# Patient Record
Sex: MALE | Race: BLACK OR AFRICAN AMERICAN | NOT HISPANIC OR LATINO | Employment: OTHER | URBAN - METROPOLITAN AREA
[De-identification: names, ages, dates, MRNs, and addresses within clinical notes are randomized per-mention and may not be internally consistent; named-entity substitution may affect disease eponyms.]

---
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
## 2017-12-15 ENCOUNTER — OFFICE VISIT (OUTPATIENT)
Dept: SPINE | Facility: CLINIC | Age: 82
End: 2017-12-15
Attending: PHYSICAL MEDICINE & REHABILITATION
Payer: MEDICARE

## 2017-12-15 ENCOUNTER — LAB VISIT (OUTPATIENT)
Dept: LAB | Facility: OTHER | Age: 82
End: 2017-12-15
Attending: PHYSICAL MEDICINE & REHABILITATION
Payer: MEDICARE

## 2017-12-15 VITALS
HEART RATE: 60 BPM | SYSTOLIC BLOOD PRESSURE: 142 MMHG | WEIGHT: 256 LBS | HEIGHT: 71 IN | BODY MASS INDEX: 35.84 KG/M2 | DIASTOLIC BLOOD PRESSURE: 67 MMHG

## 2017-12-15 DIAGNOSIS — G95.9 CERVICAL MYELOPATHY: ICD-10-CM

## 2017-12-15 DIAGNOSIS — R26.81 GAIT INSTABILITY: ICD-10-CM

## 2017-12-15 DIAGNOSIS — M40.203 KYPHOSIS OF CERVICOTHORACIC REGION, UNSPECIFIED KYPHOSIS TYPE: ICD-10-CM

## 2017-12-15 DIAGNOSIS — M54.2 NECK PAIN: Primary | ICD-10-CM

## 2017-12-15 LAB
CREAT SERPL-MCNC: 0.9 MG/DL
EST. GFR  (AFRICAN AMERICAN): >60 ML/MIN/1.73 M^2
EST. GFR  (NON AFRICAN AMERICAN): >60 ML/MIN/1.73 M^2

## 2017-12-15 PROCEDURE — 99999 PR PBB SHADOW E&M-EST. PATIENT-LVL III: CPT | Mod: PBBFAC,,, | Performed by: PHYSICAL MEDICINE & REHABILITATION

## 2017-12-15 PROCEDURE — 36415 COLL VENOUS BLD VENIPUNCTURE: CPT

## 2017-12-15 PROCEDURE — 99204 OFFICE O/P NEW MOD 45 MIN: CPT | Mod: S$PBB,,, | Performed by: PHYSICAL MEDICINE & REHABILITATION

## 2017-12-15 PROCEDURE — 82565 ASSAY OF CREATININE: CPT

## 2017-12-15 PROCEDURE — 99213 OFFICE O/P EST LOW 20 MIN: CPT | Mod: PBBFAC | Performed by: PHYSICAL MEDICINE & REHABILITATION

## 2017-12-15 RX ORDER — LACTULOSE 10 G/15ML
SOLUTION ORAL
Refills: 0 | COMMUNITY
Start: 2017-12-06

## 2017-12-15 RX ORDER — DILTIAZEM HYDROCHLORIDE 120 MG/1
90 CAPSULE, EXTENDED RELEASE ORAL
Refills: 0 | COMMUNITY
Start: 2017-11-02

## 2017-12-15 RX ORDER — APIXABAN 2.5 MG/1
2.5 TABLET, FILM COATED ORAL 2 TIMES DAILY
Refills: 0 | Status: ON HOLD | COMMUNITY
Start: 2017-11-29 | End: 2018-05-18 | Stop reason: HOSPADM

## 2017-12-15 RX ORDER — INSULIN GLARGINE 100 [IU]/ML
INJECTION, SOLUTION SUBCUTANEOUS
Refills: 0 | COMMUNITY
Start: 2017-11-02 | End: 2018-04-04 | Stop reason: CLARIF

## 2017-12-15 RX ORDER — GABAPENTIN 100 MG/1
100 CAPSULE ORAL 3 TIMES DAILY PRN
Refills: 0 | COMMUNITY
Start: 2017-11-02

## 2017-12-15 RX ORDER — DOXYCYCLINE 100 MG/1
100 CAPSULE ORAL 2 TIMES DAILY
Refills: 0 | COMMUNITY
Start: 2017-11-09 | End: 2018-04-04 | Stop reason: CLARIF

## 2017-12-15 RX ORDER — VALSARTAN 80 MG/1
80 TABLET ORAL DAILY
Refills: 0 | Status: ON HOLD | COMMUNITY
Start: 2017-11-02 | End: 2018-05-18 | Stop reason: HOSPADM

## 2017-12-15 RX ORDER — PEN NEEDLE, DIABETIC 31 GX5/16"
NEEDLE, DISPOSABLE MISCELLANEOUS
Refills: 0 | COMMUNITY
Start: 2017-11-04 | End: 2019-01-01

## 2017-12-15 RX ORDER — ATORVASTATIN CALCIUM 20 MG/1
20 TABLET, FILM COATED ORAL NIGHTLY
Refills: 0 | COMMUNITY
Start: 2017-11-02

## 2017-12-15 RX ORDER — CINACALCET HYDROCHLORIDE 30 MG/1
30 TABLET, COATED ORAL
Refills: 0 | COMMUNITY
Start: 2017-11-16

## 2017-12-15 RX ORDER — GABAPENTIN 400 MG/1
400 CAPSULE ORAL 2 TIMES DAILY
Refills: 0 | COMMUNITY
Start: 2017-12-06 | End: 2018-04-04 | Stop reason: CLARIF

## 2017-12-15 RX ORDER — TRAZODONE HYDROCHLORIDE 50 MG/1
50 TABLET ORAL NIGHTLY
Refills: 0 | Status: ON HOLD | COMMUNITY
Start: 2017-12-06 | End: 2018-05-18 | Stop reason: HOSPADM

## 2017-12-15 NOTE — LETTER
December 15, 2017      Branden Baeza MD  [address]  [place] [address]           [facility]  [address], [address]  [facility] [address]  Phone: [phone]  Fax: [phone]          Patient: Hollis Pitts   MR Number: [id number]   YOB: 1935   Date of Visit: 12/15/2017       Dear Dr. Branden Baeza:    Thank you for referring Hollis Pitts to me for evaluation. Attached you will find relevant portions of my assessment and plan of care.    If you have questions, please do not hesitate to call me. I look forward to following Hollis Pitts along with you.    Sincerely,    Leonor Black MD    Enclosure  CC:  No Recipients    If you would like to receive this communication electronically, please contact [email or web address] or [phone] to request more information on [facility] Link access.    For providers and/or their staff who would like to refer a patient to [facility], please contact us through our one-stop-shop provider referral line, [facility] [place], at [phone].    If you feel you have received this communication in error or would no longer like to receive these types of communications, please e-mail [email or web address]

## 2017-12-21 ENCOUNTER — HOSPITAL ENCOUNTER (OUTPATIENT)
Dept: RADIOLOGY | Facility: OTHER | Age: 82
Discharge: HOME | End: 2017-12-21
Attending: PHYSICAL MEDICINE & REHABILITATION
Payer: MEDICARE

## 2017-12-21 ENCOUNTER — TELEPHONE (OUTPATIENT)
Dept: SPINE | Facility: CLINIC | Age: 82
End: 2017-12-21

## 2017-12-21 DIAGNOSIS — R26.81 GAIT INSTABILITY: ICD-10-CM

## 2017-12-21 DIAGNOSIS — M54.2 NECK PAIN: ICD-10-CM

## 2017-12-21 DIAGNOSIS — M40.203 KYPHOSIS OF CERVICOTHORACIC REGION, UNSPECIFIED KYPHOSIS TYPE: ICD-10-CM

## 2017-12-21 DIAGNOSIS — G95.9 CERVICAL MYELOPATHY: Primary | ICD-10-CM

## 2017-12-21 PROCEDURE — 72070 X-RAY EXAM THORAC SPINE 2VWS: CPT | Mod: TC

## 2017-12-21 PROCEDURE — 72070 X-RAY EXAM THORAC SPINE 2VWS: CPT | Mod: 26,,, | Performed by: RADIOLOGY

## 2017-12-21 PROCEDURE — 72050 X-RAY EXAM NECK SPINE 4/5VWS: CPT | Mod: TC

## 2017-12-21 PROCEDURE — 72050 X-RAY EXAM NECK SPINE 4/5VWS: CPT | Mod: 26,,, | Performed by: RADIOLOGY

## 2017-12-21 NOTE — TELEPHONE ENCOUNTER
MRI called and stated that patient is unable to get test done because he is in to much pain and they were unable to get patient head into the machine without him moving she suggest that he goes to main campus and he be sedated.

## 2017-12-22 ENCOUNTER — TELEPHONE (OUTPATIENT)
Dept: SPINE | Facility: CLINIC | Age: 82
End: 2017-12-22

## 2017-12-22 NOTE — TELEPHONE ENCOUNTER
Called spoke with patient daughter I inform her that Jennie will contact her to schedule MRI with sedation once it is reschedule give us a call and to reschedule appointment with .  ----- Message from Karina Galarza sent at 12/22/2017  9:35 AM CST -----  Contact: Bud Dhaliwal)  x_  1st Request  _  2nd Request  _  3rd Request    Who: Bud Dhaliwal)    Why: The staff at the MRI imaging center stated that  the patient needed to be sedated in order to have an MRI. The pt needs to be rescheduled for an MRI and A F/U appt.      What Number to Call Back:[phone]    When to Expect a call back: (Within 24 hours)    Please return the call at earliest convenience. Thanks!

## 2018-01-10 ENCOUNTER — TELEPHONE (OUTPATIENT)
Dept: SPINE | Facility: CLINIC | Age: 83
End: 2018-01-10

## 2018-01-10 DIAGNOSIS — G95.9 CERVICAL MYELOPATHY: Primary | ICD-10-CM

## 2018-01-10 DIAGNOSIS — R26.81 GAIT INSTABILITY: ICD-10-CM

## 2018-01-10 DIAGNOSIS — M40.203 KYPHOSIS OF CERVICOTHORACIC REGION, UNSPECIFIED KYPHOSIS TYPE: ICD-10-CM

## 2018-01-10 DIAGNOSIS — M54.2 NECK PAIN: ICD-10-CM

## 2018-01-10 NOTE — TELEPHONE ENCOUNTER
The MRI of the cervical spine is suppose to be done with sedation, not with oral sedation.  The important one is MRI of the cervical spine and needs to be scheduled with sedation.  I am putting another order in for MRI cervical with and without contrast cervical.

## 2018-01-10 NOTE — TELEPHONE ENCOUNTER
----- Message from Jennifer Lozoya sent at 1/9/2018  4:49 PM CST -----  Contact: Jennifer Lozoya - Radiology.  To whom this may concern:    I am writing in regards to patient, Hollis Pitts (MRN [id number]).  His daughter, Bud Stephenson, reached out to me after Mr. Pitts had a bad experience at [facility] MRI.  [facility] at [facility] per their request.  They wanted me to reach out to you to prescribe him medicine for pain because he's unable to lay down on the flat MRI table too long without hurting.  Can you help me with this matter as soon as possible?      You can call and speak to his daughter at [phone].  She is also requesting if you could send the medication to the pharmacy:  [facility]  [address]  [place], [place]  [phone]    Please call me at [phone] if you have any questions or if you need me to help with anything.    Thank you so much,  Jennifer Lozoya  Radiology  [phone]

## 2018-01-10 NOTE — TELEPHONE ENCOUNTER
----- Message from Leonor Black MD sent at 1/10/2018  9:11 AM CST -----  Contact: Jennifer Lozoya - Radiology.  The MRI is suppose to be with sedation, so he should not have the pain lying down????  ----- Message -----  From: Jennifer Lozoya  Sent: 1/9/2018   4:49 PM  To: Leonor Black MD, #    To whom this may concern:    I am writing in regards to patient, Hollis Pitts (MRN [id number]).  His daughter, Bud Stephenson, reached out to me after Mr. Pitts had a bad experience at [facility] MRI.  [facility] at 4:30[facility] per their request.  They wanted me to reach out to you to prescribe him medicine for pain because he's unable to lay down on the flat MRI table too long without hurting.  Can you help me with this matter as soon as possible?      You can call and speak to his daughter at [phone].  She is also requesting if you could send the medication to the pharmacy:  [facility]  [address]  [place], [place]  [phone]    Please call me at [phone] if you have any questions or if you need me to help with anything.    Thank you so much,  Jennifer Lozoya  Radiology  [phone]

## 2018-01-10 NOTE — TELEPHONE ENCOUNTER
----- Message from Jennifer Lozoya sent at 1/9/2018  4:49 PM CST -----  Contact: Jennifer Lozoya - Radiology.  To whom this may concern:    I am writing in regards to patient, Hollis Pitts (MRN [id number]).  His daughter, Bud Stephenson, reached out to me after Mr. Pitts had a bad experience at [facility] MRI.  [facility] per their request.  They wanted me to reach out to you to prescribe him medicine for pain because he's unable to lay down on the flat MRI table too long without hurting.  Can you help me with this matter as soon as possible?      You can call and speak to his daughter at [phone].  She is also requesting if you could send the medication to the pharmacy:  [facility]  [address]  [place], [place]  [phone]    Please call me at [phone] if you have any questions or if you need me to help with anything.    Thank you so much,  Jennifer Lozoya  Radiology  [phone]

## 2018-01-12 ENCOUNTER — HOSPITAL ENCOUNTER (OUTPATIENT)
Dept: RADIOLOGY | Facility: HOSPITAL | Age: 83
Discharge: HOME | End: 2018-01-12
Attending: PHYSICAL MEDICINE & REHABILITATION
Payer: MEDICARE

## 2018-01-12 VITALS — SYSTOLIC BLOOD PRESSURE: 187 MMHG | OXYGEN SATURATION: 97 % | DIASTOLIC BLOOD PRESSURE: 84 MMHG | HEART RATE: 54 BPM

## 2018-01-12 VITALS
OXYGEN SATURATION: 94 % | HEART RATE: 66 BPM | RESPIRATION RATE: 18 BRPM | DIASTOLIC BLOOD PRESSURE: 77 MMHG | SYSTOLIC BLOOD PRESSURE: 161 MMHG

## 2018-01-12 DIAGNOSIS — R26.81 GAIT INSTABILITY: ICD-10-CM

## 2018-01-12 DIAGNOSIS — M54.2 NECK PAIN: ICD-10-CM

## 2018-01-12 DIAGNOSIS — M40.203 KYPHOSIS OF CERVICOTHORACIC REGION, UNSPECIFIED KYPHOSIS TYPE: ICD-10-CM

## 2018-01-12 DIAGNOSIS — G95.9 CERVICAL MYELOPATHY: ICD-10-CM

## 2018-01-12 PROCEDURE — 72141 MRI NECK SPINE W/O DYE: CPT | Mod: TC

## 2018-01-12 PROCEDURE — 72148 MRI LUMBAR SPINE W/O DYE: CPT | Mod: 26,GC,, | Performed by: RADIOLOGY

## 2018-01-12 PROCEDURE — 72148 MRI LUMBAR SPINE W/O DYE: CPT | Mod: TC

## 2018-01-12 PROCEDURE — 72141 MRI NECK SPINE W/O DYE: CPT | Mod: 26,GC,, | Performed by: RADIOLOGY

## 2018-01-12 PROCEDURE — 63600175 PHARM REV CODE 636 W HCPCS: Performed by: STUDENT IN AN ORGANIZED HEALTH CARE EDUCATION/TRAINING PROGRAM

## 2018-01-12 RX ORDER — MIDAZOLAM HYDROCHLORIDE 1 MG/ML
2 INJECTION, SOLUTION INTRAMUSCULAR; INTRAVENOUS
Status: DISCONTINUED | OUTPATIENT
Start: 2018-01-12 | End: 2018-01-13 | Stop reason: HOSPADM

## 2018-01-12 RX ADMIN — MIDAZOLAM HYDROCHLORIDE 2 MG: 1 INJECTION, SOLUTION INTRAMUSCULAR; INTRAVENOUS at 01:01

## 2018-01-12 NOTE — PROGRESS NOTES
Pt reports feeling fine and AAO w/ NAD noted / currently sitting up in his W/c and talking with staff / plan to D/C home / pt's sister-in-law is in [place] and to take pt home / pt is W./C bound due to back pain

## 2018-01-12 NOTE — PROGRESS NOTES
Pt to W/R and family member with pt to take home / tolerated MRI well and reports feel asleep and does not remember the procedure / encouraged to increase PO fluids / eat a meal and rest today

## 2018-01-12 NOTE — PROGRESS NOTES
Staff moving pt to MRI table and C/O intense neck pain / pt able to be positioned after several attempts where with padding he is able to tolerate being flat / has trouble with neck bending

## 2018-01-12 NOTE — PROGRESS NOTES
Several attempts for IV unsuccessful / RN and Tech attempted x 4 and successfully placed 22ga to R hand / pt has some tremors and anxiety as well as contractions in UE bi-lat with small veins making IV access diff. / pt agree and cooperative

## 2018-01-15 ENCOUNTER — TELEPHONE (OUTPATIENT)
Dept: SPINE | Facility: CLINIC | Age: 83
End: 2018-01-15

## 2018-01-15 NOTE — TELEPHONE ENCOUNTER
MRI of the cervical spine shows reversal of the normal curve and degenerative changes, no evidence of myelopathy but feel like likely cause of weakness.  We will get him scheduled to see Dr. Myers.  A message left to review results.  They have also been released on my [facility]

## 2018-01-16 ENCOUNTER — TELEPHONE (OUTPATIENT)
Dept: SPINE | Facility: CLINIC | Age: 83
End: 2018-01-16

## 2018-01-17 ENCOUNTER — TELEPHONE (OUTPATIENT)
Dept: SPINE | Facility: CLINIC | Age: 83
End: 2018-01-17

## 2018-01-17 NOTE — TELEPHONE ENCOUNTER
Called and discussed results with he and his daughter.  They are aware of appointment with Dr. Hess on 2/5

## 2018-02-05 ENCOUNTER — TELEPHONE (OUTPATIENT)
Dept: NEUROSURGERY | Facility: CLINIC | Age: 83
End: 2018-02-05

## 2018-02-05 ENCOUNTER — OFFICE VISIT (OUTPATIENT)
Dept: SPINE | Facility: CLINIC | Age: 83
End: 2018-02-05
Payer: MEDICARE

## 2018-02-05 VITALS
RESPIRATION RATE: 14 BRPM | DIASTOLIC BLOOD PRESSURE: 82 MMHG | WEIGHT: 256 LBS | SYSTOLIC BLOOD PRESSURE: 116 MMHG | HEIGHT: 71 IN | BODY MASS INDEX: 35.84 KG/M2 | HEART RATE: 60 BPM

## 2018-02-05 DIAGNOSIS — G95.9 CERVICAL MYELOPATHY: Primary | ICD-10-CM

## 2018-02-05 PROCEDURE — 99999 PR PBB SHADOW E&M-EST. PATIENT-LVL III: CPT | Mod: PBBFAC,,, | Performed by: NEUROLOGICAL SURGERY

## 2018-02-05 PROCEDURE — 1159F MED LIST DOCD IN RCRD: CPT | Mod: ,,, | Performed by: NEUROLOGICAL SURGERY

## 2018-02-05 PROCEDURE — 99204 OFFICE O/P NEW MOD 45 MIN: CPT | Mod: S$PBB,,, | Performed by: NEUROLOGICAL SURGERY

## 2018-02-05 PROCEDURE — 1125F AMNT PAIN NOTED PAIN PRSNT: CPT | Mod: ,,, | Performed by: NEUROLOGICAL SURGERY

## 2018-02-05 PROCEDURE — 99213 OFFICE O/P EST LOW 20 MIN: CPT | Mod: PBBFAC | Performed by: NEUROLOGICAL SURGERY

## 2018-02-05 RX ORDER — DOCUSATE SODIUM 100 MG/1
100 CAPSULE, LIQUID FILLED ORAL 2 TIMES DAILY
COMMUNITY
End: 2018-04-04 | Stop reason: CLARIF

## 2018-02-05 RX ORDER — OMEGA-3-ACID ETHYL ESTERS 1 G/1
2 CAPSULE, LIQUID FILLED ORAL 2 TIMES DAILY
COMMUNITY
End: 2018-04-04 | Stop reason: CLARIF
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
## 2018-02-05 NOTE — LETTER
February 5, 2018      Leonor Black MD  [address]  [address]  [facility]  [facility] [address]           [facility]  [address], [address]  [facility] [address]  Phone: [phone]  Fax: [phone]          Patient: Hollis Pitts   MR Number: [id number]   YOB: 1935   Date of Visit: 2/5/2018       Dear Dr. Leonor Black:    Thank you for referring Hollis Pitts to me for evaluation. Attached you will find relevant portions of my assessment and plan of care.    If you have questions, please do not hesitate to call me. I look forward to following Hollis Pitts along with you.    Sincerely,    Osvaldo Hess MD    Enclosure  CC:  No Recipients    If you would like to receive this communication electronically, please contact [email or web address] or [phone] to request more information on [facility] Link access.    For providers and/or their staff who would like to refer a patient to [facility], please contact us through our one-stop-shop provider referral line, [facility], at [phone].    If you feel you have received this communication in error or would no longer like to receive these types of communications, please e-mail [email or web address]

## 2018-02-19 ENCOUNTER — TELEPHONE (OUTPATIENT)
Dept: NEUROSURGERY | Facility: CLINIC | Age: 83
End: 2018-02-19

## 2018-02-19 ENCOUNTER — CLINICAL SUPPORT (OUTPATIENT)
Dept: REHABILITATION | Facility: HOSPITAL | Age: 83
End: 2018-02-19
Attending: NEUROLOGICAL SURGERY
Payer: MEDICARE

## 2018-02-19 DIAGNOSIS — R26.89 DECREASED FUNCTIONAL MOBILITY: ICD-10-CM

## 2018-02-19 DIAGNOSIS — M54.2 CERVICAL PAIN: ICD-10-CM

## 2018-02-19 DIAGNOSIS — R29.898 SHOULDER WEAKNESS: ICD-10-CM

## 2018-02-19 PROCEDURE — G8978 MOBILITY CURRENT STATUS: HCPCS | Mod: CL

## 2018-02-19 PROCEDURE — G8979 MOBILITY GOAL STATUS: HCPCS | Mod: CK

## 2018-02-19 PROCEDURE — 97162 PT EVAL MOD COMPLEX 30 MIN: CPT

## 2018-02-19 NOTE — TELEPHONE ENCOUNTER
----- Message from Dalton Bailon sent at 2/19/2018  9:29 AM CST -----  Contact: Bud (daughter)  X_ 1st Request  _ 2nd Request  _ 3rd Request    Who: KINGS HALL [[id number]]    Why: Would like to clarify with staff that patient neck brace orders will be at the PT appointment    What Number to Call Back: [phone] (cell) or [phone] (work)    When to Expect a call back: (Before the end of the day)  -- if call after 3:00 call back will be tomorrow.

## 2018-02-19 NOTE — PLAN OF CARE
"                                                    Physical Therapy Initial Evaluation     Name: Hollis Pitts  [facility] Number: [id number]    Diagnosis:   Encounter Diagnoses   Name Primary?    Cervical pain     Shoulder weakness     Decreased functional mobility      Physician: Osvaldo Hess MD  Treatment Orders: PT Eval and Treat  Past Medical History:   Diagnosis Date    BPH (benign prostatic hypertrophy)     Depression     Diabetes mellitus, type 2     Dyslipidemia     Glaucoma (increased eye pressure)     Hypertension      Current Outpatient Prescriptions   Medication Sig    atorvastatin (LIPITOR) 20 MG tablet Take 20 mg by mouth nightly.    BD INSULIN PEN NEEDLE UF SHORT 31 gauge x 5/16" Ndle USE WITH LANTUS PEN DAILY    CARTIA  mg Cp24 Take 120 mg by mouth once daily.    cetirizine (ZYRTEC) 10 MG tablet Take 10 mg by mouth daily as needed.    citalopram (CELEXA) 20 MG tablet Take 20 mg by mouth once daily.    clorazepate (TRANXENE) 3.75 MG Tab Start with half (½) a tablet in AM and 1 at bedtime, if no relief of stress in a week, may increase to 1 tablet twice a day.    CONSTULOSE 10 gram/15 mL solution TAKE  30 MILLILITERS BY MOUTH 3 TIMES A DAY UNTIL BOWEL MOVEMENT ...  (REFER TO PRESCRIPTION NOTES).    docusate sodium (COLACE) 100 MG capsule Take 100 mg by mouth 2 (two) times daily.    doxycycline (VIBRAMYCIN) 100 MG Cap Take 100 mg by mouth 2 (two) times daily.    ELIQUIS 2.5 mg Tab     fluticasone (FLONASE) 50 mcg/actuation nasal spray 1 spray by Each Nare route once daily.    FREESTYLE TEST Strp     gabapentin (NEURONTIN) 100 MG capsule Take 200 mg by mouth 2 (two) times daily.    gabapentin (NEURONTIN) 400 MG capsule Take 400 mg by mouth 2 (two) times daily.    LANTUS SOLOSTAR 100 unit/mL (3 mL) InPn pen INJECT 15 UNITS UNDER THE SKIN AT BEDTIME (MUST SEE THE DOCTOR ON 11/14/2017)    omega-3 acid ethyl esters (LOVAZA) 1 gram capsule Take 2 g by mouth 2 (two) times daily. "    SENSIPAR 30 mg Tab     tamsulosin (FLOMAX) 0.4 mg Cp24 0.4 mg once daily.     traZODone (DESYREL) 50 MG tablet Take 50 mg by mouth every evening.    valsartan (DIOVAN) 80 MG tablet Take 80 mg by mouth once daily.    vitamin B comp with C no.4 150 mg Tab Take vitamin B complex 1 a day.  Over-the-counter is an appropriate alternative.     No current facility-administered medications for this visit.      Review of patient's allergies indicates:  No Known Allergies    Evaluation Date: 02/19/2018  Visit # authorized: 20  Authorization period: 12/31/2018  MD referral: Osvaldo Hess MD      Subjective     Patient states: something fell on his head in July and crushed his vertebrae. Pt presents to therapy in  with soft collar around neck. Pt has been in  since July due to injury and states he only wears collar when riding in his car to limit movement of head. Pt's daughter reports Hollis just finished up HH 3 weeks ago. Pt lives with daughter and requires assistance for all ADL's and mobility. Pt unable to use R hand to do ADL's as he cannot . Pt with memory deficits, unable to recall home health. PT required assistance from daughter for history. Pt only notices pain with active movement of neck.   Imaging:     Findings:   There is reversal of the normal cervical lordosis. There is significant disc space loss through the levels of C4-C7 with  degenerative changes of the endplates. There is mild grade 1 retrolisthesis of C4 on C5 and C5 on C6.  Bone marrow  signal is normal.   Visualized posterior fossa structures and cervical cord are unremarkable.   Limited evaluation of the neck soft tissues is unremarkable.   C2-3: Posterior disc osteophyte complex formation without spinal canal stenosis or neuroforaminal narrowing.   C3-4: No spinal canal stenosis or neuroforaminal narrowing.   C4-5: Posterior disc osteophyte complex formation and uncovertebral arthrosis resulting in moderate right-sided  neuroforaminal  narrowing and no spinal canal stenosis.   C5-6: Posterior disc osteophyte complex formation effacing the anterior CSF sleeve and uncovertebral  arthrosis resulting in moderate right-sided neuroforaminal narrowing and mild spinal canal stenosis.   C6-7: Posterior disc osteophyte complex formation effacing the anterior CSF sleeve and uncovertebral  arthrosis resulting in moderate spinal canal stenosis and moderate to severe left neuroforaminal narrowing   C7-T1: No spinal canal stenosis or neuroforaminal narrowing.  Pain Scale: Hollis rates pain on a scale of 0-10 to be 8 at worst; 1 currently; 1 at best .  Onset: sudden  Radicular symptoms:  BUE to fingertips - numbness, tingling.   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 I recommended surgical correction of his deformity because he has lost the ability to ambulate over the past 7 months. He believes his deficits have stabilized at this time and would like to consider his options. I have recommended a [place] J collar worn at all times and physical therapy. He will follow up in 1 month for re-evaluation.     The patient understands and agrees with the plan of care. All questions were answered.   1. [place] J cervical collar  2. Referral to Physical Therapy  3. RTC 1 month       Objective     Posture Alignment: slouched posture, forward head, sitting in WC - pt with noted C/S in ROT L with slight SB    CERVICAL SPINE AROM:   Flexion: 3 degrees   Extension: 0 degrees   Left Sidebend: 2 degrees   Right Sidebend: 0 degrees   Left Rotation: 2 degrees   Right  Rotation: 0 degrees     SEGMENTAL MOBILITY: cervical deformity noted, joint mobility not tested due to safety     UPPER EXTREMITY STRENGTH:   Left Right   Shoulder Flexion 2+/5 2+/5   Shoulder Abduction 2+/5 2+/5     Shoulder Internal Rotation 4+/5 4+/5   Shoulder External Rotation 2+/5 3-/5   Elbow Flexion 4-/5 3+/5   Elbow Extension 4-/5 3+/5   Wrist Flexion 4/5 4/5   Wrist Extension 4/5 4/5    Poor Fair        Dermatomes: Sensation: Light Touch: Intact  Myotomes: Decreased t/o BUE  Palpation: Mild TTP at B Upper trap     Special Tests:  Not tested due to safety     Pt/family was provided educational information, including: role of PT, goals for PT, scheduling - pt verbalized understanding. Discussed insurance limitations with pt.     TREATMENT     Time In: 2:50 p  Time Out: 3:20 p    PT Evaluation Completed? Yes  Discussed Plan of Care with patient: Yes      Assessment     Patient presents to therapy with s/s consistent with diagnosis. Pt with primary deficits in C/S ROM, BUE strength, BUE radicular s/s, and overall decreased functional mobility. Pt severely limited in all active movement at C/S. Noted increased mm tone B paraspinals, C/S extensors. Pt is in process of being scheduled for fitting of [place] J collar. Pt requires assistance for all ADL's and functional activities. Pt evaluated at [facility] - PT deemed pt more appropriate for referral to Neuro PT at [facility]. Pt and daughter agreed with this plan, will contact patient to schedule therapy once referral is confirmed. Pt will require transportation.    Pt prognosis is Fair.  Pt will benefit from skilled outpatient physical therapy to address the above stated deficits, provide pt/family education and to maximize pt's level of independence.     History  Co-morbidities and personal factors that may impact the plan of care Examination  Body Structures and Functions, activity limitations and participation restrictions that may impact the plan of care    Clinical  Presentation   Co-morbidities:   advanced age, depression and HTN, DM II        Personal Factors:   age  lifestyle  attitudes Body Regions:   neck  upper extremities    Body Systems:    ROM  strength  transfers  motor control        Participation Restrictions:   Fall Risk - WC bound      Activity limitations:   Learning and applying knowledge  no deficits    General Tasks and Commands  undertaking multiple tasks    Communication  communicating with/receiving spoken language    Mobility  lifting and carrying objects  fine hand use (grasping/picking up)  walking  moving around using equipment (WC)    Self care  washing oneself (bathing, drying, washing hands)  caring for body parts (brushing teeth, shaving, grooming)  toileting  dressing    Domestic Life  shopping  cooking  doing house work (cleaning house, washing dishes, laundry)    Interactions/Relationships  no deficits    Life Areas  no deficits    Community and Social Life  no deficits         evolving clinical presentation with changing clinical characteristics                      moderate   moderate  moderate Decision Making/ Complexity Score:  moderate       Medical necessity is demonstrated by the following IMPAIRMENTS/PROBLEMS:  1. Increased Pain  2. Decreased Segmental Mobility & Decreased ROM  3. Decreased Core & BUE strength  4. Postural Imbalance  5. Decreased Tolerance to Functional Activities    Pt's spiritual, cultural and educational needs considered and pt agreeable to plan of care and goals as stated below:     Anticipated Barriers for physical therapy: Severe functional disability    Short Term GOALS: 3 weeks. Pt agrees with goals set.  1. Patient demonstrates independence with HEP.   2. Patient demonstrates independence with Postural Awareness.   3. Patient demonstrates independence with body mechanics.     Long Term GOALS: 6 weeks. Pt agrees with goals set.  1. Patient demonstrates increased C/S ROM by 10 degrees to improve tolerance to  functional activities pain free.   2. Patient demonstrates increased strength BUE's to 3+/5 or greater to improve tolerance to functional activities pain free.   3. Patient demonstrates improved overall function per FOTO Neck Survey to 48% Limitation or less.     Functional Limitations Reports - G Codes  Category: Mobility  Tool: FOTO Neck Survey  Score: 69% Limitation   Modifier  Impairment Limitation Restriction    CH  0 % impaired, limited or restricted    CI  @ least 1% but less than 20% impaired, limited or restricted    CJ  @ least 20%<40% impaired, limited or restricted    CK  @ least 40%<60% impaired, limited or restricted    CL  @ least 60% <80% impaired, limited or restricted    CM  @ least 80%<100% impaired limited or restricted    CN  100% impaired, limited or restricted     Current/  CL = 69% limitation  Goal/ : CK =  48% limitation    PLAN     Outpatient physical therapy 2 times weekly to include: pt ed, hep, therapeutic exercises, neuromuscular re-education/ balance exercises, joint mobilizations, aquatic therapy and modalities prn. Cont PT for  6 weeks. Pt may be seen by PTA as part of the rehabilitation team.     Therapist: Renee Wilkerson, PT  2/19/2018

## 2018-02-19 NOTE — TELEPHONE ENCOUNTER
----- Message from Linden Stevens sent at 2/19/2018  1:52 PM CST -----  Contact: patients daughter Khanh  x_ 1st Request   _ 2nd Request   _ 3rd Request     Who: KINGS HALL [[id number]]    Why: Pt missed your call is requesting a call back from Hayley    What Number to Call Back: [phone]    When to Expect a call back: (Before the end of the day)   -- if call after 3:00 call back will be tomorrow.

## 2018-02-22 ENCOUNTER — TELEPHONE (OUTPATIENT)
Dept: NEUROSURGERY | Facility: CLINIC | Age: 83
End: 2018-02-22

## 2018-02-22 NOTE — TELEPHONE ENCOUNTER
----- Message from Alexsu Santizo sent at 2/22/2018  8:59 AM CST -----  Contact: Vishal Miranda [employer]    [phone]  Calling for the status of the detailed written order sent on 2-8-18.  They are not able to dispense anything to the pt until they get it back.

## 2018-03-08 ENCOUNTER — OFFICE VISIT (OUTPATIENT)
Dept: NEUROSURGERY | Facility: CLINIC | Age: 83
End: 2018-03-08
Payer: MEDICARE

## 2018-03-08 VITALS
HEART RATE: 60 BPM | BODY MASS INDEX: 35.84 KG/M2 | DIASTOLIC BLOOD PRESSURE: 66 MMHG | SYSTOLIC BLOOD PRESSURE: 125 MMHG | HEIGHT: 71 IN | WEIGHT: 256 LBS | TEMPERATURE: 98 F

## 2018-03-08 DIAGNOSIS — G95.9 CERVICAL MYELOPATHY: Primary | ICD-10-CM

## 2018-03-08 DIAGNOSIS — M40.12 OTHER SECONDARY KYPHOSIS, CERVICAL REGION: ICD-10-CM

## 2018-03-08 PROCEDURE — 99214 OFFICE O/P EST MOD 30 MIN: CPT | Mod: S$PBB,,, | Performed by: NEUROLOGICAL SURGERY

## 2018-03-08 PROCEDURE — 99999 PR PBB SHADOW E&M-EST. PATIENT-LVL III: CPT | Mod: PBBFAC,,, | Performed by: NEUROLOGICAL SURGERY

## 2018-03-08 PROCEDURE — 99213 OFFICE O/P EST LOW 20 MIN: CPT | Mod: PBBFAC | Performed by: NEUROLOGICAL SURGERY

## 2018-03-08 RX ORDER — POLYETHYLENE GLYCOL 3350 17 G/17G
POWDER, FOR SOLUTION ORAL
Refills: 0 | COMMUNITY
Start: 2017-12-20 | End: 2018-04-04 | Stop reason: CLARIF

## 2018-03-08 RX ORDER — BISACODYL 5 MG
TABLET, DELAYED RELEASE (ENTERIC COATED) ORAL
Refills: 0 | COMMUNITY
Start: 2018-02-12

## 2018-03-08 NOTE — PROGRESS NOTES
CHIEF COMPLAINT:  Follow up after Physical Therapy and cervical collar    I, Phi Loera, attest that this documentation has been prepared under the direction and in the presence of Osvaldo Hess MD.    HPI:  Hollis Pitts is a 82 y.o.  male with type 2 diabetes, who presents today for follow up evaluation of neck pain after Physical Therapy and cervical collar. Pt reports continued numbness and tingling in his bilateral hands. His neck pain has been improving since his last visit. Pt presents today in a wheel chair and wearing a cervical collar. He has been wearing the collar regularly but not always consistently. Pt denies any back pain or headaches. Per family, the mobility in his right arm has improved, but has begun swelling again.       Review of patient's allergies indicates:  No Known Allergies    Past Medical History:   Diagnosis Date    BPH (benign prostatic hypertrophy)     Depression     Diabetes mellitus, type 2     Dyslipidemia     Glaucoma (increased eye pressure)     Hypertension      Past Surgical History:   Procedure Laterality Date    CATARACT EXTRACTION W/  INTRAOCULAR LENS IMPLANT       Family History   Problem Relation Age of Onset    Clotting disorder Mother     Paranoid behavior Mother     Cancer Maternal Grandmother      Social History   Substance Use Topics    Smoking status: Former Smoker     Quit date: 2/6/1950    Smokeless tobacco: Never Used    Alcohol use 4.2 oz/week     7 Shots of liquor per week        Review of Systems   Constitutional: Negative.    HENT: Negative.    Eyes: Negative.    Respiratory: Negative.    Cardiovascular:        Right hand swelling   Gastrointestinal: Negative.    Endocrine: Negative.    Genitourinary: Negative.    Musculoskeletal: Positive for neck pain. Negative for back pain and gait problem.   Skin: Negative.    Allergic/Immunologic: Negative.    Neurological: Positive for numbness (bilateral hands). Negative for weakness,  light-headedness and headaches.   Hematological: Negative.    Psychiatric/Behavioral: Negative.        OBJECTIVE:   Vital Signs:  Temp: 98.4 °F (36.9 °C) (03/08/18 1008)  Pulse: 60 (03/08/18 1008)  BP: 125/66 (03/08/18 1008)    Physical Exam:    Vital signs: All nursing notes and vital signs reviewed -- afebrile, vital signs stable.  Constitutional: Patient sitting comfortably in chair. Appears well developed and well nourished.  Skin: Exposed areas are intact without abnormal markings, rashes or other lesions.  HEENT: Normocephalic. Normal conjunctivae.  Cardiovascular: Normal rate and regular rhythm.  Respiratory: Chest wall rises and falls symmetrically, without signs of respiratory distress.  Abdomen: Soft and non-tender.  Extremities: Warm and without edema. Calves supple, non-tender.  Psych/Behavior: Normal affect.    Neurological:    Mental status: Alert and oriented. Conversational and appropriate.       Cranial Nerves: Grossly intact.     Motor:    Upper:  Deltoids Triceps Biceps WE WF     R 3/5 4/5 5/5 5/5 5/5 4/5    L 3/5 4/5 5/5 5/5 5/5 4/5      Lower:  HF KE KF DF PF EHL    R 4-/5 5/5 5/5 5/5 5/5 5/5    L 4-/5 5/5 5/5 5/5 5/5 5/5     Sensory: Intact sensation to light touch in all extremities. Romberg negative.    Reflexes:          DTR: 2+ symmetrically throughout.     Moctezuma's: Negative.     Babinski's: Negative.     Clonus: Negative.    Cerebellar: Finger-to-nose and rapid alternating movements normal. Gait stable, fluid.    Spine:    Posture: Head well aligned over pelvis in front and side views.  No focal or global spinal deformity visible on inspection. Shoulders and hips even. No obvious leg length discrepancy. No scapula winging.    Bending: Full ROM with forward, back and lateral bending. No rib prominence with forward bend.    Cervical:      ROM: Cervical deformity. Inability to maintain horizontal gaze. Restricted ROM in all directions, pain limited.       Midline TTP: Negative.      Spurling's test: Negative.     Lhermitte's: Negative.    Thoracic:     Midline TTP: Negative    Lumbar:     Midline TTP: Negative     Straight Leg Test: Negative     Crossed Straight Leg Test: Negative     Sciatic notch tenderness: Negative.    Other:     SI joint TTP: Negative.     Greater trochanter TTP: Negative.     Tenderness with external/internal hip rotation: Negative.    Diagnostic Results:  All imaging was independently reviewed by me.    No new imaging for my review.     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 His myelopathy is stable to mildly improved in the cervical collar, and his neck pain has improved significantly. Today we discussed in detail my recommendation for surgical correction of his kyphosis in order to protect his spinal cord and preserve his current neurologic function. Specifically I have recommended a 2 level ACD at C5-6 and C6-7. R/B/A/I/M were reviewed in detail. He would like to proceed and follow up with me one week before surgery to discuss any final questions. He will need thorough preop medical clearance. I will also order a CT C spine prior to surgery for planning purposes.    He is NOT a candidate for the ACD pathway given his cervical deformity.    The patient understands and agrees with the plan of care. All questions were answered.     1. 2 level ACDF  2. RTC 1 week prior to surgery       I, Dr. Osvaldo Hess personally performed the services described in this documentation. All medical record entries made by the scribe, Phi Loera, were at my direction and in my presence.  I have reviewed the chart and agree that the record reflects my personal performance and is accurate and complete.      Osvaldo Hess M.D.  Department of Neurosurgery  [facility]      .

## 2018-04-03 ENCOUNTER — TELEPHONE (OUTPATIENT)
Dept: NEUROSURGERY | Facility: CLINIC | Age: 83
End: 2018-04-03

## 2018-04-03 DIAGNOSIS — M48.02 CERVICAL STENOSIS OF SPINE: ICD-10-CM

## 2018-04-03 DIAGNOSIS — M40.292 OTHER KYPHOSIS OF CERVICAL REGION: ICD-10-CM

## 2018-04-03 DIAGNOSIS — G95.9 MYELOPATHY: Primary | ICD-10-CM

## 2018-04-04 ENCOUNTER — ANESTHESIA EVENT (OUTPATIENT)
Dept: SURGERY | Facility: HOSPITAL | Age: 83
DRG: 471 | End: 2018-04-04
Payer: MEDICARE

## 2018-04-04 DIAGNOSIS — Z01.818 PRE-OP TESTING: Primary | ICD-10-CM

## 2018-04-04 RX ORDER — VIT C/E/ZN/COPPR/LUTEIN/ZEAXAN 250MG-90MG
1200 CAPSULE ORAL DAILY
Status: ON HOLD | COMMUNITY
End: 2018-05-18 | Stop reason: HOSPADM

## 2018-04-04 NOTE — ANESTHESIA PREPROCEDURE EVALUATION
Anesthesia Assessment: Preoperative EQUATION    Planned Procedure: Procedure(s) (LRB):  DISKECTOMY AND FUSION-ANTERIOR CERVICAL (ACDF) (N/A)  Requested Anesthesia Type:General  Surgeon: Osvaldo Hess MD  Service: Neurosurgery  Known or anticipated Date of Surgery:4/10/2018      Optimization:  Anesthesia Preop Clinic Assessment  Indicated      Plan:    Testing:  T&S     Consultation:Patient's PCP for a statement of optimization      Patient  has previously scheduled Medical Appointment:    Navigation: Tests Scheduled.              Consults scheduled.             Results will be tracked by Preop Clinic.                        No anesthesia preop clinic visit.                                                                                                                04/04/2018  Pre-operative evaluation for Procedure(s) (LRB):  DISKECTOMY AND FUSION-ANTERIOR CERVICAL (ACDF) (N/A)    Hollis Pitts is a 82 y.o. male with PMH of presumably controlled DM2 (no A1C), moderately controlled HTN, obesity, and cervical myelopathy secondary to cervical stenosis indicating the procedure above.      LDA: none currently     Prev airway:  None on file     Drips:  None     Patient Active Problem List   Diagnosis    Abnormal EKG    Shortness of breath    Morbid obesity    Erectile dysfunction    BPH with urinary obstruction    Memory deficits    Anxiety    Cervical pain    Shoulder weakness    Decreased functional mobility       Review of patient's allergies indicates:  No Known Allergies     No current facility-administered medications on file prior to encounter.      Current Outpatient Prescriptions on File Prior to Encounter   Medication Sig Dispense Refill    atorvastatin (LIPITOR) 20 MG tablet Take 20 mg by mouth nightly.  0    bisacodyl (DULCOLAX) 5 mg EC tablet   0    CARTIA  mg Cp24 Take 90 mg by mouth.   0    citalopram (CELEXA) 20 MG tablet Take 20 mg by mouth once daily.  0    gabapentin  "(NEURONTIN) 100 MG capsule Take 100 mg by mouth 3 (three) times daily as needed.   0    traZODone (DESYREL) 50 MG tablet Take 50 mg by mouth every evening.  0    valsartan (DIOVAN) 80 MG tablet Take 80 mg by mouth once daily.  0    vitamin B comp with C no.4 150 mg Tab Take vitamin B complex 1 a day.  Over-the-counter is an appropriate alternative. 30 tablet 12    BD INSULIN PEN NEEDLE UF SHORT 31 gauge x 5/16" Ndle USE WITH LANTUS PEN DAILY  0    CONSTULOSE 10 gram/15 mL solution TAKE  30 MILLILITERS BY MOUTH 3 TIMES A DAY UNTIL BOWEL MOVEMENT ...  (REFER TO PRESCRIPTION NOTES).  0    ELIQUIS 2.5 mg Tab Take 2.5 mg by mouth 2 (two) times daily.   0    FREESTYLE TEST Strp   1    SENSIPAR 30 mg Tab Take 30 mg by mouth daily with breakfast.   0       Past Surgical History:   Procedure Laterality Date    CATARACT EXTRACTION W/  INTRAOCULAR LENS IMPLANT         Social History     Social History    Marital status:      Spouse name: N/A    Number of children: N/A    Years of education: N/A     Occupational History    Not on file.     Social History Main Topics    Smoking status: Former Smoker     Quit date: 2/6/1950    Smokeless tobacco: Never Used    Alcohol use 4.2 oz/week     7 Shots of liquor per week    Drug use: No    Sexual activity: Yes     Partners: Female     Other Topics Concern    Not on file     Social History Narrative    No narrative on file         Vital Signs Range (Last 24H):         CBC: No results for input(s): WBC, RBC, HGB, HCT, PLT, MCV, MCH, MCHC in the last 72 hours.    CMP: No results for input(s): NA, K, CL, CO2, BUN, CREATININE, GLU, MG, PHOS, CALCIUM, ALBUMIN, PROT, ALKPHOS, ALT, AST, BILITOT in the last 72 hours.    INR  No results for input(s): PT, INR, PROTIME, APTT in the last 72 hours.        Diagnostic Studies:      EKG:  Vent. Rate : 052 BPM     Atrial Rate : 052 BPM     P-R Int : 150 ms          QRS Dur : 084 ms      QT Int : 434 ms       P-R-T Axes : 013 " -11 049 degrees     QTc Int : 403 ms    Sinus bradycardia  Otherwise normal ECG  No previous ECGs available  Confirmed by Too Petersen MD ([id number]) on 3/19/2018 7:15:45 PM    Stress Echo 2015:   Post Exercise Imaging:    Immediate post exercise images demonstrate left ventricular function augmenting. Right ventricular function augments. Left ventricular end systolic volume decreases.     No exercise induced wall motion abnormalities were identified.       CONCLUSIONS     1 - Normal left ventricular systolic function (EF 55-60%).     2 - Moderate left atrial enlargement.     3 - Left ventricular diastolic dysfunction.     4 - Normal right ventricular systolic function .     No evidence of stress induced myocardial ischemia.     This document has been electronically    SIGNED BY: Marquita Roy MD On: 12/31/2015 09:55          Anesthesia Evaluation    I have reviewed the Patient Summary Reports.     I have reviewed the Medications.     Review of Systems  Anesthesia Hx:  No previous Anesthesia  History of prior surgery of interest to airway management or planning: Previous anesthesia: General Denies Family Hx of Anesthesia complications.   Denies Personal Hx of Anesthesia complications.   Social:  Former Smoker, Social Alcohol Use    Hematology/Oncology:  Hematology Normal   Oncology Normal     EENT/Dental:EENT/Dental Normal   Cardiovascular:   Exercise tolerance: poor Hypertension, well controlled  Functional Capacity 1 METS    Pulmonary:  Pulmonary Normal    Renal/:  Renal/ Normal     Hepatic/GI:  Hepatic/GI Normal    Musculoskeletal:   Arthritis  Right upper extremity weakness, right hand edematous   Neurological:  Neurology Normal    Endocrine:   Diabetes, well controlled  Diabetes, Type 2 Diabetes for 40 years Typical AM glucose range: 96    Psych:   depression      OLAYINKA PAUL 4/4/18    Physical Exam  General:  Well nourished    Airway/Jaw/Neck:  Airway Findings: Mouth Opening: Normal Tongue: Normal  General  Airway Assessment: Adult  Mallampati: II  Improves to II with phonation.  TM Distance: Normal, at least 6 cm  Jaw/Neck Findings:  Neck ROM: Extension Decreased, Mod.      Dental:  Dental Findings: Upper Dentures   Chest/Lungs:  Chest/Lungs Findings: Clear to auscultation, Normal Respiratory Rate     Heart/Vascular:  Heart Findings: Rate: Normal  Rhythm: Regular Rhythm  Sounds: Normal        Mental Status:  Mental Status Findings:  Cooperative, Alert and Oriented         Anesthesia Plan  Type of Anesthesia, risks & benefits discussed:  Anesthesia Type:  general  Patient's Preference:   Intra-op Monitoring Plan: arterial line and standard ASA monitors  Intra-op Monitoring Plan Comments:   Post Op Pain Control Plan: multimodal analgesia  Post Op Pain Control Plan Comments:   Induction:   IV  Beta Blocker:  Patient is not currently on a Beta-Blocker (No further documentation required).       Informed Consent: Patient understands risks and agrees with Anesthesia plan.  Questions answered. Anesthesia consent signed with patient.  ASA Score: 3     Day of Surgery Review of History & Physical:    H&P update referred to the surgeon.         Ready For Surgery From Anesthesia Perspective.

## 2018-04-04 NOTE — PRE ADMISSION SCREENING
Anesthesia Assessment: Preoperative EQUATION    Planned Procedure: Procedure(s) (LRB):  DISKECTOMY AND FUSION-ANTERIOR CERVICAL (ACDF) (N/A)  Requested Anesthesia Type:General  Surgeon: Osvaldo Hess MD  Service: Neurosurgery  Known or anticipated Date of Surgery:4/10/2018      Optimization:  Anesthesia Preop Clinic Assessment  Indicated      Plan:    Testing:  T&S     Consultation:Patient's PCP for a statement of optimization      Patient  has previously scheduled Medical Appointment:    Navigation: Tests Scheduled.              Consults scheduled.             Results will be tracked by Preop Clinic.                        No anesthesia preop clinic visit.

## 2018-04-05 ENCOUNTER — OFFICE VISIT (OUTPATIENT)
Dept: NEUROSURGERY | Facility: CLINIC | Age: 83
End: 2018-04-05
Payer: MEDICARE

## 2018-04-05 ENCOUNTER — HOSPITAL ENCOUNTER (OUTPATIENT)
Dept: RADIOLOGY | Facility: HOSPITAL | Age: 83
Discharge: HOME | End: 2018-04-05
Attending: NEUROLOGICAL SURGERY
Payer: MEDICARE

## 2018-04-05 VITALS
WEIGHT: 256 LBS | DIASTOLIC BLOOD PRESSURE: 64 MMHG | BODY MASS INDEX: 43.71 KG/M2 | SYSTOLIC BLOOD PRESSURE: 124 MMHG | HEART RATE: 54 BPM | HEIGHT: 64 IN

## 2018-04-05 DIAGNOSIS — M40.12 OTHER SECONDARY KYPHOSIS, CERVICAL REGION: ICD-10-CM

## 2018-04-05 DIAGNOSIS — G95.9 CERVICAL MYELOPATHY: ICD-10-CM

## 2018-04-05 DIAGNOSIS — G95.9 CERVICAL MYELOPATHY: Primary | ICD-10-CM

## 2018-04-05 PROCEDURE — 99213 OFFICE O/P EST LOW 20 MIN: CPT | Mod: S$PBB,,, | Performed by: NEUROLOGICAL SURGERY

## 2018-04-05 PROCEDURE — 72125 CT NECK SPINE W/O DYE: CPT | Mod: TC

## 2018-04-05 PROCEDURE — 72125 CT NECK SPINE W/O DYE: CPT | Mod: 26,,, | Performed by: RADIOLOGY

## 2018-04-05 PROCEDURE — 99999 PR PBB SHADOW E&M-EST. PATIENT-LVL III: CPT | Mod: PBBFAC,,, | Performed by: NEUROLOGICAL SURGERY

## 2018-04-05 PROCEDURE — 99213 OFFICE O/P EST LOW 20 MIN: CPT | Mod: PBBFAC,25 | Performed by: NEUROLOGICAL SURGERY

## 2018-04-05 NOTE — PROGRESS NOTES
CHIEF COMPLAINT:  Follow up 1 week prior to surgery     I, Phi Loera, attest that this documentation has been prepared under the direction and in the presence of Osvaldo Hess MD.    HPI:  Hollis Pitts is a 82 y.o.  male with type 2 diabetes, who presents today for follow up evaluation one week prior to 2 level ACDF scheduled for 4/10/2018. Pt reports    Pt presents today wearing a cervical collar and in a wheelchair. He would like to proceed with surgery. Per pt's family, his PCP would like to see him on 4/9, the day before surgery. He notes continued neck pain and BUE numbness/tingling as well as BUE weakness. Pt is only able to walk a few steps unassisted. He has never received neck surgery.     Review of patient's allergies indicates:  No Known Allergies    Past Medical History:   Diagnosis Date    BPH (benign prostatic hypertrophy)     Depression     Diabetes mellitus, type 2     Dyslipidemia     Glaucoma (increased eye pressure)     Hypertension      Past Surgical History:   Procedure Laterality Date    CATARACT EXTRACTION W/  INTRAOCULAR LENS IMPLANT       Family History   Problem Relation Age of Onset    Clotting disorder Mother     Paranoid behavior Mother     Cancer Maternal Grandmother      Social History   Substance Use Topics    Smoking status: Former Smoker     Quit date: 2/6/1950    Smokeless tobacco: Never Used    Alcohol use 4.2 oz/week     7 Shots of liquor per week        Review of Systems   HENT: Negative.    Eyes: Negative.    Respiratory: Negative.    Cardiovascular: Negative.    Gastrointestinal: Negative.    Endocrine: Negative.    Genitourinary: Negative.    Musculoskeletal: Positive for gait problem (wheelchair) and neck pain. Negative for back pain.   Skin: Negative.    Allergic/Immunologic: Negative.    Neurological: Positive for weakness (BUE) and numbness (BUE). Negative for light-headedness and headaches.   Hematological: Negative.    Psychiatric/Behavioral:  Negative.        OBJECTIVE:   Vital Signs:  Pulse: (!) 54 (04/05/18 1017)  BP: 124/64 (04/05/18 1017)    Physical Exam:    Vital signs: All nursing notes and vital signs reviewed -- afebrile, vital signs stable.  Constitutional: Patient sitting comfortably in chair. Appears well developed and well nourished.  Skin: Exposed areas are intact without abnormal markings, rashes or other lesions.  HEENT: Normocephalic. Normal conjunctivae.  Cardiovascular: Normal rate and regular rhythm.  Respiratory: Chest wall rises and falls symmetrically, without signs of respiratory distress.  Abdomen: Soft and non-tender.  Extremities: Warm and without edema. Calves supple, non-tender.  Psych/Behavior: Normal affect.    Neurological:    Mental status: Alert and oriented. Conversational and appropriate.       Cranial Nerves: Grossly intact.     Motor:    Upper:  Deltoids Triceps Biceps WE WF     R 3/5 4/5 5/5 5/5 5/5 4/5    L 3/5 4/5 5/5 5/5 5/5 4/5      Lower:  HF KE KF DF PF EHL    R 4-/5 5/5 5/5 5/5 5/5 5/5    L 4-/5 5/5 5/5 5/5 5/5 5/5     Sensory: Intact sensation to light touch in all extremities. Romberg negative.    Reflexes:          DTR: 2+ symmetrically throughout.     Moctezuma's: Negative.     Babinski's: Negative.     Clonus: Negative.    Cerebellar: Finger-to-nose and rapid alternating movements normal. Gait stable, fluid.    Spine:    Posture: Head well aligned over pelvis in front and side views.  No focal or global spinal deformity visible on inspection. Shoulders and hips even. No obvious leg length discrepancy. No scapula winging.    Bending: Full ROM with forward, back and lateral bending. No rib prominence with forward bend.    Cervical:      ROM: Cervical deformity. Inability to maintain horizontal gaze. Restricted ROM in all directions, pain limited.      Midline TTP: Negative.     Spurling's test: Negative.     Lhermitte's: Negative.    Thoracic:     Midline TTP: Negative    Lumbar:     Midline TTP:  Negative     Straight Leg Test: Negative     Crossed Straight Leg Test: Negative     Sciatic notch tenderness: Negative.    Other:     SI joint TTP: Negative.     Greater trochanter TTP: Negative.     Tenderness with external/internal hip rotation: Negative.    Diagnostic Results:  All imaging was independently reviewed by me.    No new imaging for my review.     ASSESSMENT/PLAN:     Today we reviewed the planned for surgery in detail again, reviewing the risks and benefits. He and his daughter wish to proceed with the surgery. Given his age and medical comorbidities, I do NOT think he is an appropriate ACD Pathway candidate.    The patient understands and agrees with the plan of care. All questions were answered.     1. 2 level ACDF at C5/6 and C6/7 as scheduled      I, Dr. Osvaldo Hess personally performed the services described in this documentation. All medical record entries made by the scribe, Phi Loera, were at my direction and in my presence.  I have reviewed the chart and agree that the record reflects my personal performance and is accurate and complete.      Osvaldo Hess M.D.  Department of Neurosurgery  [facility]      .

## 2018-04-10 ENCOUNTER — HOSPITAL ENCOUNTER (INPATIENT)
Facility: HOSPITAL | Age: 83
LOS: 38 days | Discharge: NURSING, REHAB OR LONG-TERM CARE | DRG: 471 | End: 2018-05-18
Attending: NEUROLOGICAL SURGERY | Admitting: NEUROLOGICAL SURGERY
Payer: MEDICARE

## 2018-04-10 ENCOUNTER — ANESTHESIA (OUTPATIENT)
Dept: SURGERY | Facility: HOSPITAL | Age: 83
DRG: 471 | End: 2018-04-10
Payer: MEDICARE

## 2018-04-10 DIAGNOSIS — R26.89 DECREASED FUNCTIONAL MOBILITY: ICD-10-CM

## 2018-04-10 DIAGNOSIS — E78.49 OTHER HYPERLIPIDEMIA: ICD-10-CM

## 2018-04-10 DIAGNOSIS — I50.30 DIASTOLIC CHF: ICD-10-CM

## 2018-04-10 DIAGNOSIS — M54.12 CERVICAL MYELOPATHY WITH CERVICAL RADICULOPATHY: ICD-10-CM

## 2018-04-10 DIAGNOSIS — T88.8XXD FLUID COLLECTION AT SURGICAL SITE, SUBSEQUENT ENCOUNTER: ICD-10-CM

## 2018-04-10 DIAGNOSIS — R00.0 TACHYCARDIA: ICD-10-CM

## 2018-04-10 DIAGNOSIS — M54.2 CERVICAL PAIN: ICD-10-CM

## 2018-04-10 DIAGNOSIS — A41.9 SEPSIS, DUE TO UNSPECIFIED ORGANISM: ICD-10-CM

## 2018-04-10 DIAGNOSIS — Z92.29 HX: ANTICOAGULATION: ICD-10-CM

## 2018-04-10 DIAGNOSIS — R29.898 SHOULDER WEAKNESS: Primary | ICD-10-CM

## 2018-04-10 DIAGNOSIS — G95.9 CERVICAL MYELOPATHY WITH CERVICAL RADICULOPATHY: ICD-10-CM

## 2018-04-10 DIAGNOSIS — I10 ESSENTIAL HYPERTENSION: ICD-10-CM

## 2018-04-10 DIAGNOSIS — R33.8 BENIGN PROSTATIC HYPERPLASIA WITH URINARY RETENTION: ICD-10-CM

## 2018-04-10 DIAGNOSIS — T88.8XXA FLUID COLLECTION AT SURGICAL SITE, INITIAL ENCOUNTER: ICD-10-CM

## 2018-04-10 DIAGNOSIS — J69.0 ASPIRATION PNEUMONITIS: ICD-10-CM

## 2018-04-10 DIAGNOSIS — N40.1 BENIGN PROSTATIC HYPERPLASIA WITH URINARY RETENTION: ICD-10-CM

## 2018-04-10 DIAGNOSIS — F41.9 ANXIETY: ICD-10-CM

## 2018-04-10 DIAGNOSIS — E83.52 HYPERCALCEMIA: ICD-10-CM

## 2018-04-10 DIAGNOSIS — G96.00 CSF LEAK: ICD-10-CM

## 2018-04-10 DIAGNOSIS — J69.0 ASPIRATION PNEUMONIA OF BOTH LOWER LOBES DUE TO GASTRIC SECRETIONS: ICD-10-CM

## 2018-04-10 DIAGNOSIS — E11.65 TYPE 2 DIABETES MELLITUS WITH HYPERGLYCEMIA, WITHOUT LONG-TERM CURRENT USE OF INSULIN: ICD-10-CM

## 2018-04-10 DIAGNOSIS — R41.0 DELIRIUM: ICD-10-CM

## 2018-04-10 LAB
ANION GAP SERPL CALC-SCNC: 7 MMOL/L
BASOPHILS # BLD AUTO: 0.02 K/UL
BASOPHILS NFR BLD: 0.2 %
BUN SERPL-MCNC: 17 MG/DL
CALCIUM SERPL-MCNC: 8.5 MG/DL
CHLORIDE SERPL-SCNC: 109 MMOL/L
CO2 SERPL-SCNC: 23 MMOL/L
CREAT SERPL-MCNC: 0.7 MG/DL
DIFFERENTIAL METHOD: ABNORMAL
EOSINOPHIL # BLD AUTO: 0.1 K/UL
EOSINOPHIL NFR BLD: 1.2 %
ERYTHROCYTE [DISTWIDTH] IN BLOOD BY AUTOMATED COUNT: 15.3 %
EST. GFR  (AFRICAN AMERICAN): >60 ML/MIN/1.73 M^2
EST. GFR  (NON AFRICAN AMERICAN): >60 ML/MIN/1.73 M^2
GLUCOSE SERPL-MCNC: 108 MG/DL (ref 70–110)
GLUCOSE SERPL-MCNC: 170 MG/DL
HCO3 UR-SCNC: 22.8 MMOL/L (ref 24–28)
HCT VFR BLD AUTO: 30.3 %
HCT VFR BLD CALC: 29 %PCV (ref 36–54)
HGB BLD-MCNC: 9.4 G/DL
IMM GRANULOCYTES # BLD AUTO: 0.07 K/UL
IMM GRANULOCYTES NFR BLD AUTO: 0.6 %
LYMPHOCYTES # BLD AUTO: 3.2 K/UL
LYMPHOCYTES NFR BLD: 27.5 %
MCH RBC QN AUTO: 30.6 PG
MCHC RBC AUTO-ENTMCNC: 31 G/DL
MCV RBC AUTO: 99 FL
MONOCYTES # BLD AUTO: 1.2 K/UL
MONOCYTES NFR BLD: 10 %
NEUTROPHILS # BLD AUTO: 7.1 K/UL
NEUTROPHILS NFR BLD: 60.5 %
NRBC BLD-RTO: 0 /100 WBC
PCO2 BLDA: 33.8 MMHG (ref 35–45)
PH SMN: 7.44 [PH] (ref 7.35–7.45)
PLATELET # BLD AUTO: 208 K/UL
PMV BLD AUTO: 9.5 FL
PO2 BLDA: 281 MMHG (ref 80–100)
POC BE: -1 MMOL/L
POC IONIZED CALCIUM: 1.23 MMOL/L (ref 1.06–1.42)
POC SATURATED O2: 100 % (ref 95–100)
POC TCO2: 24 MMOL/L (ref 23–27)
POCT GLUCOSE: 123 MG/DL (ref 70–110)
POCT GLUCOSE: 146 MG/DL (ref 70–110)
POCT GLUCOSE: 206 MG/DL (ref 70–110)
POTASSIUM BLD-SCNC: 4 MMOL/L (ref 3.5–5.1)
POTASSIUM SERPL-SCNC: 4.1 MMOL/L
RBC # BLD AUTO: 3.07 M/UL
SAMPLE: ABNORMAL
SODIUM BLD-SCNC: 142 MMOL/L (ref 136–145)
SODIUM SERPL-SCNC: 139 MMOL/L
WBC # BLD AUTO: 11.65 K/UL

## 2018-04-10 PROCEDURE — 63600175 PHARM REV CODE 636 W HCPCS: Performed by: STUDENT IN AN ORGANIZED HEALTH CARE EDUCATION/TRAINING PROGRAM

## 2018-04-10 PROCEDURE — 00QT0ZZ REPAIR SPINAL MENINGES, OPEN APPROACH: ICD-10-PCS | Performed by: NEUROLOGICAL SURGERY

## 2018-04-10 PROCEDURE — 27201423 OPTIME MED/SURG SUP & DEVICES STERILE SUPPLY: Performed by: NEUROLOGICAL SURGERY

## 2018-04-10 PROCEDURE — 0RB30ZZ EXCISION OF CERVICAL VERTEBRAL DISC, OPEN APPROACH: ICD-10-PCS | Performed by: NEUROLOGICAL SURGERY

## 2018-04-10 PROCEDURE — 27201037 HC PRESSURE MONITORING SET UP

## 2018-04-10 PROCEDURE — 20930 SP BONE ALGRFT MORSEL ADD-ON: CPT | Mod: GC,,, | Performed by: NEUROLOGICAL SURGERY

## 2018-04-10 PROCEDURE — 36000710: Performed by: NEUROLOGICAL SURGERY

## 2018-04-10 PROCEDURE — 94761 N-INVAS EAR/PLS OXIMETRY MLT: CPT

## 2018-04-10 PROCEDURE — C1729 CATH, DRAINAGE: HCPCS | Performed by: NEUROLOGICAL SURGERY

## 2018-04-10 PROCEDURE — 25000003 PHARM REV CODE 250: Performed by: STUDENT IN AN ORGANIZED HEALTH CARE EDUCATION/TRAINING PROGRAM

## 2018-04-10 PROCEDURE — 22853 INSJ BIOMECHANICAL DEVICE: CPT | Mod: GC,,, | Performed by: NEUROLOGICAL SURGERY

## 2018-04-10 PROCEDURE — 25000003 PHARM REV CODE 250: Performed by: NEUROLOGICAL SURGERY

## 2018-04-10 PROCEDURE — 37000008 HC ANESTHESIA 1ST 15 MINUTES: Performed by: NEUROLOGICAL SURGERY

## 2018-04-10 PROCEDURE — D9220A PRA ANESTHESIA: Mod: ,,, | Performed by: ANESTHESIOLOGY

## 2018-04-10 PROCEDURE — 63600175 PHARM REV CODE 636 W HCPCS: Performed by: NEUROLOGICAL SURGERY

## 2018-04-10 PROCEDURE — C1713 ANCHOR/SCREW BN/BN,TIS/BN: HCPCS | Performed by: NEUROLOGICAL SURGERY

## 2018-04-10 PROCEDURE — 22552 ARTHRD ANT NTRBD CERVICAL EA: CPT | Mod: GC,,, | Performed by: NEUROLOGICAL SURGERY

## 2018-04-10 PROCEDURE — 82962 GLUCOSE BLOOD TEST: CPT | Performed by: NEUROLOGICAL SURGERY

## 2018-04-10 PROCEDURE — 22551 ARTHRD ANT NTRBDY CERVICAL: CPT | Mod: 22,GC,, | Performed by: NEUROLOGICAL SURGERY

## 2018-04-10 PROCEDURE — 22845 INSERT SPINE FIXATION DEVICE: CPT | Mod: 59,GC,, | Performed by: NEUROLOGICAL SURGERY

## 2018-04-10 PROCEDURE — 0RG20A0 FUSION OF 2 OR MORE CERVICAL VERTEBRAL JOINTS WITH INTERBODY FUSION DEVICE, ANTERIOR APPROACH, ANTERIOR COLUMN, OPEN APPROACH: ICD-10-PCS | Performed by: NEUROLOGICAL SURGERY

## 2018-04-10 PROCEDURE — 63600175 PHARM REV CODE 636 W HCPCS: Performed by: NURSE ANESTHETIST, CERTIFIED REGISTERED

## 2018-04-10 PROCEDURE — 85025 COMPLETE CBC W/AUTO DIFF WBC: CPT

## 2018-04-10 PROCEDURE — 80048 BASIC METABOLIC PNL TOTAL CA: CPT

## 2018-04-10 PROCEDURE — 86920 COMPATIBILITY TEST SPIN: CPT

## 2018-04-10 PROCEDURE — 71000033 HC RECOVERY, INTIAL HOUR: Performed by: NEUROLOGICAL SURGERY

## 2018-04-10 PROCEDURE — 71000039 HC RECOVERY, EACH ADD'L HOUR: Performed by: NEUROLOGICAL SURGERY

## 2018-04-10 PROCEDURE — 37000009 HC ANESTHESIA EA ADD 15 MINS: Performed by: NEUROLOGICAL SURGERY

## 2018-04-10 PROCEDURE — 27000221 HC OXYGEN, UP TO 24 HOURS

## 2018-04-10 PROCEDURE — 36000711: Performed by: NEUROLOGICAL SURGERY

## 2018-04-10 PROCEDURE — 27800903 OPTIME MED/SURG SUP & DEVICES OTHER IMPLANTS: Performed by: NEUROLOGICAL SURGERY

## 2018-04-10 PROCEDURE — 20600001 HC STEP DOWN PRIVATE ROOM

## 2018-04-10 DEVICE — MATRIX BONE CELLR VIVIGEN 1CC: Type: IMPLANTABLE DEVICE | Site: SPINE CERVICAL | Status: FUNCTIONAL

## 2018-04-10 DEVICE — SPACER LORDOTIC STD ACIS 7MM: Type: IMPLANTABLE DEVICE | Site: SPINE CERVICAL | Status: FUNCTIONAL

## 2018-04-10 DEVICE — IMPLANTABLE DEVICE: Type: IMPLANTABLE DEVICE | Site: SPINE CERVICAL | Status: FUNCTIONAL

## 2018-04-10 DEVICE — SCREW BONE SPINAL ANT 16MM: Type: IMPLANTABLE DEVICE | Site: SPINE CERVICAL | Status: FUNCTIONAL

## 2018-04-10 RX ORDER — CEFAZOLIN SODIUM 1 G/3ML
2 INJECTION, POWDER, FOR SOLUTION INTRAMUSCULAR; INTRAVENOUS
Status: COMPLETED | OUTPATIENT
Start: 2018-04-10 | End: 2018-04-10

## 2018-04-10 RX ORDER — MUPIROCIN 20 MG/G
OINTMENT TOPICAL
Status: DISCONTINUED | OUTPATIENT
Start: 2018-04-10 | End: 2018-04-10 | Stop reason: HOSPADM

## 2018-04-10 RX ORDER — BISACODYL 5 MG
10 TABLET, DELAYED RELEASE (ENTERIC COATED) ORAL DAILY
Status: DISCONTINUED | OUTPATIENT
Start: 2018-04-11 | End: 2018-04-12

## 2018-04-10 RX ORDER — LIDOCAINE HCL/PF 100 MG/5ML
SYRINGE (ML) INTRAVENOUS
Status: DISCONTINUED | OUTPATIENT
Start: 2018-04-10 | End: 2018-04-10

## 2018-04-10 RX ORDER — INSULIN ASPART 100 [IU]/ML
0-5 INJECTION, SOLUTION INTRAVENOUS; SUBCUTANEOUS
Status: DISCONTINUED | OUTPATIENT
Start: 2018-04-10 | End: 2018-04-12

## 2018-04-10 RX ORDER — GLUCAGON 1 MG
1 KIT INJECTION
Status: DISCONTINUED | OUTPATIENT
Start: 2018-04-10 | End: 2018-04-12

## 2018-04-10 RX ORDER — NICARDIPINE HYDROCHLORIDE 2.5 MG/ML
INJECTION INTRAVENOUS
Status: DISCONTINUED | OUTPATIENT
Start: 2018-04-10 | End: 2018-04-10

## 2018-04-10 RX ORDER — SUCCINYLCHOLINE CHLORIDE 20 MG/ML
INJECTION INTRAMUSCULAR; INTRAVENOUS
Status: DISCONTINUED | OUTPATIENT
Start: 2018-04-10 | End: 2018-04-10

## 2018-04-10 RX ORDER — HYDROMORPHONE HYDROCHLORIDE 1 MG/ML
0.2 INJECTION, SOLUTION INTRAMUSCULAR; INTRAVENOUS; SUBCUTANEOUS EVERY 5 MIN PRN
Status: DISCONTINUED | OUTPATIENT
Start: 2018-04-10 | End: 2018-04-10 | Stop reason: HOSPADM

## 2018-04-10 RX ORDER — HYDRALAZINE HYDROCHLORIDE 20 MG/ML
10 INJECTION INTRAMUSCULAR; INTRAVENOUS EVERY 4 HOURS PRN
Status: DISCONTINUED | OUTPATIENT
Start: 2018-04-10 | End: 2018-04-13

## 2018-04-10 RX ORDER — HEPARIN SODIUM 5000 [USP'U]/ML
5000 INJECTION, SOLUTION INTRAVENOUS; SUBCUTANEOUS EVERY 8 HOURS
Status: DISCONTINUED | OUTPATIENT
Start: 2018-04-11 | End: 2018-04-13

## 2018-04-10 RX ORDER — FENTANYL CITRATE 50 UG/ML
INJECTION, SOLUTION INTRAMUSCULAR; INTRAVENOUS
Status: DISCONTINUED | OUTPATIENT
Start: 2018-04-10 | End: 2018-04-10

## 2018-04-10 RX ORDER — ACETAMINOPHEN 325 MG/1
650 TABLET ORAL EVERY 6 HOURS PRN
Status: DISCONTINUED | OUTPATIENT
Start: 2018-04-10 | End: 2018-04-16

## 2018-04-10 RX ORDER — SODIUM CHLORIDE 0.9 % (FLUSH) 0.9 %
3 SYRINGE (ML) INJECTION
Status: DISCONTINUED | OUTPATIENT
Start: 2018-04-10 | End: 2018-04-10 | Stop reason: HOSPADM

## 2018-04-10 RX ORDER — SODIUM CHLORIDE AND POTASSIUM CHLORIDE 150; 900 MG/100ML; MG/100ML
INJECTION, SOLUTION INTRAVENOUS CONTINUOUS
Status: DISCONTINUED | OUTPATIENT
Start: 2018-04-10 | End: 2018-04-11

## 2018-04-10 RX ORDER — MIDAZOLAM HYDROCHLORIDE 1 MG/ML
INJECTION, SOLUTION INTRAMUSCULAR; INTRAVENOUS
Status: DISCONTINUED | OUTPATIENT
Start: 2018-04-10 | End: 2018-04-10

## 2018-04-10 RX ORDER — ONDANSETRON 8 MG/1
8 TABLET, ORALLY DISINTEGRATING ORAL EVERY 6 HOURS PRN
Status: DISCONTINUED | OUTPATIENT
Start: 2018-04-10 | End: 2018-04-23

## 2018-04-10 RX ORDER — VANCOMYCIN HYDROCHLORIDE 1 G/20ML
INJECTION, POWDER, LYOPHILIZED, FOR SOLUTION INTRAVENOUS
Status: DISCONTINUED | OUTPATIENT
Start: 2018-04-10 | End: 2018-04-10 | Stop reason: HOSPADM

## 2018-04-10 RX ORDER — ONDANSETRON 4 MG/1
4 TABLET, ORALLY DISINTEGRATING ORAL ONCE
Status: COMPLETED | OUTPATIENT
Start: 2018-04-10 | End: 2018-04-10

## 2018-04-10 RX ORDER — ATORVASTATIN CALCIUM 20 MG/1
20 TABLET, FILM COATED ORAL NIGHTLY
Status: DISCONTINUED | OUTPATIENT
Start: 2018-04-10 | End: 2018-04-13

## 2018-04-10 RX ORDER — IBUPROFEN 200 MG
24 TABLET ORAL
Status: DISCONTINUED | OUTPATIENT
Start: 2018-04-10 | End: 2018-04-12

## 2018-04-10 RX ORDER — MAG HYDROX/ALUMINUM HYD/SIMETH 200-200-20
30 SUSPENSION, ORAL (FINAL DOSE FORM) ORAL EVERY 4 HOURS PRN
Status: DISCONTINUED | OUTPATIENT
Start: 2018-04-10 | End: 2018-04-23

## 2018-04-10 RX ORDER — PHENYLEPHRINE HYDROCHLORIDE 10 MG/ML
INJECTION INTRAVENOUS
Status: DISCONTINUED | OUTPATIENT
Start: 2018-04-10 | End: 2018-04-10

## 2018-04-10 RX ORDER — MUPIROCIN 20 MG/G
1 OINTMENT TOPICAL
Status: DISCONTINUED | OUTPATIENT
Start: 2018-04-10 | End: 2018-04-10 | Stop reason: HOSPADM

## 2018-04-10 RX ORDER — GABAPENTIN 100 MG/1
100 CAPSULE ORAL 3 TIMES DAILY
Status: DISCONTINUED | OUTPATIENT
Start: 2018-04-10 | End: 2018-04-13

## 2018-04-10 RX ORDER — LABETALOL HYDROCHLORIDE 5 MG/ML
INJECTION, SOLUTION INTRAVENOUS
Status: DISCONTINUED | OUTPATIENT
Start: 2018-04-10 | End: 2018-04-10

## 2018-04-10 RX ORDER — METHYLPREDNISOLONE ACETATE 40 MG/ML
INJECTION, SUSPENSION INTRA-ARTICULAR; INTRALESIONAL; INTRAMUSCULAR; SOFT TISSUE
Status: DISCONTINUED | OUTPATIENT
Start: 2018-04-10 | End: 2018-04-10 | Stop reason: HOSPADM

## 2018-04-10 RX ORDER — SODIUM CHLORIDE 9 MG/ML
INJECTION, SOLUTION INTRAVENOUS CONTINUOUS
Status: DISCONTINUED | OUTPATIENT
Start: 2018-04-10 | End: 2018-04-10

## 2018-04-10 RX ORDER — FENTANYL CITRATE 50 UG/ML
25 INJECTION, SOLUTION INTRAMUSCULAR; INTRAVENOUS EVERY 5 MIN PRN
Status: DISCONTINUED | OUTPATIENT
Start: 2018-04-10 | End: 2018-04-10 | Stop reason: HOSPADM

## 2018-04-10 RX ORDER — HYDROCODONE BITARTRATE AND ACETAMINOPHEN 5; 325 MG/1; MG/1
1 TABLET ORAL EVERY 4 HOURS PRN
Status: DISCONTINUED | OUTPATIENT
Start: 2018-04-10 | End: 2018-04-23

## 2018-04-10 RX ORDER — DEXAMETHASONE SODIUM PHOSPHATE 4 MG/ML
INJECTION, SOLUTION INTRA-ARTICULAR; INTRALESIONAL; INTRAMUSCULAR; INTRAVENOUS; SOFT TISSUE
Status: DISCONTINUED | OUTPATIENT
Start: 2018-04-10 | End: 2018-04-10

## 2018-04-10 RX ORDER — ACETAMINOPHEN 10 MG/ML
1000 INJECTION, SOLUTION INTRAVENOUS ONCE
Status: COMPLETED | OUTPATIENT
Start: 2018-04-10 | End: 2018-04-10

## 2018-04-10 RX ORDER — MORPHINE SULFATE 2 MG/ML
2 INJECTION, SOLUTION INTRAMUSCULAR; INTRAVENOUS
Status: DISCONTINUED | OUTPATIENT
Start: 2018-04-10 | End: 2018-04-12

## 2018-04-10 RX ORDER — SODIUM CHLORIDE 9 MG/ML
INJECTION, SOLUTION INTRAVENOUS CONTINUOUS PRN
Status: DISCONTINUED | OUTPATIENT
Start: 2018-04-10 | End: 2018-04-10

## 2018-04-10 RX ORDER — FENTANYL CITRATE 50 UG/ML
25 INJECTION, SOLUTION INTRAMUSCULAR; INTRAVENOUS
Status: DISCONTINUED | OUTPATIENT
Start: 2018-04-10 | End: 2018-04-10

## 2018-04-10 RX ORDER — KETAMINE HCL IN 0.9 % NACL 50 MG/5 ML
SYRINGE (ML) INTRAVENOUS
Status: DISCONTINUED | OUTPATIENT
Start: 2018-04-10 | End: 2018-04-10

## 2018-04-10 RX ORDER — CITALOPRAM 10 MG/1
20 TABLET ORAL DAILY
Status: DISCONTINUED | OUTPATIENT
Start: 2018-04-11 | End: 2018-04-13

## 2018-04-10 RX ORDER — ROCURONIUM BROMIDE 10 MG/ML
INJECTION, SOLUTION INTRAVENOUS
Status: DISCONTINUED | OUTPATIENT
Start: 2018-04-10 | End: 2018-04-10

## 2018-04-10 RX ORDER — VALSARTAN 80 MG/1
80 TABLET ORAL DAILY
Status: DISCONTINUED | OUTPATIENT
Start: 2018-04-11 | End: 2018-04-13

## 2018-04-10 RX ORDER — LIDOCAINE HYDROCHLORIDE AND EPINEPHRINE 10; 10 MG/ML; UG/ML
INJECTION, SOLUTION INFILTRATION; PERINEURAL
Status: DISCONTINUED | OUTPATIENT
Start: 2018-04-10 | End: 2018-04-10 | Stop reason: HOSPADM

## 2018-04-10 RX ORDER — AMOXICILLIN 250 MG
2 CAPSULE ORAL NIGHTLY PRN
Status: DISCONTINUED | OUTPATIENT
Start: 2018-04-10 | End: 2018-04-13

## 2018-04-10 RX ORDER — ACETAMINOPHEN 325 MG/1
650 TABLET ORAL EVERY 4 HOURS PRN
Status: DISCONTINUED | OUTPATIENT
Start: 2018-04-10 | End: 2018-04-16

## 2018-04-10 RX ORDER — BACITRACIN 50000 [IU]/1
INJECTION, POWDER, FOR SOLUTION INTRAMUSCULAR
Status: DISCONTINUED | OUTPATIENT
Start: 2018-04-10 | End: 2018-04-10 | Stop reason: HOSPADM

## 2018-04-10 RX ORDER — MUPIROCIN 20 MG/G
1 OINTMENT TOPICAL 2 TIMES DAILY
Status: COMPLETED | OUTPATIENT
Start: 2018-04-10 | End: 2018-04-15

## 2018-04-10 RX ORDER — LABETALOL HYDROCHLORIDE 5 MG/ML
10 INJECTION, SOLUTION INTRAVENOUS EVERY 4 HOURS PRN
Status: DISCONTINUED | OUTPATIENT
Start: 2018-04-10 | End: 2018-04-13

## 2018-04-10 RX ORDER — CINACALCET 30 MG/1
30 TABLET, FILM COATED ORAL
Status: DISCONTINUED | OUTPATIENT
Start: 2018-04-11 | End: 2018-04-11

## 2018-04-10 RX ORDER — IBUPROFEN 200 MG
16 TABLET ORAL
Status: DISCONTINUED | OUTPATIENT
Start: 2018-04-10 | End: 2018-04-12

## 2018-04-10 RX ORDER — PROPOFOL 10 MG/ML
VIAL (ML) INTRAVENOUS
Status: DISCONTINUED | OUTPATIENT
Start: 2018-04-10 | End: 2018-04-10

## 2018-04-10 RX ORDER — PROPOFOL 10 MG/ML
VIAL (ML) INTRAVENOUS CONTINUOUS PRN
Status: DISCONTINUED | OUTPATIENT
Start: 2018-04-10 | End: 2018-04-10

## 2018-04-10 RX ORDER — LIDOCAINE HYDROCHLORIDE 10 MG/ML
1 INJECTION, SOLUTION EPIDURAL; INFILTRATION; INTRACAUDAL; PERINEURAL ONCE
Status: COMPLETED | OUTPATIENT
Start: 2018-04-10 | End: 2018-04-10

## 2018-04-10 RX ORDER — DILTIAZEM HYDROCHLORIDE 30 MG/1
90 TABLET, FILM COATED ORAL DAILY
Status: DISCONTINUED | OUTPATIENT
Start: 2018-04-11 | End: 2018-04-13

## 2018-04-10 RX ORDER — BISACODYL 10 MG
10 SUPPOSITORY, RECTAL RECTAL DAILY
Status: DISCONTINUED | OUTPATIENT
Start: 2018-04-11 | End: 2018-04-11

## 2018-04-10 RX ORDER — CEFAZOLIN SODIUM 1 G/3ML
2 INJECTION, POWDER, FOR SOLUTION INTRAMUSCULAR; INTRAVENOUS
Status: COMPLETED | OUTPATIENT
Start: 2018-04-11 | End: 2018-04-12

## 2018-04-10 RX ORDER — TRAZODONE HYDROCHLORIDE 50 MG/1
50 TABLET ORAL NIGHTLY
Status: DISCONTINUED | OUTPATIENT
Start: 2018-04-10 | End: 2018-04-13

## 2018-04-10 RX ADMIN — Medication 10 MG: at 04:04

## 2018-04-10 RX ADMIN — LIDOCAINE HYDROCHLORIDE 1 MG: 10 INJECTION, SOLUTION EPIDURAL; INFILTRATION; INTRACAUDAL; PERINEURAL at 11:04

## 2018-04-10 RX ADMIN — MUPIROCIN: 20 OINTMENT TOPICAL at 11:04

## 2018-04-10 RX ADMIN — Medication 0.2 MG: at 07:04

## 2018-04-10 RX ADMIN — PHENYLEPHRINE HYDROCHLORIDE 200 MCG: 10 INJECTION INTRAVENOUS at 02:04

## 2018-04-10 RX ADMIN — FENTANYL CITRATE 50 MCG: 50 INJECTION, SOLUTION INTRAMUSCULAR; INTRAVENOUS at 01:04

## 2018-04-10 RX ADMIN — Medication 10 MG: at 01:04

## 2018-04-10 RX ADMIN — PROPOFOL 150 MG: 10 INJECTION, EMULSION INTRAVENOUS at 12:04

## 2018-04-10 RX ADMIN — MUPIROCIN: 20 OINTMENT TOPICAL at 08:04

## 2018-04-10 RX ADMIN — DEXAMETHASONE SODIUM PHOSPHATE 12 MG: 4 INJECTION, SOLUTION INTRAMUSCULAR; INTRAVENOUS at 04:04

## 2018-04-10 RX ADMIN — LABETALOL HYDROCHLORIDE 10 MG: 5 INJECTION, SOLUTION INTRAVENOUS at 03:04

## 2018-04-10 RX ADMIN — NICARDIPINE HYDROCHLORIDE 0.6 MG: 2.5 INJECTION INTRAVENOUS at 03:04

## 2018-04-10 RX ADMIN — ONDANSETRON 4 MG: 4 TABLET, ORALLY DISINTEGRATING ORAL at 07:04

## 2018-04-10 RX ADMIN — MIDAZOLAM HYDROCHLORIDE 2 MG: 1 INJECTION, SOLUTION INTRAMUSCULAR; INTRAVENOUS at 11:04

## 2018-04-10 RX ADMIN — LABETALOL HYDROCHLORIDE 10 MG: 5 INJECTION, SOLUTION INTRAVENOUS at 02:04

## 2018-04-10 RX ADMIN — REMIFENTANIL HYDROCHLORIDE 0.2 MCG: 1 INJECTION, POWDER, LYOPHILIZED, FOR SOLUTION INTRAVENOUS at 12:04

## 2018-04-10 RX ADMIN — SODIUM CHLORIDE: 0.9 INJECTION, SOLUTION INTRAVENOUS at 11:04

## 2018-04-10 RX ADMIN — ATORVASTATIN CALCIUM 20 MG: 20 TABLET, FILM COATED ORAL at 08:04

## 2018-04-10 RX ADMIN — POTASSIUM CHLORIDE AND SODIUM CHLORIDE: 900; 150 INJECTION, SOLUTION INTRAVENOUS at 06:04

## 2018-04-10 RX ADMIN — GABAPENTIN 100 MG: 100 CAPSULE ORAL at 08:04

## 2018-04-10 RX ADMIN — CEFAZOLIN 2 G: 330 INJECTION, POWDER, FOR SOLUTION INTRAMUSCULAR; INTRAVENOUS at 04:04

## 2018-04-10 RX ADMIN — Medication 20 MG: at 12:04

## 2018-04-10 RX ADMIN — ACETAMINOPHEN 1000 MG: 10 INJECTION, SOLUTION INTRAVENOUS at 01:04

## 2018-04-10 RX ADMIN — SODIUM CHLORIDE, SODIUM GLUCONATE, SODIUM ACETATE, POTASSIUM CHLORIDE, MAGNESIUM CHLORIDE, SODIUM PHOSPHATE, DIBASIC, AND POTASSIUM PHOSPHATE: .53; .5; .37; .037; .03; .012; .00082 INJECTION, SOLUTION INTRAVENOUS at 12:04

## 2018-04-10 RX ADMIN — ROCURONIUM BROMIDE 20 MG: 10 INJECTION, SOLUTION INTRAVENOUS at 12:04

## 2018-04-10 RX ADMIN — SUCCINYLCHOLINE CHLORIDE 120 MG: 20 INJECTION, SOLUTION INTRAMUSCULAR; INTRAVENOUS at 12:04

## 2018-04-10 RX ADMIN — CEFAZOLIN 2 G: 330 INJECTION, POWDER, FOR SOLUTION INTRAMUSCULAR; INTRAVENOUS at 12:04

## 2018-04-10 RX ADMIN — LIDOCAINE HYDROCHLORIDE 75 MG: 20 INJECTION, SOLUTION INTRAVENOUS at 12:04

## 2018-04-10 RX ADMIN — SODIUM CHLORIDE 0.2 MCG: 9 INJECTION, SOLUTION INTRAVENOUS at 12:04

## 2018-04-10 RX ADMIN — PROPOFOL 150 MCG/KG/MIN: 10 INJECTION, EMULSION INTRAVENOUS at 12:04

## 2018-04-10 RX ADMIN — TRAZODONE HYDROCHLORIDE 50 MG: 50 TABLET ORAL at 09:04

## 2018-04-10 RX ADMIN — FENTANYL CITRATE 50 MCG: 50 INJECTION, SOLUTION INTRAMUSCULAR; INTRAVENOUS at 02:04

## 2018-04-10 RX ADMIN — Medication 20 MG: at 01:04

## 2018-04-10 RX ADMIN — NICARDIPINE HYDROCHLORIDE 0.4 MG: 2.5 INJECTION INTRAVENOUS at 02:04

## 2018-04-10 RX ADMIN — MUPIROCIN 1 G: 20 OINTMENT TOPICAL at 08:04

## 2018-04-10 RX ADMIN — INSULIN ASPART 1 UNITS: 100 INJECTION, SOLUTION INTRAVENOUS; SUBCUTANEOUS at 09:04

## 2018-04-10 RX ADMIN — FENTANYL CITRATE 100 MCG: 50 INJECTION, SOLUTION INTRAMUSCULAR; INTRAVENOUS at 12:04

## 2018-04-10 NOTE — HPI
82 y.o.  male with type 2 diabetes, who presents today for follow up evaluation one week prior to 2 level ACDF scheduled for 4/10/2018. Pt reports    Pt presents today wearing a cervical collar and in a wheelchair. He would like to proceed with surgery. Per pt's family, his PCP would like to see him on 4/9, the day before surgery. He notes continued neck pain and BUE numbness/tingling as well as BUE weakness. Pt is only able to walk a few steps unassisted. He has never received neck surgery.

## 2018-04-10 NOTE — SUBJECTIVE & OBJECTIVE
"Prescriptions Prior to Admission   Medication Sig Dispense Refill Last Dose    atorvastatin (LIPITOR) 20 MG tablet Take 20 mg by mouth nightly.  0 Taking    bisacodyl (DULCOLAX) 5 mg EC tablet   0 Taking    CARTIA  mg Cp24 Take 90 mg by mouth.   0 Taking    citalopram (CELEXA) 20 MG tablet Take 20 mg by mouth once daily.  0 Taking    gabapentin (NEURONTIN) 100 MG capsule Take 100 mg by mouth 3 (three) times daily as needed.   0 Taking    omega-3 fatty acids-fish oil (FISH OIL) 360-1,200 mg Cap Take 1,200 mg by mouth once daily.   Taking    traZODone (DESYREL) 50 MG tablet Take 50 mg by mouth every evening.  0 Taking    valsartan (DIOVAN) 80 MG tablet Take 80 mg by mouth once daily.  0 Taking    vitamin B comp with C no.4 150 mg Tab Take vitamin B complex 1 a day.  Over-the-counter is an appropriate alternative. 30 tablet 12 Taking    BD INSULIN PEN NEEDLE UF SHORT 31 gauge x 5/16" Ndle USE WITH LANTUS PEN DAILY  0 Taking    CONSTULOSE 10 gram/15 mL solution TAKE  30 MILLILITERS BY MOUTH 3 TIMES A DAY UNTIL BOWEL MOVEMENT ...  (REFER TO PRESCRIPTION NOTES).  0 Taking    ELIQUIS 2.5 mg Tab Take 2.5 mg by mouth 2 (two) times daily.   0 Taking    FREESTYLE TEST Strp   1 Taking    SENSIPAR 30 mg Tab Take 30 mg by mouth daily with breakfast.   0 Taking       Review of patient's allergies indicates:  No Known Allergies    Past Medical History:   Diagnosis Date    BPH (benign prostatic hypertrophy)     Depression     Diabetes mellitus, type 2     Dyslipidemia     Glaucoma (increased eye pressure)     Hypertension      Past Surgical History:   Procedure Laterality Date    CATARACT EXTRACTION W/  INTRAOCULAR LENS IMPLANT       Family History     Problem Relation (Age of Onset)    Cancer Maternal Grandmother    Clotting disorder Mother    Paranoid behavior Mother        Social History Main Topics    Smoking status: Former Smoker     Quit date: 2/6/1950    Smokeless tobacco: Never Used    Alcohol " use 4.2 oz/week     7 Shots of liquor per week    Drug use: No    Sexual activity: Yes     Partners: Female     Review of Systems  Objective:        There is no height or weight on file to calculate BMI.  Vital Signs (Most Recent):    Vital Signs (24h Range):                              Physical Exam:  Nursing note and vitals reviewed.    Constitutional: He appears well-developed and well-nourished.     Cardiovascular: Normal rate.     Abdominal: Soft.     Psych/Behavior: He is alert. He is oriented to person, place, and time. He has a normal mood and affect.     Neurological:   Constitutional: Patient sitting comfortably in chair. Appears well developed and well nourished.  Skin: Exposed areas are intact without abnormal markings, rashes or other lesions.  HEENT: Normocephalic. Normal conjunctivae.  Cardiovascular: Normal rate and regular rhythm.  Respiratory: Chest wall rises and falls symmetrically, without signs of respiratory distress.  Abdomen: Soft and non-tender.  Extremities: Warm and without edema. Calves supple, non-tender.  Psych/Behavior: Normal affect.     Neurological:     Mental status: Alert and oriented. Conversational and appropriate.       Cranial Nerves: Grossly intact.      Motor:     Upper:   Deltoids Triceps Biceps WE WF     R 3/5 4/5 5/5 5/5 5/5 4/5    L 3/5 4/5 5/5 5/5 5/5 4/5     Lower:   HF KE KF DF PF EHL    R 4-/5 5/5 5/5 5/5 5/5 5/5    L 4-/5 5/5 5/5 5/5 5/5 5/5     Sensory: Intact sensation to light touch in all extremities. Romberg negative.     Reflexes:          DTR: 2+ symmetrically throughout.     Moctezuma's: Negative.     Babinski's: Negative.     Clonus: Negative.     Cerebellar: Finger-to-nose and rapid alternating movements normal. Gait stable, fluid.     Spine:     Posture: Head well aligned over pelvis in front and side views.  No focal or global spinal deformity visible on inspection. Shoulders and hips even. No obvious leg length discrepancy. No scapula  winging.     Bending: Full ROM with forward, back and lateral bending. No rib prominence with forward bend.     Cervical:      ROM: Cervical deformity. Inability to maintain horizontal gaze. Restricted ROM in all directions, pain limited.      Midline TTP: Negative.     Spurling's test: Negative.     Lhermitte's: Negative.     Thoracic:     Midline TTP: Negative     Lumbar:     Midline TTP: Negative     Straight Leg Test: Negative     Crossed Straight Leg Test: Negative     Sciatic notch tenderness: Negative.     Other:     SI joint TTP: Negative.     Greater trochanter TTP: Negative.     Tenderness with external/internal hip rotation: Negative.         Significant Labs:  No results for input(s): GLU, NA, K, CL, CO2, BUN, CREATININE, CALCIUM, MG in the last 48 hours.  No results for input(s): WBC, HGB, HCT, PLT in the last 48 hours.  No results for input(s): LABPT, INR, APTT in the last 48 hours.  Microbiology Results (last 7 days)     ** No results found for the last 168 hours. **        {Select Results:80438}    Significant Diagnostics:  {Imaging Review:84786}

## 2018-04-10 NOTE — OP NOTE
[facility]  Neurosurgery  Operative Note    SUMMARY      Date of Procedure: 4/10/2018     Procedure: Procedure(s) (LRB):  DISKECTOMY AND FUSION-ANTERIOR CERVICAL (ACDF); C5-6. C6-7 (N/A)     Surgeon(s) and Role:     * Shilo Borrego MD - Resident - Assisting     * Osvaldo Hess MD - Primary     * Osvaldo Hess MD - Primary     * Osvaldo Hess MD - Primary  Orion Be MD      Pre-Operative Diagnosis: Myelopathy [G95.9]  Cervical stenosis of spine [M48.02]    Post-Operative Diagnosis: Post-Op Diagnosis Codes:     * Myelopathy [G95.9]     * Cervical stenosis of spine [M48.02]    Anesthesia: General    Technical Procedures Used: C5-7 ACDF    Description of the Findings of the Procedure: see full op note    Complications: No    Estimated Blood Loss (EBL): 1050 mL           Specimens:   Specimen (12h ago through future)    None           Implants:   Implant Name Type Inv. Item Serial No.  Lot No. LRB No. Used   PIN DISTRACTION DISP 14MM - [id number]  PIN DISTRACTION DISP 14MM  B [facility] [id number] N/A 2   [employer] BONE CELLR VIVIGEN 1CC - [id number]  MATRIX BONE CELLR VIVIGEN 1C [id number] [facility]  N/A 1   PIN FIXATION TEMPORARY STRGHT - [id number]  PIN FIXATION TEMPORARY STRGHT  [employer]  N/A 1   PLATE BONE SPINAL ANT 34MM - [id number]  PLATE BONE SPINAL ANT 34MM  [employer]  N/A 1   SCREW BONE SPINAL ANT 16MM - [id number]  SCREW BONE SPINAL ANT 16MM  [employer]  N/A 6   13mm [place] Cage       [employer]   N/A 1              Condition: Good    Disposition: PACU - hemodynamically stable.

## 2018-04-10 NOTE — TRANSFER OF CARE
Anesthesia Transfer of Care Note    Patient: Hollis Pitts    Procedure(s) Performed: Procedure(s) (LRB):  DISKECTOMY AND FUSION-ANTERIOR CERVICAL (ACDF); C5-6. C6-7 (N/A)    Patient location: PACU    Anesthesia Type: general    Transport from OR: Transported from OR on 100% O2 by closed face mask with adequate spontaneous ventilation    Post pain: adequate analgesia    Post assessment: no apparent anesthetic complications and tolerated procedure well    Post vital signs: stable    Level of consciousness: responds to stimulation and sedated    Nausea/Vomiting: no nausea/vomiting    Complications: none    Transfer of care protocol was followed      Last vitals:   Visit Vitals  /63   Pulse 64   Temp 36 °C (96.8 °F) (Temporal)   Resp 19   Ht 6' (1.829 m)   Wt 102.1 kg (225 lb)   SpO2 100%   BMI 30.52 kg/m²

## 2018-04-11 PROBLEM — E78.5 HYPERLIPIDEMIA: Status: ACTIVE | Noted: 2018-04-11

## 2018-04-11 PROBLEM — N40.1 BENIGN PROSTATIC HYPERPLASIA WITH URINARY RETENTION: Status: ACTIVE | Noted: 2018-04-11

## 2018-04-11 PROBLEM — E78.49 OTHER HYPERLIPIDEMIA: Status: ACTIVE | Noted: 2018-04-11

## 2018-04-11 PROBLEM — N40.0 BPH (BENIGN PROSTATIC HYPERPLASIA): Status: ACTIVE | Noted: 2018-04-11

## 2018-04-11 PROBLEM — E11.65 TYPE 2 DIABETES MELLITUS WITH HYPERGLYCEMIA: Status: ACTIVE | Noted: 2018-04-11

## 2018-04-11 PROBLEM — Z92.29 HX: ANTICOAGULATION: Status: ACTIVE | Noted: 2018-04-11

## 2018-04-11 PROBLEM — R33.8 BENIGN PROSTATIC HYPERPLASIA WITH URINARY RETENTION: Status: ACTIVE | Noted: 2018-04-11

## 2018-04-11 PROBLEM — I10 ESSENTIAL HYPERTENSION: Status: ACTIVE | Noted: 2018-04-11

## 2018-04-11 LAB
ANION GAP SERPL CALC-SCNC: 8 MMOL/L
BASOPHILS # BLD AUTO: 0.02 K/UL
BASOPHILS NFR BLD: 0.1 %
BUN SERPL-MCNC: 20 MG/DL
CALCIUM SERPL-MCNC: 9 MG/DL
CHLORIDE SERPL-SCNC: 107 MMOL/L
CO2 SERPL-SCNC: 20 MMOL/L
CREAT SERPL-MCNC: 0.9 MG/DL
DIFFERENTIAL METHOD: ABNORMAL
EOSINOPHIL # BLD AUTO: 0 K/UL
EOSINOPHIL NFR BLD: 0 %
ERYTHROCYTE [DISTWIDTH] IN BLOOD BY AUTOMATED COUNT: 15.3 %
EST. GFR  (AFRICAN AMERICAN): >60 ML/MIN/1.73 M^2
EST. GFR  (NON AFRICAN AMERICAN): >60 ML/MIN/1.73 M^2
GLUCOSE SERPL-MCNC: 275 MG/DL
HCT VFR BLD AUTO: 32.1 %
HGB BLD-MCNC: 10.1 G/DL
IMM GRANULOCYTES # BLD AUTO: 0.2 K/UL
IMM GRANULOCYTES NFR BLD AUTO: 1.1 %
LYMPHOCYTES # BLD AUTO: 1.6 K/UL
LYMPHOCYTES NFR BLD: 8.5 %
MCH RBC QN AUTO: 30.7 PG
MCHC RBC AUTO-ENTMCNC: 31.5 G/DL
MCV RBC AUTO: 98 FL
MONOCYTES # BLD AUTO: 1.6 K/UL
MONOCYTES NFR BLD: 8.9 %
NEUTROPHILS # BLD AUTO: 14.9 K/UL
NEUTROPHILS NFR BLD: 81.4 %
NRBC BLD-RTO: 0 /100 WBC
PLATELET # BLD AUTO: 231 K/UL
PMV BLD AUTO: 9.5 FL
POCT GLUCOSE: 229 MG/DL (ref 70–110)
POCT GLUCOSE: 247 MG/DL (ref 70–110)
POCT GLUCOSE: 271 MG/DL (ref 70–110)
POCT GLUCOSE: 307 MG/DL (ref 70–110)
POTASSIUM SERPL-SCNC: 5 MMOL/L
RBC # BLD AUTO: 3.29 M/UL
SODIUM SERPL-SCNC: 135 MMOL/L
WBC # BLD AUTO: 18.34 K/UL

## 2018-04-11 PROCEDURE — 25000003 PHARM REV CODE 250: Performed by: NEUROLOGICAL SURGERY

## 2018-04-11 PROCEDURE — 25000003 PHARM REV CODE 250: Performed by: PHYSICIAN ASSISTANT

## 2018-04-11 PROCEDURE — 20600001 HC STEP DOWN PRIVATE ROOM

## 2018-04-11 PROCEDURE — 99222 1ST HOSP IP/OBS MODERATE 55: CPT | Mod: GC,,, | Performed by: INTERNAL MEDICINE

## 2018-04-11 PROCEDURE — 94761 N-INVAS EAR/PLS OXIMETRY MLT: CPT

## 2018-04-11 PROCEDURE — 99024 POSTOP FOLLOW-UP VISIT: CPT | Mod: POP,,, | Performed by: PHYSICIAN ASSISTANT

## 2018-04-11 PROCEDURE — 97161 PT EVAL LOW COMPLEX 20 MIN: CPT

## 2018-04-11 PROCEDURE — 97530 THERAPEUTIC ACTIVITIES: CPT

## 2018-04-11 PROCEDURE — 85025 COMPLETE CBC W/AUTO DIFF WBC: CPT

## 2018-04-11 PROCEDURE — 36415 COLL VENOUS BLD VENIPUNCTURE: CPT

## 2018-04-11 PROCEDURE — 63600175 PHARM REV CODE 636 W HCPCS: Performed by: INTERNAL MEDICINE

## 2018-04-11 PROCEDURE — 94799 UNLISTED PULMONARY SVC/PX: CPT

## 2018-04-11 PROCEDURE — 80048 BASIC METABOLIC PNL TOTAL CA: CPT

## 2018-04-11 PROCEDURE — 63600175 PHARM REV CODE 636 W HCPCS: Performed by: NEUROLOGICAL SURGERY

## 2018-04-11 RX ORDER — BISACODYL 10 MG
10 SUPPOSITORY, RECTAL RECTAL DAILY PRN
Status: DISCONTINUED | OUTPATIENT
Start: 2018-04-11 | End: 2018-04-13

## 2018-04-11 RX ORDER — BACITRACIN ZINC 500 UNIT/G
OINTMENT (GRAM) TOPICAL 2 TIMES DAILY
Status: DISCONTINUED | OUTPATIENT
Start: 2018-04-12 | End: 2018-04-13

## 2018-04-11 RX ORDER — TAMSULOSIN HYDROCHLORIDE 0.4 MG/1
0.4 CAPSULE ORAL DAILY
Status: DISCONTINUED | OUTPATIENT
Start: 2018-04-11 | End: 2018-04-13

## 2018-04-11 RX ORDER — INSULIN ASPART 100 [IU]/ML
3 INJECTION, SOLUTION INTRAVENOUS; SUBCUTANEOUS
Status: DISCONTINUED | OUTPATIENT
Start: 2018-04-11 | End: 2018-04-12

## 2018-04-11 RX ADMIN — CITALOPRAM HYDROBROMIDE 20 MG: 20 TABLET ORAL at 08:04

## 2018-04-11 RX ADMIN — POTASSIUM CHLORIDE AND SODIUM CHLORIDE: 900; 150 INJECTION, SOLUTION INTRAVENOUS at 05:04

## 2018-04-11 RX ADMIN — INSULIN ASPART 3 UNITS: 100 INJECTION, SOLUTION INTRAVENOUS; SUBCUTANEOUS at 05:04

## 2018-04-11 RX ADMIN — HYDROCODONE BITARTRATE AND ACETAMINOPHEN 1 TABLET: 5; 325 TABLET ORAL at 01:04

## 2018-04-11 RX ADMIN — INSULIN ASPART 1 UNITS: 100 INJECTION, SOLUTION INTRAVENOUS; SUBCUTANEOUS at 09:04

## 2018-04-11 RX ADMIN — GABAPENTIN 100 MG: 100 CAPSULE ORAL at 08:04

## 2018-04-11 RX ADMIN — CEFAZOLIN 2 G: 330 INJECTION, POWDER, FOR SOLUTION INTRAMUSCULAR; INTRAVENOUS at 08:04

## 2018-04-11 RX ADMIN — TAMSULOSIN HYDROCHLORIDE 0.4 MG: 0.4 CAPSULE ORAL at 05:04

## 2018-04-11 RX ADMIN — CEFAZOLIN 2 G: 330 INJECTION, POWDER, FOR SOLUTION INTRAMUSCULAR; INTRAVENOUS at 01:04

## 2018-04-11 RX ADMIN — HEPARIN SODIUM 5000 UNITS: 5000 INJECTION, SOLUTION INTRAVENOUS; SUBCUTANEOUS at 02:04

## 2018-04-11 RX ADMIN — ATORVASTATIN CALCIUM 20 MG: 20 TABLET, FILM COATED ORAL at 09:04

## 2018-04-11 RX ADMIN — HEPARIN SODIUM 5000 UNITS: 5000 INJECTION, SOLUTION INTRAVENOUS; SUBCUTANEOUS at 09:04

## 2018-04-11 RX ADMIN — MUPIROCIN 1 G: 20 OINTMENT TOPICAL at 09:04

## 2018-04-11 RX ADMIN — VALSARTAN 80 MG: 40 TABLET ORAL at 08:04

## 2018-04-11 RX ADMIN — CEFAZOLIN 2 G: 330 INJECTION, POWDER, FOR SOLUTION INTRAMUSCULAR; INTRAVENOUS at 11:04

## 2018-04-11 RX ADMIN — TRAZODONE HYDROCHLORIDE 50 MG: 50 TABLET ORAL at 09:04

## 2018-04-11 RX ADMIN — INSULIN ASPART 2 UNITS: 100 INJECTION, SOLUTION INTRAVENOUS; SUBCUTANEOUS at 09:04

## 2018-04-11 RX ADMIN — INSULIN ASPART 4 UNITS: 100 INJECTION, SOLUTION INTRAVENOUS; SUBCUTANEOUS at 12:04

## 2018-04-11 RX ADMIN — HYDROCODONE BITARTRATE AND ACETAMINOPHEN 1 TABLET: 5; 325 TABLET ORAL at 05:04

## 2018-04-11 RX ADMIN — CINACALCET HYDROCHLORIDE 30 MG: 30 TABLET, COATED ORAL at 08:04

## 2018-04-11 RX ADMIN — HYDROCODONE BITARTRATE AND ACETAMINOPHEN 1 TABLET: 5; 325 TABLET ORAL at 11:04

## 2018-04-11 RX ADMIN — CEFAZOLIN 2 G: 330 INJECTION, POWDER, FOR SOLUTION INTRAMUSCULAR; INTRAVENOUS at 05:04

## 2018-04-11 RX ADMIN — BISACODYL 10 MG: 5 TABLET, COATED ORAL at 08:04

## 2018-04-11 RX ADMIN — HEPARIN SODIUM 5000 UNITS: 5000 INJECTION, SOLUTION INTRAVENOUS; SUBCUTANEOUS at 05:04

## 2018-04-11 RX ADMIN — MUPIROCIN 1 G: 20 OINTMENT TOPICAL at 08:04

## 2018-04-11 RX ADMIN — DILTIAZEM HYDROCHLORIDE 90 MG: 30 TABLET, FILM COATED ORAL at 08:04

## 2018-04-11 RX ADMIN — GABAPENTIN 100 MG: 100 CAPSULE ORAL at 09:04

## 2018-04-11 RX ADMIN — GABAPENTIN 100 MG: 100 CAPSULE ORAL at 03:04

## 2018-04-11 NOTE — PT/OT/SLP EVAL
Physical Therapy Evaluation    Patient Name:  Hollis Pitts   MRN:  [id number]    Recommendations:     Discharge Recommendations:  nursing facility, skilled   Discharge Equipment Recommendations: bedside commode   Barriers to discharge: Inaccessible home and Decreased caregiver support    Assessment:     Hollis Pitts is a 82 y.o. male admitted with a medical diagnosis of <principal problem not specified>.  He presents with the following impairments/functional limitations:  weakness, impaired functional mobilty, gait instability, impaired endurance, impaired balance, impaired self care skills, decreased lower extremity function, decreased upper extremity function, pain, impaired cardiopulmonary response to activity, decreased safety awareness.  Pt demo decreased functional mobility secondary to BLE weakness, cervical pain, and fatigue.  Pt tolerated session c mod c/o fatigue.  Pt performed bed mobility c max A, transfer c max-total A.  Pt performed sit<>stand 3x c RW max A - unable to achieve full standing d/t BLE weakness, excess knee flexion and forward flexed posture.  Pt transferred to bedside chair total A squat pivot.  Pt would benefit from continued skilled acute PT 4x/wk to improve functional mobility.  Recommending pt receive PT services in SNF setting following d/c from hospital once medically cleared.      Rehab Prognosis:  fair; patient would benefit from acute skilled PT services to address these deficits and reach maximum level of function.      Recent Surgery: Procedure(s) (LRB):  DISKECTOMY AND FUSION-ANTERIOR CERVICAL (ACDF); C5-6. C6-7 (N/A) 1 Day Post-Op    Plan:     During this hospitalization, patient to be seen 4 x/week to address the above listed problems via gait training, therapeutic activities, therapeutic exercises, neuromuscular re-education  · Plan of Care Expires:  05/11/18   Plan of Care Reviewed with: patient    Subjective     Communicated with RN prior to session.  Patient found  "HOB elevated upon PT entry to room, agreeable to evaluation.      Chief Complaint: neck pain; weakness  Patient comments/goals: "I don't want to slip out of the chair because I slipped out of one before."  Pain/Comfort:  · Pain Rating 1: 8/10  · Location - Orientation 1: posterior  · Location 1: neck  · Pain Addressed 1: Distraction  · Pain Rating Post-Intervention 1: 8/10    Patients cultural, spiritual, Zoroastrianism conflicts given the current situation: none stated    Living Environment:  Pt lives c friend in H c [place].  PTA pt mainly using WC for mobility c limited short distance amb c SW and assistance from friend.    Prior to admission, patients level of functiom: required assistance c ADLs and functional mobility.  Patient has the following equipment: walker, standard, wheelchair.  DME owned (not currently used): none.  Upon discharge, patient will have assistance from friend (limited d/t work).    Objective:     Patient found with: telemetry, peripheral IV, MADELIN drain     General Precautions: Standard, fall   Orthopedic Precautions:N/A   Braces: Cervical collar     Exams:  · Cognitive Exam:  Patient is oriented to Person, Place, Time and Situation and follows 100% of all commands   · Gross Motor Coordination:  WFL  · Sensation:    · -       Intact  · RLE ROM: WFL  · RLE Strength: hip flex (3/5); knee flex/ext (4-/5); ankle DF/PF (3+/5)  · LLE ROM: WFL  · LLE Strength: hip flex (3/5); knee flex/ext (4-/5); ankle DF/PF (4-/5)    Functional Mobility:  · Bed Mobility:     · Rolling Right: maximal assistance  · Scooting: maximal assistance  · Supine to Sit: maximal assistance  · Transfers:     · Sit to Stand:  maximal assistance with standard walker  · Bed to Chair: total assistance with  no AD  using  Squat Pivot  · Gait: not attempted d/t BLE and fatigue  · Balance: static sitting (S); dynamic sitting (SBA); static standing (max A)    AM-PAC 6 CLICK MOBILITY  Total Score:10       Therapeutic Activities and " Exercises:  Educated on PT role/POC; safety c mobility; benefits of OOB activities; performing BLE exercises while up in chair or in bed -v/u  Sit<>stand 3x c RW max A  Static standing 3x~30sec  Transfer bed>chair c total A  Assisted c use of urinal  Contacted RN for linen change as pt soiled lines.    Patient left up in chair with all lines intact, call button in reach and RN notified.    GOALS:    Physical Therapy Goals        Problem: Physical Therapy Goal    Goal Priority Disciplines Outcome Goal Variances Interventions   Physical Therapy Goal     PT/OT, PT Ongoing (interventions implemented as appropriate)     Description:  Goals to be met by: 2018     Patient will increase functional independence with mobility by performin. Supine to sit with MInimal Assistance  2. Sit to supine with MInimal Assistance  3. Sit to stand transfer with Minimal Assistance  4. Bed to chair transfer with Minimal Assistance using Rolling Walker  5. Lower extremity exercise program x30 reps per handout, with independence                      History:     Past Medical History:   Diagnosis Date    BPH (benign prostatic hypertrophy)     Depression     Diabetes mellitus, type 2     Dyslipidemia     Glaucoma (increased eye pressure)     Hypertension        Past Surgical History:   Procedure Laterality Date    CATARACT EXTRACTION W/  INTRAOCULAR LENS IMPLANT         Clinical Decision Making:     History  Co-morbidities and personal factors that may impact the plan of care Examination  Body Structures and Functions, activity limitations and participation restrictions that may impact the plan of care Clinical Presentation   Decision Making/ Complexity Score   Co-morbidities:   [] Time since onset of injury / illness / exacerbation  [] Status of current condition  []Patient's cognitive status and safety concerns    [] Multiple Medical Problems (see med hx)  Personal Factors:   [] Patient's age  [] Prior Level of function    [] Patient's home situation (environment and family support)  [] Patient's level of motivation  [] Expected progression of patient      HISTORY:(criteria)    [x] 73347 - no personal factors/history    [] 89185 - has 1-2 personal factor/comorbidity     [] 47202 - has >3 personal factor/comorbidity     Body Regions:  [] Objective examination findings  [] Head     []  Neck  [] Trunk   [] Upper Extremity  [] Lower Extremity    Body Systems:  [] For communication ability, affect, cognition, language, and learning style: the assessment of the ability to make needs known, consciousness, orientation (person, place, and time), expected emotional /behavioral responses, and learning preferences (eg, learning barriers, education  needs)  [] For the neuromuscular system: a general assessment of gross coordinated movement (eg, balance, gait, locomotion, transfers, and transitions) and motor function  (motor control and motor learning)  [] For the musculoskeletal system: the assessment of gross symmetry, gross range of motion, gross strength, height, and weight  [] For the integumentary system: the assessment of pliability(texture), presence of scar formation, skin color, and skin integrity  [] For cardiovascular/pulmonary system: the assessment of heart rate, respiratory rate, blood pressure, and edema     Activity limitations:    [] Patient's cognitive status and saf ety concerns          [] Status of current condition      [] Weight bearing restriction  [] Cardiopulmunary Restriction    Participation Restrictions:   [] Goals and goal agreement with the patient     [] Rehab potential (prognosis) and probable outcome      Examination of Body System: (criteria)    [x] 83649 - addressing 1-2 elements    [] 85788 - addressing a total of 3 or more elements     [] 42820 -  Addressing a total of 4 or more elements         Clinical Presentation: (criteria)  Stable - 98851     On examination of body system using standardized tests and  measures patient presents with 1-2 elements from any of the following: body structures and functions, activity limitations, and/or participation restrictions.  Leading to a clinical presentation that is considered stable and/or uncomplicated                              Clinical Decision Making  (Eval Complexity):  Low- 11116     Time Tracking:     PT Received On: 04/11/18  PT Start Time: 1115     PT Stop Time: 1146  PT Total Time (min): 31 min     Billable Minutes: Evaluation 15 min and Therapeutic Activity 16 min      Rogelio Hernández, PT  04/11/2018

## 2018-04-11 NOTE — SUBJECTIVE & OBJECTIVE
"Past Medical History:   Diagnosis Date    BPH (benign prostatic hypertrophy)     Depression     Diabetes mellitus, type 2     Dyslipidemia     Glaucoma (increased eye pressure)     Hypertension        Past Surgical History:   Procedure Laterality Date    CATARACT EXTRACTION W/  INTRAOCULAR LENS IMPLANT         Review of patient's allergies indicates:  No Known Allergies    No current facility-administered medications on file prior to encounter.      Current Outpatient Prescriptions on File Prior to Encounter   Medication Sig    atorvastatin (LIPITOR) 20 MG tablet Take 20 mg by mouth nightly.    bisacodyl (DULCOLAX) 5 mg EC tablet     CARTIA  mg Cp24 Take 90 mg by mouth.     citalopram (CELEXA) 20 MG tablet Take 20 mg by mouth once daily.    gabapentin (NEURONTIN) 100 MG capsule Take 100 mg by mouth 3 (three) times daily as needed.     SENSIPAR 30 mg Tab Take 30 mg by mouth daily with breakfast.     traZODone (DESYREL) 50 MG tablet Take 50 mg by mouth every evening.    valsartan (DIOVAN) 80 MG tablet Take 80 mg by mouth once daily.    vitamin B comp with C no.4 150 mg Tab Take vitamin B complex 1 a day.  Over-the-counter is an appropriate alternative.    BD INSULIN PEN NEEDLE UF SHORT 31 gauge x 5/16" Ndle USE WITH LANTUS PEN DAILY    CONSTULOSE 10 gram/15 mL solution TAKE  30 MILLILITERS BY MOUTH 3 TIMES A DAY UNTIL BOWEL MOVEMENT ...  (REFER TO PRESCRIPTION NOTES).    ELIQUIS 2.5 mg Tab Take 2.5 mg by mouth 2 (two) times daily.     FREESTYLE TEST Strp      Family History     Problem Relation (Age of Onset)    Cancer Maternal Grandmother    Clotting disorder Mother    Paranoid behavior Mother        Social History Main Topics    Smoking status: Former Smoker     Quit date: 2/6/1950    Smokeless tobacco: Never Used    Alcohol use 4.2 oz/week     7 Shots of liquor per week    Drug use: No    Sexual activity: Yes     Partners: Female     Review of Systems   Constitutional: Negative for " chills, fatigue and fever.   HENT: Negative for congestion and sore throat.    Eyes: Negative for visual disturbance.   Respiratory: Negative for cough and shortness of breath.    Cardiovascular: Positive for leg swelling. Negative for chest pain and palpitations.   Gastrointestinal: Negative for abdominal distention, abdominal pain, constipation, diarrhea, nausea and vomiting.   Endocrine: Negative for polyuria.   Genitourinary: Negative for dysuria and urgency.   Musculoskeletal: Negative for myalgias.   Skin: Negative for rash and wound.   Allergic/Immunologic: Negative for immunocompromised state.   Neurological: Positive for weakness and numbness. Negative for dizziness and seizures.   Hematological: Negative for adenopathy. Does not bruise/bleed easily.   Psychiatric/Behavioral: Negative for confusion and dysphoric mood. The patient is not nervous/anxious.      Objective:     Vital Signs (Most Recent):  Temp: 97.5 °F (36.4 °C) (04/11/18 1536)  Pulse: 60 (04/11/18 1536)  Resp: 16 (04/11/18 1536)  BP: 126/60 (04/11/18 1536)  SpO2: 98 % (04/11/18 1536) Vital Signs (24h Range):  Temp:  [96.8 °F (36 °C)-98.3 °F (36.8 °C)] 97.5 °F (36.4 °C)  Pulse:  [58-89] 60  Resp:  [16-22] 16  SpO2:  [94 %-100 %] 98 %  BP: (121-170)/(59-81) 126/60     Weight: 114 kg (251 lb 5.2 oz)  Body mass index is 34.09 kg/m².    Physical Exam   Constitutional: He is oriented to person, place, and time. He appears well-developed and well-nourished. No distress.   HENT:   Head: Normocephalic and atraumatic.   Neck drain in place  C-collar in place  Incision c/d/i   Eyes: EOM are normal. Pupils are equal, round, and reactive to light.   Neck: Normal range of motion. Neck supple. No JVD present.   Cardiovascular: Normal rate and regular rhythm.  Exam reveals no gallop and no friction rub.    No murmur heard.  Trace pedal edema bilaterally   Pulmonary/Chest: Effort normal and breath sounds normal. No respiratory distress. He has no wheezes.  "  Abdominal: Soft. Bowel sounds are normal. He exhibits no distension and no mass. There is no tenderness.   Musculoskeletal: Normal range of motion. He exhibits no edema or tenderness.   Neurological: He is alert and oriented to person, place, and time. He displays normal reflexes. No cranial nerve deficit.   Skin: Skin is warm and dry. No rash noted. No erythema.   Psychiatric: He has a normal mood and affect. His behavior is normal.   Circumstantial thought process       Significant Labs:     CBC:    Recent Labs  Lab 04/10/18  1518 04/10/18  1751 04/11/18  1222   WBC  --  11.65 18.34*   GRAN  --  60.5  7.1 81.4*  14.9*   HGB  --  9.4* 10.1*   HCT 29* 30.3* 32.1*   PLT  --  208 231       Chem 10:    Recent Labs  Lab 04/10/18  1751 04/11/18  1221    135*   K 4.1 5.0    107   CO2 23 20*   BUN 17 20   CREATININE 0.7 0.9   * 275*   CALCIUM 8.5* 9.0       Significant Imaging:     X-ray cervical spine:  "Patient has had surgery with fusion from the C 5-C6-C7 levels.  Disc spacers and bridging anterior plate can be seen.  Alignment is satisfactory." - per interpreting radiologist  "

## 2018-04-11 NOTE — ASSESSMENT & PLAN NOTE
-- On apixaban prior to admission  -- Indication not clear. Per his daughter, it was started after his head trauma admission for VTE prophylaxis  -- She denied any history of arrhythmia, DVT, or PE  -- Agree with holding, particularly in the context of recent surgery. Would not resume upon d/c
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
## 2018-04-11 NOTE — ASSESSMENT & PLAN NOTE
-- Stable at present. Acceptable BP range in an elderly man immediately following a large surgery  -- Continue diltiazem and ARB

## 2018-04-11 NOTE — ASSESSMENT & PLAN NOTE
-- Poorly controlled currently, the etiology of which is likely post-surgical stress and glucocorticoid administration (received both dex and methylprednisolone)  -- Recommend HbA1c to assess long-term control (ordered for AM draw)  -- Will hold home oral antihyperglycemic agents in favor of low-dose aspart SSI + POCT glucose checks  -- Recommend aspart 2 units TID with meals (ordered)  -- Diabetic diet when not NPO

## 2018-04-11 NOTE — OP NOTE
DATE OF SURGERY: 4/10/18    PREOPERATIVE DIAGNOSIS:  1. 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 Severe degenerative cervical stenosis with myelopathy  3. History of controlled Diabetes type 2    POSTOPERATIVE DIAGNOSIS:  Same.    PROCEDURE PERFORMED:  1. Anterior cervical discectomy and fusion with plating, C5-6 and C6-7  2. Application of PEEK interbody cage, C5-6 and C6-7 (Depuy Synthes)  3. Application of anterior cervical plate, C5-6 and C6-7 (Depuy Synthes)  4. Use of intraoperative microscope for microdissection  5. Use of neuromonitoring with MEPs  6. Use of intraoperative fluoroscopy  7. Vivigen bone grafting  8. Application and removal of Caraballo Wells tongs    SURGEON: Osvaldo Hess M.D.    ASSISTANT: Shilo Borrego M.D.    ANESTHESIA: GETA    ESTIMATED BLOOD LOSS: 200mL    COMPLICATIONS: Durotomy at C6-7 s/p repair    DRAINS: None    SPECIMENS SENT: None    INDICATIONS:    This is an 82M with advanced and progressive degenerative cervical myelopathy and kyphosis. He has 4 extremity weakness and lost the ability to walk several months prior to our first clinic encounter. Imaging showed severe kyphotic deformity with the apex at C5-6 and C6-7, where he also has severe degenerative stenosis and ongoing spinal cord compression. I recommended a 2 level ACD. R/B/A/I/M were reviewed in detail with the patient and his daughter and they wished to proceed. They understand that his risks are significantly elevated because of his age and DM2.    PROCEDURE:    The patient was brought into the operating room where he was intubated and placed under general anesthesia without difficulty. All lines were placed. The patient was positioned supine onto the operating table with appropriate padding of all pressure points. Caraballo Wells tongs were placed at the head. The head was placed in a Halsey head christensen with a chin strap and hanging weight traction. Lateral x-rays were taken for  localization and to assess cervical lordosis. Baseline MEPs were run and found to be present and stable in all extremities. The right neck was marked, prepped and draped in the usual sterile fashion. A timeout was performed prior to the procedure. Ten mL of Lidocaine with epinephrine were injected into the skin.    A transverse linear skin incision was made at the right neck and Weitlaner retractors were used to widen the incision. The platysma was divided sharply with Metzenbaum scissors. A sub-plastysmal dissection was carried out with Bovie electrocautery. A Lauren Beck approach to the the anterior cervical spine was performed in the usual fashion. The carotid artery was palpated lateral to the working corridor. The prevertebral soft tissues were bluntly dissected with Kitner dissectors. A spinal needle was placed into the presumed C5-6 disc space, and was confirmed on lateral x-ray. Subperiosteal dissection was carried out with Bovie electrocautery. The Trimline retractor system was put into place. Star pins were placed at C5 and C7 and confirmed to be in good position on xray. They were then distracted to widen the disc spaces.    An annulotomy at C5-6 and C6-7 was performed with the 15 blade. Pituitary rongeurs and curettes were used to remove disc material at both levels. Anterior osteophytes were removed with the rongeur and Kerrison punches. The end plates at both levels were prepared with straight curettes. The microscope was then brought into the field for microdissection. Posterior osteophytes were removed with the high speed drill and Kerrison punches at both levels. The posterior longitudinal ligament was opened and resected with curettes and kerrison punches at both levels. Adequate central and neuroforaminal decompression was achieved and confirmed at both levels with a nerve hook. A small durotomy was encountered at C6-7 where PLL was adherent to the dura. It was repaired with a piece of  Surgicel and Eviseal fibrin glue.    Both C5-6 and C6-7 were sized with a spacer trail. A size 7 PEEK cage was packed with Vivigen and countersunk into C5-6. A size 13 carbon fiber cage was packed with Vivigen and countersunk into C6-7. MEPs were run after this was completed and found to be consistent with baseline. The microscope was taken out of the field. The Lorain pins were removed and the hanging weights were removed. An anterior cervical plate spanning from C5 to C7 was then placed and secured with size 16 variable screws. All hardware was found to be in adequate position on AP and lateral xrays, and an excellent deformity reduction was achieved. The screws were finally tightened and locked.    The wound was copiously irrigated with normal saline and hemostasis was achieved with bipolar electrocautery. Depomedrol was placed over the esophagus and the retropharyngeal space. One gram of Vancomycin powder was placed into the wound. A deep Hemovac drain was placed. The platysma was reapproximated with interrupted inverted 3.0 Vicryl sutures and a subcuticular stitch was placed with 4.0 Monocryl. Dermabond was placed at the skin.    The patient tolerated the procedure well from a hemodynamic and neuromonitoring standpoint. MEPs were present and stable throughout the case. I was present for all critical portions of the case, and at the end of the case all counts were correct. The patient was repositioned supine onto the hospital bed where he was extubated and allowed to emerge from anesthesia without difficult. The patient was sent to the PACU in stable condition for recovery.    JUSTIFICATION OF MODIFIER 22: This was a complex deformity operation of the cervical spine in a frail elderly patient. It required significantly increased preop planning, mental effort, technical skill and time to perform every aspect of this procedure.

## 2018-04-11 NOTE — HOSPITAL COURSE
4/10: OR for ACDF C3-5  4/11: NAEON. Continued neck pain and BUE numbness/weakness which is same as preop. No new/worsening pain/weakness since surgery. Tolerating mechanical soft diet since daughter took his dentures home. He is sitting in chair with cervical collar on. Therapy recommending SNF placement. Surgical drain clamped; will dc tomorrow. Medicine consulted for comanagement ( DM and HTN etc).   4/12: Surgical drain clamped yesterday and patient was started on flomax after rouse removal given history of BPH. NAEON. Patient reported increased neck pain, choking on liquid/solids, coughing, and difficulty eating secondary to pain. He said he has some issues yesterday but it is worst today. Nurse did report of coughing with AM meds today. Yesterday, nursing staff fed patient breakfast/lunch and did not notice any issues.   4/13: Lumbar drain placed this am, transfer to [facility] when bed available for higher level of care  4/14: NAEON. Some drainage from around HV exit site. Stitch placed  4/15: continue LD, continues drainage from HV site.   4/16: no drainage from HV site overnight  4/18: HV pulled yesterday, continued LD drain, OR cancelled today due to no leaking from wound  4/19: no drainage from wound. Clamp LD today.   4/20: LD removed. PT/OT ordered.   4/21: concern for aspiration o/n, WBC trending up, now on O2, lethargic, consulted HM & possible tx to [facility]  4/23: vitals and exam improved. TTF  4/24: stable, TTF  4/26: WBC up again, Pseudomonas on last UA on rocephin and vanc. Redraw vanc trough today and reconsult HM. Remains NPO per ST   4/27: Patient with waxing and waning leukocytosis. Low grade fever yesterday evening with Tmax 100.6. Patient has no pain on exam. He is deconditioned. Medicine re-consulted for co-management since patient stepped down. He continues to have dysphagia and speech therapy is unsure when he will progress with swallowing. IR consulted for peg placement for long term nutritional  means. Plan for MRI C spine w/wo contrast today to reassess patient given dysphagia, continued weakness and deconditioning. Radiology wanted CT head to r/o any metals prior MRI.  UA ordered, nursing staff to collect.   4/28: MRI with increased fluid collection in surgical bed concerning for CSF. Transfer to ICU, plan for wound washout and csf repair today. PEG delayed. On vanc, cefepime and flagyl for aspiration PNA  4/29: s/p wound washout and CSF leak repair POD 1. Patient extubated post op, on RA sat 98. NAEON.  4/30:  per ID will change abx regimen to vanc and cef  5/1: evauation by sp, transfer to ns   5/2: leukocytosis resolved, ST recommending NPO, intra-op cultures remain negative. Pt drowsy today during exam, will get repeat CTH  5/3: Repeat CTH stable. Hospital medicine consulted for assistance with commorbidities   5/4: Plan for PEG placement today  5/5: PEG in place.   5/6: NAEON, pending SNF  5/7: NAEON. Afebrile. VSS. Continued general deconditioning and intermittent drowsiness. Patient awake and alert on my exam but drowsy/lethargic with ST at 9:30am. He has no incisional pain, dysphagia, or any complaints on exam. Speech improved compared to pre wound revision on 4/28/18. Medicine following peripherally for co-management. Pending SNF placement. Tolerating TF. Voiding per condom cath.   5/8:  NAEON. Afebrile. VSS. Continued general deconditioning and waxing/waning mental status. Patient awake and alert on nurse's exam early am but drowsy on my exam with Dr. Hess at 11:30am. CT head today to reassess previous hygromas.  Medicine following peripherally for co-management and evaluating hypercalcemia. Pending SNF placement.   5/10: Exam stable - awake, alert today. BP stabilized, BG improved with current regimen. Cleared for discharge to SNF from  standpoint pending biphosphonate infusion.  5/11-12: Pt neurologically stable.  Stable for DC to SNF, Awaiting placement, to be transferred on Monday.    5/13: NAEON, placement pending, mental status improved  5/14: Aspiration this am after episode of vomiting. On 2L O2. Accepted to NH when medically stable.  5/15: Pt stable on 2L O2. Repeat CXR stable.  Started on po augmentin for aspiration pneumonia prophylaxis.  Intermittent low grade fevers.  HM Following.  SNF pending.    5/16: Intermittent low grade fevers again overnight with Tmax 102.7 at 1824 and leukocytosis. O2 sats ranging 92-96% on 2L. TF restarted yesterday; now at goal. Medicine comanaging and given 1L LR bolus for ANGELIA crea 1.8. Medicine also started cipro for UTI. Patient awake and alert on exam with improving speech. He has no complaints on exam. Pending SNF placement when medically stable.   5/17: ANGELIA improving, crea 1.5 today. O2 sats ranging 92-99% on 2L. AFEBRILE overnight. SBP improved 110-143 today--med adjusting diltiazem dosing. Med started IVF. Suppository today since last BM was 5/11. Identification and susceptibility pending for Ucx. Ekg (sinus tachy) today since patient was tachycardic 110's, asymptomatic.   5/18: BG dropped overnight- likely due to unintentionally delayed tube feeds? Will hold aspart today and continue to monitor just on detemir. Renal function improving. Had 1 large BM. Breathing improved. ANGELIA resolved, improved respiratory status, HR improved. DC today to SNF.

## 2018-04-11 NOTE — PLAN OF CARE
Problem: Physical Therapy Goal  Goal: Physical Therapy Goal  Goals to be met by: 2018     Patient will increase functional independence with mobility by performin. Supine to sit with MInimal Assistance  2. Sit to supine with MInimal Assistance  3. Sit to stand transfer with Minimal Assistance  4. Bed to chair transfer with Minimal Assistance using Rolling Walker  5. Lower extremity exercise program x30 reps per handout, with independence    Outcome: Ongoing (interventions implemented as appropriate)  Eval completed and POC established    Rogelio Hernández DPT  2018

## 2018-04-11 NOTE — PLAN OF CARE
TAN following for DC needs. TAN in communication with CM.    Team requested that TAN sent referrals to SNF's near the patient's home as the patient has a weakened support system at this time. TAN sent to [facility] the [facility], and [facility] via [facility].     Only PT has seen the patient at this time. TAN sent PT note and will send more therapy notes once written.     UPDATE 4:08 PM  SW received call from patient's nurse stating that the patient's ex-wife is in the room and would like to speak to TAN. TAN met with the patient and ex-wife at the bedside. TAN provided the patient and ex-wife with a SNF list. The ex-wife stated that she will review the list with her daughter and call TAN with choices. The ex-wife stated that the patient's daughter has the POA. The daughter's name is Bud Stephenson and her contact is [phone].      Yvonne Ellington, TAN  [id number]

## 2018-04-11 NOTE — ASSESSMENT & PLAN NOTE
-- Poorly controlled currently, the etiology of which is likely post-surgical stress and glucocorticoid administration,  -- given concern for kai-surgical inflammation affecting patient's swallowing, primary is giving one day worth of decadron 4mg IV [id number].   -- HbA1c 5.7, patient reports being controlled on lifestyle modification alone.    -- discontinue prandial insulin in setting of NPO status. Starting basal insulin with detemir 11u daily. Continue LDSSI.    -- Diabetic diet when not NPO

## 2018-04-11 NOTE — ANESTHESIA POSTPROCEDURE EVALUATION
Anesthesia Post Evaluation    Patient: Hollis Pitts    Procedure(s) Performed: Procedure(s) (LRB):  DISKECTOMY AND FUSION-ANTERIOR CERVICAL (ACDF); C5-6. C6-7 (N/A)    Final Anesthesia Type: general  Patient location during evaluation: PACU  Patient participation: Yes- Able to Participate  Level of consciousness: awake and alert  Post-procedure vital signs: reviewed and stable  Pain management: adequate  Airway patency: patent  PONV status at discharge: No PONV  Anesthetic complications: no      Cardiovascular status: blood pressure returned to baseline  Respiratory status: unassisted  Hydration status: euvolemic  Follow-up not needed.        Visit Vitals  BP (!) 155/81 (BP Location: Right arm, Patient Position: Sitting)   Pulse 89   Temp 36.7 °C (98 °F) (Oral)   Resp 16   Ht 6' (1.829 m)   Wt 114 kg (251 lb 5.2 oz)   SpO2 95%   BMI 34.09 kg/m²       Pain/Katarina Score: Pain Assessment Performed: Yes (4/11/2018  4:00 AM)  Presence of Pain: denies (4/11/2018  4:00 AM)  Pain Rating Prior to Med Admin: 4 (4/11/2018  5:54 AM)  Pain Rating Post Med Admin: 0 (4/11/2018  2:52 AM)  Katarina Score: 9 (4/10/2018  9:20 PM)

## 2018-04-11 NOTE — ASSESSMENT & PLAN NOTE
82 y.o. male s/p C3-5 ACDF with Dr. Hess, POD#1. Previously on eliquis.     -Patient is neurologically stable but with continued BUE weakness and neck pain which is unchanged since surgery.   -Post op imaging with satisfactory placement of hardware.   -Pain controlled on current regimen.  -Surgical drain: 98cc serosang. Continue drain but keep it clamped/no suction and to gravity.  -Continue antibiotics while drain in place.   -Bacitracin to incision BID.   -Cervical collar when OOB or with activity  -Continue aggressive PT/OT/OOB daily  -Patient must be OOB for atleast 6 hours daily (may be in intervals: 2 hours in chair with each meal)  -IS to bed side. Patient to use atleast 10x every hour.  -Continue bowel regimen daily.  -Continue SQH, Teds and SCDs for DVTP.  -Restarted all home meds for HTN, HLD, anxiety/depression, and hypercalcemia.   -Dubois DC'ed today. Patient voiding. Will bladder scan in 4 hour if patient does not void spontaneously. Nursing staff to measure PVR  -Medicine consulted for co-management of co morbidities. He is poor historian. Unable to determine history or home meds/dosing. Attempted to contact daughter multiple times but with no response.     Dispo:  Pending SNF placemement    Discussed with Dr. Hess
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
## 2018-04-11 NOTE — NURSING TRANSFER
Nursing Transfer Note      4/10/2018     Transfer [id number]    Transfer via stretcher    Transfer with personal belongings    Transported by pct/rn    Medicines sent: insulin pen, iv fluids    Chart send with patient: yes    Notified: kaylynn, RN- on tsu    Patient reassessed at: upon arrival to the unit after xray

## 2018-04-11 NOTE — PLAN OF CARE
POC reviewed with pt and daughter, both acknowledged understanding. Pt remains free of falls/injuries. Pt on telemetry remains SR and SB. Pt blood glucose being monitored q 4, insulin administered per prn orders. Pt tolerating clear liquid diet. Pt pain controled with prescribed meds. Pt fitted and wearing neck brace. Pt voiding per catheter, output recorded. Pt has a neck hemovac to gravity, not suction. Output recorded. Recorded q 4 NV checks per MD orders. Bringing to xray before transporting pt to his room. Daughter has pt's upper dentures in her purse.  No acute events throughout shift. No distress noted, will continue to monitor.

## 2018-04-11 NOTE — PROGRESS NOTES
Home medical supplies at the pts bedside, fitted and placed the neck brace onto the pt. Neck brace instructions left in pt's chart.

## 2018-04-11 NOTE — ANESTHESIA RELEASE NOTE
Anesthesia Release from PACU Note    Patient: Hollis Pitts    Procedure(s) Performed: Procedure(s) (LRB):  DISKECTOMY AND FUSION-ANTERIOR CERVICAL (ACDF); C5-6. C6-7 (N/A)    Anesthesia type: general    Post pain: Adequate analgesia    Post assessment: no apparent anesthetic complications    Last Vitals:   Visit Vitals  BP (!) 121/59   Pulse (!) 58   Temp 36.3 °C (97.3 °F) (Temporal)   Resp 18   Ht 6' (1.829 m)   Wt 102.1 kg (225 lb)   SpO2 95%   BMI 30.52 kg/m²       Post vital signs: stable    Level of consciousness: awake, alert  and oriented    Nausea/Vomiting: no nausea/no vomiting    Complications: none    Airway Patency: patent    Respiratory: unassisted    Cardiovascular: stable and blood pressure at baseline    Hydration: euvolemic
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
## 2018-04-11 NOTE — ANESTHESIA POSTPROCEDURE EVALUATION
Anesthesia Post Evaluation    Patient: Hollis Pitts    Procedure(s) Performed: Procedure(s) (LRB):  DISKECTOMY AND FUSION-ANTERIOR CERVICAL (ACDF); C5-6. C6-7 (N/A)    Final Anesthesia Type: general  Patient location during evaluation: PACU  Patient participation: Yes- Able to Participate  Level of consciousness: awake and alert and oriented  Post-procedure vital signs: reviewed and stable  Pain management: adequate  Airway patency: patent  PONV status at discharge: No PONV  Anesthetic complications: no      Cardiovascular status: blood pressure returned to baseline and hemodynamically stable  Respiratory status: unassisted  Hydration status: euvolemic  Follow-up not needed.        Visit Vitals  BP (!) 121/59   Pulse (!) 58   Temp 36.3 °C (97.3 °F) (Temporal)   Resp 18   Ht 6' (1.829 m)   Wt 102.1 kg (225 lb)   SpO2 95%   BMI 30.52 kg/m²       Pain/Katarina Score: Pain Assessment Performed: Yes (4/10/2018  7:30 PM)  Presence of Pain: complains of pain/discomfort (4/10/2018  7:30 PM)  Pain Rating Prior to Med Admin: 8 (4/10/2018  7:35 PM)  Katarina Score: 9 (4/10/2018  7:30 PM)

## 2018-04-11 NOTE — ASSESSMENT & PLAN NOTE
-- Management per primary team  -- POD #1 s/p ACDF.SNF placement in progress per discussion with the patient  -- High delirium risk. Discontinued IV phenergan PRN. Recommend the lowest dose opioid possible for pain and avoiding sedating medications. Agree with rouse d/c trial, with the caveat that his BPH places him at high risk for urinary obstruction

## 2018-04-11 NOTE — HOSPITAL COURSE
"Patient admitted to [facility] for cervical myelopathy and radiculopathy, s/p cervical diskectomy and fusion 4/10/18. On 4/11 patient developed dysphagia likely due to post op inflammation. He was started on steroids and hospital medicine was consulted for assistance with glycemic control. Patient was found to have a post-op CSF leak; lumbar drain was placed 4/13 and patient was transferred to NCC unit. Lumbar drain was removed 4/20. Patient has had significant leukocytosis since 4/19 (WBC 20's) and, per chart review, there was concern for an aspiration event 4/21. He had SIRS criteria at this time and was thus started on broad spectrum abx with vanc and cefepime. Urine Cx from 4/21 grew pseudomonas sensitive to cefepime, however, leukocytosis has persisted. CXR 4/21 showed "Persistent bilateral areas of increased attenuation are noted within the lower lung zones concerning for edema, aspiration or pneumonia." Patient was stepped down to floor 4/25.     5/8 - NAEON. BG slightly elevated out of desired range. Will increase aspart dosing. BP remains well controlled. Pending vitamin D and PTH given hypercalcemia.   5/9- Patient continues to work with PT/OT. Remains NPO. Vitamin D very low at 15 and PTH elevated at 134. Patient unable to receive senispar via PEG tube. IV fluids started for hypercalcemia and patient appearing dry on exam. BG in better control but still needs adjustment. Increase detemir to 16 u BID and aspart to 5 units q 4 hours.  5/10- IV fluids stopped given some increased respiratory secretions. detemir increased to 18 units BID though BG better controlled. Leukocytosis resolved.   5/11- NAEON. BG well controlled. S/p one dose of pamidronate with improvement in calcium. BP well controlled.   5/14- Patient with aspirational event after vomiting this AM. Thick copious secretions and minimal hypoxia now on NC. Mental status status quo. Complaining of diffuse pain. Discussed with family member at bedside that " if patient were to decompensate, they would want everything done.   5/15- Fever curve improving. Patient maintaining sats on 2 L. Having intermittent cough. Started on abx overnight as per primary team.     5/16- Patient febrile up to 102.7. ANGELIA developed overnight likely from sepsis and pre-renal injury. On cirpo and augmentin. Patient's mental status markedly improved. More talkative today. Coached on incentive spirometry. Acapella and CPT q 4 hours.   5/17/2018- ADRIANEON. Pt without complaints today, states he is not SOB or having fevers. ANGELIA improving, BG within goal range, will monitor it for changes now that feeds are restarted.  5/18- BG dropped overnight- unsure if this was related to tube feeds not being hung or not? Will hold aspart today and continue to monitor just on detemir. Renal function improving. Had 1 large BM. Breathing improved.

## 2018-04-11 NOTE — PROGRESS NOTES
[facility]  Neurosurgery  Progress Note    Subjective:     History of Present Illness: 82 y.o.  male with type 2 diabetes, who presents today for follow up evaluation one week prior to 2 level ACDF scheduled for 4/10/2018. Pt reports    Pt presents today wearing a cervical collar and in a wheelchair. He would like to proceed with surgery. Per pt's family, his PCP would like to see him on 4/9, the day before surgery. He notes continued neck pain and BUE numbness/tingling as well as BUE weakness. Pt is only able to walk a few steps unassisted. He has never received neck surgery.     Post-Op Info:  Procedure(s) (LRB):  DISKECTOMY AND FUSION-ANTERIOR CERVICAL (ACDF); C5-6. C6-7 (N/A)   1 Day Post-Op     Interval History:  NAEON. POD#1 ACDF C3-5. He is sitting in chair with cervical collar on. Continued neck pain and BUE numbness/weakness which is same as preop. No new/worsening pain/weakness since surgery. Tolerating mechanical soft diet since daughter took his dentures home. Voiding since rouse removal. Therapy recommending SNF placement. Surgical drain clamped today. Medicine consulted for comanagement ( DM and HTN etc).     Medications:  Continuous Infusions:  Scheduled Meds:   atorvastatin  20 mg Oral Nightly    bisacodyl  10 mg Oral Daily    ceFAZolin (ANCEF) IVPB  2 g Intravenous Q8H    cinacalcet  30 mg Oral Daily with breakfast    citalopram  20 mg Oral Daily    diltiaZEM  90 mg Oral Daily    gabapentin  100 mg Oral TID    heparin (porcine)  5,000 Units Subcutaneous Q8H    mupirocin  1 g Nasal BID    traZODone  50 mg Oral QHS    valsartan  80 mg Oral Daily     PRN Meds:acetaminophen, acetaminophen, aluminum-magnesium hydroxide-simethicone, bisacodyl, dextrose 50%, dextrose 50%, glucagon (human recombinant), glucose, glucose, hydrALAZINE, hydrocodone-acetaminophen 5-325mg, insulin aspart U-100, labetalol, morphine, ondansetron, promethazine (PHENERGAN) IVPB, senna-docusate 8.6-50 mg      Review of Systems  Objective:     Weight: 114 kg (251 lb 5.2 oz)  Body mass index is 34.09 kg/m².  Vital Signs (Most Recent):  Temp: 97.5 °F (36.4 °C) (04/11/18 1536)  Pulse: 60 (04/11/18 1536)  Resp: 16 (04/11/18 1536)  BP: 126/60 (04/11/18 1536)  SpO2: 98 % (04/11/18 1536) Vital Signs (24h Range):  Temp:  [96.8 °F (36 °C)-98.3 °F (36.8 °C)] 97.5 °F (36.4 °C)  Pulse:  [58-89] 60  Resp:  [16-22] 16  SpO2:  [94 %-100 %] 98 %  BP: (121-170)/(59-81) 126/60       Date 04/11/18 0700 - 04/12/18 0659   Shift 5296-5520 3179-1910 0233-3407 24 Hour Total   I  N  T  A  K  E   Shift Total  (mL/kg)       O  U  T  P  U  T   Urine  (mL/kg/hr) 200  (0.2)   200    Shift Total  (mL/kg) 200  (1.8)   200  (1.8)   Weight (kg) 114 114 114 114                        Closed/Suction Drain 04/10/18 1425 Anterior Neck Accordion 10 Fr. (Active)   Site Description Healing 4/10/2018 10:26 PM   Dressing Type No dressing 4/10/2018 10:26 PM   Dressing Status Clean;Dry;Intact 4/10/2018  9:20 PM   Drainage Serosanguineous 4/11/2018  7:34 AM   Status Clamped 4/11/2018  8:45 AM   Output (mL) 90 mL 4/11/2018  5:00 AM       Neurosurgery Physical Exam  General: well developed, well nourished. no acute distress. Generalized deconditioning   Neurologic: Awake, alert and oriented x3. Thought content appropriate.  Head: normocephalic, atraumatic   GCS: Motor: 6/Verbal: 5/Eyes: 4 GCS Total: 15  Cranial nerves: face symmetric, tongue midline, pupils equal, round, reactive to light with accomodation, extraocular muscles intact. CN II-XII grossly intact.   Language: no aphasia  Speech: no dysarthria   Sensory: decrease response to light touch in BUE  Motor Strength: Moves all extremities spontaneously with good tone.     Strength  Deltoids Triceps Biceps Wrist Extension Wrist Flexion Hand    Upper: R 3/5 3/5 4/5 5/5 5/5 4/5    L 4+/5 4+/5 4+/5 5/5 5/5 4+/5     Iliopsoas Quadriceps Knee  Flexion Tibialis  anterior Gastro- cnemius EHL   Lower: R 4/5 5/5 5/5  5/5 5/5 5/5    L 4/5 5/5 5/5 5/5 5/5 5/5       Moct[place]uma: absent  Clonus: absent  Babinski: absent   ENT: normal hearing with finger rub  Heart: RRR, no cyanosis, pallor, or edema.   Lungs:  normal respiratory effort  Abdomen: soft, non-tender and symmetric  Extremities: warm with no cyanosis, edema, or clubbing. Generalized edema throughout.   Pulses: palpable distal pulses  Skin: warm, dry and intact. No visible rashes or lesions.       wound is c/d/i, no erythema, edema, ttp, or drainage.  wound edges are well approximated.        Significant Labs:    Recent Labs  Lab 04/10/18  1751 04/11/18  1221   * 275*    135*   K 4.1 5.0    107   CO2 23 20*   BUN 17 20   CREATININE 0.7 0.9   CALCIUM 8.5* 9.0       Recent Labs  Lab 04/10/18  1518 04/10/18  1751 04/11/18  1222   WBC  --  11.65 18.34*   HGB  --  9.4* 10.1*   HCT 29* 30.3* 32.1*   PLT  --  208 231     No results for input(s): LABPT, INR, APTT in the last 48 hours.  Microbiology Results (last 7 days)     ** No results found for the last 168 hours. **            Significant Diagnostics:  Post-op films personally reviewed and showed good hardware position anteriorly at C3-5      Assessment/Plan:     Cervical myelopathy with cervical radiculopathy    82 y.o. male s/p C3-5 ACDF with Dr. Hess, POD#1. Previously on eliquis.     -Patient is neurologically stable but with continued BUE weakness and neck pain which is unchanged since surgery.   -Post op imaging with satisfactory placement of hardware.   -Pain controlled on current regimen.  -Surgical drain: 98cc serosang. Continue drain but keep it clamped/no suction and to gravity.  -Continue antibiotics while drain in place.   -Bacitracin to incision BID.   -Cervical collar when OOB or with activity  -Continue aggressive PT/OT/OOB daily  -Patient must be OOB for atleast 6 hours daily (may be in intervals: 2 hours in chair with each meal)  -IS to bed side. Patient to use atleast 10x every  hour.  -Continue bowel regimen daily.  -Continue SQH, Teds and SCDs for DVTP.  -Restarted all home meds for HTN, HLD,   -[place]'ed today. Patient voiding. Will bladder scan in 4 hour if patient does not void spontaneously. Nursing staff to measure PVR  -Medicine consulted for co-management of co morbidities. He is poor historian. Unable to determine home meds and dosing. Attempted to contact daughter multiple times but with no response.     Dispo:  Pending SNF placemement    Discussed with Dr. Deshawn Bradford PAWoodC  Neurosurgery  [facility]

## 2018-04-11 NOTE — SUBJECTIVE & OBJECTIVE
Interval History:  MITESHON. POD#1 ACDF C3-5. He is sitting in chair with cervical collar on. Continued neck pain and BUE numbness/weakness which is same as preop. No new/worsening pain/weakness since surgery. Tolerating mechanical soft diet since daughter took his dentures home. Voiding since rouse removal. Therapy recommending SNF placement. Surgical drain clamped today. Medicine consulted for comanagement ( DM and HTN etc).     Medications:  Continuous Infusions:  Scheduled Meds:   atorvastatin  20 mg Oral Nightly    bisacodyl  10 mg Oral Daily    ceFAZolin (ANCEF) IVPB  2 g Intravenous Q8H    cinacalcet  30 mg Oral Daily with breakfast    citalopram  20 mg Oral Daily    diltiaZEM  90 mg Oral Daily    gabapentin  100 mg Oral TID    heparin (porcine)  5,000 Units Subcutaneous Q8H    mupirocin  1 g Nasal BID    traZODone  50 mg Oral QHS    valsartan  80 mg Oral Daily     PRN Meds:acetaminophen, acetaminophen, aluminum-magnesium hydroxide-simethicone, bisacodyl, dextrose 50%, dextrose 50%, glucagon (human recombinant), glucose, glucose, hydrALAZINE, hydrocodone-acetaminophen 5-325mg, insulin aspart U-100, labetalol, morphine, ondansetron, promethazine (PHENERGAN) IVPB, senna-docusate 8.6-50 mg     Review of Systems  Objective:     Weight: 114 kg (251 lb 5.2 oz)  Body mass index is 34.09 kg/m².  Vital Signs (Most Recent):  Temp: 97.5 °F (36.4 °C) (04/11/18 1536)  Pulse: 60 (04/11/18 1536)  Resp: 16 (04/11/18 1536)  BP: 126/60 (04/11/18 1536)  SpO2: 98 % (04/11/18 1536) Vital Signs (24h Range):  Temp:  [96.8 °F (36 °C)-98.3 °F (36.8 °C)] 97.5 °F (36.4 °C)  Pulse:  [58-89] 60  Resp:  [16-22] 16  SpO2:  [94 %-100 %] 98 %  BP: (121-170)/(59-81) 126/60       Date 04/11/18 0700 - 04/12/18 0659   Shift 6205-7072 1053-6775 9116-7031 24 Hour Total   I  N  T  A  K  E   Shift Total  (mL/kg)       O  U  T  P  U  T   Urine  (mL/kg/hr) 200  (0.2)   200    Shift Total  (mL/kg) 200  (1.8)   200  (1.8)   Weight (kg) 114 114  114 114                        Closed/Suction Drain 04/10/18 1425 Anterior Neck Accordion 10 Fr. (Active)   Site Description Healing 4/10/2018 10:26 PM   Dressing Type No dressing 4/10/2018 10:26 PM   Dressing Status Clean;Dry;Intact 4/10/2018  9:20 PM   Drainage Serosanguineous 4/11/2018  7:34 AM   Status Clamped 4/11/2018  8:45 AM   Output (mL) 90 mL 4/11/2018  5:00 AM       Neurosurgery Physical Exam  General: well developed, well nourished. no acute distress. Generalized deconditioning   Neurologic: Awake, alert and oriented x3. Thought content appropriate.  Head: normocephalic, atraumatic   GCS: Motor: 6/Verbal: 5/Eyes: 4 GCS Total: 15  Cranial nerves: face symmetric, tongue midline, pupils equal, round, reactive to light with accomodation, extraocular muscles intact. CN II-XII grossly intact.   Language: no aphasia  Speech: no dysarthria   Sensory: decrease response to light touch in BUE  Motor Strength: Moves all extremities spontaneously with good tone.     Strength  Deltoids Triceps Biceps Wrist Extension Wrist Flexion Hand    Upper: R 3/5 3/5 4/5 5/5 5/5 4/5    L 4+/5 4+/5 4+/5 5/5 5/5 4+/5     Iliopsoas Quadriceps Knee  Flexion Tibialis  anterior Gastro- cnemius EHL   Lower: R 4/5 5/5 5/5 5/5 5/5 5/5    L 4/5 5/5 5/5 5/5 5/5 5/5       [place]ct[place]: absent  Clonus: absent  Babinski: absent   ENT: normal hearing with finger rub  Heart: RRR, no cyanosis, pallor, or edema.   Lungs:  normal respiratory effort  Abdomen: soft, non-tender and symmetric  Extremities: warm with no cyanosis, edema, or clubbing. Generalized edema throughout.   Pulses: palpable distal pulses  Skin: warm, dry and intact. No visible rashes or lesions.       wound is c/d/i, no erythema, edema, ttp, or drainage.  wound edges are well approximated.        Significant Labs:    Recent Labs  Lab 04/10/18  1751 04/11/18  1221   * 275*    135*   K 4.1 5.0    107   CO2 23 20*   BUN 17 20   CREATININE 0.7 0.9   CALCIUM 8.5* 9.0        Recent Labs  Lab 04/10/18  1518 04/10/18  1751 04/11/18  1222   WBC  --  11.65 18.34*   HGB  --  9.4* 10.1*   HCT 29* 30.3* 32.1*   PLT  --  208 231     No results for input(s): LABPT, INR, APTT in the last 48 hours.  Microbiology Results (last 7 days)     ** No results found for the last 168 hours. **            Significant Diagnostics:  Post-op films personally reviewed and showed good hardware position anteriorly at C3-5

## 2018-04-12 LAB
ANION GAP SERPL CALC-SCNC: 5 MMOL/L
BASOPHILS # BLD AUTO: 0.01 K/UL
BASOPHILS NFR BLD: 0.1 %
BUN SERPL-MCNC: 22 MG/DL
CALCIUM SERPL-MCNC: 9 MG/DL
CHLORIDE SERPL-SCNC: 107 MMOL/L
CO2 SERPL-SCNC: 26 MMOL/L
CREAT SERPL-MCNC: 0.8 MG/DL
DIFFERENTIAL METHOD: ABNORMAL
EOSINOPHIL # BLD AUTO: 0 K/UL
EOSINOPHIL NFR BLD: 0 %
ERYTHROCYTE [DISTWIDTH] IN BLOOD BY AUTOMATED COUNT: 15.5 %
EST. GFR  (AFRICAN AMERICAN): >60 ML/MIN/1.73 M^2
EST. GFR  (NON AFRICAN AMERICAN): >60 ML/MIN/1.73 M^2
ESTIMATED AVG GLUCOSE: 117 MG/DL
GLUCOSE SERPL-MCNC: 218 MG/DL
HBA1C MFR BLD HPLC: 5.7 %
HCT VFR BLD AUTO: 28.6 %
HGB BLD-MCNC: 9 G/DL
IMM GRANULOCYTES # BLD AUTO: 0.07 K/UL
IMM GRANULOCYTES NFR BLD AUTO: 0.5 %
LYMPHOCYTES # BLD AUTO: 1.7 K/UL
LYMPHOCYTES NFR BLD: 12.7 %
MCH RBC QN AUTO: 30.3 PG
MCHC RBC AUTO-ENTMCNC: 31.5 G/DL
MCV RBC AUTO: 96 FL
MONOCYTES # BLD AUTO: 1.7 K/UL
MONOCYTES NFR BLD: 12.9 %
NEUTROPHILS # BLD AUTO: 9.9 K/UL
NEUTROPHILS NFR BLD: 73.8 %
NRBC BLD-RTO: 0 /100 WBC
PLATELET # BLD AUTO: 212 K/UL
PMV BLD AUTO: 10 FL
POCT GLUCOSE: 191 MG/DL (ref 70–110)
POCT GLUCOSE: 241 MG/DL (ref 70–110)
POTASSIUM SERPL-SCNC: 4.6 MMOL/L
RBC # BLD AUTO: 2.97 M/UL
SODIUM SERPL-SCNC: 138 MMOL/L
WBC # BLD AUTO: 13.47 K/UL

## 2018-04-12 PROCEDURE — 83036 HEMOGLOBIN GLYCOSYLATED A1C: CPT

## 2018-04-12 PROCEDURE — 97166 OT EVAL MOD COMPLEX 45 MIN: CPT

## 2018-04-12 PROCEDURE — 25000003 PHARM REV CODE 250: Performed by: NEUROLOGICAL SURGERY

## 2018-04-12 PROCEDURE — 80048 BASIC METABOLIC PNL TOTAL CA: CPT

## 2018-04-12 PROCEDURE — 36415 COLL VENOUS BLD VENIPUNCTURE: CPT

## 2018-04-12 PROCEDURE — 99024 POSTOP FOLLOW-UP VISIT: CPT | Mod: POP,,, | Performed by: PHYSICIAN ASSISTANT

## 2018-04-12 PROCEDURE — 25000003 PHARM REV CODE 250: Performed by: STUDENT IN AN ORGANIZED HEALTH CARE EDUCATION/TRAINING PROGRAM

## 2018-04-12 PROCEDURE — 63600175 PHARM REV CODE 636 W HCPCS: Performed by: NEUROLOGICAL SURGERY

## 2018-04-12 PROCEDURE — 63600175 PHARM REV CODE 636 W HCPCS: Performed by: STUDENT IN AN ORGANIZED HEALTH CARE EDUCATION/TRAINING PROGRAM

## 2018-04-12 PROCEDURE — 63600175 PHARM REV CODE 636 W HCPCS: Performed by: PHYSICIAN ASSISTANT

## 2018-04-12 PROCEDURE — 25000003 PHARM REV CODE 250: Performed by: PHYSICIAN ASSISTANT

## 2018-04-12 PROCEDURE — 99232 SBSQ HOSP IP/OBS MODERATE 35: CPT | Mod: GC,,, | Performed by: INTERNAL MEDICINE

## 2018-04-12 PROCEDURE — 92610 EVALUATE SWALLOWING FUNCTION: CPT

## 2018-04-12 PROCEDURE — 20600001 HC STEP DOWN PRIVATE ROOM

## 2018-04-12 PROCEDURE — 85025 COMPLETE CBC W/AUTO DIFF WBC: CPT

## 2018-04-12 PROCEDURE — 63600175 PHARM REV CODE 636 W HCPCS: Performed by: INTERNAL MEDICINE

## 2018-04-12 RX ORDER — DEXAMETHASONE SODIUM PHOSPHATE 4 MG/ML
4 INJECTION, SOLUTION INTRA-ARTICULAR; INTRALESIONAL; INTRAMUSCULAR; INTRAVENOUS; SOFT TISSUE EVERY 6 HOURS
Status: COMPLETED | OUTPATIENT
Start: 2018-04-12 | End: 2018-04-13

## 2018-04-12 RX ORDER — MORPHINE SULFATE 2 MG/ML
3 INJECTION, SOLUTION INTRAMUSCULAR; INTRAVENOUS
Status: DISCONTINUED | OUTPATIENT
Start: 2018-04-12 | End: 2018-04-13

## 2018-04-12 RX ORDER — ACETAMINOPHEN 10 MG/ML
1000 INJECTION, SOLUTION INTRAVENOUS EVERY 8 HOURS
Status: COMPLETED | OUTPATIENT
Start: 2018-04-12 | End: 2018-04-13

## 2018-04-12 RX ORDER — INSULIN ASPART 100 [IU]/ML
0-5 INJECTION, SOLUTION INTRAVENOUS; SUBCUTANEOUS EVERY 6 HOURS PRN
Status: DISCONTINUED | OUTPATIENT
Start: 2018-04-12 | End: 2018-04-13

## 2018-04-12 RX ORDER — SODIUM CHLORIDE 9 MG/ML
INJECTION, SOLUTION INTRAVENOUS CONTINUOUS
Status: DISCONTINUED | OUTPATIENT
Start: 2018-04-12 | End: 2018-04-13

## 2018-04-12 RX ORDER — GLUCAGON 1 MG
1 KIT INJECTION
Status: DISCONTINUED | OUTPATIENT
Start: 2018-04-12 | End: 2018-04-13

## 2018-04-12 RX ORDER — DIAZEPAM 5 MG/ML
5 INJECTION, SOLUTION INTRAMUSCULAR; INTRAVENOUS EVERY 6 HOURS PRN
Status: DISCONTINUED | OUTPATIENT
Start: 2018-04-12 | End: 2018-04-13

## 2018-04-12 RX ORDER — INSULIN ASPART 100 [IU]/ML
6 INJECTION, SOLUTION INTRAVENOUS; SUBCUTANEOUS
Status: DISCONTINUED | OUTPATIENT
Start: 2018-04-12 | End: 2018-04-12

## 2018-04-12 RX ADMIN — HEPARIN SODIUM 5000 UNITS: 5000 INJECTION, SOLUTION INTRAVENOUS; SUBCUTANEOUS at 05:04

## 2018-04-12 RX ADMIN — DEXAMETHASONE SODIUM PHOSPHATE 4 MG: 4 INJECTION, SOLUTION INTRAMUSCULAR; INTRAVENOUS at 06:04

## 2018-04-12 RX ADMIN — DIAZEPAM 5 MG: 5 INJECTION, SOLUTION INTRAMUSCULAR; INTRAVENOUS at 04:04

## 2018-04-12 RX ADMIN — HYDROCODONE BITARTRATE AND ACETAMINOPHEN 1 TABLET: 5; 325 TABLET ORAL at 07:04

## 2018-04-12 RX ADMIN — ACETAMINOPHEN 1000 MG: 10 INJECTION, SOLUTION INTRAVENOUS at 02:04

## 2018-04-12 RX ADMIN — CEFAZOLIN 2 G: 330 INJECTION, POWDER, FOR SOLUTION INTRAMUSCULAR; INTRAVENOUS at 04:04

## 2018-04-12 RX ADMIN — VALSARTAN 80 MG: 40 TABLET ORAL at 08:04

## 2018-04-12 RX ADMIN — INSULIN ASPART 3 UNITS: 100 INJECTION, SOLUTION INTRAVENOUS; SUBCUTANEOUS at 08:04

## 2018-04-12 RX ADMIN — GABAPENTIN 100 MG: 100 CAPSULE ORAL at 08:04

## 2018-04-12 RX ADMIN — TAMSULOSIN HYDROCHLORIDE 0.4 MG: 0.4 CAPSULE ORAL at 08:04

## 2018-04-12 RX ADMIN — MORPHINE SULFATE 3 MG: 2 INJECTION, SOLUTION INTRAMUSCULAR; INTRAVENOUS at 06:04

## 2018-04-12 RX ADMIN — MORPHINE SULFATE 3 MG: 2 INJECTION, SOLUTION INTRAMUSCULAR; INTRAVENOUS at 04:04

## 2018-04-12 RX ADMIN — MORPHINE SULFATE 2 MG: 2 INJECTION, SOLUTION INTRAMUSCULAR; INTRAVENOUS at 10:04

## 2018-04-12 RX ADMIN — HEPARIN SODIUM 5000 UNITS: 5000 INJECTION, SOLUTION INTRAVENOUS; SUBCUTANEOUS at 08:04

## 2018-04-12 RX ADMIN — INSULIN ASPART 2 UNITS: 100 INJECTION, SOLUTION INTRAVENOUS; SUBCUTANEOUS at 08:04

## 2018-04-12 RX ADMIN — DEXAMETHASONE SODIUM PHOSPHATE 4 MG: 4 INJECTION, SOLUTION INTRAMUSCULAR; INTRAVENOUS at 10:04

## 2018-04-12 RX ADMIN — MORPHINE SULFATE 3 MG: 2 INJECTION, SOLUTION INTRAMUSCULAR; INTRAVENOUS at 02:04

## 2018-04-12 RX ADMIN — MUPIROCIN 1 G: 20 OINTMENT TOPICAL at 08:04

## 2018-04-12 RX ADMIN — LABETALOL HYDROCHLORIDE 10 MG: 5 INJECTION, SOLUTION INTRAVENOUS at 04:04

## 2018-04-12 RX ADMIN — CEFAZOLIN 2 G: 330 INJECTION, POWDER, FOR SOLUTION INTRAMUSCULAR; INTRAVENOUS at 07:04

## 2018-04-12 RX ADMIN — HEPARIN SODIUM 5000 UNITS: 5000 INJECTION, SOLUTION INTRAVENOUS; SUBCUTANEOUS at 02:04

## 2018-04-12 RX ADMIN — MORPHINE SULFATE 2 MG: 2 INJECTION, SOLUTION INTRAMUSCULAR; INTRAVENOUS at 09:04

## 2018-04-12 RX ADMIN — SODIUM CHLORIDE: 0.9 INJECTION, SOLUTION INTRAVENOUS at 02:04

## 2018-04-12 RX ADMIN — ACETAMINOPHEN 1000 MG: 10 INJECTION, SOLUTION INTRAVENOUS at 08:04

## 2018-04-12 RX ADMIN — DILTIAZEM HYDROCHLORIDE 90 MG: 30 TABLET, FILM COATED ORAL at 08:04

## 2018-04-12 RX ADMIN — BACITRACIN ZINC: 500 OINTMENT TOPICAL at 09:04

## 2018-04-12 RX ADMIN — INSULIN DETEMIR 11 UNITS: 100 INJECTION, SOLUTION SUBCUTANEOUS at 02:04

## 2018-04-12 RX ADMIN — CITALOPRAM HYDROBROMIDE 20 MG: 20 TABLET ORAL at 08:04

## 2018-04-12 NOTE — PT/OT/SLP EVAL
"Occupational Therapy   Evaluation    Name: Hollis Pitts  MRN: [id number]  Admitting Diagnosis:  Cervical myelopathy with cervical radiculopathy 2 Days Post-Op    Recommendations:     Discharge Recommendations: nursing facility, skilled  Discharge Equipment Recommendations:   (possibly drop arm commode if safe/appropriate; con't to assess for further needs)  Barriers to discharge:  Decreased caregiver support- friend who pt lives with can provide limited support    History:     Occupational Profile:  Living Environment: lives in  home with "another sandoval", states it's a friend who works  Previous level of function: friend assists with transfer to w/c, pt states he was able to stand with assist and pivot to w/c PTA, friend assists with dressing UB/LB, some setup for feeding using LUE, wears diapers and friend assist with seated cleaning/sponge bath as able- reports weakness has been ongoing x 1-1/2 years.   Roles and Routines: none stated  Equipment Owned:  walker, standard, wheelchair  Assistance upon Discharge: limited    Past Medical History:   Diagnosis Date    Benign prostatic hyperplasia with urinary retention 4/11/2018    Cervical myelopathy with cervical radiculopathy 4/10/2018    Depression     Dyslipidemia     Essential hypertension 4/11/2018    Glaucoma (increased eye pressure)     Mild cognitive impairment     Traumatic brain injury 07/2017    Type 2 diabetes mellitus with hyperglycemia, without long-term current use of insulin 4/11/2018         Past Surgical History:   Procedure Laterality Date    CATARACT EXTRACTION W/  INTRAOCULAR LENS IMPLANT         Subjective     Chief Complaint: pain in neck and into Ue's, 8 not moving, 9 with any movement  Patient/Family stated goals: none stated, agreed that he wants to increase strength and functional abilities  Communicated with: nsg prior to session.  Pain/Comfort:  Pain Rating 1:  (8/9 10 today in neck and increased significantly with movement and " swallowing)  Pain Addressed 1: Distraction, Reposition, Cessation of Activity  Pain Rating Post-Intervention 1:  (8-9/10)    Patients cultural, spiritual, Islam conflicts given the current situation: none    Objective:     Patient found with: telemetry, peripheral IV, MADELIN drain    General Precautions: Standard, aspiration, fall   Orthopedic Precautions:    Braces: Cervical collar     Occupational Performance:    Bed Mobility:    · Rolling with total assist today, pain is significant issue today (with movement and swallowing)    Functional Mobility/Transfers:  -not performed this date due to pain limiting movement today and decreased safety for task    Activities of Daily Living:  · Feeding- NPO, not able to get L hand completely to mouth   · UE dress total assist  · LE dress total assist  · toileting total assist-nsg notified that pt needing to be changed/cleaned after tx    Cognitive/Visual Perceptual:  Follows command appropriately, noted with [facility]CI s/p TBI in 2017 per chart    Physical Exam:HOB up ~60'  RUE: shoulder ~1/8 AROM/ ~1/2 PROM; elbow flexion grossly 3/5, triceps trace/PROM grossly wfl extension, grasp 3-/5 (3/4 AROM, index trace ) and decreased full PROM  LUE: shoulder ~1/4 AROM/~1/2 PROM; elbow flexion 4-/5, triceps 3+/5, grasp 4-/5  Decreased BLE strength, see PT note for details    Patient left HOB elevated with ST present, call button in reach    [facility] 6 Click:  [facility] Total Score: 7    Treatment & Education:  Pt performed A/AA/PROM all jt's/planes BUE's x 5-10 reps with pain tolerance limiting, performed BLE AAROM x 10 reps heel slides and abd/adduction, active ankle pumps; educated on plan of care, exercises as able in bed; total assist to position pt in bed after exercises/eval for ST eval.  Education:    Assessment:     Hollis Pitts is a 82 y.o. male with a medical diagnosis of Cervical myelopathy with cervical radiculopathy.  He presents with significantly decreased strength and ADL  "performance, pain limiting activity today but pt participating as able.  Performance deficits affecting function are weakness, impaired endurance, impaired self care skills, impaired balance, impaired functional mobilty, decreased upper extremity function, decreased lower extremity function, pain, decreased ROM.      Rehab Prognosis:  good; patient would benefit from acute skilled OT services to address these deficits and reach maximum level of function.         Clinical Decision Makin.  OT Mod:  "Pt evaluation falls under moderate complexity for evaluation coding due to identification of 3-5 performance deficits noted as stated above. Eval required Min/Mod assistance to complete on this date and detailed assessment(s) were utilized. Moreover, an expanded review of history and occupational profile obtained with additional review of cognitive, physical and psychosocial hx."     Plan:     Patient to be seen 4 x/week to address the above listed problems via self-care/home management, therapeutic activities, therapeutic exercises  · Plan of Care Expires: 18  · Plan of Care Reviewed with: patient    This Plan of care has been discussed with the patient who was involved in its development and understands and is in agreement with the identified goals and treatment plan    GOALS:    Occupational Therapy Goals        Problem: Occupational Therapy Goal    Goal Priority Disciplines Outcome Interventions   Occupational Therapy Goal     OT, PT/OT Ongoing (interventions implemented as appropriate)    Description:  Goals to be met by: 18     Patient will increase functional independence with ADLs by performing:    Feeding with Set-up Assistance.  UE Dressing with Moderate Assistance.  LE Dressing with Maximum Assistance.  Grooming while seated with Minimal Assistance.  Supine to sit with Moderate Assistance.  Stand pivot transfers with Moderate Assistance.  Increased functional strength B shoulders to ~1/2 AROM " for increased adl independence and mobility, increase distal strength 1/2-1 mm grade for increased adl's and mobility.  Family/caregiver education provided for mobility and adl's, DME needs, AE rec's                       Time Tracking:     OT Date of Treatment: 04/12/18  OT Start Time: 1205  OT Stop Time: 1230  OT Total Time (min): 25 min    Billable Minutes:Evaluation 25 min    Carito Christopher OT  4/12/2018

## 2018-04-12 NOTE — PLAN OF CARE
SW following for DC needs. SW in communication with CM.    SW received message from patient's daughter, Bud ([phone]). SW called Bud back and left a message.    Yvonne Ellington, TAN  [id number]

## 2018-04-12 NOTE — PT/OT/SLP EVAL
"Speech Language Pathology Evaluation  Bedside Swallow    Patient Name:  Hollis Pitts   MRN:  [id number]  Admitting Diagnosis: Cervical myelopathy with cervical radiculopathy    Recommendations:                 General Recommendations:  Dysphagia therapy  Diet recommendations:  NPO, NPO   Aspiration Precautions: Ice chips sparingly and Strict aspiration precautions if pt needs medication pt ok for pills crushed in small amounts of puree  General Precautions: Standard, aspiration, fall  Communication strategies:  none    History:     Past Medical History:   Diagnosis Date    Benign prostatic hyperplasia with urinary retention 4/11/2018    Cervical myelopathy with cervical radiculopathy 4/10/2018    Depression     Dyslipidemia     Essential hypertension 4/11/2018    Glaucoma (increased eye pressure)     Mild cognitive impairment     Traumatic brain injury 07/2017    Type 2 diabetes mellitus with hyperglycemia, without long-term current use of insulin 4/11/2018       Past Surgical History:   Procedure Laterality Date    CATARACT EXTRACTION W/  INTRAOCULAR LENS IMPLANT         Social History: Patient lives with awake/alert.    Prior diet: Regular/thin.    Subjective     Awake/alert  " It's so painful to swallow."     Pain/Comfort:  ·   Pt with c/o pain during swallow-no numerical value given    Objective:     Oral Musculature Evaluation  · Oral Musculature: WFL  · Dentition: present and adequate  · Mandibular Strength and Mobility: WFL  · Oral Labial Strength and Mobility: WFL  · Lingual Strength and Mobility: WFL  · Volitional Cough: adequate  · Volitional Swallow: timely  · Voice Prior to PO Intake: clear    Bedside Swallow Eval:   Consistencies Assessed:  · Thin liquids x2 small sips   · Puree 1/4 tsp x2     Oral Phase:   · WFL   · Facial grimaces     Pharyngeal Phase:   · multiple spontaneous swallows  · throat clearing post all trial with regurgitation of stasis into oral cavity and removed via suction. "   · Pudding and thin liquids noted in suction tubing.   · wet vocal quality after swallow post thin liquids      Assessment:     Hollis Pitts is a 82 y.o. male with an SLP diagnosis of Dysphagia.  He presents s/p ACDF    Goals:    SLP Goals        Problem: SLP Goal    Goal Priority Disciplines Outcome   SLP Goal     SLP    Description:  Speech Language Pathology Goals  Goals expected to be met by 4/18    1. Pt will participate in ongoing swallow assessment                        Plan:     · Patient to be seen:  5 x/week   · Plan of Care reviewed with:  patient   · SLP Follow-Up:  Yes       Discharge recommendations:  nursing facility, skilled     Time Tracking:     SLP Treatment Date:   04/12/18  Speech Start Time:  1224  Speech Stop Time:  1236     Speech Total Time (min):  12 min    Billable Minutes: Eval Swallow and Oral Function 12    Humera Wadsworth CCC-SLP  04/12/2018

## 2018-04-12 NOTE — ASSESSMENT & PLAN NOTE
82 y.o. male s/p C3-5 ACDF with Dr. Hess, POD#2. Previously on eliquis.     -Patient is neurologically stable but with continued BUE weakness and neck pain which is unchanged since surgery. Today, patient has worsening neck pain and now with dysphagia with solids/liquids. Will unclamp drain and start dex4q6 x24 hrs. NPO till speech evaluation.   -Post op imaging with satisfactory placement of hardware.   -Pain controlled on current regimen.  -Surgical drain: not documented since it was clamped. Drainage is serosang. Continue drain but at no suction and to gravity.  -Continue antibiotics while drain in place.   -Bacitracin to incision BID.   -Cervical collar when OOB or with activity  -Continue aggressive PT/OT/OOB daily  -Patient must be OOB for atleast 6 hours daily (may be in intervals: 2 hours in chair with each meal)  -IS to bed side. Patient to use atleast 10x every hour.  -Continue bowel regimen daily.  -Continue SQH, Teds and SCDs for DVTP.  -Restarted all home meds for HTN, HLD, anxiety/depression, and hypercalcemia.   -[place]ois DC'ed 4/11/18. Patient voiding and PVR <15cc this AM. Started flomax yesterday given history of BPH.   -Medicine consulted for co-management of co morbidities. He is poor historian. Unable to determine history or home meds/dosing. Attempted to contact daughter again today multiple times but with no response.   -Hyperglycemia/DM: on insulin TID with POCT. Med managing.   -Previously taking eliquis 2.5mg BID. Okay to resume 1 week after surgery.     Dispo:  Pending SNF placemement    Discussed with Dr. Hess

## 2018-04-12 NOTE — PLAN OF CARE
Problem: Patient Care Overview  Goal: Plan of Care Review  Outcome: Ongoing (interventions implemented as appropriate)  Pt has call bell in reach, non slip socks on, and bedrails up x2. Pt has wife and daughter at bedside. Pt encouraged to wash hands. Pt working with PT during the day. Pt encouraged to sleep with head elevated.

## 2018-04-12 NOTE — SUBJECTIVE & OBJECTIVE
Interval History: Surgical drain clamped yesterday and patient was started on flomax after rouse removal given history of BPH. MITESHON. Patient reported increased neck pain, choking on liquid/solids, coughing, and difficulty eating secondary to pain. He said he has some issues yesterday but it is worse today. Nurse did report of coughing with AM meds today. Yesterday's nursing staff fed patient breakfast/lunch and did not notice any issues. He is voiding on pads with post void residual 15cc this am. Medicine following for co-management.     Medications:  Continuous Infusions:  Scheduled Meds:   atorvastatin  20 mg Oral Nightly    bacitracin   Topical (Top) BID    bisacodyl  10 mg Oral Daily    ceFAZolin (ANCEF) IVPB  2 g Intravenous Q8H    citalopram  20 mg Oral Daily    dexamethasone  4 mg Intravenous Q6H    diltiaZEM  90 mg Oral Daily    gabapentin  100 mg Oral TID    heparin (porcine)  5,000 Units Subcutaneous Q8H    insulin aspart U-100  3 Units Subcutaneous TIDWM    mupirocin  1 g Nasal BID    tamsulosin  0.4 mg Oral Daily    traZODone  50 mg Oral QHS    valsartan  80 mg Oral Daily     PRN Meds:acetaminophen, acetaminophen, aluminum-magnesium hydroxide-simethicone, bisacodyl, dextrose 50%, dextrose 50%, glucagon (human recombinant), glucose, glucose, hydrALAZINE, hydrocodone-acetaminophen 5-325mg, insulin aspart U-100, labetalol, morphine, ondansetron, senna-docusate 8.6-50 mg     Review of Systems  Objective:     Weight: 113.6 kg (250 lb 7.1 oz)  Body mass index is 33.97 kg/m².  Vital Signs (Most Recent):  Temp: 98.3 °F (36.8 °C) (04/12/18 0809)  Pulse: 97 (04/12/18 0819)  Resp: 16 (04/12/18 0809)  BP: (!) 140/72 (04/12/18 0809)  SpO2: 95 % (04/12/18 0809) Vital Signs (24h Range):  Temp:  [97.3 °F (36.3 °C)-98.5 °F (36.9 °C)] 98.3 °F (36.8 °C)  Pulse:  [60-97] 97  Resp:  [16-18] 16  SpO2:  [95 %-98 %] 95 %  BP: (126-144)/(60-72) 140/72                           Closed/Suction Drain 04/10/18 1425  Anterior Neck Accordion 10 Fr. (Active)   Site Description Healing 4/10/2018 10:26 PM   Dressing Type No dressing 4/10/2018 10:26 PM   Dressing Status Clean;Dry;Intact 4/10/2018  9:20 PM   Drainage Serosanguineous 4/11/2018  7:34 AM   Status Clamped 4/11/2018  8:45 AM   Output (mL) 90 mL 4/11/2018  5:00 AM       Neurosurgery Physical Exam  General: well developed, well nourished. no acute distress. Generalized deconditioning   Neurologic: Awake, alert and oriented x3. Thought content appropriate.  Head: normocephalic, atraumatic   GCS: Motor: 6/Verbal: 5/Eyes: 4 GCS Total: 15  Cranial nerves: face symmetric, tongue midline, pupils equal, round, reactive to light with accomodation, extraocular muscles intact. CN II-XII grossly intact.   Language: no aphasia  Speech: no dysarthria   Sensory: decrease response to light touch in BUE  Motor Strength: Moves all extremities spontaneously with good tone.      Strength   Deltoids Triceps Biceps Wrist Extension Wrist Flexion Hand    Upper: R 3/5 3/5 4/5 5/5 5/5 4/5     L 4/5 4/5 4/5 5/5 5/5 4+/5       Iliopsoas Quadriceps Knee  Flexion Tibialis  anterior Gastro- cnemius EHL   Lower: R 4/5 5/5 5/5 5/5 5/5 5/5     L 4/5 5/5 5/5 5/5 5/5 5/5         [place]: absent  Clonus: absent  Babinski: absent   ENT: normal hearing with finger rub  Heart: RRR, no cyanosis, pallor, or edema.   Lungs:  normal respiratory effort  Abdomen: soft, non-tender and symmetric  Extremities: warm with no cyanosis, edema, or clubbing. Generalized edema throughout.   Pulses: palpable distal pulses  Skin: warm, dry and intact. No visible rashes or lesions.         wound is c/d/i, no erythema, edema, ttp, or drainage.  wound edges are well approximated. Surgical drain intact.        Significant Labs:    Recent Labs  Lab 04/10/18  1751 04/11/18  1221 04/12/18  0518   * 275* 218*    135* 138   K 4.1 5.0 4.6    107 107   CO2 23 20* 26   BUN 17 20 22   CREATININE 0.7 0.9 0.8   CALCIUM  8.5* 9.0 9.0       Recent Labs  Lab 04/10/18  1751 04/11/18  1222 04/12/18  0518   WBC 11.65 18.34* 13.47*   HGB 9.4* 10.1* 9.0*   HCT 30.3* 32.1* 28.6*    231 212     No results for input(s): LABPT, INR, APTT in the last 48 hours.  Microbiology Results (last 7 days)     ** No results found for the last 168 hours. **        Significant Diagnostics:  Post-op films personally reviewed and showed good hardware position anteriorly at C3-5

## 2018-04-12 NOTE — PLAN OF CARE
Problem: Occupational Therapy Goal  Goal: Occupational Therapy Goal  Goals to be met by: 4/26/18     Patient will increase functional independence with ADLs by performing:    Feeding with Set-up Assistance.  UE Dressing with Moderate Assistance.  LE Dressing with Maximum Assistance.  Grooming while seated with Minimal Assistance.  Supine to sit with Moderate Assistance.  Stand pivot transfers with Moderate Assistance.  Increased functional strength by 1/2 -1 mm grade for increased adl independence and mobility.    Outcome: Ongoing (interventions implemented as appropriate)  Goals established.  Carito Christopher, OTR
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
## 2018-04-12 NOTE — PROGRESS NOTES
Patient moderately limited and not able to change position independently.  Patient positioned to left side with one pillow and air mattress ordered.  Will continue to monitor.

## 2018-04-12 NOTE — PLAN OF CARE
Problem: Occupational Therapy Goal  Goal: Occupational Therapy Goal  Goals to be met by: 4/26/18     Patient will increase functional independence with ADLs by performing:    Feeding with Set-up Assistance.  UE Dressing with Moderate Assistance.  LE Dressing with Maximum Assistance.  Grooming while seated with Minimal Assistance.  Supine to sit with Moderate Assistance.  Stand pivot transfers with Moderate Assistance.  Increased functional strength B shoulders to ~1/2 AROM for increased adl independence and mobility, increase distal strength 1/2-1 mm grade for increased adl's and mobility.  Family/caregiver education provided for mobility and adl's, DME needs, AE rec's      Outcome: Ongoing (interventions implemented as appropriate)  con't with established goals  Carito Christopher, OTR

## 2018-04-12 NOTE — ASSESSMENT & PLAN NOTE
-- On apixaban prior to admission.   -- Indication not clear. Per his daughter, it was started after his head trauma admission for VTE prophylaxis.   -- She denied any history of arrhythmia, DVT, or PE  -- Agree with holding, particularly in the context of recent surgery. Would not resume upon d/c.

## 2018-04-12 NOTE — PROGRESS NOTES
[facility]  Neurosurgery  Progress Note    Subjective:     History of Present Illness: 82 y.o.  male with type 2 diabetes, who presents today for follow up evaluation one week prior to 2 level ACDF scheduled for 4/10/2018. Pt reports    Pt presents today wearing a cervical collar and in a wheelchair. He would like to proceed with surgery. Per pt's family, his PCP would like to see him on 4/9, the day before surgery. He notes continued neck pain and BUE numbness/tingling as well as BUE weakness. Pt is only able to walk a few steps unassisted. He has never received neck surgery.     Post-Op Info:  Procedure(s) (LRB):  DISKECTOMY AND FUSION-ANTERIOR CERVICAL (ACDF); C5-6. C6-7 (N/A)   2 Days Post-Op     Interval History: Surgical drain clamped yesterday and patient was started on flomax after rouse removal given history of BPH. NAEON. Patient reported increased neck pain, choking on liquid/solids, coughing, and difficulty eating secondary to pain. He said he has some issues yesterday but it is worse today. Nurse did report of coughing with AM meds today. Yesterday's nursing staff fed patient breakfast/lunch and did not notice any issues. He is voiding on pads with post void residual 15cc this am. Medicine following for co-management.     Medications:  Continuous Infusions:  Scheduled Meds:   atorvastatin  20 mg Oral Nightly    bacitracin   Topical (Top) BID    bisacodyl  10 mg Oral Daily    ceFAZolin (ANCEF) IVPB  2 g Intravenous Q8H    citalopram  20 mg Oral Daily    dexamethasone  4 mg Intravenous Q6H    diltiaZEM  90 mg Oral Daily    gabapentin  100 mg Oral TID    heparin (porcine)  5,000 Units Subcutaneous Q8H    insulin aspart U-100  3 Units Subcutaneous TIDWM    mupirocin  1 g Nasal BID    tamsulosin  0.4 mg Oral Daily    traZODone  50 mg Oral QHS    valsartan  80 mg Oral Daily     PRN Meds:acetaminophen, acetaminophen, aluminum-magnesium hydroxide-simethicone, bisacodyl,  dextrose 50%, dextrose 50%, glucagon (human recombinant), glucose, glucose, hydrALAZINE, hydrocodone-acetaminophen 5-325mg, insulin aspart U-100, labetalol, morphine, ondansetron, senna-docusate 8.6-50 mg     Review of Systems  Objective:     Weight: 113.6 kg (250 lb 7.1 oz)  Body mass index is 33.97 kg/m².  Vital Signs (Most Recent):  Temp: 98.3 °F (36.8 °C) (04/12/18 0809)  Pulse: 97 (04/12/18 0819)  Resp: 16 (04/12/18 0809)  BP: (!) 140/72 (04/12/18 0809)  SpO2: 95 % (04/12/18 0809) Vital Signs (24h Range):  Temp:  [97.3 °F (36.3 °C)-98.5 °F (36.9 °C)] 98.3 °F (36.8 °C)  Pulse:  [60-97] 97  Resp:  [16-18] 16  SpO2:  [95 %-98 %] 95 %  BP: (126-144)/(60-72) 140/72                           Closed/Suction Drain 04/10/18 1425 Anterior Neck Accordion 10 Fr. (Active)   Site Description Healing 4/10/2018 10:26 PM   Dressing Type No dressing 4/10/2018 10:26 PM   Dressing Status Clean;Dry;Intact 4/10/2018  9:20 PM   Drainage Serosanguineous 4/11/2018  7:34 AM   Status Clamped 4/11/2018  8:45 AM   Output (mL) 90 mL 4/11/2018  5:00 AM       Neurosurgery Physical Exam  General: well developed, well nourished. no acute distress. Generalized deconditioning   Neurologic: Awake, alert and oriented x3. Thought content appropriate.  Head: normocephalic, atraumatic   GCS: Motor: 6/Verbal: 5/Eyes: 4 GCS Total: 15  Cranial nerves: face symmetric, tongue midline, pupils equal, round, reactive to light with accomodation, extraocular muscles intact. CN II-XII grossly intact.   Language: no aphasia  Speech: no dysarthria   Sensory: decrease response to light touch in BUE  Motor Strength: Moves all extremities spontaneously with good tone.      Strength   Deltoids Triceps Biceps Wrist Extension Wrist Flexion Hand    Upper: R 3/5 3/5 4/5 5/5 5/5 4/5     L 4/5 4/5 4/5 5/5 5/5 4+/5       Iliopsoas Quadriceps Knee  Flexion Tibialis  anterior Gastro- cnemius EHL   Lower: R 4/5 5/5 5/5 5/5 5/5 5/5     L 4/5 5/5 5/5 5/5 5/5 5/5          Moctezuma: absent  Clonus: absent  Babinski: absent   ENT: normal hearing with finger rub  Heart: RRR, no cyanosis, pallor, or edema.   Lungs:  normal respiratory effort  Abdomen: soft, non-tender and symmetric  Extremities: warm with no cyanosis, edema, or clubbing. Generalized edema throughout.   Pulses: palpable distal pulses  Skin: warm, dry and intact. No visible rashes or lesions.         wound is c/d/i, no erythema, edema, ttp, or drainage.  wound edges are well approximated. Surgical drain intact.        Significant Labs:    Recent Labs  Lab 04/10/18  1751 04/11/18  1221 04/12/18  0518   * 275* 218*    135* 138   K 4.1 5.0 4.6    107 107   CO2 23 20* 26   BUN 17 20 22   CREATININE 0.7 0.9 0.8   CALCIUM 8.5* 9.0 9.0       Recent Labs  Lab 04/10/18  1751 04/11/18  1222 04/12/18  0518   WBC 11.65 18.34* 13.47*   HGB 9.4* 10.1* 9.0*   HCT 30.3* 32.1* 28.6*    231 212     No results for input(s): LABPT, INR, APTT in the last 48 hours.  Microbiology Results (last 7 days)     ** No results found for the last 168 hours. **        Significant Diagnostics:  Post-op films personally reviewed and showed good hardware position anteriorly at C3-5    Assessment/Plan:     * Cervical myelopathy with cervical radiculopathy    82 y.o. male s/p C3-5 ACDF with Dr. Hess, POD#2. Previously on eliquis.     -Patient is neurologically stable but with continued BUE weakness and neck pain which is unchanged since surgery. Today, patient has worsening neck pain and now with dysphagia with solids/liquids. Will unclamp drain and start dex4q6 x24 hrs. NPO till speech evaluation.   -Post op imaging with satisfactory placement of hardware.   -Pain controlled on current regimen.  -Surgical drain: not documented since it was clamped. Drainage is serosang. Continue drain but at no suction and to gravity.  -Continue antibiotics while drain in place.   -Bacitracin to incision BID.   -Cervical collar when OOB or  with activity  -Continue aggressive PT/OT/OOB daily  -Patient must be OOB for atleast 6 hours daily (may be in intervals: 2 hours in chair with each meal)  -IS to bed side. Patient to use atleast 10x every hour.  -Continue bowel regimen daily.  -Continue SQH, Teds and SCDs for DVTP.  -Restarted all home meds for HTN, HLD, anxiety/depression, and hypercalcemia.   -Dubois SD'ed 4/11/18. Patient voiding and PVR <15cc this AM. Started flomax yesterday given history of BPH.   -Medicine consulted for co-management of co morbidities. He is poor historian. Unable to determine history or home meds/dosing. Attempted to contact daughter again today multiple times but with no response.   -Hyperglycemia/DM: on insulin TID with POCT. Med managing.   -Previously taking eliquis 2.5mg BID. Okay to resume 1 week after surgery.     Dispo:  Pending SNF placemement    Discussed with KEVEN CorderoC  Neurosurgery  [facility]

## 2018-04-12 NOTE — ASSESSMENT & PLAN NOTE
-- Management per primary team  -- POD #2 s/p ACDF.   -- High delirium risk. Discontinued IV phenergan PRN. Recommend the lowest dose opioid possible for pain and avoiding sedating medications. Agree with rouse d/c trial, with the caveat that his BPH places him at high risk for urinary obstruction

## 2018-04-12 NOTE — SUBJECTIVE & OBJECTIVE
Interval History:   NAOEN. Patient reports eating little if anything since yesterday due to his sore throat and inability to eat. Made NPO until further speech evaluation.   Provides no other complaints.    Review of Systems   Constitutional: no fever or chills  ENT: no nasal congestion or sore throat  Respiratory: no cough or shortness of breath  Cardiovascular: no chest pain or palpitations  Gastrointestinal: no nausea or vomiting, no abdominal pain or abdominal distention   Genitourinary: no hematuria or dysuria  Integument/Breast: no rash or pruritis  Hematologic/Lymphatic: no easy bruising or lymphadenopathy  Musculoskeletal: no arthralgias or myalgias  Neurological: no seizures or tremors  Endocrine: no heat or cold intolerance    Objective:     Vital Signs (Most Recent):  Temp: 98 °F (36.7 °C) (04/12/18 1142)  Pulse: 62 (04/12/18 1142)  Resp: 18 (04/12/18 1142)  BP: (!) 166/77 (04/12/18 1142)  SpO2: 97 % (04/12/18 1142) Vital Signs (24h Range):  Temp:  [97.5 °F (36.4 °C)-98.5 °F (36.9 °C)] 98 °F (36.7 °C)  Pulse:  [60-97] 62  Resp:  [16-18] 18  SpO2:  [95 %-98 %] 97 %  BP: (126-166)/(60-77) 166/77     Weight: 113.6 kg (250 lb 7.1 oz)  Body mass index is 33.97 kg/m².    Intake/Output Summary (Last 24 hours) at 04/12/18 1414  Last data filed at 04/12/18 0500   Gross per 24 hour   Intake              120 ml   Output              725 ml   Net             -605 ml      Physical Exam  General: well developed, well nourished, appears to be in NAD.   Eyes: conjunctivae/corneas clear. PERRL. EOMI.   Neck: supple, symmetrical, trachea midline, no JVD. Collar brace on. Has horizontal surgical incision with drain.   Cardiovascular: Heart: regular rate and rhythm, S1, S2 normal, no murmur, click, rub or gallop.  Lungs: clear to auscultation bilaterally and normal respiratory effort  Chest Wall: no tenderness  Abdomen/Rectal: Abdomen: Non distended. + BS. No masses. No TTP. No rebound or guarding.   Extremities: + pitting  edema in extremities, no redness or tenderness in the calves or thighs. Pulses: 2+ and symmetric  Skin: Skin color, texture, turgor normal. No rashes or lesions.   Musculoskeletal: full range of motion of joints.   Lymph Nodes: No cervical or supraclavicular adenopathy  Psych/Behavioral: Alert and oriented, slowed response and low affect.      Significant Labs:   BMP:   Recent Labs  Lab 04/12/18  0518   *      K 4.6      CO2 26   BUN 22   CREATININE 0.8   CALCIUM 9.0     CBC:   Recent Labs  Lab 04/10/18  1751 04/11/18  1222 04/12/18  0518   WBC 11.65 18.34* 13.47*   HGB 9.4* 10.1* 9.0*   HCT 30.3* 32.1* 28.6*    231 212

## 2018-04-13 LAB
ANION GAP SERPL CALC-SCNC: 6 MMOL/L
BASOPHILS # BLD AUTO: 0.01 K/UL
BASOPHILS NFR BLD: 0.1 %
BUN SERPL-MCNC: 19 MG/DL
CALCIUM SERPL-MCNC: 9.4 MG/DL
CHLORIDE SERPL-SCNC: 107 MMOL/L
CO2 SERPL-SCNC: 24 MMOL/L
CREAT SERPL-MCNC: 0.8 MG/DL
DIFFERENTIAL METHOD: ABNORMAL
EOSINOPHIL # BLD AUTO: 0 K/UL
EOSINOPHIL NFR BLD: 0 %
ERYTHROCYTE [DISTWIDTH] IN BLOOD BY AUTOMATED COUNT: 15.2 %
EST. GFR  (AFRICAN AMERICAN): >60 ML/MIN/1.73 M^2
EST. GFR  (NON AFRICAN AMERICAN): >60 ML/MIN/1.73 M^2
GLUCOSE SERPL-MCNC: 205 MG/DL
HCT VFR BLD AUTO: 29.5 %
HGB BLD-MCNC: 9.3 G/DL
IMM GRANULOCYTES # BLD AUTO: 0.06 K/UL
IMM GRANULOCYTES NFR BLD AUTO: 0.6 %
LYMPHOCYTES # BLD AUTO: 1.2 K/UL
LYMPHOCYTES NFR BLD: 12.8 %
MCH RBC QN AUTO: 30.4 PG
MCHC RBC AUTO-ENTMCNC: 31.5 G/DL
MCV RBC AUTO: 96 FL
MONOCYTES # BLD AUTO: 0.4 K/UL
MONOCYTES NFR BLD: 3.6 %
NEUTROPHILS # BLD AUTO: 8.1 K/UL
NEUTROPHILS NFR BLD: 82.9 %
NRBC BLD-RTO: 0 /100 WBC
PLATELET # BLD AUTO: 228 K/UL
PMV BLD AUTO: 9.8 FL
POCT GLUCOSE: 154 MG/DL (ref 70–110)
POCT GLUCOSE: 176 MG/DL (ref 70–110)
POCT GLUCOSE: 204 MG/DL (ref 70–110)
POCT GLUCOSE: 207 MG/DL (ref 70–110)
POCT GLUCOSE: 207 MG/DL (ref 70–110)
POTASSIUM SERPL-SCNC: 4.8 MMOL/L
RBC # BLD AUTO: 3.06 M/UL
SODIUM SERPL-SCNC: 137 MMOL/L
WBC # BLD AUTO: 9.72 K/UL

## 2018-04-13 PROCEDURE — 63600175 PHARM REV CODE 636 W HCPCS: Performed by: PHYSICIAN ASSISTANT

## 2018-04-13 PROCEDURE — 25000003 PHARM REV CODE 250: Performed by: NURSE PRACTITIONER

## 2018-04-13 PROCEDURE — 63600175 PHARM REV CODE 636 W HCPCS: Performed by: STUDENT IN AN ORGANIZED HEALTH CARE EDUCATION/TRAINING PROGRAM

## 2018-04-13 PROCEDURE — 63600175 PHARM REV CODE 636 W HCPCS: Performed by: INTERNAL MEDICINE

## 2018-04-13 PROCEDURE — 80048 BASIC METABOLIC PNL TOTAL CA: CPT

## 2018-04-13 PROCEDURE — 85025 COMPLETE CBC W/AUTO DIFF WBC: CPT

## 2018-04-13 PROCEDURE — 20000000 HC ICU ROOM

## 2018-04-13 PROCEDURE — 25000003 PHARM REV CODE 250: Performed by: STUDENT IN AN ORGANIZED HEALTH CARE EDUCATION/TRAINING PROGRAM

## 2018-04-13 PROCEDURE — 63600175 PHARM REV CODE 636 W HCPCS: Performed by: NEUROLOGICAL SURGERY

## 2018-04-13 PROCEDURE — 93010 ELECTROCARDIOGRAM REPORT: CPT | Mod: ,,, | Performed by: INTERNAL MEDICINE

## 2018-04-13 PROCEDURE — 63600175 PHARM REV CODE 636 W HCPCS: Performed by: PSYCHIATRY & NEUROLOGY

## 2018-04-13 PROCEDURE — 99223 1ST HOSP IP/OBS HIGH 75: CPT | Mod: ,,, | Performed by: NURSE PRACTITIONER

## 2018-04-13 PROCEDURE — 63600175 PHARM REV CODE 636 W HCPCS: Performed by: NURSE PRACTITIONER

## 2018-04-13 PROCEDURE — 99024 POSTOP FOLLOW-UP VISIT: CPT | Mod: POP,,, | Performed by: PHYSICIAN ASSISTANT

## 2018-04-13 PROCEDURE — 36415 COLL VENOUS BLD VENIPUNCTURE: CPT

## 2018-04-13 RX ORDER — INSULIN ASPART 100 [IU]/ML
0-5 INJECTION, SOLUTION INTRAVENOUS; SUBCUTANEOUS EVERY 6 HOURS PRN
Status: DISCONTINUED | OUTPATIENT
Start: 2018-04-13 | End: 2018-04-13

## 2018-04-13 RX ORDER — TRAZODONE HYDROCHLORIDE 50 MG/1
50 TABLET ORAL NIGHTLY
Status: DISCONTINUED | OUTPATIENT
Start: 2018-04-13 | End: 2018-04-15

## 2018-04-13 RX ORDER — AMOXICILLIN 250 MG
1 CAPSULE ORAL 2 TIMES DAILY
Status: DISCONTINUED | OUTPATIENT
Start: 2018-04-13 | End: 2018-04-13

## 2018-04-13 RX ORDER — GABAPENTIN 300 MG/1
300 CAPSULE ORAL 3 TIMES DAILY
Status: DISCONTINUED | OUTPATIENT
Start: 2018-04-13 | End: 2018-04-15

## 2018-04-13 RX ORDER — INSULIN ASPART 100 [IU]/ML
1-12 INJECTION, SOLUTION INTRAVENOUS; SUBCUTANEOUS EVERY 4 HOURS PRN
Status: DISCONTINUED | OUTPATIENT
Start: 2018-04-13 | End: 2018-04-16

## 2018-04-13 RX ORDER — SODIUM CHLORIDE 9 MG/ML
INJECTION, SOLUTION INTRAVENOUS CONTINUOUS
Status: DISCONTINUED | OUTPATIENT
Start: 2018-04-13 | End: 2018-04-15

## 2018-04-13 RX ORDER — GLUCAGON 1 MG
1 KIT INJECTION
Status: DISCONTINUED | OUTPATIENT
Start: 2018-04-13 | End: 2018-04-13 | Stop reason: SDUPTHER

## 2018-04-13 RX ORDER — TAMSULOSIN HYDROCHLORIDE 0.4 MG/1
0.4 CAPSULE ORAL DAILY
Status: DISCONTINUED | OUTPATIENT
Start: 2018-04-13 | End: 2018-04-13

## 2018-04-13 RX ORDER — HEPARIN SODIUM 5000 [USP'U]/ML
5000 INJECTION, SOLUTION INTRAVENOUS; SUBCUTANEOUS EVERY 8 HOURS
Status: DISCONTINUED | OUTPATIENT
Start: 2018-04-13 | End: 2018-05-18 | Stop reason: HOSPADM

## 2018-04-13 RX ORDER — FENTANYL CITRATE 50 UG/ML
25 INJECTION, SOLUTION INTRAMUSCULAR; INTRAVENOUS ONCE
Status: COMPLETED | OUTPATIENT
Start: 2018-04-13 | End: 2018-04-13

## 2018-04-13 RX ORDER — CEFAZOLIN SODIUM 1 G/3ML
2 INJECTION, POWDER, FOR SOLUTION INTRAMUSCULAR; INTRAVENOUS
Status: DISCONTINUED | OUTPATIENT
Start: 2018-04-13 | End: 2018-04-20

## 2018-04-13 RX ORDER — HYDROCHLOROTHIAZIDE 12.5 MG/1
12.5 TABLET ORAL DAILY
Status: DISCONTINUED | OUTPATIENT
Start: 2018-04-13 | End: 2018-04-13

## 2018-04-13 RX ORDER — DILTIAZEM HYDROCHLORIDE 30 MG/1
30 TABLET, FILM COATED ORAL EVERY 6 HOURS
Status: DISCONTINUED | OUTPATIENT
Start: 2018-04-13 | End: 2018-04-15

## 2018-04-13 RX ORDER — HEPARIN SODIUM 5000 [USP'U]/ML
5000 INJECTION, SOLUTION INTRAVENOUS; SUBCUTANEOUS EVERY 8 HOURS
Status: DISCONTINUED | OUTPATIENT
Start: 2018-04-13 | End: 2018-04-13

## 2018-04-13 RX ORDER — SENNOSIDES 8.6 MG/1
8.6 TABLET ORAL 2 TIMES DAILY
Status: DISCONTINUED | OUTPATIENT
Start: 2018-04-13 | End: 2018-04-13 | Stop reason: SDUPTHER

## 2018-04-13 RX ORDER — SENNOSIDES 8.6 MG/1
8.6 TABLET ORAL 2 TIMES DAILY
Status: DISCONTINUED | OUTPATIENT
Start: 2018-04-13 | End: 2018-04-13

## 2018-04-13 RX ORDER — GLUCAGON 1 MG
1 KIT INJECTION
Status: DISCONTINUED | OUTPATIENT
Start: 2018-04-13 | End: 2018-04-23

## 2018-04-13 RX ORDER — DILTIAZEM HYDROCHLORIDE 30 MG/1
30 TABLET, FILM COATED ORAL EVERY 6 HOURS
Status: DISCONTINUED | OUTPATIENT
Start: 2018-04-13 | End: 2018-04-13

## 2018-04-13 RX ORDER — CITALOPRAM 10 MG/1
20 TABLET ORAL DAILY
Status: DISCONTINUED | OUTPATIENT
Start: 2018-04-14 | End: 2018-04-15

## 2018-04-13 RX ORDER — LABETALOL HYDROCHLORIDE 5 MG/ML
10 INJECTION, SOLUTION INTRAVENOUS EVERY 30 MIN PRN
Status: DISCONTINUED | OUTPATIENT
Start: 2018-04-13 | End: 2018-04-13

## 2018-04-13 RX ORDER — TAMSULOSIN HYDROCHLORIDE 0.4 MG/1
0.4 CAPSULE ORAL DAILY
Status: DISCONTINUED | OUTPATIENT
Start: 2018-04-13 | End: 2018-04-15

## 2018-04-13 RX ORDER — FENTANYL CITRATE 50 UG/ML
50 INJECTION, SOLUTION INTRAMUSCULAR; INTRAVENOUS ONCE
Status: COMPLETED | OUTPATIENT
Start: 2018-04-13 | End: 2018-04-13

## 2018-04-13 RX ORDER — LIDOCAINE HYDROCHLORIDE 20 MG/ML
JELLY TOPICAL
Status: DISCONTINUED | OUTPATIENT
Start: 2018-04-13 | End: 2018-05-18 | Stop reason: HOSPADM

## 2018-04-13 RX ORDER — GABAPENTIN 300 MG/1
300 CAPSULE ORAL 3 TIMES DAILY
Status: DISCONTINUED | OUTPATIENT
Start: 2018-04-13 | End: 2018-04-13

## 2018-04-13 RX ORDER — LIDOCAINE HYDROCHLORIDE 20 MG/ML
JELLY TOPICAL ONCE
Status: COMPLETED | OUTPATIENT
Start: 2018-04-13 | End: 2018-04-13

## 2018-04-13 RX ORDER — ATORVASTATIN CALCIUM 20 MG/1
20 TABLET, FILM COATED ORAL NIGHTLY
Status: DISCONTINUED | OUTPATIENT
Start: 2018-04-13 | End: 2018-04-15

## 2018-04-13 RX ORDER — AMOXICILLIN 250 MG
1 CAPSULE ORAL 2 TIMES DAILY
Status: DISCONTINUED | OUTPATIENT
Start: 2018-04-13 | End: 2018-04-15

## 2018-04-13 RX ORDER — CEFAZOLIN SODIUM 1 G/3ML
2 INJECTION, POWDER, FOR SOLUTION INTRAMUSCULAR; INTRAVENOUS
Status: CANCELLED | OUTPATIENT
Start: 2018-04-13

## 2018-04-13 RX ORDER — CEFAZOLIN SODIUM 1 G/3ML
2 INJECTION, POWDER, FOR SOLUTION INTRAMUSCULAR; INTRAVENOUS ONCE
Status: COMPLETED | OUTPATIENT
Start: 2018-04-13 | End: 2018-04-13

## 2018-04-13 RX ORDER — INSULIN ASPART 100 [IU]/ML
1-10 INJECTION, SOLUTION INTRAVENOUS; SUBCUTANEOUS EVERY 6 HOURS PRN
Status: DISCONTINUED | OUTPATIENT
Start: 2018-04-13 | End: 2018-04-13

## 2018-04-13 RX ORDER — LABETALOL HYDROCHLORIDE 5 MG/ML
10 INJECTION, SOLUTION INTRAVENOUS EVERY 6 HOURS PRN
Status: DISCONTINUED | OUTPATIENT
Start: 2018-04-13 | End: 2018-05-02

## 2018-04-13 RX ORDER — HYDRALAZINE HYDROCHLORIDE 20 MG/ML
10 INJECTION INTRAMUSCULAR; INTRAVENOUS EVERY 4 HOURS PRN
Status: DISCONTINUED | OUTPATIENT
Start: 2018-04-13 | End: 2018-04-25

## 2018-04-13 RX ADMIN — HYDRALAZINE HYDROCHLORIDE 10 MG: 20 INJECTION INTRAMUSCULAR; INTRAVENOUS at 07:04

## 2018-04-13 RX ADMIN — DEXAMETHASONE SODIUM PHOSPHATE 4 MG: 4 INJECTION, SOLUTION INTRAMUSCULAR; INTRAVENOUS at 01:04

## 2018-04-13 RX ADMIN — HEPARIN SODIUM 5000 UNITS: 5000 INJECTION, SOLUTION INTRAVENOUS; SUBCUTANEOUS at 10:04

## 2018-04-13 RX ADMIN — DEXAMETHASONE SODIUM PHOSPHATE 4 MG: 4 INJECTION, SOLUTION INTRAMUSCULAR; INTRAVENOUS at 05:04

## 2018-04-13 RX ADMIN — SODIUM CHLORIDE: 0.9 INJECTION, SOLUTION INTRAVENOUS at 04:04

## 2018-04-13 RX ADMIN — INSULIN ASPART 1 UNITS: 100 INJECTION, SOLUTION INTRAVENOUS; SUBCUTANEOUS at 01:04

## 2018-04-13 RX ADMIN — FENTANYL CITRATE 50 MCG: 50 INJECTION INTRAMUSCULAR; INTRAVENOUS at 09:04

## 2018-04-13 RX ADMIN — INSULIN ASPART 2 UNITS: 100 INJECTION, SOLUTION INTRAVENOUS; SUBCUTANEOUS at 11:04

## 2018-04-13 RX ADMIN — LIDOCAINE HYDROCHLORIDE: 20 JELLY TOPICAL at 06:04

## 2018-04-13 RX ADMIN — FENTANYL CITRATE 25 MCG: 50 INJECTION, SOLUTION INTRAMUSCULAR; INTRAVENOUS at 01:04

## 2018-04-13 RX ADMIN — CEFAZOLIN 2 G: 330 INJECTION, POWDER, FOR SOLUTION INTRAMUSCULAR; INTRAVENOUS at 09:04

## 2018-04-13 RX ADMIN — INSULIN ASPART 1 UNITS: 100 INJECTION, SOLUTION INTRAVENOUS; SUBCUTANEOUS at 05:04

## 2018-04-13 RX ADMIN — INSULIN ASPART 2 UNITS: 100 INJECTION, SOLUTION INTRAVENOUS; SUBCUTANEOUS at 10:04

## 2018-04-13 RX ADMIN — MORPHINE SULFATE 3 MG: 2 INJECTION, SOLUTION INTRAMUSCULAR; INTRAVENOUS at 01:04

## 2018-04-13 RX ADMIN — INSULIN ASPART 4 UNITS: 100 INJECTION, SOLUTION INTRAVENOUS; SUBCUTANEOUS at 09:04

## 2018-04-13 RX ADMIN — HEPARIN SODIUM 5000 UNITS: 5000 INJECTION, SOLUTION INTRAVENOUS; SUBCUTANEOUS at 05:04

## 2018-04-13 RX ADMIN — MUPIROCIN 1 G: 20 OINTMENT TOPICAL at 09:04

## 2018-04-13 RX ADMIN — CEFAZOLIN 2 G: 330 INJECTION, POWDER, FOR SOLUTION INTRAMUSCULAR; INTRAVENOUS at 04:04

## 2018-04-13 RX ADMIN — INSULIN ASPART 4 UNITS: 100 INJECTION, SOLUTION INTRAVENOUS; SUBCUTANEOUS at 05:04

## 2018-04-13 RX ADMIN — HEPARIN SODIUM 5000 UNITS: 5000 INJECTION, SOLUTION INTRAVENOUS; SUBCUTANEOUS at 09:04

## 2018-04-13 RX ADMIN — ACETAMINOPHEN 1000 MG: 10 INJECTION, SOLUTION INTRAVENOUS at 05:04

## 2018-04-13 RX ADMIN — INSULIN DETEMIR 10 UNITS: 100 INJECTION, SOLUTION SUBCUTANEOUS at 10:04

## 2018-04-13 NOTE — PROGRESS NOTES
NCC team notified of pt being unable to pass ADELAIDA. Randolph tube ordered; to be placed by MD. Will continue to monitor.

## 2018-04-13 NOTE — SUBJECTIVE & OBJECTIVE
Interval History: Mr. Pitts complains of some pain between his shoulders.  He denies any new weakness, paresthesias.      Medications:  Continuous Infusions:  Scheduled Meds:   atorvastatin  20 mg Oral Nightly    citalopram  20 mg Oral Daily    diltiaZEM  30 mg Oral Q6H    gabapentin  100 mg Oral TID    mupirocin  1 g Nasal BID    traZODone  50 mg Oral QHS    valsartan  80 mg Oral Daily     PRN Meds:acetaminophen, acetaminophen, aluminum-magnesium hydroxide-simethicone, dextrose 50%, dextrose 50%, glucagon (human recombinant), glucagon (human recombinant), hydrocodone-acetaminophen 5-325mg, insulin aspart U-100, ondansetron, senna-docusate 8.6-50 mg     Review of Systems  Objective:     Weight: 112.1 kg (247 lb 2.2 oz)  Body mass index is 33.52 kg/m².  Vital Signs (Most Recent):  Temp: 98.2 °F (36.8 °C) (04/13/18 1108)  Pulse: 89 (04/13/18 1108)  Resp: 16 (04/13/18 1108)  BP: (!) 163/77 (04/13/18 1108)  SpO2: (!) 92 % (04/13/18 1108) Vital Signs (24h Range):  Temp:  [98 °F (36.7 °C)-98.7 °F (37.1 °C)] 98.2 °F (36.8 °C)  Pulse:  [62-93] 89  Resp:  [16-20] 16  SpO2:  [92 %-97 %] 92 %  BP: (123-176)/(59-83) 163/77                           Closed/Suction Drain 04/10/18 1425 Anterior Neck Accordion 10 Fr. (Active)   Site Description Healing 4/13/2018  7:30 AM   Dressing Type No dressing 4/13/2018  7:30 AM   Dressing Status Clean;Dry;Intact 4/12/2018  8:08 PM   Drainage Serosanguineous 4/13/2018  7:30 AM   Status To bulb suction 4/13/2018  7:30 AM   Output (mL) 90 mL 4/11/2018  5:00 AM       Neurosurgery Physical Exam   General: well developed, well nourished, no distress  Head: normocephalic, atraumatic  Neurologic: Alert and oriented. Thought content appropriate  GCS: Motor: 6/Verbal: 5/Eyes: 4 GCS Total: 15  Mental Status: Awake, Alert, Oriented x 4  Language: No aphasia  Speech: No dysarthria  Cranial nerves: face symmetric, tongue midline, CN II-XII grossly intact.   Eyes: pupils equal, round, reactive to  light with accommodation, EOMI  Pulmonary: normal respirations, not labored, no accessory muscles used  Abdomen: soft, non-distended, not tender to palpation  Sensory: intact to light touch throughout  Motor Strength: Moves all extremities spontaneously with good tone.  No abnormal movements seen.     Strength  Deltoids Triceps Biceps Wrist Extension Wrist Flexion Hand    Upper: R 3/5 3/5 4/5 5/5 5/5 4-/5    L 4/5 4/5 4/5 5/5 5/5 4/5     Iliopsoas Quadriceps Knee  Flexion Tibialis  anterior Gastro- cnemius EHL   Lower: R 4/5 5/5 5/5 5/5 5/5 5/5    L 4/5 5/5 5/5 5/5 5/5 5/5     Pronator Drift: no drift noted  Finger-to-nose: Intact bilaterally  Moctezuma: absent  Clonus: absent  Babinski: absent  Pulses: 2+ and symmetric radial and dorsalis pedis  Skin: warm, dry and intact, no rashes  Incision is clean, dry, and intact with skin well approximated with dermabond.  There is no drainage, erythema, or edema.  HV intact with bloody drainage that appears to be CSF.        Significant Labs:    Recent Labs  Lab 04/12/18  0518 04/13/18  0424   * 205*    137   K 4.6 4.8    107   CO2 26 24   BUN 22 19   CREATININE 0.8 0.8   CALCIUM 9.0 9.4       Recent Labs  Lab 04/12/18 0518 04/13/18  0423   WBC 13.47* 9.72   HGB 9.0* 9.3*   HCT 28.6* 29.5*    228     No results for input(s): LABPT, INR, APTT in the last 48 hours.  Microbiology Results (last 7 days)     ** No results found for the last 168 hours. **          Significant Diagnostics:  No new imaging
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
## 2018-04-13 NOTE — PROGRESS NOTES
Pt arrived to RM 7065 @ 1400. [facility] team notified of pt's arrival to unit. Team to bedside to assess pt. Will continue to monitor.

## 2018-04-13 NOTE — HOSPITAL COURSE
4/13: Admit NCC s/p ACDF with new csf leak pending lumbar drain  4/14: ok to sit up with lumbar drain clamped, passed sp eval started diet   4/16: No leaking noted from drain insertion site. Will follow I/O, Serum Na. Discussed elloquis, no documented reason/patient unaware why taking it.   4/17 CSF drainage notified Neurosurgery; Drain is clamped; Possible OR tomorrow in AM - However, final decision to be determined tonight.   4/18: Insulin increased. Dilt. Dose adjusted.   4/19: Discuss transfer with NSGY,   4/21: patient with SIRS criteria, ill appearing, transfer back to [facility], empiric ABX, cultures  4/22: more alert today, start tube feeds, CXR stable  4/23- GNR in urine culture. WBC remains elevated but stabilized. Afebrile.  4/24: No LP per NSGY,continue abx,  c.diff pending, increased aspart, stepdown to NSGY today  4/25: NAEON, transfer to [facility] [place] [place]  4/28: today on floor patient having increasing dysphagia, MRI cervical spine revealed fluid collection pushing on trachea and esophagus causing displacement, NCC consulted and NSGY took patient emergently to OR, admit to NCC  4/29: s/p wound washout and CSF leak repair POD 1. Patient extubated post op, on RA sat 98. NAEON.  4/30:  per ID will change abx regimen to vanc and cef  5/1: evauation by sp, transfer to nsgy

## 2018-04-13 NOTE — PT/OT/SLP DISCHARGE
Physical Therapy Discharge Summary    Name: Hollis Pitts  MRN: [id number]   Principal Problem: Cervical myelopathy with cervical radiculopathy     Patient Discharged from acute Physical Therapy on 18.  Please refer to prior PT noted date on 18 for functional status.     Assessment:     Patient was discharged unexpectedly.  Information required to complete an accurate discharge summary is unknown.  Refer to therapy initial evaluation and last progress note for initial and most recent functional status and goal achievement.  Recommendations made may be found in medical record.    Objective:     GOALS:    Physical Therapy Goals        Problem: Physical Therapy Goal    Goal Priority Disciplines Outcome Goal Variances Interventions   Physical Therapy Goal     PT/OT, PT Ongoing (interventions implemented as appropriate)     Description:  Goals to be met by: 2018     Patient will increase functional independence with mobility by performin. Supine to sit with MInimal Assistance  2. Sit to supine with MInimal Assistance  3. Sit to stand transfer with Minimal Assistance  4. Bed to chair transfer with Minimal Assistance using Rolling Walker  5. Lower extremity exercise program x30 reps per handout, with independence                      Reasons for Discontinuation of Therapy Services  Patient is unable to continue work toward goals because of medical or psychosocial complications.      Plan:     Patient Discharged to: Neuro ICU 2* CSF leak.    Jennifer Fuller, PT  2018

## 2018-04-13 NOTE — ASSESSMENT & PLAN NOTE
- NSGY following   - S/P ACDF with CSF leak   - Pending Lumbar drain goal 10 ML /hr clamp if >10ml/h  - PT/Ot when able   - NPO  - Neuro checks Q 1

## 2018-04-13 NOTE — PT/OT/SLP DISCHARGE
Occupational Therapy Discharge Summary    Hollis Pitts  MRN: [id number]   Principal Problem: Cervical myelopathy with cervical radiculopathy      Patient Discharged from acute Occupational Therapy on 4/13/18.  Please refer to prior OT note dated 4/12/18 for functional status.    Assessment:      Patient transferred to lower level of care secondary to decreasing medical/mental status    Objective:     GOALS:    Occupational Therapy Goals        Problem: Occupational Therapy Goal    Goal Priority Disciplines Outcome Interventions   Occupational Therapy Goal     OT, PT/OT Ongoing (interventions implemented as appropriate)    Description:  Goals to be met by: 4/26/18     Patient will increase functional independence with ADLs by performing:    Feeding with Set-up Assistance.  UE Dressing with Moderate Assistance.  LE Dressing with Maximum Assistance.  Grooming while seated with Minimal Assistance.  Supine to sit with Moderate Assistance.  Stand pivot transfers with Moderate Assistance.  Increased functional strength B shoulders to ~1/2 AROM for increased adl independence and mobility, increase distal strength 1/2-1 mm grade for increased adl's and mobility.  Family/caregiver education provided for mobility and adl's, DME needs, AE rec's                       Reasons for Discontinuation of Therapy Services  Transfer to alternate level of care.      Plan:     Patient Discharged to: pending d/c to ICU, orders discontinued by MD Carito Christopher, OT  4/13/2018

## 2018-04-13 NOTE — PROGRESS NOTES
Report called to HUMBERTO Lao. Pt transferred to NICU. Receiving nurse at the BS. All belongings with pt. Family  at the BS visiting pt earlier today and informed of pending transfer to room 7065.

## 2018-04-13 NOTE — PLAN OF CARE
TAN following for DC needs. TAN in communication with CM.    SW received call from patient's daughter, Bud ([phone]). Bud stated that she was made aware that the patient went back to ICU. Bud stated that she will go visit skilled facilities this weekend and will give 3 choices on Monday.     Yvonne Ellington, GARY  [id number]

## 2018-04-13 NOTE — CARE UPDATE
Discussed patient with ALFREDA this morning. He developed CSF leak and he is being transferred to neuro critical care. He has been made strict NPO and his insulin discontinued by the neurosurgery primary team.    Recommend continuing his basal insulin despite NPO status given the advanced nature of his diabetes and dexamethasone administration. He tolerated 11 units of detemir yesterday with glucose consistently in the high 190s and low 200s on POCT testing. Will order 10 units daily and start a sliding scale with low-dose aspart SSI and POCT glucose Q6H.     Fernando Miranda MD  PGY-3 Internal Medicine  Pager #: [phone]

## 2018-04-13 NOTE — SUBJECTIVE & OBJECTIVE
Interval History:  [facility]dit [facility]     Review of Systems    Review of symptoms  Constitutional: Denies fevers or chills.  Pulmonary: Denies shortness of breath or cough.  Cardiology: Denies chest pain or palpitations.  GI: Denies abdominal pain or constipation.  Neurologic: Denies new weakness,  headache, or paresthesias.  Objective:     Vitals:  Temp: 98.2 °F (36.8 °C)  Pulse: 89  Rhythm: normal sinus rhythm  BP: (!) 163/77  MAP (mmHg): 111  Resp: 16  SpO2: (!) 92 %  O2 Device (Oxygen Therapy): room air    Temp  Min: 98 °F (36.7 °C)  Max: 98.7 °F (37.1 °C)  Pulse  Min: 62  Max: 93  BP  Min: 123/59  Max: 176/79  MAP (mmHg)  Min: 108  Max: 117  Resp  Min: 16  Max: 20  SpO2  Min: 92 %  Max: 97 %    04/12 0701 - 04/13 0700  In: 582.5 [I.V.:382.5]  Out: 1000 [Urine:1000]   Unmeasured Output  Urine Occurrence: 1  Stool Occurrence: 0       Physical Exam       Physical Exam:  GA: Alert, comfortable, no acute distress.   HEENT: No scleral icterus or JVD.   Pulmonary: Clear to auscultation A/L.  Cardiac: RRR S1 & S2 w/o rubs/murmurs/gallops.   Abdominal: Bowel sounds present x 4.   Skin: No jaundice, rashes, or visible lesions.  Neuro:  --GCS: E4 V5 M6  --Mental Status: Awake alert oriented follows all commands     --CN II-XII grossly intact.   --Pupils 3mm, PERRL.   --Corneal reflex, gag, cough intact.  --LUE strength: 5/5  --RUE strength: 5/5  --LLE strength: 5/5  --RLE strength: 5/5    Unable to test gait due to level of consciousness.    Medications:  Continuous Scheduled  atorvastatin 20 mg Nightly   citalopram 20 mg Daily   diltiaZEM 90 mg Daily   gabapentin 100 mg TID   heparin (porcine) 5,000 Units Q8H   insulin detemir U-100 10 Units Daily   mupirocin 1 g BID   traZODone 50 mg QHS   valsartan 80 mg Daily   PRN  acetaminophen 650 mg Q4H PRN   acetaminophen 650 mg Q6H PRN   aluminum-magnesium hydroxide-simethicone 30 mL Q4H PRN   dextrose 50% 12.5 g PRN   glucagon (human recombinant) 1 mg PRN   hydrocodone-acetaminophen  5-325mg 1 tablet Q4H PRN   insulin aspart U-100 0-5 Units Q6H PRN   ondansetron 8 mg Q6H PRN   senna-docusate 8.6-50 mg 2 tablet Nightly PRN     Today I personally reviewed pertinent medications, lines/drains/airways, imaging, lab results,     Diet

## 2018-04-13 NOTE — PROGRESS NOTES
[facility]  Neurosurgery  Progress Note    Subjective:     History of Present Illness: 82 y.o.  male with type 2 diabetes, who presents today for follow up evaluation one week prior to 2 level ACDF scheduled for 4/10/2018. Pt reports    Pt presents today wearing a cervical collar and in a wheelchair. He would like to proceed with surgery. Per pt's family, his PCP would like to see him on 4/9, the day before surgery. He notes continued neck pain and BUE numbness/tingling as well as BUE weakness. Pt is only able to walk a few steps unassisted. He has never received neck surgery.     Post-Op Info:  Procedure(s) (LRB):  DISKECTOMY AND FUSION-ANTERIOR CERVICAL (ACDF); C5-6. C6-7 (N/A)   3 Days Post-Op     Interval History: Mr. Pitts complains of some pain between his shoulders.  He denies any new weakness, paresthesias.      Medications:  Continuous Infusions:  Scheduled Meds:   atorvastatin  20 mg Oral Nightly    citalopram  20 mg Oral Daily    diltiaZEM  30 mg Oral Q6H    gabapentin  100 mg Oral TID    mupirocin  1 g Nasal BID    traZODone  50 mg Oral QHS    valsartan  80 mg Oral Daily     PRN Meds:acetaminophen, acetaminophen, aluminum-magnesium hydroxide-simethicone, dextrose 50%, dextrose 50%, glucagon (human recombinant), glucagon (human recombinant), hydrocodone-acetaminophen 5-325mg, insulin aspart U-100, ondansetron, senna-docusate 8.6-50 mg     Review of Systems  Objective:     Weight: 112.1 kg (247 lb 2.2 oz)  Body mass index is 33.52 kg/m².  Vital Signs (Most Recent):  Temp: 98.2 °F (36.8 °C) (04/13/18 1108)  Pulse: 89 (04/13/18 1108)  Resp: 16 (04/13/18 1108)  BP: (!) 163/77 (04/13/18 1108)  SpO2: (!) 92 % (04/13/18 1108) Vital Signs (24h Range):  Temp:  [98 °F (36.7 °C)-98.7 °F (37.1 °C)] 98.2 °F (36.8 °C)  Pulse:  [62-93] 89  Resp:  [16-20] 16  SpO2:  [92 %-97 %] 92 %  BP: (123-176)/(59-83) 163/77                           Closed/Suction Drain 04/10/18 1425 Anterior Neck  Accordion 10 Fr. (Active)   Site Description Healing 4/13/2018  7:30 AM   Dressing Type No dressing 4/13/2018  7:30 AM   Dressing Status Clean;Dry;Intact 4/12/2018  8:08 PM   Drainage Serosanguineous 4/13/2018  7:30 AM   Status To bulb suction 4/13/2018  7:30 AM   Output (mL) 90 mL 4/11/2018  5:00 AM       Neurosurgery Physical Exam   General: well developed, well nourished, no distress  Head: normocephalic, atraumatic  Neurologic: Alert and oriented. Thought content appropriate  GCS: Motor: 6/Verbal: 5/Eyes: 4 GCS Total: 15  Mental Status: Awake, Alert, Oriented x 4  Language: No aphasia  Speech: No dysarthria  Cranial nerves: face symmetric, tongue midline, CN II-XII grossly intact.   Eyes: pupils equal, round, reactive to light with accommodation, EOMI  Pulmonary: normal respirations, not labored, no accessory muscles used  Abdomen: soft, non-distended, not tender to palpation  Sensory: intact to light touch throughout  Motor Strength: Moves all extremities spontaneously with good tone.  No abnormal movements seen.     Strength  Deltoids Triceps Biceps Wrist Extension Wrist Flexion Hand    Upper: R 3/5 3/5 4/5 5/5 5/5 4-/5    L 4/5 4/5 4/5 5/5 5/5 4/5     Iliopsoas Quadriceps Knee  Flexion Tibialis  anterior Gastro- cnemius EHL   Lower: R 4/5 5/5 5/5 5/5 5/5 5/5    L 4/5 5/5 5/5 5/5 5/5 5/5     Pronator Drift: no drift noted  Finger-to-nose: Intact bilaterally  [place]: absent  Clonus: absent  Babinski: absent  Pulses: 2+ and symmetric radial and dorsalis pedis  Skin: warm, dry and intact, no rashes  Incision is clean, dry, and intact with skin well approximated with dermabond.  There is no drainage, erythema, or edema.  HV intact with bloody drainage that appears to be CSF.        Significant Labs:    Recent Labs  Lab 04/12/18  0518 04/13/18  0424   * 205*    137   K 4.6 4.8    107   CO2 26 24   BUN 22 19   CREATININE 0.8 0.8   CALCIUM 9.0 9.4       Recent Labs  Lab 04/12/18  0518  04/13/18  0423   WBC 13.47* 9.72   HGB 9.0* 9.3*   HCT 28.6* 29.5*    228     No results for input(s): LABPT, INR, APTT in the last 48 hours.  Microbiology Results (last 7 days)     ** No results found for the last 168 hours. **          Significant Diagnostics:  No new imaging    Assessment/Plan:     * Cervical myelopathy with cervical radiculopathy    82 y.o. male s/p C3-5 ACDF with Dr. Hess, POD#3  -Neurologically stable  -Leave incision open to air  -Xrays stable post op  -HV with CSF drainage - currently clamped, please leave clamped  -LD placed today for continued CSF leak - Lumbar drain to be open at 0 cm h20 and level with shoulder. Check output q30min and clamp drain to prevent draining more than 10cc per hr.  Does not require minimum output per hour - if LD puts out 0, it is fine.  Please do not drop to floor to drain.  -Transfer to [facility] for LD management  -Ancef while drains in place  -Continue dex 4mg q6h, PPI while on dex  -Cervical collar when OOB or with activity  -OK for OOB with PT - clamp LD when OOB  -IS to bed side. Patient to use atleast 10x every hour.  -Continue bowel regimen daily.  -Continue SQH, Teds and SCDs for DVTP.  -Urinary retention - rouse replaced yesterday,   -Hyperglycemia/DM: on insulin TID with POCT. Med managing.   -Continue to hold Eliquis x 1 weeks at this time  -Discussed with Dr. Deshawn Hernandez PA-C  Neurosurgery  [facility]
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
## 2018-04-13 NOTE — ASSESSMENT & PLAN NOTE
82 y.o. male s/p C3-5 ACDF with Dr. Hess, POD#3  -Neurologically stable  -Leave incision open to air  -Xrays stable post op  -HV with CSF drainage - currently clamped, please leave clamped  -LD placed today for continued CSF leak - Lumbar drain to be open at 0 cm h20 and level with shoulder. Check output q30min and clamp drain to prevent draining more than 10cc per hr.  Does not require minimum output per hour - if LD puts out 0, it is fine.  Please do not drop to floor to drain.  -Transfer to [facility] for LD management  -Ancef while drains in place  -Continue dex 4mg q6h, PPI while on dex  -Cervical collar when OOB or with activity  -OK for OOB with PT - clamp LD when OOB  -IS to bed side. Patient to use atleast 10x every hour.  -Continue bowel regimen daily.  -Continue SQH, Teds and SCDs for DVTP.  -Urinary retention - rouse replaced yesterday,   -Hyperglycemia/DM: on insulin TID with POCT. Med managing.   -Continue to hold Eliquis x 1 weeks at this time  -Discussed with Dr. Hess

## 2018-04-13 NOTE — PT/OT/SLP PROGRESS
Speech Language Pathology      Hollis Pitts  MRN: [id number]    Patient not seen today secondary to Other (Orders cancelled 2/2 pt transferring to ICU per neurosurgery).  Please re-consult as appropriate.     RADHA Condon, CCC-SLP

## 2018-04-14 LAB
ALBUMIN SERPL BCP-MCNC: 3 G/DL
ALP SERPL-CCNC: 67 U/L
ALT SERPL W/O P-5'-P-CCNC: 9 U/L
ANION GAP SERPL CALC-SCNC: 8 MMOL/L
AST SERPL-CCNC: 11 U/L
BASOPHILS # BLD AUTO: 0.01 K/UL
BASOPHILS NFR BLD: 0.1 %
BILIRUB SERPL-MCNC: 0.4 MG/DL
BLD PROD TYP BPU: NORMAL
BLOOD UNIT EXPIRATION DATE: NORMAL
BLOOD UNIT TYPE CODE: 5100
BLOOD UNIT TYPE: NORMAL
BUN SERPL-MCNC: 21 MG/DL
CALCIUM SERPL-MCNC: 9.8 MG/DL
CHLORIDE SERPL-SCNC: 107 MMOL/L
CO2 SERPL-SCNC: 21 MMOL/L
CODING SYSTEM: NORMAL
CREAT SERPL-MCNC: 0.8 MG/DL
DIFFERENTIAL METHOD: ABNORMAL
DISPENSE STATUS: NORMAL
EOSINOPHIL # BLD AUTO: 0 K/UL
EOSINOPHIL NFR BLD: 0 %
ERYTHROCYTE [DISTWIDTH] IN BLOOD BY AUTOMATED COUNT: 14.8 %
EST. GFR  (AFRICAN AMERICAN): >60 ML/MIN/1.73 M^2
EST. GFR  (NON AFRICAN AMERICAN): >60 ML/MIN/1.73 M^2
GLUCOSE SERPL-MCNC: 132 MG/DL
HCT VFR BLD AUTO: 32.7 %
HGB BLD-MCNC: 10.3 G/DL
IMM GRANULOCYTES # BLD AUTO: 0.09 K/UL
IMM GRANULOCYTES NFR BLD AUTO: 0.7 %
INR PPP: 1.2
LYMPHOCYTES # BLD AUTO: 2.5 K/UL
LYMPHOCYTES NFR BLD: 20.1 %
MAGNESIUM SERPL-MCNC: 1.7 MG/DL
MCH RBC QN AUTO: 30.1 PG
MCHC RBC AUTO-ENTMCNC: 31.5 G/DL
MCV RBC AUTO: 96 FL
MONOCYTES # BLD AUTO: 1.8 K/UL
MONOCYTES NFR BLD: 14 %
NEUTROPHILS # BLD AUTO: 8.1 K/UL
NEUTROPHILS NFR BLD: 65.1 %
NRBC BLD-RTO: 0 /100 WBC
PHOSPHATE SERPL-MCNC: 2 MG/DL
PLATELET # BLD AUTO: 259 K/UL
PMV BLD AUTO: 9.9 FL
POCT GLUCOSE: 135 MG/DL (ref 70–110)
POCT GLUCOSE: 149 MG/DL (ref 70–110)
POCT GLUCOSE: 166 MG/DL (ref 70–110)
POCT GLUCOSE: 169 MG/DL (ref 70–110)
POCT GLUCOSE: 237 MG/DL (ref 70–110)
POTASSIUM SERPL-SCNC: 4.1 MMOL/L
PROT SERPL-MCNC: 6.5 G/DL
PROTHROMBIN TIME: 12.2 SEC
RBC # BLD AUTO: 3.42 M/UL
SODIUM SERPL-SCNC: 136 MMOL/L
TRANS ERYTHROCYTES VOL PATIENT: NORMAL ML
WBC # BLD AUTO: 12.47 K/UL

## 2018-04-14 PROCEDURE — 83735 ASSAY OF MAGNESIUM: CPT

## 2018-04-14 PROCEDURE — 63600175 PHARM REV CODE 636 W HCPCS: Performed by: NURSE PRACTITIONER

## 2018-04-14 PROCEDURE — 85610 PROTHROMBIN TIME: CPT

## 2018-04-14 PROCEDURE — 20000000 HC ICU ROOM

## 2018-04-14 PROCEDURE — 25000003 PHARM REV CODE 250: Performed by: NURSE PRACTITIONER

## 2018-04-14 PROCEDURE — 85025 COMPLETE CBC W/AUTO DIFF WBC: CPT

## 2018-04-14 PROCEDURE — G8997 SWALLOW GOAL STATUS: HCPCS | Mod: CH

## 2018-04-14 PROCEDURE — 63600175 PHARM REV CODE 636 W HCPCS: Performed by: STUDENT IN AN ORGANIZED HEALTH CARE EDUCATION/TRAINING PROGRAM

## 2018-04-14 PROCEDURE — 94761 N-INVAS EAR/PLS OXIMETRY MLT: CPT

## 2018-04-14 PROCEDURE — G8996 SWALLOW CURRENT STATUS: HCPCS | Mod: CI

## 2018-04-14 PROCEDURE — 80053 COMPREHEN METABOLIC PANEL: CPT

## 2018-04-14 PROCEDURE — 84100 ASSAY OF PHOSPHORUS: CPT

## 2018-04-14 PROCEDURE — 92610 EVALUATE SWALLOWING FUNCTION: CPT

## 2018-04-14 PROCEDURE — 63600175 PHARM REV CODE 636 W HCPCS: Performed by: PHYSICIAN ASSISTANT

## 2018-04-14 PROCEDURE — 25000003 PHARM REV CODE 250: Performed by: STUDENT IN AN ORGANIZED HEALTH CARE EDUCATION/TRAINING PROGRAM

## 2018-04-14 PROCEDURE — 99233 SBSQ HOSP IP/OBS HIGH 50: CPT | Mod: GC,,, | Performed by: PSYCHIATRY & NEUROLOGY

## 2018-04-14 RX ORDER — SODIUM,POTASSIUM PHOSPHATES 280-250MG
2 POWDER IN PACKET (EA) ORAL
Status: DISCONTINUED | OUTPATIENT
Start: 2018-04-14 | End: 2018-04-14

## 2018-04-14 RX ORDER — MAGNESIUM SULFATE/D5W 2 G/50 ML
2 INTRAVENOUS SOLUTION, PIGGYBACK (ML) INTRAVENOUS ONCE
Status: COMPLETED | OUTPATIENT
Start: 2018-04-14 | End: 2018-04-14

## 2018-04-14 RX ORDER — FENTANYL CITRATE 50 UG/ML
INJECTION, SOLUTION INTRAMUSCULAR; INTRAVENOUS
Status: DISPENSED
Start: 2018-04-14 | End: 2018-04-14

## 2018-04-14 RX ORDER — FENTANYL CITRATE 50 UG/ML
12.5 INJECTION, SOLUTION INTRAMUSCULAR; INTRAVENOUS ONCE
Status: DISCONTINUED | OUTPATIENT
Start: 2018-04-14 | End: 2018-04-14

## 2018-04-14 RX ORDER — FENTANYL CITRATE 50 UG/ML
12.5 INJECTION, SOLUTION INTRAMUSCULAR; INTRAVENOUS
Status: DISCONTINUED | OUTPATIENT
Start: 2018-04-14 | End: 2018-05-04

## 2018-04-14 RX ADMIN — GABAPENTIN 300 MG: 300 CAPSULE ORAL at 12:04

## 2018-04-14 RX ADMIN — GABAPENTIN 300 MG: 300 CAPSULE ORAL at 08:04

## 2018-04-14 RX ADMIN — HYDRALAZINE HYDROCHLORIDE 10 MG: 20 INJECTION INTRAMUSCULAR; INTRAVENOUS at 11:04

## 2018-04-14 RX ADMIN — INSULIN ASPART 6 UNITS: 100 INJECTION, SOLUTION INTRAVENOUS; SUBCUTANEOUS at 10:04

## 2018-04-14 RX ADMIN — DILTIAZEM HYDROCHLORIDE 30 MG: 30 TABLET, FILM COATED ORAL at 12:04

## 2018-04-14 RX ADMIN — Medication 2 G: at 07:04

## 2018-04-14 RX ADMIN — HYDRALAZINE HYDROCHLORIDE 10 MG: 20 INJECTION INTRAMUSCULAR; INTRAVENOUS at 02:04

## 2018-04-14 RX ADMIN — DILTIAZEM HYDROCHLORIDE 30 MG: 30 TABLET, FILM COATED ORAL at 06:04

## 2018-04-14 RX ADMIN — VALSARTAN 20 MG: 40 TABLET, FILM COATED ORAL at 03:04

## 2018-04-14 RX ADMIN — SODIUM PHOSPHATE, MONOBASIC, MONOHYDRATE 20.01 MMOL: 276; 142 INJECTION, SOLUTION INTRAVENOUS at 07:04

## 2018-04-14 RX ADMIN — DILTIAZEM HYDROCHLORIDE 30 MG: 30 TABLET, FILM COATED ORAL at 11:04

## 2018-04-14 RX ADMIN — CEFAZOLIN 2 G: 330 INJECTION, POWDER, FOR SOLUTION INTRAMUSCULAR; INTRAVENOUS at 02:04

## 2018-04-14 RX ADMIN — FENTANYL CITRATE 12.5 MCG: 50 INJECTION, SOLUTION INTRAMUSCULAR; INTRAVENOUS at 03:04

## 2018-04-14 RX ADMIN — STANDARDIZED SENNA CONCENTRATE AND DOCUSATE SODIUM 1 TABLET: 8.6; 5 TABLET, FILM COATED ORAL at 12:04

## 2018-04-14 RX ADMIN — TRAZODONE HYDROCHLORIDE 50 MG: 50 TABLET ORAL at 09:04

## 2018-04-14 RX ADMIN — TAMSULOSIN HYDROCHLORIDE 0.4 MG: 0.4 CAPSULE ORAL at 12:04

## 2018-04-14 RX ADMIN — MUPIROCIN 1 G: 20 OINTMENT TOPICAL at 09:04

## 2018-04-14 RX ADMIN — CEFAZOLIN 2 G: 330 INJECTION, POWDER, FOR SOLUTION INTRAMUSCULAR; INTRAVENOUS at 01:04

## 2018-04-14 RX ADMIN — ATORVASTATIN CALCIUM 20 MG: 20 TABLET, FILM COATED ORAL at 08:04

## 2018-04-14 RX ADMIN — HEPARIN SODIUM 5000 UNITS: 5000 INJECTION, SOLUTION INTRAVENOUS; SUBCUTANEOUS at 09:04

## 2018-04-14 RX ADMIN — HEPARIN SODIUM 5000 UNITS: 5000 INJECTION, SOLUTION INTRAVENOUS; SUBCUTANEOUS at 06:04

## 2018-04-14 RX ADMIN — MUPIROCIN 1 G: 20 OINTMENT TOPICAL at 12:04

## 2018-04-14 RX ADMIN — CITALOPRAM HYDROBROMIDE 20 MG: 10 TABLET ORAL at 12:04

## 2018-04-14 RX ADMIN — HEPARIN SODIUM 5000 UNITS: 5000 INJECTION, SOLUTION INTRAVENOUS; SUBCUTANEOUS at 02:04

## 2018-04-14 RX ADMIN — STANDARDIZED SENNA CONCENTRATE AND DOCUSATE SODIUM 1 TABLET: 8.6; 5 TABLET, FILM COATED ORAL at 08:04

## 2018-04-14 NOTE — PT/OT/SLP EVAL
"Speech Language Pathology Evaluation  Bedside Swallow    Patient Name:  Hollis Pitts   MRN:  [id number]   [id number]/[id number] A    Admitting Diagnosis: Cervical myelopathy with cervical radiculopathy    Recommendations:                 General Recommendations:  Dysphagia therapy  Diet recommendations:  Regular, Thin   Aspiration Precautions:   · NO straws  · PO meds crushed in pureed  · Pt would benefit from 1:1 supervision to ensure consistent implementation of ALL aspiration precautions   · Fully awake and alert for PO intake  · Fully upright position for PO intake  · Small bites/ sips  · Slow rate of eating/ drinking  · 1 bite/ sip @ a time  · Refrain from talking prior to swallow completion   · Remain upright for 20-20 min post PO intake   · Discontinue PO upon: choking, wet vocal quality, wet breath sounds, watery eyes, reddened facial features  General Precautions: Standard, aspiration, fall  Communication strategies:  go to room if call light pushed    History:     Past Medical History:   Diagnosis Date    Benign prostatic hyperplasia with urinary retention 4/11/2018    Cervical myelopathy with cervical radiculopathy 4/10/2018    Depression     Dyslipidemia     Essential hypertension 4/11/2018    Glaucoma (increased eye pressure)     Mild cognitive impairment     Traumatic brain injury 07/2017    Type 2 diabetes mellitus with hyperglycemia, without long-term current use of insulin 4/11/2018       Past Surgical History:   Procedure Laterality Date    CATARACT EXTRACTION W/  INTRAOCULAR LENS IMPLANT       Prior Intubation HX:  Pt intubated/ extubated same day, 4/10/18.    Prior diet: Per pt, regular consistency diet and thin liquids. Per review of medical chart, NPO recommended per results of SLP evaluation completed 4/12/18.     Subjective     "I'm coughing a lot just because of the phlegm from my surgery.     Pain/Comfort:  · Pain Rating 1: 0/10  · Pain Rating Post-Intervention 1: 0/10    Objective:     Oral " Musculature Evaluation  · Oral Musculature: WFL  · Dentition: upper dentures, present and adequate  · Secretion Management: adequate  · Mucosal Quality: good  · Mandibular Strength and Mobility: WFL  · Oral Labial Strength and Mobility: WFL  · Lingual Strength and Mobility: WFL  · Velar Elevation: WFL  · Buccal Strength and Mobility: WFL  · Volitional Cough: WFL  · Volitional Swallow: WFL  · Voice Prior to PO Intake: Mildly hoarse    Bedside Swallow Eval:   Consistencies Assessed:  · Thin water- multiple cup sips in isolation and as liquid wash   · Nectar thick water- cup sip x1  · Pureed apple sauce- tsp x2  · Regular consistency preet cracker- 1/2 cracker via bites x2    Oral Phase:   · Appeared WFL    Pharyngeal Phase:   · Pt with frequent spontaneous dry coughing before, during, and after provision of PO trials appearing likely 2/2 s/p ACDF. Coughing and vocal quality unchanged and dry across observations. SPO2 remained at 95-97% throughout evaluation. Aspiration unable to be ruled out. However appears likely unrelated to PO.     Treatment:   Pt awake and alert upon entry wearing cervical collar. HOB raised. Education provided re: role of SLP, definition/ risks/ overt clinical signs of aspiration, definition of silent aspiration, consistent implementation of all aspiration precautions listed above, and SLP POC. Pt verbalized understanding of all education provided and agreement with SLP POC. White board updated. No further questions.     Results discussed with MD.     Assessment:     Hollis Pitts is a 82 y.o. male with an SLP diagnosis of risk of aspiration.     Goals:    SLP Goals        Problem: SLP Goal    Goal Priority Disciplines Outcome   SLP Goal     SLP Ongoing (interventions implemented as appropriate)   Description:  Speech Language Pathology Goals  Goals expected to be met by 4/21:    1. Pt will tolerate regular consistency diet and thin liquids with no overt clinical signs of aspiration.                           Plan:     · Patient to be seen:  5 x/week   · Plan of Care expires:  05/13/18  · Plan of Care reviewed with:  patient   · SLP Follow-Up:  Yes       Discharge recommendations:  nursing facility, skilled     Time Tracking:     SLP Treatment Date:   04/14/18  Speech Start Time:  1216  Speech Stop Time:  1232     Speech Total Time (min):  16 min    Billable Minutes: Eval Swallow and Oral Function 16    RADHA Moseley, CCC-SLP  [phone]  4/14/2018

## 2018-04-14 NOTE — ASSESSMENT & PLAN NOTE
- NSGY following   - S/P ACDF with CSF leak   - Lumbar drain goal 10 ML /hr clamp if >10ml/h  - PT/Ot when able   - passed sp eval and diet started   - Neuro checks Q 1   - ok to sit up with lumbar drain clamped per nsgy

## 2018-04-14 NOTE — PT/OT/SLP PROGRESS
Speech Language Pathology      Hollis CASANDRA Pitts   7065/7065 A    MRN: [id number]    SLP orders received and acknowledged. Thank you. Patient not seen today secondary to MD hold (Comment) (NSG reported per neurosurgery, HOB unable to be raised to fully upright position necessary for PO intake upon SLP attempt to evaluate pt at 1046.). Will follow-up.     RADHA Moseley, CCC-SLP  [phone]  4/14/2018

## 2018-04-14 NOTE — PLAN OF CARE
Problem: Patient Care Overview  Goal: Plan of Care Review  Outcome: Ongoing (interventions implemented as appropriate)  No acute events this shift. POC reviewed with patient; he verbalized understanding. All questions answered, all concerns addressed. Pt with serous fluid leaking from around hemovac insertion site; Neuro Sx placed an additional suture to help with this. hemovac remains clamped. Lumbar drain open and drained to 10ml every hour. Diet started per speech therapy recs; pt now able to swallow home meds in pudding. VS stable, no distress noted.

## 2018-04-14 NOTE — SUBJECTIVE & OBJECTIVE
Interval History:  AMdit [facility]     Review of Systems      Review of symptoms  Constitutional: Denies fevers or chills.  Pulmonary: Denies shortness of breath or cough.  Cardiology: Denies chest pain or palpitations.  GI: Denies abdominal pain or constipation.  Neurologic: Denies new weakness,  headache, or paresthesias.  Objective:     Vitals:  Temp: 98.6 °F (37 °C)  Pulse: (!) 117  Rhythm: normal sinus rhythm  BP: (!) 151/73  MAP (mmHg): 104  Resp: (!) 22  SpO2: 98 %  O2 Device (Oxygen Therapy): room air    Temp  Min: 97.8 °F (36.6 °C)  Max: 98.9 °F (37.2 °C)  Pulse  Min: 71  Max: 117  BP  Min: 150/70  Max: 170/81  MAP (mmHg)  Min: 101  Max: 116  Resp  Min: 10  Max: 28  SpO2  Min: 94 %  Max: 98 %    04/13 0701 - 04/14 0700  In: 1400 [I.V.:1400]  Out: 2985 [Urine:2520; Drains:465]   Unmeasured Output  Urine Occurrence: 1  Stool Occurrence: 0       Physical Exam         Physical Exam:  GA: Alert, comfortable, no acute distress.   HEENT: No scleral icterus or JVD.   Pulmonary: Clear to auscultation A/L.  Cardiac: RRR S1 & S2 w/o rubs/murmurs/gallops.   Abdominal: Bowel sounds present x 4.   Skin: No jaundice, rashes, or visible lesions.  Neuro:  --GCS: E4 V5 M6  --Mental Status: Awake alert oriented follows all commands     --CN II-XII grossly intact.   --Pupils 3mm, PERRL.   --Corneal reflex, gag, cough intact.  --LUE strength: 5/5  --RUE strength: 5/5  --LLE strength: 5/5  --RLE strength: 5/5    Unable to test gait due to level of consciousness.    Medications:  Continuous    sodium chloride 0.9% Last Rate: 75 mL/hr at 04/14/18 1300   Scheduled    atorvastatin 20 mg Nightly   ceFAZolin (ANCEF) IVPB 2 g Q12H   citalopram 20 mg Daily   diltiaZEM 30 mg Q6H   fentaNYL     gabapentin 300 mg TID   heparin (porcine) 5,000 Units Q8H   mupirocin 1 g BID   senna-docusate 8.6-50 mg 1 tablet BID   tamsulosin 0.4 mg Daily   traZODone 50 mg QHS   valsartan 20 mg Daily   PRN    acetaminophen 650 mg Q4H PRN   acetaminophen 650 mg Q6H  PRN   aluminum-magnesium hydroxide-simethicone 30 mL Q4H PRN   dextrose 50% 12.5 g PRN   dextrose 50% 12.5 g PRN   fentaNYL 12.5 mcg Q2H PRN   glucagon (human recombinant) 1 mg PRN   hydrALAZINE 10 mg Q4H PRN   hydrocodone-acetaminophen 5-325mg 1 tablet Q4H PRN   insulin aspart U-100 1-12 Units Q4H PRN   labetalol 10 mg Q6H PRN   lidocaine HCL 2%  PRN   ondansetron 8 mg Q6H PRN     Today I personally reviewed pertinent medications, lines/drains/airways, imaging, lab results,     Diet

## 2018-04-14 NOTE — ASSESSMENT & PLAN NOTE
-- Stable at present.    -- Continue diltiazem 90mg and valsartan 80mg daily.  -- able to restart po meds now that virgilio vallejo passed

## 2018-04-14 NOTE — SUBJECTIVE & OBJECTIVE
Interval History: NAEON. Lumbar drain placed yesterday.    Medications:  Continuous Infusions:   sodium chloride 0.9% 100 mL/hr at 04/14/18 1000     Scheduled Meds:   atorvastatin  20 mg Per NG tube Nightly    ceFAZolin (ANCEF) IVPB  2 g Intravenous Q12H    citalopram  20 mg Per NG tube Daily    diltiaZEM  30 mg Per NG tube Q6H    fentaNYL        gabapentin  300 mg Per NG tube TID    heparin (porcine)  5,000 Units Subcutaneous Q8H    mupirocin  1 g Nasal BID    senna-docusate 8.6-50 mg  1 tablet Per NG tube BID    tamsulosin  0.4 mg Per NG tube Daily    traZODone  50 mg Per NG tube QHS    valsartan  20 mg Per NG tube Daily     PRN Meds:acetaminophen, acetaminophen, aluminum-magnesium hydroxide-simethicone, dextrose 50%, dextrose 50%, fentaNYL, glucagon (human recombinant), hydrALAZINE, hydrocodone-acetaminophen 5-325mg, insulin aspart U-100, labetalol, lidocaine HCL 2%, ondansetron     Review of Systems    Objective:     Weight: 112.1 kg (247 lb 2.2 oz)  Body mass index is 33.52 kg/m².  Vital Signs (Most Recent):  Temp: 98.6 °F (37 °C) (04/14/18 1100)  Pulse: 89 (04/14/18 1100)  Resp: 16 (04/14/18 1100)  BP: (!) 170/81 (04/14/18 1100)  SpO2: 96 % (04/14/18 1100) Vital Signs (24h Range):  Temp:  [97.8 °F (36.6 °C)-98.9 °F (37.2 °C)] 98.6 °F (37 °C)  Pulse:  [71-94] 89  Resp:  [10-28] 16  SpO2:  [94 %-98 %] 96 %  BP: (150-170)/(70-85) 170/81       Date 04/14/18 0700 - 04/15/18 0659   Shift 5486-3802 0851-7367 6342-9985 24 Hour Total   I  N  T  A  K  E   I.V.  (mL/kg) 450  (4)   450  (4)    IV Piggyback 250   250    Shift Total  (mL/kg) 700  (6.2)   700  (6.2)   O  U  T  P  U  T   Urine  (mL/kg/hr) 725   725    Drains 40   40    Shift Total  (mL/kg) 765  (6.8)   765  (6.8)   Weight (kg) 112.1 112.1 112.1 112.1            Closed/Suction Drain 04/10/18 1425 Anterior Neck Accordion 10 Fr. (Active)   Site Description Healing 4/13/2018  7:30 AM   Dressing Type No dressing 4/13/2018  7:30 AM   Dressing Status  Clean;Dry;Intact 4/12/2018  8:08 PM   Drainage Serosanguineous 4/13/2018  7:30 AM   Status To bulb suction 4/13/2018  7:30 AM   Output (mL) 90 mL 4/11/2018  5:00 AM       Neurosurgery Physical Exam     General: well developed, well nourished, no distress  Head: normocephalic, atraumatic  Neurologic: Alert and oriented. Thought content appropriate  GCS: Motor: 6/Verbal: 5/Eyes: 4 GCS Total: 15  Mental Status: Awake, Alert, Oriented x 4  Language: No aphasia  Speech: No dysarthria  Cranial nerves: face symmetric, tongue midline, CN II-XII grossly intact.   Eyes: pupils equal, round, reactive to light with accommodation, EOMI  Pulmonary: normal respirations, not labored, no accessory muscles used  Abdomen: soft, non-distended, not tender to palpation  Sensory: intact to light touch throughout  Motor Strength: Moves all extremities spontaneously with good tone.  No abnormal movements seen.     Strength  Deltoids Triceps Biceps Wrist Extension Wrist Flexion Hand    Upper: R 3/5 3/5 4/5 5/5 5/5 4-/5    L 4/5 4/5 4/5 5/5 5/5 4/5     Iliopsoas Quadriceps Knee  Flexion Tibialis  anterior Gastro- cnemius EHL   Lower: R 4/5 5/5 5/5 5/5 5/5 5/5    L 4/5 5/5 5/5 5/5 5/5 5/5     Pronator Drift: no drift noted  Finger-to-nose: Intact bilaterally  Incision is clean, dry, intact with skin well approximated with dermabond.  There is no drainage, erythema, or edema.      Significant Labs:    Recent Labs  Lab 04/13/18  0424 04/14/18  0255   * 132*    136   K 4.8 4.1    107   CO2 24 21*   BUN 19 21   CREATININE 0.8 0.8   CALCIUM 9.4 9.8   MG  --  1.7       Recent Labs  Lab 04/13/18  0423 04/14/18  0255   WBC 9.72 12.47   HGB 9.3* 10.3*   HCT 29.5* 32.7*    259     No results for input(s): LABPT, INR, APTT in the last 48 hours.  Microbiology Results (last 7 days)     ** No results found for the last 168 hours. **          Significant Diagnostics:  No new imaging

## 2018-04-14 NOTE — PROGRESS NOTES
[facility]  Neurosurgery  Progress Note    Subjective:     History of Present Illness: 82 y.o.  male with type 2 diabetes, who presents today for follow up evaluation one week prior to 2 level ACDF scheduled for 4/10/2018. Pt reports    Pt presents today wearing a cervical collar and in a wheelchair. He would like to proceed with surgery. Per pt's family, his PCP would like to see him on 4/9, the day before surgery. He notes continued neck pain and BUE numbness/tingling as well as BUE weakness. Pt is only able to walk a few steps unassisted. He has never received neck surgery.     Post-Op Info:  Procedure(s) (LRB):  DISKECTOMY AND FUSION-ANTERIOR CERVICAL (ACDF); C5-6. C6-7 (N/A)   4 Days Post-Op     Interval History: NAEON. Lumbar drain placed yesterday.    Medications:  Continuous Infusions:   sodium chloride 0.9% 100 mL/hr at 04/14/18 1000     Scheduled Meds:   atorvastatin  20 mg Per NG tube Nightly    ceFAZolin (ANCEF) IVPB  2 g Intravenous Q12H    citalopram  20 mg Per NG tube Daily    diltiaZEM  30 mg Per NG tube Q6H    fentaNYL        gabapentin  300 mg Per NG tube TID    heparin (porcine)  5,000 Units Subcutaneous Q8H    mupirocin  1 g Nasal BID    senna-docusate 8.6-50 mg  1 tablet Per NG tube BID    tamsulosin  0.4 mg Per NG tube Daily    traZODone  50 mg Per NG tube QHS    valsartan  20 mg Per NG tube Daily     PRN Meds:acetaminophen, acetaminophen, aluminum-magnesium hydroxide-simethicone, dextrose 50%, dextrose 50%, fentaNYL, glucagon (human recombinant), hydrALAZINE, hydrocodone-acetaminophen 5-325mg, insulin aspart U-100, labetalol, lidocaine HCL 2%, ondansetron     Review of Systems    Objective:     Weight: 112.1 kg (247 lb 2.2 oz)  Body mass index is 33.52 kg/m².  Vital Signs (Most Recent):  Temp: 98.6 °F (37 °C) (04/14/18 1100)  Pulse: 89 (04/14/18 1100)  Resp: 16 (04/14/18 1100)  BP: (!) 170/81 (04/14/18 1100)  SpO2: 96 % (04/14/18 1100) Vital Signs (24h  Range):  Temp:  [97.8 °F (36.6 °C)-98.9 °F (37.2 °C)] 98.6 °F (37 °C)  Pulse:  [71-94] 89  Resp:  [10-28] 16  SpO2:  [94 %-98 %] 96 %  BP: (150-170)/(70-85) 170/81       Date 04/14/18 0700 - 04/15/18 0659   Shift 3348-1201 8683-9597 9809-9609 24 Hour Total   I  N  T  A  K  E   I.V.  (mL/kg) 450  (4)   450  (4)    IV Piggyback 250   250    Shift Total  (mL/kg) 700  (6.2)   700  (6.2)   O  U  T  P  U  T   Urine  (mL/kg/hr) 725   725    Drains 40   40    Shift Total  (mL/kg) 765  (6.8)   765  (6.8)   Weight (kg) 112.1 112.1 112.1 112.1            Closed/Suction Drain 04/10/18 1425 Anterior Neck Accordion 10 Fr. (Active)   Site Description Healing 4/13/2018  7:30 AM   Dressing Type No dressing 4/13/2018  7:30 AM   Dressing Status Clean;Dry;Intact 4/12/2018  8:08 PM   Drainage Serosanguineous 4/13/2018  7:30 AM   Status To bulb suction 4/13/2018  7:30 AM   Output (mL) 90 mL 4/11/2018  5:00 AM       Neurosurgery Physical Exam     General: well developed, well nourished, no distress  Head: normocephalic, atraumatic  Neurologic: Alert and oriented. Thought content appropriate  GCS: Motor: 6/Verbal: 5/Eyes: 4 GCS Total: 15  Mental Status: Awake, Alert, Oriented x 4  Language: No aphasia  Speech: No dysarthria  Cranial nerves: face symmetric, tongue midline, CN II-XII grossly intact.   Eyes: pupils equal, round, reactive to light with accommodation, EOMI  Pulmonary: normal respirations, not labored, no accessory muscles used  Abdomen: soft, non-distended, not tender to palpation  Sensory: intact to light touch throughout  Motor Strength: Moves all extremities spontaneously with good tone.  No abnormal movements seen.     Strength  Deltoids Triceps Biceps Wrist Extension Wrist Flexion Hand    Upper: R 3/5 3/5 4/5 5/5 5/5 4-/5    L 4/5 4/5 4/5 5/5 5/5 4/5     Iliopsoas Quadriceps Knee  Flexion Tibialis  anterior Gastro- cnemius EHL   Lower: R 4/5 5/5 5/5 5/5 5/5 5/5    L 4/5 5/5 5/5 5/5 5/5 5/5     Pronator Drift: no drift  noted  Finger-to-nose: Intact bilaterally  Incision is clean, dry, intact with skin well approximated with dermabond.  There is no drainage, erythema, or edema.      Significant Labs:    Recent Labs  Lab 04/13/18  0424 04/14/18  0255   * 132*    136   K 4.8 4.1    107   CO2 24 21*   BUN 19 21   CREATININE 0.8 0.8   CALCIUM 9.4 9.8   MG  --  1.7       Recent Labs  Lab 04/13/18  0423 04/14/18  0255   WBC 9.72 12.47   HGB 9.3* 10.3*   HCT 29.5* 32.7*    259     No results for input(s): LABPT, INR, APTT in the last 48 hours.  Microbiology Results (last 7 days)     ** No results found for the last 168 hours. **          Significant Diagnostics:  No new imaging    Assessment/Plan:     * Cervical myelopathy with cervical radiculopathy    82 y.o. male s/p C3-5 ACDF with Dr. Hess, POD#4    -Neurologically stable  -Leave incision open to air  -HV with CSF drainage - currently clamped, please leave clamped  -LD placed for continued CSF leak - Lumbar drain to be open at 0 cm h20 and level with shoulder. Check output q30min and clamp drain to prevent draining more than 10cc per hr.  Does not require minimum output per hour - if LD puts out 0, it is fine.  Please do not drop to floor to drain.  -Ancef while drains in place  -Continue dex 4mg q6h, PPI while on dex  -Cervical collar when OOB or with activity  -OK for OOB with PT - clamp LD when OOB  -IS to bed side. Patient to use atleast 10x every hour.  -Continue bowel regimen daily.  -Continue SQH, Teds and SCDs for DVTP.  -Urinary retention - rouse replaced  -Hyperglycemia/DM: on insulin TID with POCT.   -Continue to hold Eliquis x 1 weeks at this time  -Medical management per NCC  -ST az Johns MD  Neurosurgery  [facility]

## 2018-04-14 NOTE — PLAN OF CARE
Problem: Patient Care Overview  Goal: Plan of Care Review  Outcome: Ongoing (interventions implemented as appropriate)  POC reviewed with pt at 0500. Pt verbalized understanding. Questions and concerns addressed. No acute events overnight. Pt vitals and neuro exam consistent though out night. Pt reported of fluid leak. Nurse unable to visualize source of leak and will continue to monitor with hourly neuro checks. Pt labs rresulted. NSCCU contacted.Nurse awaiting response.  Pt progressing toward goals. Will continue to monitor. See flowsheets for full assessment and VS info

## 2018-04-14 NOTE — PROGRESS NOTES
[facility]  Neurocritical Care  Progress Note    Admit Date: 4/10/2018  Service Date: 04/14/2018  Length of Stay: 4    Subjective:     Chief Complaint: Cervical myelopathy with cervical radiculopathy    History of Present Illness: Mr. Pitts is an 82-year-old man with HTN, HPLD, DMII, BPH, MDD, and cervical spondylosis with myelopathy who was admitted to NS for ACDF. Per chart review he was in his usual state of health until July 2017 when he was hit by a large beam of wood, after which he has been non-ambulatory and confused. While very pleasant, he is unfortunately somewhat of a poor historian. His symptoms prior to admission include bilateral hand numbness, tingling, and restricted upper extremity range of motion. CT C-spine without contrast showed multilevel spondylosis of there cervical spine with mild stenosis at C5-6 and C6-7 as well as severe extensive neuroforaminal narrowing. Yesterday he underwent two level ACDF at C5/6 and C6/7 without immediate complication. Reported blood loss in the brief op note is 1050 mL. He was given dexamethasone as well. Hospital medicine consulted for hyperglycemia and comanagement of his medical conditions.He had developed a CSF leak and will have Luumbar drain placed by ns. He is being admitted to [facility] for a higher level of care.     Hospital Course: 4/13: Admit NCC s/p ACDF with new csf leak pending lumbar drain  4/14: ok to sit up with lumbar drain clamped, passed sp eval started diet     Interval History:  AMdit [facility]     Review of Systems      Review of symptoms  Constitutional: Denies fevers or chills.  Pulmonary: Denies shortness of breath or cough.  Cardiology: Denies chest pain or palpitations.  GI: Denies abdominal pain or constipation.  Neurologic: Denies new weakness,  headache, or paresthesias.  Objective:     Vitals:  Temp: 98.6 °F (37 °C)  Pulse: (!) 117  Rhythm: normal sinus rhythm  BP: (!) 151/73  MAP (mmHg): 104  Resp: (!) 22  SpO2: 98  %  O2 Device (Oxygen Therapy): room air    Temp  Min: 97.8 °F (36.6 °C)  Max: 98.9 °F (37.2 °C)  Pulse  Min: 71  Max: 117  BP  Min: 150/70  Max: 170/81  MAP (mmHg)  Min: 101  Max: 116  Resp  Min: 10  Max: 28  SpO2  Min: 94 %  Max: 98 %    04/13 0701 - 04/14 0700  In: 1400 [I.V.:1400]  Out: 2985 [Urine:2520; Drains:465]   Unmeasured Output  Urine Occurrence: 1  Stool Occurrence: 0       Physical Exam         Physical Exam:  GA: Alert, comfortable, no acute distress.   HEENT: No scleral icterus or JVD.   Pulmonary: Clear to auscultation A/L.  Cardiac: RRR S1 & S2 w/o rubs/murmurs/gallops.   Abdominal: Bowel sounds present x 4.   Skin: No jaundice, rashes, or visible lesions.  Neuro:  --GCS: E4 V5 M6  --Mental Status: Awake alert oriented follows all commands     --CN II-XII grossly intact.   --Pupils 3mm, PERRL.   --Corneal reflex, gag, cough intact.  --LUE strength: 5/5  --RUE strength: 5/5  --LLE strength: 5/5  --RLE strength: 5/5    Unable to test gait due to level of consciousness.    Medications:  Continuous    sodium chloride 0.9% Last Rate: 75 mL/hr at 04/14/18 1300   Scheduled    atorvastatin 20 mg Nightly   ceFAZolin (ANCEF) IVPB 2 g Q12H   citalopram 20 mg Daily   diltiaZEM 30 mg Q6H   fentaNYL     gabapentin 300 mg TID   heparin (porcine) 5,000 Units Q8H   mupirocin 1 g BID   senna-docusate 8.6-50 mg 1 tablet BID   tamsulosin 0.4 mg Daily   traZODone 50 mg QHS   valsartan 20 mg Daily   PRN    acetaminophen 650 mg Q4H PRN   acetaminophen 650 mg Q6H PRN   aluminum-magnesium hydroxide-simethicone 30 mL Q4H PRN   dextrose 50% 12.5 g PRN   dextrose 50% 12.5 g PRN   fentaNYL 12.5 mcg Q2H PRN   glucagon (human recombinant) 1 mg PRN   hydrALAZINE 10 mg Q4H PRN   hydrocodone-acetaminophen 5-325mg 1 tablet Q4H PRN   insulin aspart U-100 1-12 Units Q4H PRN   labetalol 10 mg Q6H PRN   lidocaine HCL 2%  PRN   ondansetron 8 mg Q6H PRN     Today I personally reviewed pertinent medications, lines/drains/airways, imaging,  lab results,     Diet              Assessment/Plan:     Neuro   * Cervical myelopathy with cervical radiculopathy    - NSGY following   - S/P ACDF with CSF leak   - Lumbar drain goal 10 ML /hr clamp if >10ml/h  - PT/Ot when able   - passed sp eval and diet started   - Neuro checks Q 1   - ok to sit up with lumbar drain clamped per nsgy          Cardiac/Vascular   Other hyperlipidemia    - Continue atorvastatin        Essential hypertension    -- Stable at present.    -- Continue diltiazem 90mg and valsartan 80mg daily.  -- able to restart po meds now that sp eval passed         Endocrine   Type 2 diabetes mellitus with hyperglycemia, without long-term current use of insulin    - HbA1c 5.7  - starting diet   - SSI with ACCU checks         Other   Decreased functional mobility    - PT/TO eval and treat when ok per NSGY             Prophylaxis:  Venous Thromboembolism: mechanical  Stress Ulcer: None  Ventilator Pneumonia: not applicable     Activity Orders          Out of bed starting at 04/10 1644        No Order    Daljit Macdonald NP  Neurocritical Care  [facility]

## 2018-04-14 NOTE — PROGRESS NOTES
D/t unsuccessful maribeth tube placement, pt unable to receive medications. JOHN Vincent made aware. Nurse awaiting further orders/instructions

## 2018-04-14 NOTE — PLAN OF CARE
Problem: SLP Goal  Goal: SLP Goal  Speech Language Pathology Goals  Goals expected to be met by 4/21:    1. Pt will tolerate regular consistency diet and thin liquids with no overt clinical signs of aspiration.        Outcome: Ongoing (interventions implemented as appropriate)  Clinical evaluation of swallow complete. Recommend regular consistency diet and thin liquids. NO straws. PO meds crushed in pureed. Strict aspiration precautions. Pt would benefit from 1:1 supervision to ensure consistent implementation of all aspiration precautions.     RADHA Moseley, CCC-SLP  [phone]  4/14/2018

## 2018-04-15 LAB
ALBUMIN SERPL BCP-MCNC: 2.7 G/DL
ALP SERPL-CCNC: 65 U/L
ALT SERPL W/O P-5'-P-CCNC: 8 U/L
ANION GAP SERPL CALC-SCNC: 7 MMOL/L
AST SERPL-CCNC: 10 U/L
BASOPHILS # BLD AUTO: 0.01 K/UL
BASOPHILS NFR BLD: 0.1 %
BILIRUB SERPL-MCNC: 0.5 MG/DL
BUN SERPL-MCNC: 21 MG/DL
CALCIUM SERPL-MCNC: 9 MG/DL
CHLORIDE SERPL-SCNC: 110 MMOL/L
CO2 SERPL-SCNC: 20 MMOL/L
CREAT SERPL-MCNC: 0.7 MG/DL
DIFFERENTIAL METHOD: ABNORMAL
EOSINOPHIL # BLD AUTO: 0 K/UL
EOSINOPHIL NFR BLD: 0.2 %
ERYTHROCYTE [DISTWIDTH] IN BLOOD BY AUTOMATED COUNT: 15 %
EST. GFR  (AFRICAN AMERICAN): >60 ML/MIN/1.73 M^2
EST. GFR  (NON AFRICAN AMERICAN): >60 ML/MIN/1.73 M^2
GLUCOSE SERPL-MCNC: 138 MG/DL
HCT VFR BLD AUTO: 31.7 %
HGB BLD-MCNC: 10.2 G/DL
IMM GRANULOCYTES # BLD AUTO: 0.06 K/UL
IMM GRANULOCYTES NFR BLD AUTO: 0.6 %
LYMPHOCYTES # BLD AUTO: 2.4 K/UL
LYMPHOCYTES NFR BLD: 25 %
MAGNESIUM SERPL-MCNC: 1.7 MG/DL
MCH RBC QN AUTO: 31 PG
MCHC RBC AUTO-ENTMCNC: 32.2 G/DL
MCV RBC AUTO: 96 FL
MONOCYTES # BLD AUTO: 1.6 K/UL
MONOCYTES NFR BLD: 16.8 %
NEUTROPHILS # BLD AUTO: 5.5 K/UL
NEUTROPHILS NFR BLD: 57.3 %
NRBC BLD-RTO: 0 /100 WBC
PHOSPHATE SERPL-MCNC: 2.2 MG/DL
PLATELET # BLD AUTO: 254 K/UL
PMV BLD AUTO: 9.9 FL
POCT GLUCOSE: 154 MG/DL (ref 70–110)
POCT GLUCOSE: 167 MG/DL (ref 70–110)
POCT GLUCOSE: 174 MG/DL (ref 70–110)
POCT GLUCOSE: 181 MG/DL (ref 70–110)
POCT GLUCOSE: 191 MG/DL (ref 70–110)
POTASSIUM SERPL-SCNC: 3.8 MMOL/L
PROT SERPL-MCNC: 5.9 G/DL
RBC # BLD AUTO: 3.29 M/UL
SODIUM SERPL-SCNC: 137 MMOL/L
WBC # BLD AUTO: 9.68 K/UL

## 2018-04-15 PROCEDURE — 80053 COMPREHEN METABOLIC PANEL: CPT

## 2018-04-15 PROCEDURE — 94761 N-INVAS EAR/PLS OXIMETRY MLT: CPT

## 2018-04-15 PROCEDURE — 25000003 PHARM REV CODE 250: Performed by: NURSE PRACTITIONER

## 2018-04-15 PROCEDURE — 85025 COMPLETE CBC W/AUTO DIFF WBC: CPT

## 2018-04-15 PROCEDURE — 84100 ASSAY OF PHOSPHORUS: CPT

## 2018-04-15 PROCEDURE — 25000003 PHARM REV CODE 250: Performed by: STUDENT IN AN ORGANIZED HEALTH CARE EDUCATION/TRAINING PROGRAM

## 2018-04-15 PROCEDURE — 25000003 PHARM REV CODE 250: Performed by: PHYSICIAN ASSISTANT

## 2018-04-15 PROCEDURE — 63600175 PHARM REV CODE 636 W HCPCS: Performed by: PHYSICIAN ASSISTANT

## 2018-04-15 PROCEDURE — 20000000 HC ICU ROOM

## 2018-04-15 PROCEDURE — 99233 SBSQ HOSP IP/OBS HIGH 50: CPT | Mod: GC,,, | Performed by: PSYCHIATRY & NEUROLOGY

## 2018-04-15 PROCEDURE — 63600175 PHARM REV CODE 636 W HCPCS: Performed by: STUDENT IN AN ORGANIZED HEALTH CARE EDUCATION/TRAINING PROGRAM

## 2018-04-15 PROCEDURE — 63600175 PHARM REV CODE 636 W HCPCS: Performed by: NURSE PRACTITIONER

## 2018-04-15 PROCEDURE — 25000003 PHARM REV CODE 250: Performed by: PSYCHIATRY & NEUROLOGY

## 2018-04-15 PROCEDURE — 83735 ASSAY OF MAGNESIUM: CPT

## 2018-04-15 RX ORDER — TRAZODONE HYDROCHLORIDE 50 MG/1
50 TABLET ORAL NIGHTLY
Status: DISCONTINUED | OUTPATIENT
Start: 2018-04-15 | End: 2018-04-23

## 2018-04-15 RX ORDER — DILTIAZEM HYDROCHLORIDE 30 MG/1
30 TABLET, FILM COATED ORAL EVERY 6 HOURS
Status: DISCONTINUED | OUTPATIENT
Start: 2018-04-16 | End: 2018-04-18

## 2018-04-15 RX ORDER — GABAPENTIN 300 MG/1
300 CAPSULE ORAL 3 TIMES DAILY
Status: DISCONTINUED | OUTPATIENT
Start: 2018-04-15 | End: 2018-04-23

## 2018-04-15 RX ORDER — VALSARTAN 40 MG/1
40 TABLET ORAL DAILY
Status: DISCONTINUED | OUTPATIENT
Start: 2018-04-15 | End: 2018-04-15

## 2018-04-15 RX ORDER — TAMSULOSIN HYDROCHLORIDE 0.4 MG/1
0.4 CAPSULE ORAL DAILY
Status: DISCONTINUED | OUTPATIENT
Start: 2018-04-16 | End: 2018-04-23

## 2018-04-15 RX ORDER — ATORVASTATIN CALCIUM 20 MG/1
20 TABLET, FILM COATED ORAL NIGHTLY
Status: DISCONTINUED | OUTPATIENT
Start: 2018-04-15 | End: 2018-04-23

## 2018-04-15 RX ORDER — AMOXICILLIN 250 MG
1 CAPSULE ORAL 2 TIMES DAILY
Status: DISCONTINUED | OUTPATIENT
Start: 2018-04-15 | End: 2018-04-23

## 2018-04-15 RX ORDER — POLYETHYLENE GLYCOL 3350 17 G/17G
17 POWDER, FOR SOLUTION ORAL DAILY
Status: DISCONTINUED | OUTPATIENT
Start: 2018-04-15 | End: 2018-04-16

## 2018-04-15 RX ORDER — LANOLIN ALCOHOL/MO/W.PET/CERES
400 CREAM (GRAM) TOPICAL ONCE
Status: COMPLETED | OUTPATIENT
Start: 2018-04-15 | End: 2018-04-15

## 2018-04-15 RX ORDER — VALSARTAN 40 MG/1
40 TABLET ORAL DAILY
Status: DISCONTINUED | OUTPATIENT
Start: 2018-04-16 | End: 2018-04-23

## 2018-04-15 RX ORDER — MAGNESIUM SULFATE HEPTAHYDRATE 40 MG/ML
2 INJECTION, SOLUTION INTRAVENOUS ONCE
Status: DISCONTINUED | OUTPATIENT
Start: 2018-04-15 | End: 2018-04-15

## 2018-04-15 RX ORDER — CITALOPRAM 20 MG/1
20 TABLET, FILM COATED ORAL DAILY
Status: DISCONTINUED | OUTPATIENT
Start: 2018-04-16 | End: 2018-04-23

## 2018-04-15 RX ADMIN — TRAZODONE HYDROCHLORIDE 50 MG: 50 TABLET ORAL at 08:04

## 2018-04-15 RX ADMIN — CITALOPRAM HYDROBROMIDE 20 MG: 10 TABLET ORAL at 09:04

## 2018-04-15 RX ADMIN — STANDARDIZED SENNA CONCENTRATE AND DOCUSATE SODIUM 1 TABLET: 8.6; 5 TABLET, FILM COATED ORAL at 09:04

## 2018-04-15 RX ADMIN — INSULIN ASPART 4 UNITS: 100 INJECTION, SOLUTION INTRAVENOUS; SUBCUTANEOUS at 10:04

## 2018-04-15 RX ADMIN — DILTIAZEM HYDROCHLORIDE 30 MG: 30 TABLET, FILM COATED ORAL at 06:04

## 2018-04-15 RX ADMIN — HEPARIN SODIUM 5000 UNITS: 5000 INJECTION, SOLUTION INTRAVENOUS; SUBCUTANEOUS at 09:04

## 2018-04-15 RX ADMIN — INSULIN ASPART 4 UNITS: 100 INJECTION, SOLUTION INTRAVENOUS; SUBCUTANEOUS at 12:04

## 2018-04-15 RX ADMIN — TAMSULOSIN HYDROCHLORIDE 0.4 MG: 0.4 CAPSULE ORAL at 09:04

## 2018-04-15 RX ADMIN — CEFAZOLIN 2 G: 330 INJECTION, POWDER, FOR SOLUTION INTRAMUSCULAR; INTRAVENOUS at 01:04

## 2018-04-15 RX ADMIN — HYDRALAZINE HYDROCHLORIDE 10 MG: 20 INJECTION INTRAMUSCULAR; INTRAVENOUS at 08:04

## 2018-04-15 RX ADMIN — HEPARIN SODIUM 5000 UNITS: 5000 INJECTION, SOLUTION INTRAVENOUS; SUBCUTANEOUS at 01:04

## 2018-04-15 RX ADMIN — INSULIN ASPART 4 UNITS: 100 INJECTION, SOLUTION INTRAVENOUS; SUBCUTANEOUS at 08:04

## 2018-04-15 RX ADMIN — HEPARIN SODIUM 5000 UNITS: 5000 INJECTION, SOLUTION INTRAVENOUS; SUBCUTANEOUS at 06:04

## 2018-04-15 RX ADMIN — INSULIN DETEMIR 5 UNITS: 100 INJECTION, SOLUTION SUBCUTANEOUS at 09:04

## 2018-04-15 RX ADMIN — DILTIAZEM HYDROCHLORIDE 30 MG: 30 TABLET, FILM COATED ORAL at 11:04

## 2018-04-15 RX ADMIN — ATORVASTATIN CALCIUM 20 MG: 20 TABLET, FILM COATED ORAL at 08:04

## 2018-04-15 RX ADMIN — VALSARTAN 40 MG: 40 TABLET ORAL at 09:04

## 2018-04-15 RX ADMIN — GABAPENTIN 300 MG: 300 CAPSULE ORAL at 02:04

## 2018-04-15 RX ADMIN — GABAPENTIN 300 MG: 300 CAPSULE ORAL at 08:04

## 2018-04-15 RX ADMIN — DILTIAZEM HYDROCHLORIDE 30 MG: 30 TABLET, FILM COATED ORAL at 12:04

## 2018-04-15 RX ADMIN — MUPIROCIN 1 G: 20 OINTMENT TOPICAL at 09:04

## 2018-04-15 RX ADMIN — STANDARDIZED SENNA CONCENTRATE AND DOCUSATE SODIUM 1 TABLET: 8.6; 5 TABLET, FILM COATED ORAL at 08:04

## 2018-04-15 RX ADMIN — POLYETHYLENE GLYCOL 3350 17 G: 17 POWDER, FOR SOLUTION ORAL at 09:04

## 2018-04-15 RX ADMIN — INSULIN DETEMIR 5 UNITS: 100 INJECTION, SOLUTION SUBCUTANEOUS at 08:04

## 2018-04-15 RX ADMIN — INSULIN ASPART 4 UNITS: 100 INJECTION, SOLUTION INTRAVENOUS; SUBCUTANEOUS at 06:04

## 2018-04-15 RX ADMIN — CEFAZOLIN 2 G: 330 INJECTION, POWDER, FOR SOLUTION INTRAMUSCULAR; INTRAVENOUS at 02:04

## 2018-04-15 RX ADMIN — GABAPENTIN 300 MG: 300 CAPSULE ORAL at 09:04

## 2018-04-15 RX ADMIN — HYDRALAZINE HYDROCHLORIDE 10 MG: 20 INJECTION INTRAMUSCULAR; INTRAVENOUS at 04:04

## 2018-04-15 RX ADMIN — MAGNESIUM OXIDE TAB 400 MG (241.3 MG ELEMENTAL MG) 400 MG: 400 (241.3 MG) TAB at 09:04

## 2018-04-15 NOTE — ASSESSMENT & PLAN NOTE
- HbA1c 5.7  - starting diet   - SSI with ACCU checks   --levemir added as patient takes LA insulin at home.

## 2018-04-15 NOTE — H&P
[facility]  Neurocritical Care  History & Physical    Admit Date: 4/10/2018  Service Date: 04/15/2018  Length of Stay: 5    Subjective:     Chief Complaint: Cervical myelopathy with cervical radiculopathy    History of Present Illness: Mr. Pitts is an 82-year-old man with HTN, HPLD, DMII, BPH, MDD, and cervical spondylosis with myelopathy who was admitted to NS for ACDF. Per chart review he was in his usual state of health until July 2017 when he was hit by a large beam of wood, after which he has been non-ambulatory and confused. While very pleasant, he is unfortunately somewhat of a poor historian. His symptoms prior to admission include bilateral hand numbness, tingling, and restricted upper extremity range of motion. CT C-spine without contrast showed multilevel spondylosis of there cervical spine with mild stenosis at C5-6 and C6-7 as well as severe extensive neuroforaminal narrowing. Yesterday he underwent two level ACDF at C5/6 and C6/7 without immediate complication. Reported blood loss in the brief op note is 1050 mL. He was given dexamethasone as well. Hospital medicine consulted for hyperglycemia and comanagement of his medical conditions.He had developed a CSF leak and will have Luumbar drain placed by ns. He is being admitted to [facility] for a higher level of care.       Review of Systems: Constitutional: Denies fevers or chills.  Pulmonary: Denies shortness of breath or cough.  Cardiology: Denies chest pain or palpitations.  GI: Denies abdominal pain or constipation.  Neurologic: Denies new weakness, headache, or paresthesias.     Vitals:   Temp: 97.9 °F (36.6 °C)  Pulse: 89  Rhythm: normal sinus rhythm  BP: (!) 140/70  MAP (mmHg): 94  Resp: 20  SpO2: 97 %  O2 Device (Oxygen Therapy): room air    Temp  Min: 97.7 °F (36.5 °C)  Max: 99 °F (37.2 °C)  Pulse  Min: 82  Max: 121  BP  Min: 124/56  Max: 183/85  MAP (mmHg)  Min: 81  Max: 122  Resp  Min: 15  Max: 23  SpO2  Min: 95 %  Max: 100  %    04/14 0701 - 04/15 0700  In: 1872.1 [I.V.:1622.1]  Out: 2430 [Urine:2310; Drains:120]   Unmeasured Output  Urine Occurrence: 1  Stool Occurrence: 0     Physical Exam:  GA: Alert, comfortable, no acute distress.   HEENT: No scleral icterus or JVD.   Pulmonary: Clear to auscultation A/L.  Cardiac: RRR S1 & S2 w/o rubs/murmurs/gallops.   Abdominal: Bowel sounds present x 4.   Skin: No jaundice, rashes, or visible lesions.  Neuro:  --GCS: E4 V5 M6  --Mental Status: Awake alert oriented follows all commands     --CN II-XII grossly intact.   --Pupils 3mm, PERRL.   --Corneal reflex, gag, cough intact.  --slightly decreased RUE proximal strength, otherwise 5/5 throughout  --sensation intact dillon  --negative babinski  --normal tone throughout    Medications:   Continuous Scheduled  atorvastatin 20 mg Nightly   ceFAZolin (ANCEF) IVPB 2 g Q12H   citalopram 20 mg Daily   diltiaZEM 30 mg Q6H   gabapentin 300 mg TID   heparin (porcine) 5,000 Units Q8H   insulin detemir U-100 5 Units BID   polyethylene glycol 17 g Daily   senna-docusate 8.6-50 mg 1 tablet BID   tamsulosin 0.4 mg Daily   traZODone 50 mg QHS   valsartan 40 mg Daily   PRN  acetaminophen 650 mg Q4H PRN   acetaminophen 650 mg Q6H PRN   aluminum-magnesium hydroxide-simethicone 30 mL Q4H PRN   dextrose 50% 12.5 g PRN   dextrose 50% 12.5 g PRN   fentaNYL 12.5 mcg Q2H PRN   glucagon (human recombinant) 1 mg PRN   hydrALAZINE 10 mg Q4H PRN   hydrocodone-acetaminophen 5-325mg 1 tablet Q4H PRN   insulin aspart U-100 1-12 Units Q4H PRN   labetalol 10 mg Q6H PRN   lidocaine HCL 2%  PRN   ondansetron 8 mg Q6H PRN      Today I independently reviewed pertinent medications, lines/drains/airways, imaging, cardiology, lab results, microbiology results,   Assessment/Plan:     Neuro   * Cervical myelopathy with cervical radiculopathy    - NSGY following   - S/P ACDF with CSF leak   - Lumbar drain goal 10 ML /hr clamp if >10ml/h  - PT/Ot when able   - passed sp eval and diet started    - Neuro checks Q 1   - ok to sit up with lumbar drain clamped per nsgy          Cardiac/Vascular   Other hyperlipidemia    - Continue atorvastatin        Essential hypertension    -- Stable at present.    -- Continue valsartan 40mg daily.          Endocrine   Type 2 diabetes mellitus with hyperglycemia, without long-term current use of insulin    - HbA1c 5.7  - starting diet   - SSI with ACCU checks   --levemir added as patient takes LA insulin at home.             Prophylaxis:  Venous Thromboembolism: mechanical chemical  Stress Ulcer: None  Ventilator Pneumonia: not applicable     Activity Orders          Out of bed starting at 04/10 1644        No Order    Candice Hamm PA-C  Neurocritical Care  [facility]

## 2018-04-15 NOTE — PROGRESS NOTES
Site surrounding hemovac draining clear fluid. NCC team notified. Instructed to notify neurosx. LUIS ALBERTO Quigley w/ neurosx notified and to bedside to assess pt. Instructed to unclamp hemovac and allow to drain via gravity for 30 minutes. Hemovac re-clamped at 1530. Drainage from area around hemovac site resolved. Will continue to monitor.

## 2018-04-15 NOTE — ASSESSMENT & PLAN NOTE
- NSGY following   - S/P ACDF with CSF leak   - Lumbar drain goal 10 ML /hr clamp if >10ml/h  - PT/Ot when able   - passed sp eval and diet started   - Neuro checks Q 1   - ok to sit up with lumbar drain clamped per nsgy  - No drainage noted from puncture site.

## 2018-04-15 NOTE — PROGRESS NOTES
[facility]  Neurosurgery  Progress Note    Subjective:     History of Present Illness: 82 y.o.  male with type 2 diabetes, who presents today for follow up evaluation one week prior to 2 level ACDF scheduled for 4/10/2018. Pt reports    Pt presents today wearing a cervical collar and in a wheelchair. He would like to proceed with surgery. Per pt's family, his PCP would like to see him on 4/9, the day before surgery. He notes continued neck pain and BUE numbness/tingling as well as BUE weakness. Pt is only able to walk a few steps unassisted. He has never received neck surgery.     Post-Op Info:  Procedure(s) (LRB):  DISKECTOMY AND FUSION-ANTERIOR CERVICAL (ACDF); C5-6. C6-7 (N/A)   5 Days Post-Op     Interval History: NAEON. Drainage from HV exit site continues.    Medications:  Continuous Infusions:    Scheduled Meds:   atorvastatin  20 mg Per NG tube Nightly    ceFAZolin (ANCEF) IVPB  2 g Intravenous Q12H    citalopram  20 mg Per NG tube Daily    diltiaZEM  30 mg Per NG tube Q6H    gabapentin  300 mg Per NG tube TID    heparin (porcine)  5,000 Units Subcutaneous Q8H    insulin detemir U-100  5 Units Subcutaneous BID    polyethylene glycol  17 g Oral Daily    senna-docusate 8.6-50 mg  1 tablet Per NG tube BID    tamsulosin  0.4 mg Per NG tube Daily    traZODone  50 mg Per NG tube QHS    valsartan  40 mg Per NG tube Daily     PRN Meds:acetaminophen, acetaminophen, aluminum-magnesium hydroxide-simethicone, dextrose 50%, dextrose 50%, fentaNYL, glucagon (human recombinant), hydrALAZINE, hydrocodone-acetaminophen 5-325mg, insulin aspart U-100, labetalol, lidocaine HCL 2%, ondansetron     Review of Systems    Objective:     Weight: 109.5 kg (241 lb 6.5 oz)  Body mass index is 32.74 kg/m².  Vital Signs (Most Recent):  Temp: 97.7 °F (36.5 °C) (04/15/18 0701)  Pulse: 105 (04/15/18 1001)  Resp: (!) 22 (04/15/18 1001)  BP: 131/71 (04/15/18 1001)  SpO2: 97 % (04/15/18 1001) Vital Signs (24h  Range):  Temp:  [97.7 °F (36.5 °C)-99 °F (37.2 °C)] 97.7 °F (36.5 °C)  Pulse:  [] 105  Resp:  [15-23] 22  SpO2:  [95 %-100 %] 97 %  BP: (124-183)/(56-85) 131/71       Date 04/15/18 0700 - 04/16/18 0659   Shift 1109-6094 1671-3196 8067-3322 24 Hour Total   I  N  T  A  K  E   P.O. 300   300    I.V.  (mL/kg) 75  (0.7)   75  (0.7)    Shift Total  (mL/kg) 375  (3.4)   375  (3.4)   O  U  T  P  U  T   Urine  (mL/kg/hr) 153   153    Drains 6   6    Shift Total  (mL/kg) 159  (1.5)   159  (1.5)   Weight (kg) 109.5 109.5 109.5 109.5            Closed/Suction Drain 04/10/18 1425 Anterior Neck Accordion 10 Fr. (Active)   Site Description Healing 4/13/2018  7:30 AM   Dressing Type No dressing 4/13/2018  7:30 AM   Dressing Status Clean;Dry;Intact 4/12/2018  8:08 PM   Drainage Serosanguineous 4/13/2018  7:30 AM   Status To bulb suction 4/13/2018  7:30 AM   Output (mL) 90 mL 4/11/2018  5:00 AM       Neurosurgery Physical Exam     General: well developed, well nourished, no distress  Head: normocephalic, atraumatic  Neurologic: Alert and oriented. Thought content appropriate  GCS: Motor: 6/Verbal: 5/Eyes: 4 GCS Total: 15  Mental Status: Awake, Alert, Oriented x 4  Language: No aphasia  Speech: No dysarthria  Cranial nerves: face symmetric, tongue midline, CN II-XII grossly intact.   Eyes: pupils equal, round, reactive to light with accommodation, EOMI  Pulmonary: normal respirations, not labored, no accessory muscles used  Abdomen: soft, non-distended, not tender to palpation  Sensory: intact to light touch throughout  Motor Strength: Moves all extremities spontaneously with good tone.  No abnormal movements seen.     Strength  Deltoids Triceps Biceps Wrist Extension Wrist Flexion Hand    Upper: R 3/5 3/5 4/5 5/5 5/5 4-/5    L 4/5 4/5 4/5 5/5 5/5 4/5     Iliopsoas Quadriceps Knee  Flexion Tibialis  anterior Gastro- cnemius EHL   Lower: R 4/5 5/5 5/5 5/5 5/5 5/5    L 4/5 5/5 5/5 5/5 5/5 5/5     Pronator Drift: no drift  noted  Finger-to-nose: Intact bilaterally  Incision is clean, dry, intact with skin well approximated with dermabond.  There is no drainage, erythema, or edema.      Significant Labs:    Recent Labs  Lab 04/14/18  0255 04/15/18  0351   * 138*    137   K 4.1 3.8    110   CO2 21* 20*   BUN 21 21   CREATININE 0.8 0.7   CALCIUM 9.8 9.0   MG 1.7 1.7       Recent Labs  Lab 04/14/18  0255 04/15/18  0351   WBC 12.47 9.68   HGB 10.3* 10.2*   HCT 32.7* 31.7*    254       Recent Labs  Lab 04/14/18  1307   INR 1.2     Microbiology Results (last 7 days)     ** No results found for the last 168 hours. **          Significant Diagnostics:  No new imaging    Assessment/Plan:     * Cervical myelopathy with cervical radiculopathy    82 y.o. male s/p C3-5 ACDF with Dr. Hess.    -Neurologically stable  -Leave incision open to air  -HV with CSF drainage - currently clamped, please leave clamped  -LD placed for continued CSF leak - Drain 10cc/hr  -Ancef while drains in place  -Continue dex 4mg q6h, PPI while on dex  -Cervical collar when OOB or with activity  -OK for OOB with PT - clamp LD when OOB  -IS to bed side. Patient to use atleast 10x every hour.  -Continue bowel regimen daily.  -Continue SQH, Teds and SCDs for DVTP.  -Urinary retention - rouse replaced  -Hyperglycemia/DM: on insulin TID with POCT.   -Continue to hold Eliquis x 1 weeks at this time  -Medical management per NCC  -ST kehinde Johns MD  Neurosurgery  [facility]

## 2018-04-15 NOTE — PLAN OF CARE
Problem: Patient Care Overview  Goal: Plan of Care Review  Outcome: Ongoing (interventions implemented as appropriate)  POC reviewed with Hollis Pitts and family at 1600. Pt verbalized understanding. Questions and concerns addressed. No acute events today. Pt progressing toward goals. Lumbar drain at 10ml every hour; hemovac clamped; pt given bath per protocol.  Will continue to monitor. See flowsheets for full assessment and VS info.

## 2018-04-15 NOTE — ASSESSMENT & PLAN NOTE
82 y.o. male s/p C3-5 ACDF with Dr. Hess.    -Neurologically stable  -Leave incision open to air  -HV with CSF drainage - currently clamped, please leave clamped  -LD placed for continued CSF leak - Drain 10cc/hr  -Ancef while drains in place  -Continue dex 4mg q6h, PPI while on dex  -Cervical collar when OOB or with activity  -OK for OOB with PT - clamp LD when OOB  -IS to bed side. Patient to use atleast 10x every hour.  -Continue bowel regimen daily.  -Continue SQH, Teds and SCDs for DVTP.  -Urinary retention - rouse replaced  -Hyperglycemia/DM: on insulin TID with POCT.   -Continue to hold Eliquis x 1 weeks at this time  -Medical management per NCC  -ST consulted

## 2018-04-15 NOTE — PLAN OF CARE
Problem: Patient Care Overview  Goal: Plan of Care Review  Outcome: Ongoing (interventions implemented as appropriate)  POC reviewed with pt at 0400. Pt verbalized understanding. Questions and concerns addressed. No acute events overnight. Pt progressing toward goals. Will continue to monitor. See flowsheets for full assessment and VS info

## 2018-04-15 NOTE — SUBJECTIVE & OBJECTIVE
Interval History: NAEON. Drainage from HV exit site continues.    Medications:  Continuous Infusions:    Scheduled Meds:   atorvastatin  20 mg Per NG tube Nightly    ceFAZolin (ANCEF) IVPB  2 g Intravenous Q12H    citalopram  20 mg Per NG tube Daily    diltiaZEM  30 mg Per NG tube Q6H    gabapentin  300 mg Per NG tube TID    heparin (porcine)  5,000 Units Subcutaneous Q8H    insulin detemir U-100  5 Units Subcutaneous BID    polyethylene glycol  17 g Oral Daily    senna-docusate 8.6-50 mg  1 tablet Per NG tube BID    tamsulosin  0.4 mg Per NG tube Daily    traZODone  50 mg Per NG tube QHS    valsartan  40 mg Per NG tube Daily     PRN Meds:acetaminophen, acetaminophen, aluminum-magnesium hydroxide-simethicone, dextrose 50%, dextrose 50%, fentaNYL, glucagon (human recombinant), hydrALAZINE, hydrocodone-acetaminophen 5-325mg, insulin aspart U-100, labetalol, lidocaine HCL 2%, ondansetron     Review of Systems    Objective:     Weight: 109.5 kg (241 lb 6.5 oz)  Body mass index is 32.74 kg/m².  Vital Signs (Most Recent):  Temp: 97.7 °F (36.5 °C) (04/15/18 0701)  Pulse: 105 (04/15/18 1001)  Resp: (!) 22 (04/15/18 1001)  BP: 131/71 (04/15/18 1001)  SpO2: 97 % (04/15/18 1001) Vital Signs (24h Range):  Temp:  [97.7 °F (36.5 °C)-99 °F (37.2 °C)] 97.7 °F (36.5 °C)  Pulse:  [] 105  Resp:  [15-23] 22  SpO2:  [95 %-100 %] 97 %  BP: (124-183)/(56-85) 131/71       Date 04/15/18 0700 - 04/16/18 0659   Shift 7194-7007 0841-1928 3880-8437 24 Hour Total   I  N  T  A  K  E   P.O. 300   300    I.V.  (mL/kg) 75  (0.7)   75  (0.7)    Shift Total  (mL/kg) 375  (3.4)   375  (3.4)   O  U  T  P  U  T   Urine  (mL/kg/hr) 153   153    Drains 6   6    Shift Total  (mL/kg) 159  (1.5)   159  (1.5)   Weight (kg) 109.5 109.5 109.5 109.5            Closed/Suction Drain 04/10/18 1425 Anterior Neck Accordion 10 Fr. (Active)   Site Description Healing 4/13/2018  7:30 AM   Dressing Type No dressing 4/13/2018  7:30 AM   Dressing Status  Clean;Dry;Intact 4/12/2018  8:08 PM   Drainage Serosanguineous 4/13/2018  7:30 AM   Status To bulb suction 4/13/2018  7:30 AM   Output (mL) 90 mL 4/11/2018  5:00 AM       Neurosurgery Physical Exam     General: well developed, well nourished, no distress  Head: normocephalic, atraumatic  Neurologic: Alert and oriented. Thought content appropriate  GCS: Motor: 6/Verbal: 5/Eyes: 4 GCS Total: 15  Mental Status: Awake, Alert, Oriented x 4  Language: No aphasia  Speech: No dysarthria  Cranial nerves: face symmetric, tongue midline, CN II-XII grossly intact.   Eyes: pupils equal, round, reactive to light with accommodation, EOMI  Pulmonary: normal respirations, not labored, no accessory muscles used  Abdomen: soft, non-distended, not tender to palpation  Sensory: intact to light touch throughout  Motor Strength: Moves all extremities spontaneously with good tone.  No abnormal movements seen.     Strength  Deltoids Triceps Biceps Wrist Extension Wrist Flexion Hand    Upper: R 3/5 3/5 4/5 5/5 5/5 4-/5    L 4/5 4/5 4/5 5/5 5/5 4/5     Iliopsoas Quadriceps Knee  Flexion Tibialis  anterior Gastro- cnemius EHL   Lower: R 4/5 5/5 5/5 5/5 5/5 5/5    L 4/5 5/5 5/5 5/5 5/5 5/5     Pronator Drift: no drift noted  Finger-to-nose: Intact bilaterally  Incision is clean, dry, intact with skin well approximated with dermabond.  There is no drainage, erythema, or edema.      Significant Labs:    Recent Labs  Lab 04/14/18  0255 04/15/18  0351   * 138*    137   K 4.1 3.8    110   CO2 21* 20*   BUN 21 21   CREATININE 0.8 0.7   CALCIUM 9.8 9.0   MG 1.7 1.7       Recent Labs  Lab 04/14/18  0255 04/15/18  0351   WBC 12.47 9.68   HGB 10.3* 10.2*   HCT 32.7* 31.7*    254       Recent Labs  Lab 04/14/18  1307   INR 1.2     Microbiology Results (last 7 days)     ** No results found for the last 168 hours. **          Significant Diagnostics:  No new imaging

## 2018-04-15 NOTE — PROGRESS NOTES
Dr. Johns (neurosurgery) notified of pt's CSF leakage around and hourly drainage. No interventions at this time. Nurse awaiting order

## 2018-04-16 LAB
ALBUMIN SERPL BCP-MCNC: 2.7 G/DL
ALP SERPL-CCNC: 70 U/L
ALT SERPL W/O P-5'-P-CCNC: 7 U/L
ANION GAP SERPL CALC-SCNC: 8 MMOL/L
AST SERPL-CCNC: 9 U/L
BASOPHILS # BLD AUTO: 0.01 K/UL
BASOPHILS NFR BLD: 0.1 %
BILIRUB SERPL-MCNC: 0.6 MG/DL
BUN SERPL-MCNC: 17 MG/DL
CALCIUM SERPL-MCNC: 9.5 MG/DL
CHLORIDE SERPL-SCNC: 107 MMOL/L
CO2 SERPL-SCNC: 20 MMOL/L
CREAT SERPL-MCNC: 0.7 MG/DL
DIFFERENTIAL METHOD: ABNORMAL
EOSINOPHIL # BLD AUTO: 0.1 K/UL
EOSINOPHIL NFR BLD: 1.1 %
ERYTHROCYTE [DISTWIDTH] IN BLOOD BY AUTOMATED COUNT: 15 %
EST. GFR  (AFRICAN AMERICAN): >60 ML/MIN/1.73 M^2
EST. GFR  (NON AFRICAN AMERICAN): >60 ML/MIN/1.73 M^2
GLUCOSE SERPL-MCNC: 213 MG/DL
HCT VFR BLD AUTO: 32.4 %
HGB BLD-MCNC: 10.4 G/DL
IMM GRANULOCYTES # BLD AUTO: 0.07 K/UL
IMM GRANULOCYTES NFR BLD AUTO: 0.9 %
LYMPHOCYTES # BLD AUTO: 1.5 K/UL
LYMPHOCYTES NFR BLD: 19.4 %
MAGNESIUM SERPL-MCNC: 1.7 MG/DL
MAGNESIUM SERPL-MCNC: 1.9 MG/DL
MCH RBC QN AUTO: 30.7 PG
MCHC RBC AUTO-ENTMCNC: 32.1 G/DL
MCV RBC AUTO: 96 FL
MONOCYTES # BLD AUTO: 1.2 K/UL
MONOCYTES NFR BLD: 15.5 %
NEUTROPHILS # BLD AUTO: 4.9 K/UL
NEUTROPHILS NFR BLD: 63 %
NRBC BLD-RTO: 0 /100 WBC
PHOSPHATE SERPL-MCNC: 2.3 MG/DL
PLATELET # BLD AUTO: 298 K/UL
PMV BLD AUTO: 9.3 FL
POCT GLUCOSE: 155 MG/DL (ref 70–110)
POCT GLUCOSE: 161 MG/DL (ref 70–110)
POCT GLUCOSE: 187 MG/DL (ref 70–110)
POCT GLUCOSE: 198 MG/DL (ref 70–110)
POCT GLUCOSE: 204 MG/DL (ref 70–110)
POCT GLUCOSE: 214 MG/DL (ref 70–110)
POCT GLUCOSE: 227 MG/DL (ref 70–110)
POTASSIUM SERPL-SCNC: 4.3 MMOL/L
PROT SERPL-MCNC: 5.9 G/DL
RBC # BLD AUTO: 3.39 M/UL
SODIUM SERPL-SCNC: 135 MMOL/L
SODIUM SERPL-SCNC: 137 MMOL/L
WBC # BLD AUTO: 7.83 K/UL

## 2018-04-16 PROCEDURE — 63600175 PHARM REV CODE 636 W HCPCS: Performed by: NURSE PRACTITIONER

## 2018-04-16 PROCEDURE — 99233 SBSQ HOSP IP/OBS HIGH 50: CPT | Mod: ,,, | Performed by: NURSE PRACTITIONER

## 2018-04-16 PROCEDURE — 84295 ASSAY OF SERUM SODIUM: CPT

## 2018-04-16 PROCEDURE — 63600175 PHARM REV CODE 636 W HCPCS: Performed by: PHYSICIAN ASSISTANT

## 2018-04-16 PROCEDURE — 85025 COMPLETE CBC W/AUTO DIFF WBC: CPT

## 2018-04-16 PROCEDURE — 83735 ASSAY OF MAGNESIUM: CPT | Mod: 91

## 2018-04-16 PROCEDURE — 63600175 PHARM REV CODE 636 W HCPCS: Performed by: STUDENT IN AN ORGANIZED HEALTH CARE EDUCATION/TRAINING PROGRAM

## 2018-04-16 PROCEDURE — 25000003 PHARM REV CODE 250: Performed by: STUDENT IN AN ORGANIZED HEALTH CARE EDUCATION/TRAINING PROGRAM

## 2018-04-16 PROCEDURE — 94761 N-INVAS EAR/PLS OXIMETRY MLT: CPT

## 2018-04-16 PROCEDURE — 83735 ASSAY OF MAGNESIUM: CPT

## 2018-04-16 PROCEDURE — 84100 ASSAY OF PHOSPHORUS: CPT

## 2018-04-16 PROCEDURE — 92526 ORAL FUNCTION THERAPY: CPT

## 2018-04-16 PROCEDURE — 20000000 HC ICU ROOM

## 2018-04-16 PROCEDURE — 80053 COMPREHEN METABOLIC PANEL: CPT

## 2018-04-16 RX ORDER — SODIUM,POTASSIUM PHOSPHATES 280-250MG
2 POWDER IN PACKET (EA) ORAL
Status: DISCONTINUED | OUTPATIENT
Start: 2018-04-16 | End: 2018-04-23

## 2018-04-16 RX ORDER — POTASSIUM CHLORIDE 20 MEQ/15ML
60 SOLUTION ORAL
Status: DISCONTINUED | OUTPATIENT
Start: 2018-04-16 | End: 2018-04-23

## 2018-04-16 RX ORDER — GLUCAGON 1 MG
1 KIT INJECTION
Status: DISCONTINUED | OUTPATIENT
Start: 2018-04-16 | End: 2018-04-23

## 2018-04-16 RX ORDER — INSULIN ASPART 100 [IU]/ML
1-10 INJECTION, SOLUTION INTRAVENOUS; SUBCUTANEOUS
Status: DISCONTINUED | OUTPATIENT
Start: 2018-04-16 | End: 2018-04-23

## 2018-04-16 RX ORDER — LANOLIN ALCOHOL/MO/W.PET/CERES
800 CREAM (GRAM) TOPICAL
Status: DISCONTINUED | OUTPATIENT
Start: 2018-04-16 | End: 2018-04-23

## 2018-04-16 RX ORDER — IBUPROFEN 200 MG
16 TABLET ORAL
Status: DISCONTINUED | OUTPATIENT
Start: 2018-04-16 | End: 2018-04-23

## 2018-04-16 RX ORDER — IBUPROFEN 200 MG
24 TABLET ORAL
Status: DISCONTINUED | OUTPATIENT
Start: 2018-04-16 | End: 2018-04-23

## 2018-04-16 RX ORDER — POTASSIUM CHLORIDE 20 MEQ/15ML
40 SOLUTION ORAL
Status: DISCONTINUED | OUTPATIENT
Start: 2018-04-16 | End: 2018-04-23

## 2018-04-16 RX ORDER — ACETAMINOPHEN 325 MG/1
650 TABLET ORAL EVERY 4 HOURS PRN
Status: DISCONTINUED | OUTPATIENT
Start: 2018-04-16 | End: 2018-04-23

## 2018-04-16 RX ADMIN — CEFAZOLIN 2 G: 330 INJECTION, POWDER, FOR SOLUTION INTRAMUSCULAR; INTRAVENOUS at 02:04

## 2018-04-16 RX ADMIN — POTASSIUM & SODIUM PHOSPHATES POWDER PACK 280-160-250 MG 2 PACKET: 280-160-250 PACK at 12:04

## 2018-04-16 RX ADMIN — INSULIN DETEMIR 5 UNITS: 100 INJECTION, SOLUTION SUBCUTANEOUS at 08:04

## 2018-04-16 RX ADMIN — HYDRALAZINE HYDROCHLORIDE 10 MG: 20 INJECTION INTRAMUSCULAR; INTRAVENOUS at 06:04

## 2018-04-16 RX ADMIN — STANDARDIZED SENNA CONCENTRATE AND DOCUSATE SODIUM 1 TABLET: 8.6; 5 TABLET, FILM COATED ORAL at 09:04

## 2018-04-16 RX ADMIN — GABAPENTIN 300 MG: 300 CAPSULE ORAL at 08:04

## 2018-04-16 RX ADMIN — INSULIN ASPART 2 UNITS: 100 INJECTION, SOLUTION INTRAVENOUS; SUBCUTANEOUS at 09:04

## 2018-04-16 RX ADMIN — CITALOPRAM HYDROBROMIDE 20 MG: 20 TABLET ORAL at 08:04

## 2018-04-16 RX ADMIN — HEPARIN SODIUM 5000 UNITS: 5000 INJECTION, SOLUTION INTRAVENOUS; SUBCUTANEOUS at 09:04

## 2018-04-16 RX ADMIN — POTASSIUM & SODIUM PHOSPHATES POWDER PACK 280-160-250 MG 2 PACKET: 280-160-250 PACK at 05:04

## 2018-04-16 RX ADMIN — POTASSIUM & SODIUM PHOSPHATES POWDER PACK 280-160-250 MG 2 PACKET: 280-160-250 PACK at 08:04

## 2018-04-16 RX ADMIN — VALSARTAN 40 MG: 40 TABLET ORAL at 08:04

## 2018-04-16 RX ADMIN — HEPARIN SODIUM 5000 UNITS: 5000 INJECTION, SOLUTION INTRAVENOUS; SUBCUTANEOUS at 05:04

## 2018-04-16 RX ADMIN — INSULIN ASPART 2 UNITS: 100 INJECTION, SOLUTION INTRAVENOUS; SUBCUTANEOUS at 06:04

## 2018-04-16 RX ADMIN — MAGNESIUM OXIDE TAB 400 MG (241.3 MG ELEMENTAL MG) 800 MG: 400 (241.3 MG) TAB at 05:04

## 2018-04-16 RX ADMIN — DILTIAZEM HYDROCHLORIDE 30 MG: 30 TABLET, FILM COATED ORAL at 12:04

## 2018-04-16 RX ADMIN — GABAPENTIN 300 MG: 300 CAPSULE ORAL at 09:04

## 2018-04-16 RX ADMIN — POTASSIUM CHLORIDE 40 MEQ: 20 SOLUTION ORAL at 05:04

## 2018-04-16 RX ADMIN — DILTIAZEM HYDROCHLORIDE 30 MG: 30 TABLET, FILM COATED ORAL at 05:04

## 2018-04-16 RX ADMIN — GABAPENTIN 300 MG: 300 CAPSULE ORAL at 02:04

## 2018-04-16 RX ADMIN — ATORVASTATIN CALCIUM 20 MG: 20 TABLET, FILM COATED ORAL at 09:04

## 2018-04-16 RX ADMIN — TRAZODONE HYDROCHLORIDE 50 MG: 50 TABLET ORAL at 09:04

## 2018-04-16 RX ADMIN — MAGNESIUM OXIDE TAB 400 MG (241.3 MG ELEMENTAL MG) 800 MG: 400 (241.3 MG) TAB at 08:04

## 2018-04-16 RX ADMIN — INSULIN ASPART 4 UNITS: 100 INJECTION, SOLUTION INTRAVENOUS; SUBCUTANEOUS at 08:04

## 2018-04-16 RX ADMIN — INSULIN ASPART 2 UNITS: 100 INJECTION, SOLUTION INTRAVENOUS; SUBCUTANEOUS at 12:04

## 2018-04-16 RX ADMIN — TAMSULOSIN HYDROCHLORIDE 0.4 MG: 0.4 CAPSULE ORAL at 08:04

## 2018-04-16 RX ADMIN — HEPARIN SODIUM 5000 UNITS: 5000 INJECTION, SOLUTION INTRAVENOUS; SUBCUTANEOUS at 02:04

## 2018-04-16 NOTE — ASSESSMENT & PLAN NOTE
82 y.o. male s/p C3-5 ACDF with Dr. Hess.    -Neurologically stable  -Leave incision open to air  -HV with CSF drainage - currently clamped, please leave clamped  -LD placed for continued CSF leak - Drain 10cc/hr, continue today  -Ancef while drains in place  -Continue dex 4mg q6h, PPI while on dex  -Cervical collar when OOB or with activity  -OK for OOB with PT - clamp LD when OOB  -IS to bed side. Patient to use atleast 10x every hour.  -Continue bowel regimen daily.  -Continue SQH, Teds and SCDs for DVTP.  -Urinary retention - rouse replaced  -Hyperglycemia/DM: on insulin TID with POCT.   -Continue to hold Eliquis x 1 weeks at this time  -Medical management per NCC  -ST consulted

## 2018-04-16 NOTE — NURSING
Pt UO 0 mL/hr this hour. Pt voiding via condom cath. Notified BO Larson w/ NCC. No new orders at this time. Will continue to monitor.

## 2018-04-16 NOTE — SUBJECTIVE & OBJECTIVE
Interval History:    -No Leaking noted from Drain insertion site.   -Elloquis noted in Hx,. No documented a-fib or DVT. EF WNL. Patient does not know why he is taking either.       Review of Systems   Respiratory: Negative for apnea, cough, choking, chest tightness, shortness of breath, wheezing and stridor.    Cardiovascular: Negative for chest pain, palpitations and leg swelling.   Gastrointestinal: Negative for abdominal distention, abdominal pain, nausea and vomiting.   Neurological: Negative for numbness.   Psychiatric/Behavioral: Negative for agitation and confusion.       Objective:     Vitals:  Temp: 98.2 °F (36.8 °C)  Pulse: 95  Rhythm: normal sinus rhythm  BP: (!) 113/53  MAP (mmHg): 77  Resp: 13  SpO2: 99 %  O2 Device (Oxygen Therapy): room air    Temp  Min: 97.8 °F (36.6 °C)  Max: 98.5 °F (36.9 °C)  Pulse  Min: 78  Max: 104  BP  Min: 111/69  Max: 172/79  MAP (mmHg)  Min: 77  Max: 111  Resp  Min: 11  Max: 21  SpO2  Min: 93 %  Max: 99 %    04/15 0701 - 04/16 0700  In: 875 [P.O.:800; I.V.:75]  Out: 1777 [Urine:1568; Drains:209]   Unmeasured Output  Urine Occurrence: 1  Stool Occurrence: 0       Physical Exam   Constitutional: He appears well-developed.   Cardiovascular: Normal rate, regular rhythm, normal heart sounds and intact distal pulses.    Pulmonary/Chest: Effort normal and breath sounds normal.   Abdominal: Soft. Bowel sounds are normal.   Neurological:   E4 V5 M6  AAO x 4  PERRLA, EOMI, 3mm/3mm  VAN, folows commands  Sensation intact    RUE 3/5  LUE 3/5  RLE 5/5  LLE 5/5   Skin: Skin is warm.   Vitals reviewed.        Medications:  Continuous Scheduled  atorvastatin 20 mg Nightly   ceFAZolin (ANCEF) IVPB 2 g Q12H   citalopram 20 mg Daily   diltiaZEM 30 mg Q6H   gabapentin 300 mg TID   heparin (porcine) 5,000 Units Q8H   insulin detemir U-100 7 Units BID   senna-docusate 8.6-50 mg 1 tablet BID   tamsulosin 0.4 mg Daily   traZODone 50 mg QHS   valsartan 40 mg Daily   PRN  acetaminophen 650 mg Q4H PRN    aluminum-magnesium hydroxide-simethicone 30 mL Q4H PRN   dextrose 50% 12.5 g PRN   dextrose 50% 12.5 g PRN   dextrose 50% 25 g PRN   fentaNYL 12.5 mcg Q2H PRN   glucagon (human recombinant) 1 mg PRN   glucagon (human recombinant) 1 mg PRN   glucose 16 g PRN   glucose 24 g PRN   hydrALAZINE 10 mg Q4H PRN   hydrocodone-acetaminophen 5-325mg 1 tablet Q4H PRN   insulin aspart U-100 1-10 Units QID (AC + HS) PRN   labetalol 10 mg Q6H PRN   lidocaine HCL 2%  PRN   magnesium oxide 800 mg PRN   magnesium oxide 800 mg PRN   ondansetron 8 mg Q6H PRN   potassium chloride 10% 40 mEq PRN   potassium chloride 10% 40 mEq PRN   potassium chloride 10% 60 mEq PRN   potassium, sodium phosphates 2 packet PRN   potassium, sodium phosphates 2 packet PRN   potassium, sodium phosphates 2 packet PRN     Today I personally reviewed pertinent medications, lines/drains/airways, imaging, cardiology, lab results, microbiology results,   Diet  [facility]; 2000 Calorie  Diet diabetic [facility]; 2000 Calorie
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
## 2018-04-16 NOTE — PROGRESS NOTES
[facility]  Neurosurgery  Progress Note    Subjective:     History of Present Illness: 82 y.o.  male with type 2 diabetes, who presents today for follow up evaluation one week prior to 2 level ACDF scheduled for 4/10/2018. Pt reports    Pt presents today wearing a cervical collar and in a wheelchair. He would like to proceed with surgery. Per pt's family, his PCP would like to see him on 4/9, the day before surgery. He notes continued neck pain and BUE numbness/tingling as well as BUE weakness. Pt is only able to walk a few steps unassisted. He has never received neck surgery.     Post-Op Info:  Procedure(s) (LRB):  DISKECTOMY AND FUSION-ANTERIOR CERVICAL (ACDF); C5-6. C6-7 (N/A)   6 Days Post-Op     Interval History: NAEON. No drainage from HV site this morning    Medications:  Continuous Infusions:    Scheduled Meds:   atorvastatin  20 mg Oral Nightly    ceFAZolin (ANCEF) IVPB  2 g Intravenous Q12H    citalopram  20 mg Oral Daily    diltiaZEM  30 mg Oral Q6H    gabapentin  300 mg Oral TID    heparin (porcine)  5,000 Units Subcutaneous Q8H    insulin detemir U-100  7 Units Subcutaneous BID    senna-docusate 8.6-50 mg  1 tablet Oral BID    tamsulosin  0.4 mg Oral Daily    traZODone  50 mg Oral QHS    valsartan  40 mg Oral Daily     PRN Meds:acetaminophen, aluminum-magnesium hydroxide-simethicone, dextrose 50%, dextrose 50%, dextrose 50%, fentaNYL, glucagon (human recombinant), glucagon (human recombinant), glucose, glucose, hydrALAZINE, hydrocodone-acetaminophen 5-325mg, insulin aspart U-100, labetalol, lidocaine HCL 2%, magnesium oxide, magnesium oxide, ondansetron, potassium chloride 10%, potassium chloride 10%, potassium chloride 10%, potassium, sodium phosphates, potassium, sodium phosphates, potassium, sodium phosphates     Review of Systems    Objective:     Weight: 109.5 kg (241 lb 6.5 oz)  Body mass index is 32.74 kg/m².  Vital Signs (Most Recent):  Temp: 98.2 °F (36.8 °C)  (04/16/18 1102)  Pulse: 102 (04/16/18 1302)  Resp: 18 (04/16/18 1302)  BP: 127/64 (04/16/18 1302)  SpO2: 96 % (04/16/18 1302) Vital Signs (24h Range):  Temp:  [97.8 °F (36.6 °C)-98.5 °F (36.9 °C)] 98.2 °F (36.8 °C)  Pulse:  [] 102  Resp:  [11-21] 18  SpO2:  [93 %-99 %] 96 %  BP: (111-172)/(57-80) 127/64       Date 04/16/18 0700 - 04/17/18 0659   Shift 3662-4459 8367-8896 1953-0365 24 Hour Total   I  N  T  A  K  E   P.O. 275   275    Shift Total  (mL/kg) 275  (2.5)   275  (2.5)   O  U  T  P  U  T   Urine  (mL/kg/hr) 560   560    Drains 85   85    Shift Total  (mL/kg) 645  (5.9)   645  (5.9)   Weight (kg) 109.5 109.5 109.5 109.5            Closed/Suction Drain 04/10/18 1425 Anterior Neck Accordion 10 Fr. (Active)   Site Description Healing 4/13/2018  7:30 AM   Dressing Type No dressing 4/13/2018  7:30 AM   Dressing Status Clean;Dry;Intact 4/12/2018  8:08 PM   Drainage Serosanguineous 4/13/2018  7:30 AM   Status To bulb suction 4/13/2018  7:30 AM   Output (mL) 90 mL 4/11/2018  5:00 AM       Neurosurgery Physical Exam     General: well developed, well nourished, no distress  Head: normocephalic, atraumatic  Neurologic: Alert and oriented. Thought content appropriate  GCS: Motor: 6/Verbal: 5/Eyes: 4 GCS Total: 15  Mental Status: Awake, Alert, Oriented x 4  Language: No aphasia  Speech: No dysarthria  Cranial nerves: face symmetric, tongue midline, CN II-XII grossly intact.   Eyes: pupils equal, round, reactive to light with accommodation, EOMI  Pulmonary: normal respirations, not labored, no accessory muscles used  Abdomen: soft, non-distended, not tender to palpation  Sensory: intact to light touch throughout  Motor Strength: Moves all extremities spontaneously with good tone.  No abnormal movements seen.     Strength  Deltoids Triceps Biceps Wrist Extension Wrist Flexion Hand    Upper: R 3/5 3/5 4/5 5/5 5/5 4-/5    L 4/5 4/5 4/5 5/5 5/5 4/5     Iliopsoas Quadriceps Knee  Flexion Tibialis  anterior Gastro-  cnemius EHL   Lower: R 4/5 5/5 5/5 5/5 5/5 5/5    L 4/5 5/5 5/5 5/5 5/5 5/5     Pronator Drift: no drift noted  Finger-to-nose: Intact bilaterally  Incision is clean, dry, intact with skin well approximated with dermabond.  There is no drainage, erythema, or edema.      Significant Labs:    Recent Labs  Lab 04/15/18  0351 04/16/18  0152   * 213*    135*   K 3.8 4.3    107   CO2 20* 20*   BUN 21 17   CREATININE 0.7 0.7   CALCIUM 9.0 9.5   MG 1.7 1.7       Recent Labs  Lab 04/15/18  0351 04/16/18  0152   WBC 9.68 7.83   HGB 10.2* 10.4*   HCT 31.7* 32.4*    298     No results for input(s): LABPT, INR, APTT in the last 48 hours.  Microbiology Results (last 7 days)     ** No results found for the last 168 hours. **          Significant Diagnostics:  No new imaging    Assessment/Plan:     * Cervical myelopathy with cervical radiculopathy    82 y.o. male s/p C3-5 ACDF with Dr. Hess.    -Neurologically stable  -Leave incision open to air  -HV with CSF drainage - currently clamped, please leave clamped  -LD placed for continued CSF leak - Drain 10cc/hr, continue today  -Ancef while drains in place  -Continue dex 4mg q6h, PPI while on dex  -Cervical collar when OOB or with activity  -OK for OOB with PT - clamp LD when OOB  -IS to bed side. Patient to use atleast 10x every hour.  -Continue bowel regimen daily.  -Continue SQH, Teds and SCDs for DVTP.  -Urinary retention - rouse replaced  -Hyperglycemia/DM: on insulin TID with POCT.   -Continue to hold Eliquis x 1 weeks at this time  -Medical management per NCC  -ST kehinde Holman MD  Neurosurgery  [facility]

## 2018-04-16 NOTE — PLAN OF CARE
04/16/18 0847   Discharge Reassessment   Assessment Type Discharge Planning Reassessment   Provided patient/caregiver education on the expected discharge date and the discharge plan No   Do you have any problems affording any of your prescribed medications? No   Discharge Plan A Skilled Nursing Facility   Discharge Plan B Rehab   Patient choice form signed by patient/caregiver No   Can the patient answer the patient profile reliably? Yes, cognitively intact   How does the patient rate their overall health at the present time? Fair   Describe the patient's ability to walk at the present time. Major restrictions/daily assistance from another person   How often would a person be available to care for the patient? Whenever needed   Number of comorbid conditions (as recorded on the chart) Three   During the past month, has the patient often been bothered by feeling down, depressed or hopeless? No   During the past month, has the patient often been bothered by little interest or pleasure in doing things? No       Patient in Neuro ICU-- Per MD, CSF leak with lumbar drain.  Not medically stable for discharge.   SW working on SNF placement when patient medically stable.     Kristan Hahn RN, CCRN-K, [facility]  Neuro-Critical Care   [phone]

## 2018-04-16 NOTE — PLAN OF CARE
Problem: Patient Care Overview  Goal: Plan of Care Review  Outcome: Ongoing (interventions implemented as appropriate)  POC reviewed with pt and family at 1300. Pt verbalized understanding. Questions and concerns addressed. No acute events today. Pt's lumbar drain increased from 10 mL/hr to 15 mL/hr drainage. Pt progressing toward goals. Will continue to monitor. See flowsheets for full assessment and VS info.

## 2018-04-16 NOTE — PT/OT/SLP PROGRESS
Speech Language Pathology Treatment  Discharge    Patient Name:  Hollis Pitts   MRN:  [id number]  Admitting Diagnosis: Cervical myelopathy with cervical radiculopathy    Recommendations:                 General Recommendations:  Follow-up not indicated  Diet recommendations:  Regular, Liquid Diet Level: Thin   Aspiration Precautions: Standard aspiration precautions   General Precautions: Standard, aspiration, fall  Communication strategies:  none    Subjective     Awake/alert      Pain/Comfort:  · Pain Rating 1: 0/10  · Pain Rating Post-Intervention 1: 0/10    Objective:     Has the patient been evaluated by SLP for swallowing?   Yes  Keep patient NPO? No   Current Respiratory Status: room air      Pt repositioned upright in bed with c-collar in place. Pt tolerated thin liquids via straw x5oz, fruit x2 and whole pills with liquid wash x8 for ongoing swallow assessment. Pt with adequate bolus control, mastication time and oral clearance of all trials. No overt s/s of airway compromise across trials. SLP reviewed swallow precautions with pt. Pt tolerating regular diet/thin liquids without difficulty. Recommend regular diet/thin liquids at this time. No further ST recommended at this time.     Assessment:     Hollis Pitts is a 82 y.o. male with an SLP diagnosis of Dysphagia.    Goals:    SLP Goals        Problem: SLP Goal    Goal Priority Disciplines Outcome   SLP Goal     SLP Ongoing (interventions implemented as appropriate)   Description:  Speech Language Pathology Goals  Goals expected to be met by 4/21:    1. Pt will tolerate regular consistency diet and thin liquids with no overt clinical signs of aspiration. MET                           Plan:     · Plan of Care reviewed with:  patient   · SLP Follow-Up:  No       Discharge recommendations:  nursing facility, skilled       Time Tracking:     SLP Treatment Date:   04/16/18  Speech Start Time:  0824  Speech Stop Time:  0834     Speech Total Time (min):  10  min    Billable Minutes: Treatment Swallowing Dysfunction 10    Humera Wadsworth CCC-SLP  04/16/2018
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
## 2018-04-16 NOTE — SUBJECTIVE & OBJECTIVE
Interval History: NAEON. No drainage from HV site this morning    Medications:  Continuous Infusions:    Scheduled Meds:   atorvastatin  20 mg Oral Nightly    ceFAZolin (ANCEF) IVPB  2 g Intravenous Q12H    citalopram  20 mg Oral Daily    diltiaZEM  30 mg Oral Q6H    gabapentin  300 mg Oral TID    heparin (porcine)  5,000 Units Subcutaneous Q8H    insulin detemir U-100  7 Units Subcutaneous BID    senna-docusate 8.6-50 mg  1 tablet Oral BID    tamsulosin  0.4 mg Oral Daily    traZODone  50 mg Oral QHS    valsartan  40 mg Oral Daily     PRN Meds:acetaminophen, aluminum-magnesium hydroxide-simethicone, dextrose 50%, dextrose 50%, dextrose 50%, fentaNYL, glucagon (human recombinant), glucagon (human recombinant), glucose, glucose, hydrALAZINE, hydrocodone-acetaminophen 5-325mg, insulin aspart U-100, labetalol, lidocaine HCL 2%, magnesium oxide, magnesium oxide, ondansetron, potassium chloride 10%, potassium chloride 10%, potassium chloride 10%, potassium, sodium phosphates, potassium, sodium phosphates, potassium, sodium phosphates     Review of Systems    Objective:     Weight: 109.5 kg (241 lb 6.5 oz)  Body mass index is 32.74 kg/m².  Vital Signs (Most Recent):  Temp: 98.2 °F (36.8 °C) (04/16/18 1102)  Pulse: 102 (04/16/18 1302)  Resp: 18 (04/16/18 1302)  BP: 127/64 (04/16/18 1302)  SpO2: 96 % (04/16/18 1302) Vital Signs (24h Range):  Temp:  [97.8 °F (36.6 °C)-98.5 °F (36.9 °C)] 98.2 °F (36.8 °C)  Pulse:  [] 102  Resp:  [11-21] 18  SpO2:  [93 %-99 %] 96 %  BP: (111-172)/(57-80) 127/64       Date 04/16/18 0700 - 04/17/18 0659   Shift 7210-3210 0106-8167 0700-5535 24 Hour Total   I  N  T  A  K  E   P.O. 275   275    Shift Total  (mL/kg) 275  (2.5)   275  (2.5)   O  U  T  P  U  T   Urine  (mL/kg/hr) 560   560    Drains 85   85    Shift Total  (mL/kg) 645  (5.9)   645  (5.9)   Weight (kg) 109.5 109.5 109.5 109.5            Closed/Suction Drain 04/10/18 1425 Anterior Neck Accordion 10 Fr. (Active)   Site  Description Healing 4/13/2018  7:30 AM   Dressing Type No dressing 4/13/2018  7:30 AM   Dressing Status Clean;Dry;Intact 4/12/2018  8:08 PM   Drainage Serosanguineous 4/13/2018  7:30 AM   Status To bulb suction 4/13/2018  7:30 AM   Output (mL) 90 mL 4/11/2018  5:00 AM       Neurosurgery Physical Exam     General: well developed, well nourished, no distress  Head: normocephalic, atraumatic  Neurologic: Alert and oriented. Thought content appropriate  GCS: Motor: 6/Verbal: 5/Eyes: 4 GCS Total: 15  Mental Status: Awake, Alert, Oriented x 4  Language: No aphasia  Speech: No dysarthria  Cranial nerves: face symmetric, tongue midline, CN II-XII grossly intact.   Eyes: pupils equal, round, reactive to light with accommodation, EOMI  Pulmonary: normal respirations, not labored, no accessory muscles used  Abdomen: soft, non-distended, not tender to palpation  Sensory: intact to light touch throughout  Motor Strength: Moves all extremities spontaneously with good tone.  No abnormal movements seen.     Strength  Deltoids Triceps Biceps Wrist Extension Wrist Flexion Hand    Upper: R 3/5 3/5 4/5 5/5 5/5 4-/5    L 4/5 4/5 4/5 5/5 5/5 4/5     Iliopsoas Quadriceps Knee  Flexion Tibialis  anterior Gastro- cnemius EHL   Lower: R 4/5 5/5 5/5 5/5 5/5 5/5    L 4/5 5/5 5/5 5/5 5/5 5/5     Pronator Drift: no drift noted  Finger-to-nose: Intact bilaterally  Incision is clean, dry, intact with skin well approximated with dermabond.  There is no drainage, erythema, or edema.      Significant Labs:    Recent Labs  Lab 04/15/18  0351 04/16/18  0152   * 213*    135*   K 3.8 4.3    107   CO2 20* 20*   BUN 21 17   CREATININE 0.7 0.7   CALCIUM 9.0 9.5   MG 1.7 1.7       Recent Labs  Lab 04/15/18  0351 04/16/18  0152   WBC 9.68 7.83   HGB 10.2* 10.4*   HCT 31.7* 32.4*    298     No results for input(s): LABPT, INR, APTT in the last 48 hours.  Microbiology Results (last 7 days)     ** No results found for the last 168  hours. **          Significant Diagnostics:  No new imaging

## 2018-04-16 NOTE — PLAN OF CARE
Problem: Patient Care Overview  Goal: Plan of Care Review  Outcome: Ongoing (interventions implemented as appropriate)  POC reviewed with pt and caregiver at 2000. Pt verbalized understanding. Questions and concerns addressed. No acute events overnight. Pt progressing toward goals. Will continue to monitor. See flowsheets for full assessment and VS info.

## 2018-04-17 ENCOUNTER — ANESTHESIA EVENT (OUTPATIENT)
Dept: SURGERY | Facility: HOSPITAL | Age: 83
End: 2018-04-17

## 2018-04-17 LAB
ABO + RH BLD: NORMAL
ALBUMIN SERPL BCP-MCNC: 2.7 G/DL
ALP SERPL-CCNC: 69 U/L
ALT SERPL W/O P-5'-P-CCNC: 5 U/L
ANION GAP SERPL CALC-SCNC: 8 MMOL/L
AST SERPL-CCNC: 8 U/L
BASOPHILS # BLD AUTO: 0.03 K/UL
BASOPHILS NFR BLD: 0.4 %
BILIRUB SERPL-MCNC: 0.4 MG/DL
BLD GP AB SCN CELLS X3 SERPL QL: NORMAL
BUN SERPL-MCNC: 15 MG/DL
CALCIUM SERPL-MCNC: 10.3 MG/DL
CHLORIDE SERPL-SCNC: 108 MMOL/L
CO2 SERPL-SCNC: 22 MMOL/L
CREAT SERPL-MCNC: 0.7 MG/DL
DIFFERENTIAL METHOD: ABNORMAL
EOSINOPHIL # BLD AUTO: 0.2 K/UL
EOSINOPHIL NFR BLD: 2.9 %
ERYTHROCYTE [DISTWIDTH] IN BLOOD BY AUTOMATED COUNT: 15.6 %
EST. GFR  (AFRICAN AMERICAN): >60 ML/MIN/1.73 M^2
EST. GFR  (NON AFRICAN AMERICAN): >60 ML/MIN/1.73 M^2
GLUCOSE SERPL-MCNC: 158 MG/DL
HCT VFR BLD AUTO: 32.9 %
HGB BLD-MCNC: 10.6 G/DL
IMM GRANULOCYTES # BLD AUTO: 0.1 K/UL
IMM GRANULOCYTES NFR BLD AUTO: 1.2 %
LYMPHOCYTES # BLD AUTO: 2.6 K/UL
LYMPHOCYTES NFR BLD: 32 %
MAGNESIUM SERPL-MCNC: 1.8 MG/DL
MCH RBC QN AUTO: 31.2 PG
MCHC RBC AUTO-ENTMCNC: 32.2 G/DL
MCV RBC AUTO: 97 FL
MONOCYTES # BLD AUTO: 1.2 K/UL
MONOCYTES NFR BLD: 14 %
NEUTROPHILS # BLD AUTO: 4.1 K/UL
NEUTROPHILS NFR BLD: 49.5 %
NRBC BLD-RTO: 0 /100 WBC
PHOSPHATE SERPL-MCNC: 2.7 MG/DL
PHOSPHATE SERPL-MCNC: 2.7 MG/DL
PLATELET # BLD AUTO: 278 K/UL
PMV BLD AUTO: 9.5 FL
POCT GLUCOSE: 170 MG/DL (ref 70–110)
POCT GLUCOSE: 211 MG/DL (ref 70–110)
POCT GLUCOSE: 229 MG/DL (ref 70–110)
POCT GLUCOSE: 259 MG/DL (ref 70–110)
POTASSIUM SERPL-SCNC: 4.8 MMOL/L
PROT SERPL-MCNC: 5.9 G/DL
RBC # BLD AUTO: 3.4 M/UL
SODIUM SERPL-SCNC: 138 MMOL/L
WBC # BLD AUTO: 8.22 K/UL

## 2018-04-17 PROCEDURE — 84100 ASSAY OF PHOSPHORUS: CPT

## 2018-04-17 PROCEDURE — 83735 ASSAY OF MAGNESIUM: CPT

## 2018-04-17 PROCEDURE — 93931 UPPER EXTREMITY STUDY: CPT

## 2018-04-17 PROCEDURE — 63600175 PHARM REV CODE 636 W HCPCS: Performed by: PHYSICIAN ASSISTANT

## 2018-04-17 PROCEDURE — 63600175 PHARM REV CODE 636 W HCPCS: Performed by: STUDENT IN AN ORGANIZED HEALTH CARE EDUCATION/TRAINING PROGRAM

## 2018-04-17 PROCEDURE — 99233 SBSQ HOSP IP/OBS HIGH 50: CPT | Mod: ,,, | Performed by: PSYCHIATRY & NEUROLOGY

## 2018-04-17 PROCEDURE — 20000000 HC ICU ROOM

## 2018-04-17 PROCEDURE — 25000003 PHARM REV CODE 250: Performed by: STUDENT IN AN ORGANIZED HEALTH CARE EDUCATION/TRAINING PROGRAM

## 2018-04-17 PROCEDURE — 85025 COMPLETE CBC W/AUTO DIFF WBC: CPT

## 2018-04-17 PROCEDURE — 86901 BLOOD TYPING SEROLOGIC RH(D): CPT

## 2018-04-17 PROCEDURE — 80053 COMPREHEN METABOLIC PANEL: CPT

## 2018-04-17 PROCEDURE — 63600175 PHARM REV CODE 636 W HCPCS: Performed by: NURSE PRACTITIONER

## 2018-04-17 PROCEDURE — 93931 UPPER EXTREMITY STUDY: CPT | Mod: 26,,, | Performed by: INTERNAL MEDICINE

## 2018-04-17 PROCEDURE — 99291 CRITICAL CARE FIRST HOUR: CPT | Mod: ,,, | Performed by: PHYSICIAN ASSISTANT

## 2018-04-17 RX ORDER — ROCURONIUM BROMIDE 10 MG/ML
INJECTION, SOLUTION INTRAVENOUS
Status: DISPENSED
Start: 2018-04-17 | End: 2018-04-17

## 2018-04-17 RX ORDER — SUCCINYLCHOLINE CHLORIDE 20 MG/ML
INJECTION INTRAMUSCULAR; INTRAVENOUS
Status: DISPENSED
Start: 2018-04-17 | End: 2018-04-17

## 2018-04-17 RX ORDER — PROPOFOL 10 MG/ML
INJECTION, EMULSION INTRAVENOUS
Status: DISPENSED
Start: 2018-04-17 | End: 2018-04-17

## 2018-04-17 RX ORDER — SODIUM CHLORIDE 9 MG/ML
INJECTION, SOLUTION INTRAVENOUS CONTINUOUS
Status: CANCELLED | OUTPATIENT
Start: 2018-04-17

## 2018-04-17 RX ORDER — PHENYLEPHRINE HCL IN 0.9% NACL 1 MG/10 ML
SYRINGE (ML) INTRAVENOUS
Status: DISPENSED
Start: 2018-04-17 | End: 2018-04-17

## 2018-04-17 RX ORDER — MUPIROCIN 20 MG/G
1 OINTMENT TOPICAL
Status: CANCELLED | OUTPATIENT
Start: 2018-04-17

## 2018-04-17 RX ORDER — MUPIROCIN 20 MG/G
OINTMENT TOPICAL
Status: CANCELLED | OUTPATIENT
Start: 2018-04-17

## 2018-04-17 RX ORDER — ETOMIDATE 2 MG/ML
INJECTION INTRAVENOUS
Status: DISPENSED
Start: 2018-04-17 | End: 2018-04-17

## 2018-04-17 RX ADMIN — STANDARDIZED SENNA CONCENTRATE AND DOCUSATE SODIUM 1 TABLET: 8.6; 5 TABLET, FILM COATED ORAL at 08:04

## 2018-04-17 RX ADMIN — GABAPENTIN 300 MG: 300 CAPSULE ORAL at 09:04

## 2018-04-17 RX ADMIN — INSULIN ASPART 4 UNITS: 100 INJECTION, SOLUTION INTRAVENOUS; SUBCUTANEOUS at 04:04

## 2018-04-17 RX ADMIN — CEFAZOLIN 2 G: 330 INJECTION, POWDER, FOR SOLUTION INTRAMUSCULAR; INTRAVENOUS at 02:04

## 2018-04-17 RX ADMIN — VALSARTAN 40 MG: 40 TABLET ORAL at 08:04

## 2018-04-17 RX ADMIN — TAMSULOSIN HYDROCHLORIDE 0.4 MG: 0.4 CAPSULE ORAL at 08:04

## 2018-04-17 RX ADMIN — INSULIN ASPART 4 UNITS: 100 INJECTION, SOLUTION INTRAVENOUS; SUBCUTANEOUS at 01:04

## 2018-04-17 RX ADMIN — DILTIAZEM HYDROCHLORIDE 30 MG: 30 TABLET, FILM COATED ORAL at 11:04

## 2018-04-17 RX ADMIN — MAGNESIUM OXIDE TAB 400 MG (241.3 MG ELEMENTAL MG) 800 MG: 400 (241.3 MG) TAB at 06:04

## 2018-04-17 RX ADMIN — GABAPENTIN 300 MG: 300 CAPSULE ORAL at 08:04

## 2018-04-17 RX ADMIN — HEPARIN SODIUM 5000 UNITS: 5000 INJECTION, SOLUTION INTRAVENOUS; SUBCUTANEOUS at 06:04

## 2018-04-17 RX ADMIN — FENTANYL CITRATE 12.5 MCG: 50 INJECTION, SOLUTION INTRAMUSCULAR; INTRAVENOUS at 09:04

## 2018-04-17 RX ADMIN — TRAZODONE HYDROCHLORIDE 50 MG: 50 TABLET ORAL at 09:04

## 2018-04-17 RX ADMIN — INSULIN ASPART 3 UNITS: 100 INJECTION, SOLUTION INTRAVENOUS; SUBCUTANEOUS at 09:04

## 2018-04-17 RX ADMIN — DILTIAZEM HYDROCHLORIDE 30 MG: 30 TABLET, FILM COATED ORAL at 01:04

## 2018-04-17 RX ADMIN — DILTIAZEM HYDROCHLORIDE 30 MG: 30 TABLET, FILM COATED ORAL at 02:04

## 2018-04-17 RX ADMIN — INSULIN ASPART 2 UNITS: 100 INJECTION, SOLUTION INTRAVENOUS; SUBCUTANEOUS at 09:04

## 2018-04-17 RX ADMIN — ATORVASTATIN CALCIUM 20 MG: 20 TABLET, FILM COATED ORAL at 09:04

## 2018-04-17 RX ADMIN — CEFAZOLIN 2 G: 330 INJECTION, POWDER, FOR SOLUTION INTRAMUSCULAR; INTRAVENOUS at 01:04

## 2018-04-17 RX ADMIN — DILTIAZEM HYDROCHLORIDE 30 MG: 30 TABLET, FILM COATED ORAL at 05:04

## 2018-04-17 RX ADMIN — CITALOPRAM HYDROBROMIDE 20 MG: 20 TABLET ORAL at 08:04

## 2018-04-17 RX ADMIN — POTASSIUM & SODIUM PHOSPHATES POWDER PACK 280-160-250 MG 2 PACKET: 280-160-250 PACK at 06:04

## 2018-04-17 RX ADMIN — HEPARIN SODIUM 5000 UNITS: 5000 INJECTION, SOLUTION INTRAVENOUS; SUBCUTANEOUS at 09:04

## 2018-04-17 RX ADMIN — STANDARDIZED SENNA CONCENTRATE AND DOCUSATE SODIUM 1 TABLET: 8.6; 5 TABLET, FILM COATED ORAL at 09:04

## 2018-04-17 RX ADMIN — DILTIAZEM HYDROCHLORIDE 30 MG: 30 TABLET, FILM COATED ORAL at 06:04

## 2018-04-17 RX ADMIN — HEPARIN SODIUM 5000 UNITS: 5000 INJECTION, SOLUTION INTRAVENOUS; SUBCUTANEOUS at 01:04

## 2018-04-17 RX ADMIN — GABAPENTIN 300 MG: 300 CAPSULE ORAL at 04:04

## 2018-04-17 NOTE — SUBJECTIVE & OBJECTIVE
Interval History:  4/17 CSF drainage notified Neurosurgery; Drain is clamped; Possible OR tomorrow in AM - However, final decision to be determined tonight.     Review of Systems   Review of symptoms: Denies all   Constitutional: Denies fevers or chills.  Pulmonary: Denies shortness of breath or cough.  Cardiology: Denies chest pain or palpitations.  GI: Denies abdominal pain or constipation.  Neurologic: Denies new weakness,  headache, or paresthesias.  Objective:     Vitals:  Temp: 98.7 °F (37.1 °C)  Pulse: 91  Rhythm: normal sinus rhythm  BP: 138/63  MAP (mmHg): 90  Resp: 20  SpO2: 98 %  O2 Device (Oxygen Therapy): room air    Temp  Min: 97.4 °F (36.3 °C)  Max: 98.7 °F (37.1 °C)  Pulse  Min: 73  Max: 97  BP  Min: 118/60  Max: 171/72  MAP (mmHg)  Min: 82  Max: 108  Resp  Min: 11  Max: 24  SpO2  Min: 96 %  Max: 100 %    04/16 0701 - 04/17 0700  In: 375 [P.O.:375]  Out: 1545 [Urine:1385; Drains:160]   Unmeasured Output  Urine Occurrence: 1  Stool Occurrence: 0  Pad Count: 2       Physical Exam     Physical Exam:  GA: C.Collar in place, Awake, Alert, Oriented, Follows commands,comfortable, no acute distress.   HEENT: No scleral icterus or JVD.   Pulmonary: Clear to auscultation Anteior. No wheezing, crackles, or rhonchi.  Cardiac: RRR S1 & S2 w/o rubs/murmurs/gallops.   Abdominal: Bowel sounds present x 4. No appreciable hepatosplenomegaly.  Skin: No jaundice, rashes, or visible lesions.  Neuro:  --GCS: E4 V5 M5  --Mental Status:  C.Collar in place, Awake, Alert, Oriented, Follows commands,  --CN II-XII grossly unable to fully assess completely   --Pupils 4mm, PERRL.   --Corneal reflex, gag, cough intact.  --LUE strength: 5/5  --RUE strength: 4/5  --LLE strength: 3/5 able to wiggle toes  --RLE strength: 3/5 able to wiggle toes  Unable to test orientation, language, gait due to level of consciousness.    Medications:  Continuous Scheduled  atorvastatin 20 mg Nightly   ceFAZolin (ANCEF) IVPB 2 g Q12H   citalopram 20 mg  Daily   diltiaZEM 30 mg Q6H   gabapentin 300 mg TID   heparin (porcine) 5,000 Units Q8H   insulin detemir U-100 7 Units BID   senna-docusate 8.6-50 mg 1 tablet BID   tamsulosin 0.4 mg Daily   traZODone 50 mg QHS   valsartan 40 mg Daily   PRN  acetaminophen 650 mg Q4H PRN   aluminum-magnesium hydroxide-simethicone 30 mL Q4H PRN   dextrose 50% 12.5 g PRN   dextrose 50% 12.5 g PRN   dextrose 50% 25 g PRN   fentaNYL 12.5 mcg Q2H PRN   glucagon (human recombinant) 1 mg PRN   glucagon (human recombinant) 1 mg PRN   glucose 16 g PRN   glucose 24 g PRN   hydrALAZINE 10 mg Q4H PRN   hydrocodone-acetaminophen 5-325mg 1 tablet Q4H PRN   insulin aspart U-100 1-10 Units QID (AC + HS) PRN   labetalol 10 mg Q6H PRN   lidocaine HCL 2%  PRN   magnesium oxide 800 mg PRN   magnesium oxide 800 mg PRN   ondansetron 8 mg Q6H PRN   potassium chloride 10% 40 mEq PRN   potassium chloride 10% 40 mEq PRN   potassium chloride 10% 60 mEq PRN   potassium, sodium phosphates 2 packet PRN   potassium, sodium phosphates 2 packet PRN   potassium, sodium phosphates 2 packet PRN     Today I personally reviewed pertinent medications, lines/drains/airways, lab results, notably:    Diet  Diet diabetic [facility]; 2000 Calorie  Diet diabetic [facility]; 2000 Calorie

## 2018-04-17 NOTE — PROGRESS NOTES
[facility]  Neurosurgery  Progress Note    Subjective:     History of Present Illness: 82 y.o.  male with type 2 diabetes, who presents today for follow up evaluation one week prior to 2 level ACDF scheduled for 4/10/2018. Pt reports    Pt presents today wearing a cervical collar and in a wheelchair. He would like to proceed with surgery. Per pt's family, his PCP would like to see him on 4/9, the day before surgery. He notes continued neck pain and BUE numbness/tingling as well as BUE weakness. Pt is only able to walk a few steps unassisted. He has never received neck surgery.     Post-Op Info:  Procedure(s) (LRB):  DISKECTOMY AND FUSION-ANTERIOR CERVICAL (ACDF); C5-6. C6-7 (N/A)   7 Days Post-Op     Interval History: No AE. AFVSS. No drainage from HV site this morning but started this afternoon.     Medications:  Continuous Infusions:    Scheduled Meds:   atorvastatin  20 mg Oral Nightly    ceFAZolin (ANCEF) IVPB  2 g Intravenous Q12H    citalopram  20 mg Oral Daily    diltiaZEM  30 mg Oral Q6H    etomidate        gabapentin  300 mg Oral TID    heparin (porcine)  5,000 Units Subcutaneous Q8H    insulin detemir U-100  7 Units Subcutaneous BID    phenylephrine HCl in 0.9% NaCl        propofol        rocuronium        senna-docusate 8.6-50 mg  1 tablet Oral BID    succinylcholine        tamsulosin  0.4 mg Oral Daily    traZODone  50 mg Oral QHS    valsartan  40 mg Oral Daily     PRN Meds:acetaminophen, aluminum-magnesium hydroxide-simethicone, dextrose 50%, dextrose 50%, dextrose 50%, fentaNYL, glucagon (human recombinant), glucagon (human recombinant), glucose, glucose, hydrALAZINE, hydrocodone-acetaminophen 5-325mg, insulin aspart U-100, labetalol, lidocaine HCL 2%, magnesium oxide, magnesium oxide, ondansetron, potassium chloride 10%, potassium chloride 10%, potassium chloride 10%, potassium, sodium phosphates, potassium, sodium phosphates, potassium, sodium phosphates     Review  of Systems    Objective:     Weight: 109.5 kg (241 lb 6.5 oz)  Body mass index is 32.74 kg/m².  Vital Signs (Most Recent):  Temp: 97.8 °F (36.6 °C) (04/17/18 1105)  Pulse: 92 (04/17/18 1305)  Resp: 13 (04/17/18 1305)  BP: 136/64 (04/17/18 1305)  SpO2: 98 % (04/17/18 1305) Vital Signs (24h Range):  Temp:  [97.4 °F (36.3 °C)-98.5 °F (36.9 °C)] 97.8 °F (36.6 °C)  Pulse:  [73-96] 92  Resp:  [10-24] 13  SpO2:  [96 %-100 %] 98 %  BP: (118-171)/(57-75) 136/64       Date 04/17/18 0700 - 04/18/18 0659   Shift 5482-6313 8874-7965 5421-1451 24 Hour Total   I  N  T  A  K  E   P.O. 350   350    Shift Total  (mL/kg) 350  (3.2)   350  (3.2)   O  U  T  P  U  T   Urine  (mL/kg/hr) 755   755    Drains 57   57    Shift Total  (mL/kg) 812  (7.4)   812  (7.4)   Weight (kg) 109.5 109.5 109.5 109.5            Closed/Suction Drain 04/10/18 1425 Anterior Neck Accordion 10 Fr. (Active)   Site Description Healing 4/13/2018  7:30 AM   Dressing Type No dressing 4/13/2018  7:30 AM   Dressing Status Clean;Dry;Intact 4/12/2018  8:08 PM   Drainage Serosanguineous 4/13/2018  7:30 AM   Status To bulb suction 4/13/2018  7:30 AM   Output (mL) 90 mL 4/11/2018  5:00 AM       Neurosurgery Physical Exam     General: well developed, well nourished, no distress  Head: normocephalic, atraumatic  Neurologic: Alert and oriented. Thought content appropriate  GCS: Motor: 6/Verbal: 5/Eyes: 4 GCS Total: 15  Mental Status: Awake, Alert, Oriented x 4  Language: No aphasia  Speech: No dysarthria  Cranial nerves: face symmetric, tongue midline, CN II-XII grossly intact.   Eyes: pupils equal, round, reactive to light with accommodation, EOMI  Pulmonary: normal respirations, not labored, no accessory muscles used  Abdomen: soft, non-distended, not tender to palpation  Sensory: intact to light touch throughout  Motor Strength: Moves all extremities spontaneously with good tone.  No abnormal movements seen.     Strength  Deltoids Triceps Biceps Wrist Extension Wrist  Flexion Hand    Upper: R 3/5 3/5 4/5 5/5 5/5 4-/5    L 4/5 4/5 4/5 5/5 5/5 4/5     Iliopsoas Quadriceps Knee  Flexion Tibialis  anterior Gastro- cnemius EHL   Lower: R 4/5 5/5 5/5 5/5 5/5 5/5    L 4/5 5/5 5/5 5/5 5/5 5/5     Pronator Drift: no drift noted  Finger-to-nose: Intact bilaterally  Incision is clean, dry, intact with skin well approximated with dermabond.  There is no drainage, erythema, or edema.      Significant Labs:    Recent Labs  Lab 04/16/18  0152 04/16/18  1515 04/16/18  1537 04/17/18  0212   *  --   --  158*   * 137  --  138   K 4.3  --   --  4.8     --   --  108   CO2 20*  --   --  22*   BUN 17  --   --  15   CREATININE 0.7  --   --  0.7   CALCIUM 9.5  --   --  10.3   MG 1.7  --  1.9 1.8       Recent Labs  Lab 04/16/18  0152 04/17/18  0212   WBC 7.83 8.22   HGB 10.4* 10.6*   HCT 32.4* 32.9*    278     No results for input(s): LABPT, INR, APTT in the last 48 hours.  Microbiology Results (last 7 days)     ** No results found for the last 168 hours. **          Significant Diagnostics:  No new imaging    Assessment/Plan:     * Cervical myelopathy with cervical radiculopathy    82 y.o. male s/p C3-5 ACDF POD#7    -Neurologically stable  -Leave incision open to air  -HV with CSF drainage - currently clamped, please leave clamped  -LD placed for continued CSF leak - Drain 15cc/hr, continue today  -Ancef while drains in place  -Cervical collar when OOB or with activity  Strict bed rest.   -IS to bed side. Patient to use atleast 10x every hour.  CV- goal normotension  Pulm- on RA  FENGI- ADAT. Goal eunatremia.   Heme/ID- hgb/hct stable. Afebrile.   ppx- SQH; hold at midnight, PILI/SCDs, gi ppx. Continue bowel regimen daily.  Continue to hold Eliquis  Medical management per NCC    Dispo- cont ICU care of lumabr drain. Will take to OR tomorrow for exploration of wound.             Orion Be MD  Neurosurgery  [facility]

## 2018-04-17 NOTE — PROGRESS NOTES
Patient taken to 2nd floor CT scanner on a portable monitor per RN. Ambu bag in bed. No acute events during transfer. RN will continue to monitor.

## 2018-04-17 NOTE — PLAN OF CARE
Problem: Patient Care Overview  Goal: Plan of Care Review  Outcome: Ongoing (interventions implemented as appropriate)  POC reviewed with patient at 1400. Patient verbalized understanding. Questions and concerns addressed with the patient. Lumbar drain clamped per Neurosurgery orders. Type and Screen sent off per orders. Patient is to be NPO at 0000. No acute events during the day. RN will continue to monitor. See flowsheets for full assessment and VS info.

## 2018-04-17 NOTE — PROGRESS NOTES
Nurse reported phone call from Nadiya to pt. Pt refused to speak with family member. Nurse left number on board for patient.

## 2018-04-17 NOTE — ANESTHESIA PREPROCEDURE EVALUATION
04/17/2018  [facility]  Anesthesia Pre-Operative Evaluation         Patient Name: Hollis Pitts  YOB: 1935  MRN: [id number]    SUBJECTIVE:     Pre-operative evaluation for Procedure(s) (LRB):  WASHOUT-CERVICAL (Right)     04/17/2018    Hollis Pitts is a 82 y.o. obese male w/ a significant PMHx of HTN, anxiety, cervical myelopathy and radiculopathy s/p discectomy and anterior fusion who presents for the above procedure. Pt is awake alert and oriented to person, place, and time. He denies any SOB, CP, or N/V. He's in a C-collar  And on room air.    LDA:   Left forearm 20g PIV  Right wrist 18g PIV  Neck drain  Condom catheter  Lumbar drain    Prev airway:   Present Prior to Hospital Arrival?: No; Placement Date: 04/10/18; Placement Time: 1204; Method of Intubation: Glidescope (in line. no extension. patient semirecumbent.); Inserted by: Anesthesia Resident; Airway Device: Endotracheal Tube; Mask Ventilation: Easy; Intubated: Postinduction; Blade:  (glidescope 4); Airway Device Size: 7.5; Style: Cuffed; Cuff Inflation: Minimal occlusive pressure; Placement Verified By: Auscultation, Capnometry, ETT Condensation; Grade: Grade I; Complicating Factors: None; Removal Date: 04/10/18;  Removal Time: 1657    Drips: None documented.      Patient Active Problem List   Diagnosis    Abnormal EKG    Shortness of breath    Morbid obesity    Erectile dysfunction    BPH with urinary obstruction    Memory deficits    Anxiety    Cervical pain    Shoulder weakness    Decreased functional mobility    Cervical myelopathy with cervical radiculopathy    Benign prostatic hyperplasia with urinary retention    Type 2 diabetes mellitus with hyperglycemia, without long-term current use of insulin    Essential hypertension    HX: anticoagulation    Other hyperlipidemia       Review of  "patient's allergies indicates:  No Known Allergies    Current Inpatient Medications:   atorvastatin  20 mg Oral Nightly    ceFAZolin (ANCEF) IVPB  2 g Intravenous Q12H    citalopram  20 mg Oral Daily    diltiaZEM  30 mg Oral Q6H    etomidate        gabapentin  300 mg Oral TID    heparin (porcine)  5,000 Units Subcutaneous Q8H    insulin detemir U-100  7 Units Subcutaneous BID    phenylephrine HCl in 0.9% NaCl        propofol        rocuronium        senna-docusate 8.6-50 mg  1 tablet Oral BID    succinylcholine        tamsulosin  0.4 mg Oral Daily    traZODone  50 mg Oral QHS    valsartan  40 mg Oral Daily       No current facility-administered medications on file prior to encounter.      Current Outpatient Prescriptions on File Prior to Encounter   Medication Sig Dispense Refill    atorvastatin (LIPITOR) 20 MG tablet Take 20 mg by mouth nightly.  0    bisacodyl (DULCOLAX) 5 mg EC tablet   0    CARTIA  mg Cp24 Take 90 mg by mouth.   0    citalopram (CELEXA) 20 MG tablet Take 20 mg by mouth once daily.  0    gabapentin (NEURONTIN) 100 MG capsule Take 100 mg by mouth 3 (three) times daily as needed.   0    SENSIPAR 30 mg Tab Take 30 mg by mouth daily with breakfast.   0    traZODone (DESYREL) 50 MG tablet Take 50 mg by mouth every evening.  0    valsartan (DIOVAN) 80 MG tablet Take 80 mg by mouth once daily.  0    vitamin B comp with C no.4 150 mg Tab Take vitamin B complex 1 a day.  Over-the-counter is an appropriate alternative. 30 tablet 12    BD INSULIN PEN NEEDLE UF SHORT 31 gauge x 5/16" Ndle USE WITH LANTUS PEN DAILY  0    CONSTULOSE 10 gram/15 mL solution TAKE  30 MILLILITERS BY MOUTH 3 TIMES A DAY UNTIL BOWEL MOVEMENT ...  (REFER TO PRESCRIPTION NOTES).  0    ELIQUIS 2.5 mg Tab Take 2.5 mg by mouth 2 (two) times daily.   0    FREESTYLE TEST Strp   1       Past Surgical History:   Procedure Laterality Date    CATARACT EXTRACTION W/  INTRAOCULAR LENS IMPLANT         Social " History     Social History    Marital status:      Spouse name: N/A    Number of children: N/A    Years of education: N/A     Occupational History    Not on file.     Social History Main Topics    Smoking status: Former Smoker     Quit date: 2/6/1950    Smokeless tobacco: Never Used    Alcohol use 4.2 oz/week     7 Shots of liquor per week    Drug use: No    Sexual activity: Yes     Partners: Female     Other Topics Concern    Not on file     Social History Narrative    No narrative on file       OBJECTIVE:     Vital Signs Range (Last 24H):  Temp:  [36.3 °C (97.4 °F)-36.9 °C (98.5 °F)]   Pulse:  []   Resp:  [10-24]   BP: (113-171)/(53-75)   SpO2:  [96 %-100 %]       CBC:   Recent Labs      04/16/18   0152  04/17/18   0212   WBC  7.83  8.22   RBC  3.39*  3.40*   HGB  10.4*  10.6*   HCT  32.4*  32.9*   PLT  298  278   MCV  96  97   MCH  30.7  31.2*   MCHC  32.1  32.2       CMP:   Recent Labs      04/16/18   0152  04/16/18   1515  04/16/18   1537  04/17/18   0212   NA  135*  137   --   138   K  4.3   --    --   4.8   CL  107   --    --   108   CO2  20*   --    --   22*   BUN  17   --    --   15   CREATININE  0.7   --    --   0.7   GLU  213*   --    --   158*   MG  1.7   --   1.9  1.8   PHOS  2.3*   --    --   2.7  2.7   CALCIUM  9.5   --    --   10.3   ALBUMIN  2.7*   --    --   2.7*   PROT  5.9*   --    --   5.9*   ALKPHOS  70   --    --   69   ALT  7*   --    --   5*   AST  9*   --    --   8*   BILITOT  0.6   --    --   0.4       INR:  Recent Labs      04/14/18   1307   INR  1.2       Diagnostic Studies: No relevant studies.    EKG:   Sinus rhythm with Premature atrial complexes  Otherwise normal ECG  When compared with ECG of 19-MAR-2018 12:08,  Premature atrial complexes are now Present  Vent. rate has increased BY  29 BPM  Confirmed by Nancy Owen MD ([id number]) on 4/14/2018 11:32:23 AM    2D ECHO:  No results found for this or any previous visit.      ASSESSMENT/PLAN:         Anesthesia  Evaluation    I have reviewed the Patient Summary Reports.     I have reviewed the Medications.     Review of Systems  Anesthesia Hx:  No previous Anesthesia  History of prior surgery of interest to airway management or planning: Previous anesthesia: General Denies Family Hx of Anesthesia complications.   Denies Personal Hx of Anesthesia complications.   Social:  Former Smoker, Social Alcohol Use    Hematology/Oncology:  Hematology Normal   Oncology Normal     EENT/Dental:EENT/Dental Normal   Cardiovascular:   Exercise tolerance: poor Hypertension, well controlled  Functional Capacity 1 METS    Pulmonary:  Pulmonary Normal    Renal/:  Renal/ Normal     Hepatic/GI:  Hepatic/GI Normal    Musculoskeletal:   Arthritis  Right upper extremity weakness, right hand edematous   Neurological:  Neurology Normal    Endocrine:   Diabetes, well controlled  Diabetes, Type 2 Diabetes for 40 years Typical AM glucose range: 96    Psych:   anxiety depression          Physical Exam  General:  Well nourished    Airway/Jaw/Neck:  Airway Findings: Mouth Opening: Small, but > 3cm Tongue: Normal  General Airway Assessment: Adult  Mallampati: II  Improves to II with phonation.  TM Distance: Normal, at least 6 cm  Jaw/Neck Findings:   - Pt has a C-collar in place     Dental:  Dental Findings: Upper Dentures   Chest/Lungs:  Chest/Lungs Findings: Clear to auscultation, Normal Respiratory Rate     Heart/Vascular:  Heart Findings: Rate: Normal  Rhythm: Regular Rhythm  Sounds: Normal        Mental Status:  Mental Status Findings:  Cooperative, Alert and Oriented         Anesthesia Plan  Type of Anesthesia, risks & benefits discussed:  Anesthesia Type:  general  Patient's Preference:   Intra-op Monitoring Plan: standard ASA monitors  Intra-op Monitoring Plan Comments:   Post Op Pain Control Plan: multimodal analgesia  Post Op Pain Control Plan Comments:   Induction:   IV  Beta Blocker:  Patient is not currently on a Beta-Blocker (No further  documentation required).       Informed Consent: Patient understands risks and agrees with Anesthesia plan.  Questions answered. Anesthesia consent signed with patient.  ASA Score: 3     Day of Surgery Review of History & Physical:    H&P update referred to the surgeon.     Anesthesia Plan Notes: - maybe a difficult intubation with C-collar in place.         Ready For Surgery From Anesthesia Perspective.

## 2018-04-17 NOTE — PROGRESS NOTES
Phosphorus lab draw held until AM lab draw d/t poor venous access. Dr. Tamar MD [facility] notified and approved.

## 2018-04-17 NOTE — ASSESSMENT & PLAN NOTE
82 y.o. male s/p C3-5 ACDF POD#7    -Neurologically stable  -Leave incision open to air  -HV with CSF drainage - currently clamped, please leave clamped  -LD placed for continued CSF leak - Drain 15cc/hr, continue today  -Ancef while drains in place  -Cervical collar when OOB or with activity  Strict bed rest.   -IS to bed side. Patient to use atleast 10x every hour.  CV- goal normotension  Pulm- on RA  FENGI- ADAT. Goal eunatremia.   Heme/ID- hgb/hct stable. Afebrile.   ppx- SQH; hold at midnight, PILI/SCDs, gi ppx. Continue bowel regimen daily.  Continue to hold Eliquis  Medical management per NCC    Dispo- cont ICU care of lumabr drain. Will take to OR tomorrow for exploration of wound.

## 2018-04-17 NOTE — PROGRESS NOTES
Neurosrugery paged. Nurse awaiting response. [facility] and drain out put

## 2018-04-17 NOTE — SUBJECTIVE & OBJECTIVE
Interval History: No AE. AFVSS. No drainage from HV site this morning but started this afternoon.     Medications:  Continuous Infusions:    Scheduled Meds:   atorvastatin  20 mg Oral Nightly    ceFAZolin (ANCEF) IVPB  2 g Intravenous Q12H    citalopram  20 mg Oral Daily    diltiaZEM  30 mg Oral Q6H    etomidate        gabapentin  300 mg Oral TID    heparin (porcine)  5,000 Units Subcutaneous Q8H    insulin detemir U-100  7 Units Subcutaneous BID    phenylephrine HCl in 0.9% NaCl        propofol        rocuronium        senna-docusate 8.6-50 mg  1 tablet Oral BID    succinylcholine        tamsulosin  0.4 mg Oral Daily    traZODone  50 mg Oral QHS    valsartan  40 mg Oral Daily     PRN Meds:acetaminophen, aluminum-magnesium hydroxide-simethicone, dextrose 50%, dextrose 50%, dextrose 50%, fentaNYL, glucagon (human recombinant), glucagon (human recombinant), glucose, glucose, hydrALAZINE, hydrocodone-acetaminophen 5-325mg, insulin aspart U-100, labetalol, lidocaine HCL 2%, magnesium oxide, magnesium oxide, ondansetron, potassium chloride 10%, potassium chloride 10%, potassium chloride 10%, potassium, sodium phosphates, potassium, sodium phosphates, potassium, sodium phosphates     Review of Systems    Objective:     Weight: 109.5 kg (241 lb 6.5 oz)  Body mass index is 32.74 kg/m².  Vital Signs (Most Recent):  Temp: 97.8 °F (36.6 °C) (04/17/18 1105)  Pulse: 92 (04/17/18 1305)  Resp: 13 (04/17/18 1305)  BP: 136/64 (04/17/18 1305)  SpO2: 98 % (04/17/18 1305) Vital Signs (24h Range):  Temp:  [97.4 °F (36.3 °C)-98.5 °F (36.9 °C)] 97.8 °F (36.6 °C)  Pulse:  [73-96] 92  Resp:  [10-24] 13  SpO2:  [96 %-100 %] 98 %  BP: (118-171)/(57-75) 136/64       Date 04/17/18 0700 - 04/18/18 0659   Shift 5898-8297 4370-0829 6532-7942 24 Hour Total   I  N  T  A  K  E   P.O. 350   350    Shift Total  (mL/kg) 350  (3.2)   350  (3.2)   O  U  T  P  U  T   Urine  (mL/kg/hr) 755   755    Drains 57   57    Shift Total  (mL/kg)  812  (7.4)   812  (7.4)   Weight (kg) 109.5 109.5 109.5 109.5            Closed/Suction Drain 04/10/18 1425 Anterior Neck Accordion 10 Fr. (Active)   Site Description Healing 4/13/2018  7:30 AM   Dressing Type No dressing 4/13/2018  7:30 AM   Dressing Status Clean;Dry;Intact 4/12/2018  8:08 PM   Drainage Serosanguineous 4/13/2018  7:30 AM   Status To bulb suction 4/13/2018  7:30 AM   Output (mL) 90 mL 4/11/2018  5:00 AM       Neurosurgery Physical Exam     General: well developed, well nourished, no distress  Head: normocephalic, atraumatic  Neurologic: Alert and oriented. Thought content appropriate  GCS: Motor: 6/Verbal: 5/Eyes: 4 GCS Total: 15  Mental Status: Awake, Alert, Oriented x 4  Language: No aphasia  Speech: No dysarthria  Cranial nerves: face symmetric, tongue midline, CN II-XII grossly intact.   Eyes: pupils equal, round, reactive to light with accommodation, EOMI  Pulmonary: normal respirations, not labored, no accessory muscles used  Abdomen: soft, non-distended, not tender to palpation  Sensory: intact to light touch throughout  Motor Strength: Moves all extremities spontaneously with good tone.  No abnormal movements seen.     Strength  Deltoids Triceps Biceps Wrist Extension Wrist Flexion Hand    Upper: R 3/5 3/5 4/5 5/5 5/5 4-/5    L 4/5 4/5 4/5 5/5 5/5 4/5     Iliopsoas Quadriceps Knee  Flexion Tibialis  anterior Gastro- cnemius EHL   Lower: R 4/5 5/5 5/5 5/5 5/5 5/5    L 4/5 5/5 5/5 5/5 5/5 5/5     Pronator Drift: no drift noted  Finger-to-nose: Intact bilaterally  Incision is clean, dry, intact with skin well approximated with dermabond.  There is no drainage, erythema, or edema.      Significant Labs:    Recent Labs  Lab 04/16/18  0152 04/16/18  1515 04/16/18  1537 04/17/18  0212   *  --   --  158*   * 137  --  138   K 4.3  --   --  4.8     --   --  108   CO2 20*  --   --  22*   BUN 17  --   --  15   CREATININE 0.7  --   --  0.7   CALCIUM 9.5  --   --  10.3   MG 1.7  --   1.9 1.8       Recent Labs  Lab 04/16/18  0152 04/17/18  0212   WBC 7.83 8.22   HGB 10.4* 10.6*   HCT 32.4* 32.9*    278     No results for input(s): LABPT, INR, APTT in the last 48 hours.  Microbiology Results (last 7 days)     ** No results found for the last 168 hours. **          Significant Diagnostics:  No new imaging

## 2018-04-17 NOTE — PROGRESS NOTES
Patient returned from 2nd floor CT scan with RN. Ambu bag in bed. No acute events during transfer. RN will continue to monitor.
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
## 2018-04-17 NOTE — ASSESSMENT & PLAN NOTE
- NSGY following   - S/P ACDF with CSF leak  POD #7  - Lumbar drain goal 10 ML /hr clamp if >10ml/h  - PT/Ot when able   - passed sp eval and diet started   - Neuro checks Q 1   - ok to sit up with lumbar drain clamped per nsgy  - Continue Ancef while drain in place  - 4/17 CSF drainage noted NGSY made aware Drain is clamped; Possible OR tomorrow in AM - However, final decision to be determined tonight.

## 2018-04-17 NOTE — PLAN OF CARE
SW completed LOCET via phone and faxed the PASRR to the state in anticipation of SNF recs.    Makayla Ann LMSW  Neurocritical Care   [facility]  [id number]

## 2018-04-17 NOTE — PROGRESS NOTES
RN notified Neurosurgery to clarify order set on lumbar drainage. RN ordered to drop the drain below the level of the patient to get 15 cc/her per LUIS ALBERTO Johnson. RN will continue to monitor.

## 2018-04-18 ENCOUNTER — ANESTHESIA (OUTPATIENT)
Dept: SURGERY | Facility: HOSPITAL | Age: 83
End: 2018-04-18

## 2018-04-18 LAB
ALBUMIN SERPL BCP-MCNC: 2.5 G/DL
ALP SERPL-CCNC: 69 U/L
ALT SERPL W/O P-5'-P-CCNC: 6 U/L
ANION GAP SERPL CALC-SCNC: 8 MMOL/L
AST SERPL-CCNC: 10 U/L
BASOPHILS # BLD AUTO: 0.04 K/UL
BASOPHILS NFR BLD: 0.3 %
BILIRUB SERPL-MCNC: 0.4 MG/DL
BUN SERPL-MCNC: 19 MG/DL
CALCIUM SERPL-MCNC: 8.8 MG/DL
CHLORIDE SERPL-SCNC: 109 MMOL/L
CO2 SERPL-SCNC: 20 MMOL/L
CREAT SERPL-MCNC: 0.9 MG/DL
DIFFERENTIAL METHOD: ABNORMAL
EOSINOPHIL # BLD AUTO: 0.2 K/UL
EOSINOPHIL NFR BLD: 1.2 %
ERYTHROCYTE [DISTWIDTH] IN BLOOD BY AUTOMATED COUNT: 15.6 %
EST. GFR  (AFRICAN AMERICAN): >60 ML/MIN/1.73 M^2
EST. GFR  (NON AFRICAN AMERICAN): >60 ML/MIN/1.73 M^2
GLUCOSE SERPL-MCNC: 198 MG/DL
HCT VFR BLD AUTO: 30.6 %
HGB BLD-MCNC: 9.9 G/DL
IMM GRANULOCYTES # BLD AUTO: 0.27 K/UL
IMM GRANULOCYTES NFR BLD AUTO: 2 %
LYMPHOCYTES # BLD AUTO: 2.2 K/UL
LYMPHOCYTES NFR BLD: 15.7 %
MAGNESIUM SERPL-MCNC: 1.6 MG/DL
MCH RBC QN AUTO: 31.2 PG
MCHC RBC AUTO-ENTMCNC: 32.4 G/DL
MCV RBC AUTO: 97 FL
MONOCYTES # BLD AUTO: 1.5 K/UL
MONOCYTES NFR BLD: 11.1 %
NEUTROPHILS # BLD AUTO: 9.5 K/UL
NEUTROPHILS NFR BLD: 69.7 %
NRBC BLD-RTO: 0 /100 WBC
PHOSPHATE SERPL-MCNC: 2.1 MG/DL
PLATELET # BLD AUTO: 284 K/UL
PMV BLD AUTO: 9.1 FL
POCT GLUCOSE: 196 MG/DL (ref 70–110)
POCT GLUCOSE: 218 MG/DL (ref 70–110)
POCT GLUCOSE: 218 MG/DL (ref 70–110)
POCT GLUCOSE: 242 MG/DL (ref 70–110)
POCT GLUCOSE: 245 MG/DL (ref 70–110)
POTASSIUM SERPL-SCNC: 4.6 MMOL/L
PROT SERPL-MCNC: 5.5 G/DL
RBC # BLD AUTO: 3.17 M/UL
SODIUM SERPL-SCNC: 137 MMOL/L
WBC # BLD AUTO: 13.68 K/UL

## 2018-04-18 PROCEDURE — 99499 UNLISTED E&M SERVICE: CPT | Mod: ,,, | Performed by: NEUROLOGICAL SURGERY

## 2018-04-18 PROCEDURE — 80053 COMPREHEN METABOLIC PANEL: CPT

## 2018-04-18 PROCEDURE — 25000003 PHARM REV CODE 250: Performed by: NURSE PRACTITIONER

## 2018-04-18 PROCEDURE — 63600175 PHARM REV CODE 636 W HCPCS: Performed by: STUDENT IN AN ORGANIZED HEALTH CARE EDUCATION/TRAINING PROGRAM

## 2018-04-18 PROCEDURE — 20000000 HC ICU ROOM

## 2018-04-18 PROCEDURE — 94761 N-INVAS EAR/PLS OXIMETRY MLT: CPT

## 2018-04-18 PROCEDURE — 83735 ASSAY OF MAGNESIUM: CPT

## 2018-04-18 PROCEDURE — 25000003 PHARM REV CODE 250: Performed by: PSYCHIATRY & NEUROLOGY

## 2018-04-18 PROCEDURE — 85025 COMPLETE CBC W/AUTO DIFF WBC: CPT

## 2018-04-18 PROCEDURE — 63600175 PHARM REV CODE 636 W HCPCS: Performed by: NURSE PRACTITIONER

## 2018-04-18 PROCEDURE — 84100 ASSAY OF PHOSPHORUS: CPT

## 2018-04-18 PROCEDURE — 99233 SBSQ HOSP IP/OBS HIGH 50: CPT | Mod: ,,, | Performed by: NURSE PRACTITIONER

## 2018-04-18 PROCEDURE — 63600175 PHARM REV CODE 636 W HCPCS: Performed by: PHYSICIAN ASSISTANT

## 2018-04-18 PROCEDURE — 25500020 PHARM REV CODE 255: Performed by: NEUROLOGICAL SURGERY

## 2018-04-18 PROCEDURE — 25000003 PHARM REV CODE 250: Performed by: STUDENT IN AN ORGANIZED HEALTH CARE EDUCATION/TRAINING PROGRAM

## 2018-04-18 RX ORDER — DILTIAZEM HYDROCHLORIDE 120 MG/1
120 CAPSULE, COATED, EXTENDED RELEASE ORAL DAILY
Status: DISCONTINUED | OUTPATIENT
Start: 2018-04-18 | End: 2018-04-23

## 2018-04-18 RX ADMIN — GABAPENTIN 300 MG: 300 CAPSULE ORAL at 02:04

## 2018-04-18 RX ADMIN — HYDRALAZINE HYDROCHLORIDE 10 MG: 20 INJECTION INTRAMUSCULAR; INTRAVENOUS at 05:04

## 2018-04-18 RX ADMIN — CEFAZOLIN 2 G: 330 INJECTION, POWDER, FOR SOLUTION INTRAMUSCULAR; INTRAVENOUS at 02:04

## 2018-04-18 RX ADMIN — GABAPENTIN 300 MG: 300 CAPSULE ORAL at 09:04

## 2018-04-18 RX ADMIN — TAMSULOSIN HYDROCHLORIDE 0.4 MG: 0.4 CAPSULE ORAL at 09:04

## 2018-04-18 RX ADMIN — INSULIN ASPART 2 UNITS: 100 INJECTION, SOLUTION INTRAVENOUS; SUBCUTANEOUS at 10:04

## 2018-04-18 RX ADMIN — INSULIN ASPART 4 UNITS: 100 INJECTION, SOLUTION INTRAVENOUS; SUBCUTANEOUS at 02:04

## 2018-04-18 RX ADMIN — INSULIN ASPART 2 UNITS: 100 INJECTION, SOLUTION INTRAVENOUS; SUBCUTANEOUS at 05:04

## 2018-04-18 RX ADMIN — POTASSIUM & SODIUM PHOSPHATES POWDER PACK 280-160-250 MG 2 PACKET: 280-160-250 PACK at 05:04

## 2018-04-18 RX ADMIN — DILTIAZEM HYDROCHLORIDE 120 MG: 120 CAPSULE, COATED, EXTENDED RELEASE ORAL at 09:04

## 2018-04-18 RX ADMIN — FENTANYL CITRATE 12.5 MCG: 50 INJECTION, SOLUTION INTRAMUSCULAR; INTRAVENOUS at 10:04

## 2018-04-18 RX ADMIN — LABETALOL HYDROCHLORIDE 10 MG: 5 INJECTION, SOLUTION INTRAVENOUS at 03:04

## 2018-04-18 RX ADMIN — ATORVASTATIN CALCIUM 20 MG: 20 TABLET, FILM COATED ORAL at 08:04

## 2018-04-18 RX ADMIN — ACETAMINOPHEN 650 MG: 325 TABLET, FILM COATED ORAL at 07:04

## 2018-04-18 RX ADMIN — FENTANYL CITRATE 12.5 MCG: 50 INJECTION, SOLUTION INTRAMUSCULAR; INTRAVENOUS at 05:04

## 2018-04-18 RX ADMIN — VALSARTAN 40 MG: 40 TABLET ORAL at 09:04

## 2018-04-18 RX ADMIN — MAGNESIUM OXIDE TAB 400 MG (241.3 MG ELEMENTAL MG) 800 MG: 400 (241.3 MG) TAB at 05:04

## 2018-04-18 RX ADMIN — CITALOPRAM HYDROBROMIDE 20 MG: 20 TABLET ORAL at 09:04

## 2018-04-18 RX ADMIN — TRAZODONE HYDROCHLORIDE 50 MG: 50 TABLET ORAL at 08:04

## 2018-04-18 RX ADMIN — IOHEXOL 180 ML: 350 INJECTION, SOLUTION INTRAVENOUS at 12:04

## 2018-04-18 RX ADMIN — HEPARIN SODIUM 5000 UNITS: 5000 INJECTION, SOLUTION INTRAVENOUS; SUBCUTANEOUS at 09:04

## 2018-04-18 RX ADMIN — STANDARDIZED SENNA CONCENTRATE AND DOCUSATE SODIUM 1 TABLET: 8.6; 5 TABLET, FILM COATED ORAL at 08:04

## 2018-04-18 RX ADMIN — HEPARIN SODIUM 5000 UNITS: 5000 INJECTION, SOLUTION INTRAVENOUS; SUBCUTANEOUS at 02:04

## 2018-04-18 RX ADMIN — GABAPENTIN 300 MG: 300 CAPSULE ORAL at 08:04

## 2018-04-18 RX ADMIN — DILTIAZEM HYDROCHLORIDE 30 MG: 30 TABLET, FILM COATED ORAL at 05:04

## 2018-04-18 RX ADMIN — HYDRALAZINE HYDROCHLORIDE 10 MG: 20 INJECTION INTRAMUSCULAR; INTRAVENOUS at 02:04

## 2018-04-18 RX ADMIN — INSULIN ASPART 4 UNITS: 100 INJECTION, SOLUTION INTRAVENOUS; SUBCUTANEOUS at 10:04

## 2018-04-18 NOTE — ASSESSMENT & PLAN NOTE
82 y.o. male s/p C3-5 ACDF POD#8    -Neurologically stable  -Leave incision open to air  -HV removed, monitor site for leaking  -LD placed for continued CSF leak - Drain 15cc/hr, continue today  -Ancef while drains in place  -Cervical collar when OOB or with activity  -ok to sit up today  -IS to bed side. Patient to use atleast 10x every hour.  CV- goal normotension  Pulm- on RA  FENGI- ADAT. Goal eunatremia.   Heme/ID- hgb/hct stable. Afebrile.   ppx- SQH; hold at midnight, PILI/SCDs, gi ppx. Continue bowel regimen daily.  Continue to hold Eliquis  Medical management per NCC    Dispo- cont ICU care of lumabr drain.

## 2018-04-18 NOTE — ASSESSMENT & PLAN NOTE
- NSGY following   - S/P ACDF with CSF leak 4/10  - Lumbar drain clamped  - PT/Ot when able   - passed sp eval and diet started   - Neuro checks Q 1   - ok to sit up with lumbar drain clamped per nsgy  - Continue Ancef while drain in place  - 4/17 CSF drainage noted NGSY made aware Drain is clamped; Possible OR 4/18. Believe on hold at this time

## 2018-04-18 NOTE — NURSING
Pt brought to radiology at 11:15 for barium swallow study via stretcher w/ RN and transport w/ portable tele monitor. Arrived in radiology at 11:30. Pt tolerated transport well. Barium swallow study completed. Pt tolerated procedure well. VSS. Pt transported back to [facility] via stretcher by RN and transport w/ portable tele monitor. Pt tolerated transport well. Arrived in room at 1300. Pt resting comfortably in bed. VSS. Will continue to monitor.

## 2018-04-18 NOTE — SUBJECTIVE & OBJECTIVE
Interval History: HV pulled yesterday, LD still in place, no leaking from wound. OR cancelled today    Medications:  Continuous Infusions:    Scheduled Meds:   atorvastatin  20 mg Oral Nightly    ceFAZolin (ANCEF) IVPB  2 g Intravenous Q12H    citalopram  20 mg Oral Daily    diltiaZEM  120 mg Oral Daily    gabapentin  300 mg Oral TID    heparin (porcine)  5,000 Units Subcutaneous Q8H    insulin detemir U-100  9 Units Subcutaneous BID    senna-docusate 8.6-50 mg  1 tablet Oral BID    tamsulosin  0.4 mg Oral Daily    traZODone  50 mg Oral QHS    valsartan  40 mg Oral Daily     PRN Meds:acetaminophen, aluminum-magnesium hydroxide-simethicone, dextrose 50%, dextrose 50%, dextrose 50%, fentaNYL, glucagon (human recombinant), glucagon (human recombinant), glucose, glucose, hydrALAZINE, hydrocodone-acetaminophen 5-325mg, insulin aspart U-100, labetalol, lidocaine HCL 2%, magnesium oxide, magnesium oxide, ondansetron, potassium chloride 10%, potassium chloride 10%, potassium chloride 10%, potassium, sodium phosphates, potassium, sodium phosphates, potassium, sodium phosphates     Review of Systems    Objective:     Weight: 109.5 kg (241 lb 6.5 oz)  Body mass index is 32.74 kg/m².  Vital Signs (Most Recent):  Temp: 98.7 °F (37.1 °C) (04/18/18 0702)  Pulse: 101 (04/18/18 1002)  Resp: 18 (04/18/18 1002)  BP: (!) 140/65 (04/18/18 1002)  SpO2: 98 % (04/18/18 1002) Vital Signs (24h Range):  Temp:  [97.5 °F (36.4 °C)-99.2 °F (37.3 °C)] 98.7 °F (37.1 °C)  Pulse:  [] 101  Resp:  [13-22] 18  SpO2:  [96 %-99 %] 98 %  BP: (128-173)/(58-75) 140/65       Date 04/18/18 0700 - 04/19/18 0659   Shift 2937-0197 9333-1255 7509-0962 24 Hour Total   I  N  T  A  K  E   Shift Total  (mL/kg)       O  U  T  P  U  T   Urine  (mL/kg/hr) 725   725    Drains 31   31    Shift Total  (mL/kg) 756  (6.9)   756  (6.9)   Weight (kg) 109.5 109.5 109.5 109.5            Closed/Suction Drain 04/10/18 1425 Anterior Neck Accordion 10 Fr. (Active)    Site Description Healing 4/13/2018  7:30 AM   Dressing Type No dressing 4/13/2018  7:30 AM   Dressing Status Clean;Dry;Intact 4/12/2018  8:08 PM   Drainage Serosanguineous 4/13/2018  7:30 AM   Status To bulb suction 4/13/2018  7:30 AM   Output (mL) 90 mL 4/11/2018  5:00 AM       Neurosurgery Physical Exam     General: well developed, well nourished, no distress  Head: normocephalic, atraumatic  Neurologic: Alert and oriented. Thought content appropriate  GCS: Motor: 6/Verbal: 5/Eyes: 4 GCS Total: 15  Mental Status: Awake, Alert, Oriented x 4  Language: No aphasia  Speech: No dysarthria  Cranial nerves: face symmetric, tongue midline, CN II-XII grossly intact.   Eyes: pupils equal, round, reactive to light with accommodation, EOMI  Pulmonary: normal respirations, not labored, no accessory muscles used  Abdomen: soft, non-distended, not tender to palpation  Sensory: intact to light touch throughout  Motor Strength: Moves all extremities spontaneously with good tone.  No abnormal movements seen.     Strength  Deltoids Triceps Biceps Wrist Extension Wrist Flexion Hand    Upper: R 3/5 3/5 4/5 5/5 5/5 4-/5    L 4/5 4/5 4/5 5/5 5/5 4/5     Iliopsoas Quadriceps Knee  Flexion Tibialis  anterior Gastro- cnemius EHL   Lower: R 4/5 5/5 5/5 5/5 5/5 5/5    L 4/5 5/5 5/5 5/5 5/5 5/5     Pronator Drift: no drift noted  Finger-to-nose: Intact bilaterally  Incision is clean, dry, intact with skin well approximated with dermabond.  There is no drainage, erythema, or edema.      Significant Labs:    Recent Labs  Lab 04/16/18  1515 04/16/18  1537 04/17/18  0212 04/18/18  0341   GLU  --   --  158* 198*     --  138 137   K  --   --  4.8 4.6   CL  --   --  108 109   CO2  --   --  22* 20*   BUN  --   --  15 19   CREATININE  --   --  0.7 0.9   CALCIUM  --   --  10.3 8.8   MG  --  1.9 1.8 1.6       Recent Labs  Lab 04/17/18  0212 04/18/18  0341   WBC 8.22 13.68*   HGB 10.6* 9.9*   HCT 32.9* 30.6*    284     No results for  input(s): LABPT, INR, APTT in the last 48 hours.  Microbiology Results (last 7 days)     ** No results found for the last 168 hours. **          Significant Diagnostics:  No new imaging

## 2018-04-18 NOTE — PLAN OF CARE
Problem: Patient Care Overview  Goal: Plan of Care Review  POC reviewed with pt and family at 1300. Pt and daughter verbalized understanding. Questions and concerns addressed. No acute events today. Pt had barium swallow study done today. Pt tolerated procedure well. Pt progressing toward goals. Will continue to monitor. See flowsheets for full assessment and VS info.
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
## 2018-04-18 NOTE — PLAN OF CARE
Problem: Patient Care Overview  Goal: Plan of Care Review  Outcome: Ongoing (interventions implemented as appropriate)  POC reviewed with pt at 0400. Pt verbalized understanding. Nurse ackowledged patient's fears and concerns pertaining to procedures and care. Pt neuro consistently the same throughout the night. Pt status was changed to NPO at midnight for procedure preparation. Pt received AM meds except heparin per JOHN Colon, NP [facility]. Pt seen by Dr. Andreas MD at approx 6AM. Pt in need of consent for procedure. Dr. Andreas MD informed and acknowledged need.  Lio's daughter was at bedside. Nurse and NP MATTHIEU Macdonald addressed questions and concerns. No acute events overnight. Pt progressing toward goals. Will continue to monitor. See flowsheets for full assessment and VS info

## 2018-04-18 NOTE — PROGRESS NOTES
Pt to report to AM procedure. Consent not obtained by Neurosurgery. Dr. Andreas MD notified while at bedside. MD verbalized plan to obtain consent.

## 2018-04-18 NOTE — NURSING
Dr. Hess w/ TREY at bedside. Orders to measure pt's neck circumference in cm q1h x 4 hr. Baseline neck measurement: 39 1/2 cm. Orders to notify NSY ASAP if 1 cm difference.

## 2018-04-18 NOTE — PROGRESS NOTES
[facility]  Neurosurgery  Progress Note    Subjective:     History of Present Illness: 82 y.o.  male with type 2 diabetes, who presents today for follow up evaluation one week prior to 2 level ACDF scheduled for 4/10/2018. Pt reports    Pt presents today wearing a cervical collar and in a wheelchair. He would like to proceed with surgery. Per pt's family, his PCP would like to see him on 4/9, the day before surgery. He notes continued neck pain and BUE numbness/tingling as well as BUE weakness. Pt is only able to walk a few steps unassisted. He has never received neck surgery.     Post-Op Info:  Procedure(s) (LRB):  DISKECTOMY AND FUSION-ANTERIOR CERVICAL (ACDF); C5-6. C6-7 (N/A)   8 Days Post-Op     Interval History: HV pulled yesterday, LD still in place, no leaking from wound. OR cancelled today    Medications:  Continuous Infusions:    Scheduled Meds:   atorvastatin  20 mg Oral Nightly    ceFAZolin (ANCEF) IVPB  2 g Intravenous Q12H    citalopram  20 mg Oral Daily    diltiaZEM  120 mg Oral Daily    gabapentin  300 mg Oral TID    heparin (porcine)  5,000 Units Subcutaneous Q8H    insulin detemir U-100  9 Units Subcutaneous BID    senna-docusate 8.6-50 mg  1 tablet Oral BID    tamsulosin  0.4 mg Oral Daily    traZODone  50 mg Oral QHS    valsartan  40 mg Oral Daily     PRN Meds:acetaminophen, aluminum-magnesium hydroxide-simethicone, dextrose 50%, dextrose 50%, dextrose 50%, fentaNYL, glucagon (human recombinant), glucagon (human recombinant), glucose, glucose, hydrALAZINE, hydrocodone-acetaminophen 5-325mg, insulin aspart U-100, labetalol, lidocaine HCL 2%, magnesium oxide, magnesium oxide, ondansetron, potassium chloride 10%, potassium chloride 10%, potassium chloride 10%, potassium, sodium phosphates, potassium, sodium phosphates, potassium, sodium phosphates     Review of Systems    Objective:     Weight: 109.5 kg (241 lb 6.5 oz)  Body mass index is 32.74 kg/m².  Vital Signs  (Most Recent):  Temp: 98.7 °F (37.1 °C) (04/18/18 0702)  Pulse: 101 (04/18/18 1002)  Resp: 18 (04/18/18 1002)  BP: (!) 140/65 (04/18/18 1002)  SpO2: 98 % (04/18/18 1002) Vital Signs (24h Range):  Temp:  [97.5 °F (36.4 °C)-99.2 °F (37.3 °C)] 98.7 °F (37.1 °C)  Pulse:  [] 101  Resp:  [13-22] 18  SpO2:  [96 %-99 %] 98 %  BP: (128-173)/(58-75) 140/65       Date 04/18/18 0700 - 04/19/18 0659   Shift 5605-8515 4796-8793 2312-6983 24 Hour Total   I  N  T  A  K  E   Shift Total  (mL/kg)       O  U  T  P  U  T   Urine  (mL/kg/hr) 725   725    Drains 31   31    Shift Total  (mL/kg) 756  (6.9)   756  (6.9)   Weight (kg) 109.5 109.5 109.5 109.5            Closed/Suction Drain 04/10/18 1425 Anterior Neck Accordion 10 Fr. (Active)   Site Description Healing 4/13/2018  7:30 AM   Dressing Type No dressing 4/13/2018  7:30 AM   Dressing Status Clean;Dry;Intact 4/12/2018  8:08 PM   Drainage Serosanguineous 4/13/2018  7:30 AM   Status To bulb suction 4/13/2018  7:30 AM   Output (mL) 90 mL 4/11/2018  5:00 AM       Neurosurgery Physical Exam     General: well developed, well nourished, no distress  Head: normocephalic, atraumatic  Neurologic: Alert and oriented. Thought content appropriate  GCS: Motor: 6/Verbal: 5/Eyes: 4 GCS Total: 15  Mental Status: Awake, Alert, Oriented x 4  Language: No aphasia  Speech: No dysarthria  Cranial nerves: face symmetric, tongue midline, CN II-XII grossly intact.   Eyes: pupils equal, round, reactive to light with accommodation, EOMI  Pulmonary: normal respirations, not labored, no accessory muscles used  Abdomen: soft, non-distended, not tender to palpation  Sensory: intact to light touch throughout  Motor Strength: Moves all extremities spontaneously with good tone.  No abnormal movements seen.     Strength  Deltoids Triceps Biceps Wrist Extension Wrist Flexion Hand    Upper: R 3/5 3/5 4/5 5/5 5/5 4-/5    L 4/5 4/5 4/5 5/5 5/5 4/5     Iliopsoas Quadriceps Knee  Flexion Tibialis  anterior  Gastro- cnemius EHL   Lower: R 4/5 5/5 5/5 5/5 5/5 5/5    L 4/5 5/5 5/5 5/5 5/5 5/5     Pronator Drift: no drift noted  Finger-to-nose: Intact bilaterally  Incision is clean, dry, intact with skin well approximated with dermabond.  There is no drainage, erythema, or edema.      Significant Labs:    Recent Labs  Lab 04/16/18  1515 04/16/18  1537 04/17/18  0212 04/18/18  0341   GLU  --   --  158* 198*     --  138 137   K  --   --  4.8 4.6   CL  --   --  108 109   CO2  --   --  22* 20*   BUN  --   --  15 19   CREATININE  --   --  0.7 0.9   CALCIUM  --   --  10.3 8.8   MG  --  1.9 1.8 1.6       Recent Labs  Lab 04/17/18  0212 04/18/18  0341   WBC 8.22 13.68*   HGB 10.6* 9.9*   HCT 32.9* 30.6*    284     No results for input(s): LABPT, INR, APTT in the last 48 hours.  Microbiology Results (last 7 days)     ** No results found for the last 168 hours. **          Significant Diagnostics:  No new imaging    Assessment/Plan:     * Cervical myelopathy with cervical radiculopathy    82 y.o. male s/p C3-5 ACDF POD#8    -Neurologically stable  -Leave incision open to air  -HV removed, monitor site for leaking  -LD placed for continued CSF leak - Drain 15cc/hr, continue today  -Ancef while drains in place  -Cervical collar when OOB or with activity  -ok to sit up today  -IS to bed side. Patient to use atleast 10x every hour.  CV- goal normotension  Pulm- on RA  FENGI- ADAT. Goal eunatremia.   Heme/ID- hgb/hct stable. Afebrile.   ppx- SQH; hold at midnight, PILI/SCDs, gi ppx. Continue bowel regimen daily.  Continue to hold Eliquis  Medical management per [facility]    Dispo- cont ICU care of lumabr drain.            Devante Holman MD  Neurosurgery  [facility]

## 2018-04-18 NOTE — SUBJECTIVE & OBJECTIVE
Interval History:    -Insulin increased  -No OR today  -Dilt Adjusted    Review of Systems   Respiratory: Negative for apnea, cough, choking, chest tightness, shortness of breath, wheezing and stridor.    Cardiovascular: Negative for chest pain, palpitations and leg swelling.   Gastrointestinal: Negative for abdominal distention, abdominal pain, anal bleeding, blood in stool, nausea and vomiting.       Objective:     Vitals:  Temp: 98.7 °F (37.1 °C)  Pulse: 101  Rhythm: normal sinus rhythm  BP: (!) 140/65  MAP (mmHg): 93  Resp: 18  SpO2: 98 %  O2 Device (Oxygen Therapy): room air    Temp  Min: 97.5 °F (36.4 °C)  Max: 99.2 °F (37.3 °C)  Pulse  Min: 84  Max: 104  BP  Min: 128/58  Max: 173/75  MAP (mmHg)  Min: 83  Max: 113  Resp  Min: 13  Max: 22  SpO2  Min: 96 %  Max: 99 %    04/17 0701 - 04/18 0700  In: 850 [P.O.:850]  Out: 2876 [Urine:2660; Drains:216]   Unmeasured Output  Urine Occurrence: 1  Stool Occurrence: 0  Pad Count: 2       Physical Exam   Constitutional: He appears well-developed.   Cardiovascular: Normal rate, regular rhythm, normal heart sounds and intact distal pulses.    Pulmonary/Chest: Effort normal and breath sounds normal.   Abdominal: Soft. Bowel sounds are normal.   Neurological:   E4 V5 M6  AAO x 4  PERRLA, EOMI, 3mm/3mm  VAN, folows commands  Sensation intact    RUE 3/5  LUE 3/5  RLE 5/5  LLE 5/5    Skin: Skin is warm.   Vitals reviewed.        Medications:  Continuous Scheduled  atorvastatin 20 mg Nightly   ceFAZolin (ANCEF) IVPB 2 g Q12H   citalopram 20 mg Daily   diltiaZEM 120 mg Daily   gabapentin 300 mg TID   heparin (porcine) 5,000 Units Q8H   insulin detemir U-100 9 Units BID   senna-docusate 8.6-50 mg 1 tablet BID   tamsulosin 0.4 mg Daily   traZODone 50 mg QHS   valsartan 40 mg Daily   PRN  acetaminophen 650 mg Q4H PRN   aluminum-magnesium hydroxide-simethicone 30 mL Q4H PRN   dextrose 50% 12.5 g PRN   dextrose 50% 12.5 g PRN   dextrose 50% 25 g PRN   fentaNYL 12.5 mcg Q2H PRN    glucagon (human recombinant) 1 mg PRN   glucagon (human recombinant) 1 mg PRN   glucose 16 g PRN   glucose 24 g PRN   hydrALAZINE 10 mg Q4H PRN   hydrocodone-acetaminophen 5-325mg 1 tablet Q4H PRN   insulin aspart U-100 1-10 Units QID (AC + HS) PRN   labetalol 10 mg Q6H PRN   lidocaine HCL 2%  PRN   magnesium oxide 800 mg PRN   magnesium oxide 800 mg PRN   ondansetron 8 mg Q6H PRN   potassium chloride 10% 40 mEq PRN   potassium chloride 10% 40 mEq PRN   potassium chloride 10% 60 mEq PRN   potassium, sodium phosphates 2 packet PRN   potassium, sodium phosphates 2 packet PRN   potassium, sodium phosphates 2 packet PRN     Today I personally reviewed pertinent medications, lines/drains/airways, imaging, cardiology, lab results, microbiology results, notably:    Diet  Diet NPO  Diet NPO

## 2018-04-19 PROBLEM — D72.829 LEUKOCYTOSIS: Status: ACTIVE | Noted: 2018-04-19

## 2018-04-19 PROBLEM — R00.0 TACHYCARDIA: Status: ACTIVE | Noted: 2018-04-19

## 2018-04-19 LAB
ALBUMIN SERPL BCP-MCNC: 2.8 G/DL
ALBUMIN SERPL BCP-MCNC: 2.8 G/DL
ALP SERPL-CCNC: 73 U/L
ALP SERPL-CCNC: 75 U/L
ALT SERPL W/O P-5'-P-CCNC: 5 U/L
ALT SERPL W/O P-5'-P-CCNC: <5 U/L
ANION GAP SERPL CALC-SCNC: 7 MMOL/L
ANION GAP SERPL CALC-SCNC: 7 MMOL/L
ANISOCYTOSIS BLD QL SMEAR: SLIGHT
AST SERPL-CCNC: 6 U/L
AST SERPL-CCNC: 9 U/L
BACTERIA #/AREA URNS AUTO: NORMAL /HPF
BASO STIPL BLD QL SMEAR: ABNORMAL
BASOPHILS # BLD AUTO: 0.02 K/UL
BASOPHILS # BLD AUTO: 0.03 K/UL
BASOPHILS NFR BLD: 0.1 %
BASOPHILS NFR BLD: 0.2 %
BILIRUB SERPL-MCNC: 0.7 MG/DL
BILIRUB SERPL-MCNC: 0.7 MG/DL
BILIRUB UR QL STRIP: NEGATIVE
BUN SERPL-MCNC: 21 MG/DL
BUN SERPL-MCNC: 22 MG/DL
CALCIUM SERPL-MCNC: 10.4 MG/DL
CALCIUM SERPL-MCNC: 10.7 MG/DL
CHLORIDE SERPL-SCNC: 102 MMOL/L
CHLORIDE SERPL-SCNC: 102 MMOL/L
CLARITY UR REFRACT.AUTO: CLEAR
CO2 SERPL-SCNC: 24 MMOL/L
CO2 SERPL-SCNC: 26 MMOL/L
COLOR UR AUTO: YELLOW
CREAT SERPL-MCNC: 0.9 MG/DL
CREAT SERPL-MCNC: 0.9 MG/DL
DIFFERENTIAL METHOD: ABNORMAL
DIFFERENTIAL METHOD: ABNORMAL
EOSINOPHIL # BLD AUTO: 0 K/UL
EOSINOPHIL # BLD AUTO: 0 K/UL
EOSINOPHIL NFR BLD: 0 %
EOSINOPHIL NFR BLD: 0 %
ERYTHROCYTE [DISTWIDTH] IN BLOOD BY AUTOMATED COUNT: 15.6 %
ERYTHROCYTE [DISTWIDTH] IN BLOOD BY AUTOMATED COUNT: 15.7 %
EST. GFR  (AFRICAN AMERICAN): >60 ML/MIN/1.73 M^2
EST. GFR  (AFRICAN AMERICAN): >60 ML/MIN/1.73 M^2
EST. GFR  (NON AFRICAN AMERICAN): >60 ML/MIN/1.73 M^2
EST. GFR  (NON AFRICAN AMERICAN): >60 ML/MIN/1.73 M^2
GLUCOSE SERPL-MCNC: 228 MG/DL
GLUCOSE SERPL-MCNC: 241 MG/DL
GLUCOSE UR QL STRIP: ABNORMAL
HCT VFR BLD AUTO: 33.1 %
HCT VFR BLD AUTO: 34.1 %
HGB BLD-MCNC: 10.5 G/DL
HGB BLD-MCNC: 10.7 G/DL
HGB UR QL STRIP: NEGATIVE
IMM GRANULOCYTES # BLD AUTO: 0.24 K/UL
IMM GRANULOCYTES # BLD AUTO: 0.24 K/UL
IMM GRANULOCYTES NFR BLD AUTO: 1.2 %
IMM GRANULOCYTES NFR BLD AUTO: 1.2 %
KETONES UR QL STRIP: ABNORMAL
LEUKOCYTE ESTERASE UR QL STRIP: NEGATIVE
LYMPHOCYTES # BLD AUTO: 2 K/UL
LYMPHOCYTES # BLD AUTO: 2 K/UL
LYMPHOCYTES NFR BLD: 10.1 %
LYMPHOCYTES NFR BLD: 9.8 %
MAGNESIUM SERPL-MCNC: 1.8 MG/DL
MCH RBC QN AUTO: 30.5 PG
MCH RBC QN AUTO: 30.7 PG
MCHC RBC AUTO-ENTMCNC: 31.4 G/DL
MCHC RBC AUTO-ENTMCNC: 31.7 G/DL
MCV RBC AUTO: 97 FL
MCV RBC AUTO: 97 FL
MICROSCOPIC COMMENT: NORMAL
MONOCYTES # BLD AUTO: 1.5 K/UL
MONOCYTES # BLD AUTO: 2.3 K/UL
MONOCYTES NFR BLD: 11.1 %
MONOCYTES NFR BLD: 7.4 %
NEUTROPHILS # BLD AUTO: 16 K/UL
NEUTROPHILS # BLD AUTO: 16.1 K/UL
NEUTROPHILS NFR BLD: 77.8 %
NEUTROPHILS NFR BLD: 81.1 %
NITRITE UR QL STRIP: NEGATIVE
NRBC BLD-RTO: 0 /100 WBC
NRBC BLD-RTO: 0 /100 WBC
PH UR STRIP: 6 [PH] (ref 5–8)
PHOSPHATE SERPL-MCNC: 2.8 MG/DL
PLATELET # BLD AUTO: 330 K/UL
PLATELET # BLD AUTO: 330 K/UL
PLATELET BLD QL SMEAR: ABNORMAL
PMV BLD AUTO: 9.1 FL
PMV BLD AUTO: 9.1 FL
POCT GLUCOSE: 182 MG/DL (ref 70–110)
POCT GLUCOSE: 204 MG/DL (ref 70–110)
POCT GLUCOSE: 214 MG/DL (ref 70–110)
POIKILOCYTOSIS BLD QL SMEAR: SLIGHT
POTASSIUM SERPL-SCNC: 4.8 MMOL/L
POTASSIUM SERPL-SCNC: 5 MMOL/L
PROCALCITONIN SERPL IA-MCNC: 0.21 NG/ML
PROT SERPL-MCNC: 6.4 G/DL
PROT SERPL-MCNC: 6.5 G/DL
PROT UR QL STRIP: NEGATIVE
RBC # BLD AUTO: 3.42 M/UL
RBC # BLD AUTO: 3.51 M/UL
RBC #/AREA URNS AUTO: 2 /HPF (ref 0–4)
SCHISTOCYTES BLD QL SMEAR: ABNORMAL
SCHISTOCYTES BLD QL SMEAR: PRESENT
SODIUM SERPL-SCNC: 133 MMOL/L
SODIUM SERPL-SCNC: 135 MMOL/L
SP GR UR STRIP: 1.02 (ref 1–1.03)
SQUAMOUS #/AREA URNS AUTO: 0 /HPF
URN SPEC COLLECT METH UR: ABNORMAL
UROBILINOGEN UR STRIP-ACNC: 2 EU/DL
WBC # BLD AUTO: 19.66 K/UL
WBC # BLD AUTO: 20.72 K/UL
WBC #/AREA URNS AUTO: 2 /HPF (ref 0–5)
YEAST UR QL AUTO: NORMAL

## 2018-04-19 PROCEDURE — 63600175 PHARM REV CODE 636 W HCPCS: Performed by: STUDENT IN AN ORGANIZED HEALTH CARE EDUCATION/TRAINING PROGRAM

## 2018-04-19 PROCEDURE — 84100 ASSAY OF PHOSPHORUS: CPT

## 2018-04-19 PROCEDURE — 87070 CULTURE OTHR SPECIMN AEROBIC: CPT

## 2018-04-19 PROCEDURE — 25000003 PHARM REV CODE 250: Performed by: NEUROLOGICAL SURGERY

## 2018-04-19 PROCEDURE — 87040 BLOOD CULTURE FOR BACTERIA: CPT | Mod: 59

## 2018-04-19 PROCEDURE — 85025 COMPLETE CBC W/AUTO DIFF WBC: CPT | Mod: 91

## 2018-04-19 PROCEDURE — 63600175 PHARM REV CODE 636 W HCPCS: Performed by: NURSE PRACTITIONER

## 2018-04-19 PROCEDURE — 25000003 PHARM REV CODE 250: Performed by: NURSE PRACTITIONER

## 2018-04-19 PROCEDURE — 80053 COMPREHEN METABOLIC PANEL: CPT | Mod: 91

## 2018-04-19 PROCEDURE — 20600001 HC STEP DOWN PRIVATE ROOM

## 2018-04-19 PROCEDURE — 63600175 PHARM REV CODE 636 W HCPCS: Performed by: PHYSICIAN ASSISTANT

## 2018-04-19 PROCEDURE — 25000003 PHARM REV CODE 250: Performed by: STUDENT IN AN ORGANIZED HEALTH CARE EDUCATION/TRAINING PROGRAM

## 2018-04-19 PROCEDURE — 81001 URINALYSIS AUTO W/SCOPE: CPT

## 2018-04-19 PROCEDURE — 25000003 PHARM REV CODE 250: Performed by: PSYCHIATRY & NEUROLOGY

## 2018-04-19 PROCEDURE — 84145 PROCALCITONIN (PCT): CPT

## 2018-04-19 PROCEDURE — 87205 SMEAR GRAM STAIN: CPT

## 2018-04-19 PROCEDURE — 83735 ASSAY OF MAGNESIUM: CPT

## 2018-04-19 PROCEDURE — 63600175 PHARM REV CODE 636 W HCPCS: Performed by: PSYCHIATRY & NEUROLOGY

## 2018-04-19 RX ORDER — INSULIN ASPART 100 [IU]/ML
3 INJECTION, SOLUTION INTRAVENOUS; SUBCUTANEOUS
Status: DISCONTINUED | OUTPATIENT
Start: 2018-04-19 | End: 2018-04-23

## 2018-04-19 RX ORDER — METOPROLOL TARTRATE 25 MG/1
12.5 TABLET ORAL EVERY 8 HOURS
Status: DISCONTINUED | OUTPATIENT
Start: 2018-04-19 | End: 2018-04-23

## 2018-04-19 RX ORDER — BENZONATATE 100 MG/1
100 CAPSULE ORAL 3 TIMES DAILY PRN
Status: DISCONTINUED | OUTPATIENT
Start: 2018-04-19 | End: 2018-05-01

## 2018-04-19 RX ADMIN — HYDRALAZINE HYDROCHLORIDE 10 MG: 20 INJECTION INTRAMUSCULAR; INTRAVENOUS at 11:04

## 2018-04-19 RX ADMIN — HEPARIN SODIUM 5000 UNITS: 5000 INJECTION, SOLUTION INTRAVENOUS; SUBCUTANEOUS at 05:04

## 2018-04-19 RX ADMIN — HYDROCODONE BITARTRATE AND ACETAMINOPHEN 1 TABLET: 5; 325 TABLET ORAL at 08:04

## 2018-04-19 RX ADMIN — INSULIN ASPART 4 UNITS: 100 INJECTION, SOLUTION INTRAVENOUS; SUBCUTANEOUS at 02:04

## 2018-04-19 RX ADMIN — BENZONATATE 100 MG: 100 CAPSULE ORAL at 08:04

## 2018-04-19 RX ADMIN — FENTANYL CITRATE 12.5 MCG: 50 INJECTION, SOLUTION INTRAMUSCULAR; INTRAVENOUS at 02:04

## 2018-04-19 RX ADMIN — BENZONATATE 100 MG: 100 CAPSULE ORAL at 10:04

## 2018-04-19 RX ADMIN — MAGNESIUM OXIDE TAB 400 MG (241.3 MG ELEMENTAL MG) 800 MG: 400 (241.3 MG) TAB at 02:04

## 2018-04-19 RX ADMIN — GABAPENTIN 300 MG: 300 CAPSULE ORAL at 08:04

## 2018-04-19 RX ADMIN — TRAZODONE HYDROCHLORIDE 50 MG: 50 TABLET ORAL at 08:04

## 2018-04-19 RX ADMIN — DILTIAZEM HYDROCHLORIDE 120 MG: 120 CAPSULE, COATED, EXTENDED RELEASE ORAL at 08:04

## 2018-04-19 RX ADMIN — STANDARDIZED SENNA CONCENTRATE AND DOCUSATE SODIUM 1 TABLET: 8.6; 5 TABLET, FILM COATED ORAL at 08:04

## 2018-04-19 RX ADMIN — HYDROCODONE BITARTRATE AND ACETAMINOPHEN 1 TABLET: 5; 325 TABLET ORAL at 07:04

## 2018-04-19 RX ADMIN — Medication 12.5 MG: at 08:04

## 2018-04-19 RX ADMIN — HYDROCODONE BITARTRATE AND ACETAMINOPHEN 1 TABLET: 5; 325 TABLET ORAL at 01:04

## 2018-04-19 RX ADMIN — HEPARIN SODIUM 5000 UNITS: 5000 INJECTION, SOLUTION INTRAVENOUS; SUBCUTANEOUS at 09:04

## 2018-04-19 RX ADMIN — VALSARTAN 40 MG: 40 TABLET ORAL at 08:04

## 2018-04-19 RX ADMIN — MAGNESIUM OXIDE TAB 400 MG (241.3 MG ELEMENTAL MG) 800 MG: 400 (241.3 MG) TAB at 06:04

## 2018-04-19 RX ADMIN — CITALOPRAM HYDROBROMIDE 20 MG: 20 TABLET ORAL at 08:04

## 2018-04-19 RX ADMIN — GABAPENTIN 300 MG: 300 CAPSULE ORAL at 02:04

## 2018-04-19 RX ADMIN — CEFAZOLIN 2 G: 330 INJECTION, POWDER, FOR SOLUTION INTRAMUSCULAR; INTRAVENOUS at 02:04

## 2018-04-19 RX ADMIN — INSULIN ASPART 1 UNITS: 100 INJECTION, SOLUTION INTRAVENOUS; SUBCUTANEOUS at 08:04

## 2018-04-19 RX ADMIN — TAMSULOSIN HYDROCHLORIDE 0.4 MG: 0.4 CAPSULE ORAL at 08:04

## 2018-04-19 RX ADMIN — HEPARIN SODIUM 5000 UNITS: 5000 INJECTION, SOLUTION INTRAVENOUS; SUBCUTANEOUS at 01:04

## 2018-04-19 RX ADMIN — INSULIN ASPART 3 UNITS: 100 INJECTION, SOLUTION INTRAVENOUS; SUBCUTANEOUS at 02:04

## 2018-04-19 RX ADMIN — ATORVASTATIN CALCIUM 20 MG: 20 TABLET, FILM COATED ORAL at 08:04

## 2018-04-19 RX ADMIN — INSULIN ASPART 4 UNITS: 100 INJECTION, SOLUTION INTRAVENOUS; SUBCUTANEOUS at 08:04

## 2018-04-19 RX ADMIN — BENZONATATE 100 MG: 100 CAPSULE ORAL at 02:04

## 2018-04-19 NOTE — PLAN OF CARE
04/19/18 0925   Discharge Reassessment   Assessment Type Discharge Planning Reassessment   Provided patient/caregiver education on the expected discharge date and the discharge plan No   Do you have any problems affording any of your prescribed medications? No   Discharge Plan A Skilled Nursing Facility   Discharge Plan B Rehab   Patient choice form signed by patient/caregiver N/A   Can the patient answer the patient profile reliably? Yes, cognitively intact   How does the patient rate their overall health at the present time? Fair   Describe the patient's ability to walk at the present time. Major restrictions/daily assistance from another person   How often would a person be available to care for the patient? Whenever needed   Number of comorbid conditions (as recorded on the chart) Three   During the past month, has the patient often been bothered by feeling down, depressed or hopeless? No   During the past month, has the patient often been bothered by little interest or pleasure in doing things? No       Patient with lumbar drain.  Not medically stable for discharge    Kristan Hahn RN, CCRN-K, [facility]  Neuro-Critical Care   [phone]

## 2018-04-19 NOTE — PLAN OF CARE
Problem: Patient Care Overview  Goal: Plan of Care Review  Outcome: Ongoing (interventions implemented as appropriate)  POC reviewed with pt at 2300. Pt verbalized understanding. Questions and concerns addressed. Pt progressing toward goals. Will continue to monitor. See flowsheets for full assessment and VS info. See notes for events overnight.

## 2018-04-19 NOTE — SUBJECTIVE & OBJECTIVE
Interval History:    -Insulin increased  -Discuss transfer to floor under NSGY    Review of Systems   Respiratory: Negative for apnea, cough, choking, chest tightness, shortness of breath, wheezing and stridor.    Cardiovascular: Negative for chest pain, palpitations and leg swelling.   Gastrointestinal: Negative for abdominal distention, abdominal pain, anal bleeding, blood in stool, nausea and vomiting.       Objective:     Vitals:  Temp: 98.9 °F (37.2 °C)  Pulse: 101  Rhythm: normal sinus rhythm  BP: (!) 163/60  MAP (mmHg): 86  Resp: 19  SpO2: 95 %  O2 Device (Oxygen Therapy): room air    Temp  Min: 98.4 °F (36.9 °C)  Max: 99.9 °F (37.7 °C)  Pulse  Min: 89  Max: 114  BP  Min: 122/68  Max: 163/60  MAP (mmHg)  Min: 83  Max: 102  Resp  Min: 14  Max: 25  SpO2  Min: 93 %  Max: 100 %    04/18 0701 - 04/19 0700  In: 1150 [P.O.:1150]  Out: 1884 [Urine:1625; Drains:259]   Unmeasured Output  Urine Occurrence: 1  Stool Occurrence: 0  Pad Count: 1       Physical Exam   Constitutional: He appears well-developed.   Cardiovascular: Normal rate, regular rhythm, normal heart sounds and intact distal pulses.    Pulmonary/Chest: Effort normal and breath sounds normal.   Abdominal: Soft. Bowel sounds are normal.   Neurological:   E4 V5 M6  AAO x 4  PERRLA, EOMI, 3mm/3mm  VAN, folows commands  Sensation intact    RUE 3/5  LUE 3/5  RLE 5/5  LLE 5/5    Skin: Skin is warm.   Vitals reviewed.        Medications:  Continuous Scheduled    atorvastatin 20 mg Nightly   ceFAZolin (ANCEF) IVPB 2 g Q12H   citalopram 20 mg Daily   diltiaZEM 120 mg Daily   gabapentin 300 mg TID   heparin (porcine) 5,000 Units Q8H   insulin detemir U-100 11 Units BID   senna-docusate 8.6-50 mg 1 tablet BID   tamsulosin 0.4 mg Daily   traZODone 50 mg QHS   valsartan 40 mg Daily   PRN    acetaminophen 650 mg Q4H PRN   aluminum-magnesium hydroxide-simethicone 30 mL Q4H PRN   benzonatate 100 mg TID PRN   dextrose 50% 12.5 g PRN   dextrose 50% 12.5 g PRN   dextrose 50%  25 g PRN   fentaNYL 12.5 mcg Q2H PRN   glucagon (human recombinant) 1 mg PRN   glucagon (human recombinant) 1 mg PRN   glucose 16 g PRN   glucose 24 g PRN   hydrALAZINE 10 mg Q4H PRN   hydrocodone-acetaminophen 5-325mg 1 tablet Q4H PRN   insulin aspart U-100 1-10 Units QID (AC + HS) PRN   labetalol 10 mg Q6H PRN   lidocaine HCL 2%  PRN   magnesium oxide 800 mg PRN   magnesium oxide 800 mg PRN   ondansetron 8 mg Q6H PRN   potassium chloride 10% 40 mEq PRN   potassium chloride 10% 40 mEq PRN   potassium chloride 10% 60 mEq PRN   potassium, sodium phosphates 2 packet PRN   potassium, sodium phosphates 2 packet PRN   potassium, sodium phosphates 2 packet PRN     Today I personally reviewed pertinent medications, lines/drains/airways, imaging, cardiology, lab results, microbiology results,   Diet  Diet diabetic [facility]; 2000 Calorie  Diet diabetic [facility]; 2000 Calorie

## 2018-04-19 NOTE — SUBJECTIVE & OBJECTIVE
Interval History: NAEON. No leakage from wound.     Medications:  Continuous Infusions:    Scheduled Meds:   atorvastatin  20 mg Oral Nightly    ceFAZolin (ANCEF) IVPB  2 g Intravenous Q12H    citalopram  20 mg Oral Daily    diltiaZEM  120 mg Oral Daily    gabapentin  300 mg Oral TID    heparin (porcine)  5,000 Units Subcutaneous Q8H    insulin aspart U-100  3 Units Subcutaneous TIDWM    insulin detemir U-100  9 Units Subcutaneous BID    senna-docusate 8.6-50 mg  1 tablet Oral BID    tamsulosin  0.4 mg Oral Daily    traZODone  50 mg Oral QHS    valsartan  40 mg Oral Daily     PRN Meds:acetaminophen, aluminum-magnesium hydroxide-simethicone, benzonatate, dextrose 50%, dextrose 50%, dextrose 50%, fentaNYL, glucagon (human recombinant), glucagon (human recombinant), glucose, glucose, hydrALAZINE, hydrocodone-acetaminophen 5-325mg, insulin aspart U-100, labetalol, lidocaine HCL 2%, magnesium oxide, magnesium oxide, ondansetron, potassium chloride 10%, potassium chloride 10%, potassium chloride 10%, potassium, sodium phosphates, potassium, sodium phosphates, potassium, sodium phosphates     Review of Systems    Objective:     Weight: 109.5 kg (241 lb 6.5 oz)  Body mass index is 32.74 kg/m².  Vital Signs (Most Recent):  Temp: 98.9 °F (37.2 °C) (04/19/18 1501)  Pulse: 94 (04/19/18 1601)  Resp: 16 (04/19/18 1601)  BP: 128/60 (04/19/18 1601)  SpO2: (!) 94 % (04/19/18 1601) Vital Signs (24h Range):  Temp:  [97.6 °F (36.4 °C)-99.9 °F (37.7 °C)] 98.9 °F (37.2 °C)  Pulse:  [] 94  Resp:  [14-25] 16  SpO2:  [93 %-100 %] 94 %  BP: (123-163)/(58-74) 128/60       Date 04/19/18 0700 - 04/20/18 0659   Shift 3705-3925 6959-2981 5581-7966 24 Hour Total   I  N  T  A  K  E   P.O. 600 120  720    Shift Total  (mL/kg) 600  (5.5) 120  (1.1)  720  (6.6)   O  U  T  P  U  T   Urine  (mL/kg/hr) 600  (0.7)   600    Drains 30 0  30    Shift Total  (mL/kg) 630  (5.8) 0  (0)  630  (5.8)   Weight (kg) 109.5 109.5 109.5 109.5             Closed/Suction Drain 04/10/18 1425 Anterior Neck Accordion 10 Fr. (Active)   Site Description Healing 4/13/2018  7:30 AM   Dressing Type No dressing 4/13/2018  7:30 AM   Dressing Status Clean;Dry;Intact 4/12/2018  8:08 PM   Drainage Serosanguineous 4/13/2018  7:30 AM   Status To bulb suction 4/13/2018  7:30 AM   Output (mL) 90 mL 4/11/2018  5:00 AM       Neurosurgery Physical Exam     General: well developed, well nourished, no distress  Head: normocephalic, atraumatic  Neurologic: Alert and oriented. Thought content appropriate  GCS: Motor: 6/Verbal: 5/Eyes: 4 GCS Total: 15  Mental Status: Awake, Alert, Oriented x 4  Language: No aphasia  Speech: No dysarthria  Cranial nerves: face symmetric, tongue midline, CN II-XII grossly intact.   Eyes: pupils equal, round, reactive to light with accommodation, EOMI  Pulmonary: normal respirations, not labored, no accessory muscles used  Abdomen: soft, non-distended, not tender to palpation  Sensory: intact to light touch throughout  Motor Strength: Moves all extremities spontaneously with good tone.  No abnormal movements seen.     Strength  Deltoids Triceps Biceps Wrist Extension Wrist Flexion Hand    Upper: R 3/5 3/5 4/5 5/5 5/5 4-/5    L 4/5 4/5 4/5 5/5 5/5 4/5     Iliopsoas Quadriceps Knee  Flexion Tibialis  anterior Gastro- cnemius EHL   Lower: R 4/5 5/5 5/5 5/5 5/5 5/5    L 4/5 5/5 5/5 5/5 5/5 5/5     Pronator Drift: no drift noted  Finger-to-nose: Intact bilaterally  Incision is clean, dry, intact with skin well approximated with dermabond.  There is no drainage, erythema, or edema.      Significant Labs:    Recent Labs  Lab 04/18/18  0341 04/19/18  0120 04/19/18  0852   * 241* 228*    133* 135*   K 4.6 5.0 4.8    102 102   CO2 20* 24 26   BUN 19 22 21   CREATININE 0.9 0.9 0.9   CALCIUM 8.8 10.4 10.7*   MG 1.6 1.8  --        Recent Labs  Lab 04/18/18  0341 04/19/18  0120 04/19/18  0852   WBC 13.68* 20.72* 19.66*   HGB 9.9* 10.5* 10.7*   HCT  30.6* 33.1* 34.1*    330 330     No results for input(s): LABPT, INR, APTT in the last 48 hours.  Microbiology Results (last 7 days)     Procedure Component Value Units Date/Time    Blood culture [[id number]] Collected:  04/19/18 1005    Order Status:  Sent Specimen:  Blood from Peripheral, Wrist, Right Updated:  04/19/18 1131    Blood culture [[id number]] Collected:  04/19/18 0952    Order Status:  Sent Specimen:  Blood from Peripheral, Hand, Left Updated:  04/19/18 1127    Culture, Respiratory with Gram Stain [[id number]] Collected:  04/19/18 0231    Order Status:  Completed Specimen:  Respiratory from Sputum, Expectorated Updated:  04/19/18 0902     Gram Stain (Respiratory) <10 epithelial cells per low power field.     Gram Stain (Respiratory) Many WBC's     Gram Stain (Respiratory) Many Gram positive cocci     Gram Stain (Respiratory) Many Gram negative rods     Gram Stain (Respiratory) Rare Gram positive rods          Significant Diagnostics:  No new imaging
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
## 2018-04-19 NOTE — PROGRESS NOTES
NCC and NSGY notified of change in pt CSF color from clear/yellow to blood tinged since beginning of shift. No new orders at this time. Will continue to monitor closely.

## 2018-04-19 NOTE — ASSESSMENT & PLAN NOTE
- NSGY following   - S/P ACDF with CSF leak 4/10  - Lumbar drain clamped  - PT/Ot when able   - passed sp eval and diet started   - Neuro checks Q 1   - ok to sit up with lumbar drain clamped per nsgy  - Continue Ancef while drain in place  - 4/17 CSF drainage noted NGSY made aware Drain is clamped; P  4/19 discussing tranfers to floor with NSGY service

## 2018-04-19 NOTE — ASSESSMENT & PLAN NOTE
82 y.o. male s/p C3-5 ACDF POD#9    -Neurologically stable  -Leave incision open to air  -HV removed, monitor site for leaking  -LD placed for continued CSF leak - CLAMP TODAY  -Ancef while drains in place  -Cervical collar when OOB or with activity  -ok to sit up  -IS to bed side. Patient to use atleast 10x every hour.  CV- goal normotension  Pulm- on RA  FENGI- ADAT. Goal eunatremia.   Heme/ID- hgb/hct stable. Afebrile.   ppx- PILI/SCDs, gi ppx. Continue bowel regimen daily.  Continue to hold Eliquis  Medical management per NCC    Dispo- cont ICU care of lumabr drain.

## 2018-04-19 NOTE — NURSING
Pt transferred to  726 by RN x2 in bed. HUMBERTO Alcantara notified of pt arrival. Pt oriented to , call light within reach, and bed alarm set. Lumbar drain remains clamped per neurosurgery orders. Family in  with pt. NAD.

## 2018-04-19 NOTE — PROGRESS NOTES
NCC notified of pt frequent cough and elevated WBCs. Respiratory culture ordered and prn cough med ordered. Will continue to monitor.

## 2018-04-19 NOTE — PROGRESS NOTES
[facility]  Neurosurgery  Progress Note    Subjective:     History of Present Illness: 82 y.o.  male with type 2 diabetes, who presents today for follow up evaluation one week prior to 2 level ACDF scheduled for 4/10/2018. Pt reports    Pt presents today wearing a cervical collar and in a wheelchair. He would like to proceed with surgery. Per pt's family, his PCP would like to see him on 4/9, the day before surgery. He notes continued neck pain and BUE numbness/tingling as well as BUE weakness. Pt is only able to walk a few steps unassisted. He has never received neck surgery.     Post-Op Info:  Procedure(s) (LRB):  DISKECTOMY AND FUSION-ANTERIOR CERVICAL (ACDF); C5-6. C6-7 (N/A)   9 Days Post-Op     Interval History: NAEON. No leakage from wound.     Medications:  Continuous Infusions:    Scheduled Meds:   atorvastatin  20 mg Oral Nightly    ceFAZolin (ANCEF) IVPB  2 g Intravenous Q12H    citalopram  20 mg Oral Daily    diltiaZEM  120 mg Oral Daily    gabapentin  300 mg Oral TID    heparin (porcine)  5,000 Units Subcutaneous Q8H    insulin aspart U-100  3 Units Subcutaneous TIDWM    insulin detemir U-100  9 Units Subcutaneous BID    senna-docusate 8.6-50 mg  1 tablet Oral BID    tamsulosin  0.4 mg Oral Daily    traZODone  50 mg Oral QHS    valsartan  40 mg Oral Daily     PRN Meds:acetaminophen, aluminum-magnesium hydroxide-simethicone, benzonatate, dextrose 50%, dextrose 50%, dextrose 50%, fentaNYL, glucagon (human recombinant), glucagon (human recombinant), glucose, glucose, hydrALAZINE, hydrocodone-acetaminophen 5-325mg, insulin aspart U-100, labetalol, lidocaine HCL 2%, magnesium oxide, magnesium oxide, ondansetron, potassium chloride 10%, potassium chloride 10%, potassium chloride 10%, potassium, sodium phosphates, potassium, sodium phosphates, potassium, sodium phosphates     Review of Systems    Objective:     Weight: 109.5 kg (241 lb 6.5 oz)  Body mass index is 32.74  kg/m².  Vital Signs (Most Recent):  Temp: 98.9 °F (37.2 °C) (04/19/18 1501)  Pulse: 94 (04/19/18 1601)  Resp: 16 (04/19/18 1601)  BP: 128/60 (04/19/18 1601)  SpO2: (!) 94 % (04/19/18 1601) Vital Signs (24h Range):  Temp:  [97.6 °F (36.4 °C)-99.9 °F (37.7 °C)] 98.9 °F (37.2 °C)  Pulse:  [] 94  Resp:  [14-25] 16  SpO2:  [93 %-100 %] 94 %  BP: (123-163)/(58-74) 128/60       Date 04/19/18 0700 - 04/20/18 0659   Shift 5713-0150 7450-2828 2997-4884 24 Hour Total   I  N  T  A  K  E   P.O. 600 120  720    Shift Total  (mL/kg) 600  (5.5) 120  (1.1)  720  (6.6)   O  U  T  P  U  T   Urine  (mL/kg/hr) 600  (0.7)   600    Drains 30 0  30    Shift Total  (mL/kg) 630  (5.8) 0  (0)  630  (5.8)   Weight (kg) 109.5 109.5 109.5 109.5            Closed/Suction Drain 04/10/18 1425 Anterior Neck Accordion 10 Fr. (Active)   Site Description Healing 4/13/2018  7:30 AM   Dressing Type No dressing 4/13/2018  7:30 AM   Dressing Status Clean;Dry;Intact 4/12/2018  8:08 PM   Drainage Serosanguineous 4/13/2018  7:30 AM   Status To bulb suction 4/13/2018  7:30 AM   Output (mL) 90 mL 4/11/2018  5:00 AM       Neurosurgery Physical Exam     General: well developed, well nourished, no distress  Head: normocephalic, atraumatic  Neurologic: Alert and oriented. Thought content appropriate  GCS: Motor: 6/Verbal: 5/Eyes: 4 GCS Total: 15  Mental Status: Awake, Alert, Oriented x 4  Language: No aphasia  Speech: No dysarthria  Cranial nerves: face symmetric, tongue midline, CN II-XII grossly intact.   Eyes: pupils equal, round, reactive to light with accommodation, EOMI  Pulmonary: normal respirations, not labored, no accessory muscles used  Abdomen: soft, non-distended, not tender to palpation  Sensory: intact to light touch throughout  Motor Strength: Moves all extremities spontaneously with good tone.  No abnormal movements seen.     Strength  Deltoids Triceps Biceps Wrist Extension Wrist Flexion Hand    Upper: R 3/5 3/5 4/5 5/5 5/5 4-/5    L 4/5  4/5 4/5 5/5 5/5 4/5     Iliopsoas Quadriceps Knee  Flexion Tibialis  anterior Gastro- cnemius EHL   Lower: R 4/5 5/5 5/5 5/5 5/5 5/5    L 4/5 5/5 5/5 5/5 5/5 5/5     Pronator Drift: no drift noted  Finger-to-nose: Intact bilaterally  Incision is clean, dry, intact with skin well approximated with dermabond.  There is no drainage, erythema, or edema.      Significant Labs:    Recent Labs  Lab 04/18/18  0341 04/19/18  0120 04/19/18  0852   * 241* 228*    133* 135*   K 4.6 5.0 4.8    102 102   CO2 20* 24 26   BUN 19 22 21   CREATININE 0.9 0.9 0.9   CALCIUM 8.8 10.4 10.7*   MG 1.6 1.8  --        Recent Labs  Lab 04/18/18 0341 04/19/18  0120 04/19/18  0852   WBC 13.68* 20.72* 19.66*   HGB 9.9* 10.5* 10.7*   HCT 30.6* 33.1* 34.1*    330 330     No results for input(s): LABPT, INR, APTT in the last 48 hours.  Microbiology Results (last 7 days)     Procedure Component Value Units Date/Time    Blood culture [[id number]] Collected:  04/19/18 1005    Order Status:  Sent Specimen:  Blood from Peripheral, Wrist, Right Updated:  04/19/18 1131    Blood culture [[id number]] Collected:  04/19/18 0952    Order Status:  Sent Specimen:  Blood from Peripheral, Hand, Left Updated:  04/19/18 1127    Culture, Respiratory with Gram Stain [[id number]] Collected:  04/19/18 0231    Order Status:  Completed Specimen:  Respiratory from Sputum, Expectorated Updated:  04/19/18 0902     Gram Stain (Respiratory) <10 epithelial cells per low power field.     Gram Stain (Respiratory) Many WBC's     Gram Stain (Respiratory) Many Gram positive cocci     Gram Stain (Respiratory) Many Gram negative rods     Gram Stain (Respiratory) Rare Gram positive rods          Significant Diagnostics:  No new imaging    Assessment/Plan:     * Cervical myelopathy with cervical radiculopathy    82 y.o. male s/p C3-5 ACDF POD#9    -Neurologically stable  -Leave incision open to air  -HV removed, monitor site for leaking  -LD placed for continued  CSF leak - CLAMP TODAY  -Ancef while drains in place  -Cervical collar when OOB or with activity  -ok to sit up  -IS to bed side. Patient to use atleast 10x every hour.  CV- goal normotension  Pulm- on RA  FENGI- ADAT. Goal eunatremia.   Heme/ID- hgb/hct stable. Afebrile.   ppx- PILI/SCDs, gi ppx. Continue bowel regimen daily.  Continue to hold Eliquis  Medical management per NCC    Dispo- cont ICU care of lumabr drain.            Rosalio Johns MD  Neurosurgery  [facility]

## 2018-04-20 LAB
ALBUMIN SERPL BCP-MCNC: 2.7 G/DL
ALP SERPL-CCNC: 73 U/L
ALT SERPL W/O P-5'-P-CCNC: <5 U/L
ANION GAP SERPL CALC-SCNC: 10 MMOL/L
AST SERPL-CCNC: 9 U/L
BASOPHILS # BLD AUTO: 0.01 K/UL
BASOPHILS NFR BLD: 0.1 %
BILIRUB SERPL-MCNC: 0.7 MG/DL
BUN SERPL-MCNC: 23 MG/DL
CALCIUM SERPL-MCNC: 11.5 MG/DL
CHLORIDE SERPL-SCNC: 100 MMOL/L
CO2 SERPL-SCNC: 24 MMOL/L
CREAT SERPL-MCNC: 0.8 MG/DL
DIFFERENTIAL METHOD: ABNORMAL
EOSINOPHIL # BLD AUTO: 0 K/UL
EOSINOPHIL NFR BLD: 0 %
ERYTHROCYTE [DISTWIDTH] IN BLOOD BY AUTOMATED COUNT: 15.4 %
EST. GFR  (AFRICAN AMERICAN): >60 ML/MIN/1.73 M^2
EST. GFR  (NON AFRICAN AMERICAN): >60 ML/MIN/1.73 M^2
GLUCOSE SERPL-MCNC: 233 MG/DL
HCT VFR BLD AUTO: 33 %
HGB BLD-MCNC: 10.4 G/DL
IMM GRANULOCYTES # BLD AUTO: 0.16 K/UL
IMM GRANULOCYTES NFR BLD AUTO: 1.2 %
LYMPHOCYTES # BLD AUTO: 1.1 K/UL
LYMPHOCYTES NFR BLD: 8.7 %
MAGNESIUM SERPL-MCNC: 2 MG/DL
MCH RBC QN AUTO: 30.6 PG
MCHC RBC AUTO-ENTMCNC: 31.5 G/DL
MCV RBC AUTO: 97 FL
MONOCYTES # BLD AUTO: 2 K/UL
MONOCYTES NFR BLD: 15.2 %
NEUTROPHILS # BLD AUTO: 9.7 K/UL
NEUTROPHILS NFR BLD: 74.8 %
NRBC BLD-RTO: 0 /100 WBC
PHOSPHATE SERPL-MCNC: 2.2 MG/DL
PLATELET # BLD AUTO: 318 K/UL
PMV BLD AUTO: 9.4 FL
POCT GLUCOSE: 204 MG/DL (ref 70–110)
POCT GLUCOSE: 206 MG/DL (ref 70–110)
POCT GLUCOSE: 244 MG/DL (ref 70–110)
POTASSIUM SERPL-SCNC: 4.6 MMOL/L
PROT SERPL-MCNC: 6.6 G/DL
RBC # BLD AUTO: 3.4 M/UL
SODIUM SERPL-SCNC: 134 MMOL/L
WBC # BLD AUTO: 13.02 K/UL

## 2018-04-20 PROCEDURE — 97802 MEDICAL NUTRITION INDIV IN: CPT

## 2018-04-20 PROCEDURE — 84100 ASSAY OF PHOSPHORUS: CPT

## 2018-04-20 PROCEDURE — 25000003 PHARM REV CODE 250: Performed by: NEUROLOGICAL SURGERY

## 2018-04-20 PROCEDURE — 85025 COMPLETE CBC W/AUTO DIFF WBC: CPT

## 2018-04-20 PROCEDURE — 36415 COLL VENOUS BLD VENIPUNCTURE: CPT

## 2018-04-20 PROCEDURE — 83735 ASSAY OF MAGNESIUM: CPT

## 2018-04-20 PROCEDURE — 25000003 PHARM REV CODE 250: Performed by: NURSE PRACTITIONER

## 2018-04-20 PROCEDURE — 63600175 PHARM REV CODE 636 W HCPCS: Performed by: STUDENT IN AN ORGANIZED HEALTH CARE EDUCATION/TRAINING PROGRAM

## 2018-04-20 PROCEDURE — 80053 COMPREHEN METABOLIC PANEL: CPT

## 2018-04-20 PROCEDURE — 25000003 PHARM REV CODE 250: Performed by: PSYCHIATRY & NEUROLOGY

## 2018-04-20 PROCEDURE — 25000003 PHARM REV CODE 250: Performed by: PHYSICIAN ASSISTANT

## 2018-04-20 PROCEDURE — 25000003 PHARM REV CODE 250: Performed by: STUDENT IN AN ORGANIZED HEALTH CARE EDUCATION/TRAINING PROGRAM

## 2018-04-20 PROCEDURE — 20600001 HC STEP DOWN PRIVATE ROOM

## 2018-04-20 PROCEDURE — 99024 POSTOP FOLLOW-UP VISIT: CPT | Mod: POP,,, | Performed by: PHYSICIAN ASSISTANT

## 2018-04-20 PROCEDURE — 63600175 PHARM REV CODE 636 W HCPCS: Performed by: PHYSICIAN ASSISTANT

## 2018-04-20 RX ADMIN — HEPARIN SODIUM 5000 UNITS: 5000 INJECTION, SOLUTION INTRAVENOUS; SUBCUTANEOUS at 09:04

## 2018-04-20 RX ADMIN — INSULIN ASPART 4 UNITS: 100 INJECTION, SOLUTION INTRAVENOUS; SUBCUTANEOUS at 12:04

## 2018-04-20 RX ADMIN — INSULIN ASPART 4 UNITS: 100 INJECTION, SOLUTION INTRAVENOUS; SUBCUTANEOUS at 09:04

## 2018-04-20 RX ADMIN — Medication 12.5 MG: at 09:04

## 2018-04-20 RX ADMIN — INSULIN ASPART 3 UNITS: 100 INJECTION, SOLUTION INTRAVENOUS; SUBCUTANEOUS at 04:04

## 2018-04-20 RX ADMIN — GABAPENTIN 300 MG: 300 CAPSULE ORAL at 03:04

## 2018-04-20 RX ADMIN — Medication 12.5 MG: at 03:04

## 2018-04-20 RX ADMIN — ACETAMINOPHEN 650 MG: 325 TABLET, FILM COATED ORAL at 05:04

## 2018-04-20 RX ADMIN — INSULIN ASPART 3 UNITS: 100 INJECTION, SOLUTION INTRAVENOUS; SUBCUTANEOUS at 09:04

## 2018-04-20 RX ADMIN — ATORVASTATIN CALCIUM 20 MG: 20 TABLET, FILM COATED ORAL at 09:04

## 2018-04-20 RX ADMIN — INSULIN ASPART 4 UNITS: 100 INJECTION, SOLUTION INTRAVENOUS; SUBCUTANEOUS at 04:04

## 2018-04-20 RX ADMIN — HYDROCODONE BITARTRATE AND ACETAMINOPHEN 1 TABLET: 5; 325 TABLET ORAL at 12:04

## 2018-04-20 RX ADMIN — HEPARIN SODIUM 5000 UNITS: 5000 INJECTION, SOLUTION INTRAVENOUS; SUBCUTANEOUS at 05:04

## 2018-04-20 RX ADMIN — Medication 12.5 MG: at 05:04

## 2018-04-20 RX ADMIN — GABAPENTIN 300 MG: 300 CAPSULE ORAL at 09:04

## 2018-04-20 RX ADMIN — CITALOPRAM HYDROBROMIDE 20 MG: 20 TABLET ORAL at 09:04

## 2018-04-20 RX ADMIN — INSULIN ASPART 2 UNITS: 100 INJECTION, SOLUTION INTRAVENOUS; SUBCUTANEOUS at 09:04

## 2018-04-20 RX ADMIN — INSULIN ASPART 3 UNITS: 100 INJECTION, SOLUTION INTRAVENOUS; SUBCUTANEOUS at 12:04

## 2018-04-20 RX ADMIN — CEFAZOLIN 2 G: 330 INJECTION, POWDER, FOR SOLUTION INTRAMUSCULAR; INTRAVENOUS at 02:04

## 2018-04-20 RX ADMIN — VALSARTAN 40 MG: 40 TABLET ORAL at 09:04

## 2018-04-20 RX ADMIN — SODIUM CHLORIDE TAB 1 GM 2 G: 1 TAB at 03:04

## 2018-04-20 RX ADMIN — STANDARDIZED SENNA CONCENTRATE AND DOCUSATE SODIUM 1 TABLET: 8.6; 5 TABLET, FILM COATED ORAL at 09:04

## 2018-04-20 RX ADMIN — TAMSULOSIN HYDROCHLORIDE 0.4 MG: 0.4 CAPSULE ORAL at 09:04

## 2018-04-20 RX ADMIN — HEPARIN SODIUM 5000 UNITS: 5000 INJECTION, SOLUTION INTRAVENOUS; SUBCUTANEOUS at 03:04

## 2018-04-20 RX ADMIN — DILTIAZEM HYDROCHLORIDE 120 MG: 120 CAPSULE, COATED, EXTENDED RELEASE ORAL at 09:04

## 2018-04-20 RX ADMIN — TRAZODONE HYDROCHLORIDE 50 MG: 50 TABLET ORAL at 09:04

## 2018-04-20 NOTE — ASSESSMENT & PLAN NOTE
82 y.o. male s/p C3-5 ACDF POD#10    -Neurologically stable  -Neuro checks q4h  -Leave incision open to air  -HV removed, monitor site for leaking  -LD removed, monitor site for leaking  -Cervical collar when OOB or with activity  -IS to bed side. Patient to use atleast 10x every hour.  -CV- goal normotension. Continue home meds. PRN meds SBP >160.  -Hyponatremia-  today, will start daily NaCl tabs.  -Heme/ID- hgb/hct stable. Afebrile.   -Urinary retention- rouse in place, continue Flomax  -Continue PILI/SCDs/SQH  -Continue bowel regimen daily.  -Continue to hold Eliquis  -PT/OT ordered, will f/u recs

## 2018-04-20 NOTE — PLAN OF CARE
TAN following for DC needs. TAN in communication with CM.    SW spoke to the patient's daughter, Bud. Bud stated that their choices for SNF are [facility], [facility], and [facility].     SW sent to the above facilities via [facility].     Yvonne Ellington, TAN  [id number]

## 2018-04-20 NOTE — PLAN OF CARE
Problem: Patient Care Overview  Goal: Plan of Care Review  Outcome: Ongoing (interventions implemented as appropriate)  Pt seems to be a bit lethargic and complained of sore throat/ difficulty swallowing. Spoke with MD and had his diet changed to soft/pureed. Attempted to feed him dinner and he complained of his throat hurting and seemed unable to swallow. After he was more lethargic and his breathing was slightly labored- after auscultation of his anterior chest I Consulted Nsx about CXR for poss aspPneumonia. Pt is AAOX4.

## 2018-04-20 NOTE — PROGRESS NOTES
[facility]  Neurosurgery  Progress Note    Subjective:     History of Present Illness: 82 y.o.  male with type 2 diabetes, who presents today for follow up evaluation one week prior to 2 level ACDF scheduled for 4/10/2018. Pt reports    Pt presents today wearing a cervical collar and in a wheelchair. He would like to proceed with surgery. Per pt's family, his PCP would like to see him on 4/9, the day before surgery. He notes continued neck pain and BUE numbness/tingling as well as BUE weakness. Pt is only able to walk a few steps unassisted. He has never received neck surgery.     Post-Op Info:  Procedure(s) (LRB):  DISKECTOMY AND FUSION-ANTERIOR CERVICAL (ACDF); C5-6. C6-7 (N/A)   10 Days Post-Op     Interval History: NAEON. Pt states pain is well controlled. Denies numbness/tingling, increased weakness, difficulty swallowing, cp, or sob. Tolerating diet. Dubois in place. C-collar in place.    Medications:  Continuous Infusions:  Scheduled Meds:   atorvastatin  20 mg Oral Nightly    ceFAZolin (ANCEF) IVPB  2 g Intravenous Q12H    citalopram  20 mg Oral Daily    diltiaZEM  120 mg Oral Daily    gabapentin  300 mg Oral TID    heparin (porcine)  5,000 Units Subcutaneous Q8H    insulin aspart U-100  3 Units Subcutaneous TIDWM    insulin detemir U-100  9 Units Subcutaneous BID    metoprolol tartrate  12.5 mg Oral Q8H    senna-docusate 8.6-50 mg  1 tablet Oral BID    tamsulosin  0.4 mg Oral Daily    traZODone  50 mg Oral QHS    valsartan  40 mg Oral Daily     PRN Meds:acetaminophen, aluminum-magnesium hydroxide-simethicone, benzonatate, dextrose 50%, dextrose 50%, dextrose 50%, fentaNYL, glucagon (human recombinant), glucagon (human recombinant), glucose, glucose, hydrALAZINE, hydrocodone-acetaminophen 5-325mg, insulin aspart U-100, labetalol, lidocaine HCL 2%, magnesium oxide, magnesium oxide, ondansetron, potassium chloride 10%, potassium chloride 10%, potassium chloride 10%, potassium,  sodium phosphates, potassium, sodium phosphates, potassium, sodium phosphates     Review of Systems  Objective:     Weight: 109.5 kg (241 lb 6.5 oz)  Body mass index is 32.74 kg/m².  Vital Signs (Most Recent):  Temp: 97.7 °F (36.5 °C) (04/20/18 1143)  Pulse: 81 (04/20/18 1143)  Resp: 18 (04/20/18 1143)  BP: (!) 101/57 (04/20/18 1138)  SpO2: (!) 94 % (04/20/18 1143) Vital Signs (24h Range):  Temp:  [95.8 °F (35.4 °C)-100.1 °F (37.8 °C)] 97.7 °F (36.5 °C)  Pulse:  [] 81  Resp:  [16-21] 18  SpO2:  [90 %-96 %] 94 %  BP: (101-172)/(57-89) 101/57                      Male External Urinary Catheter 04/13/18 1600 Small (Active)   Collection Container Urimeter 4/20/2018  8:21 AM   Securement Method secured to top of thigh w/ adhesive device 4/20/2018  8:21 AM   Skin no redness;no breakdown 4/20/2018  8:21 AM   Tolerance no signs/symptoms of discomfort 4/20/2018  8:21 AM   Output (mL) 150 mL 4/19/2018  2:00 PM   Catheter Change Date 04/19/18 4/19/2018  3:01 PM   Catheter Change Time 0701 4/19/2018  3:01 PM            Lumbar Drain 04/13/18 1000 (Active)   Drain Status Clamped 4/19/2018  3:01 PM   Drain Level (cm) 0 cm 4/18/2018  7:05 PM   Level to cmH20 (see comment) 4/18/2018  3:02 PM   CSF Color Tan 4/19/2018  3:01 PM   Site Description Healing 4/19/2018  3:01 PM   Dressing Status Clean;Dry;Intact 4/19/2018  3:01 PM   Interventions HOB degrees (see comment) 4/19/2018  3:05 AM   Output (mL) 0 mL 4/19/2018  3:00 PM       Neurosurgery Physical Exam  General: well developed, well nourished, no distress.   Head: normocephalic, atraumatic  Neurologic: Alert and oriented. Thought content appropriate.  GCS: Motor: 6/Verbal: 5/Eyes: 4 GCS Total: 15  Mental Status: Awake, Alert, Oriented x 4  Language: No aphasia  Speech: No dysarthria  Cranial nerves: face symmetric, tongue midline, CN II-XII grossly intact.   Eyes: pupils equal, round, reactive to light with accomodation, EOMI.   Pulmonary: normal respirations, no signs of  respiratory distress  Abdomen: soft, non-distended, not tender to palpation  Sensory: intact to light touch throughout    Motor Strength:Moves all extremities spontaneously with good tone. No abnormal movements seen.     Strength  Deltoids Triceps Biceps Wrist Extension Wrist Flexion Hand    Upper: R 3/5 3/5 4-/5 4/5 4/5 4/5    L 3/5 4/5 4/5 4/5 4/5 4/5     Iliopsoas Quadriceps Knee  Flexion Tibialis  anterior Gastro- cnemius EHL   Lower: R 3/5 4+/5 5/5 5/5 5/5 5/5    L 3/5 4+/5 5/5 5/5 5/5 5/5     DTR's - 2 + and symmetric in UE and LE  Pronator Drift: no drift noted  Finger-to-nose: Intact bilaterally  Moctezuma: absent  Clonus: absent  Babinski: absent  Pulses: 2+ and symmetric radial and dorsalis pedis.  Skin: Skin is warm, dry and intact.  Incision c/d/i with no surrounding erythema, edema, or drainage. Skin edges well approximated with dermabond.  Lumbar drain removed and skin closed with suture. No drainage from site.  C-collar in place.   .    Significant Labs:    Recent Labs  Lab 04/19/18  0120 04/19/18  0852 04/20/18  0529   * 228* 233*   * 135* 134*   K 5.0 4.8 4.6    102 100   CO2 24 26 24   BUN 22 21 23   CREATININE 0.9 0.9 0.8   CALCIUM 10.4 10.7* 11.5*   MG 1.8  --  2.0       Recent Labs  Lab 04/19/18  0120 04/19/18  0852 04/20/18  0529   WBC 20.72* 19.66* 13.02*   HGB 10.5* 10.7* 10.4*   HCT 33.1* 34.1* 33.0*    330 318     No results for input(s): LABPT, INR, APTT in the last 48 hours.  Microbiology Results (last 7 days)     Procedure Component Value Units Date/Time    Culture, Respiratory with Gram Stain [[id number]] Collected:  04/19/18 0231    Order Status:  Completed Specimen:  Respiratory from Sputum, Expectorated Updated:  04/20/18 0907     Respiratory Culture Normal respiratory mitzy     Gram Stain (Respiratory) <10 epithelial cells per low power field.     Gram Stain (Respiratory) Many WBC's     Gram Stain (Respiratory) Many Gram positive cocci     Gram Stain  (Respiratory) Many Gram negative rods     Gram Stain (Respiratory) Rare Gram positive rods    Blood culture [[id number]] Collected:  04/19/18 1005    Order Status:  Completed Specimen:  Blood from Peripheral, Wrist, Right Updated:  04/19/18 1915     Blood Culture, Routine No Growth to date    Narrative:       Blood cultures from 2 different sites. 4 bottles total.  Please draw before starting antibiotics.    Blood culture [[id number]] Collected:  04/19/18 0952    Order Status:  Completed Specimen:  Blood from Peripheral, Hand, Left Updated:  04/19/18 1915     Blood Culture, Routine No Growth to date    Narrative:       Blood cultures x 2 different sites. 4 bottles total. Please  draw cultures before administering antibiotics.        Recent Lab Results       04/20/18  0812 04/20/18  0529 04/19/18  1955 04/19/18  1429      Immature Granulocytes  1.2(H)       Immature Grans (Abs)  0.16  Comment:  Mild elevation in immature granulocytes is non specific and   can be seen in a variety of conditions including stress response,   acute inflammation, trauma and pregnancy. Correlation with other   laboratory and clinical findings is essential.  (H)       Albumin  2.7(L)       Alkaline Phosphatase  73       ALT  <5(L)       Anion Gap  10       AST  9(L)       Baso #  0.01       Basophil%  0.1       Total Bilirubin  0.7  Comment:  For infants and newborns, interpretation of results should be based  on gestational age, weight and in agreement with clinical  observations.  Premature Infant recommended reference ranges:  Up to 24 hours.............<8.0 mg/dL  Up to 48 hours............<12.0 mg/dL  3-5 days..................<15.0 mg/dL  6-29 days.................<15.0 mg/dL         BUN, Bld  23       Calcium  11.5(H)       Chloride  100       CO2  24       Creatinine  0.8       Differential Method  Automated       eGFR if   >60.0       eGFR if non   >60.0  Comment:  Calculation used to obtain the  estimated glomerular filtration  rate (eGFR) is the CKD-EPI equation.          Eos #  0.0       Eosinophil%  0.0       Glucose  233(H)       Gran # (ANC)  9.7(H)       Gran%  74.8(H)       Hematocrit  33.0(L)       Hemoglobin  10.4(L)       Lymph #  1.1       Lymph%  8.7(L)       Magnesium  2.0       MCH  30.6       MCHC  31.5(L)       MCV  97       Mono #  2.0(H)       Mono%  15.2(H)       MPV  9.4       nRBC  0       Phosphorus  2.2(L)       Platelets  318       POCT Glucose 206(H)  182(H) 204(H)     Potassium  4.6       Total Protein  6.6       RBC  3.40(L)       RDW  15.4(H)       Sodium  134(L)       WBC  13.02(H)           All pertinent labs from the last 24 hours have been reviewed.    Significant Diagnostics:  No new imaging    Assessment/Plan:     * Cervical myelopathy with cervical radiculopathy    82 y.o. male s/p C3-5 ACDF POD#10    -Neurologically stable  -Neuro checks q4h  -Leave incision open to air  -HV removed, monitor site for leaking  -LD removed, monitor site for leaking  -Cervical collar when OOB or with activity  -IS to bed side. Patient to use atleast 10x every hour.  -CV- goal normotension. Continue home meds. PRN meds SBP >160.  -Hyponatremia-  today, will start daily NaCl tabs.  -Heme/ID- hgb/hct stable. Afebrile.   -Urinary retention- rouse in place, continue Flomax  -Continue PILI/SCDs/SQH  -Continue bowel regimen daily.  -Continue to hold Eliquis  -PT/OT ordered, will f/u recs              Daisy Camacho PA-C  Neurosurgery  [facility]

## 2018-04-20 NOTE — PROGRESS NOTES
[facility]  Adult Nutrition  Consult Note    SUMMARY     Recommendations    1. Continue current 2000 kcal ADA diet.   2. Add Boost Glucose Control ONS BID to aid in caloric intake.   3. RD to monitor & follow-up.    Goals: PO intake >50%  Nutrition Goal Status: new  Communication of RD Recs: reviewed with RN    Reason for Assessment    Reason for Assessment: length of stay  Diagnosis: other (see comments) (Cervical myelopathy)  Relevant Medical History: DM, HTN  Interdisciplinary Rounds: did not attend    General Information Comments: 10 days post-op cervical fusion. Pt w/ varying appetite, consuming 25-75% of meals.  Nutrition Discharge Planning: Adequate PO intake    Nutrition/Diet History    Patient Reported Diet/Restrictions/Preferences: general  Do you have any cultural, spiritual, Shinto conflicts, given your current situation?: none  Factors Affecting Nutritional Intake: decreased appetite    Anthropometrics    Temp: 98.8 °F (37.1 °C)  Height Method: Stated  Height: 6' (182.9 cm)  Height (inches): 72 in  Weight Method: Bed Scale  Weight: 109.5 kg (241 lb 6.5 oz)  Weight (lb): 241.41 lb  Ideal Body Weight (IBW), Male: 178 lb  % Ideal Body Weight, Male (lb): 135.62 lb  BMI (Calculated): 32.8  BMI Grade: 30 - 34.9- obesity - grade I    Lab/Procedures/Meds    Pertinent Labs Reviewed: reviewed  Pertinent Labs Comments: Na 134, Gluc 233, A1C 5.7  Pertinent Medications Reviewed: reviewed    Physical Findings/Assessment    Overall Physical Appearance: nourished  Oral/Mouth Cavity: WDL  Skin: incision(s)    Estimated/Assessed Needs    Weight Used For Calorie Calculations: 109.5 kg (241 lb 6.5 oz)  Energy Calorie Requirements (kcal): 2211 kcal/d  Energy Need Method: [place]-St Jeor (1.1 PAL)     Protein Requirements: 110 g/d (1 g/kg)   Weight Used For Protein Calculations: 109.5 kg (241 lb 6.5 oz)     Fluid Need Method: other (see comments) (Per MD or 1 mL/kcal)    Nutrition Prescription  Ordered    Current Diet Order: 2000 kcal ADA    Nutrition Risk    Level of Risk/Frequency of Follow-up: moderate     Assessment and Plan    Nutrition Problem  Increased nutrient needs    Related to (etiology):   Physiological causes    Signs and Symptoms (as evidenced by):   S/p cervical fusion     Nutrition Diagnosis Status:   New    Monitor and Evaluation    Food and Nutrient Intake: energy intake, food and beverage intake  Food and Nutrient Adminstration: diet order  Physical Activity and Function: nutrition-related ADLs and IADLs  Anthropometric Measurements: weight, weight change  Biochemical Data, Medical Tests and Procedures: lipid profile, glucose/endocrine profile, inflammatory profile, gastrointestinal profile, electrolyte and renal panel  Nutrition-Focused Physical Findings: overall appearance     Nutrition Follow-Up    RD Follow-up?: Yes

## 2018-04-20 NOTE — SUBJECTIVE & OBJECTIVE
Interval History: NAEON. Pt states pain is well controlled. Denies numbness/tingling, increased weakness, difficulty swallowing, cp, or sob. Tolerating diet. Dubois in place. C-collar in place.    Medications:  Continuous Infusions:  Scheduled Meds:   atorvastatin  20 mg Oral Nightly    ceFAZolin (ANCEF) IVPB  2 g Intravenous Q12H    citalopram  20 mg Oral Daily    diltiaZEM  120 mg Oral Daily    gabapentin  300 mg Oral TID    heparin (porcine)  5,000 Units Subcutaneous Q8H    insulin aspart U-100  3 Units Subcutaneous TIDWM    insulin detemir U-100  9 Units Subcutaneous BID    metoprolol tartrate  12.5 mg Oral Q8H    senna-docusate 8.6-50 mg  1 tablet Oral BID    tamsulosin  0.4 mg Oral Daily    traZODone  50 mg Oral QHS    valsartan  40 mg Oral Daily     PRN Meds:acetaminophen, aluminum-magnesium hydroxide-simethicone, benzonatate, dextrose 50%, dextrose 50%, dextrose 50%, fentaNYL, glucagon (human recombinant), glucagon (human recombinant), glucose, glucose, hydrALAZINE, hydrocodone-acetaminophen 5-325mg, insulin aspart U-100, labetalol, lidocaine HCL 2%, magnesium oxide, magnesium oxide, ondansetron, potassium chloride 10%, potassium chloride 10%, potassium chloride 10%, potassium, sodium phosphates, potassium, sodium phosphates, potassium, sodium phosphates     Review of Systems  Objective:     Weight: 109.5 kg (241 lb 6.5 oz)  Body mass index is 32.74 kg/m².  Vital Signs (Most Recent):  Temp: 97.7 °F (36.5 °C) (04/20/18 1143)  Pulse: 81 (04/20/18 1143)  Resp: 18 (04/20/18 1143)  BP: (!) 101/57 (04/20/18 1138)  SpO2: (!) 94 % (04/20/18 1143) Vital Signs (24h Range):  Temp:  [95.8 °F (35.4 °C)-100.1 °F (37.8 °C)] 97.7 °F (36.5 °C)  Pulse:  [] 81  Resp:  [16-21] 18  SpO2:  [90 %-96 %] 94 %  BP: (101-172)/(57-89) 101/57                      Male External Urinary Catheter 04/13/18 1600 Small (Active)   Collection Container Urimeter 4/20/2018  8:21 AM   Securement Method secured to top of thigh w/  adhesive device 4/20/2018  8:21 AM   Skin no redness;no breakdown 4/20/2018  8:21 AM   Tolerance no signs/symptoms of discomfort 4/20/2018  8:21 AM   Output (mL) 150 mL 4/19/2018  2:00 PM   Catheter Change Date 04/19/18 4/19/2018  3:01 PM   Catheter Change Time 0701 4/19/2018  3:01 PM            Lumbar Drain 04/13/18 1000 (Active)   Drain Status Clamped 4/19/2018  3:01 PM   Drain Level (cm) 0 cm 4/18/2018  7:05 PM   Level to cmH20 (see comment) 4/18/2018  3:02 PM   CSF Color Tan 4/19/2018  3:01 PM   Site Description Healing 4/19/2018  3:01 PM   Dressing Status Clean;Dry;Intact 4/19/2018  3:01 PM   Interventions HOB degrees (see comment) 4/19/2018  3:05 AM   Output (mL) 0 mL 4/19/2018  3:00 PM       Neurosurgery Physical Exam  General: well developed, well nourished, no distress.   Head: normocephalic, atraumatic  Neurologic: Alert and oriented. Thought content appropriate.  GCS: Motor: 6/Verbal: 5/Eyes: 4 GCS Total: 15  Mental Status: Awake, Alert, Oriented x 4  Language: No aphasia  Speech: No dysarthria  Cranial nerves: face symmetric, tongue midline, CN II-XII grossly intact.   Eyes: pupils equal, round, reactive to light with accomodation, EOMI.   Pulmonary: normal respirations, no signs of respiratory distress  Abdomen: soft, non-distended, not tender to palpation  Sensory: intact to light touch throughout    Motor Strength:Moves all extremities spontaneously with good tone. No abnormal movements seen.     Strength  Deltoids Triceps Biceps Wrist Extension Wrist Flexion Hand    Upper: R 3/5 3/5 4-/5 4/5 4/5 4/5    L 3/5 4/5 4/5 4/5 4/5 4/5     Iliopsoas Quadriceps Knee  Flexion Tibialis  anterior Gastro- cnemius EHL   Lower: R 3/5 4+/5 5/5 5/5 5/5 5/5    L 3/5 4+/5 5/5 5/5 5/5 5/5     DTR's - 2 + and symmetric in UE and LE  Pronator Drift: no drift noted  Finger-to-nose: Intact bilaterally  Moctezuma: absent  Clonus: absent  Babinski: absent  Pulses: 2+ and symmetric radial and dorsalis pedis.  Skin: Skin is  warm, dry and intact.  Incision c/d/i with no surrounding erythema, edema, or drainage. Skin edges well approximated with dermabond.  Lumbar drain removed and skin closed with suture. No drainage from site.  C-collar in place.   .    Significant Labs:    Recent Labs  Lab 04/19/18  0120 04/19/18  0852 04/20/18  0529   * 228* 233*   * 135* 134*   K 5.0 4.8 4.6    102 100   CO2 24 26 24   BUN 22 21 23   CREATININE 0.9 0.9 0.8   CALCIUM 10.4 10.7* 11.5*   MG 1.8  --  2.0       Recent Labs  Lab 04/19/18  0120 04/19/18  0852 04/20/18  0529   WBC 20.72* 19.66* 13.02*   HGB 10.5* 10.7* 10.4*   HCT 33.1* 34.1* 33.0*    330 318     No results for input(s): LABPT, INR, APTT in the last 48 hours.  Microbiology Results (last 7 days)     Procedure Component Value Units Date/Time    Culture, Respiratory with Gram Stain [[id number]] Collected:  04/19/18 0231    Order Status:  Completed Specimen:  Respiratory from Sputum, Expectorated Updated:  04/20/18 0907     Respiratory Culture Normal respiratory mitzy     Gram Stain (Respiratory) <10 epithelial cells per low power field.     Gram Stain (Respiratory) Many WBC's     Gram Stain (Respiratory) Many Gram positive cocci     Gram Stain (Respiratory) Many Gram negative rods     Gram Stain (Respiratory) Rare Gram positive rods    Blood culture [[id number]] Collected:  04/19/18 1005    Order Status:  Completed Specimen:  Blood from Peripheral, Wrist, Right Updated:  04/19/18 1915     Blood Culture, Routine No Growth to date    Narrative:       Blood cultures from 2 different sites. 4 bottles total.  Please draw before starting antibiotics.    Blood culture [[id number]] Collected:  04/19/18 0952    Order Status:  Completed Specimen:  Blood from Peripheral, Hand, Left Updated:  04/19/18 1915     Blood Culture, Routine No Growth to date    Narrative:       Blood cultures x 2 different sites. 4 bottles total. Please  draw cultures before administering antibiotics.         Recent Lab Results       04/20/18  0812 04/20/18  0529 04/19/18  1955 04/19/18  1429      Immature Granulocytes  1.2(H)       Immature Grans (Abs)  0.16  Comment:  Mild elevation in immature granulocytes is non specific and   can be seen in a variety of conditions including stress response,   acute inflammation, trauma and pregnancy. Correlation with other   laboratory and clinical findings is essential.  (H)       Albumin  2.7(L)       Alkaline Phosphatase  73       ALT  <5(L)       Anion Gap  10       AST  9(L)       Baso #  0.01       Basophil%  0.1       Total Bilirubin  0.7  Comment:  For infants and newborns, interpretation of results should be based  on gestational age, weight and in agreement with clinical  observations.  Premature Infant recommended reference ranges:  Up to 24 hours.............<8.0 mg/dL  Up to 48 hours............<12.0 mg/dL  3-5 days..................<15.0 mg/dL  6-29 days.................<15.0 mg/dL         BUN, Bld  23       Calcium  11.5(H)       Chloride  100       CO2  24       Creatinine  0.8       Differential Method  Automated       eGFR if   >60.0       eGFR if non   >60.0  Comment:  Calculation used to obtain the estimated glomerular filtration  rate (eGFR) is the CKD-EPI equation.          Eos #  0.0       Eosinophil%  0.0       Glucose  233(H)       Gran # (ANC)  9.7(H)       Gran%  74.8(H)       Hematocrit  33.0(L)       Hemoglobin  10.4(L)       Lymph #  1.1       Lymph%  8.7(L)       Magnesium  2.0       MCH  30.6       MCHC  31.5(L)       MCV  97       Mono #  2.0(H)       Mono%  15.2(H)       MPV  9.4       nRBC  0       Phosphorus  2.2(L)       Platelets  318       POCT Glucose 206(H)  182(H) 204(H)     Potassium  4.6       Total Protein  6.6       RBC  3.40(L)       RDW  15.4(H)       Sodium  134(L)       WBC  13.02(H)           All pertinent labs from the last 24 hours have been reviewed.    Significant Diagnostics:  No new  imaging

## 2018-04-20 NOTE — PLAN OF CARE
CM met with patient to discuss the rec for [facility]. Patient provided the name of [facility] and CM went over other options and patient chose [facility].

## 2018-04-21 PROBLEM — R65.10 SIRS (SYSTEMIC INFLAMMATORY RESPONSE SYNDROME): Status: ACTIVE | Noted: 2018-04-21

## 2018-04-21 LAB
ALBUMIN SERPL BCP-MCNC: 2.6 G/DL
ALP SERPL-CCNC: 69 U/L
ALT SERPL W/O P-5'-P-CCNC: 5 U/L
AMORPH CRY UR QL COMP ASSIST: NORMAL
ANION GAP SERPL CALC-SCNC: 8 MMOL/L
AST SERPL-CCNC: 11 U/L
BACTERIA #/AREA URNS AUTO: NORMAL /HPF
BACTERIA SPEC AEROBE CULT: NORMAL
BASOPHILS # BLD AUTO: 0.02 K/UL
BASOPHILS NFR BLD: 0.1 %
BILIRUB SERPL-MCNC: 0.9 MG/DL
BILIRUB UR QL STRIP: NEGATIVE
BUN SERPL-MCNC: 32 MG/DL
CALCIUM SERPL-MCNC: 11.9 MG/DL
CHLORIDE SERPL-SCNC: 103 MMOL/L
CLARITY UR REFRACT.AUTO: ABNORMAL
CO2 SERPL-SCNC: 26 MMOL/L
COLOR UR AUTO: YELLOW
CREAT SERPL-MCNC: 0.8 MG/DL
DIFFERENTIAL METHOD: ABNORMAL
EOSINOPHIL # BLD AUTO: 0 K/UL
EOSINOPHIL NFR BLD: 0 %
ERYTHROCYTE [DISTWIDTH] IN BLOOD BY AUTOMATED COUNT: 15.2 %
EST. GFR  (AFRICAN AMERICAN): >60 ML/MIN/1.73 M^2
EST. GFR  (NON AFRICAN AMERICAN): >60 ML/MIN/1.73 M^2
GLUCOSE SERPL-MCNC: 238 MG/DL
GLUCOSE UR QL STRIP: ABNORMAL
GRAM STN SPEC: NORMAL
HCT VFR BLD AUTO: 33.3 %
HGB BLD-MCNC: 10.6 G/DL
HGB UR QL STRIP: ABNORMAL
HYALINE CASTS UR QL AUTO: 0 /LPF
IMM GRANULOCYTES # BLD AUTO: 0.15 K/UL
IMM GRANULOCYTES NFR BLD AUTO: 0.7 %
KETONES UR QL STRIP: ABNORMAL
LEUKOCYTE ESTERASE UR QL STRIP: NEGATIVE
LYMPHOCYTES # BLD AUTO: 1.3 K/UL
LYMPHOCYTES NFR BLD: 6.3 %
MAGNESIUM SERPL-MCNC: 2 MG/DL
MCH RBC QN AUTO: 30.7 PG
MCHC RBC AUTO-ENTMCNC: 31.8 G/DL
MCV RBC AUTO: 97 FL
MICROSCOPIC COMMENT: NORMAL
MONOCYTES # BLD AUTO: 1.7 K/UL
MONOCYTES NFR BLD: 8.1 %
NEUTROPHILS # BLD AUTO: 17.5 K/UL
NEUTROPHILS NFR BLD: 84.8 %
NITRITE UR QL STRIP: NEGATIVE
NRBC BLD-RTO: 0 /100 WBC
PH UR STRIP: 5 [PH] (ref 5–8)
PHOSPHATE SERPL-MCNC: 1.8 MG/DL
PLATELET # BLD AUTO: 330 K/UL
PMV BLD AUTO: 9.4 FL
POCT GLUCOSE: 201 MG/DL (ref 70–110)
POCT GLUCOSE: 257 MG/DL (ref 70–110)
POTASSIUM SERPL-SCNC: 4.4 MMOL/L
PROT SERPL-MCNC: 6.6 G/DL
PROT UR QL STRIP: ABNORMAL
RBC # BLD AUTO: 3.45 M/UL
RBC #/AREA URNS AUTO: 1 /HPF (ref 0–4)
SODIUM SERPL-SCNC: 137 MMOL/L
SP GR UR STRIP: 1.02 (ref 1–1.03)
URN SPEC COLLECT METH UR: ABNORMAL
UROBILINOGEN UR STRIP-ACNC: NEGATIVE EU/DL
WBC # BLD AUTO: 20.64 K/UL
WBC #/AREA URNS AUTO: 2 /HPF (ref 0–5)
YEAST UR QL AUTO: NORMAL

## 2018-04-21 PROCEDURE — 97164 PT RE-EVAL EST PLAN CARE: CPT

## 2018-04-21 PROCEDURE — 25000003 PHARM REV CODE 250: Performed by: NEUROLOGICAL SURGERY

## 2018-04-21 PROCEDURE — 87088 URINE BACTERIA CULTURE: CPT

## 2018-04-21 PROCEDURE — 27000633 HC CORTRAK FEEDING TUBE

## 2018-04-21 PROCEDURE — 97168 OT RE-EVAL EST PLAN CARE: CPT

## 2018-04-21 PROCEDURE — 20000000 HC ICU ROOM

## 2018-04-21 PROCEDURE — 99291 CRITICAL CARE FIRST HOUR: CPT | Mod: ,,, | Performed by: PSYCHIATRY & NEUROLOGY

## 2018-04-21 PROCEDURE — 63600175 PHARM REV CODE 636 W HCPCS: Performed by: PSYCHIATRY & NEUROLOGY

## 2018-04-21 PROCEDURE — 43752 NASAL/OROGASTRIC W/TUBE PLMT: CPT

## 2018-04-21 PROCEDURE — 84100 ASSAY OF PHOSPHORUS: CPT

## 2018-04-21 PROCEDURE — 81001 URINALYSIS AUTO W/SCOPE: CPT

## 2018-04-21 PROCEDURE — 87086 URINE CULTURE/COLONY COUNT: CPT

## 2018-04-21 PROCEDURE — 99024 POSTOP FOLLOW-UP VISIT: CPT | Mod: POP,,, | Performed by: PHYSICIAN ASSISTANT

## 2018-04-21 PROCEDURE — 63600175 PHARM REV CODE 636 W HCPCS: Performed by: NEUROLOGICAL SURGERY

## 2018-04-21 PROCEDURE — 87186 SC STD MICRODIL/AGAR DIL: CPT

## 2018-04-21 PROCEDURE — 63600175 PHARM REV CODE 636 W HCPCS: Performed by: STUDENT IN AN ORGANIZED HEALTH CARE EDUCATION/TRAINING PROGRAM

## 2018-04-21 PROCEDURE — 85025 COMPLETE CBC W/AUTO DIFF WBC: CPT

## 2018-04-21 PROCEDURE — 87040 BLOOD CULTURE FOR BACTERIA: CPT | Mod: 59

## 2018-04-21 PROCEDURE — 25000003 PHARM REV CODE 250: Performed by: PSYCHIATRY & NEUROLOGY

## 2018-04-21 PROCEDURE — 36415 COLL VENOUS BLD VENIPUNCTURE: CPT

## 2018-04-21 PROCEDURE — 80053 COMPREHEN METABOLIC PANEL: CPT

## 2018-04-21 PROCEDURE — 87077 CULTURE AEROBIC IDENTIFY: CPT

## 2018-04-21 PROCEDURE — 51702 INSERT TEMP BLADDER CATH: CPT

## 2018-04-21 PROCEDURE — 25000003 PHARM REV CODE 250: Performed by: NURSE PRACTITIONER

## 2018-04-21 PROCEDURE — 27000221 HC OXYGEN, UP TO 24 HOURS

## 2018-04-21 PROCEDURE — 83735 ASSAY OF MAGNESIUM: CPT

## 2018-04-21 PROCEDURE — 25000003 PHARM REV CODE 250: Performed by: STUDENT IN AN ORGANIZED HEALTH CARE EDUCATION/TRAINING PROGRAM

## 2018-04-21 RX ORDER — VANCOMYCIN/0.9 % SOD CHLORIDE 1 G/100 ML
1000 PLASTIC BAG, INJECTION (ML) INTRAVENOUS
Status: DISCONTINUED | OUTPATIENT
Start: 2018-04-21 | End: 2018-04-23

## 2018-04-21 RX ORDER — CEFTRIAXONE 1 G/1
1 INJECTION, POWDER, FOR SOLUTION INTRAMUSCULAR; INTRAVENOUS
Status: DISCONTINUED | OUTPATIENT
Start: 2018-04-21 | End: 2018-04-21

## 2018-04-21 RX ORDER — CEFEPIME HYDROCHLORIDE 1 G/1
1 INJECTION, POWDER, FOR SOLUTION INTRAMUSCULAR; INTRAVENOUS
Status: DISCONTINUED | OUTPATIENT
Start: 2018-04-21 | End: 2018-04-28

## 2018-04-21 RX ADMIN — INSULIN ASPART 2 UNITS: 100 INJECTION, SOLUTION INTRAVENOUS; SUBCUTANEOUS at 09:04

## 2018-04-21 RX ADMIN — INSULIN ASPART 4 UNITS: 100 INJECTION, SOLUTION INTRAVENOUS; SUBCUTANEOUS at 06:04

## 2018-04-21 RX ADMIN — Medication 12.5 MG: at 05:04

## 2018-04-21 RX ADMIN — Medication 1 G: at 12:04

## 2018-04-21 RX ADMIN — TAMSULOSIN HYDROCHLORIDE 0.4 MG: 0.4 CAPSULE ORAL at 10:04

## 2018-04-21 RX ADMIN — DILTIAZEM HYDROCHLORIDE 120 MG: 120 CAPSULE, COATED, EXTENDED RELEASE ORAL at 10:04

## 2018-04-21 RX ADMIN — HEPARIN SODIUM 5000 UNITS: 5000 INJECTION, SOLUTION INTRAVENOUS; SUBCUTANEOUS at 05:04

## 2018-04-21 RX ADMIN — CITALOPRAM HYDROBROMIDE 20 MG: 20 TABLET ORAL at 10:04

## 2018-04-21 RX ADMIN — HEPARIN SODIUM 5000 UNITS: 5000 INJECTION, SOLUTION INTRAVENOUS; SUBCUTANEOUS at 01:04

## 2018-04-21 RX ADMIN — Medication 12.5 MG: at 01:04

## 2018-04-21 RX ADMIN — GABAPENTIN 300 MG: 300 CAPSULE ORAL at 10:04

## 2018-04-21 RX ADMIN — ACETAMINOPHEN 650 MG: 325 TABLET, FILM COATED ORAL at 12:04

## 2018-04-21 RX ADMIN — CEFEPIME 1 G: 1 INJECTION, POWDER, FOR SOLUTION INTRAMUSCULAR; INTRAVENOUS at 12:04

## 2018-04-21 RX ADMIN — VALSARTAN 40 MG: 40 TABLET ORAL at 10:04

## 2018-04-21 RX ADMIN — CEFTRIAXONE SODIUM 1 G: 1 INJECTION, POWDER, FOR SOLUTION INTRAMUSCULAR; INTRAVENOUS at 12:04

## 2018-04-21 RX ADMIN — CEFEPIME 1 G: 1 INJECTION, POWDER, FOR SOLUTION INTRAMUSCULAR; INTRAVENOUS at 09:04

## 2018-04-21 RX ADMIN — INSULIN ASPART 6 UNITS: 100 INJECTION, SOLUTION INTRAVENOUS; SUBCUTANEOUS at 12:04

## 2018-04-21 RX ADMIN — INSULIN ASPART 3 UNITS: 100 INJECTION, SOLUTION INTRAVENOUS; SUBCUTANEOUS at 12:04

## 2018-04-21 RX ADMIN — HEPARIN SODIUM 5000 UNITS: 5000 INJECTION, SOLUTION INTRAVENOUS; SUBCUTANEOUS at 09:04

## 2018-04-21 NOTE — PLAN OF CARE
Problem: Physical Therapy Goal  Goal: Physical Therapy Goal  Goals to be met by: 2018     Patient will increase functional independence with mobility by performin. Supine to sit with Max Assistance  2. Sit to supine with Max Assistance  3. Sit to stand transfer with Max  Assistance  4. Bed to chair transfer with Max Assistance using Rolling Walker  5. Lower extremity exercise program x30 reps per handout, with independence     Outcome: Ongoing (interventions implemented as appropriate)  Goals updated. Remain appropriate to improve functional independence.    DC rec's for SNF.    Osvaldo Abbott III, DPT, PT  2018
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
## 2018-04-21 NOTE — ASSESSMENT & PLAN NOTE
82 y.o. male s/p C3-5 ACDF POD#11  - Neurologically stable, no concern for CSF leak  - Neuro checks q4h  - Leave incision open to air, healing well with no edema present  - Overnight, concern for aspiration & placed on 2L NC, + rhonchi on exam  - NPO for now, speech to reevaluate  - Now tachycardic & WBC 20 (13), with fevers overnight  - CXR with concern for developing PNA   - Blood cx, urine cx, BLE US pending  - Consulted hospital medicine for management of hypoxia, concerns for infection  - Also called NCC to evaluate pt for transfer to unit for higher level of care  -Cervical collar when OOB or with activity  -IS to bed side. Patient to use atleast 10x every hour.  -CV- goal normotension. Continue home meds. PRN meds SBP >160.  -Hyponatremia-  today, will start daily NaCl tabs.  -Heme/ID- hgb/hct stable. Afebrile.   -Urinary retention- rouse in place, continue Flomax  -Continue PILI/SCDs/SQH  -Continue bowel regimen daily.  -Continue to hold Eliquis  -Discussed with Dr. Hess

## 2018-04-21 NOTE — ASSESSMENT & PLAN NOTE
Leukocytosis  Tachycardia  Hyperthermia  Broad spectrum abx  Blood cultures  Urinalysis  Transfer back to [facility]

## 2018-04-21 NOTE — PLAN OF CARE
Problem: Patient Care Overview  Goal: Plan of Care Review  Outcome: Ongoing (interventions implemented as appropriate)  PT is AAOX3, no acute changes noted during shift, pt is on bedrest and has been repositioned q2, no skin breakdown noted, VSS tele monitor maintained. Neurovascular checks were done q4, no new deficits noted, No falls noted. Fall precautions remain Pain assessed. No pain noted. Pt resting comfortable in bed. Call light in reach. Will continue to monitor.

## 2018-04-21 NOTE — PT/OT/SLP RE-EVAL
Physical Therapy Re-evaluation    Patient Name:  Hollis Pitts   MRN:  [id number]    Recommendations:     Discharge Recommendations:  nursing facility, skilled   Discharge Equipment Recommendations:  (TBD)   Barriers to discharge: Total Assist for all mobility    Assessment:     Hollis Pitts is a 82 y.o. male admitted with a medical diagnosis of Cervical myelopathy with cervical radiculopathy.  He presents with the following impairments/functional limitations:  weakness, impaired endurance, impaired self care skills, impaired functional mobilty, gait instability, impaired balance, decreased lower extremity function, decreased upper extremity function, decreased safety awareness, pain, impaired coordination, impaired fine motor, impaired skin, impaired cardiopulmonary response to activity. Fair tolerance to PT session. Total Assist required for safety with all mobility. Max-Mod Assist to maintain midline, upright posture for edge of bed sitting. Poor engagement of posterior trunk musculature to reverse thoracic kyphosis and rounded shoulder positioning. Unsafe to attempt stand attempt on this date secondary to assist required to maintain upright posture and BLE weakness. Returned to supine with Total Assist after 5-6 mins seated EOB. Unable to tolerate further EOB sitting secondary to fatigue and inability to hold self in midline position. Total Assist for supine scoot towards HOB. To benefit from continued skilled PT intervention to address deficits. DC rec's for SNF to improve safety and overall functional mobility.    Rehab Prognosis:  Fair; patient would benefit from acute skilled PT services to address these deficits and reach maximum level of function.      Recent Surgery: Procedure(s) (LRB):  DISKECTOMY AND FUSION-ANTERIOR CERVICAL (ACDF); C5-6. C6-7 (N/A) 11 Days Post-Op    Plan:     During this hospitalization, patient to be seen 3 x/week to address the above listed problems via gait training, therapeutic  activities, therapeutic exercises, neuromuscular re-education  · Plan of Care Expires:  05/21/18   Plan of Care Reviewed with: patient    Subjective     Communicated with RN prior to session.  Patient found L-sidelying upon PT entry to room, agreeable to evaluation.      Chief Complaint: neck pain  Pain/Comfort:  · Pain Rating 1:  (reports neck pain with movement; however, not rated with VAS)  · Pain Rating Post-Intervention 1:  (no pain once returned to L-sidelying position)    Patients cultural, spiritual, Temple conflicts given the current situation: none stated      Objective:     Patient found with: telemetry, peripheral IV, pressure relief boots (cervical collar; wedge placed for left sidelying position)     General Precautions: Standard, aspiration, fall   Orthopedic Precautions:N/A   Braces: Cervical collar     Exams:  · Cognitive Exam:  Patient is oriented to Person and Situation and follows 100% of one-step commands   · Gross Motor Coordination:  impaired secondary to BLE weakness R>L  · Postural Exam:  Patient presented with the following abnormalities: -       Rounded shoulders  · -       Forward head  · -       Kyphosis  · Sensation: -       Intact  light/touch BLE  · Skin Integrity/Edema:  -       Skin integrity: Visible skin intact  · RLE Strength: knee extension 2+/5; ankle DF 3/5  · LLE ROM: WFL  · LLE Strength: knee extension 2+/5; ankle DF 3/5; hip strength NT secondary to fatigue with EOB sitting    Functional Mobility:  · Bed Mobility: Total Assist of 2 required for all bed mobility    AM-PAC 6 CLICK MOBILITY  Total Score:6       Therapeutic Activities and Exercises:  Re-evaluation with OT present.     Patient educated on:   - role of PT/POC    - safety with all functional mobility   - bed mobility training   - importance of participation with therapy services   - safe to attempt transfer with therapy services only at this time.    Verbalized understanding of all education  provided.      Patient left left sidelying with all lines intact, call button in reach, RN notified and OT present.    GOALS:    Physical Therapy Goals        Problem: Physical Therapy Goal    Goal Priority Disciplines Outcome Goal Variances Interventions   Physical Therapy Goal     PT/OT, PT Ongoing (interventions implemented as appropriate)     Description:  Goals to be met by: 2018     Patient will increase functional independence with mobility by performin. Supine to sit with Max Assistance  2. Sit to supine with Max Assistance  3. Sit to stand transfer with Max  Assistance  4. Bed to chair transfer with Max Assistance using Rolling Walker  5. Lower extremity exercise program x30 reps per handout, with independence                       History:     Past Medical History:   Diagnosis Date    Benign prostatic hyperplasia with urinary retention 2018    Cervical myelopathy with cervical radiculopathy 4/10/2018    Depression     Dyslipidemia     Essential hypertension 2018    Glaucoma (increased eye pressure)     Mild cognitive impairment     Traumatic brain injury 2017    Type 2 diabetes mellitus with hyperglycemia, without long-term current use of insulin 2018       Past Surgical History:   Procedure Laterality Date    CATARACT EXTRACTION W/  INTRAOCULAR LENS IMPLANT         Time Tracking:     PT Received On: 18  PT Start Time: 914     PT Stop Time: 928  PT Total Time (min): 14 min     Billable Minutes: Alla-genevieve 14      Osvaldo Abbott III, PT  2018

## 2018-04-21 NOTE — PLAN OF CARE
Problem: Occupational Therapy Goal  Goal: Occupational Therapy Goal  Goals to be met by: 4/28/18     Patient will increase functional independence with ADLs by performing:    Feeding with Set-up Assistance.  UE Dressing with Moderate Assistance.  LE Dressing with Maximum Assistance.  Grooming while seated with Minimal Assistance.  Supine to sit with Moderate Assistance.  Stand pivot transfers with Moderate Assistance.  Increased functional strength B shoulders to ~1/2 AROM for increased adl independence and mobility, increase distal strength 1/2-1 mm grade for increased adl's and mobility.  Family/caregiver education provided for mobility and adl's, DME needs, AE rec's      Initiate OT POC     Comments: Arash Easley OTR/L  4/21/2018

## 2018-04-21 NOTE — NURSING
Attempted to place NGT x 2 nurses. Unable to pass tube. Neuro Critical Care team made aware. No po meds to pass at this time. Will pass on to night shift to attempt. Speech eval for tomorrow placed. WCTM.

## 2018-04-21 NOTE — PT/OT/SLP RE-EVAL
Occupational Therapy   Re-evaluation    Name: Hollis Pitts  MRN: [id number]  Admitting Diagnosis:  Cervical myelopathy with cervical radiculopathy 11 Days Post-Op    Recommendations:     Discharge Recommendations: nursing facility, skilled  Discharge Equipment Recommendations:   (tbd)  Barriers to discharge:  Decreased caregiver support    History:     Past Medical History:   Diagnosis Date    Benign prostatic hyperplasia with urinary retention 4/11/2018    Cervical myelopathy with cervical radiculopathy 4/10/2018    Depression     Dyslipidemia     Essential hypertension 4/11/2018    Glaucoma (increased eye pressure)     Mild cognitive impairment     Traumatic brain injury 07/2017    Type 2 diabetes mellitus with hyperglycemia, without long-term current use of insulin 4/11/2018         Past Surgical History:   Procedure Laterality Date    CATARACT EXTRACTION W/  INTRAOCULAR LENS IMPLANT         Subjective     Chief Complaint: No complaints   Patient/Family stated goals: return home  Communicated with: RN prior to session.  Pain/Comfort:  Pain Rating 1: other (see comments) (C/o neck pain w/ movement but did not rate )  Location - Orientation 1: posterior  Location 1: neck  Pain Addressed 1: Distraction, Reposition, Cessation of Activity  Pain Rating Post-Intervention 1: 0/10    Objective:     Patient found with: telemetry, peripheral IV, pressure relief boots    General Precautions: Standard, aspiration, fall   Orthopedic Precautions:N/A   Braces: Cervical collar     Occupational Performance:    Bed Mobility:    · Patient completed Rolling/Turning to Left with  maximal assistance  · Patient completed Scooting/Bridging with total assistance  · Patient completed Supine to Sit with total assistance  · Patient completed Sit to Supine with total assistance    Functional Mobility/Transfers:  Not appropriate at this time.    Activities of Daily Living:  · LB Dressing: total assistance donned/doffed  "socks    Cognitive/Visual Perceptual:  Cognitive/Psychosocial Skills:     -       Oriented to: Person   -       Follows Commands/attention:Follows one-step commands  -       Communication: clear/fluent  -       Memory: No Deficits noted  -       Safety awareness/insight to disability: impaired   -       Mood/Affect/Coping skills/emotional control: Appropriate to situation      Physical Exam:  Balance:    -       poor overal balance   Postural examination/scapula alignment:    -       Rounded shoulders  -       Forward head  Skin integrity: Visible skin intact  Upper Extremity Range of Motion:     -       Right Upper Extremity: Deficits: ~15 degrees AROM and up to 90 for PROM  -       Left Upper Extremity: Deficits: ~10 degrees AROM; 75 degrees for PROM    Patient left left sidelying with all lines intact and call button in reach    [facility] 6 Click:  [facility] Total Score: 7    Treatment & Education:  Pt educated on POC.  Education:    Assessment:     Hollis Pitts is a 82 y.o. male with a medical diagnosis of Cervical myelopathy with cervical radiculopathy.  He presents with impairments listed below. Pt would benefit from skilled OT services to improve independence and overall occupational functioning.      Performance deficits affecting function are weakness, impaired endurance, impaired self care skills, impaired functional mobilty, gait instability, impaired balance, decreased upper extremity function, decreased lower extremity function, decreased safety awareness, decreased ROM.      Rehab Prognosis:  fair; patient would benefit from acute skilled OT services to address these deficits and reach maximum level of function.         Clinical Decision Makin.  OT Low:  "Pt evaluation falls under low complexity for evaluation coding due to performance deficits noted in 1-3 areas as stated above and 0 co-morbities affecting current functional status. Data obtained from problem focused assessments. No modifications " "or assistance was required for completion of evaluation. Only brief occupational profile and history review completed."     Plan:     Patient to be seen 3 x/week to address the above listed problems via self-care/home management, therapeutic activities, therapeutic exercises, neuromuscular re-education  · Plan of Care Expires: 05/21/18  · Plan of Care Reviewed with: patient    This Plan of care has been discussed with the patient who was involved in its development and understands and is in agreement with the identified goals and treatment plan    GOALS:    Occupational Therapy Goals        Problem: Occupational Therapy Goal    Goal Priority Disciplines Outcome Interventions   Occupational Therapy Goal     OT, PT/OT Ongoing (interventions implemented as appropriate)    Description:  Goals to be met by: 4/28/18     Patient will increase functional independence with ADLs by performing:    Feeding with Set-up Assistance.  UE Dressing with Moderate Assistance.  LE Dressing with Maximum Assistance.  Grooming while seated with Minimal Assistance.  Supine to sit with Moderate Assistance.  Stand pivot transfers with Moderate Assistance.  Increased functional strength B shoulders to ~1/2 AROM for increased adl independence and mobility, increase distal strength 1/2-1 mm grade for increased adl's and mobility.  Family/caregiver education provided for mobility and adl's, DME needs, AE rec's                        Time Tracking:     OT Date of Treatment: 04/21/18  OT Start Time: 0913  OT Stop Time: 0928  OT Total Time (min): 15 min    Billable Minutes:Re-eval 15 minutes    Arash Easley OT  4/21/2018      "

## 2018-04-21 NOTE — NURSING TRANSFER
Nursing Transfer Note      4/21/2018     Transfer To: [facility]    Transfer via bed    Transfer with cardiac monitoring    Transported by RN, Charge Nurse    Medicines sent: yes    Chart send with patient: Yes    Notified: daughter    Patient reassessed at: (4/21/18, 1415)     Upon arrival to floor: cardiac monitor applied, patient oriented to room, call bell in reach and bed in lowest position

## 2018-04-21 NOTE — SUBJECTIVE & OBJECTIVE
Interval History: Patient answering questions today but lethargic.  Concern for aspiration overnight, now on 2L O2.  No new weakness, paresthesias.     Medications:  Continuous Infusions:  Scheduled Meds:   atorvastatin  20 mg Oral Nightly    ceFEPime (MAXIPIME) IVPB  1 g Intravenous Q8H    citalopram  20 mg Oral Daily    diltiaZEM  120 mg Oral Daily    gabapentin  300 mg Oral TID    heparin (porcine)  5,000 Units Subcutaneous Q8H    insulin aspart U-100  3 Units Subcutaneous TIDWM    insulin detemir U-100  9 Units Subcutaneous BID    metoprolol tartrate  12.5 mg Oral Q8H    senna-docusate 8.6-50 mg  1 tablet Oral BID    sodium chloride  2 g Oral Daily    tamsulosin  0.4 mg Oral Daily    traZODone  50 mg Oral QHS    valsartan  40 mg Oral Daily    vancomycin (VANCOCIN) IVPB  1,000 mg Intravenous Q12H     PRN Meds:acetaminophen, aluminum-magnesium hydroxide-simethicone, benzonatate, dextrose 50%, dextrose 50%, dextrose 50%, fentaNYL, glucagon (human recombinant), glucagon (human recombinant), glucose, glucose, hydrALAZINE, hydrocodone-acetaminophen 5-325mg, insulin aspart U-100, labetalol, lidocaine HCL 2%, magnesium oxide, magnesium oxide, ondansetron, potassium chloride 10%, potassium chloride 10%, potassium chloride 10%, potassium, sodium phosphates, potassium, sodium phosphates, potassium, sodium phosphates     Review of Systems  Objective:     Weight: 109.5 kg (241 lb 6.5 oz)  Body mass index is 32.74 kg/m².  Vital Signs (Most Recent):  Temp: (!) 101.5 °F (38.6 °C) (04/21/18 1242)  Pulse: (!) 117 (04/21/18 1242)  Resp: (!) 22 (04/21/18 1242)  BP: (!) 142/67 (04/21/18 1156)  SpO2: 96 % (04/21/18 1242) Vital Signs (24h Range):  Temp:  [98.9 °F (37.2 °C)-102.4 °F (39.1 °C)] 101.5 °F (38.6 °C)  Pulse:  [] 117  Resp:  [16-22] 22  SpO2:  [88 %-96 %] 96 %  BP: (114-175)/(42-93) 142/67                      Male External Urinary Catheter 04/13/18 1600 Small (Active)   Collection Container Urimeter  4/20/2018  9:00 PM   Securement Method secured to top of thigh w/ adhesive device 4/20/2018  9:00 PM   Skin no redness;no breakdown 4/20/2018  9:00 PM   Tolerance no signs/symptoms of discomfort 4/20/2018  9:00 PM   Output (mL) 150 mL 4/19/2018  2:00 PM   Catheter Change Date 04/19/18 4/19/2018  3:01 PM   Catheter Change Time 0701 4/19/2018  3:01 PM       Neurosurgery Physical Exam   General: well developed, well nourished, no distress.   Head: normocephalic, atraumatic  Neurologic: Alert and oriented. Thought content appropriate, but lethargic & slow to answer  GCS: Motor: 6/Verbal: 5/Eyes: 4 GCS Total: 15  Mental Status: Awake, Alert, Oriented x 4  Language: No aphasia  Speech: No dysarthria  Cranial nerves: face symmetric, tongue midline, CN II-XII grossly intact.   Eyes: pupils equal, round, reactive to light with accomodation, EOMI.   Pulmonary: normal respirations, no signs of respiratory distress  Abdomen: soft, non-distended, not tender to palpation  Sensory: intact to light touch throughout     Motor Strength:Moves all extremities spontaneously with good tone. No abnormal movements seen.      Strength   Deltoids Triceps Biceps Wrist Extension Wrist Flexion Hand    Upper: R 3/5 3/5 4-/5 4/5 4/5 4/5     L 3/5 4/5 4/5 4/5 4/5 4/5       Iliopsoas Quadriceps Knee  Flexion Tibialis  anterior Gastro- cnemius EHL   Lower: R 3/5 4+/5 5/5 5/5 5/5 5/5     L 3/5 4+/5 5/5 5/5 5/5 5/5      DTR's - 2 + and symmetric in UE and LE  Pronator Drift: no drift noted  Finger-to-nose: Intact bilaterally  Moctezuma: absent  Clonus: absent  Babinski: absent  Pulses: 2+ and symmetric radial and dorsalis pedis.  Skin: Skin is warm, dry and intact.  Incision c/d/i with no surrounding erythema, edema, or drainage. Skin edges well approximated with dermabond.  C-collar in place.       Significant Labs:    Recent Labs  Lab 04/20/18  0529 04/21/18  0441   * 238*   * 137   K 4.6 4.4    103   CO2 24 26   BUN 23 32*    CREATININE 0.8 0.8   CALCIUM 11.5* 11.9*   MG 2.0 2.0       Recent Labs  Lab 04/20/18  0529 04/21/18  0441   WBC 13.02* 20.64*   HGB 10.4* 10.6*   HCT 33.0* 33.3*    330     No results for input(s): LABPT, INR, APTT in the last 48 hours.  Microbiology Results (last 7 days)     Procedure Component Value Units Date/Time    Blood culture [[id number]]     Order Status:  Sent Specimen:  Blood     Blood culture [[id number]]     Order Status:  Sent Specimen:  Blood     Culture, Respiratory with Gram Stain [[id number]] Collected:  04/19/18 0231    Order Status:  Completed Specimen:  Respiratory from Sputum, Expectorated Updated:  04/21/18 1059     Respiratory Culture Normal respiratory mitzy     Gram Stain (Respiratory) <10 epithelial cells per low power field.     Gram Stain (Respiratory) Many WBC's     Gram Stain (Respiratory) Many Gram positive cocci     Gram Stain (Respiratory) Many Gram negative rods     Gram Stain (Respiratory) Rare Gram positive rods    Blood culture [[id number]] Collected:  04/19/18 1005    Order Status:  Completed Specimen:  Blood from Peripheral, Wrist, Right Updated:  04/20/18 1412     Blood Culture, Routine No Growth to date     Blood Culture, Routine No Growth to date    Narrative:       Blood cultures from 2 different sites. 4 bottles total.  Please draw before starting antibiotics.    Blood culture [[id number]] Collected:  04/19/18 0952    Order Status:  Completed Specimen:  Blood from Peripheral, Hand, Left Updated:  04/20/18 1412     Blood Culture, Routine No Growth to date     Blood Culture, Routine No Growth to date    Narrative:       Blood cultures x 2 different sites. 4 bottles total. Please  draw cultures before administering antibiotics.          Significant Diagnostics:  I personally reviewed CXR - The cardiomediastinal silhouette is prominent, magnified by technique, stable..  There is obscuration of the left costophrenic angle suggesting effusion.  There is elevation of the  right hemidiaphragm..  The trachea is midline.  The lungs are symmetrically expanded bilaterally with patchy increased interstitial and parenchymal attenuation, primarily in a perihilar distribution suggesting edema.  There is patchy increased parenchymal attenuation projected over the left lower lung zone, developing consolidation is of concern.  Developing right lower lobe consolidation not excluded medially..  There is no pneumothorax.  The osseous structures are unchanged..

## 2018-04-21 NOTE — PLAN OF CARE
Problem: Patient Care Overview  Goal: Plan of Care Review  Outcome: Ongoing (interventions implemented as appropriate)  Pt transferred from neuro stepdown to CMICU room 3082. Pt oriented to self and situation, lethargic. Pt's speech unclear at times, pupils reactive and pulses palpable. Pt moves extremities spontaneously. Pt SR-ST on monitor. 2 L NC, nonproductive cough. C-collar in place, incision to anterior neck CDI, no dressing, open to air. Afebrile since arriving to unit. Dubois placed and urine specimen sent to lab. 1 BM on shift. Dentures at bed side table. NPO. Attempted to place NGT. IV abx. Plan for speech eval tomorrow. Vitals and assessments per flowsheets. POC reviewed with pt's family at bedside, all concerns and questions addressed.     Problem: Diabetes, Type 2 (Adult)  Intervention: Optimize Glycemic Control  Pt's BG monitored AC/HS. Scheduled insulin held due to pt's status of NPO. Sliding scale insulin administered. DM educated throughout shift. No episodes of hypoglycemia on shift.

## 2018-04-21 NOTE — PROGRESS NOTES
[facility]  Neurosurgery  Progress Note    Subjective:     History of Present Illness: 82 y.o.  male with type 2 diabetes, who presents today for follow up evaluation one week prior to 2 level ACDF scheduled for 4/10/2018. Pt reports    Pt presents today wearing a cervical collar and in a wheelchair. He would like to proceed with surgery. Per pt's family, his PCP would like to see him on 4/9, the day before surgery. He notes continued neck pain and BUE numbness/tingling as well as BUE weakness. Pt is only able to walk a few steps unassisted. He has never received neck surgery.     Post-Op Info:  Procedure(s) (LRB):  DISKECTOMY AND FUSION-ANTERIOR CERVICAL (ACDF); C5-6. C6-7 (N/A)   11 Days Post-Op     Interval History: Patient answering questions today but lethargic.  Concern for aspiration overnight, now on 2L O2.  No new weakness, paresthesias.     Medications:  Continuous Infusions:  Scheduled Meds:   atorvastatin  20 mg Oral Nightly    ceFEPime (MAXIPIME) IVPB  1 g Intravenous Q8H    citalopram  20 mg Oral Daily    diltiaZEM  120 mg Oral Daily    gabapentin  300 mg Oral TID    heparin (porcine)  5,000 Units Subcutaneous Q8H    insulin aspart U-100  3 Units Subcutaneous TIDWM    insulin detemir U-100  9 Units Subcutaneous BID    metoprolol tartrate  12.5 mg Oral Q8H    senna-docusate 8.6-50 mg  1 tablet Oral BID    sodium chloride  2 g Oral Daily    tamsulosin  0.4 mg Oral Daily    traZODone  50 mg Oral QHS    valsartan  40 mg Oral Daily    vancomycin (VANCOCIN) IVPB  1,000 mg Intravenous Q12H     PRN Meds:acetaminophen, aluminum-magnesium hydroxide-simethicone, benzonatate, dextrose 50%, dextrose 50%, dextrose 50%, fentaNYL, glucagon (human recombinant), glucagon (human recombinant), glucose, glucose, hydrALAZINE, hydrocodone-acetaminophen 5-325mg, insulin aspart U-100, labetalol, lidocaine HCL 2%, magnesium oxide, magnesium oxide, ondansetron, potassium chloride 10%, potassium  chloride 10%, potassium chloride 10%, potassium, sodium phosphates, potassium, sodium phosphates, potassium, sodium phosphates     Review of Systems  Objective:     Weight: 109.5 kg (241 lb 6.5 oz)  Body mass index is 32.74 kg/m².  Vital Signs (Most Recent):  Temp: (!) 101.5 °F (38.6 °C) (04/21/18 1242)  Pulse: (!) 117 (04/21/18 1242)  Resp: (!) 22 (04/21/18 1242)  BP: (!) 142/67 (04/21/18 1156)  SpO2: 96 % (04/21/18 1242) Vital Signs (24h Range):  Temp:  [98.9 °F (37.2 °C)-102.4 °F (39.1 °C)] 101.5 °F (38.6 °C)  Pulse:  [] 117  Resp:  [16-22] 22  SpO2:  [88 %-96 %] 96 %  BP: (114-175)/(42-93) 142/67                      Male External Urinary Catheter 04/13/18 1600 Small (Active)   Collection Container Urimeter 4/20/2018  9:00 PM   Securement Method secured to top of thigh w/ adhesive device 4/20/2018  9:00 PM   Skin no redness;no breakdown 4/20/2018  9:00 PM   Tolerance no signs/symptoms of discomfort 4/20/2018  9:00 PM   Output (mL) 150 mL 4/19/2018  2:00 PM   Catheter Change Date 04/19/18 4/19/2018  3:01 PM   Catheter Change Time 0701 4/19/2018  3:01 PM       Neurosurgery Physical Exam   General: well developed, well nourished, no distress.   Head: normocephalic, atraumatic  Neurologic: Alert and oriented. Thought content appropriate, but lethargic & slow to answer  GCS: Motor: 6/Verbal: 5/Eyes: 4 GCS Total: 15  Mental Status: Awake, Alert, Oriented x 4  Language: No aphasia  Speech: No dysarthria  Cranial nerves: face symmetric, tongue midline, CN II-XII grossly intact.   Eyes: pupils equal, round, reactive to light with accomodation, EOMI.   Pulmonary: normal respirations, no signs of respiratory distress  Abdomen: soft, non-distended, not tender to palpation  Sensory: intact to light touch throughout     Motor Strength:Moves all extremities spontaneously with good tone. No abnormal movements seen.      Strength   Deltoids Triceps Biceps Wrist Extension Wrist Flexion Hand    Upper: R 3/5 3/5 4-/5 4/5  4/5 4/5     L 3/5 4/5 4/5 4/5 4/5 4/5       Iliopsoas Quadriceps Knee  Flexion Tibialis  anterior Gastro- cnemius EHL   Lower: R 3/5 4+/5 5/5 5/5 5/5 5/5     L 3/5 4+/5 5/5 5/5 5/5 5/5      DTR's - 2 + and symmetric in UE and LE  Pronator Drift: no drift noted  Finger-to-nose: Intact bilaterally  Moctezuma: absent  Clonus: absent  Babinski: absent  Pulses: 2+ and symmetric radial and dorsalis pedis.  Skin: Skin is warm, dry and intact.  Incision c/d/i with no surrounding erythema, edema, or drainage. Skin edges well approximated with dermabond.  C-collar in place.       Significant Labs:    Recent Labs  Lab 04/20/18  0529 04/21/18  0441   * 238*   * 137   K 4.6 4.4    103   CO2 24 26   BUN 23 32*   CREATININE 0.8 0.8   CALCIUM 11.5* 11.9*   MG 2.0 2.0       Recent Labs  Lab 04/20/18  0529 04/21/18  0441   WBC 13.02* 20.64*   HGB 10.4* 10.6*   HCT 33.0* 33.3*    330     No results for input(s): LABPT, INR, APTT in the last 48 hours.  Microbiology Results (last 7 days)     Procedure Component Value Units Date/Time    Blood culture [[id number]]     Order Status:  Sent Specimen:  Blood     Blood culture [[id number]]     Order Status:  Sent Specimen:  Blood     Culture, Respiratory with Gram Stain [[id number]] Collected:  04/19/18 0231    Order Status:  Completed Specimen:  Respiratory from Sputum, Expectorated Updated:  04/21/18 1059     Respiratory Culture Normal respiratory mitzy     Gram Stain (Respiratory) <10 epithelial cells per low power field.     Gram Stain (Respiratory) Many WBC's     Gram Stain (Respiratory) Many Gram positive cocci     Gram Stain (Respiratory) Many Gram negative rods     Gram Stain (Respiratory) Rare Gram positive rods    Blood culture [[id number]] Collected:  04/19/18 1005    Order Status:  Completed Specimen:  Blood from Peripheral, Wrist, Right Updated:  04/20/18 1412     Blood Culture, Routine No Growth to date     Blood Culture, Routine No Growth to date     Narrative:       Blood cultures from 2 different sites. 4 bottles total.  Please draw before starting antibiotics.    Blood culture [[id number]] Collected:  04/19/18 0952    Order Status:  Completed Specimen:  Blood from Peripheral, Hand, Left Updated:  04/20/18 1412     Blood Culture, Routine No Growth to date     Blood Culture, Routine No Growth to date    Narrative:       Blood cultures x 2 different sites. 4 bottles total. Please  draw cultures before administering antibiotics.          Significant Diagnostics:  I personally reviewed CXR - The cardiomediastinal silhouette is prominent, magnified by technique, stable..  There is obscuration of the left costophrenic angle suggesting effusion.  There is elevation of the right hemidiaphragm..  The trachea is midline.  The lungs are symmetrically expanded bilaterally with patchy increased interstitial and parenchymal attenuation, primarily in a perihilar distribution suggesting edema.  There is patchy increased parenchymal attenuation projected over the left lower lung zone, developing consolidation is of concern.  Developing right lower lobe consolidation not excluded medially..  There is no pneumothorax.  The osseous structures are unchanged..    Assessment/Plan:     * Cervical myelopathy with cervical radiculopathy    82 y.o. male s/p C3-5 ACDF POD#11  - Neurologically stable, no concern for CSF leak  - Neuro checks q4h  - Leave incision open to air, healing well with no edema present  - Overnight, concern for aspiration & placed on 2L NC, + rhonchi on exam  - NPO for now, speech to reevaluate  - Now tachycardic & WBC 20 (13), with fevers overnight  - CXR with concern for developing PNA   - Blood cx, urine cx, BLE US pending  - Consulted hospital medicine for management of hypoxia, concerns for infection  - Also called NCC to evaluate pt for transfer to unit for higher level of care  -Cervical collar when OOB or with activity  -IS to bed side. Patient to use  atleast 10x every hour.  -CV- goal normotension. Continue home meds. PRN meds SBP >160.  -Hyponatremia-  today, will start daily NaCl tabs.  -Heme/ID- hgb/hct stable. Afebrile.   -Urinary retention- rouse in place, continue Flomax  -Continue PILI/SCDs/SQH  -Continue bowel regimen daily.  -Continue to hold Eliquis  -Discussed with LUIS ALBERTO Smith-C  Neurosurgery  [facility]

## 2018-04-21 NOTE — NURSING
Pt arrived to [facility] 3082. Pt placed on monitor, weight and temp obtained. No gtts. 2 L NC. NCC informed. Dubois placed and urine specimen sent. No new orders at this time. VIBHA.

## 2018-04-22 PROBLEM — E63.9 INADEQUATE DIETARY ENERGY INTAKE: Status: ACTIVE | Noted: 2018-04-22

## 2018-04-22 LAB
ALBUMIN SERPL BCP-MCNC: 2.4 G/DL
ALP SERPL-CCNC: 71 U/L
ALT SERPL W/O P-5'-P-CCNC: 10 U/L
ANION GAP SERPL CALC-SCNC: 8 MMOL/L
ANISOCYTOSIS BLD QL SMEAR: SLIGHT
AST SERPL-CCNC: 30 U/L
BASOPHILS # BLD AUTO: 0.02 K/UL
BASOPHILS NFR BLD: 0.1 %
BILIRUB SERPL-MCNC: 0.8 MG/DL
BUN SERPL-MCNC: 35 MG/DL
CALCIUM SERPL-MCNC: 11.5 MG/DL
CHLORIDE SERPL-SCNC: 108 MMOL/L
CO2 SERPL-SCNC: 26 MMOL/L
CREAT SERPL-MCNC: 0.8 MG/DL
DIFFERENTIAL METHOD: ABNORMAL
EOSINOPHIL # BLD AUTO: 0 K/UL
EOSINOPHIL NFR BLD: 0 %
ERYTHROCYTE [DISTWIDTH] IN BLOOD BY AUTOMATED COUNT: 15.2 %
EST. GFR  (AFRICAN AMERICAN): >60 ML/MIN/1.73 M^2
EST. GFR  (NON AFRICAN AMERICAN): >60 ML/MIN/1.73 M^2
GLUCOSE SERPL-MCNC: 233 MG/DL
HCT VFR BLD AUTO: 32.5 %
HGB BLD-MCNC: 10.3 G/DL
IMM GRANULOCYTES # BLD AUTO: 0.18 K/UL
IMM GRANULOCYTES NFR BLD AUTO: 0.8 %
LYMPHOCYTES # BLD AUTO: 1.7 K/UL
LYMPHOCYTES NFR BLD: 7.7 %
MAGNESIUM SERPL-MCNC: 2.1 MG/DL
MCH RBC QN AUTO: 31.1 PG
MCHC RBC AUTO-ENTMCNC: 31.7 G/DL
MCV RBC AUTO: 98 FL
MONOCYTES # BLD AUTO: 2.2 K/UL
MONOCYTES NFR BLD: 9.6 %
NEUTROPHILS # BLD AUTO: 18.5 K/UL
NEUTROPHILS NFR BLD: 81.8 %
NRBC BLD-RTO: 0 /100 WBC
PHOSPHATE SERPL-MCNC: 1.9 MG/DL
PLATELET # BLD AUTO: 301 K/UL
PLATELET BLD QL SMEAR: ABNORMAL
PMV BLD AUTO: 9.9 FL
POCT GLUCOSE: 181 MG/DL (ref 70–110)
POCT GLUCOSE: 199 MG/DL (ref 70–110)
POCT GLUCOSE: 200 MG/DL (ref 70–110)
POCT GLUCOSE: 220 MG/DL (ref 70–110)
POCT GLUCOSE: 222 MG/DL (ref 70–110)
POCT GLUCOSE: 234 MG/DL (ref 70–110)
POLYCHROMASIA BLD QL SMEAR: ABNORMAL
POTASSIUM SERPL-SCNC: 4.1 MMOL/L
PROT SERPL-MCNC: 6.4 G/DL
RBC # BLD AUTO: 3.31 M/UL
SODIUM SERPL-SCNC: 142 MMOL/L
WBC # BLD AUTO: 22.61 K/UL

## 2018-04-22 PROCEDURE — 27000221 HC OXYGEN, UP TO 24 HOURS

## 2018-04-22 PROCEDURE — G8997 SWALLOW GOAL STATUS: HCPCS | Mod: CJ

## 2018-04-22 PROCEDURE — 25000003 PHARM REV CODE 250: Performed by: STUDENT IN AN ORGANIZED HEALTH CARE EDUCATION/TRAINING PROGRAM

## 2018-04-22 PROCEDURE — 63600175 PHARM REV CODE 636 W HCPCS: Performed by: PSYCHIATRY & NEUROLOGY

## 2018-04-22 PROCEDURE — 25000003 PHARM REV CODE 250: Performed by: PSYCHIATRY & NEUROLOGY

## 2018-04-22 PROCEDURE — 99233 SBSQ HOSP IP/OBS HIGH 50: CPT | Mod: ,,, | Performed by: PSYCHIATRY & NEUROLOGY

## 2018-04-22 PROCEDURE — 25000003 PHARM REV CODE 250: Performed by: NURSE PRACTITIONER

## 2018-04-22 PROCEDURE — 80053 COMPREHEN METABOLIC PANEL: CPT

## 2018-04-22 PROCEDURE — 84100 ASSAY OF PHOSPHORUS: CPT

## 2018-04-22 PROCEDURE — 63600175 PHARM REV CODE 636 W HCPCS: Performed by: STUDENT IN AN ORGANIZED HEALTH CARE EDUCATION/TRAINING PROGRAM

## 2018-04-22 PROCEDURE — 92610 EVALUATE SWALLOWING FUNCTION: CPT

## 2018-04-22 PROCEDURE — 85025 COMPLETE CBC W/AUTO DIFF WBC: CPT

## 2018-04-22 PROCEDURE — 83735 ASSAY OF MAGNESIUM: CPT

## 2018-04-22 PROCEDURE — 25000003 PHARM REV CODE 250: Performed by: PHYSICIAN ASSISTANT

## 2018-04-22 PROCEDURE — 25000003 PHARM REV CODE 250: Performed by: NEUROLOGICAL SURGERY

## 2018-04-22 PROCEDURE — 97803 MED NUTRITION INDIV SUBSEQ: CPT

## 2018-04-22 PROCEDURE — 20000000 HC ICU ROOM

## 2018-04-22 PROCEDURE — 94761 N-INVAS EAR/PLS OXIMETRY MLT: CPT

## 2018-04-22 PROCEDURE — G8996 SWALLOW CURRENT STATUS: HCPCS | Mod: CN

## 2018-04-22 PROCEDURE — 63600175 PHARM REV CODE 636 W HCPCS: Performed by: NEUROLOGICAL SURGERY

## 2018-04-22 RX ADMIN — STANDARDIZED SENNA CONCENTRATE AND DOCUSATE SODIUM 1 TABLET: 8.6; 5 TABLET, FILM COATED ORAL at 08:04

## 2018-04-22 RX ADMIN — CEFEPIME 1 G: 1 INJECTION, POWDER, FOR SOLUTION INTRAMUSCULAR; INTRAVENOUS at 09:04

## 2018-04-22 RX ADMIN — HEPARIN SODIUM 5000 UNITS: 5000 INJECTION, SOLUTION INTRAVENOUS; SUBCUTANEOUS at 01:04

## 2018-04-22 RX ADMIN — CEFEPIME 1 G: 1 INJECTION, POWDER, FOR SOLUTION INTRAMUSCULAR; INTRAVENOUS at 04:04

## 2018-04-22 RX ADMIN — STANDARDIZED SENNA CONCENTRATE AND DOCUSATE SODIUM 1 TABLET: 8.6; 5 TABLET, FILM COATED ORAL at 09:04

## 2018-04-22 RX ADMIN — Medication 12.5 MG: at 09:04

## 2018-04-22 RX ADMIN — INSULIN ASPART 3 UNITS: 100 INJECTION, SOLUTION INTRAVENOUS; SUBCUTANEOUS at 05:04

## 2018-04-22 RX ADMIN — TRAZODONE HYDROCHLORIDE 50 MG: 50 TABLET ORAL at 09:04

## 2018-04-22 RX ADMIN — HEPARIN SODIUM 5000 UNITS: 5000 INJECTION, SOLUTION INTRAVENOUS; SUBCUTANEOUS at 04:04

## 2018-04-22 RX ADMIN — DILTIAZEM HYDROCHLORIDE 120 MG: 120 CAPSULE, COATED, EXTENDED RELEASE ORAL at 08:04

## 2018-04-22 RX ADMIN — POTASSIUM & SODIUM PHOSPHATES POWDER PACK 280-160-250 MG 2 PACKET: 280-160-250 PACK at 01:04

## 2018-04-22 RX ADMIN — INSULIN ASPART 1 UNITS: 100 INJECTION, SOLUTION INTRAVENOUS; SUBCUTANEOUS at 11:04

## 2018-04-22 RX ADMIN — HEPARIN SODIUM 5000 UNITS: 5000 INJECTION, SOLUTION INTRAVENOUS; SUBCUTANEOUS at 09:04

## 2018-04-22 RX ADMIN — Medication 1 G: at 01:04

## 2018-04-22 RX ADMIN — Medication 12.5 MG: at 01:04

## 2018-04-22 RX ADMIN — SODIUM CHLORIDE TAB 1 GM 2 G: 1 TAB at 08:04

## 2018-04-22 RX ADMIN — Medication 1 G: at 11:04

## 2018-04-22 RX ADMIN — ACETAMINOPHEN 650 MG: 325 TABLET, FILM COATED ORAL at 11:04

## 2018-04-22 RX ADMIN — POTASSIUM & SODIUM PHOSPHATES POWDER PACK 280-160-250 MG 2 PACKET: 280-160-250 PACK at 05:04

## 2018-04-22 RX ADMIN — CITALOPRAM HYDROBROMIDE 20 MG: 20 TABLET ORAL at 08:04

## 2018-04-22 RX ADMIN — Medication 12.5 MG: at 04:04

## 2018-04-22 RX ADMIN — GABAPENTIN 300 MG: 300 CAPSULE ORAL at 03:04

## 2018-04-22 RX ADMIN — INSULIN ASPART 4 UNITS: 100 INJECTION, SOLUTION INTRAVENOUS; SUBCUTANEOUS at 12:04

## 2018-04-22 RX ADMIN — POTASSIUM & SODIUM PHOSPHATES POWDER PACK 280-160-250 MG 2 PACKET: 280-160-250 PACK at 09:04

## 2018-04-22 RX ADMIN — INSULIN ASPART 2 UNITS: 100 INJECTION, SOLUTION INTRAVENOUS; SUBCUTANEOUS at 07:04

## 2018-04-22 RX ADMIN — CEFEPIME 1 G: 1 INJECTION, POWDER, FOR SOLUTION INTRAMUSCULAR; INTRAVENOUS at 01:04

## 2018-04-22 RX ADMIN — INSULIN ASPART 3 UNITS: 100 INJECTION, SOLUTION INTRAVENOUS; SUBCUTANEOUS at 12:04

## 2018-04-22 RX ADMIN — INSULIN ASPART 2 UNITS: 100 INJECTION, SOLUTION INTRAVENOUS; SUBCUTANEOUS at 05:04

## 2018-04-22 RX ADMIN — GABAPENTIN 300 MG: 300 CAPSULE ORAL at 09:04

## 2018-04-22 RX ADMIN — TAMSULOSIN HYDROCHLORIDE 0.4 MG: 0.4 CAPSULE ORAL at 08:04

## 2018-04-22 RX ADMIN — GABAPENTIN 300 MG: 300 CAPSULE ORAL at 08:04

## 2018-04-22 RX ADMIN — ATORVASTATIN CALCIUM 20 MG: 20 TABLET, FILM COATED ORAL at 09:04

## 2018-04-22 RX ADMIN — VALSARTAN 40 MG: 40 TABLET ORAL at 08:04

## 2018-04-22 NOTE — PLAN OF CARE
Problem: SLP Goal  Goal: SLP Goal  Speech Language Pathology Goals  Goals expected to be met by 4/29:    1. Pt will participate in ongoing assessment of swallow.          Outcome: Ongoing (interventions implemented as appropriate)  Bedside swallow assessment completed.  Pt with overt s/s aspiration at bedside.  Rec cont npo with strict aspiration precautions.  SLP to continue to follow. RADAH Condon, CRYSTAL/SLP  4/22/2018

## 2018-04-22 NOTE — ASSESSMENT & PLAN NOTE
Start tube feeds  Tube feeds only to ensure adequate nutrition while patient in acute SIRs  To avoid further aspiration due to mildly decreased mental status
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
## 2018-04-22 NOTE — NURSING
NG inserted per orders. Very difficult insertion. Pt unable to follow any commands to assist with placement. Orders for KUB placed.

## 2018-04-22 NOTE — NURSING
10F, 145cm feeding tube placed in right nare with assistance of [email or web address] [email or web address] device at 75cm.  X-RAY confirmed placement.

## 2018-04-22 NOTE — ASSESSMENT & PLAN NOTE
Leukocytosis still increasing  Tachycardia improved  Hyperthermia improved  Broad spectrum abx  Blood cultures NGTD  Urinalysis non infectiuos  likely aspiration

## 2018-04-22 NOTE — PT/OT/SLP EVAL
"Speech Language Pathology Evaluation  Bedside Swallow    Patient Name:  Hollis Pitts   MRN:  [id number]  Admitting Diagnosis: Cervical myelopathy with cervical radiculopathy    Recommendations:                 General Recommendations:  Dysphagia therapy  Diet recommendations:  NPO, NPO   Aspiration Precautions: Continue alternate means of nutrition and Strict aspiration precautions   General Precautions: Standard, aspiration, fall, NPO  Communication strategies:  provide increased time to answer and go to room if call light pushed    History:     Past Medical History:   Diagnosis Date    Benign prostatic hyperplasia with urinary retention 4/11/2018    Cervical myelopathy with cervical radiculopathy 4/10/2018    Depression     Dyslipidemia     Essential hypertension 4/11/2018    Glaucoma (increased eye pressure)     Mild cognitive impairment     Traumatic brain injury 07/2017    Type 2 diabetes mellitus with hyperglycemia, without long-term current use of insulin 4/11/2018       Past Surgical History:   Procedure Laterality Date    CATARACT EXTRACTION W/  INTRAOCULAR LENS IMPLANT         Social History: Patient did not state.  Pt seen by SLP previously during hospital stay, advanced to reg/thin and discharged from services on 4/16. Pt with return to ICU with questionable aspiration event.  NPO with ng tube currently.    Prior Intubation HX:  Intubated/extubated 4/10    Modified Barium Swallow: none this admission    Chest X-Rays: 4/21: "No pneumothorax or large pleural effusion"    Prior diet: reg/thin  Subjective     "It hurts and is difficult but I can do it."  Patient goals: to swallow     Pain/Comfort:  · Pain Rating 1: 0/10  · Pain Rating Post-Intervention 1: 0/10    Objective:     Oral Musculature Evaluation  · Oral Musculature: general weakness  · Dentition:  (upper dentures not present)  · Secretion Management: adequate  · Mucosal Quality: dry, sticky  · Mandibular Strength and Mobility: " impaired  · Oral Labial Strength and Mobility: functional pursing, functional seal  · Lingual Strength and Mobility: impaired strength, impaired left lateral movement, impaired right lateral movement  · Velar Elevation: WFL  · Buccal Strength and Mobility: WFL  · Volitional Cough: weak  · Volitional Swallow: delayed, max cues, decreased rise  · Voice Prior to PO Intake: decreased intensity, breathy    Bedside Swallow Eval:   Consistencies Assessed:  · Thin liquids tspn x1, ice chip x1  · Honey thick liquids tspn x1  · Puree tspn x1     Oral Phase:   · Slow oral transit time    Pharyngeal Phase:   · decreased hyolaryngeal excursion to palpation  · delayed swallow initation  · multiple spontaneous swallows (5+ with all)  · throat clearing with all  · wet vocal quality after swallow   · Effortful swallows with grimace across all consistencies    Compensatory Strategies  · Effortful swallow  · Multiple swallows    Treatment: Education provided re: role of SLP, s/s aspiration, risk of aspiration and cont npo.  Pt verbalized understanding though denied difficulty.  Education to be ongoing.  Pt's nurse alerted re: results and recs.       Assessment:     Hollis Pitts is a 82 y.o. male with an SLP diagnosis of Dysphagia.  He presents with overt s/s aspiration across all consistencies.    Goals:    SLP Goals        Problem: SLP Goal    Goal Priority Disciplines Outcome   SLP Goal     SLP Ongoing (interventions implemented as appropriate)   Description:  Speech Language Pathology Goals  Goals expected to be met by 4/29:    1. Pt will participate in ongoing assessment of swallow.                            Plan:     · Patient to be seen:  4 x/week   · Plan of Care expires:  05/22/18  · Plan of Care reviewed with:  patient   · SLP Follow-Up:  Yes       Discharge recommendations:  nursing facility, skilled   Barriers to Discharge:  Level of Skilled Assistance Needed      Time Tracking:     SLP Treatment Date:    04/22/18  Speech Start Time:  0815  Speech Stop Time:  0829     Speech Total Time (min):  14 min    Billable Minutes: Eval Swallow and Oral Function 14    RADHA Condon, CCC-SLP  04/22/2018

## 2018-04-22 NOTE — CONSULTS
Hospital medicine and NCC consulted for hypoxia after stepping down from neuro critical care; patient returned to the ICU prior to our evaluation. We will continue to follow the patient once he steps down from critical care.

## 2018-04-22 NOTE — CONSULTS
Consult received re: TF recs. RD is following pt. Noted pt now NPO per SLP recs, NG was placed.     Recommend Glucerna 1.5, goal rate of 55mL/hr.    -Will provide 1980kcal, 109g protein, and 1002mL fluid.    -Initiate at 10mL/hr and advance as tolerated by 10mL every 2-4hrs until goal rate.    -Hold for residuals >500mL.    -Additional water flushes per MD.     Will continue to follow-up as previously scheduled.

## 2018-04-23 ENCOUNTER — TELEPHONE (OUTPATIENT)
Dept: NEUROSURGERY | Facility: CLINIC | Age: 83
End: 2018-04-23

## 2018-04-23 LAB
ALBUMIN SERPL BCP-MCNC: 2.1 G/DL
ALP SERPL-CCNC: 83 U/L
ALT SERPL W/O P-5'-P-CCNC: 17 U/L
ANION GAP SERPL CALC-SCNC: 10 MMOL/L
AST SERPL-CCNC: 30 U/L
BACTERIA UR CULT: NORMAL
BASOPHILS # BLD AUTO: 0.03 K/UL
BASOPHILS NFR BLD: 0.1 %
BILIRUB SERPL-MCNC: 0.7 MG/DL
BUN SERPL-MCNC: 29 MG/DL
CALCIUM SERPL-MCNC: 10.9 MG/DL
CHLORIDE SERPL-SCNC: 111 MMOL/L
CO2 SERPL-SCNC: 24 MMOL/L
CREAT SERPL-MCNC: 0.8 MG/DL
DIFFERENTIAL METHOD: ABNORMAL
EOSINOPHIL # BLD AUTO: 0 K/UL
EOSINOPHIL NFR BLD: 0 %
ERYTHROCYTE [DISTWIDTH] IN BLOOD BY AUTOMATED COUNT: 15.2 %
EST. GFR  (AFRICAN AMERICAN): >60 ML/MIN/1.73 M^2
EST. GFR  (NON AFRICAN AMERICAN): >60 ML/MIN/1.73 M^2
GLUCOSE SERPL-MCNC: 248 MG/DL
HCT VFR BLD AUTO: 34.5 %
HGB BLD-MCNC: 10.9 G/DL
IMM GRANULOCYTES # BLD AUTO: 0.29 K/UL
IMM GRANULOCYTES NFR BLD AUTO: 1.3 %
LYMPHOCYTES # BLD AUTO: 2.1 K/UL
LYMPHOCYTES NFR BLD: 9.6 %
MAGNESIUM SERPL-MCNC: 1.8 MG/DL
MAGNESIUM SERPL-MCNC: 1.9 MG/DL
MCH RBC QN AUTO: 30.9 PG
MCHC RBC AUTO-ENTMCNC: 31.6 G/DL
MCV RBC AUTO: 98 FL
MONOCYTES # BLD AUTO: 2 K/UL
MONOCYTES NFR BLD: 8.9 %
NEUTROPHILS # BLD AUTO: 17.5 K/UL
NEUTROPHILS NFR BLD: 80.1 %
NRBC BLD-RTO: 0 /100 WBC
PHOSPHATE SERPL-MCNC: 1.6 MG/DL
PHOSPHATE SERPL-MCNC: 1.8 MG/DL
PLATELET # BLD AUTO: 298 K/UL
PMV BLD AUTO: 9.7 FL
POCT GLUCOSE: 239 MG/DL (ref 70–110)
POCT GLUCOSE: 245 MG/DL (ref 70–110)
POCT GLUCOSE: 275 MG/DL (ref 70–110)
POCT GLUCOSE: 325 MG/DL (ref 70–110)
POCT GLUCOSE: 351 MG/DL (ref 70–110)
POTASSIUM SERPL-SCNC: 3.8 MMOL/L
POTASSIUM SERPL-SCNC: 4.4 MMOL/L
PROT SERPL-MCNC: 6.1 G/DL
RBC # BLD AUTO: 3.53 M/UL
SODIUM SERPL-SCNC: 145 MMOL/L
VANCOMYCIN TROUGH SERPL-MCNC: 14.7 UG/ML
WBC # BLD AUTO: 21.88 K/UL

## 2018-04-23 PROCEDURE — 25000003 PHARM REV CODE 250: Performed by: STUDENT IN AN ORGANIZED HEALTH CARE EDUCATION/TRAINING PROGRAM

## 2018-04-23 PROCEDURE — 63600175 PHARM REV CODE 636 W HCPCS: Performed by: PSYCHIATRY & NEUROLOGY

## 2018-04-23 PROCEDURE — 92526 ORAL FUNCTION THERAPY: CPT

## 2018-04-23 PROCEDURE — 83735 ASSAY OF MAGNESIUM: CPT

## 2018-04-23 PROCEDURE — 25000003 PHARM REV CODE 250: Performed by: NURSE PRACTITIONER

## 2018-04-23 PROCEDURE — 20000000 HC ICU ROOM

## 2018-04-23 PROCEDURE — 84132 ASSAY OF SERUM POTASSIUM: CPT

## 2018-04-23 PROCEDURE — 25000003 PHARM REV CODE 250: Performed by: PHYSICIAN ASSISTANT

## 2018-04-23 PROCEDURE — 25000003 PHARM REV CODE 250: Performed by: PSYCHIATRY & NEUROLOGY

## 2018-04-23 PROCEDURE — 80053 COMPREHEN METABOLIC PANEL: CPT

## 2018-04-23 PROCEDURE — 27201109 HC SYSTEM FECAL MANAGEMENT

## 2018-04-23 PROCEDURE — 63600175 PHARM REV CODE 636 W HCPCS: Performed by: NURSE PRACTITIONER

## 2018-04-23 PROCEDURE — 80202 ASSAY OF VANCOMYCIN: CPT

## 2018-04-23 PROCEDURE — 84100 ASSAY OF PHOSPHORUS: CPT | Mod: 91

## 2018-04-23 PROCEDURE — 85025 COMPLETE CBC W/AUTO DIFF WBC: CPT

## 2018-04-23 PROCEDURE — 83735 ASSAY OF MAGNESIUM: CPT | Mod: 91

## 2018-04-23 PROCEDURE — 63600175 PHARM REV CODE 636 W HCPCS: Performed by: PHYSICIAN ASSISTANT

## 2018-04-23 PROCEDURE — 94761 N-INVAS EAR/PLS OXIMETRY MLT: CPT

## 2018-04-23 PROCEDURE — 84100 ASSAY OF PHOSPHORUS: CPT

## 2018-04-23 PROCEDURE — 63600175 PHARM REV CODE 636 W HCPCS: Performed by: STUDENT IN AN ORGANIZED HEALTH CARE EDUCATION/TRAINING PROGRAM

## 2018-04-23 PROCEDURE — 99233 SBSQ HOSP IP/OBS HIGH 50: CPT | Mod: ,,, | Performed by: PHYSICIAN ASSISTANT

## 2018-04-23 RX ORDER — ATORVASTATIN CALCIUM 20 MG/1
20 TABLET, FILM COATED ORAL NIGHTLY
Status: DISCONTINUED | OUTPATIENT
Start: 2018-04-23 | End: 2018-05-18 | Stop reason: HOSPADM

## 2018-04-23 RX ORDER — GABAPENTIN 300 MG/1
300 CAPSULE ORAL 3 TIMES DAILY
Status: DISCONTINUED | OUTPATIENT
Start: 2018-04-23 | End: 2018-04-29

## 2018-04-23 RX ORDER — POTASSIUM CHLORIDE 20 MEQ/15ML
60 SOLUTION ORAL
Status: DISCONTINUED | OUTPATIENT
Start: 2018-04-23 | End: 2018-04-26

## 2018-04-23 RX ORDER — GLUCAGON 1 MG
1 KIT INJECTION
Status: DISCONTINUED | OUTPATIENT
Start: 2018-04-23 | End: 2018-05-02

## 2018-04-23 RX ORDER — LANOLIN ALCOHOL/MO/W.PET/CERES
800 CREAM (GRAM) TOPICAL
Status: DISCONTINUED | OUTPATIENT
Start: 2018-04-23 | End: 2018-04-26

## 2018-04-23 RX ORDER — AMOXICILLIN 250 MG
1 CAPSULE ORAL 2 TIMES DAILY
Status: DISCONTINUED | OUTPATIENT
Start: 2018-04-23 | End: 2018-04-24

## 2018-04-23 RX ORDER — INSULIN ASPART 100 [IU]/ML
3 INJECTION, SOLUTION INTRAVENOUS; SUBCUTANEOUS
Status: DISCONTINUED | OUTPATIENT
Start: 2018-04-23 | End: 2018-04-24

## 2018-04-23 RX ORDER — SODIUM,POTASSIUM PHOSPHATES 280-250MG
2 POWDER IN PACKET (EA) ORAL
Status: DISCONTINUED | OUTPATIENT
Start: 2018-04-23 | End: 2018-04-26

## 2018-04-23 RX ORDER — DILTIAZEM HYDROCHLORIDE 30 MG/1
30 TABLET, FILM COATED ORAL EVERY 6 HOURS
Status: DISCONTINUED | OUTPATIENT
Start: 2018-04-23 | End: 2018-05-06

## 2018-04-23 RX ORDER — HYDROCODONE BITARTRATE AND ACETAMINOPHEN 5; 325 MG/1; MG/1
1 TABLET ORAL EVERY 4 HOURS PRN
Status: DISCONTINUED | OUTPATIENT
Start: 2018-04-23 | End: 2018-04-30

## 2018-04-23 RX ORDER — CITALOPRAM 10 MG/1
20 TABLET ORAL DAILY
Status: DISCONTINUED | OUTPATIENT
Start: 2018-04-24 | End: 2018-04-29

## 2018-04-23 RX ORDER — INSULIN ASPART 100 [IU]/ML
3 INJECTION, SOLUTION INTRAVENOUS; SUBCUTANEOUS EVERY 4 HOURS
Status: DISCONTINUED | OUTPATIENT
Start: 2018-04-23 | End: 2018-04-23

## 2018-04-23 RX ORDER — POTASSIUM CHLORIDE 20 MEQ/15ML
40 SOLUTION ORAL
Status: DISCONTINUED | OUTPATIENT
Start: 2018-04-23 | End: 2018-04-26

## 2018-04-23 RX ORDER — INSULIN ASPART 100 [IU]/ML
1-10 INJECTION, SOLUTION INTRAVENOUS; SUBCUTANEOUS EVERY 4 HOURS PRN
Status: DISCONTINUED | OUTPATIENT
Start: 2018-04-23 | End: 2018-05-02

## 2018-04-23 RX ORDER — TAMSULOSIN HYDROCHLORIDE 0.4 MG/1
0.4 CAPSULE ORAL DAILY
Status: DISCONTINUED | OUTPATIENT
Start: 2018-04-24 | End: 2018-05-08

## 2018-04-23 RX ORDER — TRAZODONE HYDROCHLORIDE 50 MG/1
50 TABLET ORAL NIGHTLY
Status: DISCONTINUED | OUTPATIENT
Start: 2018-04-23 | End: 2018-05-04

## 2018-04-23 RX ORDER — MAG HYDROX/ALUMINUM HYD/SIMETH 200-200-20
30 SUSPENSION, ORAL (FINAL DOSE FORM) ORAL EVERY 4 HOURS PRN
Status: DISCONTINUED | OUTPATIENT
Start: 2018-04-23 | End: 2018-05-18 | Stop reason: HOSPADM

## 2018-04-23 RX ORDER — VALSARTAN 40 MG/1
40 TABLET ORAL DAILY
Status: DISCONTINUED | OUTPATIENT
Start: 2018-04-24 | End: 2018-05-02

## 2018-04-23 RX ORDER — METOPROLOL TARTRATE 25 MG/1
12.5 TABLET ORAL EVERY 8 HOURS
Status: DISCONTINUED | OUTPATIENT
Start: 2018-04-23 | End: 2018-04-30

## 2018-04-23 RX ORDER — ONDANSETRON 8 MG/1
8 TABLET, ORALLY DISINTEGRATING ORAL EVERY 6 HOURS PRN
Status: DISCONTINUED | OUTPATIENT
Start: 2018-04-23 | End: 2018-05-18 | Stop reason: HOSPADM

## 2018-04-23 RX ORDER — VANCOMYCIN/0.9 % SOD CHLORIDE 1 G/100 ML
1000 PLASTIC BAG, INJECTION (ML) INTRAVENOUS
Status: DISCONTINUED | OUTPATIENT
Start: 2018-04-23 | End: 2018-04-25

## 2018-04-23 RX ORDER — ACETAMINOPHEN 325 MG/1
650 TABLET ORAL EVERY 4 HOURS PRN
Status: DISCONTINUED | OUTPATIENT
Start: 2018-04-23 | End: 2018-05-08

## 2018-04-23 RX ADMIN — TAMSULOSIN HYDROCHLORIDE 0.4 MG: 0.4 CAPSULE ORAL at 08:04

## 2018-04-23 RX ADMIN — HEPARIN SODIUM 5000 UNITS: 5000 INJECTION, SOLUTION INTRAVENOUS; SUBCUTANEOUS at 01:04

## 2018-04-23 RX ADMIN — INSULIN ASPART 3 UNITS: 100 INJECTION, SOLUTION INTRAVENOUS; SUBCUTANEOUS at 06:04

## 2018-04-23 RX ADMIN — CITALOPRAM HYDROBROMIDE 20 MG: 20 TABLET ORAL at 08:04

## 2018-04-23 RX ADMIN — GABAPENTIN 300 MG: 300 CAPSULE ORAL at 08:04

## 2018-04-23 RX ADMIN — POTASSIUM CHLORIDE 40 MEQ: 20 SOLUTION ORAL at 05:04

## 2018-04-23 RX ADMIN — ACETAMINOPHEN 650 MG: 325 TABLET, FILM COATED ORAL at 08:04

## 2018-04-23 RX ADMIN — POTASSIUM & SODIUM PHOSPHATES POWDER PACK 280-160-250 MG 2 PACKET: 280-160-250 PACK at 05:04

## 2018-04-23 RX ADMIN — Medication 12.5 MG: at 09:04

## 2018-04-23 RX ADMIN — POTASSIUM & SODIUM PHOSPHATES POWDER PACK 280-160-250 MG 2 PACKET: 280-160-250 PACK at 01:04

## 2018-04-23 RX ADMIN — GABAPENTIN 300 MG: 300 CAPSULE ORAL at 02:04

## 2018-04-23 RX ADMIN — DILTIAZEM HYDROCHLORIDE 30 MG: 30 TABLET, FILM COATED ORAL at 02:04

## 2018-04-23 RX ADMIN — INSULIN ASPART 3 UNITS: 100 INJECTION, SOLUTION INTRAVENOUS; SUBCUTANEOUS at 08:04

## 2018-04-23 RX ADMIN — HEPARIN SODIUM 5000 UNITS: 5000 INJECTION, SOLUTION INTRAVENOUS; SUBCUTANEOUS at 05:04

## 2018-04-23 RX ADMIN — INSULIN ASPART 3 UNITS: 100 INJECTION, SOLUTION INTRAVENOUS; SUBCUTANEOUS at 10:04

## 2018-04-23 RX ADMIN — POTASSIUM & SODIUM PHOSPHATES POWDER PACK 280-160-250 MG 2 PACKET: 280-160-250 PACK at 08:04

## 2018-04-23 RX ADMIN — Medication 12.5 MG: at 05:04

## 2018-04-23 RX ADMIN — CEFEPIME 1 G: 1 INJECTION, POWDER, FOR SOLUTION INTRAMUSCULAR; INTRAVENOUS at 08:04

## 2018-04-23 RX ADMIN — VALSARTAN 40 MG: 40 TABLET ORAL at 08:04

## 2018-04-23 RX ADMIN — CEFEPIME 1 G: 1 INJECTION, POWDER, FOR SOLUTION INTRAMUSCULAR; INTRAVENOUS at 05:04

## 2018-04-23 RX ADMIN — Medication 12.5 MG: at 01:04

## 2018-04-23 RX ADMIN — DILTIAZEM HYDROCHLORIDE 120 MG: 120 CAPSULE, COATED, EXTENDED RELEASE ORAL at 08:04

## 2018-04-23 RX ADMIN — STANDARDIZED SENNA CONCENTRATE AND DOCUSATE SODIUM 1 TABLET: 8.6; 5 TABLET, FILM COATED ORAL at 08:04

## 2018-04-23 RX ADMIN — VANCOMYCIN HYDROCHLORIDE 1 G: 10 INJECTION, POWDER, LYOPHILIZED, FOR SOLUTION INTRAVENOUS at 01:04

## 2018-04-23 RX ADMIN — INSULIN ASPART 3 UNITS: 100 INJECTION, SOLUTION INTRAVENOUS; SUBCUTANEOUS at 04:04

## 2018-04-23 RX ADMIN — INSULIN ASPART 8 UNITS: 100 INJECTION, SOLUTION INTRAVENOUS; SUBCUTANEOUS at 10:04

## 2018-04-23 RX ADMIN — MAGNESIUM OXIDE TAB 400 MG (241.3 MG ELEMENTAL MG) 800 MG: 400 (241.3 MG) TAB at 08:04

## 2018-04-23 RX ADMIN — ATORVASTATIN CALCIUM 20 MG: 20 TABLET, FILM COATED ORAL at 08:04

## 2018-04-23 RX ADMIN — INSULIN ASPART 4 UNITS: 100 INJECTION, SOLUTION INTRAVENOUS; SUBCUTANEOUS at 06:04

## 2018-04-23 RX ADMIN — INSULIN ASPART 6 UNITS: 100 INJECTION, SOLUTION INTRAVENOUS; SUBCUTANEOUS at 04:04

## 2018-04-23 RX ADMIN — TRAZODONE HYDROCHLORIDE 50 MG: 50 TABLET ORAL at 08:04

## 2018-04-23 RX ADMIN — MAGNESIUM OXIDE TAB 400 MG (241.3 MG ELEMENTAL MG) 800 MG: 400 (241.3 MG) TAB at 05:04

## 2018-04-23 RX ADMIN — CEFEPIME 1 G: 1 INJECTION, POWDER, FOR SOLUTION INTRAMUSCULAR; INTRAVENOUS at 01:04

## 2018-04-23 RX ADMIN — HYDRALAZINE HYDROCHLORIDE 10 MG: 20 INJECTION INTRAMUSCULAR; INTRAVENOUS at 02:04

## 2018-04-23 RX ADMIN — INSULIN ASPART 4 UNITS: 100 INJECTION, SOLUTION INTRAVENOUS; SUBCUTANEOUS at 08:04

## 2018-04-23 RX ADMIN — LABETALOL HYDROCHLORIDE 10 MG: 5 INJECTION, SOLUTION INTRAVENOUS at 08:04

## 2018-04-23 RX ADMIN — DILTIAZEM HYDROCHLORIDE 30 MG: 30 TABLET, FILM COATED ORAL at 05:04

## 2018-04-23 RX ADMIN — HEPARIN SODIUM 5000 UNITS: 5000 INJECTION, SOLUTION INTRAVENOUS; SUBCUTANEOUS at 09:04

## 2018-04-23 NOTE — PLAN OF CARE
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

## 2018-04-23 NOTE — PLAN OF CARE
TAN following for DC needs. TAN in communication with MALIKA.    TAN received call from Amy at [facility] who stated that the patient has been accepted. TAN informed Amy that the patient went to critical care and TAN will updated her when he steps down.     [facility] declined the patient due to not being able to meet his needs.     Yvonne Ellington, [facility] [place]  [phone]

## 2018-04-23 NOTE — PLAN OF CARE
Problem: Patient Care Overview  Goal: Plan of Care Review  Outcome: Ongoing (interventions implemented as appropriate)  NAEON. See flowsheets for vital signs and assessments. See below for updates on progress made.       Neuro: AAOx2, slept 6+ hours; afebrile; follows commands and move extremities x4    Cardiovascular: ST with PVCs; -160s/70s, given PRN hydralazine x1      Pulmonary: RA    Gastrointestinal: TF now at goal of 55 through cortrak; no BM    Genitourinary: voiding ~75 mL/hr per rouse    Endocrine: K 3.8, replaced with 40 mEq x1; mag 1.8, received 1/2 PRN PO replacement doses; phos 1.6, received 1/3 PRN PO replacement doses    Integumentary/other: c-collar in place all night, neck incision CDI    Infusions: KVO    POC: s/d    Patient progressing towards goals as tolerated, plan of care communicated and reviewed with oHllis Pitts. All concerns addressed. Will continue to monitor.

## 2018-04-23 NOTE — PT/OT/SLP PROGRESS
"Speech Language Pathology Treatment    Patient Name:  Hollis Pitts   MRN:  [id number]  Admitting Diagnosis: Cervical myelopathy with cervical radiculopathy    Recommendations:                 General Recommendations:  Dysphagia therapy  Diet recommendations:  NPO, Liquid Diet Level: NPO   Aspiration Precautions: Alternate means of nutrition/hydration, Frequent oral care and Strict aspiration precautions   General Precautions: Standard, aspiration, fall, NPO  Communication strategies:  provide increased time to answer and go to room if call light pushed    Subjective     SLP reviewed Pt with nurse, nurse reports she has been providing frequent oral care   Pt presents pleasantly confused, lethargic   Pt explains, "ok" when asked about location/place   Pt denies pain    Pain/Comfort:  · Pain Rating 1: 0/10  · Pain Rating Post-Intervention 1: 0/10    Objective:     Has the patient been evaluated by SLP for swallowing?   Yes  Keep patient NPO? Yes   Current Respiratory Status: room air    CXR 4/23/18: Cardiac monitoring leads project over the bilateral hemithoraces.  Lung volumes are symmetric.  Persistent bilateral areas of increased attenuation are noted within the lower lung zones concerning for edema, aspiration or pneumonia.  No pneumothorax or large pleural effusions.  No free air beneath the diaphragms.  Degenerative changes of the spine and shoulders noted.  Partially visualized postsurgical changes of the cervical spine again noted.  Enteric catheter crosses the diaphragm.    Pt seen for ongoing swallow assessment. Pt found awake in bed, watching TV upright in bed, with c-collar in place. Pt with open-mouth breathing posture, mildly wet vocal quality and coated lingual surface upon review of the oral mechanism.  Throat clear and cough on command difficult to eliit 2/2 decreased comprehension/cognitive status. Pt provided oral care via suction toothette.  Pt with hyperactive gag reflex t/o oral care. Pt seen with " trials of ice chip sliver x1. Pt with delayed gagging/throat clearing and ice chip contents suctioned from mouth. Patient not appropriate for PO trials. SLP reviews SLP role, S/S aspiration and need for ongoing swallow assessment with Pt. Pt lethargic and not able to demonstrate understanding.  Findings reviewed with nurse. Matt current. No further questions.     Assessment:     Hollis Pitts is a 82 y.o. male with an SLP diagnosis of Oropharyngeal Dysphagia.  He presents with decreased lingual coordination and hyperactive gag reflex t/o oral care today.  Patient would benefit from ongoing swallow assessment to determine safest, least restrictive means of nutrition/hydration/medication.  ST to continue to follow.     Goals:    SLP Goals        Problem: SLP Goal    Goal Priority Disciplines Outcome   SLP Goal     SLP Ongoing (interventions implemented as appropriate)   Description:  Speech Language Pathology Goals  Goals expected to be met by 4/29:    1. Pt will participate in ongoing assessment of swallow.                            Plan:     · Patient to be seen:  4 x/week   · Plan of Care expires:  05/22/18  · Plan of Care reviewed with:  patient   · SLP Follow-Up:  Yes       Discharge recommendations:  nursing facility, skilled   Barriers to Discharge:  Level of Skilled Assistance Needed     Time Tracking:     SLP Treatment Date:   04/23/18  Speech Start Time:  1516  Speech Stop Time:  1524     Speech Total Time (min):  8 min    Billable Minutes: Treatment Swallowing Dysfunction 8    VALENTINO Shelby., [facility]-SLP  Speech-Language Pathology  Pager: [phone]      04/23/2018

## 2018-04-23 NOTE — SUBJECTIVE & OBJECTIVE
Interval History: stepped up to ICU yesterday. AFVSS.     Medications:  Continuous Infusions:  Scheduled Meds:   atorvastatin  20 mg Oral Nightly    ceFEPime (MAXIPIME) IVPB  1 g Intravenous Q8H    citalopram  20 mg Oral Daily    diltiaZEM  120 mg Oral Daily    gabapentin  300 mg Oral TID    heparin (porcine)  5,000 Units Subcutaneous Q8H    insulin aspart U-100  3 Units Subcutaneous TIDWM    insulin detemir U-100  9 Units Subcutaneous BID    metoprolol tartrate  12.5 mg Oral Q8H    senna-docusate 8.6-50 mg  1 tablet Oral BID    sodium chloride  2 g Oral Daily    tamsulosin  0.4 mg Oral Daily    traZODone  50 mg Oral QHS    valsartan  40 mg Oral Daily    vancomycin (VANCOCIN) IVPB  1,000 mg Intravenous Q12H     PRN Meds:acetaminophen, aluminum-magnesium hydroxide-simethicone, benzonatate, dextrose 50%, dextrose 50%, dextrose 50%, fentaNYL, glucagon (human recombinant), glucagon (human recombinant), glucose, glucose, hydrALAZINE, hydrocodone-acetaminophen 5-325mg, insulin aspart U-100, labetalol, lidocaine HCL 2%, magnesium oxide, magnesium oxide, ondansetron, potassium chloride 10%, potassium chloride 10%, potassium chloride 10%, potassium, sodium phosphates, potassium, sodium phosphates, potassium, sodium phosphates     Review of Systems  Objective:     Weight: 99.5 kg (219 lb 5.7 oz)  Body mass index is 29.75 kg/m².  Vital Signs (Most Recent):  Temp: 97.8 °F (36.6 °C) (04/23/18 1100)  Pulse: 99 (04/23/18 1100)  Resp: 18 (04/23/18 1100)  BP: (!) 142/82 (04/23/18 1100)  SpO2: 95 % (04/23/18 1100) Vital Signs (24h Range):  Temp:  [97.8 °F (36.6 °C)-100 °F (37.8 °C)] 97.8 °F (36.6 °C)  Pulse:  [] 99  Resp:  [18-26] 18  SpO2:  [91 %-96 %] 95 %  BP: (130-164)/(62-82) 142/82       Date 04/23/18 0700 - 04/24/18 0659   Shift 6658-3291 1767-3966 2051-0027 24 Hour Total   I  N  T  A  K  E   I.V.  (mL/kg) 25  (0.3)   25  (0.3)    NG/   470    Shift Total  (mL/kg) 495  (5)   495  (5)    O  U  T  P  U  T   Urine  (mL/kg/hr) 330   330    Shift Total  (mL/kg) 330  (3.3)   330  (3.3)   Weight (kg) 99.5 99.5 99.5 99.5                   Male External Urinary Catheter 04/13/18 1600 Small (Active)   Collection Container Urimeter 4/20/2018  9:00 PM   Securement Method secured to top of thigh w/ adhesive device 4/20/2018  9:00 PM   Skin no redness;no breakdown 4/20/2018  9:00 PM   Tolerance no signs/symptoms of discomfort 4/20/2018  9:00 PM   Output (mL) 150 mL 4/19/2018  2:00 PM   Catheter Change Date 04/19/18 4/19/2018  3:01 PM   Catheter Change Time 0701 4/19/2018  3:01 PM       Neurosurgery Physical Exam   General: well developed, well nourished, no distress.   Head: normocephalic, atraumatic  Neurologic: Alert and oriented. Thought content appropriate, but lethargic & slow to answer  GCS: Motor: 6/Verbal: 5/Eyes: 4 GCS Total: 15  Mental Status: Awake, Alert, Oriented x 4  Language: No aphasia  Speech: No dysarthria  Cranial nerves: face symmetric, tongue midline, CN II-XII grossly intact.   Eyes: pupils equal, round, reactive to light with accomodation, EOMI.   Pulmonary: normal respirations, no signs of respiratory distress  Abdomen: soft, non-distended, not tender to palpation  Sensory: intact to light touch throughout     Motor Strength:Moves all extremities spontaneously with good tone. No abnormal movements seen.      Strength   Deltoids Triceps Biceps Wrist Extension Wrist Flexion Hand    Upper: R 3/5 3/5 4-/5 4/5 4/5 4/5     L 3/5 4/5 4/5 4/5 4/5 4/5       Iliopsoas Quadriceps Knee  Flexion Tibialis  anterior Gastro- cnemius EHL   Lower: R 3/5 4+/5 5/5 5/5 5/5 5/5     L 3/5 4+/5 5/5 5/5 5/5 5/5      DTR's - 2 + and symmetric in UE and LE  Pronator Drift: no drift noted  Finger-to-nose: Intact bilaterally  Moct[place]uma: absent  Clonus: absent  Babinski: absent  Pulses: 2+ and symmetric radial and dorsalis pedis.  Skin: Skin is warm, dry and intact.  Incision c/d/i with no surrounding erythema,  edema, or drainage. Skin edges well approximated with dermabond.  C-collar in place.       Significant Labs:    Recent Labs  Lab 04/22/18  0408 04/23/18  0357   * 248*    145   K 4.1 3.8    111*   CO2 26 24   BUN 35* 29*   CREATININE 0.8 0.8   CALCIUM 11.5* 10.9*   MG 2.1 1.8       Recent Labs  Lab 04/22/18  0408 04/23/18  0357   WBC 22.61* 21.88*   HGB 10.3* 10.9*   HCT 32.5* 34.5*    298     No results for input(s): LABPT, INR, APTT in the last 48 hours.  Microbiology Results (last 7 days)     Procedure Component Value Units Date/Time    Urine culture [[id number]] Collected:  04/21/18 1430    Order Status:  Completed Specimen:  Urine from Urine, Catheterized Updated:  04/22/18 2020     Urine Culture, Routine --     GRAM NEGATIVE MARIO, NON-LACTOSE   10,000 - 49,999 cfu/ml  Identification and susceptibility pending      Blood culture [[id number]] Collected:  04/21/18 1335    Order Status:  Completed Specimen:  Blood Updated:  04/22/18 1612     Blood Culture, Routine No Growth to date     Blood Culture, Routine No Growth to date    Narrative:       Blood cultures x 2 different sites. 4 bottles total. Please  draw cultures before administering antibiotics.    Blood culture [[id number]] Collected:  04/21/18 1335    Order Status:  Completed Specimen:  Blood Updated:  04/22/18 1612     Blood Culture, Routine No Growth to date     Blood Culture, Routine No Growth to date    Narrative:       Blood cultures from 2 different sites. 4 bottles total.  Please draw before starting antibiotics.    Blood culture [[id number]] Collected:  04/19/18 0952    Order Status:  Completed Specimen:  Blood from Peripheral, Hand, Left Updated:  04/22/18 1412     Blood Culture, Routine No Growth to date     Blood Culture, Routine No Growth to date     Blood Culture, Routine No Growth to date     Blood Culture, Routine No Growth to date    Narrative:       Blood cultures x 2 different sites. 4 bottles total.  Please  draw cultures before administering antibiotics.    Blood culture [[id number]] Collected:  04/19/18 1005    Order Status:  Completed Specimen:  Blood from Peripheral, Wrist, Right Updated:  04/22/18 1412     Blood Culture, Routine No Growth to date     Blood Culture, Routine No Growth to date     Blood Culture, Routine No Growth to date     Blood Culture, Routine No Growth to date    Narrative:       Blood cultures from 2 different sites. 4 bottles total.  Please draw before starting antibiotics.    Culture, Respiratory with Gram Stain [[id number]] Collected:  04/19/18 0231    Order Status:  Completed Specimen:  Respiratory from Sputum, Expectorated Updated:  04/21/18 1059     Respiratory Culture Normal respiratory mitzy     Gram Stain (Respiratory) <10 epithelial cells per low power field.     Gram Stain (Respiratory) Many WBC's     Gram Stain (Respiratory) Many Gram positive cocci     Gram Stain (Respiratory) Many Gram negative rods     Gram Stain (Respiratory) Rare Gram positive rods          Significant Diagnostics:  I personally reviewed CXR - The cardiomediastinal silhouette is prominent, magnified by technique, stable..  There is obscuration of the left costophrenic angle suggesting effusion.  There is elevation of the right hemidiaphragm..  The trachea is midline.  The lungs are symmetrically expanded bilaterally with patchy increased interstitial and parenchymal attenuation, primarily in a perihilar distribution suggesting edema.  There is patchy increased parenchymal attenuation projected over the left lower lung zone, developing consolidation is of concern.  Developing right lower lobe consolidation not excluded medially..  There is no pneumothorax.  The osseous structures are unchanged..Interval History:     Medications:  Continuous Infusions:  Scheduled Meds:   atorvastatin  20 mg Oral Nightly    ceFEPime (MAXIPIME) IVPB  1 g Intravenous Q8H    citalopram  20 mg Oral Daily    diltiaZEM  120 mg  Oral Daily    gabapentin  300 mg Oral TID    heparin (porcine)  5,000 Units Subcutaneous Q8H    insulin aspart U-100  3 Units Subcutaneous TIDWM    insulin detemir U-100  9 Units Subcutaneous BID    metoprolol tartrate  12.5 mg Oral Q8H    senna-docusate 8.6-50 mg  1 tablet Oral BID    sodium chloride  2 g Oral Daily    tamsulosin  0.4 mg Oral Daily    traZODone  50 mg Oral QHS    valsartan  40 mg Oral Daily    vancomycin (VANCOCIN) IVPB  1,000 mg Intravenous Q12H     PRN Meds:acetaminophen, aluminum-magnesium hydroxide-simethicone, benzonatate, dextrose 50%, dextrose 50%, dextrose 50%, fentaNYL, glucagon (human recombinant), glucagon (human recombinant), glucose, glucose, hydrALAZINE, hydrocodone-acetaminophen 5-325mg, insulin aspart U-100, labetalol, lidocaine HCL 2%, magnesium oxide, magnesium oxide, ondansetron, potassium chloride 10%, potassium chloride 10%, potassium chloride 10%, potassium, sodium phosphates, potassium, sodium phosphates, potassium, sodium phosphates     Review of Systems  Objective:     Weight: 99.5 kg (219 lb 5.7 oz)  Body mass index is 29.75 kg/m².  Vital Signs (Most Recent):  Temp: 97.8 °F (36.6 °C) (04/23/18 1100)  Pulse: 99 (04/23/18 1100)  Resp: 18 (04/23/18 1100)  BP: (!) 142/82 (04/23/18 1100)  SpO2: 95 % (04/23/18 1100) Vital Signs (24h Range):  Temp:  [97.8 °F (36.6 °C)-100 °F (37.8 °C)] 97.8 °F (36.6 °C)  Pulse:  [] 99  Resp:  [18-26] 18  SpO2:  [91 %-96 %] 95 %  BP: (130-164)/(62-82) 142/82       Date 04/23/18 0700 - 04/24/18 0659   Shift 5924-1800 1029-8504 8150-2958 24 Hour Total   I  N  T  A  K  E   I.V.  (mL/kg) 25  (0.3)   25  (0.3)    NG/   470    Shift Total  (mL/kg) 495  (5)   495  (5)   O  U  T  P  U  T   Urine  (mL/kg/hr) 330   330    Shift Total  (mL/kg) 330  (3.3)   330  (3.3)   Weight (kg) 99.5 99.5 99.5 99.5                        NG/OG Tube 04/21/18 2300 Cortrak Right nostril (Active)   Placement Check placement verified by distal tube length  "measurement 4/23/2018 11:00 AM   Distal Tube Length (cm) 75 4/23/2018 11:00 AM   Tolerance no signs/symptoms of discomfort 4/23/2018 11:00 AM   Securement anchored to nostril center w/ adhesive device 4/23/2018 11:00 AM   Clamp Status/Tolerance unclamped;no abdominal distention;no emesis;no residual;no restlessness 4/23/2018 11:00 AM   Insertion Site Appearance no redness, warmth, tenderness, skin breakdown, drainage 4/23/2018 11:00 AM   Drainage None 4/23/2018  5:00 AM   Flush/Irrigation flushed w/;water;no resistance met 4/23/2018  5:00 AM   Feeding Method continuous 4/23/2018 11:00 AM   Current Rate (mL/hr) 55 mL/hr 4/23/2018  5:00 AM   Goal Rate (mL/hr) 55 mL/hr 4/23/2018  5:00 AM   Intake (mL) 75 mL 4/23/2018  9:00 AM   Water Bolus (mL) 100 mL 4/23/2018  5:00 AM   Rate Formula Tube Feeding (mL/hr) 55 mL/hr 4/23/2018  5:00 AM   Intake (mL) - Formula Tube Feeding 55 4/23/2018 11:00 AM   Residual Amount (ml) 0 ml 4/23/2018 11:00 AM            Urethral Catheter 04/21/18 1432 Straight-tip (Active)   Site Assessment Clean;Intact;Dry 4/23/2018 11:00 AM   Collection Container Urimeter 4/23/2018 11:00 AM   Securement Method secured to top of thigh w/ adhesive device 4/23/2018 11:00 AM   Catheter Care Performed yes 4/23/2018  8:00 AM   Reason for Continuing Urinary Catheterization Urinary retention;Critically ill in ICU requiring intensive monitoring 4/23/2018 11:00 AM   CAUTI Prevention Bundle StatLock in place w 1" slack;Drainage bag off the floor;Green sheeting clip in use;No dependent loops or kinks 4/23/2018  7:00 AM   Output (mL) 75 mL 4/23/2018 11:00 AM       Neurosurgery Physical Exam    Significant Labs:    Recent Labs  Lab 04/22/18  0408 04/23/18  0357   * 248*    145   K 4.1 3.8    111*   CO2 26 24   BUN 35* 29*   CREATININE 0.8 0.8   CALCIUM 11.5* 10.9*   MG 2.1 1.8       Recent Labs  Lab 04/22/18  0408 04/23/18  0357   WBC 22.61* 21.88*   HGB 10.3* 10.9*   HCT 32.5* 34.5*    298 "     No results for input(s): LABPT, INR, APTT in the last 48 hours.  Microbiology Results (last 7 days)     Procedure Component Value Units Date/Time    Urine culture [[id number]] Collected:  04/21/18 1430    Order Status:  Completed Specimen:  Urine from Urine, Catheterized Updated:  04/22/18 2020     Urine Culture, Routine --     GRAM NEGATIVE MARIO, NON-LACTOSE   10,000 - 49,999 cfu/ml  Identification and susceptibility pending      Blood culture [[id number]] Collected:  04/21/18 1335    Order Status:  Completed Specimen:  Blood Updated:  04/22/18 1612     Blood Culture, Routine No Growth to date     Blood Culture, Routine No Growth to date    Narrative:       Blood cultures x 2 different sites. 4 bottles total. Please  draw cultures before administering antibiotics.    Blood culture [[id number]] Collected:  04/21/18 1335    Order Status:  Completed Specimen:  Blood Updated:  04/22/18 1612     Blood Culture, Routine No Growth to date     Blood Culture, Routine No Growth to date    Narrative:       Blood cultures from 2 different sites. 4 bottles total.  Please draw before starting antibiotics.    Blood culture [[id number]] Collected:  04/19/18 0952    Order Status:  Completed Specimen:  Blood from Peripheral, Hand, Left Updated:  04/22/18 1412     Blood Culture, Routine No Growth to date     Blood Culture, Routine No Growth to date     Blood Culture, Routine No Growth to date     Blood Culture, Routine No Growth to date    Narrative:       Blood cultures x 2 different sites. 4 bottles total. Please  draw cultures before administering antibiotics.    Blood culture [[id number]] Collected:  04/19/18 1005    Order Status:  Completed Specimen:  Blood from Peripheral, Wrist, Right Updated:  04/22/18 1412     Blood Culture, Routine No Growth to date     Blood Culture, Routine No Growth to date     Blood Culture, Routine No Growth to date     Blood Culture, Routine No Growth to date    Narrative:       Blood cultures  from 2 different sites. 4 bottles total.  Please draw before starting antibiotics.    Culture, Respiratory with Gram Stain [[id number]] Collected:  04/19/18 0231    Order Status:  Completed Specimen:  Respiratory from Sputum, Expectorated Updated:  04/21/18 1059     Respiratory Culture Normal respiratory mitzy     Gram Stain (Respiratory) <10 epithelial cells per low power field.     Gram Stain (Respiratory) Many WBC's     Gram Stain (Respiratory) Many Gram positive cocci     Gram Stain (Respiratory) Many Gram negative rods     Gram Stain (Respiratory) Rare Gram positive rods        All pertinent labs from the last 24 hours have been reviewed.    Significant Diagnostics:

## 2018-04-23 NOTE — TELEPHONE ENCOUNTER
Returned daughter phone call . She did not answer. I left a message on her vociemail       ----- Message from Alexus Santizo sent at 4/20/2018  2:00 PM CDT -----  Contact: Bud (daughter) @ [phone]  Pt is scheduled for a PO appt on 4-26-18.  Pt is currently in the hospital.  Pts daughter is calling to see if he needs to keep this appt.  Pls call.

## 2018-04-23 NOTE — ASSESSMENT & PLAN NOTE
82 y.o. male s/p C3-5 ACDF POD#13    - Neurologically stable  - Respiratory status improved, possible aspiration.  - Neuro checks q4h  - Leave incision open to air, healing well with no edema present  - NPO for now  - Urine with GNRs, rocephin.  -Cervical collar when OOB or with activity  -IS to bed side. Patient to use atleast 10x every hour.  -CV- goal normotension. Continue home meds. PRN meds SBP >160.  -Hyponatremia- daily NaCl tabs.  -Heme/ID- hgb/hct stable. Afebrile.   -Urinary retention- rouse in place, continue Flomax  -Continue PILI/SCDs/SQH  -Continue bowel regimen daily.  -Continue to hold Eliquis    Dispo: OK to [facility] neurosurgery service.

## 2018-04-23 NOTE — ASSESSMENT & PLAN NOTE
82 y.o. male s/p C3-5 ACDF POD#12    - Neurologically stable  - Respiratory status improved, possible aspiration.  - Neuro checks q4h  - Leave incision open to air, healing well with no edema present  - NPO for now  - Urine with GNRs, rocephin.  -Cervical collar when OOB or with activity  -IS to bed side. Patient to use atleast 10x every hour.  -CV- goal normotension. Continue home meds. PRN meds SBP >160.  -Hyponatremia- daily NaCl tabs.  -Heme/ID- hgb/hct stable. Afebrile.   -Urinary retention- rouse in place, continue Flomax  -Continue PILI/SCDs/SQH  -Continue bowel regimen daily.  -Continue to hold Eliquis    Dispo: cont ICU care today, possible ttf tomorrow if doing better.

## 2018-04-23 NOTE — PLAN OF CARE
Problem: Patient Care Overview  Goal: Plan of Care Review  Outcome: Ongoing (interventions implemented as appropriate)  Plan of care updated and reviewed. VSS. Pt remains at risk for aspiration, NG in place & TF at goal. Pt repositioned every 2 hours. See flow sheet for full documentation.

## 2018-04-23 NOTE — PROGRESS NOTES
[facility]  Neurosurgery  Progress Note    Subjective:     History of Present Illness: 82 y.o.  male with type 2 diabetes, who presents today for follow up evaluation one week prior to 2 level ACDF scheduled for 4/10/2018. Pt reports    Pt presents today wearing a cervical collar and in a wheelchair. He would like to proceed with surgery. Per pt's family, his PCP would like to see him on 4/9, the day before surgery. He notes continued neck pain and BUE numbness/tingling as well as BUE weakness. Pt is only able to walk a few steps unassisted. He has never received neck surgery.     Post-Op Info:  Procedure(s) (LRB):  DISKECTOMY AND FUSION-ANTERIOR CERVICAL (ACDF); C5-6. C6-7 (N/A)   13 Days Post-Op     Interval History: Previously stepped up to the ICU for resp issues. YAMILKA. Patient improved today.    Medications:  Continuous Infusions:  Scheduled Meds:   atorvastatin  20 mg Oral Nightly    ceFEPime (MAXIPIME) IVPB  1 g Intravenous Q8H    citalopram  20 mg Oral Daily    diltiaZEM  120 mg Oral Daily    gabapentin  300 mg Oral TID    heparin (porcine)  5,000 Units Subcutaneous Q8H    insulin aspart U-100  3 Units Subcutaneous TIDWM    insulin detemir U-100  9 Units Subcutaneous BID    metoprolol tartrate  12.5 mg Oral Q8H    senna-docusate 8.6-50 mg  1 tablet Oral BID    sodium chloride  2 g Oral Daily    tamsulosin  0.4 mg Oral Daily    traZODone  50 mg Oral QHS    valsartan  40 mg Oral Daily    vancomycin (VANCOCIN) IVPB  1,000 mg Intravenous Q12H     PRN Meds:acetaminophen, aluminum-magnesium hydroxide-simethicone, benzonatate, dextrose 50%, dextrose 50%, dextrose 50%, fentaNYL, glucagon (human recombinant), glucagon (human recombinant), glucose, glucose, hydrALAZINE, hydrocodone-acetaminophen 5-325mg, insulin aspart U-100, labetalol, lidocaine HCL 2%, magnesium oxide, magnesium oxide, ondansetron, potassium chloride 10%, potassium chloride 10%, potassium chloride 10%, potassium,  sodium phosphates, potassium, sodium phosphates, potassium, sodium phosphates     Review of Systems  Objective:     Weight: 99.5 kg (219 lb 5.7 oz)  Body mass index is 29.75 kg/m².  Vital Signs (Most Recent):  Temp: 97.8 °F (36.6 °C) (04/23/18 1100)  Pulse: 99 (04/23/18 1100)  Resp: 18 (04/23/18 1100)  BP: (!) 142/82 (04/23/18 1100)  SpO2: 95 % (04/23/18 1100) Vital Signs (24h Range):  Temp:  [97.8 °F (36.6 °C)-100 °F (37.8 °C)] 97.8 °F (36.6 °C)  Pulse:  [] 99  Resp:  [18-26] 18  SpO2:  [91 %-96 %] 95 %  BP: ()/(62-82) 142/82       Date 04/23/18 0700 - 04/24/18 0659   Shift 2388-6572 6353-9578 8613-7216 24 Hour Total   I  N  T  A  K  E   I.V.  (mL/kg) 25  (0.3)   25  (0.3)    NG/   470    Shift Total  (mL/kg) 495  (5)   495  (5)   O  U  T  P  U  T   Urine  (mL/kg/hr) 330   330    Shift Total  (mL/kg) 330  (3.3)   330  (3.3)   Weight (kg) 99.5 99.5 99.5 99.5            NG/OG Tube 04/21/18 2300 Cortrak Right nostril (Active)   Placement Check placement verified by distal tube length measurement 4/23/2018 11:00 AM   Distal Tube Length (cm) 75 4/23/2018 11:00 AM   Tolerance no signs/symptoms of discomfort 4/23/2018 11:00 AM   Securement anchored to nostril center w/ adhesive device 4/23/2018 11:00 AM   Clamp Status/Tolerance unclamped;no abdominal distention;no emesis;no residual;no restlessness 4/23/2018 11:00 AM   Insertion Site Appearance no redness, warmth, tenderness, skin breakdown, drainage 4/23/2018 11:00 AM   Drainage None 4/23/2018  5:00 AM   Flush/Irrigation flushed w/;water;no resistance met 4/23/2018  5:00 AM   Feeding Method continuous 4/23/2018 11:00 AM   Current Rate (mL/hr) 55 mL/hr 4/23/2018  5:00 AM   Goal Rate (mL/hr) 55 mL/hr 4/23/2018  5:00 AM   Intake (mL) 75 mL 4/23/2018  9:00 AM   Water Bolus (mL) 100 mL 4/23/2018  5:00 AM   Rate Formula Tube Feeding (mL/hr) 55 mL/hr 4/23/2018  5:00 AM   Intake (mL) - Formula Tube Feeding 55 4/23/2018 11:00 AM   Residual Amount (ml) 0 ml  "4/23/2018 11:00 AM            Urethral Catheter 04/21/18 1432 Straight-tip (Active)   Site Assessment Clean;Intact;Dry 4/23/2018 11:00 AM   Collection Container Urimeter 4/23/2018 11:00 AM   Securement Method secured to top of thigh w/ adhesive device 4/23/2018 11:00 AM   Catheter Care Performed yes 4/23/2018  8:00 AM   Reason for Continuing Urinary Catheterization Urinary retention;Critically ill in ICU requiring intensive monitoring 4/23/2018 11:00 AM   CAUTI Prevention Bundle StatLock in place w 1" slack;Drainage bag off the floor;Green sheeting clip in use;No dependent loops or kinks 4/23/2018  7:00 AM   Output (mL) 75 mL 4/23/2018 11:00 AM       Neurosurgery Physical Exam  General: well developed, well nourished, no distress.   Head: normocephalic, atraumatic  Neurologic: Alert and oriented.   GCS: Motor: 6/Verbal: 5/Eyes: 4 GCS Total: 15  Mental Status: Awake, Alert, Oriented x 4  Language: No aphasia  Speech: No dysarthria  Cranial nerves: face symmetric, tongue midline, CN II-XII grossly intact.   Eyes: pupils equal, round, reactive to light with accomodation, EOMI.   Sensory: intact to light touch throughout     Motor Strength:Moves all extremities spontaneously with good tone. No abnormal movements seen.      Strength   Deltoids Triceps Biceps Wrist Extension Wrist Flexion Hand    Upper: R 3/5 3/5 4-/5 4/5 4/5 4/5     L 3/5 4/5 4/5 4/5 4/5 4/5       Iliopsoas Quadriceps Knee  Flexion Tibialis  anterior Gastro- cnemius EHL   Lower: R 3/5 4+/5 5/5 5/5 5/5 5/5     L 3/5 4+/5 5/5 5/5 5/5 5/5      Incision c/d/i with no surrounding erythema, edema, or drainage. Skin edges well approximated with dermabond.  C-collar in place.     Significant Labs:    Recent Labs  Lab 04/22/18  0408 04/23/18  0357   * 248*    145   K 4.1 3.8    111*   CO2 26 24   BUN 35* 29*   CREATININE 0.8 0.8   CALCIUM 11.5* 10.9*   MG 2.1 1.8       Recent Labs  Lab 04/22/18  0408 04/23/18  0357   WBC 22.61* 21.88*   HGB " 10.3* 10.9*   HCT 32.5* 34.5*    298     No results for input(s): LABPT, INR, APTT in the last 48 hours.  Microbiology Results (last 7 days)     Procedure Component Value Units Date/Time    Urine culture [[id number]] Collected:  04/21/18 1430    Order Status:  Completed Specimen:  Urine from Urine, Catheterized Updated:  04/22/18 2020     Urine Culture, Routine --     GRAM NEGATIVE MARIO, NON-LACTOSE   10,000 - 49,999 cfu/ml  Identification and susceptibility pending      Blood culture [[id number]] Collected:  04/21/18 1335    Order Status:  Completed Specimen:  Blood Updated:  04/22/18 1612     Blood Culture, Routine No Growth to date     Blood Culture, Routine No Growth to date    Narrative:       Blood cultures x 2 different sites. 4 bottles total. Please  draw cultures before administering antibiotics.    Blood culture [[id number]] Collected:  04/21/18 1335    Order Status:  Completed Specimen:  Blood Updated:  04/22/18 1612     Blood Culture, Routine No Growth to date     Blood Culture, Routine No Growth to date    Narrative:       Blood cultures from 2 different sites. 4 bottles total.  Please draw before starting antibiotics.    Blood culture [[id number]] Collected:  04/19/18 0952    Order Status:  Completed Specimen:  Blood from Peripheral, Hand, Left Updated:  04/22/18 1412     Blood Culture, Routine No Growth to date     Blood Culture, Routine No Growth to date     Blood Culture, Routine No Growth to date     Blood Culture, Routine No Growth to date    Narrative:       Blood cultures x 2 different sites. 4 bottles total. Please  draw cultures before administering antibiotics.    Blood culture [[id number]] Collected:  04/19/18 1005    Order Status:  Completed Specimen:  Blood from Peripheral, Wrist, Right Updated:  04/22/18 1412     Blood Culture, Routine No Growth to date     Blood Culture, Routine No Growth to date     Blood Culture, Routine No Growth to date     Blood Culture, Routine No Growth  to date    Narrative:       Blood cultures from 2 different sites. 4 bottles total.  Please draw before starting antibiotics.    Culture, Respiratory with Gram Stain [[id number]] Collected:  04/19/18 0231    Order Status:  Completed Specimen:  Respiratory from Sputum, Expectorated Updated:  04/21/18 1059     Respiratory Culture Normal respiratory mitzy     Gram Stain (Respiratory) <10 epithelial cells per low power field.     Gram Stain (Respiratory) Many WBC's     Gram Stain (Respiratory) Many Gram positive cocci     Gram Stain (Respiratory) Many Gram negative rods     Gram Stain (Respiratory) Rare Gram positive rods        All pertinent labs from the last 24 hours have been reviewed.    Significant Diagnostics:  I have reviewed all pertinent imaging results/findings within the past 24 hours.    Assessment/Plan:     * Cervical myelopathy with cervical radiculopathy    82 y.o. male s/p C3-5 ACDF POD#13    - Neurologically stable  - Respiratory status improved, possible aspiration.  - Neuro checks q4h  - Leave incision open to air, healing well with no edema present  - NPO for now  - Urine with GNRs, rocephin.  -Cervical collar when OOB or with activity  -IS to bed side. Patient to use atleast 10x every hour.  -CV- goal normotension. Continue home meds. PRN meds SBP >160.  -Hyponatremia- daily NaCl tabs.  -Heme/ID- hgb/hct stable. Afebrile.   -Urinary retention- rouse in place, continue Flomax  -Continue PILI/SCDs/SQH  -Continue bowel regimen daily.  -Continue to hold Eliquis    Dispo: OK to [place] neurosurgery service.              Rosalio Johns MD  Neurosurgery  [facility]

## 2018-04-23 NOTE — SUBJECTIVE & OBJECTIVE
Interval History: Previously stepped up to the ICU for resp issues. YAMILKA. Patient improved today.    Medications:  Continuous Infusions:  Scheduled Meds:   atorvastatin  20 mg Oral Nightly    ceFEPime (MAXIPIME) IVPB  1 g Intravenous Q8H    citalopram  20 mg Oral Daily    diltiaZEM  120 mg Oral Daily    gabapentin  300 mg Oral TID    heparin (porcine)  5,000 Units Subcutaneous Q8H    insulin aspart U-100  3 Units Subcutaneous TIDWM    insulin detemir U-100  9 Units Subcutaneous BID    metoprolol tartrate  12.5 mg Oral Q8H    senna-docusate 8.6-50 mg  1 tablet Oral BID    sodium chloride  2 g Oral Daily    tamsulosin  0.4 mg Oral Daily    traZODone  50 mg Oral QHS    valsartan  40 mg Oral Daily    vancomycin (VANCOCIN) IVPB  1,000 mg Intravenous Q12H     PRN Meds:acetaminophen, aluminum-magnesium hydroxide-simethicone, benzonatate, dextrose 50%, dextrose 50%, dextrose 50%, fentaNYL, glucagon (human recombinant), glucagon (human recombinant), glucose, glucose, hydrALAZINE, hydrocodone-acetaminophen 5-325mg, insulin aspart U-100, labetalol, lidocaine HCL 2%, magnesium oxide, magnesium oxide, ondansetron, potassium chloride 10%, potassium chloride 10%, potassium chloride 10%, potassium, sodium phosphates, potassium, sodium phosphates, potassium, sodium phosphates     Review of Systems  Objective:     Weight: 99.5 kg (219 lb 5.7 oz)  Body mass index is 29.75 kg/m².  Vital Signs (Most Recent):  Temp: 97.8 °F (36.6 °C) (04/23/18 1100)  Pulse: 99 (04/23/18 1100)  Resp: 18 (04/23/18 1100)  BP: (!) 142/82 (04/23/18 1100)  SpO2: 95 % (04/23/18 1100) Vital Signs (24h Range):  Temp:  [97.8 °F (36.6 °C)-100 °F (37.8 °C)] 97.8 °F (36.6 °C)  Pulse:  [] 99  Resp:  [18-26] 18  SpO2:  [91 %-96 %] 95 %  BP: ()/(62-82) 142/82       Date 04/23/18 0700 - 04/24/18 0659   Shift [id number] 3169-4616 5078-4826 24 Hour Total   I  N  T  A  K  E   I.V.  (mL/kg) 25  (0.3)   25  (0.3)    NG/   470    Shift  "Total  (mL/kg) 495  (5)   495  (5)   O  U  T  P  U  T   Urine  (mL/kg/hr) 330   330    Shift Total  (mL/kg) 330  (3.3)   330  (3.3)   Weight (kg) 99.5 99.5 99.5 99.5            NG/OG Tube 04/21/18 2300 Cortrak Right nostril (Active)   Placement Check placement verified by distal tube length measurement 4/23/2018 11:00 AM   Distal Tube Length (cm) 75 4/23/2018 11:00 AM   Tolerance no signs/symptoms of discomfort 4/23/2018 11:00 AM   Securement anchored to nostril center w/ adhesive device 4/23/2018 11:00 AM   Clamp Status/Tolerance unclamped;no abdominal distention;no emesis;no residual;no restlessness 4/23/2018 11:00 AM   Insertion Site Appearance no redness, warmth, tenderness, skin breakdown, drainage 4/23/2018 11:00 AM   Drainage None 4/23/2018  5:00 AM   Flush/Irrigation flushed w/;water;no resistance met 4/23/2018  5:00 AM   Feeding Method continuous 4/23/2018 11:00 AM   Current Rate (mL/hr) 55 mL/hr 4/23/2018  5:00 AM   Goal Rate (mL/hr) 55 mL/hr 4/23/2018  5:00 AM   Intake (mL) 75 mL 4/23/2018  9:00 AM   Water Bolus (mL) 100 mL 4/23/2018  5:00 AM   Rate Formula Tube Feeding (mL/hr) 55 mL/hr 4/23/2018  5:00 AM   Intake (mL) - Formula Tube Feeding 55 4/23/2018 11:00 AM   Residual Amount (ml) 0 ml 4/23/2018 11:00 AM            Urethral Catheter 04/21/18 1432 Straight-tip (Active)   Site Assessment Clean;Intact;Dry 4/23/2018 11:00 AM   Collection Container Urimeter 4/23/2018 11:00 AM   Securement Method secured to top of thigh w/ adhesive device 4/23/2018 11:00 AM   Catheter Care Performed yes 4/23/2018  8:00 AM   Reason for Continuing Urinary Catheterization Urinary retention;Critically ill in ICU requiring intensive monitoring 4/23/2018 11:00 AM   CAUTI Prevention Bundle StatLock in place w 1" slack;Drainage bag off the floor;Green sheeting clip in use;No dependent loops or kinks 4/23/2018  7:00 AM   Output (mL) 75 mL 4/23/2018 11:00 AM       Neurosurgery Physical Exam  General: well developed, well nourished, " no distress.   Head: normocephalic, atraumatic  Neurologic: Alert and oriented.   GCS: Motor: 6/Verbal: 5/Eyes: 4 GCS Total: 15  Mental Status: Awake, Alert, Oriented x 4  Language: No aphasia  Speech: No dysarthria  Cranial nerves: face symmetric, tongue midline, CN II-XII grossly intact.   Eyes: pupils equal, round, reactive to light with accomodation, EOMI.   Sensory: intact to light touch throughout     Motor Strength:Moves all extremities spontaneously with good tone. No abnormal movements seen.      Strength   Deltoids Triceps Biceps Wrist Extension Wrist Flexion Hand    Upper: R 3/5 3/5 4-/5 4/5 4/5 4/5     L 3/5 4/5 4/5 4/5 4/5 4/5       Iliopsoas Quadriceps Knee  Flexion Tibialis  anterior Gastro- cnemius EHL   Lower: R 3/5 4+/5 5/5 5/5 5/5 5/5     L 3/5 4+/5 5/5 5/5 5/5 5/5      Incision c/d/i with no surrounding erythema, edema, or drainage. Skin edges well approximated with dermabond.  C-collar in place.     Significant Labs:    Recent Labs  Lab 04/22/18  0408 04/23/18  0357   * 248*    145   K 4.1 3.8    111*   CO2 26 24   BUN 35* 29*   CREATININE 0.8 0.8   CALCIUM 11.5* 10.9*   MG 2.1 1.8       Recent Labs  Lab 04/22/18  0408 04/23/18  0357   WBC 22.61* 21.88*   HGB 10.3* 10.9*   HCT 32.5* 34.5*    298     No results for input(s): LABPT, INR, APTT in the last 48 hours.  Microbiology Results (last 7 days)     Procedure Component Value Units Date/Time    Urine culture [[id number]] Collected:  04/21/18 1430    Order Status:  Completed Specimen:  Urine from Urine, Catheterized Updated:  04/22/18 2020     Urine Culture, Routine --     GRAM NEGATIVE MARIO, NON-LACTOSE   10,000 - 49,999 cfu/ml  Identification and susceptibility pending      Blood culture [[id number]] Collected:  04/21/18 1335    Order Status:  Completed Specimen:  Blood Updated:  04/22/18 1612     Blood Culture, Routine No Growth to date     Blood Culture, Routine No Growth to date    Narrative:       Blood  cultures x 2 different sites. 4 bottles total. Please  draw cultures before administering antibiotics.    Blood culture [[id number]] Collected:  04/21/18 1335    Order Status:  Completed Specimen:  Blood Updated:  04/22/18 1612     Blood Culture, Routine No Growth to date     Blood Culture, Routine No Growth to date    Narrative:       Blood cultures from 2 different sites. 4 bottles total.  Please draw before starting antibiotics.    Blood culture [[id number]] Collected:  04/19/18 0952    Order Status:  Completed Specimen:  Blood from Peripheral, Hand, Left Updated:  04/22/18 1412     Blood Culture, Routine No Growth to date     Blood Culture, Routine No Growth to date     Blood Culture, Routine No Growth to date     Blood Culture, Routine No Growth to date    Narrative:       Blood cultures x 2 different sites. 4 bottles total. Please  draw cultures before administering antibiotics.    Blood culture [[id number]] Collected:  04/19/18 1005    Order Status:  Completed Specimen:  Blood from Peripheral, Wrist, Right Updated:  04/22/18 1412     Blood Culture, Routine No Growth to date     Blood Culture, Routine No Growth to date     Blood Culture, Routine No Growth to date     Blood Culture, Routine No Growth to date    Narrative:       Blood cultures from 2 different sites. 4 bottles total.  Please draw before starting antibiotics.    Culture, Respiratory with Gram Stain [[id number]] Collected:  04/19/18 0231    Order Status:  Completed Specimen:  Respiratory from Sputum, Expectorated Updated:  04/21/18 1059     Respiratory Culture Normal respiratory mitzy     Gram Stain (Respiratory) <10 epithelial cells per low power field.     Gram Stain (Respiratory) Many WBC's     Gram Stain (Respiratory) Many Gram positive cocci     Gram Stain (Respiratory) Many Gram negative rods     Gram Stain (Respiratory) Rare Gram positive rods        All pertinent labs from the last 24 hours have been reviewed.    Significant Diagnostics:  I  have reviewed all pertinent imaging results/findings within the past 24 hours.

## 2018-04-23 NOTE — PLAN OF CARE
Problem: SLP Goal  Goal: SLP Goal  Speech Language Pathology Goals  Goals expected to be met by 4/29:    1. Pt will participate in ongoing assessment of swallow.           Outcome: Ongoing (interventions implemented as appropriate)  Pt with decreased control of oral secretions and coated lingual surface. PO trials held. Oral care provided. Pt ongoing with current goals. ST to continue to follow. Thank you.    VALENTINO Shelby., [facility]-SLP  Speech-Language Pathology  Pager: [phone]  4/23/2018

## 2018-04-24 ENCOUNTER — TELEPHONE (OUTPATIENT)
Dept: NEUROSURGERY | Facility: CLINIC | Age: 83
End: 2018-04-24

## 2018-04-24 LAB
ALBUMIN SERPL BCP-MCNC: 2.2 G/DL
ALP SERPL-CCNC: 90 U/L
ALT SERPL W/O P-5'-P-CCNC: 16 U/L
ANION GAP SERPL CALC-SCNC: 8 MMOL/L
ANISOCYTOSIS BLD QL SMEAR: SLIGHT
AST SERPL-CCNC: 17 U/L
BACTERIA BLD CULT: NORMAL
BACTERIA BLD CULT: NORMAL
BASOPHILS # BLD AUTO: 0.05 K/UL
BASOPHILS NFR BLD: 0.2 %
BILIRUB SERPL-MCNC: 0.5 MG/DL
BUN SERPL-MCNC: 30 MG/DL
BURR CELLS BLD QL SMEAR: ABNORMAL
C DIFF GDH STL QL: POSITIVE
C DIFF TOX A+B STL QL IA: NEGATIVE
C DIFF TOX GENS STL QL NAA+PROBE: NEGATIVE
CALCIUM SERPL-MCNC: 11.1 MG/DL
CHLORIDE SERPL-SCNC: 111 MMOL/L
CO2 SERPL-SCNC: 29 MMOL/L
CREAT SERPL-MCNC: 0.8 MG/DL
DIFFERENTIAL METHOD: ABNORMAL
EOSINOPHIL # BLD AUTO: 0 K/UL
EOSINOPHIL NFR BLD: 0 %
ERYTHROCYTE [DISTWIDTH] IN BLOOD BY AUTOMATED COUNT: 15.6 %
EST. GFR  (AFRICAN AMERICAN): >60 ML/MIN/1.73 M^2
EST. GFR  (NON AFRICAN AMERICAN): >60 ML/MIN/1.73 M^2
GLUCOSE SERPL-MCNC: 288 MG/DL
HCT VFR BLD AUTO: 33.8 %
HGB BLD-MCNC: 10.7 G/DL
HYPOCHROMIA BLD QL SMEAR: ABNORMAL
IMM GRANULOCYTES # BLD AUTO: 0.48 K/UL
IMM GRANULOCYTES NFR BLD AUTO: 2.3 %
LYMPHOCYTES # BLD AUTO: 1.9 K/UL
LYMPHOCYTES NFR BLD: 8.9 %
MAGNESIUM SERPL-MCNC: 2.1 MG/DL
MCH RBC QN AUTO: 31.6 PG
MCHC RBC AUTO-ENTMCNC: 31.7 G/DL
MCV RBC AUTO: 100 FL
MONOCYTES # BLD AUTO: 2.2 K/UL
MONOCYTES NFR BLD: 10.2 %
NEUTROPHILS # BLD AUTO: 16.7 K/UL
NEUTROPHILS NFR BLD: 78.4 %
NRBC BLD-RTO: 0 /100 WBC
OVALOCYTES BLD QL SMEAR: ABNORMAL
PHOSPHATE SERPL-MCNC: 2.2 MG/DL
PLATELET # BLD AUTO: 292 K/UL
PMV BLD AUTO: 10.6 FL
POCT GLUCOSE: 211 MG/DL (ref 70–110)
POCT GLUCOSE: 235 MG/DL (ref 70–110)
POCT GLUCOSE: 260 MG/DL (ref 70–110)
POCT GLUCOSE: 263 MG/DL (ref 70–110)
POCT GLUCOSE: 268 MG/DL (ref 70–110)
POCT GLUCOSE: 291 MG/DL (ref 70–110)
POIKILOCYTOSIS BLD QL SMEAR: SLIGHT
POLYCHROMASIA BLD QL SMEAR: ABNORMAL
POTASSIUM SERPL-SCNC: 4.2 MMOL/L
PROT SERPL-MCNC: 6.1 G/DL
RBC # BLD AUTO: 3.39 M/UL
SCHISTOCYTES BLD QL SMEAR: ABNORMAL
SODIUM SERPL-SCNC: 148 MMOL/L
WBC # BLD AUTO: 21.3 K/UL

## 2018-04-24 PROCEDURE — 63600175 PHARM REV CODE 636 W HCPCS: Performed by: PSYCHIATRY & NEUROLOGY

## 2018-04-24 PROCEDURE — 63600175 PHARM REV CODE 636 W HCPCS: Performed by: NURSE PRACTITIONER

## 2018-04-24 PROCEDURE — 25000003 PHARM REV CODE 250: Performed by: PHYSICIAN ASSISTANT

## 2018-04-24 PROCEDURE — 99232 SBSQ HOSP IP/OBS MODERATE 35: CPT | Mod: ,,, | Performed by: NURSE PRACTITIONER

## 2018-04-24 PROCEDURE — 87493 C DIFF AMPLIFIED PROBE: CPT

## 2018-04-24 PROCEDURE — 63600175 PHARM REV CODE 636 W HCPCS: Performed by: STUDENT IN AN ORGANIZED HEALTH CARE EDUCATION/TRAINING PROGRAM

## 2018-04-24 PROCEDURE — 63600175 PHARM REV CODE 636 W HCPCS: Performed by: PHYSICIAN ASSISTANT

## 2018-04-24 PROCEDURE — 87449 NOS EACH ORGANISM AG IA: CPT

## 2018-04-24 PROCEDURE — 94761 N-INVAS EAR/PLS OXIMETRY MLT: CPT

## 2018-04-24 PROCEDURE — 92526 ORAL FUNCTION THERAPY: CPT

## 2018-04-24 PROCEDURE — 80053 COMPREHEN METABOLIC PANEL: CPT

## 2018-04-24 PROCEDURE — 20000000 HC ICU ROOM

## 2018-04-24 PROCEDURE — 84100 ASSAY OF PHOSPHORUS: CPT

## 2018-04-24 PROCEDURE — 85025 COMPLETE CBC W/AUTO DIFF WBC: CPT

## 2018-04-24 PROCEDURE — 83735 ASSAY OF MAGNESIUM: CPT

## 2018-04-24 RX ORDER — INSULIN ASPART 100 [IU]/ML
5 INJECTION, SOLUTION INTRAVENOUS; SUBCUTANEOUS
Status: CANCELLED | OUTPATIENT
Start: 2018-04-24

## 2018-04-24 RX ORDER — INSULIN ASPART 100 [IU]/ML
5 INJECTION, SOLUTION INTRAVENOUS; SUBCUTANEOUS
Status: DISCONTINUED | OUTPATIENT
Start: 2018-04-24 | End: 2018-04-25

## 2018-04-24 RX ADMIN — HYDRALAZINE HYDROCHLORIDE 10 MG: 20 INJECTION INTRAMUSCULAR; INTRAVENOUS at 01:04

## 2018-04-24 RX ADMIN — INSULIN ASPART 6 UNITS: 100 INJECTION, SOLUTION INTRAVENOUS; SUBCUTANEOUS at 08:04

## 2018-04-24 RX ADMIN — DILTIAZEM HYDROCHLORIDE 30 MG: 30 TABLET, FILM COATED ORAL at 11:04

## 2018-04-24 RX ADMIN — HEPARIN SODIUM 5000 UNITS: 5000 INJECTION, SOLUTION INTRAVENOUS; SUBCUTANEOUS at 01:04

## 2018-04-24 RX ADMIN — Medication 12.5 MG: at 05:04

## 2018-04-24 RX ADMIN — VANCOMYCIN HYDROCHLORIDE 1 G: 10 INJECTION, POWDER, LYOPHILIZED, FOR SOLUTION INTRAVENOUS at 12:04

## 2018-04-24 RX ADMIN — GABAPENTIN 300 MG: 300 CAPSULE ORAL at 08:04

## 2018-04-24 RX ADMIN — ATORVASTATIN CALCIUM 20 MG: 20 TABLET, FILM COATED ORAL at 10:04

## 2018-04-24 RX ADMIN — VALSARTAN 40 MG: 40 TABLET ORAL at 08:04

## 2018-04-24 RX ADMIN — INSULIN ASPART 3 UNITS: 100 INJECTION, SOLUTION INTRAVENOUS; SUBCUTANEOUS at 08:04

## 2018-04-24 RX ADMIN — Medication 12.5 MG: at 01:04

## 2018-04-24 RX ADMIN — Medication 12.5 MG: at 10:04

## 2018-04-24 RX ADMIN — ACETAMINOPHEN 650 MG: 325 TABLET ORAL at 08:04

## 2018-04-24 RX ADMIN — INSULIN ASPART 5 UNITS: 100 INJECTION, SOLUTION INTRAVENOUS; SUBCUTANEOUS at 11:04

## 2018-04-24 RX ADMIN — CITALOPRAM HYDROBROMIDE 20 MG: 20 TABLET ORAL at 08:04

## 2018-04-24 RX ADMIN — INSULIN ASPART 5 UNITS: 100 INJECTION, SOLUTION INTRAVENOUS; SUBCUTANEOUS at 08:04

## 2018-04-24 RX ADMIN — INSULIN ASPART 5 UNITS: 100 INJECTION, SOLUTION INTRAVENOUS; SUBCUTANEOUS at 05:04

## 2018-04-24 RX ADMIN — INSULIN ASPART 3 UNITS: 100 INJECTION, SOLUTION INTRAVENOUS; SUBCUTANEOUS at 11:04

## 2018-04-24 RX ADMIN — DILTIAZEM HYDROCHLORIDE 30 MG: 30 TABLET, FILM COATED ORAL at 12:04

## 2018-04-24 RX ADMIN — INSULIN ASPART 3 UNITS: 100 INJECTION, SOLUTION INTRAVENOUS; SUBCUTANEOUS at 12:04

## 2018-04-24 RX ADMIN — CEFEPIME 1 G: 1 INJECTION, POWDER, FOR SOLUTION INTRAMUSCULAR; INTRAVENOUS at 05:04

## 2018-04-24 RX ADMIN — INSULIN ASPART 4 UNITS: 100 INJECTION, SOLUTION INTRAVENOUS; SUBCUTANEOUS at 05:04

## 2018-04-24 RX ADMIN — DILTIAZEM HYDROCHLORIDE 30 MG: 30 TABLET, FILM COATED ORAL at 05:04

## 2018-04-24 RX ADMIN — CEFEPIME 1 G: 1 INJECTION, POWDER, FOR SOLUTION INTRAMUSCULAR; INTRAVENOUS at 12:04

## 2018-04-24 RX ADMIN — HEPARIN SODIUM 5000 UNITS: 5000 INJECTION, SOLUTION INTRAVENOUS; SUBCUTANEOUS at 05:04

## 2018-04-24 RX ADMIN — GABAPENTIN 300 MG: 300 CAPSULE ORAL at 10:04

## 2018-04-24 RX ADMIN — INSULIN ASPART 4 UNITS: 100 INJECTION, SOLUTION INTRAVENOUS; SUBCUTANEOUS at 01:04

## 2018-04-24 RX ADMIN — INSULIN ASPART 3 UNITS: 100 INJECTION, SOLUTION INTRAVENOUS; SUBCUTANEOUS at 04:04

## 2018-04-24 RX ADMIN — CEFEPIME 1 G: 1 INJECTION, POWDER, FOR SOLUTION INTRAMUSCULAR; INTRAVENOUS at 10:04

## 2018-04-24 RX ADMIN — GABAPENTIN 300 MG: 300 CAPSULE ORAL at 04:04

## 2018-04-24 RX ADMIN — TAMSULOSIN HYDROCHLORIDE 0.4 MG: 0.4 CAPSULE ORAL at 08:04

## 2018-04-24 RX ADMIN — TRAZODONE HYDROCHLORIDE 50 MG: 50 TABLET ORAL at 10:04

## 2018-04-24 RX ADMIN — INSULIN ASPART 5 UNITS: 100 INJECTION, SOLUTION INTRAVENOUS; SUBCUTANEOUS at 01:04

## 2018-04-24 RX ADMIN — HEPARIN SODIUM 5000 UNITS: 5000 INJECTION, SOLUTION INTRAVENOUS; SUBCUTANEOUS at 10:04

## 2018-04-24 NOTE — ASSESSMENT & PLAN NOTE
Leukocytosis present but no longer increasing, hyperthermia less frequent but present  Tachycardia improved  Broad spectrum abx  Blood cultures NGTD  Urine culture with GNR, follow up speciation and sensitivities  Now day 3 of broad spectrum abx, if clinically no improvement may consider LP for post op meningitis given lumbar drain earlier in admission  No LP per NSGY team, continue abx per NSGY

## 2018-04-24 NOTE — PT/OT/SLP DISCHARGE
Physical Therapy Discharge Summary    Name: Hollis Pitts  MRN: [id number]   Principal Problem: Cervical myelopathy with cervical radiculopathy     Patient Discharged from acute Physical Therapy on 18.  Please refer to prior PT noted date on 18 for functional status.     Assessment:     Patient has not met goals.    Objective:     GOALS:    Physical Therapy Goals        Problem: Physical Therapy Goal    Goal Priority Disciplines Outcome Goal Variances Interventions   Physical Therapy Goal     PT/OT, PT Ongoing (interventions implemented as appropriate)     Description:  Goals to be met by: 2018     Patient will increase functional independence with mobility by performin. Supine to sit with Max Assistance  2. Sit to supine with Max Assistance  3. Sit to stand transfer with Max  Assistance  4. Bed to chair transfer with Max Assistance using Rolling Walker  5. Lower extremity exercise program x30 reps per handout, with independence                       Reasons for Discontinuation of Therapy Services  Transfer to alternate level of care-- ICU.       Plan:     Patient transferred to ICU 18. Pt will require new orders to resume therapy 2/2 change in status and transfer to higher level of care. Please re-consult therapy when appropriate.     Alexandria Joe, PT, DPT   2018

## 2018-04-24 NOTE — PT/OT/SLP PROGRESS
"Speech Language Pathology Treatment    Patient Name:  Hollis Pitts   MRN:  [id number]  Admitting Diagnosis: Cervical myelopathy with cervical radiculopathy    Recommendations:                 General Recommendations:  Dysphagia therapy  Diet recommendations:  NPO, Liquid Diet Level: NPO   Aspiration Precautions: Continue alternate means of nutrition, Frequent oral care and Strict aspiration precautions   General Precautions: Standard, aspiration, fall, NPO  Communication strategies:  provide increased time to answer and go to room if call light pushed    Subjective     SLP reviewed Pt with nurse, nurse reports Pt with wet vocal quality  Pt presents lethargic  Pt with minimal vocalizations or eye contact, unable to elicit spontaneous speech  Pts friend asks, "Can you give him less medicine so he is more awake?"    Pain/Comfort:  · Pain Rating 1: other (see comments) (SHAGGY 2/2 decreased ISAIAS)    Objective:     Has the patient been evaluated by SLP for swallowing?   Yes  Keep patient NPO? Yes   Current Respiratory Status: room air      Pt seen for ongoing swallow assessment. Pt found asleep in bed, HOB elevated, family friend at bedside. Patient wakes briefly per MAX verbal/tactile stimulation, easily falls back to sleep without consistent cueing. Patient with sustained phonation /a/ x1, wet vocal quality noted upon phonation. Pt with dry oral cavity, oral care provide e via suction toothette. PO trials held 2/2 decrease ISAIAS and wet vocal quality. Patient's friend asking about medications, questions deferred to nurse. No further questions. Whiteboard current. Findings reviewed with nurse.     Assessment:     Hollis Pitts is a 82 y.o. male with an SLP diagnosis of Dysphagia.  He presents with decreased ISAIAS and persistent, wet vocal quality.  Patient not appropriate for PO intake at this time and swallow assessment is ongoing.    Goals:    SLP Goals        Problem: SLP Goal    Goal Priority Disciplines Outcome   SLP " Goal     SLP Ongoing (interventions implemented as appropriate)   Description:  Speech Language Pathology Goals  Goals expected to be met by 4/29:    1. Pt will participate in ongoing assessment of swallow.                            Plan:     · Patient to be seen:  4 x/week   · Plan of Care expires:  05/22/18  · Plan of Care reviewed with:  patient, friend   · SLP Follow-Up:  Yes       Discharge recommendations:  nursing facility, skilled   Barriers to Discharge:  Level of Skilled Assistance Needed     Time Tracking:     SLP Treatment Date:   04/24/18  Speech Start Time:  1226  Speech Stop Time:  1236     Speech Total Time (min):  10 min    Billable Minutes: Speech Therapy Individual 10    ISABEL Shelby, [facility]-SLP  Speech-Language Pathology  Pager: [phone]      04/24/2018

## 2018-04-24 NOTE — PLAN OF CARE
Problem: SLP Goal  Goal: SLP Goal  Speech Language Pathology Goals  Goals expected to be met by 4/29:    1. Pt will participate in ongoing assessment of swallow.           Outcome: Ongoing (interventions implemented as appropriate)  Pt ongoing with current goal. Pt with decreased ISAIAS and wet vocal quality. Pt not appropriate for PO trials. Oral care provided.  See note for full details. ST To continue to follow.    VALENTINO Shelby., [facility]-SLP  Speech-Language Pathology  Pager: [phone]  4/24/2018

## 2018-04-24 NOTE — ASSESSMENT & PLAN NOTE
Pan Cx  Tmax -afebrile  ProCal 0.21  Urine cx-pseudomonas  Continue Vanc/cefepime  Pending C.Diff

## 2018-04-24 NOTE — SUBJECTIVE & OBJECTIVE
Interval History: NAEON. Patient neurostable.     Medications:  Continuous Infusions:  Scheduled Meds:   atorvastatin  20 mg Per NG tube Nightly    ceFEPime (MAXIPIME) IVPB  1 g Intravenous Q8H    citalopram  20 mg Per NG tube Daily    diltiaZEM  30 mg Per NG tube Q6H    gabapentin  300 mg Per NG tube TID    heparin (porcine)  5,000 Units Subcutaneous Q8H    insulin aspart U-100  5 Units Subcutaneous 6 times per day    insulin detemir U-100  9 Units Subcutaneous BID    metoprolol tartrate  12.5 mg Per NG tube Q8H    tamsulosin  0.4 mg Per NG tube Daily    traZODone  50 mg Per NG tube QHS    valsartan  40 mg Per NG tube Daily    vancomycin (VANCOCIN) IVPB  1,000 mg Intravenous Q12H     PRN Meds:acetaminophen, aluminum-magnesium hydroxide-simethicone, benzonatate, dextrose 50%, fentaNYL, glucagon (human recombinant), hydrALAZINE, hydrocodone-acetaminophen 5-325mg, insulin aspart U-100, labetalol, lidocaine HCL 2%, magnesium oxide, magnesium oxide, ondansetron, potassium chloride 10%, potassium chloride 10%, potassium chloride 10%, potassium, sodium phosphates, potassium, sodium phosphates, potassium, sodium phosphates     Review of Systems    Objective:     Weight: 99.5 kg (219 lb 5.7 oz)  Body mass index is 29.75 kg/m².  Vital Signs (Most Recent):  Temp: 98.8 °F (37.1 °C) (04/24/18 1100)  Pulse: 95 (04/24/18 1100)  Resp: 20 (04/24/18 1100)  BP: 128/64 (04/24/18 1100)  SpO2: 95 % (04/24/18 1100) Vital Signs (24h Range):  Temp:  [98.5 °F (36.9 °C)-99.6 °F (37.6 °C)] 98.8 °F (37.1 °C)  Pulse:  [] 95  Resp:  [16-53] 20  SpO2:  [93 %-97 %] 95 %  BP: (126-185)/(59-84) 128/64       Date 04/24/18 0700 - 04/25/18 0659   Shift 4115-3908 0611-2159 3569-9473 24 Hour Total   I  N  T  A  K  E   I.V.  (mL/kg) 25  (0.3)   25  (0.3)    NG/   465    Shift Total  (mL/kg) 490  (4.9)   490  (4.9)   O  U  T  P  U  T   Urine  (mL/kg/hr) 375   375    Stool 50   50    Shift Total  (mL/kg) 425  (4.3)   425  (4.3)  "  Weight (kg) 99.5 99.5 99.5 99.5            NG/OG Tube 04/21/18 2300 Cortrak Right nostril (Active)   Placement Check placement verified by distal tube length measurement 4/23/2018 11:00 AM   Distal Tube Length (cm) 75 4/23/2018 11:00 AM   Tolerance no signs/symptoms of discomfort 4/23/2018 11:00 AM   Securement anchored to nostril center w/ adhesive device 4/23/2018 11:00 AM   Clamp Status/Tolerance unclamped;no abdominal distention;no emesis;no residual;no restlessness 4/23/2018 11:00 AM   Insertion Site Appearance no redness, warmth, tenderness, skin breakdown, drainage 4/23/2018 11:00 AM   Drainage None 4/23/2018  5:00 AM   Flush/Irrigation flushed w/;water;no resistance met 4/23/2018  5:00 AM   Feeding Method continuous 4/23/2018 11:00 AM   Current Rate (mL/hr) 55 mL/hr 4/23/2018  5:00 AM   Goal Rate (mL/hr) 55 mL/hr 4/23/2018  5:00 AM   Intake (mL) 75 mL 4/23/2018  9:00 AM   Water Bolus (mL) 100 mL 4/23/2018  5:00 AM   Rate Formula Tube Feeding (mL/hr) 55 mL/hr 4/23/2018  5:00 AM   Intake (mL) - Formula Tube Feeding 55 4/23/2018 11:00 AM   Residual Amount (ml) 0 ml 4/23/2018 11:00 AM            Urethral Catheter 04/21/18 1432 Straight-tip (Active)   Site Assessment Clean;Intact;Dry 4/23/2018 11:00 AM   Collection Container Urimeter 4/23/2018 11:00 AM   Securement Method secured to top of thigh w/ adhesive device 4/23/2018 11:00 AM   Catheter Care Performed yes 4/23/2018  8:00 AM   Reason for Continuing Urinary Catheterization Urinary retention;Critically ill in ICU requiring intensive monitoring 4/23/2018 11:00 AM   CAUTI Prevention Bundle StatLock in place w 1" slack;Drainage bag off the floor;Green sheeting clip in use;No dependent loops or kinks 4/23/2018  7:00 AM   Output (mL) 75 mL 4/23/2018 11:00 AM       Neurosurgery Physical Exam    General: well developed, well nourished, no distress.   Head: normocephalic, atraumatic  Neurologic: Alert and oriented.   GCS: Motor: 6/Verbal: 5/Eyes: 4 GCS Total: " 15  Mental Status: Awake, Alert, Oriented x 4  Language: No aphasia  Speech: No dysarthria  Cranial nerves: face symmetric, tongue midline, CN II-XII grossly intact.   Eyes: pupils equal, round, reactive to light with accomodation, EOMI.   Sensory: intact to light touch throughout     Motor Strength:Moves all extremities spontaneously with good tone. No abnormal movements seen.      Strength   Deltoids Triceps Biceps Wrist Extension Wrist Flexion Hand    Upper: R 3/5 3/5 4-/5 4/5 4/5 4/5     L 3/5 4/5 4/5 4/5 4/5 4/5       Iliopsoas Quadriceps Knee  Flexion Tibialis  anterior Gastro- cnemius EHL   Lower: R 3/5 4+/5 5/5 5/5 5/5 5/5     L 3/5 4+/5 5/5 5/5 5/5 5/5      Incision c/d/i with no surrounding erythema, edema, or drainage. Skin edges well approximated with dermabond.  C-collar in place.     Significant Labs:    Recent Labs  Lab 04/23/18  0357 04/23/18  1101 04/24/18  0407   *  --  288*     --  148*   K 3.8 4.4 4.2   *  --  111*   CO2 24  --  29   BUN 29*  --  30*   CREATININE 0.8  --  0.8   CALCIUM 10.9*  --  11.1*   MG 1.8 1.9 2.1       Recent Labs  Lab 04/23/18  0357 04/24/18  0407   WBC 21.88* 21.30*   HGB 10.9* 10.7*   HCT 34.5* 33.8*    292     No results for input(s): LABPT, INR, APTT in the last 48 hours.  Microbiology Results (last 7 days)     Procedure Component Value Units Date/Time    Clostridium difficile EIA [[id number]]     Order Status:  No result Specimen:  Stool from Stool     Urine culture [[id number]]  (Susceptibility) Collected:  04/21/18 1430    Order Status:  Completed Specimen:  Urine from Urine, Catheterized Updated:  04/23/18 1800     Urine Culture, Routine --     PSEUDOMONAS AERUGINOSA  10,000 - 49,999 cfu/ml      Blood culture [[id number]] Collected:  04/21/18 1335    Order Status:  Completed Specimen:  Blood Updated:  04/23/18 1612     Blood Culture, Routine No Growth to date     Blood Culture, Routine No Growth to date     Blood Culture, Routine No  Growth to date    Narrative:       Blood cultures x 2 different sites. 4 bottles total. Please  draw cultures before administering antibiotics.    Blood culture [[id number]] Collected:  04/21/18 1335    Order Status:  Completed Specimen:  Blood Updated:  04/23/18 1612     Blood Culture, Routine No Growth to date     Blood Culture, Routine No Growth to date     Blood Culture, Routine No Growth to date    Narrative:       Blood cultures from 2 different sites. 4 bottles total.  Please draw before starting antibiotics.    Blood culture [[id number]] Collected:  04/19/18 1005    Order Status:  Completed Specimen:  Blood from Peripheral, Wrist, Right Updated:  04/23/18 1412     Blood Culture, Routine No Growth to date     Blood Culture, Routine No Growth to date     Blood Culture, Routine No Growth to date     Blood Culture, Routine No Growth to date     Blood Culture, Routine No Growth to date    Narrative:       Blood cultures from 2 different sites. 4 bottles total.  Please draw before starting antibiotics.    Blood culture [[id number]] Collected:  04/19/18 0952    Order Status:  Completed Specimen:  Blood from Peripheral, Hand, Left Updated:  04/23/18 1412     Blood Culture, Routine No Growth to date     Blood Culture, Routine No Growth to date     Blood Culture, Routine No Growth to date     Blood Culture, Routine No Growth to date     Blood Culture, Routine No Growth to date    Narrative:       Blood cultures x 2 different sites. 4 bottles total. Please  draw cultures before administering antibiotics.    Culture, Respiratory with Gram Stain [[id number]] Collected:  04/19/18 0231    Order Status:  Completed Specimen:  Respiratory from Sputum, Expectorated Updated:  04/21/18 1059     Respiratory Culture Normal respiratory mitzy     Gram Stain (Respiratory) <10 epithelial cells per low power field.     Gram Stain (Respiratory) Many WBC's     Gram Stain (Respiratory) Many Gram positive cocci     Gram Stain (Respiratory)  Many Gram negative rods     Gram Stain (Respiratory) Rare Gram positive rods        All pertinent labs from the last 24 hours have been reviewed.    Significant Diagnostics:  I have reviewed all pertinent imaging results/findings within the past 24 hours.

## 2018-04-24 NOTE — SUBJECTIVE & OBJECTIVE
Review of Systems    Review of symptoms  Constitutional: Denies fevers or chills.  Pulmonary: Denies shortness of breath or cough.  Cardiology: Denies chest pain or palpitations.  GI: Denies abdominal pain or constipation.  Neurologic: Denies new weakness,  headache, or paresthesias.  Objective:     Vitals:  Temp: 98.8 °F (37.1 °C)  Pulse: 87  Rhythm: normal sinus rhythm  BP: (!) 126/55  MAP (mmHg): 84  Resp: (!) 24  SpO2: 96 %  O2 Device (Oxygen Therapy): room air    Temp  Min: 98.8 °F (37.1 °C)  Max: 99.6 °F (37.6 °C)  Pulse  Min: 83  Max: 112  BP  Min: 126/55  Max: 185/84  MAP (mmHg)  Min: 84  Max: 121  Resp  Min: 16  Max: 53  SpO2  Min: 93 %  Max: 97 %    04/23 0701 - 04/24 0700  In: 2635 [I.V.:140]  Out: 1260 [Urine:1060]   Unmeasured Output  Urine Occurrence: 0  Stool Occurrence: 1  Emesis Occurrence: 0  Pad Count: 1       Physical Exam    Physical Exam:  GA: Alert, comfortable, no acute distress.   HEENT: No scleral icterus or JVD.   Pulmonary: Clear to auscultation A/P/L. No wheezing, crackles, or rhonchi.  Cardiac: RRR S1 & S2 w/o rubs/murmurs/gallops.   Abdominal: Bowel sounds present x 4. No appreciable hepatosplenomegaly.  Skin: No jaundice, rashes, or visible lesions.  Neuro:  --GCS: E3 V4 M6  --Mental Status:  Awake, alert,oriented to self,  Confused to place and time, follows some simple commands   --CN II-XII grossly intact.   --Pupils 3mm, PERRL.   --Corneal reflex, gag, cough intact.  -- Generalized weakness, moves all extremities     Medications:  Continuous Scheduled  atorvastatin 20 mg Nightly   ceFEPime (MAXIPIME) IVPB 1 g Q8H   citalopram 20 mg Daily   diltiaZEM 30 mg Q6H   gabapentin 300 mg TID   heparin (porcine) 5,000 Units Q8H   insulin aspart U-100 5 Units 6 times per day   insulin detemir U-100 9 Units BID   metoprolol tartrate 12.5 mg Q8H   tamsulosin 0.4 mg Daily   traZODone 50 mg QHS   valsartan 40 mg Daily   vancomycin (VANCOCIN) IVPB 1,000 mg Q12H   PRN  acetaminophen 650 mg Q4H  PRN   aluminum-magnesium hydroxide-simethicone 30 mL Q4H PRN   benzonatate 100 mg TID PRN   dextrose 50% 12.5 g PRN   fentaNYL 12.5 mcg Q2H PRN   glucagon (human recombinant) 1 mg PRN   hydrALAZINE 10 mg Q4H PRN   hydrocodone-acetaminophen 5-325mg 1 tablet Q4H PRN   insulin aspart U-100 1-10 Units Q4H PRN   labetalol 10 mg Q6H PRN   lidocaine HCL 2%  PRN   magnesium oxide 800 mg PRN   magnesium oxide 800 mg PRN   ondansetron 8 mg Q6H PRN   potassium chloride 10% 40 mEq PRN   potassium chloride 10% 40 mEq PRN   potassium chloride 10% 60 mEq PRN   potassium, sodium phosphates 2 packet PRN   potassium, sodium phosphates 2 packet PRN   potassium, sodium phosphates 2 packet PRN     Today I personally reviewed pertinent medications, lines/drains/airways, imaging, lab results,

## 2018-04-24 NOTE — ASSESSMENT & PLAN NOTE
Leukocytosis present but no longer increasing, hyperthermia less frequent but present  Tachycardia improved  Broad spectrum abx  Blood cultures NGTD  Urine culture with GNR, follow up speciation and sensitivities  Now day 3 of broad spectrum abx, if clinically no improvement may consider LP for post op meningitis given lumbar drain earlier in admission

## 2018-04-24 NOTE — PLAN OF CARE
ICU Attending Note  Neurocritical Care    Anemia of critical illness  Hypernatremia  Persistent leukocytosis    [id number]    -diltiazem, metoprolol, valsartan  -cefepime, vancomycin  -increase aspart, continue glargine, ISS  -send C diff given leukocytosis, diarrhea  -no LP per Neurosurgery at this time  -heparin prophylaxis  -coordinate transfer to floor with Neurosurgery

## 2018-04-24 NOTE — TELEPHONE ENCOUNTER
Returned michael phone call , no answer. I left a message sadia voicemail advising  Her to give me a call back

## 2018-04-24 NOTE — PROGRESS NOTES
[facility]  Neurosurgery  Progress Note    Subjective:     History of Present Illness: 82 y.o.  male with type 2 diabetes, who presents today for follow up evaluation one week prior to 2 level ACDF scheduled for 4/10/2018. Pt reports    Pt presents today wearing a cervical collar and in a wheelchair. He would like to proceed with surgery. Per pt's family, his PCP would like to see him on 4/9, the day before surgery. He notes continued neck pain and BUE numbness/tingling as well as BUE weakness. Pt is only able to walk a few steps unassisted. He has never received neck surgery.     Post-Op Info:  Procedure(s) (LRB):  DISKECTOMY AND FUSION-ANTERIOR CERVICAL (ACDF); C5-6. C6-7 (N/A)   14 Days Post-Op     Interval History: NAEON. Patient neurostable.     Medications:  Continuous Infusions:  Scheduled Meds:   atorvastatin  20 mg Per NG tube Nightly    ceFEPime (MAXIPIME) IVPB  1 g Intravenous Q8H    citalopram  20 mg Per NG tube Daily    diltiaZEM  30 mg Per NG tube Q6H    gabapentin  300 mg Per NG tube TID    heparin (porcine)  5,000 Units Subcutaneous Q8H    insulin aspart U-100  5 Units Subcutaneous 6 times per day    insulin detemir U-100  9 Units Subcutaneous BID    metoprolol tartrate  12.5 mg Per NG tube Q8H    tamsulosin  0.4 mg Per NG tube Daily    traZODone  50 mg Per NG tube QHS    valsartan  40 mg Per NG tube Daily    vancomycin (VANCOCIN) IVPB  1,000 mg Intravenous Q12H     PRN Meds:acetaminophen, aluminum-magnesium hydroxide-simethicone, benzonatate, dextrose 50%, fentaNYL, glucagon (human recombinant), hydrALAZINE, hydrocodone-acetaminophen 5-325mg, insulin aspart U-100, labetalol, lidocaine HCL 2%, magnesium oxide, magnesium oxide, ondansetron, potassium chloride 10%, potassium chloride 10%, potassium chloride 10%, potassium, sodium phosphates, potassium, sodium phosphates, potassium, sodium phosphates     Review of Systems    Objective:     Weight: 99.5 kg (219 lb 5.7  oz)  Body mass index is 29.75 kg/m².  Vital Signs (Most Recent):  Temp: 98.8 °F (37.1 °C) (04/24/18 1100)  Pulse: 95 (04/24/18 1100)  Resp: 20 (04/24/18 1100)  BP: 128/64 (04/24/18 1100)  SpO2: 95 % (04/24/18 1100) Vital Signs (24h Range):  Temp:  [98.5 °F (36.9 °C)-99.6 °F (37.6 °C)] 98.8 °F (37.1 °C)  Pulse:  [] 95  Resp:  [16-53] 20  SpO2:  [93 %-97 %] 95 %  BP: (126-185)/(59-84) 128/64       Date 04/24/18 0700 - 04/25/18 0659   Shift 4132-0473 4086-8485 6540-6681 24 Hour Total   I  N  T  A  K  E   I.V.  (mL/kg) 25  (0.3)   25  (0.3)    NG/   465    Shift Total  (mL/kg) 490  (4.9)   490  (4.9)   O  U  T  P  U  T   Urine  (mL/kg/hr) 375   375    Stool 50   50    Shift Total  (mL/kg) 425  (4.3)   425  (4.3)   Weight (kg) 99.5 99.5 99.5 99.5            NG/OG Tube 04/21/18 2300 Cortrak Right nostril (Active)   Placement Check placement verified by distal tube length measurement 4/23/2018 11:00 AM   Distal Tube Length (cm) 75 4/23/2018 11:00 AM   Tolerance no signs/symptoms of discomfort 4/23/2018 11:00 AM   Securement anchored to nostril center w/ adhesive device 4/23/2018 11:00 AM   Clamp Status/Tolerance unclamped;no abdominal distention;no emesis;no residual;no restlessness 4/23/2018 11:00 AM   Insertion Site Appearance no redness, warmth, tenderness, skin breakdown, drainage 4/23/2018 11:00 AM   Drainage None 4/23/2018  5:00 AM   Flush/Irrigation flushed w/;water;no resistance met 4/23/2018  5:00 AM   Feeding Method continuous 4/23/2018 11:00 AM   Current Rate (mL/hr) 55 mL/hr 4/23/2018  5:00 AM   Goal Rate (mL/hr) 55 mL/hr 4/23/2018  5:00 AM   Intake (mL) 75 mL 4/23/2018  9:00 AM   Water Bolus (mL) 100 mL 4/23/2018  5:00 AM   Rate Formula Tube Feeding (mL/hr) 55 mL/hr 4/23/2018  5:00 AM   Intake (mL) - Formula Tube Feeding 55 4/23/2018 11:00 AM   Residual Amount (ml) 0 ml 4/23/2018 11:00 AM            Urethral Catheter 04/21/18 1432 Straight-tip (Active)   Site Assessment Clean;Intact;Dry 4/23/2018  "11:00 AM   Collection Container Urimeter 4/23/2018 11:00 AM   Securement Method secured to top of thigh w/ adhesive device 4/23/2018 11:00 AM   Catheter Care Performed yes 4/23/2018  8:00 AM   Reason for Continuing Urinary Catheterization Urinary retention;Critically ill in ICU requiring intensive monitoring 4/23/2018 11:00 AM   CAUTI Prevention Bundle StatLock in place w 1" slack;Drainage bag off the floor;Green sheeting clip in use;No dependent loops or kinks 4/23/2018  7:00 AM   Output (mL) 75 mL 4/23/2018 11:00 AM       Neurosurgery Physical Exam    General: well developed, well nourished, no distress.   Head: normocephalic, atraumatic  Neurologic: Alert and oriented.   GCS: Motor: 6/Verbal: 5/Eyes: 4 GCS Total: 15  Mental Status: Awake, Alert, Oriented x 4  Language: No aphasia  Speech: No dysarthria  Cranial nerves: face symmetric, tongue midline, CN II-XII grossly intact.   Eyes: pupils equal, round, reactive to light with accomodation, EOMI.   Sensory: intact to light touch throughout     Motor Strength:Moves all extremities spontaneously with good tone. No abnormal movements seen.      Strength   Deltoids Triceps Biceps Wrist Extension Wrist Flexion Hand    Upper: R 3/5 3/5 4-/5 4/5 4/5 4/5     L 3/5 4/5 4/5 4/5 4/5 4/5       Iliopsoas Quadriceps Knee  Flexion Tibialis  anterior Gastro- cnemius EHL   Lower: R 3/5 4+/5 5/5 5/5 5/5 5/5     L 3/5 4+/5 5/5 5/5 5/5 5/5      Incision c/d/i with no surrounding erythema, edema, or drainage. Skin edges well approximated with dermabond.  C-collar in place.     Significant Labs:    Recent Labs  Lab 04/23/18  0357 04/23/18  1101 04/24/18  0407   *  --  288*     --  148*   K 3.8 4.4 4.2   *  --  111*   CO2 24  --  29   BUN 29*  --  30*   CREATININE 0.8  --  0.8   CALCIUM 10.9*  --  11.1*   MG 1.8 1.9 2.1       Recent Labs  Lab 04/23/18  0357 04/24/18  0407   WBC 21.88* 21.30*   HGB 10.9* 10.7*   HCT 34.5* 33.8*    292     No results for " input(s): LABPT, INR, APTT in the last 48 hours.  Microbiology Results (last 7 days)     Procedure Component Value Units Date/Time    Clostridium difficile EIA [[id number]]     Order Status:  No result Specimen:  Stool from Stool     Urine culture [[id number]]  (Susceptibility) Collected:  04/21/18 1430    Order Status:  Completed Specimen:  Urine from Urine, Catheterized Updated:  04/23/18 1800     Urine Culture, Routine --     PSEUDOMONAS AERUGINOSA  10,000 - 49,999 cfu/ml      Blood culture [[id number]] Collected:  04/21/18 1335    Order Status:  Completed Specimen:  Blood Updated:  04/23/18 1612     Blood Culture, Routine No Growth to date     Blood Culture, Routine No Growth to date     Blood Culture, Routine No Growth to date    Narrative:       Blood cultures x 2 different sites. 4 bottles total. Please  draw cultures before administering antibiotics.    Blood culture [[id number]] Collected:  04/21/18 1335    Order Status:  Completed Specimen:  Blood Updated:  04/23/18 1612     Blood Culture, Routine No Growth to date     Blood Culture, Routine No Growth to date     Blood Culture, Routine No Growth to date    Narrative:       Blood cultures from 2 different sites. 4 bottles total.  Please draw before starting antibiotics.    Blood culture [[id number]] Collected:  04/19/18 1005    Order Status:  Completed Specimen:  Blood from Peripheral, Wrist, Right Updated:  04/23/18 1412     Blood Culture, Routine No Growth to date     Blood Culture, Routine No Growth to date     Blood Culture, Routine No Growth to date     Blood Culture, Routine No Growth to date     Blood Culture, Routine No Growth to date    Narrative:       Blood cultures from 2 different sites. 4 bottles total.  Please draw before starting antibiotics.    Blood culture [[id number]] Collected:  04/19/18 0952    Order Status:  Completed Specimen:  Blood from Peripheral, Hand, Left Updated:  04/23/18 1412     Blood Culture, Routine No Growth to date     Blood  Culture, Routine No Growth to date     Blood Culture, Routine No Growth to date     Blood Culture, Routine No Growth to date     Blood Culture, Routine No Growth to date    Narrative:       Blood cultures x 2 different sites. 4 bottles total. Please  draw cultures before administering antibiotics.    Culture, Respiratory with Gram Stain [[id number]] Collected:  04/19/18 0231    Order Status:  Completed Specimen:  Respiratory from Sputum, Expectorated Updated:  04/21/18 1059     Respiratory Culture Normal respiratory mitzy     Gram Stain (Respiratory) <10 epithelial cells per low power field.     Gram Stain (Respiratory) Many WBC's     Gram Stain (Respiratory) Many Gram positive cocci     Gram Stain (Respiratory) Many Gram negative rods     Gram Stain (Respiratory) Rare Gram positive rods        All pertinent labs from the last 24 hours have been reviewed.    Significant Diagnostics:  I have reviewed all pertinent imaging results/findings within the past 24 hours.    Assessment/Plan:     * Cervical myelopathy with cervical radiculopathy    82 y.o. male s/p C3-5 ACDF POD#13    - Neurologically stable  - Respiratory status stable  - Neuro checks q4h  - Leave incision open to air, healing well with no edema present  - NPO for now, will continue to try with ST.  - Urine with GNRs, rocephin.  -Cervical collar when OOB or with activity  -IS to bed side. Patient to use atleast 10x every hour.  -CV- goal normotension. Continue home meds. PRN meds SBP >160.  -Hyponatremia- daily NaCl tabs.  -Heme/ID- hgb/hct stable. Afebrile.   -Urinary retention- rouse in place, continue Flomax  -Continue PILI/SCDs/SQH  -Continue bowel regimen daily.  -Continue to hold Eliquis    Dispo: TTF neurosurgery service today.              Rosalio Johns MD  Neurosurgery  [facility]

## 2018-04-24 NOTE — PT/OT/SLP DISCHARGE
Occupational Therapy Discharge Summary    Hollis Pitts  MRN: [id number]   Principal Problem: Cervical myelopathy with cervical radiculopathy      Patient Discharged from acute Occupational Therapy on 4/24.  Please refer to prior OT note dated 4/21 for functional status.    Assessment:      Transferred to ICU    Objective:     GOALS:    Occupational Therapy Goals        Problem: Occupational Therapy Goal    Goal Priority Disciplines Outcome Interventions   Occupational Therapy Goal     OT, PT/OT Ongoing (interventions implemented as appropriate)    Description:  Goals to be met by: 4/28/18     Patient will increase functional independence with ADLs by performing:    Feeding with Set-up Assistance.  UE Dressing with Moderate Assistance.  LE Dressing with Maximum Assistance.  Grooming while seated with Minimal Assistance.  Supine to sit with Moderate Assistance.  Stand pivot transfers with Moderate Assistance.  Increased functional strength B shoulders to ~1/2 AROM for increased adl independence and mobility, increase distal strength 1/2-1 mm grade for increased adl's and mobility.  Family/caregiver education provided for mobility and adl's, DME needs, AE rec's                        Reasons for Discontinuation of Therapy Services  Transfer to alternate level of care.      Plan:     Patient Discharged to: T/f to ICU; please re-consult therapy services when medically appropriate    Teresa lawler OT  4/24/2018

## 2018-04-24 NOTE — PLAN OF CARE
Problem: Patient Care Overview  Goal: Plan of Care Review  Outcome: Ongoing (interventions implemented as appropriate)  No acute events throughout day, VS and assessment per flow sheet, patient progressing towards goals as tolerated, plan of care reviewed with Hollis Pitts, all concerns addressed, will continue to monitor.      Neuro: pt became more alert as shift progressed, slept 8+ hours; c-collar in place, moves all extremities on commands    Cardiovascular: SR/ST with frequent ectopy; SBP 140s-170s, given PRN labetalol and hydralazine for SBP >160      Pulmonary: RA    Gastrointestinal: TF at goal of 55 through cortrak; flexi, BMx2    Genitourinary: voided 675 ml via condom cath    Endocrine: accuchecks q4 with scheduled and PRN insulin given with each check    Integumentary/other: c-collar in place, neck incision CDI    Infusions: KVO     POC: SD?

## 2018-04-24 NOTE — ASSESSMENT & PLAN NOTE
82 y.o. male s/p C3-5 ACDF POD#13    - Neurologically stable  - Respiratory status stable  - Neuro checks q4h  - Leave incision open to air, healing well with no edema present  - NPO for now, will continue to try with ST.  - Urine with GNRs, rocephin.  -Cervical collar when OOB or with activity  -IS to bed side. Patient to use atleast 10x every hour.  -CV- goal normotension. Continue home meds. PRN meds SBP >160.  -Hyponatremia- daily NaCl tabs.  -Heme/ID- hgb/hct stable. Afebrile.   -Urinary retention- rouse in place, continue Flomax  -Continue PILI/SCDs/SQH  -Continue bowel regimen daily.  -Continue to hold Eliquis    Dispo: TTF neurosurgery service today.

## 2018-04-24 NOTE — PROGRESS NOTES
[facility]  Neurocritical Care  Progress Note    Admit Date: 4/10/2018  Service Date: 04/23/2018  Length of Stay: 13    Subjective:     Chief Complaint: Cervical myelopathy with cervical radiculopathy    History of Present Illness: Mr. Pitts is an 82-year-old man with HTN, HPLD, DMII, BPH, MDD, and cervical spondylosis with myelopathy who was admitted to [facility] [place], [place] for ACDF. Per chart review he was in his usual state of health until July 2017 when he was hit by a large beam of wood, after which he has been non-ambulatory and confused. While very pleasant, he is unfortunately somewhat of a poor historian. His symptoms prior to admission include bilateral hand numbness, tingling, and restricted upper extremity range of motion. CT C-spine without contrast showed multilevel spondylosis of there cervical spine with mild stenosis at C5-6 and C6-7 as well as severe extensive neuroforaminal narrowing. Yesterday he underwent two level ACDF at C5/6 and C6/7 without immediate complication. Reported blood loss in the brief op note is 1050 mL. He was given dexamethasone as well. Hospital medicine consulted for hyperglycemia and comanagement of his medical conditions.He had developed a CSF leak and will have Luumbar drain placed by nsgy. He is being admitted to [facility] for a higher level of care.     Hospital Course: 4/13: Admit NCC s/p ACDF with new csf leak pending lumbar drain  4/14: ok to sit up with lumbar drain clamped, passed sp eval started diet   4/16: No leaking noted from drain insertion site. Will follow I/O, Serum Na. Discussed brandyoquhal, no documented reason/patient unaware why taking it.   4/17 CSF drainage notified Neurosurgery; Drain is clamped; Possible OR tomorrow in AM - However, final decision to be determined tonight.   4/18: Insulin increased. Dilt. Dose adjusted.   4/19: Discuss transfer with NSGY,   4/21: patient with SIRS criteria, ill appearing, transfer back to [facility], empiric ABX,  cultures  4/22: more alert today, start tube feeds, CXR stable      Interval History 4/23- GNR in urine culture. WBC remains elevated but stabilized. Afebrile.        Review of Systems:limited due to confusion    Vitals:   Temp: 99.5 °F (37.5 °C)  Pulse: 89  Rhythm: sinus tachycardia  BP: (!) 163/75  MAP (mmHg): 108  Resp: 20  SpO2: 95 %  O2 Device (Oxygen Therapy): room air    Temp  Min: 97.8 °F (36.6 °C)  Max: 100 °F (37.8 °C)  Pulse  Min: 83  Max: 124  BP  Min: 138/63  Max: 171/81  MAP (mmHg)  Min: 90  Max: 116  Resp  Min: 17  Max: 26  SpO2  Min: 91 %  Max: 97 %    04/22 0701 - 04/23 0700  In: 2090 [I.V.:100]  Out: 1590 [Urine:1590]   Unmeasured Output  Urine Occurrence: 0  Stool Occurrence: 1  Emesis Occurrence: 0  Pad Count: 1     Examination:   Constitutional: Well-nourished and -developed. No apparent distress.   Eyes: Conjunctiva clear, anicteric. Lids no lesions.  Head/Ears/Nose/Mouth/Throat/Neck: Moist mucous membranes. External ears, nose atraumatic.   Cardiovascular: Regular rhythm. No murmurs. No leg edema.  Respiratory: Comfortable respirations. Bilateral breath sounds course, no crackles  Gastrointestinal: No hernia. Soft, nondistended, nontender. + bowel sounds.    Neurologic:  -GCS E3V4 M6  -opens eyes to loud voice, only oriented to person, confused but follows commands  -Cranial nerves intact  -Motor antigravity x 4 except RUE which only moves at joint      Medications:   Continuous Scheduled  atorvastatin 20 mg Nightly   ceFEPime (MAXIPIME) IVPB 1 g Q8H   [START ON 4/24/2018] citalopram 20 mg Daily   diltiaZEM 30 mg Q6H   gabapentin 300 mg TID   heparin (porcine) 5,000 Units Q8H   insulin aspart U-100 3 Units 6 times per day   insulin detemir U-100 9 Units BID   metoprolol tartrate 12.5 mg Q8H   senna-docusate 8.6-50 mg 1 tablet BID   [START ON 4/24/2018] tamsulosin 0.4 mg Daily   traZODone 50 mg QHS   [START ON 4/24/2018] valsartan 40 mg Daily   vancomycin (VANCOCIN) IVPB 1,000 mg Q12H    PRN  acetaminophen 650 mg Q4H PRN   aluminum-magnesium hydroxide-simethicone 30 mL Q4H PRN   benzonatate 100 mg TID PRN   dextrose 50% 12.5 g PRN   fentaNYL 12.5 mcg Q2H PRN   glucagon (human recombinant) 1 mg PRN   hydrALAZINE 10 mg Q4H PRN   hydrocodone-acetaminophen 5-325mg 1 tablet Q4H PRN   insulin aspart U-100 1-10 Units Q4H PRN   labetalol 10 mg Q6H PRN   lidocaine HCL 2%  PRN   magnesium oxide 800 mg PRN   magnesium oxide 800 mg PRN   ondansetron 8 mg Q6H PRN   potassium chloride 10% 40 mEq PRN   potassium chloride 10% 40 mEq PRN   potassium chloride 10% 60 mEq PRN   potassium, sodium phosphates 2 packet PRN   potassium, sodium phosphates 2 packet PRN   potassium, sodium phosphates 2 packet PRN      Today I independently reviewed pertinent medications, lines/drains/airways, imaging, lab results, microbiology results, notably: Ucx  Assessment/Plan:     Neuro   * Cervical myelopathy with cervical radiculopathy    POD 13 ACDF complicated by CSF leak, lumbar drain earlier in admission, now out          Cardiac/Vascular   Tachycardia    Cardizem, metoprolol PO        Essential hypertension    Continue current regimen of PO BP meds          ID   SIRS (systemic inflammatory response syndrome)    Leukocytosis present but no longer increasing, hyperthermia less frequent but present  Tachycardia improved  Broad spectrum abx  Blood cultures NGTD  Urine culture with GNR, follow up speciation and sensitivities  Now day 3 of broad spectrum abx, if clinically no improvement may consider LP for post op meningitis given lumbar drain earlier in admission        Oncology   Leukocytosis    Pan Cx  Tmax 99.9  ProCal, Will follow          Endocrine   Type 2 diabetes mellitus with hyperglycemia, without long-term current use of insulin      ISS not sufficient , continue long acting and scheduled aspart, dose adjusted today             Prophylaxis:  Venous Thromboembolism: mechanical chemical  Stress Ulcer: None  Ventilator  Pneumonia: not applicable     Activity Orders          Out of bed starting at 04/10 1644        Full Code    Candice Hamm PA-C  Neurocritical Care  [facility]

## 2018-04-24 NOTE — PROGRESS NOTES
[facility]  Neurocritical Care  Progress Note    Admit Date: 4/10/2018  Service Date: 04/24/2018  Length of Stay: 14    Subjective:     Chief Complaint: Cervical myelopathy with cervical radiculopathy    History of Present Illness: Mr. Pitts is an 82-year-old man with HTN, HPLD, DMII, BPH, MDD, and cervical spondylosis with myelopathy who was admitted to [facility] for ACDF. Per chart review he was in his usual state of health until July 2017 when he was hit by a large beam of wood, after which he has been non-ambulatory and confused. While very pleasant, he is unfortunately somewhat of a poor historian. His symptoms prior to admission include bilateral hand numbness, tingling, and restricted upper extremity range of motion. CT C-spine without contrast showed multilevel spondylosis of there cervical spine with mild stenosis at C5-6 and C6-7 as well as severe extensive neuroforaminal narrowing. Yesterday he underwent two level ACDF at C5/6 and C6/7 without immediate complication. Reported blood loss in the brief op note is 1050 mL. He was given dexamethasone as well. Hospital medicine consulted for hyperglycemia and comanagement of his medical conditions.He had developed a CSF leak and will have Luumbar drain placed by nsgy. He is being admitted to [facility] for a higher level of care.     Hospital Course: 4/13: Admit NCC s/p ACDF with new csf leak pending lumbar drain  4/14: ok to sit up with lumbar drain clamped, passed sp eval started diet   4/16: No leaking noted from drain insertion site. Will follow I/O, Serum Na. Discussed brandyoquhal, no documented reason/patient unaware why taking it.   4/17 CSF drainage notified Neurosurgery; Drain is clamped; Possible OR tomorrow in AM - However, final decision to be determined tonight.   4/18: Insulin increased. Dilt. Dose adjusted.   4/19: Discuss transfer with NSGY,   4/21: patient with SIRS criteria, ill appearing, transfer back to [facility], empiric ABX,  cultures  4/22: more alert today, start tube feeds, CXR stable  4/23- GNR in urine culture. WBC remains elevated but stabilized. Afebrile.  4/24: No LP per NSGY,continue abx,  c.diff pending, increased aspart, stepdown to NSGY today        Review of Systems    Review of symptoms  Constitutional: Denies fevers or chills.  Pulmonary: Denies shortness of breath or cough.  Cardiology: Denies chest pain or palpitations.  GI: Denies abdominal pain or constipation.  Neurologic: Denies new weakness,  headache, or paresthesias.  Objective:     Vitals:  Temp: 98.8 °F (37.1 °C)  Pulse: 87  Rhythm: normal sinus rhythm  BP: (!) 126/55  MAP (mmHg): 84  Resp: (!) 24  SpO2: 96 %  O2 Device (Oxygen Therapy): room air    Temp  Min: 98.8 °F (37.1 °C)  Max: 99.6 °F (37.6 °C)  Pulse  Min: 83  Max: 112  BP  Min: 126/55  Max: 185/84  MAP (mmHg)  Min: 84  Max: 121  Resp  Min: 16  Max: 53  SpO2  Min: 93 %  Max: 97 %    04/23 0701 - 04/24 0700  In: 2635 [I.V.:140]  Out: 1260 [Urine:1060]   Unmeasured Output  Urine Occurrence: 0  Stool Occurrence: 1  Emesis Occurrence: 0  Pad Count: 1       Physical Exam    Physical Exam:  GA: Alert, comfortable, no acute distress.   HEENT: No scleral icterus or JVD.   Pulmonary: Clear to auscultation A/P/L. No wheezing, crackles, or rhonchi.  Cardiac: RRR S1 & S2 w/o rubs/murmurs/gallops.   Abdominal: Bowel sounds present x 4. No appreciable hepatosplenomegaly.  Skin: No jaundice, rashes, or visible lesions.  Neuro:  --GCS: E3 V4 M6  --Mental Status:  Awake, alert,oriented to self,  Confused to place and time, follows some simple commands   --CN II-XII grossly intact.   --Pupils 3mm, PERRL.   --Corneal reflex, gag, cough intact.  -- Generalized weakness, moves all extremities     Medications:  Continuous Scheduled  atorvastatin 20 mg Nightly   ceFEPime (MAXIPIME) IVPB 1 g Q8H   citalopram 20 mg Daily   diltiaZEM 30 mg Q6H   gabapentin 300 mg TID   heparin (porcine) 5,000 Units Q8H   insulin aspart U-100 5  Units 6 times per day   insulin detemir U-100 9 Units BID   metoprolol tartrate 12.5 mg Q8H   tamsulosin 0.4 mg Daily   traZODone 50 mg QHS   valsartan 40 mg Daily   vancomycin (VANCOCIN) IVPB 1,000 mg Q12H   PRN  acetaminophen 650 mg Q4H PRN   aluminum-magnesium hydroxide-simethicone 30 mL Q4H PRN   benzonatate 100 mg TID PRN   dextrose 50% 12.5 g PRN   fentaNYL 12.5 mcg Q2H PRN   glucagon (human recombinant) 1 mg PRN   hydrALAZINE 10 mg Q4H PRN   hydrocodone-acetaminophen 5-325mg 1 tablet Q4H PRN   insulin aspart U-100 1-10 Units Q4H PRN   labetalol 10 mg Q6H PRN   lidocaine HCL 2%  PRN   magnesium oxide 800 mg PRN   magnesium oxide 800 mg PRN   ondansetron 8 mg Q6H PRN   potassium chloride 10% 40 mEq PRN   potassium chloride 10% 40 mEq PRN   potassium chloride 10% 60 mEq PRN   potassium, sodium phosphates 2 packet PRN   potassium, sodium phosphates 2 packet PRN   potassium, sodium phosphates 2 packet PRN     Today I personally reviewed pertinent medications, lines/drains/airways, imaging, lab results,     Assessment/Plan:     Cardiac/Vascular   Tachycardia    Cardizem, metoprolol PO        Other hyperlipidemia    - Continue atorvastatin        Essential hypertension    Continue current regimen of PO BP meds          ID   SIRS (systemic inflammatory response syndrome)    Leukocytosis present but no longer increasing, hyperthermia less frequent but present  Tachycardia improved  Broad spectrum abx  Blood cultures NGTD  Urine culture with GNR, follow up speciation and sensitivities  Now day 3 of broad spectrum abx, if clinically no improvement may consider LP for post op meningitis given lumbar drain earlier in admission  No LP per NSGY team, continue abx per NSGY        Oncology   Leukocytosis    Pan Cx  Tmax -afebrile  ProCal 0.21  Urine cx-pseudomonas  Continue Vanc/cefepime  Pending C.Diff          Endocrine   Type 2 diabetes mellitus with hyperglycemia, without long-term current use of insulin    -- Increased  Aspart 5 units q 4 hrs  -- Continue Detemir 9 units BID        Other   Decreased functional mobility    - PT/TO eval and treat when ok per NSGY             Prophylaxis:  Venous Thromboembolism: chemical  Stress Ulcer: NA  Ventilator Pneumonia: not applicable     Full Code    Monika Galvan NP  Neurocritical Care  [facility]

## 2018-04-24 NOTE — PLAN OF CARE
Problem: Patient Care Overview  Goal: Plan of Care Review  Outcome: Ongoing (interventions implemented as appropriate)  Pt remains in ICU 3082. No acute events today. NSR with frequent ectopy throughout the day. On room air. AAOx1 to person, opens eyes spontaneously, weaker in upper and lower extremities. Pupils equal and reactive. Max temp 99.6, tylenol administered. C-collar in place, incision to neck CDI. Condom cath and Flexi in place. Pt on contact to rule out C.Diff, proper PPE worn. Family and friends educated to wear PPE and wash hands when entering and leaving room. Cortrak in place, tube feedings remain at goal. IV abx administered. Pt continues to have a congestive cough, unable to cough up anything. Bath completed. Vitals and assessments per chart. POC reviewed with pt at bedside.     Problem: Diabetes, Type 2 (Adult)  Intervention: Optimize Glycemic Control  Pt's BG monitored q4h. Scheduled and sliding scale insulin administered. DM education completed throughout the day. No episodes of hypoglycemia on shift.

## 2018-04-25 LAB
ALBUMIN SERPL BCP-MCNC: 2.1 G/DL
ALP SERPL-CCNC: 85 U/L
ALT SERPL W/O P-5'-P-CCNC: 14 U/L
ANION GAP SERPL CALC-SCNC: 5 MMOL/L
AST SERPL-CCNC: 14 U/L
BASOPHILS # BLD AUTO: 0.05 K/UL
BASOPHILS NFR BLD: 0.3 %
BILIRUB SERPL-MCNC: 0.4 MG/DL
BUN SERPL-MCNC: 30 MG/DL
CALCIUM SERPL-MCNC: 11.3 MG/DL
CHLORIDE SERPL-SCNC: 111 MMOL/L
CO2 SERPL-SCNC: 31 MMOL/L
CREAT SERPL-MCNC: 0.8 MG/DL
DIFFERENTIAL METHOD: ABNORMAL
EOSINOPHIL # BLD AUTO: 0 K/UL
EOSINOPHIL NFR BLD: 0.1 %
ERYTHROCYTE [DISTWIDTH] IN BLOOD BY AUTOMATED COUNT: 15.6 %
EST. GFR  (AFRICAN AMERICAN): >60 ML/MIN/1.73 M^2
EST. GFR  (NON AFRICAN AMERICAN): >60 ML/MIN/1.73 M^2
GLUCOSE SERPL-MCNC: 306 MG/DL
HCT VFR BLD AUTO: 36.1 %
HGB BLD-MCNC: 10.9 G/DL
IMM GRANULOCYTES # BLD AUTO: 0.35 K/UL
IMM GRANULOCYTES NFR BLD AUTO: 2.1 %
LYMPHOCYTES # BLD AUTO: 2.1 K/UL
LYMPHOCYTES NFR BLD: 12.5 %
MAGNESIUM SERPL-MCNC: 1.9 MG/DL
MCH RBC QN AUTO: 30.7 PG
MCHC RBC AUTO-ENTMCNC: 30.2 G/DL
MCV RBC AUTO: 102 FL
MONOCYTES # BLD AUTO: 1.4 K/UL
MONOCYTES NFR BLD: 8.2 %
NEUTROPHILS # BLD AUTO: 12.6 K/UL
NEUTROPHILS NFR BLD: 76.8 %
NRBC BLD-RTO: 0 /100 WBC
PHOSPHATE SERPL-MCNC: 2.2 MG/DL
PLATELET # BLD AUTO: 284 K/UL
PMV BLD AUTO: 10.2 FL
POCT GLUCOSE: 150 MG/DL (ref 70–110)
POCT GLUCOSE: 250 MG/DL (ref 70–110)
POCT GLUCOSE: 280 MG/DL (ref 70–110)
POCT GLUCOSE: 297 MG/DL (ref 70–110)
POTASSIUM SERPL-SCNC: 4.6 MMOL/L
PROT SERPL-MCNC: 5.9 G/DL
RBC # BLD AUTO: 3.55 M/UL
SODIUM SERPL-SCNC: 147 MMOL/L
WBC # BLD AUTO: 16.4 K/UL

## 2018-04-25 PROCEDURE — 97168 OT RE-EVAL EST PLAN CARE: CPT

## 2018-04-25 PROCEDURE — 85025 COMPLETE CBC W/AUTO DIFF WBC: CPT

## 2018-04-25 PROCEDURE — 27000221 HC OXYGEN, UP TO 24 HOURS

## 2018-04-25 PROCEDURE — 94761 N-INVAS EAR/PLS OXIMETRY MLT: CPT

## 2018-04-25 PROCEDURE — 80053 COMPREHEN METABOLIC PANEL: CPT

## 2018-04-25 PROCEDURE — 25000003 PHARM REV CODE 250: Performed by: PHYSICIAN ASSISTANT

## 2018-04-25 PROCEDURE — 99232 SBSQ HOSP IP/OBS MODERATE 35: CPT | Mod: ,,, | Performed by: NURSE PRACTITIONER

## 2018-04-25 PROCEDURE — 97530 THERAPEUTIC ACTIVITIES: CPT

## 2018-04-25 PROCEDURE — 11000001 HC ACUTE MED/SURG PRIVATE ROOM

## 2018-04-25 PROCEDURE — 63600175 PHARM REV CODE 636 W HCPCS: Performed by: STUDENT IN AN ORGANIZED HEALTH CARE EDUCATION/TRAINING PROGRAM

## 2018-04-25 PROCEDURE — 63600175 PHARM REV CODE 636 W HCPCS: Performed by: NURSE PRACTITIONER

## 2018-04-25 PROCEDURE — 86580 TB INTRADERMAL TEST: CPT | Performed by: NURSE PRACTITIONER

## 2018-04-25 PROCEDURE — 92526 ORAL FUNCTION THERAPY: CPT

## 2018-04-25 PROCEDURE — 63600175 PHARM REV CODE 636 W HCPCS: Performed by: PSYCHIATRY & NEUROLOGY

## 2018-04-25 PROCEDURE — 84100 ASSAY OF PHOSPHORUS: CPT

## 2018-04-25 PROCEDURE — 97164 PT RE-EVAL EST PLAN CARE: CPT

## 2018-04-25 PROCEDURE — 63600175 PHARM REV CODE 636 W HCPCS: Performed by: PHYSICIAN ASSISTANT

## 2018-04-25 PROCEDURE — 25000003 PHARM REV CODE 250: Performed by: NURSE PRACTITIONER

## 2018-04-25 PROCEDURE — 83735 ASSAY OF MAGNESIUM: CPT

## 2018-04-25 RX ORDER — INSULIN ASPART 100 [IU]/ML
6 INJECTION, SOLUTION INTRAVENOUS; SUBCUTANEOUS
Status: DISCONTINUED | OUTPATIENT
Start: 2018-04-25 | End: 2018-04-30

## 2018-04-25 RX ORDER — VANCOMYCIN/0.9 % SOD CHLORIDE 1 G/100 ML
1000 PLASTIC BAG, INJECTION (ML) INTRAVENOUS
Status: COMPLETED | OUTPATIENT
Start: 2018-04-25 | End: 2018-04-27

## 2018-04-25 RX ADMIN — DILTIAZEM HYDROCHLORIDE 30 MG: 30 TABLET, FILM COATED ORAL at 05:04

## 2018-04-25 RX ADMIN — INSULIN ASPART 4 UNITS: 100 INJECTION, SOLUTION INTRAVENOUS; SUBCUTANEOUS at 09:04

## 2018-04-25 RX ADMIN — TAMSULOSIN HYDROCHLORIDE 0.4 MG: 0.4 CAPSULE ORAL at 09:04

## 2018-04-25 RX ADMIN — GABAPENTIN 300 MG: 300 CAPSULE ORAL at 09:04

## 2018-04-25 RX ADMIN — DILTIAZEM HYDROCHLORIDE 30 MG: 30 TABLET, FILM COATED ORAL at 01:04

## 2018-04-25 RX ADMIN — ACETAMINOPHEN 650 MG: 325 TABLET ORAL at 08:04

## 2018-04-25 RX ADMIN — INSULIN ASPART 3 UNITS: 100 INJECTION, SOLUTION INTRAVENOUS; SUBCUTANEOUS at 05:04

## 2018-04-25 RX ADMIN — VALSARTAN 40 MG: 40 TABLET ORAL at 08:04

## 2018-04-25 RX ADMIN — HEPARIN SODIUM 5000 UNITS: 5000 INJECTION, SOLUTION INTRAVENOUS; SUBCUTANEOUS at 09:04

## 2018-04-25 RX ADMIN — INSULIN ASPART 6 UNITS: 100 INJECTION, SOLUTION INTRAVENOUS; SUBCUTANEOUS at 01:04

## 2018-04-25 RX ADMIN — ATORVASTATIN CALCIUM 20 MG: 20 TABLET, FILM COATED ORAL at 09:04

## 2018-04-25 RX ADMIN — Medication 5 UNITS: at 12:04

## 2018-04-25 RX ADMIN — VANCOMYCIN HYDROCHLORIDE 1 G: 10 INJECTION, POWDER, LYOPHILIZED, FOR SOLUTION INTRAVENOUS at 01:04

## 2018-04-25 RX ADMIN — DILTIAZEM HYDROCHLORIDE 30 MG: 30 TABLET, FILM COATED ORAL at 06:04

## 2018-04-25 RX ADMIN — INSULIN DETEMIR 13 UNITS: 100 INJECTION, SOLUTION SUBCUTANEOUS at 09:04

## 2018-04-25 RX ADMIN — INSULIN ASPART 5 UNITS: 100 INJECTION, SOLUTION INTRAVENOUS; SUBCUTANEOUS at 04:04

## 2018-04-25 RX ADMIN — HEPARIN SODIUM 5000 UNITS: 5000 INJECTION, SOLUTION INTRAVENOUS; SUBCUTANEOUS at 06:04

## 2018-04-25 RX ADMIN — Medication 12.5 MG: at 09:04

## 2018-04-25 RX ADMIN — CEFEPIME 1 G: 1 INJECTION, POWDER, FOR SOLUTION INTRAMUSCULAR; INTRAVENOUS at 09:04

## 2018-04-25 RX ADMIN — Medication 12.5 MG: at 05:04

## 2018-04-25 RX ADMIN — HEPARIN SODIUM 5000 UNITS: 5000 INJECTION, SOLUTION INTRAVENOUS; SUBCUTANEOUS at 01:04

## 2018-04-25 RX ADMIN — CITALOPRAM HYDROBROMIDE 20 MG: 20 TABLET ORAL at 09:04

## 2018-04-25 RX ADMIN — VANCOMYCIN HYDROCHLORIDE 1 G: 10 INJECTION, POWDER, LYOPHILIZED, FOR SOLUTION INTRAVENOUS at 02:04

## 2018-04-25 RX ADMIN — GABAPENTIN 300 MG: 300 CAPSULE ORAL at 03:04

## 2018-04-25 RX ADMIN — Medication 12.5 MG: at 01:04

## 2018-04-25 RX ADMIN — CEFEPIME 1 G: 1 INJECTION, POWDER, FOR SOLUTION INTRAMUSCULAR; INTRAVENOUS at 04:04

## 2018-04-25 RX ADMIN — CEFEPIME 1 G: 1 INJECTION, POWDER, FOR SOLUTION INTRAMUSCULAR; INTRAVENOUS at 01:04

## 2018-04-25 RX ADMIN — INSULIN ASPART 6 UNITS: 100 INJECTION, SOLUTION INTRAVENOUS; SUBCUTANEOUS at 09:04

## 2018-04-25 RX ADMIN — INSULIN ASPART 6 UNITS: 100 INJECTION, SOLUTION INTRAVENOUS; SUBCUTANEOUS at 05:04

## 2018-04-25 RX ADMIN — TRAZODONE HYDROCHLORIDE 50 MG: 50 TABLET ORAL at 09:04

## 2018-04-25 NOTE — PLAN OF CARE
Problem: Physical Therapy Goal  Goal: Physical Therapy Goal  Goals to be met by: 2018     Patient will increase functional independence with mobility by performin. Supine to sit with Moderate Assistance  2. Sit to supine with Moderate Assistance  3. Sit to stand transfer with Max Assistance  4. Bed to chair transfer with Max Assistance using Rolling Walker  5. Lower extremity exercise program x30 reps per handout, with independence  6. Pt will perform sitting at EOB x 10 minutes with supervision to improve trunk control       Outcome: Ongoing (interventions implemented as appropriate)  PT re-evaluation completed- see note for details. Goals established and POC initiated.     Alexandria Joe, PT, DPT   2018  Pager: [phone]

## 2018-04-25 NOTE — SUBJECTIVE & OBJECTIVE
Review of Systems    Review of symptoms  Constitutional: Denies fevers or chills.  Pulmonary: Denies shortness of breath or cough.  Cardiology: Denies chest pain or palpitations.  GI: Denies abdominal pain or constipation.  Neurologic: Denies new weakness,  headache, or paresthesias.  Objective:     Vitals:  Temp: 100 °F (37.8 °C)  Pulse: 98  Rhythm: sinus tachycardia  BP: (!) 141/73  MAP (mmHg): 99  Resp: 17  SpO2: (!) 93 %  O2 Device (Oxygen Therapy): room air    Temp  Min: 98.7 °F (37.1 °C)  Max: 101.1 °F (38.4 °C)  Pulse  Min: 68  Max: 105  BP  Min: 96/51  Max: 158/72  MAP (mmHg)  Min: 67  Max: 109  Resp  Min: 10  Max: 26  SpO2  Min: 91 %  Max: 98 %    04/24 0701 - 04/25 0700  In: 2430 [I.V.:115]  Out: 1045 [Urine:725]   Unmeasured Output  Urine Occurrence: 1  Stool Occurrence: 1  Emesis Occurrence: 0  Pad Count: 1       Physical Exam    Physical Exam:  GA: Alert,no acute distress.   HEENT: No scleral icterus or JVD.   Pulmonary: Clear to auscultation A/L. No wheezing, crackles, or rhonchi.  Cardiac: RRR S1 & S2 w/o rubs/murmurs/gallops.   Abdominal: Bowel sounds present x 4. No appreciable hepatosplenomegaly.  Skin: No jaundice, rashes, or visible lesions.  Neuro:  --GCS: E3 V4 M6  --Mental Status: awake, alert oriented to self, follows simple commands  --Pupils 3mm, PERRL.   --Corneal reflex, gag, cough intact.  -- generalized weakness, moves all extremities    Medications:  Continuous Scheduled  atorvastatin 20 mg Nightly   ceFEPime (MAXIPIME) IVPB 1 g Q8H   citalopram 20 mg Daily   diltiaZEM 30 mg Q6H   gabapentin 300 mg TID   heparin (porcine) 5,000 Units Q8H   insulin aspart U-100 6 Units 6 times per day   insulin detemir U-100 13 Units BID   metoprolol tartrate 12.5 mg Q8H   tamsulosin 0.4 mg Daily   traZODone 50 mg QHS   valsartan 40 mg Daily   vancomycin (VANCOCIN) IVPB 1,000 mg Q12H   PRN  acetaminophen 650 mg Q4H PRN   aluminum-magnesium hydroxide-simethicone 30 mL Q4H PRN   benzonatate 100 mg TID  PRN   dextrose 50% 12.5 g PRN   fentaNYL 12.5 mcg Q2H PRN   glucagon (human recombinant) 1 mg PRN   hydrocodone-acetaminophen 5-325mg 1 tablet Q4H PRN   insulin aspart U-100 1-10 Units Q4H PRN   labetalol 10 mg Q6H PRN   lidocaine HCL 2%  PRN   magnesium oxide 800 mg PRN   magnesium oxide 800 mg PRN   ondansetron 8 mg Q6H PRN   potassium chloride 10% 40 mEq PRN   potassium chloride 10% 40 mEq PRN   potassium chloride 10% 60 mEq PRN   potassium, sodium phosphates 2 packet PRN   potassium, sodium phosphates 2 packet PRN   potassium, sodium phosphates 2 packet PRN     Today I personally reviewed pertinent medications, lines/drains/airways, imaging, lab results,

## 2018-04-25 NOTE — PLAN OF CARE
ICU Attending Note  Neurocritical Care    E4V4M6    -O2 by NC  -cefepime, vancomycin  -detemir, aspart, ISS  -coordinate transfer to floor with Neurosurgery

## 2018-04-25 NOTE — ASSESSMENT & PLAN NOTE
Leukocytosis present but no longer increasing, hyperthermia less frequent but present  Tachycardia improved  Broad spectrum abx  Blood cultures NGTD  Urine culture with GNR, follow up speciation and sensitivities  Now day 3 of broad spectrum abx, if clinically no improvement may consider LP for post op meningitis given lumbar drain earlier in admission  No LP per NSGY team, continue abx per NSGY  Leukocytosis improved today

## 2018-04-25 NOTE — PLAN OF CARE
No LP, c diff pending, ABX. Stepdown to Neurosurgery.       04/25/18 0731   Discharge Reassessment   Assessment Type Discharge Planning Reassessment   Provided patient/caregiver education on the expected discharge date and the discharge plan Yes   Do you have any problems affording any of your prescribed medications? No   Discharge Plan A Skilled Nursing Facility   Discharge Plan B Home with family   Patient choice form signed by patient/caregiver Yes   Can the patient answer the patient profile reliably? Yes, cognitively intact  (follows commands)   How does the patient rate their overall health at the present time? (stefan)   Describe the patient's ability to walk at the present time. Major restrictions/daily assistance from another person   How often would a person be available to care for the patient? Often   Number of comorbid conditions (as recorded on the chart) Four   During the past month, has the patient often been bothered by feeling down, depressed or hopeless? No   During the past month, has the patient often been bothered by little interest or pleasure in doing things? No     Arelis Moody RN/BSN/MALIKA  [phone]  [facility]

## 2018-04-25 NOTE — PLAN OF CARE
Problem: Patient Care Overview  Goal: Plan of Care Review  Outcome: Ongoing (interventions implemented as appropriate)  VSS within the shift. Patient is noted drowsy, and confused but follows commands. On sinus rhythm. No hypotension noted. Tolerates room air with no desaturations. Productive cough noted with light yellow thick secretions being suctioned. Tube feeding provided at goal rate of 55cc/hr. Blood sugar beijng monitored due insulin and sliding scale administered. Patient voids freely but incontinent. For swallow screen this morning. For transfer out from critical care unit. POC discussed with family. Will continue to monitor.
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
## 2018-04-25 NOTE — PT/OT/SLP PROGRESS
"Speech Language Pathology Treatment    Patient Name:  Hollis Pitts   MRN:  [id number]  Admitting Diagnosis: Cervical myelopathy with cervical radiculopathy    Recommendations:                 General Recommendations:  Dysphagia therapy  Diet recommendations:  NPO, Liquid Diet Level: NPO   Aspiration Precautions: Continue alternate means of nutrition, Frequent oral care and Strict aspiration precautions   General Precautions: Standard, aspiration  Communication strategies:  provide increased time to answer and go to room if call light pushed    Subjective     SLP reviewed Pt with nurse, nurse reports Pt more alert and oral care recently completed   Pt presents fatigued  He explains, "Hollis"  He denies pain  Patient goals:"I want something to drink"    Pain/Comfort:  · Pain Rating 1: 0/10  · Pain Rating Post-Intervention 1: 0/10    Objective:     Has the patient been evaluated by SLP for swallowing?   Yes  Keep patient NPO? Yes   Current Respiratory Status: nasal cannula      Pt seen for ongoing swallow assessment. Pt presents awake in bed, w/maribeht tube and c-collar.  HOB elevated. Patient with mildly hoarse vocal quality and moderately reduced cough upon command.  He follows commands provided minimal verbal clarification. Patient with dry oral cavity and reduced lingual/labial coordination/ROM upon review of the oral mechanism. Pt seen with trial of ice chip sliver x1/ Pt with delayed coughing/choking and contents suctioned from oral cavity via [place]. Additional trials held 2/2 high aspiration risk. Daughter at bedside asking about SLP POC. SLP educates Pt and daughter on SLP role, aspiration precautions and ongoing SLP POC. Patent would benefit from ongoing review. Daughter v/u. No further questions. Whiteboard current.     Assessment:     Hollis Pitts is a 82 y.o. male with an SLP diagnosis of Dysphagia.  He presents with increased alertness today.     Goals:    SLP Goals        Problem: SLP Goal    Goal " Priority Disciplines Outcome   SLP Goal     SLP Ongoing (interventions implemented as appropriate)   Description:  Speech Language Pathology Goals  Goals expected to be met by 4/29:    1. Pt will participate in ongoing assessment of swallow.                            Plan:     · Patient to be seen:  4 x/week   · Plan of Care expires:  05/22/18  · Plan of Care reviewed with:  patient, daughter   · SLP Follow-Up:  Yes       Discharge recommendations:  nursing facility, skilled   Barriers to Discharge:  Level of Skilled Assistance Needed     Time Tracking:     SLP Treatment Date:   04/25/18  Speech Start Time:  1415  Speech Stop Time:  1430     Speech Total Time (min):  15 min    Billable Minutes: Treatment Swallowing Dysfunction 15    VALENTINO Shelby., [facility]-SLP  Speech-Language Pathology  Pager: [phone]        04/25/2018

## 2018-04-25 NOTE — PT/OT/SLP RE-EVAL
Physical Therapy Re-evaluation    Patient Name:  Hollis Pitts   MRN:  [id number]    Recommendations:     Discharge Recommendations:  nursing facility, skilled   Discharge Equipment Recommendations:  (TBD at next level of care)   Barriers to discharge: increased assistance required for mobility and ADLs    Assessment:     Hollis Pitts is a 82 y.o. male admitted with a medical diagnosis of Cervical myelopathy with cervical radiculopathy, POD 15 ACDF, complicated by CSF leak. He presents with the following impairments/functional limitations:  weakness, impaired endurance, impaired functional mobilty, impaired self care skills, impaired balance, impaired cognition, decreased lower extremity function, decreased upper extremity function, pain, decreased safety awareness, impaired cardiopulmonary response to activity, orthopedic precautions. Pt displays significant deconditioning and weakness of all extremitieis, impacting functional mobility and participation in ADLs. Pt would benefit from skilled PT intervention to address below listed deficits, improve quality of life, reduce caregiver burden and maximize (I) and safety with functional mobility.     Rehab Prognosis: good; patient would benefit from acute skilled PT services to address these deficits and reach maximum level of function.      Recent Surgery: Procedure(s) (LRB):  DISKECTOMY AND FUSION-ANTERIOR CERVICAL (ACDF); C5-6. C6-7 (N/A) 15 Days Post-Op    Plan:     During this hospitalization, patient to be seen 3 x/week to address the above listed problems via therapeutic activities, therapeutic exercises, neuromuscular re-education, wheelchair management/training  · Plan of Care Expires:  05/24/18   Plan of Care Reviewed with: patient, caregiver    Subjective     Communicated with RN prior to session.  Patient found HOB elevated upon PT entry to room, agreeable to evaluation.  Pt's speech is diffiuclt to understand; pt oriented to person and place.     Chief  Complaint: pain   Pain/Comfort:  · Pain Rating 1:  (Pt did not rate pain on numeric scale; reported increased pain with mobility )  · Pain Addressed 1: Reposition, Distraction    Patients cultural, spiritual, Roman Catholic conflicts given the current situation: no known conflicts    History Review: Prior to admit, pt was using wheelchair for mobility- able to self propel with hands and feet per sitter report. Assistance required for wheelchair transfers, and ADLs.      Objective:     Patient found with: telemetry, peripheral IV, cervical collar, oxygen, NG tube, blood pressure cuff, Condom Catheter, bowel management system     General Precautions: Standard, aspiration, fall, NPO   Orthopedic Precautions:N/A   Braces: Cervical collar     Exams:  · Cognitive Exam:  Patient is oriented to Person and Place; able to follow simple commands inconsistently   · Postural Exam:  Patient presented with the following abnormalities:    · -       Rounded shoulders  · -       Forward head  · RLE ROM: AROM WFL except hip flexion grossly decreased; PROM WFL   · RLE Strength: grossly decreased based on observation of functional mobility and AROM; pt with difficulty following commands for MMT   · LLE ROM: AROM WFL except hip flexion grossly decreased; PROM WFL   · LLE Strength: grossly decreased based on observation of functional mobility and AROM; pt with difficulty following commands for MMT     Functional Mobility:       Bed Mobility  · Rolling to right: maximum assistance   · Supine to sit: maximum assistance     · Sit to supine: total assistance       Transfers N/T 2/2 pt with impaired trunk control, lethargy, BLE weakness      Gait    Unable to perform          Therapeutic Activities and Exercises:  Pt and sitter educated on:  - role of PT and POC/goals for therapy   -  safety with mobility    Pt tolerated sitting EOB 10 minutes with assistance ranging from CGA to moderate A (only brief periods of CGA). Fluctuating trunk control,  with forward lean and multi-directional instability. Cueing provided to improve weight bearing through BLEs, improve postural awareness and increase midline orientation.     Pt will require reinforcement of education 2/2 lethargy.     Patient left HOB elevated with all lines intact, call button in reach, RN notified and sitter present.    AM-PAC 6 CLICK MOBILITY  Total Score:8       GOALS:    Physical Therapy Goals        Problem: Physical Therapy Goal    Goal Priority Disciplines Outcome Goal Variances Interventions   Physical Therapy Goal     PT/OT, PT Ongoing (interventions implemented as appropriate)     Description:  Goals to be met by: 2018     Patient will increase functional independence with mobility by performin. Supine to sit with Moderate Assistance  2. Sit to supine with Moderate Assistance  3. Sit to stand transfer with Max Assistance  4. Bed to chair transfer with Max Assistance using Rolling Walker  5. Lower extremity exercise program x30 reps per handout, with independence  6. Pt will perform sitting at EOB x 10 minutes with supervision to improve trunk control                         History:     Past Medical History:   Diagnosis Date    Benign prostatic hyperplasia with urinary retention 2018    Cervical myelopathy with cervical radiculopathy 4/10/2018    Depression     Dyslipidemia     Essential hypertension 2018    Glaucoma (increased eye pressure)     Mild cognitive impairment     Traumatic brain injury 2017    Type 2 diabetes mellitus with hyperglycemia, without long-term current use of insulin 2018       Past Surgical History:   Procedure Laterality Date    CATARACT EXTRACTION W/  INTRAOCULAR LENS IMPLANT         Time Tracking:     PT Received On: 18  PT Start Time: 823     PT Stop Time: 849  PT Total Time (min): 26 min (Co-tx with OT)    Billable Minutes: Re-eval 10 min and Therapeutic Activity 8 min    Alexandria Joe PT, DPT   2018  Pager:  [phone]

## 2018-04-25 NOTE — PLAN OF CARE
Problem: SLP Goal  Goal: SLP Goal  Speech Language Pathology Goals  Goals expected to be met by 4/29:    1. Pt will participate in ongoing assessment of swallow.           Outcome: Ongoing (interventions implemented as appropriate)  Pt with overt coughing/choking on trial of ice chip with ST today. REC: continue NPO with temporary, alternative means nutrition/hydration/medication.  Strict, frequent oral care advised. ST to continue to follow. Thank you.    VALENTINO Shelby., [facility]-SLP  Speech-Language Pathology  Pager: [phone]  4/25/2018

## 2018-04-25 NOTE — PLAN OF CARE
Problem: Occupational Therapy Goal  Goal: Occupational Therapy Goal  Goals to be met by: 5/9  Patient will increase functional independence with ADLs by performing:    Squat pivot t/f to wheelchair with total(A).  Supine<>sit ; sit<>supine with Moderate Assistance.  Grooming while seated EOB with minimal assistance to wash face.  Pt will tolerate EOB activity ~15 min duration with min(A) for postural control.   Family/caregiver demo understanding for ROM and positioning for B UE.       Outcome: Ongoing (interventions implemented as appropriate)  OT eval completed. Will follow up 2x/w at this time. Rec longterm SNF at this time with 24 hour care. MADDIE Quintanilla 4/25/2018

## 2018-04-25 NOTE — PT/OT/SLP RE-EVAL
Occupational Therapy   Re-evaluation    Name: Hollis Pitts  MRN: [id number]  Admitting Diagnosis:  Cervical myelopathy with cervical radiculopathy 15 Days Post-Op    Recommendations:     Discharge Recommendations: nursing facility, skilled (long term)  Discharge Equipment Recommendations:  lift device, hospital bed  Barriers to discharge:  None    History:     Past Medical History:   Diagnosis Date    Benign prostatic hyperplasia with urinary retention 4/11/2018    Cervical myelopathy with cervical radiculopathy 4/10/2018    Depression     Dyslipidemia     Essential hypertension 4/11/2018    Glaucoma (increased eye pressure)     Mild cognitive impairment     Traumatic brain injury 07/2017    Type 2 diabetes mellitus with hyperglycemia, without long-term current use of insulin 4/11/2018       Past Surgical History:   Procedure Laterality Date    CATARACT EXTRACTION W/  INTRAOCULAR LENS IMPLANT         Subjective     Chief Complaint: discomfort  Patient/Family stated goals: none reported   Communicated with: RN prior to session. Completed with PT. Pt agreeable to therapy session.   Pain/Comfort:  · Pain Rating 1:  (c/o discomfort in cervical spine throughout)  · Pain Addressed 1: Reposition, Pre-medicate for activity, Nurse notified  · Pain Rating Post-Intervention 1:  (remained)     History Review: Pt was wheelchair dependent- able to self propel with hand and feet per sitter report. CG assists with transfer to w/c. PTA, friend assists with dressing UB/LB, some setup for feeding using LUE, wears diapers and friend assist with seated cleaning/sponge bath as able- reports weakness has been ongoing x 1years.   Equipment Owned:  walker, standard, wheelchair    Objective:     Patient found with: peripheral IV, telemetry, pulse ox (continuous), SCD, Condom Catheter, NG tube, pressure relief boots, oxygen    General Precautions: Standard, aspiration, fall, NPO, contact   Orthopedic Precautions:spinal precautions    Braces: Aspen collar     Occupational Performance:    Bed Mobility:    · Patient completed Rolling/Turning to Right with maximal assistance  · Patient completed Supine to Sit with maximal assistance  · Patient completed Sit to Supine with maximal assistance and 2 persons    Functional Mobility/Transfers:  · Not appropriate 2/2 impaired sitting balance and fatigue    Activities of Daily Living:  · Grooming: maximal assistance for oral care/application of dentures/removal of dentures. Assist in part 2/2 limited ROM in B shoulders  · UB Dressing: maximal assistance while seated  · LB Dressing: dependence donning B socks whie seated EOB    Cognitive/Visual Perceptual:  Cognitive/Psychosocial Skills:     -       Oriented to: Person, Place and Situation   -       Follows Commands/attention:inconsistent  -       Communication: dysarthria and dysphagia//garbled speech  -       Memory: Impaired  -       Safety awareness/insight to disability: impaired   -       Mood/Affect/Coping skills/emotional control: Cooperative  Visual/Perceptual:      -Impaired      Physical Exam:  Balance:    -       CGA-mod(A) at EOB  Postural examination/scapula alignment:    -       Rounded shoulders  -       Forward head  -       Posterior pelvic tilt  Skin integrity: Dry  Edema:  None noted  Sensation:    -       Impaired  B  UE  Motor Planning:    -       Unable to assess 2/2 lethargic nature  Dominant hand:    -       R  Upper Extremity Range of Motion:     -       Right Upper Extremity: Deficits: ~15 degrees AROM and up to 90 for PROM  -       Left Upper Extremity: Deficits: ~10 degrees AROM  Upper Extremity Strength:   *unable to perform MMT 2/2 cognition   Fine Motor Coordination:    -       Impaired  bilaterally    Patient left HOB elevated with all lines intact, call button in reach, bed alarm on, RN notified and sitter present    [facility] 6 Click:  AMPA Total Score: 7     Body mass index is 29.75 kg/m².    Treatment & Education:  -Pt  alert with eyes open ~75% of session; edu on OT role in care  -EOB ~10 min with CGA-mod(A); varied 2/2 lethargic nature and inconsistent attention/arousal  -Encouraged B UE weight bearing and upright gaze; Apsen adjusted while seated   -communication board updated; questions/concerns addressed with OT scope of practice  Education:    Assessment:     Hollis Pitts is a 82 y.o. male with a medical diagnosis of Cervical myelopathy with cervical radiculopathy.  He presents 15 days post op C5-6, C6-7 ACDF. He demo overall debility with a decline in functional indep across various life routines and roles. He demo limitations in B shoulder ROM (chronic) and B UE/LE weakness. He requires total care at this time with ADLS/self care. He would best benefit from 24 hour care upon d/c.  Performance deficits affecting function are weakness, impaired endurance, impaired sensation, impaired self care skills, impaired functional mobilty, gait instability, impaired balance, decreased upper extremity function, decreased lower extremity function, decreased coordination, decreased safety awareness, decreased ROM, impaired fine motor, impaired coordination, impaired skin, edema, impaired cardiopulmonary response to activity.      Rehab Prognosis:  Limited; patient would benefit from acute skilled OT services to address these deficits and reach maximum level of function.         Plan:     Patient to be seen 2 x/week to address the above listed problems via self-care/home management, therapeutic activities, neuromuscular re-education, wheelchair management/training  · Plan of Care Expires: 05/24/18  · Plan of Care Reviewed with: patient, other (see comments) (personal sitter)    This Plan of care has been discussed with the patient who was involved in its development and understands and is in agreement with the identified goals and treatment plan    GOALS:    Occupational Therapy Goals        Problem: Occupational Therapy Goal    Goal  Priority Disciplines Outcome Interventions   Occupational Therapy Goal     OT, PT/OT Ongoing (interventions implemented as appropriate)    Description:  Goals to be met by: 5/9  Patient will increase functional independence with ADLs by performing:    Squat pivot t/f to wheelchair with total(A).  Supine<>sit ; sit<>supine with Moderate Assistance.  Grooming while seated EOB with minimal assistance to wash face.  Pt will tolerate EOB activity ~15 min duration with min(A) for postural control.   Family/caregiver demo understanding for ROM and positioning for B UE.                         Time Tracking:     OT Date of Treatment: 04/25/18  OT Start Time: 0823  OT Stop Time: 0850  OT Total Time (min): 27 min    Billable Minutes:Re-eval 17  Therapeutic Activity 10    MADDIE Quintanilla  4/25/2018

## 2018-04-26 LAB
ALBUMIN SERPL BCP-MCNC: 1.9 G/DL
ALP SERPL-CCNC: 92 U/L
ALT SERPL W/O P-5'-P-CCNC: 10 U/L
ANION GAP SERPL CALC-SCNC: 7 MMOL/L
AST SERPL-CCNC: 13 U/L
BACTERIA BLD CULT: NORMAL
BACTERIA BLD CULT: NORMAL
BASOPHILS # BLD AUTO: 0.04 K/UL
BASOPHILS NFR BLD: 0.2 %
BILIRUB SERPL-MCNC: 0.4 MG/DL
BUN SERPL-MCNC: 26 MG/DL
CALCIUM SERPL-MCNC: 11.3 MG/DL
CHLORIDE SERPL-SCNC: 110 MMOL/L
CO2 SERPL-SCNC: 30 MMOL/L
CREAT SERPL-MCNC: 0.7 MG/DL
DIFFERENTIAL METHOD: ABNORMAL
EOSINOPHIL # BLD AUTO: 0 K/UL
EOSINOPHIL NFR BLD: 0.2 %
ERYTHROCYTE [DISTWIDTH] IN BLOOD BY AUTOMATED COUNT: 15.6 %
EST. GFR  (AFRICAN AMERICAN): >60 ML/MIN/1.73 M^2
EST. GFR  (NON AFRICAN AMERICAN): >60 ML/MIN/1.73 M^2
GLUCOSE SERPL-MCNC: 161 MG/DL
HCT VFR BLD AUTO: 32.8 %
HGB BLD-MCNC: 10 G/DL
IMM GRANULOCYTES # BLD AUTO: 0.4 K/UL
IMM GRANULOCYTES NFR BLD AUTO: 1.7 %
LYMPHOCYTES # BLD AUTO: 2.5 K/UL
LYMPHOCYTES NFR BLD: 10.8 %
MAGNESIUM SERPL-MCNC: 2.1 MG/DL
MCH RBC QN AUTO: 31.2 PG
MCHC RBC AUTO-ENTMCNC: 30.5 G/DL
MCV RBC AUTO: 102 FL
MONOCYTES # BLD AUTO: 1.6 K/UL
MONOCYTES NFR BLD: 7 %
NEUTROPHILS # BLD AUTO: 18.4 K/UL
NEUTROPHILS NFR BLD: 80.1 %
NRBC BLD-RTO: 0 /100 WBC
PHOSPHATE SERPL-MCNC: 1.9 MG/DL
PLATELET # BLD AUTO: 297 K/UL
PMV BLD AUTO: 10.8 FL
POCT GLUCOSE: 149 MG/DL (ref 70–110)
POCT GLUCOSE: 150 MG/DL (ref 70–110)
POCT GLUCOSE: 160 MG/DL (ref 70–110)
POCT GLUCOSE: 162 MG/DL (ref 70–110)
POCT GLUCOSE: 180 MG/DL (ref 70–110)
POCT GLUCOSE: 182 MG/DL (ref 70–110)
POCT GLUCOSE: 229 MG/DL (ref 70–110)
POTASSIUM SERPL-SCNC: 4.1 MMOL/L
PROT SERPL-MCNC: 5.5 G/DL
RBC # BLD AUTO: 3.21 M/UL
SODIUM SERPL-SCNC: 147 MMOL/L
WBC # BLD AUTO: 23.01 K/UL

## 2018-04-26 PROCEDURE — 63600175 PHARM REV CODE 636 W HCPCS: Performed by: NURSE PRACTITIONER

## 2018-04-26 PROCEDURE — 25000003 PHARM REV CODE 250: Performed by: NURSE PRACTITIONER

## 2018-04-26 PROCEDURE — 25000003 PHARM REV CODE 250: Performed by: PHYSICIAN ASSISTANT

## 2018-04-26 PROCEDURE — 11000001 HC ACUTE MED/SURG PRIVATE ROOM

## 2018-04-26 PROCEDURE — 27000221 HC OXYGEN, UP TO 24 HOURS

## 2018-04-26 PROCEDURE — 94667 MNPJ CHEST WALL 1ST: CPT

## 2018-04-26 PROCEDURE — 63600175 PHARM REV CODE 636 W HCPCS: Performed by: STUDENT IN AN ORGANIZED HEALTH CARE EDUCATION/TRAINING PROGRAM

## 2018-04-26 PROCEDURE — 99900035 HC TECH TIME PER 15 MIN (STAT)

## 2018-04-26 PROCEDURE — 84100 ASSAY OF PHOSPHORUS: CPT

## 2018-04-26 PROCEDURE — 94761 N-INVAS EAR/PLS OXIMETRY MLT: CPT

## 2018-04-26 PROCEDURE — 83735 ASSAY OF MAGNESIUM: CPT

## 2018-04-26 PROCEDURE — 94664 DEMO&/EVAL PT USE INHALER: CPT

## 2018-04-26 PROCEDURE — 97803 MED NUTRITION INDIV SUBSEQ: CPT

## 2018-04-26 PROCEDURE — 99024 POSTOP FOLLOW-UP VISIT: CPT | Mod: POP,,, | Performed by: PHYSICIAN ASSISTANT

## 2018-04-26 PROCEDURE — 36415 COLL VENOUS BLD VENIPUNCTURE: CPT

## 2018-04-26 PROCEDURE — 85025 COMPLETE CBC W/AUTO DIFF WBC: CPT

## 2018-04-26 PROCEDURE — 63600175 PHARM REV CODE 636 W HCPCS: Performed by: PSYCHIATRY & NEUROLOGY

## 2018-04-26 PROCEDURE — 27000646 HC AEROBIKA DEVICE

## 2018-04-26 PROCEDURE — 80053 COMPREHEN METABOLIC PANEL: CPT

## 2018-04-26 RX ADMIN — ACETAMINOPHEN 650 MG: 325 TABLET ORAL at 03:04

## 2018-04-26 RX ADMIN — INSULIN DETEMIR 13 UNITS: 100 INJECTION, SOLUTION SUBCUTANEOUS at 09:04

## 2018-04-26 RX ADMIN — DILTIAZEM HYDROCHLORIDE 30 MG: 30 TABLET, FILM COATED ORAL at 05:04

## 2018-04-26 RX ADMIN — VANCOMYCIN HYDROCHLORIDE 1 G: 10 INJECTION, POWDER, LYOPHILIZED, FOR SOLUTION INTRAVENOUS at 01:04

## 2018-04-26 RX ADMIN — Medication 12.5 MG: at 08:04

## 2018-04-26 RX ADMIN — CEFEPIME 1 G: 1 INJECTION, POWDER, FOR SOLUTION INTRAMUSCULAR; INTRAVENOUS at 08:04

## 2018-04-26 RX ADMIN — ATORVASTATIN CALCIUM 20 MG: 20 TABLET, FILM COATED ORAL at 08:04

## 2018-04-26 RX ADMIN — DILTIAZEM HYDROCHLORIDE 30 MG: 30 TABLET, FILM COATED ORAL at 01:04

## 2018-04-26 RX ADMIN — CEFEPIME 1 G: 1 INJECTION, POWDER, FOR SOLUTION INTRAMUSCULAR; INTRAVENOUS at 12:04

## 2018-04-26 RX ADMIN — GABAPENTIN 300 MG: 300 CAPSULE ORAL at 03:04

## 2018-04-26 RX ADMIN — VALSARTAN 40 MG: 40 TABLET ORAL at 12:04

## 2018-04-26 RX ADMIN — VANCOMYCIN HYDROCHLORIDE 1 G: 10 INJECTION, POWDER, LYOPHILIZED, FOR SOLUTION INTRAVENOUS at 12:04

## 2018-04-26 RX ADMIN — HEPARIN SODIUM 5000 UNITS: 5000 INJECTION, SOLUTION INTRAVENOUS; SUBCUTANEOUS at 03:04

## 2018-04-26 RX ADMIN — Medication 12.5 MG: at 03:04

## 2018-04-26 RX ADMIN — LABETALOL HYDROCHLORIDE 10 MG: 5 INJECTION, SOLUTION INTRAVENOUS at 09:04

## 2018-04-26 RX ADMIN — CEFEPIME 1 G: 1 INJECTION, POWDER, FOR SOLUTION INTRAMUSCULAR; INTRAVENOUS at 05:04

## 2018-04-26 RX ADMIN — INSULIN ASPART 6 UNITS: 100 INJECTION, SOLUTION INTRAVENOUS; SUBCUTANEOUS at 05:04

## 2018-04-26 RX ADMIN — GABAPENTIN 300 MG: 300 CAPSULE ORAL at 09:04

## 2018-04-26 RX ADMIN — CITALOPRAM HYDROBROMIDE 20 MG: 20 TABLET ORAL at 09:04

## 2018-04-26 RX ADMIN — Medication 12.5 MG: at 05:04

## 2018-04-26 RX ADMIN — HEPARIN SODIUM 5000 UNITS: 5000 INJECTION, SOLUTION INTRAVENOUS; SUBCUTANEOUS at 05:04

## 2018-04-26 RX ADMIN — DILTIAZEM HYDROCHLORIDE 30 MG: 30 TABLET, FILM COATED ORAL at 12:04

## 2018-04-26 RX ADMIN — INSULIN ASPART 2 UNITS: 100 INJECTION, SOLUTION INTRAVENOUS; SUBCUTANEOUS at 12:04

## 2018-04-26 RX ADMIN — GABAPENTIN 300 MG: 300 CAPSULE ORAL at 08:04

## 2018-04-26 RX ADMIN — INSULIN ASPART 6 UNITS: 100 INJECTION, SOLUTION INTRAVENOUS; SUBCUTANEOUS at 09:04

## 2018-04-26 RX ADMIN — HYDROCODONE BITARTRATE AND ACETAMINOPHEN 1 TABLET: 5; 325 TABLET ORAL at 01:04

## 2018-04-26 RX ADMIN — INSULIN DETEMIR 13 UNITS: 100 INJECTION, SOLUTION SUBCUTANEOUS at 08:04

## 2018-04-26 RX ADMIN — INSULIN ASPART 6 UNITS: 100 INJECTION, SOLUTION INTRAVENOUS; SUBCUTANEOUS at 12:04

## 2018-04-26 RX ADMIN — INSULIN ASPART 6 UNITS: 100 INJECTION, SOLUTION INTRAVENOUS; SUBCUTANEOUS at 01:04

## 2018-04-26 RX ADMIN — INSULIN ASPART 6 UNITS: 100 INJECTION, SOLUTION INTRAVENOUS; SUBCUTANEOUS at 08:04

## 2018-04-26 RX ADMIN — TAMSULOSIN HYDROCHLORIDE 0.4 MG: 0.4 CAPSULE ORAL at 09:04

## 2018-04-26 RX ADMIN — HEPARIN SODIUM 5000 UNITS: 5000 INJECTION, SOLUTION INTRAVENOUS; SUBCUTANEOUS at 08:04

## 2018-04-26 RX ADMIN — TRAZODONE HYDROCHLORIDE 50 MG: 50 TABLET ORAL at 08:04

## 2018-04-26 NOTE — PLAN OF CARE
Problem: Patient Care Overview  Goal: Plan of Care Review  Outcome: Ongoing (interventions implemented as appropriate)  IV antibiotics cont. Speech recommending remain NPO, frequent mouth care and suctioning for secretions. Tylenol given for low grade fever 99.5. Lobatelol for SBP greater then 160. Rectal tube removed. Cont pulse ox initiated. Neuro checks Q4. Responding to voice. DC plan for SNF.

## 2018-04-26 NOTE — PT/OT/SLP PROGRESS
Speech Language Pathology Treatment    Patient Name:  Hollis Pitts   MRN:  [id number]  Admitting Diagnosis: Cervical myelopathy with cervical radiculopathy    Recommendations:                 General Recommendations:  Dysphagia therapy  Diet recommendations:  NPO, Liquid Diet Level: NPO   Aspiration Precautions: Continue alternate means of nutrition and Standard aspiration precautions   General Precautions: Standard, aspiration    Subjective     Awake, redirection required    Pain/Comfort:  · Pain Rating 1: 0/10  · Pain Rating Post-Intervention 1: 0/10    Objective:     Has the patient been evaluated by SLP for swallowing?   Yes  Keep patient NPO? Yes   Current Respiratory Status: nasal cannula      Pt repositioned upright in bed for PO trials. Pt tolerated ice chip x1 and puree via 1/4 tsp for ongoing swallow assessment. Pt with multiple swallows, decreased hyolaryngeal excursion and wet vocal quality noted across both consistencies. Delayed coughing/throat clearing noted post both trials with continuous wet vocal quality. SLP provided oral suction to assist in clearing voice; however unable to fully clear. Oral care provided by SLP at end of session. Recommend NPO at this time with strict aspiration precautions. Pt remained in upright position post session. Rec discussed with SHARIF and LUIS ALBERTO Amaral with neurosurgery.     Assessment:     Hollis Pitts is a 82 y.o. male with an SLP diagnosis of Dysphagia.    Goals:    SLP Goals        Problem: SLP Goal    Goal Priority Disciplines Outcome   SLP Goal     SLP Ongoing (interventions implemented as appropriate)   Description:  Speech Language Pathology Goals  Goals expected to be met by 4/29:    1. Pt will participate in ongoing assessment of swallow.                            Plan:     · Patient to be seen:  4 x/week   · Plan of Care expires:  05/22/18  · Plan of Care reviewed with:  patient   · SLP Follow-Up:  Yes       Discharge recommendations:  nursing facility,  skilled       Time Tracking:     SLP Treatment Date:   04/26/18  Speech Start Time:  1021  Speech Stop Time:  1040     Speech Total Time (min):  19 min    Billable Minutes: Treatment Swallowing Dysfunction 19    Humera Wadsworth CCC-SLP  04/26/2018

## 2018-04-26 NOTE — SUBJECTIVE & OBJECTIVE
Interval History: Patient stepped down to the floor yesterday. He is lethargic on exam but follows commands. Speech still recommending NPO.     Medications:  Continuous Infusions:  Scheduled Meds:   atorvastatin  20 mg Per NG tube Nightly    ceFEPime (MAXIPIME) IVPB  1 g Intravenous Q8H    citalopram  20 mg Per NG tube Daily    diltiaZEM  30 mg Per NG tube Q6H    gabapentin  300 mg Per NG tube TID    heparin (porcine)  5,000 Units Subcutaneous Q8H    insulin aspart U-100  6 Units Subcutaneous 6 times per day    insulin detemir U-100  13 Units Subcutaneous BID    metoprolol tartrate  12.5 mg Per NG tube Q8H    tamsulosin  0.4 mg Per NG tube Daily    traZODone  50 mg Per NG tube QHS    valsartan  40 mg Per NG tube Daily    vancomycin (VANCOCIN) IVPB  1,000 mg Intravenous Q12H     PRN Meds:acetaminophen, aluminum-magnesium hydroxide-simethicone, benzonatate, dextrose 50%, fentaNYL, glucagon (human recombinant), hydrocodone-acetaminophen 5-325mg, insulin aspart U-100, labetalol, lidocaine HCL 2%, ondansetron     Review of Systems  Objective:     Weight: 99.5 kg (219 lb 5.7 oz)  Body mass index is 29.75 kg/m².  Vital Signs (Most Recent):  Temp: 99.4 °F (37.4 °C) (04/26/18 1158)  Pulse: 90 (04/26/18 1214)  Resp: 17 (04/26/18 1158)  BP: (!) 159/72 (04/26/18 1158)  SpO2: 96 % (04/26/18 1214) Vital Signs (24h Range):  Temp:  [97.7 °F (36.5 °C)-99.4 °F (37.4 °C)] 99.4 °F (37.4 °C)  Pulse:  [] 90  Resp:  [16-21] 17  SpO2:  [92 %-99 %] 96 %  BP: (121-177)/(57-86) 159/72       Date 04/26/18 0700 - 04/27/18 0659   Shift 8274-4050 6283-1359 6935-8205 24 Hour Total   I  N  T  A  K  E   P.O. 0   0    Shift Total  (mL/kg) 0  (0)   0  (0)   O  U  T  P  U  T   Shift Total  (mL/kg)       Weight (kg) 99.5 99.5 99.5 99.5                        NG/OG Tube 04/21/18 2300 Cortrak Right nostril (Active)   Placement Check placement verified by distal tube length measurement;placement verified by aspirate characteristics  4/25/2018  7:46 PM   Distal Tube Length (cm) 75 4/25/2018  7:05 AM   Tolerance no signs/symptoms of discomfort 4/25/2018  7:46 PM   Securement anchored to nostril center w/ adhesive device 4/25/2018  7:46 PM   Clamp Status/Tolerance unclamped;no abdominal discomfort;no abdominal distention;no emesis;no nausea;no residual;no restlessness 4/25/2018  7:46 PM   Insertion Site Appearance no redness, warmth, tenderness, skin breakdown, drainage 4/25/2018  7:46 PM   Drainage None 4/25/2018 11:05 AM   Flush/Irrigation flushed w/;water;no resistance met 4/25/2018  7:46 PM   Feeding Method continuous 4/25/2018  7:46 PM   Current Rate (mL/hr) 55 mL/hr 4/25/2018  7:46 PM   Goal Rate (mL/hr) 55 mL/hr 4/25/2018  7:46 PM   Intake (mL) 30 mL 4/24/2018  5:00 PM   Water Bolus (mL) 100 mL 4/26/2018  5:00 AM   Rate Formula Tube Feeding (mL/hr) 55 mL/hr 4/24/2018  4:00 PM   Intake (mL) - Formula Tube Feeding 513 4/26/2018  5:00 AM   Residual Amount (ml) 0 ml 4/24/2018  7:05 AM            Rectal Tube 04/23/18 1400 rectal tube w/ balloon (indicate number of mLs) (Active)   Balloon Inflation Volume (mL) 45 4/25/2018 11:05 AM   Reposition drainage bags for BMS & Dubois on opposite sides of bed 4/25/2018 11:05 AM   Outcome stool evacuated 4/25/2018  7:46 PM   Stool Color Brown 4/25/2018  7:46 PM   Insertion Site Appearance no redness, warmth, tenderness, skin breakdown, drainage 4/25/2018  7:46 PM   Flush/Irrigation flushed w/;water 4/25/2018 11:05 AM   Intake (mL) 90 mL 4/24/2018  4:00 PM   Rectal Tube Output 100 mL 4/26/2018  5:00 AM       Male External Urinary Catheter 04/23/18 1345 Medium (Active)   Collection Container Urimeter 4/25/2018  7:46 PM   Skin reddened 4/25/2018  7:46 PM   Tolerance no signs/symptoms of discomfort;did not assist with appliance change 4/25/2018  7:46 PM   Output (mL) 650 mL 4/26/2018  5:00 AM   Catheter Change Date 04/25/18 4/25/2018  7:46 PM   Catheter Change Time 2300 4/25/2018  7:46 PM       Neurosurgery  Physical Exam  General: well developed, well nourished, no distress.   Head: normocephalic, atraumatic  Neurologic: lethargic, dysarthric, follows commands x 4  GCS: Motor: 6/Verbal: 4/Eyes: 3 GCS Total: 13  Mental Status: Awake, Alert, Oriented to person and place   Cranial nerves: face symmetric, tongue midline, CN II-XII grossly intact.   Eyes: pupils equal, round, reactive to light with accomodation, EOMI.   Sensory: intact to light touch throughout  Motor Strength: Moves all extremities spontaneously with good tone. 3/5 bilateral deltoid, 4/5 distally BUE. 4-/5 BLE. No abnormal movements seen.   DTR's - 2 + and symmetric in UE and LE  Pronator Drift: unable to assess   Finger-to-nose: unable to assess  Moct[place]uma: positive on the right   Clonus: absent  Babinski: absent  Pulses: 2+ and symmetric radial and dorsalis pedis. No lower extremity edema  Incision with surrounding edema, no erythema or drainage    Significant Labs:    Recent Labs  Lab 04/25/18  0446 04/26/18  0349   * 161*   * 147*   K 4.6 4.1   * 110   CO2 31* 30*   BUN 30* 26*   CREATININE 0.8 0.7   CALCIUM 11.3* 11.3*   MG 1.9 2.1       Recent Labs  Lab 04/25/18  0446 04/26/18  0349   WBC 16.40* 23.01*   HGB 10.9* 10.0*   HCT 36.1* 32.8*    297     No results for input(s): LABPT, INR, APTT in the last 48 hours.  Microbiology Results (last 7 days)     Procedure Component Value Units Date/Time    Urine culture [[id number]]     Order Status:  No result Specimen:  Urine     Blood culture [[id number]] Collected:  04/21/18 1335    Order Status:  Completed Specimen:  Blood Updated:  04/25/18 1612     Blood Culture, Routine No Growth to date     Blood Culture, Routine No Growth to date     Blood Culture, Routine No Growth to date     Blood Culture, Routine No Growth to date     Blood Culture, Routine No Growth to date    Narrative:       Blood cultures from 2 different sites. 4 bottles total.  Please draw before starting antibiotics.     Blood culture [[id number]] Collected:  04/21/18 1335    Order Status:  Completed Specimen:  Blood Updated:  04/25/18 1612     Blood Culture, Routine No Growth to date     Blood Culture, Routine No Growth to date     Blood Culture, Routine No Growth to date     Blood Culture, Routine No Growth to date     Blood Culture, Routine No Growth to date    Narrative:       Blood cultures x 2 different sites. 4 bottles total. Please  draw cultures before administering antibiotics.    C Diff Toxin by PCR [[id number]] Collected:  04/24/18 1151    Order Status:  Completed Updated:  04/24/18 2330     C. diff PCR Negative    Clostridium difficile EIA [[id number]]  (Abnormal) Collected:  04/24/18 1151    Order Status:  Completed Specimen:  Stool from Stool Updated:  04/24/18 2238     C. diff Antigen Positive (A)     C difficile Toxins A+B, EIA Negative     Comment: Testing not recommended for children <24 months old.       Blood culture [[id number]] Collected:  04/19/18 1005    Order Status:  Completed Specimen:  Blood from Peripheral, Wrist, Right Updated:  04/24/18 1412     Blood Culture, Routine No growth after 5 days.    Narrative:       Blood cultures from 2 different sites. 4 bottles total.  Please draw before starting antibiotics.    Blood culture [[id number]] Collected:  04/19/18 0952    Order Status:  Completed Specimen:  Blood from Peripheral, Hand, Left Updated:  04/24/18 1412     Blood Culture, Routine No growth after 5 days.    Narrative:       Blood cultures x 2 different sites. 4 bottles total. Please  draw cultures before administering antibiotics.    Urine culture [[id number]]  (Susceptibility) Collected:  04/21/18 1430    Order Status:  Completed Specimen:  Urine from Urine, Catheterized Updated:  04/23/18 1800     Urine Culture, Routine --     PSEUDOMONAS AERUGINOSA  10,000 - 49,999 cfu/ml      Culture, Respiratory with Gram Stain [[id number]] Collected:  04/19/18 0231    Order Status:  Completed Specimen:  Respiratory  from Sputum, Expectorated Updated:  04/21/18 1059     Respiratory Culture Normal respiratory mitzy     Gram Stain (Respiratory) <10 epithelial cells per low power field.     Gram Stain (Respiratory) Many WBC's     Gram Stain (Respiratory) Many Gram positive cocci     Gram Stain (Respiratory) Many Gram negative rods     Gram Stain (Respiratory) Rare Gram positive rods        All pertinent labs from the last 24 hours have been reviewed.    Significant Diagnostics:  None new

## 2018-04-26 NOTE — PROGRESS NOTES
[facility]  Adult Nutrition  Consult Note    SUMMARY     Recommendations    1. As tolerated, increase TF rate (of Diabetisource) to 70 mL/hr to meet 96% EEN, 100% EPN.    - Hold for residuals >500 mL; additional fluid per MD.   2. If able to advance diet, recommend 2000 kcal ADA diet (texture per SLP).   3. RD to monitor & follow-up.    Goals: Meet % EEN, EPN  Nutrition Goal Status: new  Communication of RD Recs: reviewed with RN    Reason for Assessment    Reason for Assessment: RD follow-up  Diagnosis: other (see comments) (Cervical myelopathy)  Relevant Medical History: DM, HTN  Interdisciplinary Rounds: did not attend    General Information Comments: 16 days post-op cervical fusion. ST recommends pt remain NPO. Tolerating current TF regimen via NGT.  Nutrition Discharge Planning: Unable to determine    Nutrition/Diet History    Patient Reported Diet/Restrictions/Preferences: general  Do you have any cultural, spiritual, Buddhist conflicts, given your current situation?: none reported   Factors Affecting Nutritional Intake: NPO, difficulty/impaired swallowing    Anthropometrics    Temp: 99.2 °F (37.3 °C)  Height Method: Stated  Height: 6' (182.9 cm)  Height (inches): 72 in  Weight Method: Bed Scale  Weight: 99.5 kg (219 lb 5.7 oz)  Weight (lb): 219.36 lb  Ideal Body Weight (IBW), Male: 178 lb  % Ideal Body Weight, Male (lb): 123.24 lb  BMI (Calculated): 29.8  BMI Grade: 25 - 29.9 - overweight    Lab/Procedures/Meds    Pertinent Labs Reviewed: reviewed  Pertinent Labs Comments: Na 147, BUN 26, Gluc 161, A1C 5.7  Pertinent Medications Reviewed: reviewed  Pertinent Medications Comments: Statin, Insulin    Physical Findings/Assessment    Overall Physical Appearance: nourished, weak  Tubes: nasogastric tube  Oral/Mouth Cavity: WDL  Skin: incision(s)    Estimated/Assessed Needs    Weight Used For Calorie Calculations: 99.5 kg (219 lb 5.7 oz)  Energy Calorie Requirements (kcal): 2092  kcal/d  Energy Need Method: Dane-St Sung (1.2 PAL)     Protein Requirements: 100-120 g/d (1-1.2 g/kg)  Weight Used For Protein Calculations: 99.5 kg (219 lb 5.7 oz)     Fluid Need Method: other (see comments) (Per MD or 1 mL/kcal)     CHO Requirement: 50% total kcals    Nutrition Prescription Ordered    Current Diet Order: NPO  Current Nutrition Support Formula Ordered: Diabetisource AC  Current Nutrition Support Rate Ordered: 55 mL/hr    Evaluation of Received Nutrient/Fluid Intake    Enteral Calories (kcal): 1584  Enteral Protein (gm): 79  Enteral (Free Water) Fluid (mL): 1080    % Kcal Needs: 76%  % Protein Needs: 79%    Energy Calories Required: not meeting needs  Protein Required: not meeting needs  Fluid Required: other (see comments) (Per MD or 1 mL/kcal)    Tolerance: tolerating    Nutrition Risk    Level of Risk/Frequency of Follow-up: high     Assessment and Plan    Nutrition Problem  Inadequate energy intake    Related to (etiology):   Current TF regimen    Signs and Symptoms (as evidenced by):   TF meeting <80% EEN, EPN    Nutrition Diagnosis Status:   New    Monitor and Evaluation    Food and Nutrient Intake: energy intake, food and beverage intake, enteral nutrition intake  Food and Nutrient Adminstration: diet order, enteral and parenteral nutrition administration  Physical Activity and Function: nutrition-related ADLs and IADLs  Anthropometric Measurements: weight, weight change  Biochemical Data, Medical Tests and Procedures: lipid profile, inflammatory profile, glucose/endocrine profile, gastrointestinal profile, electrolyte and renal panel  Nutrition-Focused Physical Findings: overall appearance     Nutrition Follow-Up    RD Follow-up?: Yes

## 2018-04-26 NOTE — PROGRESS NOTES
[facility]  Neurosurgery  Progress Note    Subjective:     History of Present Illness: 82 y.o.  male with type 2 diabetes, who presents today for follow up evaluation one week prior to 2 level ACDF scheduled for 4/10/2018. Pt reports    Pt presents today wearing a cervical collar and in a wheelchair. He would like to proceed with surgery. Per pt's family, his PCP would like to see him on 4/9, the day before surgery. He notes continued neck pain and BUE numbness/tingling as well as BUE weakness. Pt is only able to walk a few steps unassisted. He has never received neck surgery.     Post-Op Info:  Procedure(s) (LRB):  DISKECTOMY AND FUSION-ANTERIOR CERVICAL (ACDF); C5-6. C6-7 (N/A)   16 Days Post-Op     Interval History: Patient stepped down to the floor yesterday. He is lethargic on exam but follows commands. Speech still recommending NPO.     Medications:  Continuous Infusions:  Scheduled Meds:   atorvastatin  20 mg Per NG tube Nightly    ceFEPime (MAXIPIME) IVPB  1 g Intravenous Q8H    citalopram  20 mg Per NG tube Daily    diltiaZEM  30 mg Per NG tube Q6H    gabapentin  300 mg Per NG tube TID    heparin (porcine)  5,000 Units Subcutaneous Q8H    insulin aspart U-100  6 Units Subcutaneous 6 times per day    insulin detemir U-100  13 Units Subcutaneous BID    metoprolol tartrate  12.5 mg Per NG tube Q8H    tamsulosin  0.4 mg Per NG tube Daily    traZODone  50 mg Per NG tube QHS    valsartan  40 mg Per NG tube Daily    vancomycin (VANCOCIN) IVPB  1,000 mg Intravenous Q12H     PRN Meds:acetaminophen, aluminum-magnesium hydroxide-simethicone, benzonatate, dextrose 50%, fentaNYL, glucagon (human recombinant), hydrocodone-acetaminophen 5-325mg, insulin aspart U-100, labetalol, lidocaine HCL 2%, ondansetron     Review of Systems  Objective:     Weight: 99.5 kg (219 lb 5.7 oz)  Body mass index is 29.75 kg/m².  Vital Signs (Most Recent):  Temp: 99.4 °F (37.4 °C) (04/26/18 1158)  Pulse: 90  (04/26/18 1214)  Resp: 17 (04/26/18 1158)  BP: (!) 159/72 (04/26/18 1158)  SpO2: 96 % (04/26/18 1214) Vital Signs (24h Range):  Temp:  [97.7 °F (36.5 °C)-99.4 °F (37.4 °C)] 99.4 °F (37.4 °C)  Pulse:  [] 90  Resp:  [16-21] 17  SpO2:  [92 %-99 %] 96 %  BP: (121-177)/(57-86) 159/72       Date 04/26/18 0700 - 04/27/18 0659   Shift 1600-5599 2244-5542 5673-2699 24 Hour Total   I  N  T  A  K  E   P.O. 0   0    Shift Total  (mL/kg) 0  (0)   0  (0)   O  U  T  P  U  T   Shift Total  (mL/kg)       Weight (kg) 99.5 99.5 99.5 99.5                        NG/OG Tube 04/21/18 2300 Cortrak Right nostril (Active)   Placement Check placement verified by distal tube length measurement;placement verified by aspirate characteristics 4/25/2018  7:46 PM   Distal Tube Length (cm) 75 4/25/2018  7:05 AM   Tolerance no signs/symptoms of discomfort 4/25/2018  7:46 PM   Securement anchored to nostril center w/ adhesive device 4/25/2018  7:46 PM   Clamp Status/Tolerance unclamped;no abdominal discomfort;no abdominal distention;no emesis;no nausea;no residual;no restlessness 4/25/2018  7:46 PM   Insertion Site Appearance no redness, warmth, tenderness, skin breakdown, drainage 4/25/2018  7:46 PM   Drainage None 4/25/2018 11:05 AM   Flush/Irrigation flushed w/;water;no resistance met 4/25/2018  7:46 PM   Feeding Method continuous 4/25/2018  7:46 PM   Current Rate (mL/hr) 55 mL/hr 4/25/2018  7:46 PM   Goal Rate (mL/hr) 55 mL/hr 4/25/2018  7:46 PM   Intake (mL) 30 mL 4/24/2018  5:00 PM   Water Bolus (mL) 100 mL 4/26/2018  5:00 AM   Rate Formula Tube Feeding (mL/hr) 55 mL/hr 4/24/2018  4:00 PM   Intake (mL) - Formula Tube Feeding 513 4/26/2018  5:00 AM   Residual Amount (ml) 0 ml 4/24/2018  7:05 AM            Rectal Tube 04/23/18 1400 rectal tube w/ balloon (indicate number of mLs) (Active)   Balloon Inflation Volume (mL) 45 4/25/2018 11:05 AM   Reposition drainage bags for BMS & Dubois on opposite sides of bed 4/25/2018 11:05 AM   Outcome stool  evacuated 4/25/2018  7:46 PM   Stool Color Brown 4/25/2018  7:46 PM   Insertion Site Appearance no redness, warmth, tenderness, skin breakdown, drainage 4/25/2018  7:46 PM   Flush/Irrigation flushed w/;water 4/25/2018 11:05 AM   Intake (mL) 90 mL 4/24/2018  4:00 PM   Rectal Tube Output 100 mL 4/26/2018  5:00 AM       Male External Urinary Catheter 04/23/18 1345 Medium (Active)   Collection Container Urimeter 4/25/2018  7:46 PM   Skin reddened 4/25/2018  7:46 PM   Tolerance no signs/symptoms of discomfort;did not assist with appliance change 4/25/2018  7:46 PM   Output (mL) 650 mL 4/26/2018  5:00 AM   Catheter Change Date 04/25/18 4/25/2018  7:46 PM   Catheter Change Time 2300 4/25/2018  7:46 PM       Neurosurgery Physical Exam  General: well developed, well nourished, no distress.   Head: normocephalic, atraumatic  Neurologic: lethargic, dysarthric, follows commands x 4  GCS: Motor: 6/Verbal: 4/Eyes: 3 GCS Total: 13  Mental Status: Awake, Alert, Oriented to person and place   Cranial nerves: face symmetric, tongue midline, CN II-XII grossly intact.   Eyes: pupils equal, round, reactive to light with accomodation, EOMI.   Sensory: intact to light touch throughout  Motor Strength: Moves all extremities spontaneously with good tone. 3/5 bilateral deltoid, 4/5 distally BUE. 4-/5 BLE. No abnormal movements seen.   DTR's - 2 + and symmetric in UE and LE  Pronator Drift: unable to assess   Finger-to-nose: unable to assess  Moct[place]uma: positive on the right   Clonus: absent  Babinski: absent  Pulses: 2+ and symmetric radial and dorsalis pedis. No lower extremity edema  Incision with surrounding edema, no erythema or drainage    Significant Labs:    Recent Labs  Lab 04/25/18  0446 04/26/18  0349   * 161*   * 147*   K 4.6 4.1   * 110   CO2 31* 30*   BUN 30* 26*   CREATININE 0.8 0.7   CALCIUM 11.3* 11.3*   MG 1.9 2.1       Recent Labs  Lab 04/25/18  0446 04/26/18  0349   WBC 16.40* 23.01*   HGB 10.9* 10.0*    HCT 36.1* 32.8*    297     No results for input(s): LABPT, INR, APTT in the last 48 hours.  Microbiology Results (last 7 days)     Procedure Component Value Units Date/Time    Urine culture [[id number]]     Order Status:  No result Specimen:  Urine     Blood culture [[id number]] Collected:  04/21/18 1335    Order Status:  Completed Specimen:  Blood Updated:  04/25/18 1612     Blood Culture, Routine No Growth to date     Blood Culture, Routine No Growth to date     Blood Culture, Routine No Growth to date     Blood Culture, Routine No Growth to date     Blood Culture, Routine No Growth to date    Narrative:       Blood cultures from 2 different sites. 4 bottles total.  Please draw before starting antibiotics.    Blood culture [[id number]] Collected:  04/21/18 1335    Order Status:  Completed Specimen:  Blood Updated:  04/25/18 1612     Blood Culture, Routine No Growth to date     Blood Culture, Routine No Growth to date     Blood Culture, Routine No Growth to date     Blood Culture, Routine No Growth to date     Blood Culture, Routine No Growth to date    Narrative:       Blood cultures x 2 different sites. 4 bottles total. Please  draw cultures before administering antibiotics.    C Diff Toxin by PCR [[id number]] Collected:  04/24/18 1151    Order Status:  Completed Updated:  04/24/18 2330     C. diff PCR Negative    Clostridium difficile EIA [[id number]]  (Abnormal) Collected:  04/24/18 1151    Order Status:  Completed Specimen:  Stool from Stool Updated:  04/24/18 2238     C. diff Antigen Positive (A)     C difficile Toxins A+B, EIA Negative     Comment: Testing not recommended for children <24 months old.       Blood culture [[id number]] Collected:  04/19/18 1005    Order Status:  Completed Specimen:  Blood from Peripheral, Wrist, Right Updated:  04/24/18 1412     Blood Culture, Routine No growth after 5 days.    Narrative:       Blood cultures from 2 different sites. 4 bottles total.  Please draw before  starting antibiotics.    Blood culture [[id number]] Collected:  04/19/18 0952    Order Status:  Completed Specimen:  Blood from Peripheral, Hand, Left Updated:  04/24/18 1412     Blood Culture, Routine No growth after 5 days.    Narrative:       Blood cultures x 2 different sites. 4 bottles total. Please  draw cultures before administering antibiotics.    Urine culture [[id number]]  (Susceptibility) Collected:  04/21/18 1430    Order Status:  Completed Specimen:  Urine from Urine, Catheterized Updated:  04/23/18 1800     Urine Culture, Routine --     PSEUDOMONAS AERUGINOSA  10,000 - 49,999 cfu/ml      Culture, Respiratory with Gram Stain [[id number]] Collected:  04/19/18 0231    Order Status:  Completed Specimen:  Respiratory from Sputum, Expectorated Updated:  04/21/18 1059     Respiratory Culture Normal respiratory mitzy     Gram Stain (Respiratory) <10 epithelial cells per low power field.     Gram Stain (Respiratory) Many WBC's     Gram Stain (Respiratory) Many Gram positive cocci     Gram Stain (Respiratory) Many Gram negative rods     Gram Stain (Respiratory) Rare Gram positive rods        All pertinent labs from the last 24 hours have been reviewed.    Significant Diagnostics:  None new     Assessment/Plan:     * Cervical myelopathy with cervical radiculopathy    82 y.o. male s/p C3-5 ACDF POD#14    - Neurologically stable  - HM consulted for comanagement   - Neuro checks q4h  - Leave incision open to air, healing well with no edema present  - NPO for now, will continue to try with ST. Will begin to consider PEG placement   -Cervical collar when OOB or with activity  -IS to bed side. Patient to use atleast 10x every hour. CPT/duonebs   -CV- goal normotension. Continue home meds. PRN meds SBP >160.  -Hyponatremia- daily NaCl tabs.  -Heme/ID- hgb/hct stable. Leukocytosis trending up. Afebrile.   -Urinary retention- condom cath in place, continue Flomax  -UTI repeat UA/Urine cx. Pseudomonas on culture  4/21  -Continue PILI/SCDs/SQH  -Continue bowel regimen daily. C. Diff negative   -Continue to hold Eliquis. Per , indications unclear.  does not recommend continuing upon discharge.  -PT/OT   Dispo: Therapy recommending SNF placement.              Lisset Carranza PA-C  Neurosurgery  [facility]

## 2018-04-26 NOTE — ASSESSMENT & PLAN NOTE
82 y.o. male s/p C3-5 ACDF POD#14    - Neurologically stable  -  consulted for comanagement   - Neuro checks q4h  - Leave incision open to air, healing well with no edema present  - NPO for now, will continue to try with ST. Will begin to consider PEG placement   -Cervical collar when OOB or with activity  -IS to bed side. Patient to use atleast 10x every hour. CPT/duonebs   -CV- goal normotension. Continue home meds. PRN meds SBP >160.  -Hyponatremia- daily NaCl tabs.  -Heme/ID- hgb/hct stable. Leukocytosis trending up. Afebrile.   -Urinary retention- condom cath in place, continue Flomax  -UTI repeat UA/Urine cx. Pseudomonas on culture 4/21  -Continue PILI/SCDs/SQH  -Continue bowel regimen daily. C. Diff negative   -Continue to hold Eliquis. Per , indications unclear.  does not recommend continuing upon discharge.  -PT/OT   Dispo: Therapy recommending SNF placement.

## 2018-04-26 NOTE — PLAN OF CARE
Problem: SLP Goal  Goal: SLP Goal  Speech Language Pathology Goals  Goals expected to be met by 4/29:    1. Pt will participate in ongoing assessment of swallow.           Continue ongoing swallow assessment.   Humera Wadsworth CCC-SLP  4/26/2018

## 2018-04-26 NOTE — NURSING
10 mg, IVP labetolol given for . BP improved to  after push. Pt running low grade fever 99.2. Will continue to monitor if pt needs tylenol. Respiratory Tx contacted for pt assessment. Will follow up with Resp Tx for recommendations. Pt lung sounds coarse. Requiring frequent oral suction. VSS.

## 2018-04-26 NOTE — NURSING
Neurosurgery notified of pt's low-grade temp (oral 99.5 and axillary 100.6), increased somnolence, sluggish pupils. Tylenol administered. Neurosurgery to assess patient at bedside. Will continue to monitor. VSS.

## 2018-04-26 NOTE — PLAN OF CARE
Problem: Patient Care Overview  Goal: Plan of Care Review  Outcome: Ongoing (interventions implemented as appropriate)  POC reviewed w/ family at the bedside. BP elevated, all other VSS. Pain medication administered x1. NC @ 1L. TF to NGT @ 55ml. IV abx administered. Repositioned using wedge/pillow support. Condom cath replaced, flexiseal in place. Q4 BG checks maintained, insulin administered per MAR. C-collar in place. Safety maintained throughout shift. WCTM

## 2018-04-27 ENCOUNTER — TELEPHONE (OUTPATIENT)
Dept: RADIOLOGY | Facility: HOSPITAL | Age: 83
End: 2018-04-27

## 2018-04-27 PROBLEM — A41.9 SEPSIS: Status: ACTIVE | Noted: 2018-04-21

## 2018-04-27 PROBLEM — J69.0 ASPIRATION PNEUMONIA OF BOTH LOWER LOBES DUE TO GASTRIC SECRETIONS: Status: ACTIVE | Noted: 2018-04-27

## 2018-04-27 PROBLEM — R13.10 DYSPHAGIA: Status: ACTIVE | Noted: 2018-04-27

## 2018-04-27 PROBLEM — R41.0 DELIRIUM: Status: ACTIVE | Noted: 2018-04-27

## 2018-04-27 LAB
ALBUMIN SERPL BCP-MCNC: 1.9 G/DL
ALP SERPL-CCNC: 86 U/L
ALT SERPL W/O P-5'-P-CCNC: 8 U/L
ANION GAP SERPL CALC-SCNC: 7 MMOL/L
AST SERPL-CCNC: 11 U/L
BASOPHILS # BLD AUTO: 0.04 K/UL
BASOPHILS NFR BLD: 0.2 %
BILIRUB SERPL-MCNC: 0.4 MG/DL
BUN SERPL-MCNC: 25 MG/DL
CALCIUM SERPL-MCNC: 11.2 MG/DL
CHLORIDE SERPL-SCNC: 109 MMOL/L
CO2 SERPL-SCNC: 31 MMOL/L
CREAT SERPL-MCNC: 0.7 MG/DL
DIFFERENTIAL METHOD: ABNORMAL
EOSINOPHIL # BLD AUTO: 0.1 K/UL
EOSINOPHIL NFR BLD: 0.6 %
ERYTHROCYTE [DISTWIDTH] IN BLOOD BY AUTOMATED COUNT: 15.6 %
EST. GFR  (AFRICAN AMERICAN): >60 ML/MIN/1.73 M^2
EST. GFR  (NON AFRICAN AMERICAN): >60 ML/MIN/1.73 M^2
GLUCOSE SERPL-MCNC: 181 MG/DL
HCT VFR BLD AUTO: 32.1 %
HGB BLD-MCNC: 9.8 G/DL
IMM GRANULOCYTES # BLD AUTO: 0.54 K/UL
IMM GRANULOCYTES NFR BLD AUTO: 3.1 %
LYMPHOCYTES # BLD AUTO: 2.4 K/UL
LYMPHOCYTES NFR BLD: 14.2 %
MAGNESIUM SERPL-MCNC: 1.9 MG/DL
MCH RBC QN AUTO: 31.1 PG
MCHC RBC AUTO-ENTMCNC: 30.5 G/DL
MCV RBC AUTO: 102 FL
MONOCYTES # BLD AUTO: 1.4 K/UL
MONOCYTES NFR BLD: 8.3 %
NEUTROPHILS # BLD AUTO: 12.7 K/UL
NEUTROPHILS NFR BLD: 73.6 %
NRBC BLD-RTO: 0 /100 WBC
PHOSPHATE SERPL-MCNC: 2.3 MG/DL
PLATELET # BLD AUTO: 285 K/UL
PMV BLD AUTO: 10.6 FL
POCT GLUCOSE: 168 MG/DL (ref 70–110)
POCT GLUCOSE: 199 MG/DL (ref 70–110)
POCT GLUCOSE: 200 MG/DL (ref 70–110)
POCT GLUCOSE: 210 MG/DL (ref 70–110)
POCT GLUCOSE: 224 MG/DL (ref 70–110)
POTASSIUM SERPL-SCNC: 4.1 MMOL/L
PROT SERPL-MCNC: 5.5 G/DL
RBC # BLD AUTO: 3.15 M/UL
SODIUM SERPL-SCNC: 147 MMOL/L
VANCOMYCIN TROUGH SERPL-MCNC: 16.7 UG/ML
WBC # BLD AUTO: 17.21 K/UL

## 2018-04-27 PROCEDURE — 63600175 PHARM REV CODE 636 W HCPCS: Performed by: NEUROLOGICAL SURGERY

## 2018-04-27 PROCEDURE — 97530 THERAPEUTIC ACTIVITIES: CPT

## 2018-04-27 PROCEDURE — 83735 ASSAY OF MAGNESIUM: CPT

## 2018-04-27 PROCEDURE — 25000242 PHARM REV CODE 250 ALT 637 W/ HCPCS: Performed by: PHYSICIAN ASSISTANT

## 2018-04-27 PROCEDURE — 94761 N-INVAS EAR/PLS OXIMETRY MLT: CPT

## 2018-04-27 PROCEDURE — 63600175 PHARM REV CODE 636 W HCPCS: Performed by: PSYCHIATRY & NEUROLOGY

## 2018-04-27 PROCEDURE — 94640 AIRWAY INHALATION TREATMENT: CPT

## 2018-04-27 PROCEDURE — 25000003 PHARM REV CODE 250: Performed by: NURSE PRACTITIONER

## 2018-04-27 PROCEDURE — 99024 POSTOP FOLLOW-UP VISIT: CPT | Mod: POP,,, | Performed by: PHYSICIAN ASSISTANT

## 2018-04-27 PROCEDURE — 11000001 HC ACUTE MED/SURG PRIVATE ROOM

## 2018-04-27 PROCEDURE — A9585 GADOBUTROL INJECTION: HCPCS | Performed by: NEUROLOGICAL SURGERY

## 2018-04-27 PROCEDURE — S0030 INJECTION, METRONIDAZOLE: HCPCS | Performed by: STUDENT IN AN ORGANIZED HEALTH CARE EDUCATION/TRAINING PROGRAM

## 2018-04-27 PROCEDURE — 25000003 PHARM REV CODE 250: Performed by: PHYSICIAN ASSISTANT

## 2018-04-27 PROCEDURE — 80053 COMPREHEN METABOLIC PANEL: CPT

## 2018-04-27 PROCEDURE — 63600175 PHARM REV CODE 636 W HCPCS: Performed by: NURSE PRACTITIONER

## 2018-04-27 PROCEDURE — 80202 ASSAY OF VANCOMYCIN: CPT

## 2018-04-27 PROCEDURE — 87086 URINE CULTURE/COLONY COUNT: CPT

## 2018-04-27 PROCEDURE — 63600175 PHARM REV CODE 636 W HCPCS: Performed by: STUDENT IN AN ORGANIZED HEALTH CARE EDUCATION/TRAINING PROGRAM

## 2018-04-27 PROCEDURE — 25000003 PHARM REV CODE 250: Performed by: STUDENT IN AN ORGANIZED HEALTH CARE EDUCATION/TRAINING PROGRAM

## 2018-04-27 PROCEDURE — 85025 COMPLETE CBC W/AUTO DIFF WBC: CPT

## 2018-04-27 PROCEDURE — 27000221 HC OXYGEN, UP TO 24 HOURS

## 2018-04-27 PROCEDURE — 99233 SBSQ HOSP IP/OBS HIGH 50: CPT | Mod: GC,,, | Performed by: INTERNAL MEDICINE

## 2018-04-27 PROCEDURE — 94668 MNPJ CHEST WALL SBSQ: CPT

## 2018-04-27 PROCEDURE — 25500020 PHARM REV CODE 255: Performed by: NEUROLOGICAL SURGERY

## 2018-04-27 PROCEDURE — 36415 COLL VENOUS BLD VENIPUNCTURE: CPT

## 2018-04-27 PROCEDURE — 84100 ASSAY OF PHOSPHORUS: CPT

## 2018-04-27 RX ORDER — MIDAZOLAM HYDROCHLORIDE 1 MG/ML
1 INJECTION INTRAMUSCULAR; INTRAVENOUS ONCE
Status: COMPLETED | OUTPATIENT
Start: 2018-04-27 | End: 2018-04-27

## 2018-04-27 RX ORDER — GADOBUTROL 604.72 MG/ML
10 INJECTION INTRAVENOUS
Status: COMPLETED | OUTPATIENT
Start: 2018-04-27 | End: 2018-04-27

## 2018-04-27 RX ORDER — METRONIDAZOLE 500 MG/100ML
500 INJECTION, SOLUTION INTRAVENOUS
Status: DISCONTINUED | OUTPATIENT
Start: 2018-04-27 | End: 2018-04-30

## 2018-04-27 RX ORDER — IPRATROPIUM BROMIDE AND ALBUTEROL SULFATE 2.5; .5 MG/3ML; MG/3ML
3 SOLUTION RESPIRATORY (INHALATION) EVERY 4 HOURS
Status: DISCONTINUED | OUTPATIENT
Start: 2018-04-27 | End: 2018-04-30

## 2018-04-27 RX ADMIN — VALSARTAN 40 MG: 40 TABLET ORAL at 08:04

## 2018-04-27 RX ADMIN — INSULIN ASPART 6 UNITS: 100 INJECTION, SOLUTION INTRAVENOUS; SUBCUTANEOUS at 12:04

## 2018-04-27 RX ADMIN — DILTIAZEM HYDROCHLORIDE 30 MG: 30 TABLET, FILM COATED ORAL at 12:04

## 2018-04-27 RX ADMIN — ATORVASTATIN CALCIUM 20 MG: 20 TABLET, FILM COATED ORAL at 09:04

## 2018-04-27 RX ADMIN — MIDAZOLAM HYDROCHLORIDE 1 MG: 1 INJECTION, SOLUTION INTRAMUSCULAR; INTRAVENOUS at 06:04

## 2018-04-27 RX ADMIN — HEPARIN SODIUM 5000 UNITS: 5000 INJECTION, SOLUTION INTRAVENOUS; SUBCUTANEOUS at 02:04

## 2018-04-27 RX ADMIN — CEFEPIME 1 G: 1 INJECTION, POWDER, FOR SOLUTION INTRAMUSCULAR; INTRAVENOUS at 09:04

## 2018-04-27 RX ADMIN — HEPARIN SODIUM 5000 UNITS: 5000 INJECTION, SOLUTION INTRAVENOUS; SUBCUTANEOUS at 05:04

## 2018-04-27 RX ADMIN — TAMSULOSIN HYDROCHLORIDE 0.4 MG: 0.4 CAPSULE ORAL at 08:04

## 2018-04-27 RX ADMIN — DILTIAZEM HYDROCHLORIDE 30 MG: 30 TABLET, FILM COATED ORAL at 05:04

## 2018-04-27 RX ADMIN — IPRATROPIUM BROMIDE AND ALBUTEROL SULFATE 3 ML: .5; 3 SOLUTION RESPIRATORY (INHALATION) at 04:04

## 2018-04-27 RX ADMIN — TRAZODONE HYDROCHLORIDE 50 MG: 50 TABLET ORAL at 09:04

## 2018-04-27 RX ADMIN — GABAPENTIN 300 MG: 300 CAPSULE ORAL at 08:04

## 2018-04-27 RX ADMIN — INSULIN ASPART 2 UNITS: 100 INJECTION, SOLUTION INTRAVENOUS; SUBCUTANEOUS at 05:04

## 2018-04-27 RX ADMIN — DILTIAZEM HYDROCHLORIDE 30 MG: 30 TABLET, FILM COATED ORAL at 11:04

## 2018-04-27 RX ADMIN — VANCOMYCIN HYDROCHLORIDE 1 G: 10 INJECTION, POWDER, LYOPHILIZED, FOR SOLUTION INTRAVENOUS at 02:04

## 2018-04-27 RX ADMIN — CEFEPIME 1 G: 1 INJECTION, POWDER, FOR SOLUTION INTRAMUSCULAR; INTRAVENOUS at 02:04

## 2018-04-27 RX ADMIN — METRONIDAZOLE 500 MG: 500 INJECTION, SOLUTION INTRAVENOUS at 09:04

## 2018-04-27 RX ADMIN — GABAPENTIN 300 MG: 300 CAPSULE ORAL at 09:04

## 2018-04-27 RX ADMIN — INSULIN DETEMIR 13 UNITS: 100 INJECTION, SOLUTION SUBCUTANEOUS at 08:04

## 2018-04-27 RX ADMIN — CITALOPRAM HYDROBROMIDE 20 MG: 20 TABLET ORAL at 08:04

## 2018-04-27 RX ADMIN — IPRATROPIUM BROMIDE AND ALBUTEROL SULFATE 3 ML: .5; 3 SOLUTION RESPIRATORY (INHALATION) at 07:04

## 2018-04-27 RX ADMIN — INSULIN DETEMIR 13 UNITS: 100 INJECTION, SOLUTION SUBCUTANEOUS at 11:04

## 2018-04-27 RX ADMIN — Medication 12.5 MG: at 02:04

## 2018-04-27 RX ADMIN — GABAPENTIN 300 MG: 300 CAPSULE ORAL at 02:04

## 2018-04-27 RX ADMIN — VANCOMYCIN HYDROCHLORIDE 1 G: 10 INJECTION, POWDER, LYOPHILIZED, FOR SOLUTION INTRAVENOUS at 12:04

## 2018-04-27 RX ADMIN — ACETAMINOPHEN 650 MG: 325 TABLET ORAL at 12:04

## 2018-04-27 RX ADMIN — HEPARIN SODIUM 5000 UNITS: 5000 INJECTION, SOLUTION INTRAVENOUS; SUBCUTANEOUS at 09:04

## 2018-04-27 RX ADMIN — Medication 12.5 MG: at 09:04

## 2018-04-27 RX ADMIN — Medication 12.5 MG: at 05:04

## 2018-04-27 RX ADMIN — INSULIN ASPART 6 UNITS: 100 INJECTION, SOLUTION INTRAVENOUS; SUBCUTANEOUS at 02:04

## 2018-04-27 RX ADMIN — METRONIDAZOLE 500 MG: 500 INJECTION, SOLUTION INTRAVENOUS at 02:04

## 2018-04-27 RX ADMIN — INSULIN ASPART 6 UNITS: 100 INJECTION, SOLUTION INTRAVENOUS; SUBCUTANEOUS at 08:04

## 2018-04-27 RX ADMIN — HYDROCODONE BITARTRATE AND ACETAMINOPHEN 1 TABLET: 5; 325 TABLET ORAL at 06:04

## 2018-04-27 RX ADMIN — CEFEPIME 1 G: 1 INJECTION, POWDER, FOR SOLUTION INTRAMUSCULAR; INTRAVENOUS at 05:04

## 2018-04-27 RX ADMIN — INSULIN ASPART 6 UNITS: 100 INJECTION, SOLUTION INTRAVENOUS; SUBCUTANEOUS at 05:04

## 2018-04-27 RX ADMIN — INSULIN ASPART 6 UNITS: 100 INJECTION, SOLUTION INTRAVENOUS; SUBCUTANEOUS at 11:04

## 2018-04-27 RX ADMIN — GADOBUTROL 10 ML: 604.72 INJECTION INTRAVENOUS at 07:04

## 2018-04-27 NOTE — ASSESSMENT & PLAN NOTE
-- Management per primary NSRGY team  -- s/p anterior cervical disectomy and fusion with plating of C5-6, C6-7 on 4/10  -- s/p lumbar drain placement 4/13 for post-op CSF leak, drain removed 4/20

## 2018-04-27 NOTE — CONSULTS
Radiology Consult    Hollis Pitts is a 82 y.o. male with a history of dysphagia and cervical spine surgery, now with rec for G tube..  Past Medical History:   Diagnosis Date    Benign prostatic hyperplasia with urinary retention 4/11/2018    Cervical myelopathy with cervical radiculopathy 4/10/2018    Depression     Dyslipidemia     Essential hypertension 4/11/2018    Glaucoma (increased eye pressure)     Mild cognitive impairment     Traumatic brain injury 07/2017    Type 2 diabetes mellitus with hyperglycemia, without long-term current use of insulin 4/11/2018     Past Surgical History:   Procedure Laterality Date    CATARACT EXTRACTION W/  INTRAOCULAR LENS IMPLANT         Imaging reviewed with Radiology staff, Dr. Alarcon.     Procedure: G tube    Scheduled Meds:    albuterol-ipratropium 2.5mg-0.5mg/3mL  3 mL Nebulization Q4H    atorvastatin  20 mg Per NG tube Nightly    ceFEPime (MAXIPIME) IVPB  1 g Intravenous Q8H    citalopram  20 mg Per NG tube Daily    diltiaZEM  30 mg Per NG tube Q6H    gabapentin  300 mg Per NG tube TID    heparin (porcine)  5,000 Units Subcutaneous Q8H    insulin aspart U-100  6 Units Subcutaneous 6 times per day    insulin detemir U-100  13 Units Subcutaneous BID    metoprolol tartrate  12.5 mg Per NG tube Q8H    metronidazole  500 mg Intravenous Q8H    tamsulosin  0.4 mg Per NG tube Daily    traZODone  50 mg Per NG tube QHS    valsartan  40 mg Per NG tube Daily    vancomycin (VANCOCIN) IVPB  1,000 mg Intravenous Q12H     Continuous Infusions:   PRN Meds:acetaminophen, aluminum-magnesium hydroxide-simethicone, benzonatate, dextrose 50%, fentaNYL, glucagon (human recombinant), hydrocodone-acetaminophen 5-325mg, insulin aspart U-100, labetalol, lidocaine HCL 2%, ondansetron    Allergies: Review of patient's allergies indicates:  No Known Allergies    Labs:  No results for input(s): INR in the last 168 hours.    Invalid input(s):  PT,  PTT    Recent Labs  Lab  04/27/18  0350   WBC 17.21*   HGB 9.8*   HCT 32.1*   *         Recent Labs  Lab 04/27/18  0349   *   *   K 4.1      CO2 31*   BUN 25*   CREATININE 0.7   CALCIUM 11.2*   MG 1.9   ALT 8*   AST 11   ALBUMIN 1.9*   BILITOT 0.4         Vitals (Most Recent):  Temp: 98.3 °F (36.8 °C) (04/27/18 1227)  Pulse: 74 (04/27/18 1100)  Resp: 17 (04/27/18 1227)  BP: (!) 155/73 (04/27/18 1227)  SpO2: 95 % (04/27/18 1227)    Plan:   1. NPO after midnight Sunday night.  2. Continue to  Hold anticoagulants.  3. Pt scheduled for gastrostomy tube Monday, pending pre procedure imaging.  4. Place NGT if possible    Vikash Beck MD  Radiology

## 2018-04-27 NOTE — ASSESSMENT & PLAN NOTE
-- A1C 5.6, glucoses 210-150 past 24 hours  -- patient reports being controlled by lifestyle modification alone at home   -- currently on tube feeds with diabetisource   -- hospital insulin regimen: detemir 13 BID, aspart 6U q 4 hrs (increased from 5 two days ago), MDSSI, q 4 POCT  -- agree with current regimen

## 2018-04-27 NOTE — CONSULTS
[facility] Hospital Medicine  Consult Note    Patient Name: Hollis Pitts  MRN: [id number]  Admission Date: 4/10/2018  Hospital Length of Stay: 17 days  Attending Physician: ROMARIO Cr MD   Primary Care Provider: Vanessa Fontaine NP     Hospital Medicine Team: Networked reference to record PCT  Nash Gaston MD      Patient information was obtained from patient and past medical records.     Inpatient consult to Hospital Medicine-General  Consult performed by: NASH GASTON  Consult ordered by: HIMANSHU SOSA        Subjective:     Principal Problem: Cervical myelopathy with cervical radiculopathy    Chief Complaint: No chief complaint on file.       HPI: Mr. Pitts is an 82-year-old man with HTN, HPLD, DMII, BPH, MDD, and cervical spondylosis with myelopathy who was admitted to [facility] for ACDF. Per chart review he was in his usual state of health until July 2017 when he was hit by a large beam of wood, after which he has been non-ambulatory and confused. While very pleasant, he is unfortunately somewhat of a poor historian. His symptoms prior to admission include bilateral hand numbness, tingling, and restricted upper extremity range of motion. CT C-spine without contrast showed multilevel spondylosis of there cervical spine with mild stenosis at C5-6 and C6-7 as well as severe extensive neuroforaminal narrowing. Yesterday he underwent two level ACDF at C5/6 and C6/7 without immediate complication. Reported blood loss in the brief op note is 1050 mL. He was given dexamethasone as well. Hospital medicine consulted for hyperglycemia and comanagement of his medical conditions.    Today he feels well, specifically denying chest pain, shortness of breath, palpitations, syncope, and edema. No fevers, chills, or night sweats. No nausea, vomiting, constipation, or diarrhea. He has some neck discomfort, particularly when moving from the bed to the chair, but is comfortable at rest.    Past  "Medical History:   Diagnosis Date    Benign prostatic hyperplasia with urinary retention 4/11/2018    Cervical myelopathy with cervical radiculopathy 4/10/2018    Depression     Dyslipidemia     Essential hypertension 4/11/2018    Glaucoma (increased eye pressure)     Mild cognitive impairment     Traumatic brain injury 07/2017    Type 2 diabetes mellitus with hyperglycemia, without long-term current use of insulin 4/11/2018       Past Surgical History:   Procedure Laterality Date    CATARACT EXTRACTION W/  INTRAOCULAR LENS IMPLANT         Review of patient's allergies indicates:  No Known Allergies    No current facility-administered medications on file prior to encounter.      Current Outpatient Prescriptions on File Prior to Encounter   Medication Sig    atorvastatin (LIPITOR) 20 MG tablet Take 20 mg by mouth nightly.    bisacodyl (DULCOLAX) 5 mg EC tablet     CARTIA  mg Cp24 Take 90 mg by mouth.     citalopram (CELEXA) 20 MG tablet Take 20 mg by mouth once daily.    gabapentin (NEURONTIN) 100 MG capsule Take 100 mg by mouth 3 (three) times daily as needed.     SENSIPAR 30 mg Tab Take 30 mg by mouth daily with breakfast.     traZODone (DESYREL) 50 MG tablet Take 50 mg by mouth every evening.    valsartan (DIOVAN) 80 MG tablet Take 80 mg by mouth once daily.    vitamin B comp with C no.4 150 mg Tab Take vitamin B complex 1 a day.  Over-the-counter is an appropriate alternative.    BD INSULIN PEN NEEDLE UF SHORT 31 gauge x 5/16" Ndle USE WITH LANTUS PEN DAILY    CONSTULOSE 10 gram/15 mL solution TAKE  30 MILLILITERS BY MOUTH 3 TIMES A DAY UNTIL BOWEL MOVEMENT ...  (REFER TO PRESCRIPTION NOTES).    ELIQUIS 2.5 mg Tab Take 2.5 mg by mouth 2 (two) times daily.     FREESTYLE TEST Strp      Family History     Problem Relation (Age of Onset)    Cancer Maternal Grandmother    Clotting disorder Mother    Paranoid behavior Mother        Social History Main Topics    Smoking status: Former " Smoker     Quit date: 2/6/1950    Smokeless tobacco: Never Used    Alcohol use 4.2 oz/week     7 Shots of liquor per week    Drug use: No    Sexual activity: Yes     Partners: Female     Review of Systems   Constitutional: Negative for chills and fatigue.   HENT: Negative for congestion and sore throat.    Eyes: Negative for visual disturbance.   Respiratory: Negative for cough and shortness of breath.    Cardiovascular: Negative for chest pain and palpitations.   Gastrointestinal: Negative for abdominal distention, abdominal pain, constipation, diarrhea, nausea and vomiting.   Endocrine: Negative for polyuria.   Genitourinary: Negative for dysuria and urgency.   Musculoskeletal: Negative for myalgias.   Skin: Negative for rash and wound.   Allergic/Immunologic: Negative for immunocompromised state.   Neurological: Positive for weakness and numbness. Negative for dizziness and seizures.   Hematological: Negative for adenopathy. Does not bruise/bleed easily.   Psychiatric/Behavioral: Negative for confusion and dysphoric mood. The patient is not nervous/anxious.      Objective:     Vital Signs (Most Recent):  Temp: 97.6 °F (36.4 °C) (04/27/18 0329)  Pulse: 87 (04/27/18 0400)  Resp: 18 (04/27/18 0329)  BP: 135/71 (04/27/18 0329)  SpO2: 97 % (04/27/18 0400) Vital Signs (24h Range):  Temp:  [97.6 °F (36.4 °C)-100.6 °F (38.1 °C)] 97.6 °F (36.4 °C)  Pulse:  [72-96] 87  Resp:  [15-21] 18  SpO2:  [91 %-97 %] 97 %  BP: (135-177)/(63-86) 135/71     Weight: 99.5 kg (219 lb 5.7 oz)  Body mass index is 29.75 kg/m².    Physical Exam   Constitutional: He appears well-developed and well-nourished. No distress.   HENT:   Head: Normocephalic and atraumatic.   C-collar in place  Dry MM   Eyes: EOM are normal. Pupils are equal, round, and reactive to light.   Neck: Normal range of motion. Neck supple. No JVD present.   Cardiovascular: Normal rate and regular rhythm.  Exam reveals no gallop and no friction rub.    No murmur  heard.  Pulmonary/Chest: Effort normal and breath sounds normal. No respiratory distress. He has no wheezes.   Abdominal: Soft. Bowel sounds are normal. He exhibits no distension and no mass. There is no tenderness.   Musculoskeletal: Normal range of motion. He exhibits no edema or tenderness.   Neurological: He is alert. He displays normal reflexes. No cranial nerve deficit.   AOx2, lethargic   Skin: Skin is warm and dry. No rash noted. No erythema.       Significant Labs:     CBC:    Recent Labs  Lab 04/26/18  0349 04/27/18  0350   WBC 23.01* 17.21*   GRAN 80.1*  18.4* 73.6*  12.7*   HGB 10.0* 9.8*   HCT 32.8* 32.1*    285       Chem 10:    Recent Labs  Lab 04/26/18 0349 04/27/18  0349   * 147*   K 4.1 4.1    109   CO2 30* 31*   BUN 26* 25*   CREATININE 0.7 0.7   * 181*   CALCIUM 11.3* 11.2*   MG 2.1 1.9   PHOS 1.9* 2.3*       Significant Imaging:   AXR 4/25/18  The NG tube tip is below the diaphragm and likely in the stomach.    Nonspecific bowel gas pattern with no evidence of bowel obstruction.  No free air.  Soft tissues and osseous structures are otherwise unremarkable.    CXR 4/21/18  Cardiac monitoring leads project over the bilateral hemithoraces.  Lung volumes are symmetric.  Persistent bilateral areas of increased attenuation are noted within the lower lung zones concerning for edema, aspiration or pneumonia.  No pneumothorax or large pleural effusions.  No free air beneath the diaphragms.  Degenerative changes of the spine and shoulders noted.  Partially visualized postsurgical changes of the cervical spine again noted.  Enteric catheter crosses the diaphragm.  No interval detrimental change.    Assessment/Plan:     * Cervical myelopathy with cervical radiculopathy    -- Management per primary NSRGY team  -- s/p anterior cervical disectomy and fusion with plating of C5-6, C6-7 on 4/10  -- s/p lumbar drain placement 4/13 for post-op CSF leak, drain removed 4/20         "  Leukocytosis    -persistent leukocytosis with WBC in 20's since 4/19, WBC today 17 (yesterday 23), Tmax/24 hrs 100.6, no tachycardia, no hypotension, satting well on 1 LNC  -patient with SIRS criteria 4/21, started on broad spectrum abx with vanc and cefepime, remains on these abx now  -patient with dysphagia and, per chart review, concern for aspiration event 4/21; CXR 4/21 showed "persistent bilateral areas of increased attenuation are noted within the lower lung zones concerning for edema, aspiration or pneumonia"  -UCx 4/21 grew pseudomonas sensitive to cefepime, currently with condom catheter  -BCx 4/21 NGTD, c.diff negative  -AXR 4/25- nonspecific bowel gas pattern with no evidence of bowel obstruction. No free air. Soft tissues and osseous structures are otherwise unremarkable  -US LE 4/19 negative for DVT, currently without and LE edema or tenderness  -recommend ID workup with CXR, UCx, UA  -recommend broadening abx to include anaerobic coverage by adding flagyl or changing cefepime to zosyn since there is concern for aspiration with dysphagia and previous CXR findings          Dysphagia    --speech continues to recommend NPO  --currently receiving TFs  --if no improvement, recommend surgery evaluation for possible PEG          Delirium    -- likely multifactorial, patient at high risk for delirium due to age, prolonged hospitalization, UTI, possible aspiration, post-op inflammation, pain medications  -- Recommend the lowest dose opioid possible for pain and avoiding sedating medications- currently on norco 5, one dose in past 24 hours  -- delirium precautions          Other hyperlipidemia    -- Continue atorvastatin          HX: anticoagulation    -- On apixaban prior to admission.   -- Indication not clear. Per his daughter, it was started after his head trauma admission in 2017 for VTE prophylaxis.   -- She denied any history of arrhythmia, DVT, or PE  -- Agree with holding, particularly in the context " of recent surgery. Would not resume upon d/c.         Essential hypertension    -- Stable at present.    -- Continue diltiazem 30 mg q 6 hrs and valsartan 40 mg daily.         Type 2 diabetes mellitus with hyperglycemia, without long-term current use of insulin    -- A1C 5.6, glucoses 210-150 past 24 hours  -- patient reports being controlled by lifestyle modification alone at home   -- currently on tube feeds with diabetisource   -- hospital insulin regimen: detemir 13 BID, aspart 6U q 4 hrs (increased from 5 two days ago), MDSSI, q 4 POCT  -- agree with current regimen              Benign prostatic hyperplasia with urinary retention    -- Stable, continue flomax  -- Recommend monitoring closely for urinary obstruction          VTE Risk Mitigation         Ordered     heparin (porcine) injection 5,000 Units  Every 8 hours      04/13/18 1405     Place PILI hose  Until discontinued      04/13/18 0864              Thank you for your consult. I will follow-up with patient. Please contact us if you have any additional questions.    Bonnie Gaston MD  Department of Hospital Medicine   [facility]

## 2018-04-27 NOTE — ASSESSMENT & PLAN NOTE
-- On apixaban prior to admission.   -- Indication not clear. Per his daughter, it was started after his head trauma admission in 2017 for VTE prophylaxis.   -- She denied any history of arrhythmia, DVT, or PE  -- Agree with holding, particularly in the context of recent surgery. Would not resume upon d/c.

## 2018-04-27 NOTE — PT/OT/SLP PROGRESS
"Occupational Therapy   Treatment    Name: Hollis Pitts  MRN: [id number]  Admitting Diagnosis:  Cervical myelopathy with cervical radiculopathy  17 Days Post-Op    Recommendations:     Discharge Recommendations: nursing facility, skilled  Discharge Equipment Recommendations:  lift device, hospital bed  Barriers to discharge:  None    Subjective     Several times during session pt stated, "What is going on?  How did this happen?"  Pt then inquired about his family exclaiming, "Where is my family?!" then immediately said, "I might not have a family."  OT provided encouragement.    Communicated with: RN prior to session.  Pain/Comfort:  · Pain Rating 1: 0/10  · Pain Rating Post-Intervention 1: 0/10    Patients cultural, spiritual, Yarsani conflicts given the current situation: none reported    Objective:     Patient found with: peripheral IV, telemetry, pulse ox (continuous), SCD, Condom Catheter, NG tube, pressure relief boots, oxygen.  Therapy tech ([employer]) assisted with session.    General Precautions: Standard, aspiration   Orthopedic Precautions:spinal precautions   Braces: Aspen collar     Occupational Performance:    Bed Mobility:    · Patient completed Rolling/Turning to Right with total assistance  · Patient completed Scooting/Bridging with total assistance  · Patient completed Supine to Sit with total assistance  · Patient completed Sit to Supine with total assistance   · With assist of 2     Functional Mobility/Transfers:  · Task not performed this date 2* pt with confusion, difficulty following commands, and poor trunk control while seated EOB.    Activities of Daily Living:  · Grooming: minimum assistance for washing face with cloth while seated EOB.  Pt reached forward with left hand and grasped cloth then brougt up to his face.  Some cues and assist provided to help avoid NG tube and reach both eyes and forehead.     Patient left HOB elevated with all lines intact, call button in reach and bed alarm " on    [facility] 6 Click:  AMPA Total Score: 7    Treatment & Education:  *Daily orientation and reassurance provided at start of session 2* pt demonstrating confusion.  *Pt sat EOB for ~25 minutes with Mod A-Max A required to maintain upright position.  Significant postural sway observed with pt pushing posteriorly off and on throughout session.  Cues given to help pt adjust posture and activate core muscles; poor follow through.     *AAROM performed on (B) UE incorporating all joints: 3 sets x 10 reps on each side.  *Pt reached forward to touch hand of therapist with LUE: 1 set x 10 reps.  Increased cues required 2* pt stopping during task.    *Pt performed 1 bilateral UE exercise: 1 set x 10 reps of shoulder flexion with OT grasping hands and providing Min A-Mod A.    *Pt performed 1 LE exercise: 1 set x 10 reps of LAQ on both sides  *Pt educated on role of OT and POC discussed; reinforcement needed.    Education:    Assessment:     Hollis Pitts is a 82 y.o. male with a medical diagnosis of Cervical myelopathy with cervical radiculopathy.  He presents with the following performance deficits affecting function are weakness, impaired endurance, impaired self care skills, impaired functional mobilty, decreased upper extremity function, impaired cognition, impaired balance, decreased coordination, decreased safety awareness, decreased ROM, impaired cardiopulmonary response to activity, decreased lower extremity function.  Pt demonstrated confusion regarding orientation this date; however, followed ~60% of commands during session.  Pt able to perform grooming task with Min A while seated EOB with cues required to continue task once initaited.  Moderate postural sway noted when seated requiring Mod A-Max A to maintain balance.  Pt requires significant assist with all ADLs and mobility at this time and would continue to benefit from skilled OT services to address problems listed below and increase independence with ADLs.       Rehab Prognosis:  Good; patient would benefit from acute skilled OT services to address these deficits and reach maximum level of function.       Plan:     Patient to be seen 2 x/week to address the above listed problems via self-care/home management, therapeutic activities, therapeutic exercises, cognitive retraining, neuromuscular re-education, wheelchair management/training  · Plan of Care Expires: 05/24/18  · Plan of Care Reviewed with: patient, daughter    This Plan of care has been discussed with the patient who was involved in its development and understands and is in agreement with the identified goals and treatment plan    GOALS:    Occupational Therapy Goals        Problem: Occupational Therapy Goal    Goal Priority Disciplines Outcome Interventions   Occupational Therapy Goal     OT, PT/OT Ongoing (interventions implemented as appropriate)    Description:  Goals to be met by: 5/9  Patient will increase functional independence with ADLs by performing:    Squat pivot t/f to wheelchair with total(A).  Supine<>sit ; sit<>supine with Moderate Assistance.  Grooming while seated EOB with minimal assistance to wash face.  (Keep for consistency)  Pt will tolerate EOB activity ~15 min duration with min(A) for postural control.   Family/caregiver demo understanding for ROM and positioning for B UE.                          Time Tracking:     OT Date of Treatment: 04/27/18  OT Start Time: 0923  OT Stop Time: 0959  OT Total Time (min): 36 min    Billable Minutes:Therapeutic Activity 36    MADDIE Farooq  4/27/2018

## 2018-04-27 NOTE — PROGRESS NOTES
Called contact number from pt. Chart ([phone]), left voice message with call back information to call MRI or pt's nurse.

## 2018-04-27 NOTE — ASSESSMENT & PLAN NOTE
-- likely multifactorial, patient at high risk for delirium due to age, prolonged hospitalization, UTI, possible aspiration, post-op inflammation, pain medications  -- Recommend the lowest dose opioid possible for pain and avoiding sedating medications- currently on norco 5, one dose in past 24 hours  -- delirium precautions

## 2018-04-27 NOTE — SUBJECTIVE & OBJECTIVE
"Past Medical History:   Diagnosis Date    Benign prostatic hyperplasia with urinary retention 4/11/2018    Cervical myelopathy with cervical radiculopathy 4/10/2018    Depression     Dyslipidemia     Essential hypertension 4/11/2018    Glaucoma (increased eye pressure)     Mild cognitive impairment     Traumatic brain injury 07/2017    Type 2 diabetes mellitus with hyperglycemia, without long-term current use of insulin 4/11/2018       Past Surgical History:   Procedure Laterality Date    CATARACT EXTRACTION W/  INTRAOCULAR LENS IMPLANT         Review of patient's allergies indicates:  No Known Allergies    No current facility-administered medications on file prior to encounter.      Current Outpatient Prescriptions on File Prior to Encounter   Medication Sig    atorvastatin (LIPITOR) 20 MG tablet Take 20 mg by mouth nightly.    bisacodyl (DULCOLAX) 5 mg EC tablet     CARTIA  mg Cp24 Take 90 mg by mouth.     citalopram (CELEXA) 20 MG tablet Take 20 mg by mouth once daily.    gabapentin (NEURONTIN) 100 MG capsule Take 100 mg by mouth 3 (three) times daily as needed.     SENSIPAR 30 mg Tab Take 30 mg by mouth daily with breakfast.     traZODone (DESYREL) 50 MG tablet Take 50 mg by mouth every evening.    valsartan (DIOVAN) 80 MG tablet Take 80 mg by mouth once daily.    vitamin B comp with C no.4 150 mg Tab Take vitamin B complex 1 a day.  Over-the-counter is an appropriate alternative.    BD INSULIN PEN NEEDLE UF SHORT 31 gauge x 5/16" Ndle USE WITH LANTUS PEN DAILY    CONSTULOSE 10 gram/15 mL solution TAKE  30 MILLILITERS BY MOUTH 3 TIMES A DAY UNTIL BOWEL MOVEMENT ...  (REFER TO PRESCRIPTION NOTES).    ELIQUIS 2.5 mg Tab Take 2.5 mg by mouth 2 (two) times daily.     FREESTYLE TEST Strp      Family History     Problem Relation (Age of Onset)    Cancer Maternal Grandmother    Clotting disorder Mother    Paranoid behavior Mother        Social History Main Topics    Smoking status: Former " Smoker     Quit date: 2/6/1950    Smokeless tobacco: Never Used    Alcohol use 4.2 oz/week     7 Shots of liquor per week    Drug use: No    Sexual activity: Yes     Partners: Female     Review of Systems   Constitutional: Negative for chills and fatigue.   HENT: Negative for congestion and sore throat.    Eyes: Negative for visual disturbance.   Respiratory: Negative for cough and shortness of breath.    Cardiovascular: Negative for chest pain and palpitations.   Gastrointestinal: Negative for abdominal distention, abdominal pain, constipation, diarrhea, nausea and vomiting.   Endocrine: Negative for polyuria.   Genitourinary: Negative for dysuria and urgency.   Musculoskeletal: Negative for myalgias.   Skin: Negative for rash and wound.   Allergic/Immunologic: Negative for immunocompromised state.   Neurological: Positive for weakness and numbness. Negative for dizziness and seizures.   Hematological: Negative for adenopathy. Does not bruise/bleed easily.   Psychiatric/Behavioral: Negative for confusion and dysphoric mood. The patient is not nervous/anxious.      Objective:     Vital Signs (Most Recent):  Temp: 97.6 °F (36.4 °C) (04/27/18 0329)  Pulse: 87 (04/27/18 0400)  Resp: 18 (04/27/18 0329)  BP: 135/71 (04/27/18 0329)  SpO2: 97 % (04/27/18 0400) Vital Signs (24h Range):  Temp:  [97.6 °F (36.4 °C)-100.6 °F (38.1 °C)] 97.6 °F (36.4 °C)  Pulse:  [72-96] 87  Resp:  [15-21] 18  SpO2:  [91 %-97 %] 97 %  BP: (135-177)/(63-86) 135/71     Weight: 99.5 kg (219 lb 5.7 oz)  Body mass index is 29.75 kg/m².    Physical Exam   Constitutional: He appears well-developed and well-nourished. No distress.   HENT:   Head: Normocephalic and atraumatic.   C-collar in place  Dry MM   Eyes: EOM are normal. Pupils are equal, round, and reactive to light.   Neck: Normal range of motion. Neck supple. No JVD present.   Cardiovascular: Normal rate and regular rhythm.  Exam reveals no gallop and no friction rub.    No murmur  heard.  Pulmonary/Chest: Effort normal and breath sounds normal. No respiratory distress. He has no wheezes.   Abdominal: Soft. Bowel sounds are normal. He exhibits no distension and no mass. There is no tenderness.   Musculoskeletal: Normal range of motion. He exhibits no edema or tenderness.   Neurological: He is alert. He displays normal reflexes. No cranial nerve deficit.   AOx2, lethargic   Skin: Skin is warm and dry. No rash noted. No erythema.       Significant Labs:     CBC:    Recent Labs  Lab 04/26/18  0349 04/27/18  0350   WBC 23.01* 17.21*   GRAN 80.1*  18.4* 73.6*  12.7*   HGB 10.0* 9.8*   HCT 32.8* 32.1*    285       Chem 10:    Recent Labs  Lab 04/26/18 0349 04/27/18  0349   * 147*   K 4.1 4.1    109   CO2 30* 31*   BUN 26* 25*   CREATININE 0.7 0.7   * 181*   CALCIUM 11.3* 11.2*   MG 2.1 1.9   PHOS 1.9* 2.3*       Significant Imaging:   AXR 4/25/18  The NG tube tip is below the diaphragm and likely in the stomach.    Nonspecific bowel gas pattern with no evidence of bowel obstruction.  No free air.  Soft tissues and osseous structures are otherwise unremarkable.    CXR 4/21/18  Cardiac monitoring leads project over the bilateral hemithoraces.  Lung volumes are symmetric.  Persistent bilateral areas of increased attenuation are noted within the lower lung zones concerning for edema, aspiration or pneumonia.  No pneumothorax or large pleural effusions.  No free air beneath the diaphragms.  Degenerative changes of the spine and shoulders noted.  Partially visualized postsurgical changes of the cervical spine again noted.  Enteric catheter crosses the diaphragm.  No interval detrimental change.

## 2018-04-27 NOTE — ASSESSMENT & PLAN NOTE
82 y.o. male s/p C3-5 ACDF for cervical myelopathy on 4/10 with Dr. Hess, POD#15     -Patient was wheel chair bound prior to this admission and now further deconditioned. Weakness likely d/t generalized deconditioning. Given continued dysphagia, will obtain MRI C spine w/wo contrast today. Radiology required CT head to rule out any metals prior to MRI scan.   - consulted for comanagement since patient now stepped down to floor. Patient with multiple co-morbidities. Slightly elevated sodium and calcium. Hyperglycemia 200-150s on short and long acting insulin. On vanc and cefepime per [facility] for Sirs. Defer to med for recs.   -Leave incision open to air. Healing well with some edema present, likely hematoma.   -NPO per ST--unsure when patient will progress. IR consulted for PEG placement 4/27/18  -CV goal normotension. Continue home meds. PRN meds SBP >160.  -Heme/ID- hgb/hct stable. Waxing and waning leukocytosis. Low grade fever yesterday evening 4/26/18 with Tmax 100.6. D/W hosp meds.   -Urinary retention- condom cath in place, continue Flomax  -UTI Pseudomonas on culture 4/21; repeat UA/Urine cx 4/27  -Bcx 4/21 NGTD  -Continue PILI/SCDs/SQH  -Continue bowel regimen daily. C. Diff negative   -Continue to hold Eliquis. Per , indications unclear.  does not recommend continuing upon discharge.  -Continue aggressive PT/OT/ST  -Cervical collar when OOB or with activity  -IS to bed side. Patient to use atleast 10x every hour. Scheduled CPT/duonebs.   -Neurovascular checks q4h      Dispo: Therapy recommending SNF placement; pending medical stability and likely peg placement.     Discussed with Dr. Hess

## 2018-04-27 NOTE — PT/OT/SLP PROGRESS
Physical Therapy  Pt Not Seen    Patient Name:  Hollis Pitts   MRN:  [id number]    Patient not seen today secondary to pt Unavailable (Comment) (RN (Phoebe) reports pt GEOVANNY away for CT at 1:56 pm.  PTA unable to return for 2nd attempt). Will follow-up on next scheduled visit.    Keiko Mcduffie, PTA  4/27/2018

## 2018-04-27 NOTE — ASSESSMENT & PLAN NOTE
"-persistent leukocytosis with WBC in 20's since 4/19, WBC today 17 (yesterday 23), Tmax/24 hrs 100.6, no tachycardia, no hypotension, satting well on 1 LNC  -patient with SIRS criteria 4/21, started on broad spectrum abx with vanc and cefepime, remains on these abx now  -patient with dysphagia and, per chart review, concern for aspiration event 4/21; CXR 4/21 showed "persistent bilateral areas of increased attenuation are noted within the lower lung zones concerning for edema, aspiration or pneumonia"  -UCx 4/21 grew pseudomonas sensitive to cefepime, currently with condom catheter  -BCx 4/21 NGTD, c.diff negative  -AXR 4/25- nonspecific bowel gas pattern with no evidence of bowel obstruction. No free air. Soft tissues and osseous structures are otherwise unremarkable  -US LE 4/19 negative for DVT, currently without and LE edema or tenderness  -recommend ID workup with CXR, UCx, UA  -recommend broadening abx to include anaerobic coverage by adding flagyl or changing cefepime to zosyn since there is concern for aspiration with dysphagia and previous CXR findings    "

## 2018-04-27 NOTE — SUBJECTIVE & OBJECTIVE
Interval History: pod#15 for ACDF C3-5 for cervical myelopathy. Patient with waxing and waning leukocytosis. Low grade fever yesterday evening with Tmax 100.6. Patient has no pain on exam. He is deconditioned overall. Medicine re-consulted for co-management since patient stepped down. He continues to have dysphagia and speech therapy is unsure when he will progress with swallowing.  UA ordered yesterday, nursing staff to collect.     Medications:  Continuous Infusions:  Scheduled Meds:   atorvastatin  20 mg Per NG tube Nightly    ceFEPime (MAXIPIME) IVPB  1 g Intravenous Q8H    citalopram  20 mg Per NG tube Daily    diltiaZEM  30 mg Per NG tube Q6H    gabapentin  300 mg Per NG tube TID    heparin (porcine)  5,000 Units Subcutaneous Q8H    insulin aspart U-100  6 Units Subcutaneous 6 times per day    insulin detemir U-100  13 Units Subcutaneous BID    metoprolol tartrate  12.5 mg Per NG tube Q8H    tamsulosin  0.4 mg Per NG tube Daily    traZODone  50 mg Per NG tube QHS    valsartan  40 mg Per NG tube Daily    vancomycin (VANCOCIN) IVPB  1,000 mg Intravenous Q12H     PRN Meds:acetaminophen, aluminum-magnesium hydroxide-simethicone, benzonatate, dextrose 50%, fentaNYL, glucagon (human recombinant), hydrocodone-acetaminophen 5-325mg, insulin aspart U-100, labetalol, lidocaine HCL 2%, ondansetron     Review of Systems  Objective:     Weight: 99.5 kg (219 lb 5.7 oz)  Body mass index is 29.75 kg/m².  Vital Signs (Most Recent):  Temp: 98.3 °F (36.8 °C) (04/27/18 1227)  Pulse: 74 (04/27/18 1100)  Resp: 17 (04/27/18 1227)  BP: (!) 155/73 (04/27/18 1227)  SpO2: 95 % (04/27/18 1227) Vital Signs (24h Range):  Temp:  [97.6 °F (36.4 °C)-100.6 °F (38.1 °C)] 98.3 °F (36.8 °C)  Pulse:  [65-87] 74  Resp:  [15-18] 17  SpO2:  [91 %-97 %] 95 %  BP: (125-155)/(63-73) 155/73                           NG/OG Tube 04/21/18 2300 Cortrak Right nostril (Active)   Placement Check placement verified by aspirate  characteristics;placement verified by distal tube length measurement 4/26/2018  8:45 PM   Distal Tube Length (cm) 75 4/25/2018  7:05 AM   Tolerance no signs/symptoms of discomfort 4/26/2018  8:45 PM   Securement anchored to nostril center w/ adhesive device 4/26/2018  8:45 PM   Clamp Status/Tolerance unclamped;no abdominal distention;no emesis;no nausea;no residual;no restlessness 4/26/2018  8:45 PM   Insertion Site Appearance no redness, warmth, tenderness, skin breakdown, drainage 4/26/2018  8:45 PM   Drainage None 4/25/2018 11:05 AM   Flush/Irrigation flushed w/;water;no resistance met 4/26/2018  8:45 PM   Feeding Method continuous 4/26/2018  8:45 PM   Current Rate (mL/hr) 55 mL/hr 4/26/2018  8:45 PM   Goal Rate (mL/hr) 55 mL/hr 4/26/2018  8:45 PM   Intake (mL) 30 mL 4/24/2018  5:00 PM   Water Bolus (mL) 100 mL 4/26/2018  5:00 AM   Rate Formula Tube Feeding (mL/hr) 55 mL/hr 4/24/2018  4:00 PM   Intake (mL) - Formula Tube Feeding 513 4/26/2018  5:00 AM   Residual Amount (ml) 0 ml 4/24/2018  7:05 AM       Male External Urinary Catheter 04/23/18 1345 Medium (Active)   Collection Container Standard drainage bag 4/26/2018  8:45 PM   Skin reddened 4/26/2018  8:45 PM   Tolerance no signs/symptoms of discomfort 4/26/2018  8:45 PM   Output (mL) 575 mL 4/27/2018  6:00 AM   Catheter Change Date 04/26/18 4/26/2018  8:45 PM   Catheter Change Time 2300 4/26/2018  8:45 PM       Neurosurgery Physical Exam  General: well developed, well nourished. no acute distress. Generalized deconditioning   Neurologic: Awake and oriented to self and hospital. Thought content appropriate.  Head: normocephalic, atraumatic   GCS: Motor: 6/Verbal: 4/Eyes: 4 GCS Total: 14  Cranial nerves: face symmetric, tongue midline, pupils equal, round, reactive to light with accomodation, extraocular muscles intact. CN II-XII grossly intact.   Language: no aphasia  Speech:  dysarthria   Sensory: decrease response to light touch in BUE  Motor Strength: Moves  all extremities spontaneously with good tone.      Strength   Deltoids Triceps Biceps Wrist Extension Wrist Flexion Hand    Upper: R 2/5 3/5 3/5 3/5 3/5 3/5     L 3/5 4-/5 4-/5 3/5 3/5 4-/5       Iliopsoas Quadriceps Knee  Flexion Tibialis  anterior Gastro- cnemius EHL   Lower: R 2/5 2/5 2/5 4-/5 4-/5 4-/5     L 4-/5 3/5 3/5 4+/5 4+/5 4+/5         [place]: present on right   Clonus: absent  Babinski: absent   ENT: normal hearing with finger rub; NG tube in place  Heart: RRR, no cyanosis, pallor, or edema.   Lungs:  normal respiratory effort with NC O2  Abdomen: soft, non-tender and symmetric  Extremities: warm with no cyanosis, edema, or clubbing.   Pulses: palpable distal pulses  Skin: warm, dry and intact. No visible rashes or lesions.         wound is c/d/i, no erythema, ttp, or drainage.  wound edges are well approximated. There is palpable edema at surgical incision site, likely hematoma.       Significant Labs:    Recent Labs  Lab 04/26/18  0349 04/27/18  0349   * 181*   * 147*   K 4.1 4.1    109   CO2 30* 31*   BUN 26* 25*   CREATININE 0.7 0.7   CALCIUM 11.3* 11.2*   MG 2.1 1.9       Recent Labs  Lab 04/26/18  0349 04/27/18  0350   WBC 23.01* 17.21*   HGB 10.0* 9.8*   HCT 32.8* 32.1*    285     No results for input(s): LABPT, INR, APTT in the last 48 hours.  Microbiology Results (last 7 days)     Procedure Component Value Units Date/Time    Urine culture [[id number]] Collected:  04/27/18 1218    Order Status:  Sent Specimen:  Urine from Urine, Catheterized Updated:  04/27/18 1218    Urine culture [[id number]] Collected:  04/27/18 1218    Order Status:  Sent Specimen:  Urine from Urine, Catheterized Updated:  04/27/18 1218    Blood culture [[id number]] Collected:  04/21/18 1335    Order Status:  Completed Specimen:  Blood Updated:  04/26/18 1612     Blood Culture, Routine No growth after 5 days.    Narrative:       Blood cultures from 2 different sites. 4 bottles total.  Please draw  before starting antibiotics.    Blood culture [[id number]] Collected:  04/21/18 1335    Order Status:  Completed Specimen:  Blood Updated:  04/26/18 1612     Blood Culture, Routine No growth after 5 days.    Narrative:       Blood cultures x 2 different sites. 4 bottles total. Please  draw cultures before administering antibiotics.    C Diff Toxin by PCR [[id number]] Collected:  04/24/18 1151    Order Status:  Completed Updated:  04/24/18 2330     C. diff PCR Negative    Clostridium difficile EIA [[id number]]  (Abnormal) Collected:  04/24/18 1151    Order Status:  Completed Specimen:  Stool from Stool Updated:  04/24/18 2238     C. diff Antigen Positive (A)     C difficile Toxins A+B, EIA Negative     Comment: Testing not recommended for children <24 months old.       Blood culture [[id number]] Collected:  04/19/18 1005    Order Status:  Completed Specimen:  Blood from Peripheral, Wrist, Right Updated:  04/24/18 1412     Blood Culture, Routine No growth after 5 days.    Narrative:       Blood cultures from 2 different sites. 4 bottles total.  Please draw before starting antibiotics.    Blood culture [[id number]] Collected:  04/19/18 0952    Order Status:  Completed Specimen:  Blood from Peripheral, Hand, Left Updated:  04/24/18 1412     Blood Culture, Routine No growth after 5 days.    Narrative:       Blood cultures x 2 different sites. 4 bottles total. Please  draw cultures before administering antibiotics.    Urine culture [[id number]]  (Susceptibility) Collected:  04/21/18 1430    Order Status:  Completed Specimen:  Urine from Urine, Catheterized Updated:  04/23/18 1800     Urine Culture, Routine --     PSEUDOMONAS AERUGINOSA  10,000 - 49,999 cfu/ml      Culture, Respiratory with Gram Stain [[id number]] Collected:  04/19/18 0231    Order Status:  Completed Specimen:  Respiratory from Sputum, Expectorated Updated:  04/21/18 1059     Respiratory Culture Normal respiratory mitzy     Gram Stain (Respiratory) <10 epithelial  cells per low power field.     Gram Stain (Respiratory) Many WBC's     Gram Stain (Respiratory) Many Gram positive cocci     Gram Stain (Respiratory) Many Gram negative rods     Gram Stain (Respiratory) Rare Gram positive rods            Significant Diagnostics:  None new. Pending MRI C spine w/wo ordered 4/27/18

## 2018-04-27 NOTE — PLAN OF CARE
TAN following for DC needs. TAN in communication with CM.    TAN sent updated notes to [facility] Fu via [facility] and notified them that the patient has stepped down.     Yvonne Ellington, GARY  [id number]

## 2018-04-27 NOTE — ASSESSMENT & PLAN NOTE
--speech continues to recommend NPO  --currently receiving TFs  --if no improvement, recommend surgery evaluation for possible PEG

## 2018-04-27 NOTE — ASSESSMENT & PLAN NOTE
82 y.o. male s/p C3-5 ACDF for cervical myelopathy on 4/10 with Dr. Hess, POD#16     -Patient was wheel chair bound prior to this admission and now further deconditioned. MRI cervical spine with worsened fluid collection in surgical bed concerning for CSF.  -Plan for OR today for wound washout and csf repair  -Transfer to ICU today for close monitoring  -NPO per ST--unsure when patient will progress. IR consulted for PEG placement 4/27/18. Will post-pone PEG until after surgery  -CV goal normotension. Continue home meds. PRN meds SBP >160.  -Heme- hgb/hct stable. Waxing and waning leukocytosis. Low grade fever yesterday evening 4/26/18 with Tmax 100.6. Added flagyl to abx regimen for concern for aspiration PNA. Currently on vanc, flagyl and cefepime. Consult ID for assistance with abx regimen and meningitis prophylaxis  -Urinary retention- condom cath in place, continue Flomax  -UTI Pseudomonas on culture 4/21; repeat UA/Urine cx 4/27  -Bcx 4/21 NGTD  -Continue PILI/SCDs/SQH  -Continue bowel regimen daily. C. Diff negative   -Continue to hold Eliquis. Per , indications unclear.  does not recommend continuing upon discharge.  -Continue aggressive PT/OT/ST  -Cervical collar when OOB or with activity  -IS to bed side. Patient to use atleast 10x every hour. Scheduled CPT/duonebs.   -Neurovascular checks q4h      Dispo: Therapy recommending SNF placement; pending medical stability and likely peg placement.       Addendum 4/27/18 2:36pm  Reviewed CT head with Dr. Hess. Bilateral chronic hygroma R>L. No acute hemorrhage seen. Will repeat CT head prior to discharge.      Discussed with Dr. Hess

## 2018-04-27 NOTE — PLAN OF CARE
Problem: Occupational Therapy Goal  Goal: Occupational Therapy Goal  Goals to be met by: 5/9  Patient will increase functional independence with ADLs by performing:    Squat pivot t/f to wheelchair with total(A).  Supine<>sit ; sit<>supine with Moderate Assistance.  Grooming while seated EOB with minimal assistance to wash face.  (Keep for consistency)  Pt will tolerate EOB activity ~15 min duration with min(A) for postural control.   Family/caregiver demo understanding for ROM and positioning for B UE.          POC remains appropriate.    MADDIE Farooq  4/27/2018

## 2018-04-27 NOTE — PROGRESS NOTES
[facility]  Neurosurgery  Progress Note    Subjective:     History of Present Illness: 82 y.o.  male with type 2 diabetes, who presents today for follow up evaluation one week prior to 2 level ACDF scheduled for 4/10/2018. Pt reports    Pt presents today wearing a cervical collar and in a wheelchair. He would like to proceed with surgery. Per pt's family, his PCP would like to see him on 4/9, the day before surgery. He notes continued neck pain and BUE numbness/tingling as well as BUE weakness. Pt is only able to walk a few steps unassisted. He has never received neck surgery.     Post-Op Info:  Procedure(s) (LRB):  DISKECTOMY AND FUSION-ANTERIOR CERVICAL (ACDF); C5-6. C6-7 (N/A)   17 Days Post-Op     Interval History: pod#15 for ACDF C3-5 for cervical myelopathy. Patient with waxing and waning leukocytosis. Low grade fever yesterday evening with Tmax 100.6. Patient has no pain on exam. He is deconditioned overall. Medicine re-consulted for co-management since patient stepped down. He continues to have dysphagia and speech therapy is unsure when he will progress with swallowing.  UA ordered yesterday, nursing staff to collect.     Medications:  Continuous Infusions:  Scheduled Meds:   atorvastatin  20 mg Per NG tube Nightly    ceFEPime (MAXIPIME) IVPB  1 g Intravenous Q8H    citalopram  20 mg Per NG tube Daily    diltiaZEM  30 mg Per NG tube Q6H    gabapentin  300 mg Per NG tube TID    heparin (porcine)  5,000 Units Subcutaneous Q8H    insulin aspart U-100  6 Units Subcutaneous 6 times per day    insulin detemir U-100  13 Units Subcutaneous BID    metoprolol tartrate  12.5 mg Per NG tube Q8H    tamsulosin  0.4 mg Per NG tube Daily    traZODone  50 mg Per NG tube QHS    valsartan  40 mg Per NG tube Daily    vancomycin (VANCOCIN) IVPB  1,000 mg Intravenous Q12H     PRN Meds:acetaminophen, aluminum-magnesium hydroxide-simethicone, benzonatate, dextrose 50%, fentaNYL, glucagon (human  recombinant), hydrocodone-acetaminophen 5-325mg, insulin aspart U-100, labetalol, lidocaine HCL 2%, ondansetron     Review of Systems  Objective:     Weight: 99.5 kg (219 lb 5.7 oz)  Body mass index is 29.75 kg/m².  Vital Signs (Most Recent):  Temp: 98.3 °F (36.8 °C) (04/27/18 1227)  Pulse: 74 (04/27/18 1100)  Resp: 17 (04/27/18 1227)  BP: (!) 155/73 (04/27/18 1227)  SpO2: 95 % (04/27/18 1227) Vital Signs (24h Range):  Temp:  [97.6 °F (36.4 °C)-100.6 °F (38.1 °C)] 98.3 °F (36.8 °C)  Pulse:  [65-87] 74  Resp:  [15-18] 17  SpO2:  [91 %-97 %] 95 %  BP: (125-155)/(63-73) 155/73                           NG/OG Tube 04/21/18 2300 Cortrak Right nostril (Active)   Placement Check placement verified by aspirate characteristics;placement verified by distal tube length measurement 4/26/2018  8:45 PM   Distal Tube Length (cm) 75 4/25/2018  7:05 AM   Tolerance no signs/symptoms of discomfort 4/26/2018  8:45 PM   Securement anchored to nostril center w/ adhesive device 4/26/2018  8:45 PM   Clamp Status/Tolerance unclamped;no abdominal distention;no emesis;no nausea;no residual;no restlessness 4/26/2018  8:45 PM   Insertion Site Appearance no redness, warmth, tenderness, skin breakdown, drainage 4/26/2018  8:45 PM   Drainage None 4/25/2018 11:05 AM   Flush/Irrigation flushed w/;water;no resistance met 4/26/2018  8:45 PM   Feeding Method continuous 4/26/2018  8:45 PM   Current Rate (mL/hr) 55 mL/hr 4/26/2018  8:45 PM   Goal Rate (mL/hr) 55 mL/hr 4/26/2018  8:45 PM   Intake (mL) 30 mL 4/24/2018  5:00 PM   Water Bolus (mL) 100 mL 4/26/2018  5:00 AM   Rate Formula Tube Feeding (mL/hr) 55 mL/hr 4/24/2018  4:00 PM   Intake (mL) - Formula Tube Feeding 513 4/26/2018  5:00 AM   Residual Amount (ml) 0 ml 4/24/2018  7:05 AM       Male External Urinary Catheter 04/23/18 1345 Medium (Active)   Collection Container Standard drainage bag 4/26/2018  8:45 PM   Skin reddened 4/26/2018  8:45 PM   Tolerance no signs/symptoms of discomfort 4/26/2018   8:45 PM   Output (mL) 575 mL 4/27/2018  6:00 AM   Catheter Change Date 04/26/18 4/26/2018  8:45 PM   Catheter Change Time 2300 4/26/2018  8:45 PM       Neurosurgery Physical Exam  General: well developed, well nourished. no acute distress. Generalized deconditioning   Neurologic: Awake and oriented to self and hospital. Thought content appropriate.  Head: normocephalic, atraumatic   GCS: Motor: 6/Verbal: 4/Eyes: 4 GCS Total: 14  Cranial nerves: face symmetric, tongue midline, pupils equal, round, reactive to light with accomodation, extraocular muscles intact. CN II-XII grossly intact.   Language: no aphasia  Speech:  dysarthria   Sensory: decrease response to light touch in BUE  Motor Strength: Moves all extremities spontaneously with good tone.      Strength   Deltoids Triceps Biceps Wrist Extension Wrist Flexion Hand    Upper: R 2/5 3/5 3/5 3/5 3/5 3/5     L 3/5 4-/5 4-/5 3/5 3/5 4-/5       Iliopsoas Quadriceps Knee  Flexion Tibialis  anterior Gastro- cnemius EHL   Lower: R 2/5 2/5 2/5 4-/5 4-/5 4-/5     L 4-/5 3/5 3/5 4+/5 4+/5 4+/5         [place]: present on right   Clonus: absent  Babinski: absent   ENT: normal hearing with finger rub; NG tube in place  Heart: RRR, no cyanosis, pallor, or edema.   Lungs:  normal respiratory effort with NC O2  Abdomen: soft, non-tender and symmetric  Extremities: warm with no cyanosis, edema, or clubbing.   Pulses: palpable distal pulses  Skin: warm, dry and intact. No visible rashes or lesions.         wound is c/d/i, no erythema, ttp, or drainage.  wound edges are well approximated. There is palpable edema at surgical incision site, likely hematoma.       Significant Labs:    Recent Labs  Lab 04/26/18 0349 04/27/18  0349   * 181*   * 147*   K 4.1 4.1    109   CO2 30* 31*   BUN 26* 25*   CREATININE 0.7 0.7   CALCIUM 11.3* 11.2*   MG 2.1 1.9       Recent Labs  Lab 04/26/18 0349 04/27/18  0350   WBC 23.01* 17.21*   HGB 10.0* 9.8*   HCT 32.8* 32.1*   PLT  297 285     No results for input(s): LABPT, INR, APTT in the last 48 hours.  Microbiology Results (last 7 days)     Procedure Component Value Units Date/Time    Urine culture [[id number]] Collected:  04/27/18 1218    Order Status:  Sent Specimen:  Urine from Urine, Catheterized Updated:  04/27/18 1218    Urine culture [[id number]] Collected:  04/27/18 1218    Order Status:  Sent Specimen:  Urine from Urine, Catheterized Updated:  04/27/18 1218    Blood culture [[id number]] Collected:  04/21/18 1335    Order Status:  Completed Specimen:  Blood Updated:  04/26/18 1612     Blood Culture, Routine No growth after 5 days.    Narrative:       Blood cultures from 2 different sites. 4 bottles total.  Please draw before starting antibiotics.    Blood culture [[id number]] Collected:  04/21/18 1335    Order Status:  Completed Specimen:  Blood Updated:  04/26/18 1612     Blood Culture, Routine No growth after 5 days.    Narrative:       Blood cultures x 2 different sites. 4 bottles total. Please  draw cultures before administering antibiotics.    C Diff Toxin by PCR [[id number]] Collected:  04/24/18 1151    Order Status:  Completed Updated:  04/24/18 2330     C. diff PCR Negative    Clostridium difficile EIA [[id number]]  (Abnormal) Collected:  04/24/18 1151    Order Status:  Completed Specimen:  Stool from Stool Updated:  04/24/18 2238     C. diff Antigen Positive (A)     C difficile Toxins A+B, EIA Negative     Comment: Testing not recommended for children <24 months old.       Blood culture [[id number]] Collected:  04/19/18 1005    Order Status:  Completed Specimen:  Blood from Peripheral, Wrist, Right Updated:  04/24/18 1412     Blood Culture, Routine No growth after 5 days.    Narrative:       Blood cultures from 2 different sites. 4 bottles total.  Please draw before starting antibiotics.    Blood culture [[id number]] Collected:  04/19/18 0952    Order Status:  Completed Specimen:  Blood from Peripheral, Hand, Left Updated:   04/24/18 1412     Blood Culture, Routine No growth after 5 days.    Narrative:       Blood cultures x 2 different sites. 4 bottles total. Please  draw cultures before administering antibiotics.    Urine culture [[id number]]  (Susceptibility) Collected:  04/21/18 1430    Order Status:  Completed Specimen:  Urine from Urine, Catheterized Updated:  04/23/18 1800     Urine Culture, Routine --     PSEUDOMONAS AERUGINOSA  10,000 - 49,999 cfu/ml      Culture, Respiratory with Gram Stain [[id number]] Collected:  04/19/18 0231    Order Status:  Completed Specimen:  Respiratory from Sputum, Expectorated Updated:  04/21/18 1059     Respiratory Culture Normal respiratory mitzy     Gram Stain (Respiratory) <10 epithelial cells per low power field.     Gram Stain (Respiratory) Many WBC's     Gram Stain (Respiratory) Many Gram positive cocci     Gram Stain (Respiratory) Many Gram negative rods     Gram Stain (Respiratory) Rare Gram positive rods            Significant Diagnostics:  None new. Pending MRI C spine w/wo ordered 4/27/18    Assessment/Plan:     * Cervical myelopathy with cervical radiculopathy    82 y.o. male s/p C3-5 ACDF for cervical myelopathy on 4/10 with Dr. Hess, POD#15     -Patient was wheel chair bound prior to this admission and now further deconditioned. Weakness likely d/t generalized deconditioning. Given continued dysphagia, will obtain MRI C spine w/wo contrast today. Radiology required CT head to rule out any metals prior to MRI scan.   -HM consulted for comanagement since patient now stepped down to floor. Patient with multiple co-morbidities. Slightly elevated sodium and calcium. Hyperglycemia 200-150s on short and long acting insulin. On vanc and cefepime per NCC for Sirs. Defer to med for recs.   -Leave incision open to air. Healing well with some edema present, likely hematoma.   -NPO per ST--unsure when patient will progress. IR consulted for PEG placement 4/27/18  -CV goal normotension. Continue  home meds. PRN meds SBP >160.  -Heme/ID- hgb/hct stable. Waxing and waning leukocytosis. Low grade fever yesterday evening 4/26/18 with Tmax 100.6. D/W hosp meds.   -Urinary retention- condom cath in place, continue Flomax  -UTI Pseudomonas on culture 4/21; repeat UA/Urine cx 4/27  -Bcx 4/21 NGTD  -Continue PILI/SCDs/SQH  -Continue bowel regimen daily. C. Diff negative   -Continue to hold Eliquis. Per , indications unclear.  does not recommend continuing upon discharge.  -Continue aggressive PT/OT/ST  -Cervical collar when OOB or with activity  -IS to bed side. Patient to use atleast 10x every hour. Scheduled CPT/duonebs.   -Neurovascular checks q4h      Dispo: Therapy recommending SNF placement; pending medical stability and likely peg placement.       Addendum 4/27/18 2:36pm  Reviewed CT head with Dr. Hess. Bilateral chronic hygroma R>L. No acute hemorrhage seen. Will repeat CT head prior to discharge.      Discussed with Dr. Deshawn Bradford PA-C  Neurosurgery  [facility]

## 2018-04-27 NOTE — PLAN OF CARE
Problem: Patient Care Overview  Goal: Plan of Care Review  Outcome: Ongoing (interventions implemented as appropriate)  Pt lying in bed sleeping. No distress noted. IsoSource @ goal of 55 cc/hr with no residual. VS stable, will cont to monitor

## 2018-04-28 ENCOUNTER — ANESTHESIA (OUTPATIENT)
Dept: SURGERY | Facility: HOSPITAL | Age: 83
End: 2018-04-28

## 2018-04-28 ENCOUNTER — ANESTHESIA EVENT (OUTPATIENT)
Dept: SURGERY | Facility: HOSPITAL | Age: 83
DRG: 471 | End: 2018-04-28
Payer: MEDICARE

## 2018-04-28 ENCOUNTER — ANESTHESIA (OUTPATIENT)
Dept: SURGERY | Facility: HOSPITAL | Age: 83
DRG: 471 | End: 2018-04-28
Payer: MEDICARE

## 2018-04-28 ENCOUNTER — ANESTHESIA EVENT (OUTPATIENT)
Dept: SURGERY | Facility: HOSPITAL | Age: 83
End: 2018-04-28

## 2018-04-28 PROBLEM — T88.8XXA FLUID COLLECTION AT SURGICAL SITE: Status: ACTIVE | Noted: 2018-04-28

## 2018-04-28 LAB
ABO + RH BLD: NORMAL
ALBUMIN SERPL BCP-MCNC: 2 G/DL
ALLENS TEST: ABNORMAL
ALP SERPL-CCNC: 82 U/L
ALT SERPL W/O P-5'-P-CCNC: 10 U/L
ANION GAP SERPL CALC-SCNC: 4 MMOL/L
AST SERPL-CCNC: 15 U/L
BACTERIA UR CULT: NO GROWTH
BASOPHILS # BLD AUTO: 0.04 K/UL
BASOPHILS NFR BLD: 0.3 %
BILIRUB SERPL-MCNC: 0.4 MG/DL
BLD GP AB SCN CELLS X3 SERPL QL: NORMAL
BUN SERPL-MCNC: 22 MG/DL
CALCIUM SERPL-MCNC: 11.1 MG/DL
CHLORIDE SERPL-SCNC: 108 MMOL/L
CO2 SERPL-SCNC: 31 MMOL/L
CREAT SERPL-MCNC: 0.7 MG/DL
DIFFERENTIAL METHOD: ABNORMAL
EOSINOPHIL # BLD AUTO: 0.2 K/UL
EOSINOPHIL NFR BLD: 1.1 %
ERYTHROCYTE [DISTWIDTH] IN BLOOD BY AUTOMATED COUNT: 15.2 %
EST. GFR  (AFRICAN AMERICAN): >60 ML/MIN/1.73 M^2
EST. GFR  (NON AFRICAN AMERICAN): >60 ML/MIN/1.73 M^2
GLUCOSE SERPL-MCNC: 175 MG/DL
GRAM STN SPEC: NORMAL
HCO3 UR-SCNC: 33.3 MMOL/L (ref 24–28)
HCT VFR BLD AUTO: 33.9 %
HGB BLD-MCNC: 10.1 G/DL
IMM GRANULOCYTES # BLD AUTO: 0.47 K/UL
IMM GRANULOCYTES NFR BLD AUTO: 3.4 %
LYMPHOCYTES # BLD AUTO: 1.8 K/UL
LYMPHOCYTES NFR BLD: 13.1 %
MAGNESIUM SERPL-MCNC: 2.1 MG/DL
MCH RBC QN AUTO: 30.3 PG
MCHC RBC AUTO-ENTMCNC: 29.8 G/DL
MCV RBC AUTO: 102 FL
MONOCYTES # BLD AUTO: 1.3 K/UL
MONOCYTES NFR BLD: 9.1 %
NEUTROPHILS # BLD AUTO: 10.2 K/UL
NEUTROPHILS NFR BLD: 73 %
NRBC BLD-RTO: 0 /100 WBC
PCO2 BLDA: 43.5 MMHG (ref 35–45)
PH SMN: 7.49 [PH] (ref 7.35–7.45)
PHOSPHATE SERPL-MCNC: 2.3 MG/DL
PLATELET # BLD AUTO: 295 K/UL
PMV BLD AUTO: 10.6 FL
PO2 BLDA: 68 MMHG (ref 80–100)
POC BE: 10 MMOL/L
POC SATURATED O2: 95 % (ref 95–100)
POC TCO2: 35 MMOL/L (ref 23–27)
POCT GLUCOSE: 137 MG/DL (ref 70–110)
POCT GLUCOSE: 159 MG/DL (ref 70–110)
POCT GLUCOSE: 163 MG/DL (ref 70–110)
POCT GLUCOSE: 176 MG/DL (ref 70–110)
POCT GLUCOSE: 183 MG/DL (ref 70–110)
POCT GLUCOSE: 189 MG/DL (ref 70–110)
POCT GLUCOSE: 214 MG/DL (ref 70–110)
POCT GLUCOSE: 222 MG/DL (ref 70–110)
POCT GLUCOSE: 232 MG/DL (ref 70–110)
POTASSIUM SERPL-SCNC: 4.3 MMOL/L
PROT SERPL-MCNC: 5.8 G/DL
RBC # BLD AUTO: 3.33 M/UL
SAMPLE: ABNORMAL
SITE: ABNORMAL
SODIUM SERPL-SCNC: 143 MMOL/L
TB INDURATION 48 - 72 HR READ: 0 MM
WBC # BLD AUTO: 13.92 K/UL

## 2018-04-28 PROCEDURE — 63600175 PHARM REV CODE 636 W HCPCS: Performed by: INTERNAL MEDICINE

## 2018-04-28 PROCEDURE — 87116 MYCOBACTERIA CULTURE: CPT

## 2018-04-28 PROCEDURE — 22849 REINSERT SPINAL FIXATION: CPT | Mod: 78,GC,, | Performed by: NEUROLOGICAL SURGERY

## 2018-04-28 PROCEDURE — 25000242 PHARM REV CODE 250 ALT 637 W/ HCPCS: Performed by: PHYSICIAN ASSISTANT

## 2018-04-28 PROCEDURE — S0030 INJECTION, METRONIDAZOLE: HCPCS | Performed by: STUDENT IN AN ORGANIZED HEALTH CARE EDUCATION/TRAINING PROGRAM

## 2018-04-28 PROCEDURE — 25000003 PHARM REV CODE 250: Performed by: NURSE ANESTHETIST, CERTIFIED REGISTERED

## 2018-04-28 PROCEDURE — 25000003 PHARM REV CODE 250: Performed by: PHYSICIAN ASSISTANT

## 2018-04-28 PROCEDURE — 82803 BLOOD GASES ANY COMBINATION: CPT

## 2018-04-28 PROCEDURE — 80053 COMPREHEN METABOLIC PANEL: CPT

## 2018-04-28 PROCEDURE — 87206 SMEAR FLUORESCENT/ACID STAI: CPT | Mod: 91

## 2018-04-28 PROCEDURE — D9220A PRA ANESTHESIA: Mod: ANES,,, | Performed by: ANESTHESIOLOGY

## 2018-04-28 PROCEDURE — 25000003 PHARM REV CODE 250: Performed by: NURSE PRACTITIONER

## 2018-04-28 PROCEDURE — 63600175 PHARM REV CODE 636 W HCPCS: Performed by: PSYCHIATRY & NEUROLOGY

## 2018-04-28 PROCEDURE — 63707 REPAIR SPINAL FLUID LEAKAGE: CPT | Mod: 78,59,GC, | Performed by: NEUROLOGICAL SURGERY

## 2018-04-28 PROCEDURE — 86920 COMPATIBILITY TEST SPIN: CPT

## 2018-04-28 PROCEDURE — 0RG10A0 FUSION OF CERVICAL VERTEBRAL JOINT WITH INTERBODY FUSION DEVICE, ANTERIOR APPROACH, ANTERIOR COLUMN, OPEN APPROACH: ICD-10-PCS | Performed by: NEUROLOGICAL SURGERY

## 2018-04-28 PROCEDURE — 00UT07Z SUPPLEMENT SPINAL MENINGES WITH AUTOLOGOUS TISSUE SUBSTITUTE, OPEN APPROACH: ICD-10-PCS | Performed by: NEUROLOGICAL SURGERY

## 2018-04-28 PROCEDURE — 27800903 OPTIME MED/SURG SUP & DEVICES OTHER IMPLANTS: Performed by: NEUROLOGICAL SURGERY

## 2018-04-28 PROCEDURE — 87075 CULTR BACTERIA EXCEPT BLOOD: CPT | Mod: 59

## 2018-04-28 PROCEDURE — 63600175 PHARM REV CODE 636 W HCPCS: Performed by: NURSE ANESTHETIST, CERTIFIED REGISTERED

## 2018-04-28 PROCEDURE — 36000710: Performed by: NEUROLOGICAL SURGERY

## 2018-04-28 PROCEDURE — 36556 INSERT NON-TUNNEL CV CATH: CPT | Performed by: ANESTHESIOLOGY

## 2018-04-28 PROCEDURE — 37000009 HC ANESTHESIA EA ADD 15 MINS: Performed by: NEUROLOGICAL SURGERY

## 2018-04-28 PROCEDURE — 63600175 PHARM REV CODE 636 W HCPCS: Performed by: NEUROLOGICAL SURGERY

## 2018-04-28 PROCEDURE — 87015 SPECIMEN INFECT AGNT CONCNTJ: CPT | Mod: 59

## 2018-04-28 PROCEDURE — 63600175 PHARM REV CODE 636 W HCPCS: Performed by: STUDENT IN AN ORGANIZED HEALTH CARE EDUCATION/TRAINING PROGRAM

## 2018-04-28 PROCEDURE — C1713 ANCHOR/SCREW BN/BN,TIS/BN: HCPCS | Performed by: NEUROLOGICAL SURGERY

## 2018-04-28 PROCEDURE — 86850 RBC ANTIBODY SCREEN: CPT

## 2018-04-28 PROCEDURE — 94668 MNPJ CHEST WALL SBSQ: CPT

## 2018-04-28 PROCEDURE — 87205 SMEAR GRAM STAIN: CPT

## 2018-04-28 PROCEDURE — 36600 WITHDRAWAL OF ARTERIAL BLOOD: CPT

## 2018-04-28 PROCEDURE — 12000002 HC ACUTE/MED SURGE SEMI-PRIVATE ROOM

## 2018-04-28 PROCEDURE — 20930 SP BONE ALGRFT MORSEL ADD-ON: CPT | Mod: GC,,, | Performed by: NEUROLOGICAL SURGERY

## 2018-04-28 PROCEDURE — D9220A PRA ANESTHESIA: Mod: CRNA,,, | Performed by: NURSE ANESTHETIST, CERTIFIED REGISTERED

## 2018-04-28 PROCEDURE — 85025 COMPLETE CBC W/AUTO DIFF WBC: CPT

## 2018-04-28 PROCEDURE — 87102 FUNGUS ISOLATION CULTURE: CPT

## 2018-04-28 PROCEDURE — 99291 CRITICAL CARE FIRST HOUR: CPT | Mod: ,,, | Performed by: PHYSICIAN ASSISTANT

## 2018-04-28 PROCEDURE — 84100 ASSAY OF PHOSPHORUS: CPT

## 2018-04-28 PROCEDURE — 22554 ARTHRD ANT NTRBD MIN DSC CRV: CPT | Mod: 78,51,GC, | Performed by: NEUROLOGICAL SURGERY

## 2018-04-28 PROCEDURE — 27000221 HC OXYGEN, UP TO 24 HOURS

## 2018-04-28 PROCEDURE — 71000039 HC RECOVERY, EACH ADD'L HOUR: Performed by: NEUROLOGICAL SURGERY

## 2018-04-28 PROCEDURE — 36556 INSERT NON-TUNNEL CV CATH: CPT | Mod: 59,,, | Performed by: ANESTHESIOLOGY

## 2018-04-28 PROCEDURE — 36415 COLL VENOUS BLD VENIPUNCTURE: CPT

## 2018-04-28 PROCEDURE — 94761 N-INVAS EAR/PLS OXIMETRY MLT: CPT

## 2018-04-28 PROCEDURE — 99232 SBSQ HOSP IP/OBS MODERATE 35: CPT | Mod: GC,,, | Performed by: INTERNAL MEDICINE

## 2018-04-28 PROCEDURE — 25000003 PHARM REV CODE 250: Performed by: NEUROLOGICAL SURGERY

## 2018-04-28 PROCEDURE — 63600175 PHARM REV CODE 636 W HCPCS: Performed by: ANESTHESIOLOGY

## 2018-04-28 PROCEDURE — 63600175 PHARM REV CODE 636 W HCPCS: Performed by: NURSE PRACTITIONER

## 2018-04-28 PROCEDURE — 71000033 HC RECOVERY, INTIAL HOUR: Performed by: NEUROLOGICAL SURGERY

## 2018-04-28 PROCEDURE — 22853 INSJ BIOMECHANICAL DEVICE: CPT | Mod: 59,GC,, | Performed by: NEUROLOGICAL SURGERY

## 2018-04-28 PROCEDURE — 87070 CULTURE OTHR SPECIMN AEROBIC: CPT | Mod: 59

## 2018-04-28 PROCEDURE — 83735 ASSAY OF MAGNESIUM: CPT

## 2018-04-28 PROCEDURE — 82962 GLUCOSE BLOOD TEST: CPT | Performed by: NEUROLOGICAL SURGERY

## 2018-04-28 PROCEDURE — 37000008 HC ANESTHESIA 1ST 15 MINUTES: Performed by: NEUROLOGICAL SURGERY

## 2018-04-28 PROCEDURE — 25000003 PHARM REV CODE 250: Performed by: STUDENT IN AN ORGANIZED HEALTH CARE EDUCATION/TRAINING PROGRAM

## 2018-04-28 PROCEDURE — C1729 CATH, DRAINAGE: HCPCS | Performed by: NEUROLOGICAL SURGERY

## 2018-04-28 PROCEDURE — 27201423 OPTIME MED/SURG SUP & DEVICES STERILE SUPPLY: Performed by: NEUROLOGICAL SURGERY

## 2018-04-28 PROCEDURE — 36000711: Performed by: NEUROLOGICAL SURGERY

## 2018-04-28 PROCEDURE — 94640 AIRWAY INHALATION TREATMENT: CPT

## 2018-04-28 DEVICE — IMPLANTABLE DEVICE: Type: IMPLANTABLE DEVICE | Site: SPINE CERVICAL | Status: FUNCTIONAL

## 2018-04-28 RX ORDER — ONDANSETRON HYDROCHLORIDE 2 MG/ML
INJECTION, SOLUTION INTRAMUSCULAR; INTRAVENOUS
Status: DISCONTINUED | OUTPATIENT
Start: 2018-04-28 | End: 2018-04-28

## 2018-04-28 RX ORDER — PROPOFOL 10 MG/ML
VIAL (ML) INTRAVENOUS
Status: DISCONTINUED | OUTPATIENT
Start: 2018-04-28 | End: 2018-04-28

## 2018-04-28 RX ORDER — FENTANYL CITRATE 50 UG/ML
INJECTION, SOLUTION INTRAMUSCULAR; INTRAVENOUS
Status: DISPENSED
Start: 2018-04-28 | End: 2018-04-29

## 2018-04-28 RX ORDER — DEXMEDETOMIDINE HYDROCHLORIDE 100 UG/ML
INJECTION, SOLUTION INTRAVENOUS
Status: DISCONTINUED | OUTPATIENT
Start: 2018-04-28 | End: 2018-04-28

## 2018-04-28 RX ORDER — PROPOFOL 10 MG/ML
VIAL (ML) INTRAVENOUS CONTINUOUS PRN
Status: DISCONTINUED | OUTPATIENT
Start: 2018-04-28 | End: 2018-04-28

## 2018-04-28 RX ORDER — METRONIDAZOLE 500 MG/100ML
INJECTION, SOLUTION INTRAVENOUS
Status: DISPENSED
Start: 2018-04-28 | End: 2018-04-29

## 2018-04-28 RX ORDER — LABETALOL HYDROCHLORIDE 5 MG/ML
INJECTION, SOLUTION INTRAVENOUS
Status: DISPENSED
Start: 2018-04-28 | End: 2018-04-29

## 2018-04-28 RX ORDER — MIDAZOLAM HYDROCHLORIDE 1 MG/ML
INJECTION INTRAMUSCULAR; INTRAVENOUS
Status: DISCONTINUED | OUTPATIENT
Start: 2018-04-28 | End: 2018-04-28

## 2018-04-28 RX ORDER — CEFEPIME HYDROCHLORIDE 2 G/1
2 INJECTION, POWDER, FOR SOLUTION INTRAVENOUS
Status: COMPLETED | OUTPATIENT
Start: 2018-04-28 | End: 2018-04-29

## 2018-04-28 RX ORDER — FENTANYL CITRATE 50 UG/ML
INJECTION, SOLUTION INTRAMUSCULAR; INTRAVENOUS
Status: DISCONTINUED | OUTPATIENT
Start: 2018-04-28 | End: 2018-04-28

## 2018-04-28 RX ORDER — VANCOMYCIN HYDROCHLORIDE 1 G/20ML
INJECTION, POWDER, LYOPHILIZED, FOR SOLUTION INTRAVENOUS
Status: DISCONTINUED | OUTPATIENT
Start: 2018-04-28 | End: 2018-04-28 | Stop reason: HOSPADM

## 2018-04-28 RX ORDER — BACITRACIN 50000 [IU]/1
INJECTION, POWDER, FOR SOLUTION INTRAMUSCULAR
Status: DISCONTINUED | OUTPATIENT
Start: 2018-04-28 | End: 2018-04-28 | Stop reason: HOSPADM

## 2018-04-28 RX ORDER — ROCURONIUM BROMIDE 10 MG/ML
INJECTION, SOLUTION INTRAVENOUS
Status: DISCONTINUED | OUTPATIENT
Start: 2018-04-28 | End: 2018-04-28

## 2018-04-28 RX ORDER — SUCCINYLCHOLINE CHLORIDE 20 MG/ML
INJECTION INTRAMUSCULAR; INTRAVENOUS
Status: DISCONTINUED | OUTPATIENT
Start: 2018-04-28 | End: 2018-04-28

## 2018-04-28 RX ORDER — HYDRALAZINE HYDROCHLORIDE 20 MG/ML
10 INJECTION INTRAMUSCULAR; INTRAVENOUS ONCE
Status: COMPLETED | OUTPATIENT
Start: 2018-04-28 | End: 2018-04-28

## 2018-04-28 RX ORDER — HEPARIN SODIUM 5000 [USP'U]/ML
INJECTION, SOLUTION INTRAVENOUS; SUBCUTANEOUS
Status: DISPENSED
Start: 2018-04-28 | End: 2018-04-29

## 2018-04-28 RX ORDER — VASOPRESSIN 20 [USP'U]/ML
INJECTION, SOLUTION INTRAMUSCULAR; SUBCUTANEOUS
Status: DISCONTINUED | OUTPATIENT
Start: 2018-04-28 | End: 2018-04-28

## 2018-04-28 RX ORDER — PHENYLEPHRINE HYDROCHLORIDE 10 MG/ML
INJECTION INTRAVENOUS
Status: DISCONTINUED | OUTPATIENT
Start: 2018-04-28 | End: 2018-04-28

## 2018-04-28 RX ORDER — EPHEDRINE SULFATE 50 MG/ML
INJECTION, SOLUTION INTRAVENOUS
Status: DISCONTINUED | OUTPATIENT
Start: 2018-04-28 | End: 2018-04-28

## 2018-04-28 RX ORDER — HYDRALAZINE HYDROCHLORIDE 20 MG/ML
INJECTION INTRAMUSCULAR; INTRAVENOUS
Status: DISPENSED
Start: 2018-04-28 | End: 2018-04-29

## 2018-04-28 RX ORDER — HYDROCODONE BITARTRATE AND ACETAMINOPHEN 5; 325 MG/1; MG/1
TABLET ORAL
Status: DISPENSED
Start: 2018-04-28 | End: 2018-04-29

## 2018-04-28 RX ORDER — GABAPENTIN 300 MG/1
CAPSULE ORAL
Status: DISPENSED
Start: 2018-04-28 | End: 2018-04-29

## 2018-04-28 RX ORDER — LIDOCAINE HYDROCHLORIDE 40 MG/ML
SOLUTION TOPICAL
Status: DISCONTINUED | OUTPATIENT
Start: 2018-04-28 | End: 2018-04-28

## 2018-04-28 RX ORDER — METOPROLOL TARTRATE 25 MG/1
TABLET ORAL
Status: DISPENSED
Start: 2018-04-28 | End: 2018-04-29

## 2018-04-28 RX ORDER — REMIFENTANIL HYDROCHLORIDE 1 MG/ML
INJECTION, POWDER, LYOPHILIZED, FOR SOLUTION INTRAVENOUS CONTINUOUS PRN
Status: DISCONTINUED | OUTPATIENT
Start: 2018-04-28 | End: 2018-04-28

## 2018-04-28 RX ORDER — SODIUM CHLORIDE 0.9 % (FLUSH) 0.9 %
3 SYRINGE (ML) INJECTION
Status: DISCONTINUED | OUTPATIENT
Start: 2018-04-28 | End: 2018-04-29 | Stop reason: HOSPADM

## 2018-04-28 RX ORDER — LIDOCAINE HCL/PF 100 MG/5ML
SYRINGE (ML) INTRAVENOUS
Status: DISCONTINUED | OUTPATIENT
Start: 2018-04-28 | End: 2018-04-28

## 2018-04-28 RX ADMIN — EPHEDRINE SULFATE 10 MG: 50 INJECTION, SOLUTION INTRAMUSCULAR; INTRAVENOUS; SUBCUTANEOUS at 02:04

## 2018-04-28 RX ADMIN — VASOPRESSIN 1 UNITS: 20 INJECTION INTRAVENOUS at 01:04

## 2018-04-28 RX ADMIN — CEFTRIAXONE 1 G: 1 INJECTION, SOLUTION INTRAVENOUS at 02:04

## 2018-04-28 RX ADMIN — METRONIDAZOLE 500 MG: 500 INJECTION, SOLUTION INTRAVENOUS at 06:04

## 2018-04-28 RX ADMIN — EPHEDRINE SULFATE 10 MG: 50 INJECTION, SOLUTION INTRAMUSCULAR; INTRAVENOUS; SUBCUTANEOUS at 01:04

## 2018-04-28 RX ADMIN — LIDOCAINE HYDROCHLORIDE 50 MG: 20 INJECTION, SOLUTION INTRAVENOUS at 01:04

## 2018-04-28 RX ADMIN — TAMSULOSIN HYDROCHLORIDE 0.4 MG: 0.4 CAPSULE ORAL at 10:04

## 2018-04-28 RX ADMIN — HEPARIN SODIUM 5000 UNITS: 5000 INJECTION, SOLUTION INTRAVENOUS; SUBCUTANEOUS at 10:04

## 2018-04-28 RX ADMIN — PROPOFOL 50 MCG/KG/MIN: 10 INJECTION, EMULSION INTRAVENOUS at 01:04

## 2018-04-28 RX ADMIN — Medication 12.5 MG: at 06:04

## 2018-04-28 RX ADMIN — CITALOPRAM HYDROBROMIDE 20 MG: 20 TABLET ORAL at 10:04

## 2018-04-28 RX ADMIN — METRONIDAZOLE 500 MG: 500 INJECTION, SOLUTION INTRAVENOUS at 10:04

## 2018-04-28 RX ADMIN — SODIUM CHLORIDE, SODIUM GLUCONATE, SODIUM ACETATE, POTASSIUM CHLORIDE, MAGNESIUM CHLORIDE, SODIUM PHOSPHATE, DIBASIC, AND POTASSIUM PHOSPHATE: .53; .5; .37; .037; .03; .012; .00082 INJECTION, SOLUTION INTRAVENOUS at 01:04

## 2018-04-28 RX ADMIN — DILTIAZEM HYDROCHLORIDE 30 MG: 30 TABLET, FILM COATED ORAL at 06:04

## 2018-04-28 RX ADMIN — Medication 12.5 MG: at 10:04

## 2018-04-28 RX ADMIN — PHENYLEPHRINE HYDROCHLORIDE 200 MCG: 10 INJECTION INTRAVENOUS at 02:04

## 2018-04-28 RX ADMIN — VASOPRESSIN 3 UNITS: 20 INJECTION INTRAVENOUS at 02:04

## 2018-04-28 RX ADMIN — INSULIN ASPART 4 UNITS: 100 INJECTION, SOLUTION INTRAVENOUS; SUBCUTANEOUS at 10:04

## 2018-04-28 RX ADMIN — MIDAZOLAM HYDROCHLORIDE 1 MG: 1 INJECTION, SOLUTION INTRAMUSCULAR; INTRAVENOUS at 12:04

## 2018-04-28 RX ADMIN — DEXMEDETOMIDINE HYDROCHLORIDE 20 MCG: 100 INJECTION, SOLUTION, CONCENTRATE INTRAVENOUS at 01:04

## 2018-04-28 RX ADMIN — TOPICAL ANESTHETIC 1 EACH: 200 SPRAY DENTAL; PERIODONTAL at 12:04

## 2018-04-28 RX ADMIN — CEFEPIME 1 G: 1 INJECTION, POWDER, FOR SOLUTION INTRAMUSCULAR; INTRAVENOUS at 06:04

## 2018-04-28 RX ADMIN — INSULIN DETEMIR 13 UNITS: 100 INJECTION, SOLUTION SUBCUTANEOUS at 08:04

## 2018-04-28 RX ADMIN — GABAPENTIN 300 MG: 300 CAPSULE ORAL at 10:04

## 2018-04-28 RX ADMIN — VASOPRESSIN 3 UNITS: 20 INJECTION INTRAVENOUS at 01:04

## 2018-04-28 RX ADMIN — ROCURONIUM BROMIDE 5 MG: 10 INJECTION, SOLUTION INTRAVENOUS at 01:04

## 2018-04-28 RX ADMIN — CEFEPIME 2 G: 2 INJECTION, POWDER, FOR SOLUTION INTRAVENOUS at 10:04

## 2018-04-28 RX ADMIN — ONDANSETRON 4 MG: 2 INJECTION, SOLUTION INTRAMUSCULAR; INTRAVENOUS at 04:04

## 2018-04-28 RX ADMIN — INSULIN ASPART 6 UNITS: 100 INJECTION, SOLUTION INTRAVENOUS; SUBCUTANEOUS at 04:04

## 2018-04-28 RX ADMIN — FENTANYL CITRATE 12.5 MCG: 50 INJECTION, SOLUTION INTRAMUSCULAR; INTRAVENOUS at 08:04

## 2018-04-28 RX ADMIN — PHENYLEPHRINE HYDROCHLORIDE 200 MCG: 10 INJECTION INTRAVENOUS at 01:04

## 2018-04-28 RX ADMIN — ROCURONIUM BROMIDE 25 MG: 10 INJECTION, SOLUTION INTRAVENOUS at 01:04

## 2018-04-28 RX ADMIN — HYDROCODONE BITARTRATE AND ACETAMINOPHEN 1 TABLET: 5; 325 TABLET ORAL at 10:04

## 2018-04-28 RX ADMIN — IPRATROPIUM BROMIDE AND ALBUTEROL SULFATE 3 ML: .5; 3 SOLUTION RESPIRATORY (INHALATION) at 09:04

## 2018-04-28 RX ADMIN — HEPARIN SODIUM 5000 UNITS: 5000 INJECTION, SOLUTION INTRAVENOUS; SUBCUTANEOUS at 06:04

## 2018-04-28 RX ADMIN — FENTANYL CITRATE 50 MCG: 50 INJECTION, SOLUTION INTRAMUSCULAR; INTRAVENOUS at 01:04

## 2018-04-28 RX ADMIN — LIDOCAINE HYDROCHLORIDE 1 ML: 40 SPRAY LARYNGEAL; TRANSTRACHEAL at 12:04

## 2018-04-28 RX ADMIN — INSULIN DETEMIR 13 UNITS: 100 INJECTION, SOLUTION SUBCUTANEOUS at 10:04

## 2018-04-28 RX ADMIN — IPRATROPIUM BROMIDE AND ALBUTEROL SULFATE 3 ML: .5; 3 SOLUTION RESPIRATORY (INHALATION) at 12:04

## 2018-04-28 RX ADMIN — ATORVASTATIN CALCIUM 20 MG: 20 TABLET, FILM COATED ORAL at 11:04

## 2018-04-28 RX ADMIN — FENTANYL CITRATE 12.5 MCG: 50 INJECTION, SOLUTION INTRAMUSCULAR; INTRAVENOUS at 10:04

## 2018-04-28 RX ADMIN — PROPOFOL 100 MG: 10 INJECTION, EMULSION INTRAVENOUS at 01:04

## 2018-04-28 RX ADMIN — LABETALOL HYDROCHLORIDE 10 MG: 5 INJECTION, SOLUTION INTRAVENOUS at 06:04

## 2018-04-28 RX ADMIN — HYDRALAZINE HYDROCHLORIDE 10 MG: 20 INJECTION INTRAMUSCULAR; INTRAVENOUS at 07:04

## 2018-04-28 RX ADMIN — SUCCINYLCHOLINE CHLORIDE 160 MG: 20 INJECTION, SOLUTION INTRAMUSCULAR; INTRAVENOUS at 01:04

## 2018-04-28 RX ADMIN — VALSARTAN 40 MG: 40 TABLET ORAL at 10:04

## 2018-04-28 RX ADMIN — INSULIN ASPART 6 UNITS: 100 INJECTION, SOLUTION INTRAVENOUS; SUBCUTANEOUS at 10:04

## 2018-04-28 RX ADMIN — SODIUM CHLORIDE, SODIUM GLUCONATE, SODIUM ACETATE, POTASSIUM CHLORIDE, MAGNESIUM CHLORIDE, SODIUM PHOSPHATE, DIBASIC, AND POTASSIUM PHOSPHATE: .53; .5; .37; .037; .03; .012; .00082 INJECTION, SOLUTION INTRAVENOUS at 12:04

## 2018-04-28 RX ADMIN — IPRATROPIUM BROMIDE AND ALBUTEROL SULFATE 3 ML: .5; 3 SOLUTION RESPIRATORY (INHALATION) at 08:04

## 2018-04-28 RX ADMIN — Medication 0.1 MCG/KG/MIN: at 01:04

## 2018-04-28 NOTE — ASSESSMENT & PLAN NOTE
S/p ACDF on 4/13 complicated by CSF leak, lumbar drain earlier in admission, now out  4/28 after stepping patient down, MRI cervical spine revealed large fluid collection pushing on trachea and esophagus causing displacement, NSGY planning to take to OR emergently, intubated
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
## 2018-04-28 NOTE — PT/OT/SLP PROGRESS
Physical Therapy      Patient Name:  Hollis Pitts   MRN:  [id number]    Patient not seen today secondary to NSG stated patient was away at surgery and then going to ICU. Will check on patient's status tomorrow.     Steve Degroot II, PTA

## 2018-04-28 NOTE — ASSESSMENT & PLAN NOTE
"-persistent leukocytosis with WBC in 20's since 4/19  -patient with SIRS criteria 4/21, started on broad spectrum abx with vanc and cefepime, remains on these abx now  -patient with dysphagia and, per chart review, concern for aspiration event 4/21; CXR 4/21 showed "persistent bilateral areas of increased attenuation are noted within the lower lung zones concerning for edema, aspiration or pneumonia"  -UCx 4/21 grew pseudomonas sensitive to cefepime, currently with condom catheter  -BCx 4/21 NGTD, c.diff negative  -AXR 4/25- nonspecific bowel gas pattern with no evidence of bowel obstruction. No free air. Soft tissues and osseous structures are otherwise unremarkable  -US LE 4/19 negative for DVT, currently without and LE edema or tenderness  -CXR 4/27 shows no change from prior imaging  -ordered UCx, UA- pending, to ensure pseudomonas UTI resolved  -broadened abx 4/27 to include anaerobic coverage with flagyl q 8 hrs since there is concern for aspiration with dysphagia   -today WBC has trended down to 13 (yesterday 17), afebrile for past 24 hrs, and mental status has improved; recommend continuing flagyl and cefepime, discontinuing vancomycin, and following up UA and UCx results    "

## 2018-04-28 NOTE — NURSING
Notifed pts daughter Cathamas of pt's pending transfer to 7th floor, neuro ICU . Verbalized understanding.

## 2018-04-28 NOTE — CONSULTS
[facility]  Infectious Disease  Consult Note    Patient Name: Hollis Pitts  MRN: [id number]  Admission Date: 4/10/2018  Hospital Length of Stay: 18 days  Attending Physician: Osvaldo Hess MD  Primary Care Provider: Vanessa Fontaine NP     Isolation Status: No active isolations      Inpatient consult to Infectious Diseases  Consult performed by: ERIC CORONADO  Consult ordered by: HIMANSHU SOSA        Assessment/Plan:       Consult received.  Patient not in the room at the time or rounds as he was in the operating room.  In brief this is an 81 y/o male with a history of cervical spondylosis with myelopathy who underwent anterior cervical diskectomy and fusion with plating of C5-6 and C6-7.  This has been complicated by a probable CSF leak.  He had a few episodes of low grade fever and his wbc count was elevated but is now trending down.    Plan  1.  Will examine the patient and formally see the patient tomorrow.  2.  Continue vancomycin and metronidazole.  Dose of Cefepime adjusted for CNS dosing.      Eric Coronado MD  Infectious Disease  [facility]

## 2018-04-28 NOTE — ASSESSMENT & PLAN NOTE
--improved with addition of flagyl to abx regimen   -- likely multifactorial, patient at high risk for delirium due to age, prolonged hospitalization, UTI, possible aspiration, post-op inflammation, pain medications  -- Recommend the lowest dose opioid possible for pain and avoiding sedating medications  -- delirium precautions ordered

## 2018-04-28 NOTE — ASSESSMENT & PLAN NOTE
-- A1C 5.6, glucoses 224-137 past 24 hours  -- patient reports being controlled by lifestyle modification alone at home   -- currently on tube feeds with diabetisource   -- hospital insulin regimen: detemir 13 BID, aspart 6U q 4 hrs, MDSSI, q 4 POCT  -- agree with current regimen

## 2018-04-28 NOTE — PROGRESS NOTES
[facility]  Neurosurgery  Progress Note    Subjective:     History of Present Illness: 82 y.o.  male with type 2 diabetes, who presents today for follow up evaluation one week prior to 2 level ACDF scheduled for 4/10/2018. Pt reports    Pt presents today wearing a cervical collar and in a wheelchair. He would like to proceed with surgery. Per pt's family, his PCP would like to see him on 4/9, the day before surgery. He notes continued neck pain and BUE numbness/tingling as well as BUE weakness. Pt is only able to walk a few steps unassisted. He has never received neck surgery.     Post-Op Info:  Procedure(s) (LRB):  DISKECTOMY AND FUSION-ANTERIOR CERVICAL (ACDF); C5-6. C6-7 (N/A)   18 Days Post-Op     Interval History: pod#16 for ACDF C3-5 for cervical myelopathy. Afebrile overnight. MRI with worsened fluid collection concerning for CSF in the surgical bed causing mass effect on esophagus and trachea.  Patient with waxing and waning leukocytosis. Low grade fever yesterday evening with Tmax 100.6. Patient has no pain on exam. He is deconditioned overall. Medicine re-consulted for co-management since patient stepped down. He continues to have dysphagia and speech therapy is unsure when he will progress with swallowing.  UA ordered yesterday, nursing staff to collect.     Medications:  Continuous Infusions:  Scheduled Meds:   albuterol-ipratropium 2.5mg-0.5mg/3mL  3 mL Nebulization Q4H    atorvastatin  20 mg Per NG tube Nightly    ceFEPime (MAXIPIME) IVPB  1 g Intravenous Q8H    citalopram  20 mg Per NG tube Daily    diltiaZEM  30 mg Per NG tube Q6H    gabapentin  300 mg Per NG tube TID    heparin (porcine)  5,000 Units Subcutaneous Q8H    insulin aspart U-100  6 Units Subcutaneous 6 times per day    insulin detemir U-100  13 Units Subcutaneous BID    metoprolol tartrate  12.5 mg Per NG tube Q8H    metronidazole  500 mg Intravenous Q8H    tamsulosin  0.4 mg Per NG tube Daily     traZODone  50 mg Per NG tube QHS    valsartan  40 mg Per NG tube Daily     PRN Meds:acetaminophen, aluminum-magnesium hydroxide-simethicone, benzonatate, dextrose 50%, fentaNYL, glucagon (human recombinant), hydrocodone-acetaminophen 5-325mg, insulin aspart U-100, labetalol, lidocaine HCL 2%, ondansetron     Review of Systems    Objective:     Weight: 99.5 kg (219 lb 5.7 oz)  Body mass index is 29.75 kg/m².  Vital Signs (Most Recent):  Temp: 97.4 °F (36.3 °C) (04/28/18 0747)  Pulse: 72 (04/28/18 0911)  Resp: 18 (04/28/18 0911)  BP: (!) 145/70 (04/28/18 0747)  SpO2: 98 % (04/28/18 0911) Vital Signs (24h Range):  Temp:  [97.4 °F (36.3 °C)-98.3 °F (36.8 °C)] 97.4 °F (36.3 °C)  Pulse:  [63-85] 72  Resp:  [14-20] 18  SpO2:  [95 %-99 %] 98 %  BP: (118-162)/(67-77) 145/70                           NG/OG Tube 04/21/18 2300 Cortrak Right nostril (Active)   Placement Check placement verified by aspirate characteristics;placement verified by distal tube length measurement 4/26/2018  8:45 PM   Distal Tube Length (cm) 75 4/25/2018  7:05 AM   Tolerance no signs/symptoms of discomfort 4/26/2018  8:45 PM   Securement anchored to nostril center w/ adhesive device 4/26/2018  8:45 PM   Clamp Status/Tolerance unclamped;no abdominal distention;no emesis;no nausea;no residual;no restlessness 4/26/2018  8:45 PM   Insertion Site Appearance no redness, warmth, tenderness, skin breakdown, drainage 4/26/2018  8:45 PM   Drainage None 4/25/2018 11:05 AM   Flush/Irrigation flushed w/;water;no resistance met 4/26/2018  8:45 PM   Feeding Method continuous 4/26/2018  8:45 PM   Current Rate (mL/hr) 55 mL/hr 4/26/2018  8:45 PM   Goal Rate (mL/hr) 55 mL/hr 4/26/2018  8:45 PM   Intake (mL) 30 mL 4/24/2018  5:00 PM   Water Bolus (mL) 100 mL 4/26/2018  5:00 AM   Rate Formula Tube Feeding (mL/hr) 55 mL/hr 4/24/2018  4:00 PM   Intake (mL) - Formula Tube Feeding 513 4/26/2018  5:00 AM   Residual Amount (ml) 0 ml 4/24/2018  7:05 AM       Male External  Urinary Catheter 04/23/18 1345 Medium (Active)   Collection Container Standard drainage bag 4/26/2018  8:45 PM   Skin reddened 4/26/2018  8:45 PM   Tolerance no signs/symptoms of discomfort 4/26/2018  8:45 PM   Output (mL) 575 mL 4/27/2018  6:00 AM   Catheter Change Date 04/26/18 4/26/2018  8:45 PM   Catheter Change Time 2300 4/26/2018  8:45 PM       Neurosurgery Physical Exam    General: well developed, well nourished. no acute distress. Generalized deconditioning   Neurologic: Awake and oriented to self and hospital. Thought content appropriate.  Head: normocephalic, atraumatic   GCS: Motor: 6/Verbal: 4/Eyes: 4 GCS Total: 14  Cranial nerves: face symmetric, tongue midline, pupils equal, round, reactive to light with accomodation, extraocular muscles intact. CN II-XII grossly intact.   Language: no aphasia  Speech:  dysarthria   Sensory: decrease response to light touch in BUE  Motor Strength: Moves all extremities spontaneously with good tone.      Strength   Deltoids Triceps Biceps Wrist Extension Wrist Flexion Hand    Upper: R 2/5 3/5 3/5 3/5 3/5 3/5     L 3/5 4-/5 4-/5 3/5 3/5 4-/5       Iliopsoas Quadriceps Knee  Flexion Tibialis  anterior Gastro- cnemius EHL   Lower: R 2/5 2/5 2/5 4-/5 4-/5 4-/5     L 4-/5 3/5 3/5 4+/5 4+/5 4+/5         [place]: present on right   Clonus: absent  Babinski: absent   ENT: normal hearing with finger rub; NG tube in place  Heart: RRR, no cyanosis, pallor, or edema.   Lungs:  normal respiratory effort with NC O2  Abdomen: soft, non-tender and symmetric  Extremities: warm with no cyanosis, edema, or clubbing.   Pulses: palpable distal pulses  Skin: warm, dry and intact. No visible rashes or lesions.         wound is c/d/i, no erythema, ttp, or drainage.  wound edges are well approximated. There is palpable edema at surgical incision site      Significant Labs:    Recent Labs  Lab 04/27/18  0349 04/28/18  0454   * 175*   * 143   K 4.1 4.3    108   CO2 31* 31*    BUN 25* 22   CREATININE 0.7 0.7   CALCIUM 11.2* 11.1*   MG 1.9 2.1       Recent Labs  Lab 04/27/18  0350 04/28/18  0454   WBC 17.21* 13.92*   HGB 9.8* 10.1*   HCT 32.1* 33.9*    295     No results for input(s): LABPT, INR, APTT in the last 48 hours.  Microbiology Results (last 7 days)     Procedure Component Value Units Date/Time    Urine culture [[id number]] Collected:  04/27/18 1218    Order Status:  Sent Specimen:  Urine from Urine, Catheterized Updated:  04/27/18 1459    Urine culture [[id number]] Collected:  04/27/18 1218    Order Status:  Sent Specimen:  Urine from Urine, Catheterized Updated:  04/27/18 1218    Blood culture [[id number]] Collected:  04/21/18 1335    Order Status:  Completed Specimen:  Blood Updated:  04/26/18 1612     Blood Culture, Routine No growth after 5 days.    Narrative:       Blood cultures from 2 different sites. 4 bottles total.  Please draw before starting antibiotics.    Blood culture [[id number]] Collected:  04/21/18 1335    Order Status:  Completed Specimen:  Blood Updated:  04/26/18 1612     Blood Culture, Routine No growth after 5 days.    Narrative:       Blood cultures x 2 different sites. 4 bottles total. Please  draw cultures before administering antibiotics.    C Diff Toxin by PCR [[id number]] Collected:  04/24/18 1151    Order Status:  Completed Updated:  04/24/18 2330     C. diff PCR Negative    Clostridium difficile EIA [[id number]]  (Abnormal) Collected:  04/24/18 1151    Order Status:  Completed Specimen:  Stool from Stool Updated:  04/24/18 2238     C. diff Antigen Positive (A)     C difficile Toxins A+B, EIA Negative     Comment: Testing not recommended for children <24 months old.       Blood culture [[id number]] Collected:  04/19/18 1005    Order Status:  Completed Specimen:  Blood from Peripheral, Wrist, Right Updated:  04/24/18 1412     Blood Culture, Routine No growth after 5 days.    Narrative:       Blood cultures from 2 different sites. 4 bottles  total.  Please draw before starting antibiotics.    Blood culture [[id number]] Collected:  04/19/18 0952    Order Status:  Completed Specimen:  Blood from Peripheral, Hand, Left Updated:  04/24/18 1412     Blood Culture, Routine No growth after 5 days.    Narrative:       Blood cultures x 2 different sites. 4 bottles total. Please  draw cultures before administering antibiotics.    Urine culture [[id number]]  (Susceptibility) Collected:  04/21/18 1430    Order Status:  Completed Specimen:  Urine from Urine, Catheterized Updated:  04/23/18 1800     Urine Culture, Routine --     PSEUDOMONAS AERUGINOSA  10,000 - 49,999 cfu/ml      Culture, Respiratory with Gram Stain [[id number]] Collected:  04/19/18 0231    Order Status:  Completed Specimen:  Respiratory from Sputum, Expectorated Updated:  04/21/18 1059     Respiratory Culture Normal respiratory mitzy     Gram Stain (Respiratory) <10 epithelial cells per low power field.     Gram Stain (Respiratory) Many WBC's     Gram Stain (Respiratory) Many Gram positive cocci     Gram Stain (Respiratory) Many Gram negative rods     Gram Stain (Respiratory) Rare Gram positive rods            Significant Diagnostics:  MRI cervical spine 4/27/18 reviewed and agree with findings.   Large, 5.7 x 4.1 x 7.6 cm homogeneous fluid collection involving the soft tissues of the right anterior neck, interdigitating and between the right thyroid lobe and trachea with associated retropharyngeal extension as well as apparent involvement of the operative bed at the C5 through C7 level with abnormal fluid signal insinuating about spinal fixation hardware and intervertebral disc spaces at the C5-C6 and C6-C7 levels.  This fluid collection is increased in size when compared to prior CT examination of 04/17/2018.  Findings are concerning for possible postoperative seroma, hematoma, infected postoperative fluid collection.  Given the close proximity of this fluid collection to the thecal sac/anterior  epidural space, possible component of CSF leak not definitively excluded.  Clinical correlation advised.    Subsequent mass effect on the trachea and cervical esophagus with leftward displacement secondary to aforementioned large fluid collection within the soft tissues of the right neck as well as associated retropharyngeal component    Assessment/Plan:     * Cervical myelopathy with cervical radiculopathy    82 y.o. male s/p C3-5 ACDF for cervical myelopathy on 4/10 with Dr. Hess, POD#16     -Patient was wheel chair bound prior to this admission and now further deconditioned. MRI cervical spine with worsened fluid collection in surgical bed concerning for CSF.  -Plan for OR today for wound washout and csf repair  -Transfer to ICU today for close monitoring  -NPO per ST--unsure when patient will progress. IR consulted for PEG placement 4/27/18. Will post-pone PEG until after surgery  -CV goal normotension. Continue home meds. PRN meds SBP >160.  -Heme- hgb/hct stable. Waxing and waning leukocytosis. Low grade fever yesterday evening 4/26/18 with Tmax 100.6. Added flagyl to abx regimen for concern for aspiration PNA. Currently on vanc, flagyl and cefepime. Consult ID for assistance with abx regimen and meningitis prophylaxis  -Urinary retention- condom cath in place, continue Flomax  -UTI Pseudomonas on culture 4/21; repeat UA/Urine cx 4/27  -Bcx 4/21 NGTD  -Continue PILI/SCDs/SQH  -Continue bowel regimen daily. C. Diff negative   -Continue to hold Eliquis. Per , indications unclear.  does not recommend continuing upon discharge.  -Continue aggressive PT/OT/ST  -Cervical collar when OOB or with activity  -IS to bed side. Patient to use atleast 10x every hour. Scheduled CPT/duonebs.   -Neurovascular checks q4h      Dispo: Therapy recommending SNF placement; pending medical stability and likely peg placement.       Addendum 4/27/18 2:36pm  Reviewed CT head with Dr. Hess. Bilateral chronic hygroma R>L. No  acute hemorrhage seen. Will repeat CT head prior to discharge.      Discussed with Dr. Deshawn Carranza PA-C  Neurosurgery  [facility]

## 2018-04-28 NOTE — ANESTHESIA PROCEDURE NOTES
Central Line    Procedure start time: 4/28/2018 1:30 PM  Timeout: 4/28/2018 1:28 PM  Procedure end time: 4/28/2018 1:50 PM  Staffing  Anesthesiologist: GALINDO ALMARAZ JR  Performed: anesthesiologist   Anesthesiologist was present at the time of the procedure.  Indication  Indication: vascular access     Anesthesia   general anesthesia    Central Line  Skin Prep: skin prepped with ChloraPrep, skin prep agent completely dried prior to procedure  maximum sterile barriers used during central venous catheter insertion  hand hygiene performed prior to central venous catheter insertion  Location: right femoral,   Catheter type: triple lumen  Catheter Size: 7 Fr  Rationale:  surgical site in neck, with  hematoma in neck  Ultrasound: vascular probe with ultrasound  Vessel Caliber: medium, patent  Vascular Doppler:  not done, compressibility normal   Manometry: Venous cannualation confirmed by visual estimation of blood vessel pressure using manometry.  Insertion Attempts: 1   Securement:line sutured, chlorhexidine patch, sterile dressing applied and blood return through all ports     Post-Procedure  Adverse Events:none  Additional Notes  Line placed under urgent/emergent condition as pt bp was requiring significant support and no other adequate IV access available.

## 2018-04-28 NOTE — ANESTHESIA PREPROCEDURE EVALUATION
04/28/2018  [facility]  Anesthesia Pre-Operative Evaluation         Patient Name: Hollis Pitts  YOB: 1935  MRN: [id number]    SUBJECTIVE:     Pre-operative evaluation for Procedure(s) (LRB):  WASHOUT-CERVICAL-wound washout and csf leak repair with depuy (N/A     04/28/2018    Hollis Pitts is a 82 y.o. obese male w/ a significant PMHx of HTN, DM2, anxiety, cervical myelopathy and radiculopathy s/p discectomy and anterior fusion complicated by aspiration postoperatively as well as a fluid collection concerning for CSF leak at the surgical site now impinging upon the trachea on imaging who presents for the above procedure.     Likely difficult airway, will plan for glidescope intubation.    LDA:   Right hand 20g PIV  NG  Neck drain  Condom catheter    Prev airway:   Present Prior to Hospital Arrival?: No; Placement Date: 04/10/18; Placement Time: 1204; Method of Intubation: Glidescope (in line. no extension. patient semirecumbent.); Inserted by: Anesthesia Resident; Airway Device: Endotracheal Tube; Mask Ventilation: Easy; Intubated: Postinduction; Blade:  (glidescope 4); Airway Device Size: 7.5; Style: Cuffed; Cuff Inflation: Minimal occlusive pressure; Placement Verified By: Auscultation, Capnometry, ETT Condensation; Grade: Grade I; Complicating Factors: None; Removal Date: 04/10/18;  Removal Time: 1657    Drips: None documented.    Patient Active Problem List   Diagnosis    Abnormal EKG    Shortness of breath    Morbid obesity    Erectile dysfunction    BPH with urinary obstruction    Memory deficits    Anxiety    Cervical pain    Shoulder weakness    Decreased functional mobility    Cervical myelopathy with cervical radiculopathy    Benign prostatic hyperplasia with urinary retention    Type 2 diabetes mellitus with hyperglycemia, without long-term current  use of insulin    Essential hypertension    HX: anticoagulation    Other hyperlipidemia    Tachycardia    Leukocytosis    Sepsis    Inadequate dietary energy intake    Delirium    Dysphagia    Aspiration pneumonia of both lower lobes due to gastric secretions       Review of patient's allergies indicates:  No Known Allergies    Current Inpatient Medications:      Current Facility-Administered Medications on File Prior to Visit   Medication Dose Route Frequency Provider Last Rate Last Dose    acetaminophen tablet 650 mg  650 mg Per NG tube Q4H PRN KEVEN FaustinC   650 mg at 04/27/18 0049    albuterol-ipratropium 2.5mg-0.5mg/3mL nebulizer solution 3 mL  3 mL Nebulization Q4H Jose Bradford PA-C   3 mL at 04/28/18 0911    aluminum-magnesium hydroxide-simethicone 200-200-20 mg/5 mL suspension 30 mL  30 mL Per NG tube Q4H PRN KEVEN FaustinC        atorvastatin tablet 20 mg  20 mg Per NG tube Nightly LUIS ALBERTO Faustin-C   20 mg at 04/27/18 2150    benzonatate capsule 100 mg  100 mg Oral TID PRN Monika Galvan NP   100 mg at 04/19/18 2014    ceFEPIme injection 1 g  1 g Intravenous Q8H David Tapia MD   1 g at 04/28/18 0617    citalopram tablet 20 mg  20 mg Per NG tube Daily LUIS ALBERTO Faustin-C   20 mg at 04/28/18 1010    dextrose 50% injection 12.5 g  12.5 g Intravenous PRN Candice Hamm PA-C        diltiaZEM tablet 30 mg  30 mg Per NG tube Q6H LUIS ALBERTO Faustin-C   30 mg at 04/28/18 0600    fentaNYL injection 12.5 mcg  12.5 mcg Intravenous Q2H PRN Neo Pryor NP   12.5 mcg at 04/19/18 1456    gabapentin capsule 300 mg  300 mg Per NG tube TID LUIS ALBERTO Faustin-C   300 mg at 04/28/18 1010    glucagon (human recombinant) injection 1 mg  1 mg Intramuscular PRN LUIS ALBERTO Faustin-C        heparin (porcine) injection 5,000 Units  5,000 Units Subcutaneous Q8H Zeus Holbrook MD   5,000 Units at 04/28/18 0618    hydrocodone-acetaminophen 5-325mg per tablet 1 tablet  1 tablet Per  "NG tube Q4H PRN Candice Hamm PA-C   1 tablet at 04/27/18 0632    insulin aspart U-100 pen 1-10 Units  1-10 Units Subcutaneous Q4H PRN Osvaldo Hess MD   4 Units at 04/28/18 1014    insulin aspart U-100 pen 6 Units  6 Units Subcutaneous 6 times per day Monika Galvan NP   6 Units at 04/28/18 1015    insulin detemir U-100 pen 13 Units  13 Units Subcutaneous BID Monika Galvan NP   13 Units at 04/28/18 1013    labetalol injection 10 mg  10 mg Intravenous Q6H PRN Tamica Mercado NP   10 mg at 04/26/18 0938    lidocaine HCL 2% jelly   Topical (Top) PRN Tamica Mercado NP        metoprolol tartrate (LOPRESSOR) split tablet 12.5 mg  12.5 mg Per NG tube Q8H Candice Hamm PA-C   12.5 mg at 04/28/18 0618    metronidazole IVPB 500 mg  500 mg Intravenous Q8H Bonnie Gaston  mL/hr at 04/28/18 0645 500 mg at 04/28/18 0645    ondansetron disintegrating tablet 8 mg  8 mg Per NG tube Q6H PRN Candice Hamm PA-C        tamsulosin 24 hr capsule 0.4 mg  0.4 mg Per NG tube Daily Candice Hamm PA-C   0.4 mg at 04/28/18 1010    traZODone tablet 50 mg  50 mg Per NG tube QHS Candice Hamm PA-C   50 mg at 04/27/18 2151    valsartan tablet 40 mg  40 mg Per NG tube Daily Candice Hamm PA-C   40 mg at 04/28/18 1010     Current Outpatient Prescriptions on File Prior to Visit   Medication Sig Dispense Refill    atorvastatin (LIPITOR) 20 MG tablet Take 20 mg by mouth nightly.  0    BD INSULIN PEN NEEDLE UF SHORT 31 gauge x 5/16" Ndle USE WITH LANTUS PEN DAILY  0    bisacodyl (DULCOLAX) 5 mg EC tablet   0    CARTIA  mg Cp24 Take 90 mg by mouth.   0    citalopram (CELEXA) 20 MG tablet Take 20 mg by mouth once daily.  0    CONSTULOSE 10 gram/15 mL solution TAKE  30 MILLILITERS BY MOUTH 3 TIMES A DAY UNTIL BOWEL MOVEMENT ...  (REFER TO PRESCRIPTION NOTES).  0    ELIQUIS 2.5 mg Tab Take 2.5 mg by mouth 2 (two) times daily.   0    FREESTYLE TEST Strp   1    gabapentin (NEURONTIN) 100 MG capsule Take 100 mg " by mouth 3 (three) times daily as needed.   0    omega-3 fatty acids-fish oil (FISH OIL) 360-1,200 mg Cap Take 1,200 mg by mouth once daily.      SENSIPAR 30 mg Tab Take 30 mg by mouth daily with breakfast.   0    traZODone (DESYREL) 50 MG tablet Take 50 mg by mouth every evening.  0    valsartan (DIOVAN) 80 MG tablet Take 80 mg by mouth once daily.  0    vitamin B comp with C no.4 150 mg Tab Take vitamin B complex 1 a day.  Over-the-counter is an appropriate alternative. 30 tablet 12       Past Surgical History:   Procedure Laterality Date    CATARACT EXTRACTION W/  INTRAOCULAR LENS IMPLANT         Social History     Social History    Marital status:      Spouse name: N/A    Number of children: N/A    Years of education: N/A     Occupational History    Not on file.     Social History Main Topics    Smoking status: Former Smoker     Quit date: 2/6/1950    Smokeless tobacco: Never Used    Alcohol use 4.2 oz/week     7 Shots of liquor per week    Drug use: No    Sexual activity: Yes     Partners: Female     Other Topics Concern    Not on file     Social History Narrative    No narrative on file       OBJECTIVE:     Vital Signs Range (Last 24H):  Temp:  [36.3 °C (97.4 °F)-36.8 °C (98.3 °F)]   Pulse:  [63-85]   Resp:  [14-20]   BP: (118-162)/(67-77)   SpO2:  [95 %-99 %]       CBC:   Recent Labs      04/27/18   0350  04/28/18   0454   WBC  17.21*  13.92*   RBC  3.15*  3.33*   HGB  9.8*  10.1*   HCT  32.1*  33.9*   PLT  285  295   MCV  102*  102*   MCH  31.1*  30.3   MCHC  30.5*  29.8*       CMP:   Recent Labs      04/27/18   0349  04/28/18   0454   NA  147*  143   K  4.1  4.3   CL  109  108   CO2  31*  31*   BUN  25*  22   CREATININE  0.7  0.7   GLU  181*  175*   MG  1.9  2.1   PHOS  2.3*  2.3*   CALCIUM  11.2*  11.1*   ALBUMIN  1.9*  2.0*   PROT  5.5*  5.8*   ALKPHOS  86  82   ALT  8*  10   AST  11  15   BILITOT  0.4  0.4       INR:  No results for input(s): PT, INR, PROTIME, APTT in the last 72  hours.    Diagnostic Studies: No relevant studies.    EKG:   Sinus rhythm with Premature atrial complexes  Otherwise normal ECG  When compared with ECG of 19-MAR-2018 12:08,  Premature atrial complexes are now Present  Vent. rate has increased BY  29 BPM  Confirmed by Nancy Owen MD ([id number]) on 4/14/2018 11:32:23 AM    2D ECHO:  No results found for this or any previous visit.      ASSESSMENT/PLAN:         Pre-op Assessment    I have reviewed the Patient Summary Reports.      I have reviewed the Medications.     Review of Systems  Anesthesia Hx:  No previous Anesthesia  History of prior surgery of interest to airway management or planning: Previous anesthesia: General Denies Family Hx of Anesthesia complications.   Denies Personal Hx of Anesthesia complications.   Social:  Former Smoker, Social Alcohol Use    Hematology/Oncology:  Hematology Normal   Oncology Normal     EENT/Dental:EENT/Dental Normal   Cardiovascular:   Exercise tolerance: poor Hypertension, well controlled  Functional Capacity 1 METS    Pulmonary:  Pulmonary Normal    Renal/:  Renal/ Normal     Hepatic/GI:  Hepatic/GI Normal    Musculoskeletal:   Arthritis  Right upper extremity weakness, right hand edematous   Neurological:  Neurology Normal    Endocrine:   Diabetes, well controlled  Diabetes, Type 2 Diabetes for 40 years Typical AM glucose range: 96    Psych:   anxiety depression          Physical Exam  General:  Well nourished    Airway/Jaw/Neck:  Airway Findings: Mouth Opening: Small, but > 3cm Tongue: Normal  General Airway Assessment: Adult  Mallampati: II  Improves to II with phonation.  TM Distance: Normal, at least 6 cm  Jaw/Neck Findings:   - Pt has a C-collar in place     Dental:  Dental Findings: Upper Dentures   Chest/Lungs:  Chest/Lungs Findings: Clear to auscultation, Normal Respiratory Rate     Heart/Vascular:  Heart Findings: Rate: Normal  Rhythm: Regular Rhythm  Sounds: Normal        Mental Status:  Mental Status Findings:   Cooperative, Alert and Oriented         Anesthesia Plan  Type of Anesthesia, risks & benefits discussed:  Anesthesia Type:  general  Patient's Preference:   Intra-op Monitoring Plan: standard ASA monitors, arterial line and central line  Intra-op Monitoring Plan Comments:   Post Op Pain Control Plan: multimodal analgesia  Post Op Pain Control Plan Comments:   Induction:   IV  Beta Blocker:  Patient is on a Beta-Blocker and has received one dose within the past 24 hours (No further documentation required).       Informed Consent: Patient understands risks and agrees with Anesthesia plan.  Questions answered. Anesthesia consent signed with patient.  ASA Score: 4     Day of Surgery Review of History & Physical:    H&P update referred to the surgeon.         Ready For Surgery From Anesthesia Perspective.

## 2018-04-28 NOTE — SUBJECTIVE & OBJECTIVE
Interval History: pod#16 for ACDF C3-5 for cervical myelopathy. Afebrile overnight. MRI with worsened fluid collection concerning for CSF in the surgical bed causing mass effect on esophagus and trachea.  Patient with waxing and waning leukocytosis. Low grade fever yesterday evening with Tmax 100.6. Patient has no pain on exam. He is deconditioned overall. Medicine re-consulted for co-management since patient stepped down. He continues to have dysphagia and speech therapy is unsure when he will progress with swallowing.  UA ordered yesterday, nursing staff to collect.     Medications:  Continuous Infusions:  Scheduled Meds:   albuterol-ipratropium 2.5mg-0.5mg/3mL  3 mL Nebulization Q4H    atorvastatin  20 mg Per NG tube Nightly    ceFEPime (MAXIPIME) IVPB  1 g Intravenous Q8H    citalopram  20 mg Per NG tube Daily    diltiaZEM  30 mg Per NG tube Q6H    gabapentin  300 mg Per NG tube TID    heparin (porcine)  5,000 Units Subcutaneous Q8H    insulin aspart U-100  6 Units Subcutaneous 6 times per day    insulin detemir U-100  13 Units Subcutaneous BID    metoprolol tartrate  12.5 mg Per NG tube Q8H    metronidazole  500 mg Intravenous Q8H    tamsulosin  0.4 mg Per NG tube Daily    traZODone  50 mg Per NG tube QHS    valsartan  40 mg Per NG tube Daily     PRN Meds:acetaminophen, aluminum-magnesium hydroxide-simethicone, benzonatate, dextrose 50%, fentaNYL, glucagon (human recombinant), hydrocodone-acetaminophen 5-325mg, insulin aspart U-100, labetalol, lidocaine HCL 2%, ondansetron     Review of Systems    Objective:     Weight: 99.5 kg (219 lb 5.7 oz)  Body mass index is 29.75 kg/m².  Vital Signs (Most Recent):  Temp: 97.4 °F (36.3 °C) (04/28/18 0747)  Pulse: 72 (04/28/18 0911)  Resp: 18 (04/28/18 0911)  BP: (!) 145/70 (04/28/18 0747)  SpO2: 98 % (04/28/18 0911) Vital Signs (24h Range):  Temp:  [97.4 °F (36.3 °C)-98.3 °F (36.8 °C)] 97.4 °F (36.3 °C)  Pulse:  [63-85] 72  Resp:  [14-20] 18  SpO2:  [95 %-99  %] 98 %  BP: (118-162)/(67-77) 145/70                           NG/OG Tube 04/21/18 2300 Cortrak Right nostril (Active)   Placement Check placement verified by aspirate characteristics;placement verified by distal tube length measurement 4/26/2018  8:45 PM   Distal Tube Length (cm) 75 4/25/2018  7:05 AM   Tolerance no signs/symptoms of discomfort 4/26/2018  8:45 PM   Securement anchored to nostril center w/ adhesive device 4/26/2018  8:45 PM   Clamp Status/Tolerance unclamped;no abdominal distention;no emesis;no nausea;no residual;no restlessness 4/26/2018  8:45 PM   Insertion Site Appearance no redness, warmth, tenderness, skin breakdown, drainage 4/26/2018  8:45 PM   Drainage None 4/25/2018 11:05 AM   Flush/Irrigation flushed w/;water;no resistance met 4/26/2018  8:45 PM   Feeding Method continuous 4/26/2018  8:45 PM   Current Rate (mL/hr) 55 mL/hr 4/26/2018  8:45 PM   Goal Rate (mL/hr) 55 mL/hr 4/26/2018  8:45 PM   Intake (mL) 30 mL 4/24/2018  5:00 PM   Water Bolus (mL) 100 mL 4/26/2018  5:00 AM   Rate Formula Tube Feeding (mL/hr) 55 mL/hr 4/24/2018  4:00 PM   Intake (mL) - Formula Tube Feeding 513 4/26/2018  5:00 AM   Residual Amount (ml) 0 ml 4/24/2018  7:05 AM       Male External Urinary Catheter 04/23/18 1345 Medium (Active)   Collection Container Standard drainage bag 4/26/2018  8:45 PM   Skin reddened 4/26/2018  8:45 PM   Tolerance no signs/symptoms of discomfort 4/26/2018  8:45 PM   Output (mL) 575 mL 4/27/2018  6:00 AM   Catheter Change Date 04/26/18 4/26/2018  8:45 PM   Catheter Change Time 2300 4/26/2018  8:45 PM       Neurosurgery Physical Exam    General: well developed, well nourished. no acute distress. Generalized deconditioning   Neurologic: Awake and oriented to self and hospital. Thought content appropriate.  Head: normocephalic, atraumatic   GCS: Motor: 6/Verbal: 4/Eyes: 4 GCS Total: 14  Cranial nerves: face symmetric, tongue midline, pupils equal, round, reactive to light with accomodation,  extraocular muscles intact. CN II-XII grossly intact.   Language: no aphasia  Speech:  dysarthria   Sensory: decrease response to light touch in BUE  Motor Strength: Moves all extremities spontaneously with good tone.      Strength   Deltoids Triceps Biceps Wrist Extension Wrist Flexion Hand    Upper: R 2/5 3/5 3/5 3/5 3/5 3/5     L 3/5 4-/5 4-/5 3/5 3/5 4-/5       Iliopsoas Quadriceps Knee  Flexion Tibialis  anterior Gastro- cnemius EHL   Lower: R 2/5 2/5 2/5 4-/5 4-/5 4-/5     L 4-/5 3/5 3/5 4+/5 4+/5 4+/5         [place]: present on right   Clonus: absent  Babinski: absent   ENT: normal hearing with finger rub; NG tube in place  Heart: RRR, no cyanosis, pallor, or edema.   Lungs:  normal respiratory effort with NC O2  Abdomen: soft, non-tender and symmetric  Extremities: warm with no cyanosis, edema, or clubbing.   Pulses: palpable distal pulses  Skin: warm, dry and intact. No visible rashes or lesions.         wound is c/d/i, no erythema, ttp, or drainage.  wound edges are well approximated. There is palpable edema at surgical incision site      Significant Labs:    Recent Labs  Lab 04/27/18  0349 04/28/18  0454   * 175*   * 143   K 4.1 4.3    108   CO2 31* 31*   BUN 25* 22   CREATININE 0.7 0.7   CALCIUM 11.2* 11.1*   MG 1.9 2.1       Recent Labs  Lab 04/27/18  0350 04/28/18  0454   WBC 17.21* 13.92*   HGB 9.8* 10.1*   HCT 32.1* 33.9*    295     No results for input(s): LABPT, INR, APTT in the last 48 hours.  Microbiology Results (last 7 days)     Procedure Component Value Units Date/Time    Urine culture [[id number]] Collected:  04/27/18 1218    Order Status:  Sent Specimen:  Urine from Urine, Catheterized Updated:  04/27/18 1459    Urine culture [[id number]] Collected:  04/27/18 1218    Order Status:  Sent Specimen:  Urine from Urine, Catheterized Updated:  04/27/18 1218    Blood culture [[id number]] Collected:  04/21/18 1335    Order Status:  Completed Specimen:  Blood Updated:   04/26/18 1612     Blood Culture, Routine No growth after 5 days.    Narrative:       Blood cultures from 2 different sites. 4 bottles total.  Please draw before starting antibiotics.    Blood culture [[id number]] Collected:  04/21/18 1335    Order Status:  Completed Specimen:  Blood Updated:  04/26/18 1612     Blood Culture, Routine No growth after 5 days.    Narrative:       Blood cultures x 2 different sites. 4 bottles total. Please  draw cultures before administering antibiotics.    C Diff Toxin by PCR [[id number]] Collected:  04/24/18 1151    Order Status:  Completed Updated:  04/24/18 2330     C. diff PCR Negative    Clostridium difficile EIA [[id number]]  (Abnormal) Collected:  04/24/18 1151    Order Status:  Completed Specimen:  Stool from Stool Updated:  04/24/18 2238     C. diff Antigen Positive (A)     C difficile Toxins A+B, EIA Negative     Comment: Testing not recommended for children <24 months old.       Blood culture [[id number]] Collected:  04/19/18 1005    Order Status:  Completed Specimen:  Blood from Peripheral, Wrist, Right Updated:  04/24/18 1412     Blood Culture, Routine No growth after 5 days.    Narrative:       Blood cultures from 2 different sites. 4 bottles total.  Please draw before starting antibiotics.    Blood culture [[id number]] Collected:  04/19/18 0952    Order Status:  Completed Specimen:  Blood from Peripheral, Hand, Left Updated:  04/24/18 1412     Blood Culture, Routine No growth after 5 days.    Narrative:       Blood cultures x 2 different sites. 4 bottles total. Please  draw cultures before administering antibiotics.    Urine culture [[id number]]  (Susceptibility) Collected:  04/21/18 1430    Order Status:  Completed Specimen:  Urine from Urine, Catheterized Updated:  04/23/18 1800     Urine Culture, Routine --     PSEUDOMONAS AERUGINOSA  10,000 - 49,999 cfu/ml      Culture, Respiratory with Gram Stain [[id number]] Collected:  04/19/18 0231    Order Status:  Completed Specimen:   Respiratory from Sputum, Expectorated Updated:  04/21/18 1059     Respiratory Culture Normal respiratory mitzy     Gram Stain (Respiratory) <10 epithelial cells per low power field.     Gram Stain (Respiratory) Many WBC's     Gram Stain (Respiratory) Many Gram positive cocci     Gram Stain (Respiratory) Many Gram negative rods     Gram Stain (Respiratory) Rare Gram positive rods            Significant Diagnostics:  MRI cervical spine 4/27/18 reviewed and agree with findings.   Large, 5.7 x 4.1 x 7.6 cm homogeneous fluid collection involving the soft tissues of the right anterior neck, interdigitating and between the right thyroid lobe and trachea with associated retropharyngeal extension as well as apparent involvement of the operative bed at the C5 through C7 level with abnormal fluid signal insinuating about spinal fixation hardware and intervertebral disc spaces at the C5-C6 and C6-C7 levels.  This fluid collection is increased in size when compared to prior CT examination of 04/17/2018.  Findings are concerning for possible postoperative seroma, hematoma, infected postoperative fluid collection.  Given the close proximity of this fluid collection to the thecal sac/anterior epidural space, possible component of CSF leak not definitively excluded.  Clinical correlation advised.    Subsequent mass effect on the trachea and cervical esophagus with leftward displacement secondary to aforementioned large fluid collection within the soft tissues of the right neck as well as associated retropharyngeal component

## 2018-04-28 NOTE — TRANSFER OF CARE
Anesthesia Transfer of Care Note    Patient: Hollis Pitts    Procedure(s) Performed: Procedure(s) (LRB):  WASHOUT-CERVICAL-wound washout and csf leak repair with depuy (N/A)  DISKECTOMY AND FUSION-ANTERIOR CERVICAL (ACDF)- REVISON C5-C7    Patient location: PACU    Anesthesia Type: general    Transport from OR: Transported from OR on 6-10 L/min O2 by face mask with adequate spontaneous ventilation    Post pain: adequate analgesia    Post assessment: no apparent anesthetic complications and tolerated procedure well    Post vital signs: stable    Level of consciousness: awake, lethargic and alert    Nausea/Vomiting: no nausea/vomiting    Complications: none    Transfer of care protocol was followed      Last vitals:   Visit Vitals  BP (!) 145/70 (Patient Position: Lying)   Pulse 80   Temp 36.3 °C (97.4 °F) (Axillary)   Resp 18   Ht 6' (1.829 m)   Wt 99.5 kg (219 lb 5.7 oz)   SpO2 98%   BMI 29.75 kg/m²

## 2018-04-28 NOTE — ASSESSMENT & PLAN NOTE
-MRI C spine 4/27 shows a large R anterior neck  5.7 x 4.1 x 7.6 cm fluid collection  between the right thyroid lobe and trachea with associated retropharyngeal extension as well as apparent involvement of the operative bed at the C5 through C7  -interval increase in size of fluid collection since last  CT  -concerning for possible seroma, hematoma, infected postoperative fluid collection, component of CSF leak not definitively excluded  -may be contributing to patient's dysphagia  -patient transferred to [facility] for higher level of care today 4/28, medicine consult will sign off at this time, please do not hesitate to contact service with any further questions or issues

## 2018-04-28 NOTE — NURSING
Primary MD/ Neuro CC MD @ bedside. Pt being transferred to ICU for fluid collection at surgical site. Currently stable. TF stopped @ 10a for planned abdominal CT @ 12.

## 2018-04-28 NOTE — SUBJECTIVE & OBJECTIVE
"Interval History:  today on floor patient having increasing dysphagia, MRI cervical spine revealed fluid collection pushing on trachea and esophagus causing displacement, NCC consulted and NSGY took patient emergently to OR, admit to NCC    Review of Systems  Unable to obtain complete ROS, patient keeps saying "talk to my family" instead of answering ROS questions, but intermittently answers some ROS questions  Review of symptoms:  Pulmonary: Denies shortness of breath or difficulty breathing  Cardiology: Denies chest pain  Neuro: denies lethargy or lightheadedness    Objective:     Vitals:  Temp: 97.4 °F (36.3 °C)  Pulse: 80  Rhythm: normal sinus rhythm  BP: (!) 145/70  MAP (mmHg): 101  Resp: 18  SpO2: 98 %  O2 Device (Oxygen Therapy): nasal cannula    Temp  Min: 97.4 °F (36.3 °C)  Max: 98.2 °F (36.8 °C)  Pulse  Min: 63  Max: 85  BP  Min: 145/70  Max: 162/77  MAP (mmHg)  Min: 101  Max: 111  Resp  Min: 14  Max: 20  SpO2  Min: 96 %  Max: 99 %    04/27 0701 - 04/28 0700  In: 350   Out: -    Unmeasured Output  Urine Occurrence: 4  Stool Occurrence: 0  Emesis Occurrence: 0  Pad Count: 1       Physical Exam  Physical Exam:  GA: Alert, awake, comfortable, no acute distress  HEENT: No scleral icterus or JVD.   Pulmonary: Clear to auscultation Anteriorly. No wheezing, crackles, or rhonchi. Slight abdominal breathing, mouth breathing, on NC  Cardiac: RRR S1 & S2 w/o rubs/murmurs/gallops.   Abdominal: Bowel sounds present x 4. No appreciable hepatosplenomegaly.  Skin: No jaundice, rashes, or visible lesions.  Neuro:  --GCS: E4 V5 M6  --Mental Status:  Dysarthria, keeps stating "talk to my family", follows simple commands  --Pupils 3mm, PERRL.   --extremities exhibit generalized weakness  Unable to test gait     Medications:  Continuous Scheduled  albuterol-ipratropium 2.5mg-0.5mg/3mL 3 mL Q4H   atorvastatin 20 mg Nightly   ceFEPime (MAXIPIME) IVPB 1 g Q8H   citalopram 20 mg Daily   diltiaZEM 30 mg Q6H   gabapentin 300 mg TID "   heparin (porcine) 5,000 Units Q8H   insulin aspart U-100 6 Units 6 times per day   insulin detemir U-100 13 Units BID   metoprolol tartrate 12.5 mg Q8H   metronidazole 500 mg Q8H   tamsulosin 0.4 mg Daily   traZODone 50 mg QHS   valsartan 40 mg Daily   PRN  acetaminophen 650 mg Q4H PRN   aluminum-magnesium hydroxide-simethicone 30 mL Q4H PRN   bacitracin  PRN   benzonatate 100 mg TID PRN   dextrose 50% 12.5 g PRN   fentaNYL 12.5 mcg Q2H PRN   gelatin adsorbable  PRN   glucagon (human recombinant) 1 mg PRN   hydrocodone-acetaminophen 5-325mg 1 tablet Q4H PRN   insulin aspart U-100 1-10 Units Q4H PRN   labetalol 10 mg Q6H PRN   lidocaine HCL 2%  PRN   NON FORMULARY MEDICATION  PRN   ondansetron 8 mg Q6H PRN   thrombin (bovine)  PRN   vancomycin  PRN     Today I personally reviewed pertinent lines/drains/airways, imaging,     Diet  Diet NPO  Diet NPO

## 2018-04-28 NOTE — SUBJECTIVE & OBJECTIVE
Interval History: Patient's mentation is improved today; he is less lethargic, more engaged in conversation, and following commands. Flagyl was started yesterday for anaerobic coverage for possible aspiration PNA, and today his WBC count is improved. Yesterday he underwent an MRI c-spine that shows a large fluid collection in the R anterior neck between the thyroid and the trachea, with retropharyngeal extension and possible involvement with the operative bed at the C5-7 level, increased in size since last imaging and possibly representing CSF leak/fluid collection/abscess. Patient was transferred to the [facility] today for higher level of care.    Past Medical History:   Diagnosis Date    Benign prostatic hyperplasia with urinary retention 4/11/2018    Cervical myelopathy with cervical radiculopathy 4/10/2018    Depression     Dyslipidemia     Essential hypertension 4/11/2018    Glaucoma (increased eye pressure)     Mild cognitive impairment     Traumatic brain injury 07/2017    Type 2 diabetes mellitus with hyperglycemia, without long-term current use of insulin 4/11/2018       Past Surgical History:   Procedure Laterality Date    CATARACT EXTRACTION W/  INTRAOCULAR LENS IMPLANT         Review of patient's allergies indicates:  No Known Allergies    No current facility-administered medications on file prior to encounter.      Current Outpatient Prescriptions on File Prior to Encounter   Medication Sig    atorvastatin (LIPITOR) 20 MG tablet Take 20 mg by mouth nightly.    bisacodyl (DULCOLAX) 5 mg EC tablet     CARTIA  mg Cp24 Take 90 mg by mouth.     citalopram (CELEXA) 20 MG tablet Take 20 mg by mouth once daily.    gabapentin (NEURONTIN) 100 MG capsule Take 100 mg by mouth 3 (three) times daily as needed.     SENSIPAR 30 mg Tab Take 30 mg by mouth daily with breakfast.     traZODone (DESYREL) 50 MG tablet Take 50 mg by mouth every evening.    valsartan (DIOVAN) 80 MG tablet Take 80 mg by mouth  "once daily.    vitamin B comp with C no.4 150 mg Tab Take vitamin B complex 1 a day.  Over-the-counter is an appropriate alternative.    BD INSULIN PEN NEEDLE UF SHORT 31 gauge x 5/16" Ndle USE WITH LANTUS PEN DAILY    CONSTULOSE 10 gram/15 mL solution TAKE  30 MILLILITERS BY MOUTH 3 TIMES A DAY UNTIL BOWEL MOVEMENT ...  (REFER TO PRESCRIPTION NOTES).    ELIQUIS 2.5 mg Tab Take 2.5 mg by mouth 2 (two) times daily.     FREESTYLE TEST Strp      Family History     Problem Relation (Age of Onset)    Cancer Maternal Grandmother    Clotting disorder Mother    Paranoid behavior Mother        Social History Main Topics    Smoking status: Former Smoker     Quit date: 2/6/1950    Smokeless tobacco: Never Used    Alcohol use 4.2 oz/week     7 Shots of liquor per week    Drug use: No    Sexual activity: Yes     Partners: Female     Review of Systems   Constitutional: Negative for chills and fatigue.   HENT: Negative for congestion and sore throat.    Eyes: Negative for visual disturbance.   Respiratory: Negative for cough and shortness of breath.    Cardiovascular: Negative for chest pain and palpitations.   Gastrointestinal: Negative for abdominal distention, abdominal pain, constipation, diarrhea, nausea and vomiting.   Endocrine: Negative for polyuria.   Genitourinary: Negative for dysuria and urgency.   Musculoskeletal: Negative for myalgias.   Skin: Negative for rash and wound.   Allergic/Immunologic: Negative for immunocompromised state.   Neurological: Positive for weakness and numbness. Negative for dizziness and seizures.   Hematological: Negative for adenopathy. Does not bruise/bleed easily.   Psychiatric/Behavioral: Negative for confusion and dysphoric mood. The patient is not nervous/anxious.      Objective:     Vital Signs (Most Recent):  Temp: 97.4 °F (36.3 °C) (04/28/18 0747)  Pulse: 80 (04/28/18 1100)  Resp: 18 (04/28/18 0911)  BP: (!) 145/70 (04/28/18 0747)  SpO2: 98 % (04/28/18 0911) Vital Signs (24h " Range):  Temp:  [97.4 °F (36.3 °C)-98.2 °F (36.8 °C)] 97.4 °F (36.3 °C)  Pulse:  [63-85] 80  Resp:  [14-20] 18  SpO2:  [96 %-99 %] 98 %  BP: (118-162)/(67-77) 145/70     Weight: 99.5 kg (219 lb 5.7 oz)  Body mass index is 29.75 kg/m².    Physical Exam   Constitutional: He is oriented to person, place, and time. He appears well-developed and well-nourished. No distress.   HENT:   Head: Normocephalic and atraumatic.   Palpable R anterior neck mass  C-collar in place  Dry MM   Eyes: EOM are normal. Pupils are equal, round, and reactive to light.   Neck: Normal range of motion. Neck supple. No JVD present.   Cardiovascular: Normal rate and regular rhythm.  Exam reveals no gallop and no friction rub.    No murmur heard.  Pulmonary/Chest: Effort normal and breath sounds normal. No respiratory distress. He has no wheezes.   Abdominal: Soft. Bowel sounds are normal. He exhibits no distension and no mass. There is no tenderness.   Musculoskeletal: Normal range of motion. He exhibits no edema or tenderness.   Neurological: He is alert and oriented to person, place, and time. He displays normal reflexes. No cranial nerve deficit.   Skin: Skin is warm and dry. No rash noted. No erythema.       Significant Labs:     CBC:    Recent Labs  Lab 04/27/18  0350 04/28/18  0454   WBC 17.21* 13.92*   GRAN 73.6*  12.7* 73.0  10.2*   HGB 9.8* 10.1*   HCT 32.1* 33.9*    295       Chem 10:    Recent Labs  Lab 04/27/18  0349 04/28/18  0454   * 143   K 4.1 4.3    108   CO2 31* 31*   BUN 25* 22   CREATININE 0.7 0.7   * 175*   CALCIUM 11.2* 11.1*   MG 1.9 2.1   PHOS 2.3* 2.3*       Significant Imaging:   AXR 4/25/18  The NG tube tip is below the diaphragm and likely in the stomach.    Nonspecific bowel gas pattern with no evidence of bowel obstruction.  No free air.  Soft tissues and osseous structures are otherwise unremarkable.    CXR 4/21/18  Cardiac monitoring leads project over the bilateral hemithoraces.  Lung  volumes are symmetric.  Persistent bilateral areas of increased attenuation are noted within the lower lung zones concerning for edema, aspiration or pneumonia.  No pneumothorax or large pleural effusions.  No free air beneath the diaphragms.  Degenerative changes of the spine and shoulders noted.  Partially visualized postsurgical changes of the cervical spine again noted.  Enteric catheter crosses the diaphragm.  No interval detrimental change.    MRI C Spine 4/27/18  Large, 5.7 x 4.1 x 7.6 cm homogeneous fluid collection involving the soft tissues of the right anterior neck, interdigitating and between the right thyroid lobe and trachea with associated retropharyngeal extension as well as apparent involvement of the operative bed at the C5 through C7 level with abnormal fluid signal insinuating about spinal fixation hardware and intervertebral disc spaces at the C5-C6 and C6-C7 levels.  This fluid collection is increased in size when compared to prior CT examination of 04/17/2018.  Findings are concerning for possible postoperative seroma, hematoma, infected postoperative fluid collection.  Given the close proximity of this fluid collection to the thecal sac/anterior epidural space, possible component of CSF leak not definitively excluded.  Clinical correlation advised.    Subsequent mass effect on the trachea and cervical esophagus with leftward displacement secondary to aforementioned large fluid collection within the soft tissues of the right neck as well as associated retropharyngeal component    Postoperative change of prior anterior instrumented fusion of C5-C7 with multilevel degenerative change of the cervical spine, similar to prior imaging examinations.

## 2018-04-28 NOTE — ANESTHESIA POSTPROCEDURE EVALUATION
Anesthesia Post Evaluation    Patient: Hollis Pitts    Procedure(s) Performed: Procedure(s) (LRB):  WASHOUT-CERVICAL-wound washout and csf leak repair with depuy (N/A)  DISKECTOMY AND FUSION-ANTERIOR CERVICAL (ACDF)- REVISON C5-C7    Final Anesthesia Type: general  Patient location during evaluation: PACU  Patient participation: Yes- Able to Participate  Level of consciousness: awake and alert  Post-procedure vital signs: reviewed and stable  Pain management: adequate  Airway patency: patent  PONV status at discharge: No PONV  Anesthetic complications: no      Cardiovascular status: blood pressure returned to baseline  Respiratory status: nasal cannula  Hydration status: euvolemic  Follow-up not needed.        Visit Vitals  /72 (BP Location: Right arm, Patient Position: Lying)   Pulse 71   Temp 36.7 °C (98.1 °F) (Axillary)   Resp 18   Ht 6' (1.829 m)   Wt 99.5 kg (219 lb 5.7 oz)   SpO2 98%   BMI 29.75 kg/m²       Pain/Katarina Score: Pain Assessment Performed: Yes (4/28/2018  5:00 PM)  Presence of Pain: denies (4/28/2018  5:00 PM)  Pain Rating Prior to Med Admin: 7 (4/27/2018  6:32 AM)  Katarina Score: 8 (4/28/2018  5:00 PM)

## 2018-04-29 PROBLEM — G96.00 CSF LEAK: Status: ACTIVE | Noted: 2018-04-29

## 2018-04-29 LAB
ALBUMIN SERPL BCP-MCNC: 2.1 G/DL
ALP SERPL-CCNC: 75 U/L
ALT SERPL W/O P-5'-P-CCNC: 9 U/L
ANION GAP SERPL CALC-SCNC: 7 MMOL/L
AST SERPL-CCNC: 17 U/L
BASOPHILS # BLD AUTO: 0.02 K/UL
BASOPHILS NFR BLD: 0.1 %
BILIRUB SERPL-MCNC: 0.4 MG/DL
BUN SERPL-MCNC: 18 MG/DL
CALCIUM SERPL-MCNC: 10.7 MG/DL
CHLORIDE SERPL-SCNC: 108 MMOL/L
CO2 SERPL-SCNC: 27 MMOL/L
CREAT SERPL-MCNC: 0.6 MG/DL
DIFFERENTIAL METHOD: ABNORMAL
EOSINOPHIL # BLD AUTO: 0.2 K/UL
EOSINOPHIL NFR BLD: 1.1 %
ERYTHROCYTE [DISTWIDTH] IN BLOOD BY AUTOMATED COUNT: 15.1 %
EST. GFR  (AFRICAN AMERICAN): >60 ML/MIN/1.73 M^2
EST. GFR  (NON AFRICAN AMERICAN): >60 ML/MIN/1.73 M^2
GLUCOSE SERPL-MCNC: 99 MG/DL
HCT VFR BLD AUTO: 33.7 %
HGB BLD-MCNC: 10.5 G/DL
IMM GRANULOCYTES # BLD AUTO: 0.36 K/UL
IMM GRANULOCYTES NFR BLD AUTO: 2.4 %
LYMPHOCYTES # BLD AUTO: 1.3 K/UL
LYMPHOCYTES NFR BLD: 8.9 %
MAGNESIUM SERPL-MCNC: 2.2 MG/DL
MCH RBC QN AUTO: 31.2 PG
MCHC RBC AUTO-ENTMCNC: 31.2 G/DL
MCV RBC AUTO: 100 FL
MONOCYTES # BLD AUTO: 1.3 K/UL
MONOCYTES NFR BLD: 8.5 %
NEUTROPHILS # BLD AUTO: 11.7 K/UL
NEUTROPHILS NFR BLD: 79 %
NRBC BLD-RTO: 0 /100 WBC
PHOSPHATE SERPL-MCNC: 2.4 MG/DL
PLATELET # BLD AUTO: 279 K/UL
PMV BLD AUTO: 10.3 FL
POCT GLUCOSE: 123 MG/DL (ref 70–110)
POCT GLUCOSE: 145 MG/DL (ref 70–110)
POCT GLUCOSE: 155 MG/DL (ref 70–110)
POCT GLUCOSE: 168 MG/DL (ref 70–110)
POCT GLUCOSE: 97 MG/DL (ref 70–110)
POTASSIUM SERPL-SCNC: 4.1 MMOL/L
PROT SERPL-MCNC: 6 G/DL
RBC # BLD AUTO: 3.37 M/UL
SODIUM SERPL-SCNC: 142 MMOL/L
WBC # BLD AUTO: 14.79 K/UL

## 2018-04-29 PROCEDURE — 99233 SBSQ HOSP IP/OBS HIGH 50: CPT | Mod: ,,, | Performed by: INTERNAL MEDICINE

## 2018-04-29 PROCEDURE — 83735 ASSAY OF MAGNESIUM: CPT

## 2018-04-29 PROCEDURE — 25000242 PHARM REV CODE 250 ALT 637 W/ HCPCS: Performed by: PHYSICIAN ASSISTANT

## 2018-04-29 PROCEDURE — 25000003 PHARM REV CODE 250: Performed by: PHYSICIAN ASSISTANT

## 2018-04-29 PROCEDURE — 82803 BLOOD GASES ANY COMBINATION: CPT

## 2018-04-29 PROCEDURE — 63600175 PHARM REV CODE 636 W HCPCS: Performed by: INTERNAL MEDICINE

## 2018-04-29 PROCEDURE — 80053 COMPREHEN METABOLIC PANEL: CPT

## 2018-04-29 PROCEDURE — 25500020 PHARM REV CODE 255: Performed by: STUDENT IN AN ORGANIZED HEALTH CARE EDUCATION/TRAINING PROGRAM

## 2018-04-29 PROCEDURE — 36620 INSERTION CATHETER ARTERY: CPT

## 2018-04-29 PROCEDURE — 94640 AIRWAY INHALATION TREATMENT: CPT

## 2018-04-29 PROCEDURE — 37799 UNLISTED PX VASCULAR SURGERY: CPT

## 2018-04-29 PROCEDURE — 85025 COMPLETE CBC W/AUTO DIFF WBC: CPT

## 2018-04-29 PROCEDURE — 36600 WITHDRAWAL OF ARTERIAL BLOOD: CPT

## 2018-04-29 PROCEDURE — 99900035 HC TECH TIME PER 15 MIN (STAT)

## 2018-04-29 PROCEDURE — 63600175 PHARM REV CODE 636 W HCPCS: Performed by: NURSE PRACTITIONER

## 2018-04-29 PROCEDURE — 99233 SBSQ HOSP IP/OBS HIGH 50: CPT | Mod: ,,, | Performed by: PHYSICIAN ASSISTANT

## 2018-04-29 PROCEDURE — 84100 ASSAY OF PHOSPHORUS: CPT

## 2018-04-29 PROCEDURE — 94761 N-INVAS EAR/PLS OXIMETRY MLT: CPT

## 2018-04-29 PROCEDURE — 25000003 PHARM REV CODE 250: Performed by: STUDENT IN AN ORGANIZED HEALTH CARE EDUCATION/TRAINING PROGRAM

## 2018-04-29 PROCEDURE — 20000000 HC ICU ROOM

## 2018-04-29 PROCEDURE — S0030 INJECTION, METRONIDAZOLE: HCPCS | Performed by: STUDENT IN AN ORGANIZED HEALTH CARE EDUCATION/TRAINING PROGRAM

## 2018-04-29 PROCEDURE — 63600175 PHARM REV CODE 636 W HCPCS: Performed by: PHYSICIAN ASSISTANT

## 2018-04-29 PROCEDURE — 63600175 PHARM REV CODE 636 W HCPCS: Performed by: STUDENT IN AN ORGANIZED HEALTH CARE EDUCATION/TRAINING PROGRAM

## 2018-04-29 PROCEDURE — 94668 MNPJ CHEST WALL SBSQ: CPT

## 2018-04-29 RX ORDER — CEFEPIME HYDROCHLORIDE 2 G/1
2 INJECTION, POWDER, FOR SOLUTION INTRAVENOUS
Status: DISCONTINUED | OUTPATIENT
Start: 2018-04-29 | End: 2018-04-30

## 2018-04-29 RX ORDER — CITALOPRAM 10 MG/1
10 TABLET ORAL DAILY
Status: DISCONTINUED | OUTPATIENT
Start: 2018-04-30 | End: 2018-05-08

## 2018-04-29 RX ORDER — HYDRALAZINE HYDROCHLORIDE 20 MG/ML
10 INJECTION INTRAMUSCULAR; INTRAVENOUS EVERY 6 HOURS PRN
Status: DISCONTINUED | OUTPATIENT
Start: 2018-04-29 | End: 2018-05-02

## 2018-04-29 RX ORDER — FENTANYL CITRATE 50 UG/ML
INJECTION, SOLUTION INTRAMUSCULAR; INTRAVENOUS
Status: DISPENSED
Start: 2018-04-29 | End: 2018-04-29

## 2018-04-29 RX ADMIN — INSULIN ASPART 2 UNITS: 100 INJECTION, SOLUTION INTRAVENOUS; SUBCUTANEOUS at 11:04

## 2018-04-29 RX ADMIN — TAMSULOSIN HYDROCHLORIDE 0.4 MG: 0.4 CAPSULE ORAL at 08:04

## 2018-04-29 RX ADMIN — DILTIAZEM HYDROCHLORIDE 30 MG: 30 TABLET, FILM COATED ORAL at 11:04

## 2018-04-29 RX ADMIN — DILTIAZEM HYDROCHLORIDE 30 MG: 30 TABLET, FILM COATED ORAL at 06:04

## 2018-04-29 RX ADMIN — HYDROCODONE BITARTRATE AND ACETAMINOPHEN 1 TABLET: 5; 325 TABLET ORAL at 04:04

## 2018-04-29 RX ADMIN — HEPARIN SODIUM 5000 UNITS: 5000 INJECTION, SOLUTION INTRAVENOUS; SUBCUTANEOUS at 01:04

## 2018-04-29 RX ADMIN — DILTIAZEM HYDROCHLORIDE 30 MG: 30 TABLET, FILM COATED ORAL at 12:04

## 2018-04-29 RX ADMIN — TRAZODONE HYDROCHLORIDE 50 MG: 50 TABLET ORAL at 10:04

## 2018-04-29 RX ADMIN — IPRATROPIUM BROMIDE AND ALBUTEROL SULFATE 3 ML: .5; 3 SOLUTION RESPIRATORY (INHALATION) at 11:04

## 2018-04-29 RX ADMIN — IPRATROPIUM BROMIDE AND ALBUTEROL SULFATE 3 ML: .5; 3 SOLUTION RESPIRATORY (INHALATION) at 08:04

## 2018-04-29 RX ADMIN — METRONIDAZOLE 500 MG: 500 INJECTION, SOLUTION INTRAVENOUS at 06:04

## 2018-04-29 RX ADMIN — TRAZODONE HYDROCHLORIDE 50 MG: 50 TABLET ORAL at 12:04

## 2018-04-29 RX ADMIN — HEPARIN SODIUM 5000 UNITS: 5000 INJECTION, SOLUTION INTRAVENOUS; SUBCUTANEOUS at 10:04

## 2018-04-29 RX ADMIN — INSULIN DETEMIR 13 UNITS: 100 INJECTION, SOLUTION SUBCUTANEOUS at 08:04

## 2018-04-29 RX ADMIN — IOHEXOL 15 ML: 350 INJECTION, SOLUTION INTRAVENOUS at 04:04

## 2018-04-29 RX ADMIN — IPRATROPIUM BROMIDE AND ALBUTEROL SULFATE 3 ML: .5; 3 SOLUTION RESPIRATORY (INHALATION) at 01:04

## 2018-04-29 RX ADMIN — GABAPENTIN 300 MG: 300 CAPSULE ORAL at 08:04

## 2018-04-29 RX ADMIN — METRONIDAZOLE 500 MG: 500 INJECTION, SOLUTION INTRAVENOUS at 01:04

## 2018-04-29 RX ADMIN — IPRATROPIUM BROMIDE AND ALBUTEROL SULFATE 3 ML: .5; 3 SOLUTION RESPIRATORY (INHALATION) at 03:04

## 2018-04-29 RX ADMIN — CEFEPIME 2 G: 2 INJECTION, POWDER, FOR SOLUTION INTRAVENOUS at 10:04

## 2018-04-29 RX ADMIN — CEFEPIME 2 G: 2 INJECTION, POWDER, FOR SOLUTION INTRAVENOUS at 03:04

## 2018-04-29 RX ADMIN — FENTANYL CITRATE 12.5 MCG: 50 INJECTION, SOLUTION INTRAMUSCULAR; INTRAVENOUS at 12:04

## 2018-04-29 RX ADMIN — CITALOPRAM HYDROBROMIDE 20 MG: 20 TABLET ORAL at 08:04

## 2018-04-29 RX ADMIN — IPRATROPIUM BROMIDE AND ALBUTEROL SULFATE 3 ML: .5; 3 SOLUTION RESPIRATORY (INHALATION) at 07:04

## 2018-04-29 RX ADMIN — INSULIN DETEMIR 13 UNITS: 100 INJECTION, SOLUTION SUBCUTANEOUS at 10:04

## 2018-04-29 RX ADMIN — Medication 12.5 MG: at 06:04

## 2018-04-29 RX ADMIN — DILTIAZEM HYDROCHLORIDE 30 MG: 30 TABLET, FILM COATED ORAL at 05:04

## 2018-04-29 RX ADMIN — VALSARTAN 40 MG: 40 TABLET ORAL at 08:04

## 2018-04-29 RX ADMIN — ATORVASTATIN CALCIUM 20 MG: 20 TABLET, FILM COATED ORAL at 10:04

## 2018-04-29 RX ADMIN — Medication 12.5 MG: at 01:04

## 2018-04-29 RX ADMIN — METRONIDAZOLE 500 MG: 500 INJECTION, SOLUTION INTRAVENOUS at 10:04

## 2018-04-29 RX ADMIN — HEPARIN SODIUM 5000 UNITS: 5000 INJECTION, SOLUTION INTRAVENOUS; SUBCUTANEOUS at 06:04

## 2018-04-29 RX ADMIN — Medication 12.5 MG: at 10:04

## 2018-04-29 RX ADMIN — CEFEPIME 2 G: 2 INJECTION, POWDER, FOR SOLUTION INTRAVENOUS at 04:04

## 2018-04-29 NOTE — PROGRESS NOTES
[facility]  Neurocritical Care  Progress Note    Admit Date: 4/10/2018  Service Date: 04/28/2018  Length of Stay: 18    Subjective:     Chief Complaint: Cervical myelopathy with cervical radiculopathy    History of Present Illness: Mr. Pitts is an 82-year-old man with HTN, HPLD, DMII, BPH, MDD, and cervical spondylosis with myelopathy who was admitted to [facility] for ACDF. Per chart review he was in his usual state of health until July 2017 when he was hit by a large beam of wood, after which he has been non-ambulatory and confused. While very pleasant, he is unfortunately somewhat of a poor historian. His symptoms prior to admission include bilateral hand numbness, tingling, and restricted upper extremity range of motion. CT C-spine without contrast showed multilevel spondylosis of there cervical spine with mild stenosis at C5-6 and C6-7 as well as severe extensive neuroforaminal narrowing. Yesterday he underwent two level ACDF at C5/6 and C6/7 without immediate complication. Reported blood loss in the brief op note is 1050 mL. He was given dexamethasone as well. Hospital medicine consulted for hyperglycemia and comanagement of his medical conditions.He had developed a CSF leak and will have Luumbar drain placed by nsgy. He is being admitted to [facility] for a higher level of care.     Hospital Course: 4/13: Admit NCC s/p ACDF with new csf leak pending lumbar drain  4/14: ok to sit up with lumbar drain clamped, passed sp eval started diet   4/16: No leaking noted from drain insertion site. Will follow I/O, Serum Na. Discussed brandyoquhal, no documented reason/patient unaware why taking it.   4/17 CSF drainage notified Neurosurgery; Drain is clamped; Possible OR tomorrow in AM - However, final decision to be determined tonight.   4/18: Insulin increased. Dilt. Dose adjusted.   4/19: Discuss transfer with NSGY,   4/21: patient with SIRS criteria, ill appearing, transfer back to [facility], empiric ABX,  "cultures  4/22: more alert today, start tube feeds, CXR stable  4/23- GNR in urine culture. WBC remains elevated but stabilized. Afebrile.  4/24: No LP per NSGY,continue abx,  c.diff pending, increased aspart, stepdown to NSGY today  4/25: NAEON, transfer to NSGY  4/28: today on floor patient having increasing dysphagia, MRI cervical spine revealed fluid collection pushing on trachea and esophagus causing displacement, NCC consulted and NSGY took patient emergently to OR, admit to NCC    Interval History:  today on floor patient having increasing dysphagia, MRI cervical spine revealed fluid collection pushing on trachea and esophagus causing displacement, NCC consulted and NSGY took patient emergently to OR, admit to NCC    Review of Systems  Unable to obtain complete ROS, patient keeps saying "talk to my family" instead of answering ROS questions, but intermittently answers some ROS questions  Review of symptoms:  Pulmonary: Denies shortness of breath or difficulty breathing  Cardiology: Denies chest pain  Neuro: denies lethargy or lightheadedness    Objective:     Vitals:  Temp: 97.4 °F (36.3 °C)  Pulse: 80  Rhythm: normal sinus rhythm  BP: (!) 145/70  MAP (mmHg): 101  Resp: 18  SpO2: 98 %  O2 Device (Oxygen Therapy): nasal cannula    Temp  Min: 97.4 °F (36.3 °C)  Max: 98.2 °F (36.8 °C)  Pulse  Min: 63  Max: 85  BP  Min: 145/70  Max: 162/77  MAP (mmHg)  Min: 101  Max: 111  Resp  Min: 14  Max: 20  SpO2  Min: 96 %  Max: 99 %    04/27 0701 - 04/28 0700  In: 350   Out: -    Unmeasured Output  Urine Occurrence: 4  Stool Occurrence: 0  Emesis Occurrence: 0  Pad Count: 1       Physical Exam  Physical Exam:  GA: Alert, awake, comfortable, no acute distress  HEENT: No scleral icterus or JVD.   Pulmonary: Clear to auscultation Anteriorly. No wheezing, crackles, or rhonchi. Slight abdominal breathing, mouth breathing, on NC  Cardiac: RRR S1 & S2 w/o rubs/murmurs/gallops.   Abdominal: Bowel sounds present x 4. No appreciable " "hepatosplenomegaly.  Skin: No jaundice, rashes, or visible lesions.  Neuro:  --GCS: E4 V5 M6  --Mental Status:  Dysarthria, keeps stating "talk to my family", follows simple commands  --Pupils 3mm, PERRL.   --extremities exhibit generalized weakness  Unable to test gait     Medications:  Continuous Scheduled  albuterol-ipratropium 2.5mg-0.5mg/3mL 3 mL Q4H   atorvastatin 20 mg Nightly   ceFEPime (MAXIPIME) IVPB 1 g Q8H   citalopram 20 mg Daily   diltiaZEM 30 mg Q6H   gabapentin 300 mg TID   heparin (porcine) 5,000 Units Q8H   insulin aspart U-100 6 Units 6 times per day   insulin detemir U-100 13 Units BID   metoprolol tartrate 12.5 mg Q8H   metronidazole 500 mg Q8H   tamsulosin 0.4 mg Daily   traZODone 50 mg QHS   valsartan 40 mg Daily   PRN  acetaminophen 650 mg Q4H PRN   aluminum-magnesium hydroxide-simethicone 30 mL Q4H PRN   bacitracin  PRN   benzonatate 100 mg TID PRN   dextrose 50% 12.5 g PRN   fentaNYL 12.5 mcg Q2H PRN   gelatin adsorbable  PRN   glucagon (human recombinant) 1 mg PRN   hydrocodone-acetaminophen 5-325mg 1 tablet Q4H PRN   insulin aspart U-100 1-10 Units Q4H PRN   labetalol 10 mg Q6H PRN   lidocaine HCL 2%  PRN   NON FORMULARY MEDICATION  PRN   ondansetron 8 mg Q6H PRN   thrombin (bovine)  PRN   vancomycin  PRN     Today I personally reviewed pertinent lines/drains/airways, imaging,     Diet  Diet NPO  Diet NPO        Assessment/Plan:     Neuro   * Cervical myelopathy with cervical radiculopathy    S/p ACDF on 4/13 complicated by CSF leak, lumbar drain earlier in admission, now out  4/28 after stepping patient down, MRI cervical spine revealed large fluid collection pushing on trachea and esophagus causing displacement, NSGY planning to take to OR emergently, intubated          Psychiatric   Anxiety    - continue trazadone and citalopram        Pulmonary   Aspiration pneumonia of both lower lobes due to gastric secretions    - on cefepime and flagyl  - duonebs        Cardiac/Vascular "   Tachycardia    Cardizem, metoprolol PO        Other hyperlipidemia    - Continue atorvastatin        Essential hypertension    Continue current regimen of PO BP meds          Renal/   Benign prostatic hyperplasia with urinary retention    Continue tamsulosin        Endocrine   Type 2 diabetes mellitus with hyperglycemia, without long-term current use of insulin    -- continue Aspart 6units q 4 hrs  -- Continue Detemir 13  units BID  -- SSI        Other   Fluid collection at surgical site    - see cervical myelopathy            Prophylaxis:  Venous Thromboembolism: chemical  Stress Ulcer: None  Ventilator Pneumonia: no     Activity Orders          None        Full Code    Critical care time spent was 46 minutes spent examining patient with attending on the floor, reviewing patient records, and [place] of the patient with greater than 50% of the time spent with direct patient care and coordination with Neurosurgery service and Hospital Medicine service.       Willa Maxwell PA-C  Neurocritical Care  [facility]

## 2018-04-29 NOTE — SUBJECTIVE & OBJECTIVE
Interval History:  s/p wound washout and CSF leak repair POD 1. Patient extubated post op, on RA sat 98. NAEON.    Review of Systems    Unable to obtain complete ROS, intermittently answers some ROS questions  Review of symptoms:  Pulmonary: Denies shortness of breath or difficulty breathing  Cardiology: Denies chest pain  Neuro: denies lethargy or lightheadedness    Objective:     Vitals:  Temp: 98.5 °F (36.9 °C)  Pulse: 62  Rhythm: normal sinus rhythm  BP: (!) 167/74  MAP (mmHg): 106  Resp: 15  SpO2: 100 %  O2 Device (Oxygen Therapy): room air    Temp  Min: 97.9 °F (36.6 °C)  Max: 99.3 °F (37.4 °C)  Pulse  Min: 59  Max: 80  BP  Min: 131/69  Max: 167/74  MAP (mmHg)  Min: 94  Max: 133  Resp  Min: 11  Max: 19  SpO2  Min: 94 %  Max: 100 %    04/28 0701 - 04/29 0700  In: 920 [I.V.:700]  Out: 1690 [Urine:1540]   Unmeasured Output  Urine Occurrence: 4  Stool Occurrence: 0  Emesis Occurrence: 0  Pad Count: 1       Physical Exam    Physical Exam:  GA: Alert, awake, comfortable, no acute distress  HEENT: No scleral icterus or JVD.   Pulmonary: Clear to auscultation Anteriorly. No wheezing, crackles, or rhonchi.  Cardiac: RRR S1 & S2 w/o rubs/murmurs/gallops.   Abdominal: Bowel sounds present x 4. No appreciable hepatosplenomegaly. Soft  Skin: No jaundice, rashes, or visible lesions.  Neuro:  --GCS: E4 V4 M6  --Mental Status:  follows simple commands, awake, alert, dysarthric, oriented to person and place, not to time  --Pupils 3mm, PERRL.   --extremities exhibit generalized weakness, VAN spontaneously and to command  Unable to test gait     Medications:  Continuous Scheduled    albuterol-ipratropium 2.5mg-0.5mg/3mL 3 mL Q4H   atorvastatin 20 mg Nightly   citalopram 20 mg Daily   diltiaZEM 30 mg Q6H   gabapentin 300 mg TID   heparin (porcine) 5,000 Units Q8H   insulin aspart U-100 6 Units 6 times per day   insulin detemir U-100 13 Units BID   metoprolol tartrate 12.5 mg Q8H   metronidazole 500 mg Q8H   tamsulosin 0.4 mg  Daily   traZODone 50 mg QHS   valsartan 40 mg Daily   PRN    acetaminophen 650 mg Q4H PRN   aluminum-magnesium hydroxide-simethicone 30 mL Q4H PRN   benzonatate 100 mg TID PRN   dextrose 50% 12.5 g PRN   fentaNYL 12.5 mcg Q2H PRN   glucagon (human recombinant) 1 mg PRN   hydrocodone-acetaminophen 5-325mg 1 tablet Q4H PRN   insulin aspart U-100 1-10 Units Q4H PRN   labetalol 10 mg Q6H PRN   lidocaine HCL 2%  PRN   omnipaque 15 mL PRN   ondansetron 8 mg Q6H PRN     Today I personally reviewed pertinent lines/drains/airways, imaging,     Diet  Diet NPO  Diet NPO

## 2018-04-29 NOTE — PROGRESS NOTES
[facility]  Neurocritical Care  Progress Note    Admit Date: 4/10/2018  Service Date: 04/29/2018  Length of Stay: 19    Subjective:     Chief Complaint: Cervical myelopathy with cervical radiculopathy    History of Present Illness: Mr. Pitts is an 82-year-old man with HTN, HPLD, DMII, BPH, MDD, and cervical spondylosis with myelopathy who was admitted to [facility] for ACDF. Per chart review he was in his usual state of health until July 2017 when he was hit by a large beam of wood, after which he has been non-ambulatory and confused. While very pleasant, he is unfortunately somewhat of a poor historian. His symptoms prior to admission include bilateral hand numbness, tingling, and restricted upper extremity range of motion. CT C-spine without contrast showed multilevel spondylosis of there cervical spine with mild stenosis at C5-6 and C6-7 as well as severe extensive neuroforaminal narrowing. Yesterday he underwent two level ACDF at C5/6 and C6/7 without immediate complication. Reported blood loss in the brief op note is 1050 mL. He was given dexamethasone as well. Hospital medicine consulted for hyperglycemia and comanagement of his medical conditions.He had developed a CSF leak and will have Luumbar drain placed by nsgy. He is being admitted to [facility] for a higher level of care.     Hospital Course: 4/13: Admit NCC s/p ACDF with new csf leak pending lumbar drain  4/14: ok to sit up with lumbar drain clamped, passed sp eval started diet   4/16: No leaking noted from drain insertion site. Will follow I/O, Serum Na. Discussed brandyoquhal, no documented reason/patient unaware why taking it.   4/17 CSF drainage notified Neurosurgery; Drain is clamped; Possible OR tomorrow in AM - However, final decision to be determined tonight.   4/18: Insulin increased. Dilt. Dose adjusted.   4/19: Discuss transfer with NSGY,   4/21: patient with SIRS criteria, ill appearing, transfer back to [facility], empiric ABX,  cultures  4/22: more alert today, start tube feeds, CXR stable  4/23- GNR in urine culture. WBC remains elevated but stabilized. Afebrile.  4/24: No LP per NSGY,continue abx,  c.diff pending, increased aspart, stepdown to NSGY today  4/25: NAEON, transfer to NSGY  4/28: today on floor patient having increasing dysphagia, MRI cervical spine revealed fluid collection pushing on trachea and esophagus causing displacement, NCC consulted and NSGY took patient emergently to OR, admit to NCC  4/29: s/p wound washout and CSF leak repair POD 1. Patient extubated post op, on RA sat 98. NAEON.    Interval History:  s/p wound washout and CSF leak repair POD 1. Patient extubated post op, on RA sat 98. NAEON.    Review of Systems    Unable to obtain complete ROS due to dysarthria and complaince with ROS questioning      Objective:     Vitals:  Temp: 98.5 °F (36.9 °C)  Pulse: 62  Rhythm: normal sinus rhythm  BP: (!) 167/74  MAP (mmHg): 106  Resp: 15  SpO2: 100 %  O2 Device (Oxygen Therapy): room air    Temp  Min: 97.9 °F (36.6 °C)  Max: 99.3 °F (37.4 °C)  Pulse  Min: 59  Max: 80  BP  Min: 131/69  Max: 167/74  MAP (mmHg)  Min: 94  Max: 133  Resp  Min: 11  Max: 19  SpO2  Min: 94 %  Max: 100 %    04/28 0701 - 04/29 0700  In: 920 [I.V.:700]  Out: 1690 [Urine:1540]   Unmeasured Output  Urine Occurrence: 4  Stool Occurrence: 0  Emesis Occurrence: 0  Pad Count: 1       Physical Exam    Physical Exam:  GA: Alert, awake, comfortable, no acute distress  HEENT: No scleral icterus or JVD.   Pulmonary: Clear to auscultation Anteriorly. No wheezing, crackles, or rhonchi.  Cardiac: RRR S1 & S2 w/o rubs/murmurs/gallops.   Abdominal: Bowel sounds present x 4. No appreciable hepatosplenomegaly. Soft  Skin: No jaundice, rashes, or visible lesions.  Neuro:  --GCS: E4 V4 M6  --Mental Status:  follows simple commands, awake, alert, dysarthric, oriented to person and place, not to time  --Pupils 3mm, PERRL.   --extremities exhibit generalized weakness,  VAN spontaneously and to command  Unable to test gait     Medications:  Continuous Scheduled    albuterol-ipratropium 2.5mg-0.5mg/3mL 3 mL Q4H   atorvastatin 20 mg Nightly   citalopram 20 mg Daily   diltiaZEM 30 mg Q6H   gabapentin 300 mg TID   heparin (porcine) 5,000 Units Q8H   insulin aspart U-100 6 Units 6 times per day   insulin detemir U-100 13 Units BID   metoprolol tartrate 12.5 mg Q8H   metronidazole 500 mg Q8H   tamsulosin 0.4 mg Daily   traZODone 50 mg QHS   valsartan 40 mg Daily   PRN    acetaminophen 650 mg Q4H PRN   aluminum-magnesium hydroxide-simethicone 30 mL Q4H PRN   benzonatate 100 mg TID PRN   dextrose 50% 12.5 g PRN   fentaNYL 12.5 mcg Q2H PRN   glucagon (human recombinant) 1 mg PRN   hydrocodone-acetaminophen 5-325mg 1 tablet Q4H PRN   insulin aspart U-100 1-10 Units Q4H PRN   labetalol 10 mg Q6H PRN   lidocaine HCL 2%  PRN   omnipaque 15 mL PRN   ondansetron 8 mg Q6H PRN     Today I personally reviewed pertinent lines/drains/airways, imaging,     Diet  Diet NPO  Diet NPO        Assessment/Plan:     Neuro   * Cervical myelopathy with cervical radiculopathy    S/p ACDF on 4/13 complicated by CSF leak, lumbar drain earlier in admission, now out  4/28 after stepping patient down, MRI cervical spine revealed large fluid collection pushing on trachea and esophagus causing displacement, NSGY planning to take to OR emergently, intubated  4/29 s/p wound washout and CSF leak repair POD 1          CSF leak    - see cervical myelopathy        Psychiatric   Anxiety    - continue trazadone and citalopram        Pulmonary   Aspiration pneumonia of both lower lobes due to gastric secretions    - on cefepime and flagyl  - duonebs        Cardiac/Vascular   Tachycardia    Cardizem, metoprolol PO        Other hyperlipidemia    - Continue atorvastatin        Essential hypertension    Continue current regimen of PO BP meds          Renal/   Benign prostatic hyperplasia with urinary retention    Continue  tamsulosin        Endocrine   Type 2 diabetes mellitus with hyperglycemia, without long-term current use of insulin    -- continue Aspart 6units q 4 hrs  -- Continue Detemir 13  units BID  -- SSI        Other   Fluid collection at surgical site    - see cervical myelopathy            Prophylaxis:  Venous Thromboembolism: mechanical chemical  Stress Ulcer: None  Ventilator Pneumonia: not applicable     Activity Orders          None        Full Code    Wilal Maxwell PA-C  Neurocritical Care  [facility]

## 2018-04-29 NOTE — PROGRESS NOTES
Dr Lozoya at bedside to see pt. MD requesting to follow and treat NIBP as opposed to ABP. Q1h accuchecks to continue as well.

## 2018-04-29 NOTE — PROGRESS NOTES
Dr Bunn notified for PACU orders and regarding hypertension. MD to place orders. Will continue to monitor

## 2018-04-29 NOTE — SUBJECTIVE & OBJECTIVE
"Past Medical History:   Diagnosis Date    Benign prostatic hyperplasia with urinary retention 4/11/2018    Cervical myelopathy with cervical radiculopathy 4/10/2018    Depression     Dyslipidemia     Essential hypertension 4/11/2018    Glaucoma (increased eye pressure)     Mild cognitive impairment     Traumatic brain injury 07/2017    Type 2 diabetes mellitus with hyperglycemia, without long-term current use of insulin 4/11/2018       Past Surgical History:   Procedure Laterality Date    CATARACT EXTRACTION W/  INTRAOCULAR LENS IMPLANT         Review of patient's allergies indicates:  No Known Allergies    Medications:  Prescriptions Prior to Admission   Medication Sig    atorvastatin (LIPITOR) 20 MG tablet Take 20 mg by mouth nightly.    bisacodyl (DULCOLAX) 5 mg EC tablet     CARTIA  mg Cp24 Take 90 mg by mouth.     citalopram (CELEXA) 20 MG tablet Take 20 mg by mouth once daily.    gabapentin (NEURONTIN) 100 MG capsule Take 100 mg by mouth 3 (three) times daily as needed.     SENSIPAR 30 mg Tab Take 30 mg by mouth daily with breakfast.     traZODone (DESYREL) 50 MG tablet Take 50 mg by mouth every evening.    valsartan (DIOVAN) 80 MG tablet Take 80 mg by mouth once daily.    vitamin B comp with C no.4 150 mg Tab Take vitamin B complex 1 a day.  Over-the-counter is an appropriate alternative.    BD INSULIN PEN NEEDLE UF SHORT 31 gauge x 5/16" Ndle USE WITH LANTUS PEN DAILY    CONSTULOSE 10 gram/15 mL solution TAKE  30 MILLILITERS BY MOUTH 3 TIMES A DAY UNTIL BOWEL MOVEMENT ...  (REFER TO PRESCRIPTION NOTES).    ELIQUIS 2.5 mg Tab Take 2.5 mg by mouth 2 (two) times daily.     FREESTYLE TEST Strp     omega-3 fatty acids-fish oil (FISH OIL) 360-1,200 mg Cap Take 1,200 mg by mouth once daily.     Antibiotics     Start     Stop Route Frequency Ordered    04/27/18 1445  metronidazole IVPB 500 mg      -- IV Every 8 hours (non-standard times) 04/27/18 1338        Antifungals     None    "     Antivirals     None           Immunization History   Administered Date(s) Administered    PPD Test 04/25/2018       Family History     Problem Relation (Age of Onset)    Cancer Maternal Grandmother    Clotting disorder Mother    Paranoid behavior Mother        Social History     Social History    Marital status:      Spouse name: N/A    Number of children: N/A    Years of education: N/A     Social History Main Topics    Smoking status: Former Smoker     Quit date: 2/6/1950    Smokeless tobacco: Never Used    Alcohol use 4.2 oz/week     7 Shots of liquor per week    Drug use: No    Sexual activity: Yes     Partners: Female     Other Topics Concern    None     Social History Narrative    None     Review of Systems   Constitutional: Negative for chills and fatigue.   HENT: Negative for congestion, rhinorrhea and sore throat.    Eyes: Negative for visual disturbance.   Respiratory: Positive for cough and shortness of breath.    Cardiovascular: Negative for chest pain.   Gastrointestinal: Negative for abdominal distention, abdominal pain, diarrhea and nausea.   Endocrine: Negative for polyuria.   Genitourinary: Negative for dysuria and urgency.   Musculoskeletal: Positive for back pain. Negative for myalgias.   Skin: Negative for rash and wound.   Allergic/Immunologic: Negative for immunocompromised state.   Neurological: Positive for weakness and numbness. Negative for dizziness.   Psychiatric/Behavioral: Negative for confusion. The patient is not nervous/anxious.      Objective:     Vital Signs (Most Recent):  Temp: 98.3 °F (36.8 °C) (04/29/18 1102)  Pulse: 61 (04/29/18 1312)  Resp: 18 (04/29/18 1312)  BP: (!) 163/76 (04/29/18 1200)  SpO2: 99 % (04/29/18 1312) Vital Signs (24h Range):  Temp:  [97.9 °F (36.6 °C)-99.3 °F (37.4 °C)] 98.3 °F (36.8 °C)  Pulse:  [55-80] 61  Resp:  [11-19] 18  SpO2:  [94 %-100 %] 99 %  BP: (131-167)/() 163/76  Arterial Line BP: (152-177)/(64-80) 177/68     Weight:  99.5 kg (219 lb 5.7 oz)  Body mass index is 29.75 kg/m².    Estimated Creatinine Clearance: 120 mL/min (based on SCr of 0.6 mg/dL).    Physical Exam   Constitutional: He appears well-developed and well-nourished. No distress.   HENT:   Head: Normocephalic and atraumatic.   Eyes: Pupils are equal, round, and reactive to light. No scleral icterus.   Neck: Neck supple.   Anterior surgical incision c/d/i   Cardiovascular: Normal rate, regular rhythm and normal heart sounds.    Pulmonary/Chest: He is in respiratory distress (mild). He has no wheezes. He has rales.   Lots of secretions   Abdominal: Soft. Bowel sounds are normal. He exhibits no distension. There is no tenderness.   Musculoskeletal: He exhibits no edema.   Skin: Skin is warm and dry. He is not diaphoretic. No erythema.   Psychiatric: He has a normal mood and affect. His behavior is normal.       Significant Labs:   Blood Culture:   Recent Labs  Lab 04/19/18  0952 04/19/18  1005 04/21/18  1335   LABBLOO No growth after 5 days. No growth after 5 days. No growth after 5 days.  No growth after 5 days.     CBC:   Recent Labs  Lab 04/28/18  0454 04/29/18  0348   WBC 13.92* 14.79*   HGB 10.1* 10.5*   HCT 33.9* 33.7*    279     CMP:   Recent Labs  Lab 04/28/18  0454 04/29/18  0348    142   K 4.3 4.1    108   CO2 31* 27   * 99   BUN 22 18   CREATININE 0.7 0.6   CALCIUM 11.1* 10.7*   PROT 5.8* 6.0   ALBUMIN 2.0* 2.1*   BILITOT 0.4 0.4   ALKPHOS 82 75   AST 15 17   ALT 10 9*   ANIONGAP 4* 7*   EGFRNONAA >60.0 >60.0     HIV 1/2 Antibodies: No results for input(s): [id number] in the last 48 hours.  HIV Rapid: No results for input(s): HIVRAPID in the last 48 hours.  Lactic Acid: No results for input(s): LACTATE in the last 48 hours.  Lipase: No results for input(s): LIPASE in the last 48 hours.  Procalcitonin: No results for input(s): PROCAL in the last 48 hours.  Quantiferon: No results for input(s): NIL, TBAG, TBAGNIL, MITOGENNIL, TBGOLD in  the last 48 hours.  Respiratory Culture:   Recent Labs  Lab 04/19/18  0231   GSRESP <10 epithelial cells per low power field.  Many WBC's  Many Gram positive cocci  Many Gram negative rods  Rare Gram positive rods   RESPIRATORYC Normal respiratory mitzy     Urine Culture:   Recent Labs  Lab 03/29/18  1230 04/21/18  1430 04/27/18  1218   LABURIN No significant growth PSEUDOMONAS [id number],000 - 49,999 cfu/ml No growth     Urine Studies:   Recent Labs  Lab 04/19/18  1128 04/21/18  1430   COLORU Yellow Yellow   APPEARANCEUA Clear Cloudy*   PHUR 6.0 5.0   SPECGRAV 1.020 1.025   PROTEINUA Negative 2+*   GLUCUA 3+* 3+*   KETONESU Trace* 1+*   BILIRUBINUA Negative Negative   OCCULTUA Negative 3+*   NITRITE Negative Negative   UROBILINOGEN 2.0 Negative   LEUKOCYTESUR Negative Negative   RBCUA 2 1   WBCUA 2 2   BACTERIA None Rare   SQUAMEPITHEL 0  --    HYALINECASTS  --  0     Wound Culture:   Recent Labs  Lab 04/28/18  1413 04/28/18  1417   LABAERO No growth No growth       Significant Imaging: I have reviewed all pertinent imaging results/findings within the past 24 hours.

## 2018-04-29 NOTE — PROGRESS NOTES
[facility]  Neurosurgery  Progress Note    Subjective:     History of Present Illness: 82 y.o.  male with type 2 diabetes, who presents today for follow up evaluation one week prior to 2 level ACDF scheduled for 4/10/2018. Pt reports    Pt presents today wearing a cervical collar and in a wheelchair. He would like to proceed with surgery. Per pt's family, his PCP would like to see him on 4/9, the day before surgery. He notes continued neck pain and BUE numbness/tingling as well as BUE weakness. Pt is only able to walk a few steps unassisted. He has never received neck surgery.     Post-Op Info:  Procedure(s) (LRB):  WASHOUT-CERVICAL-wound washout and csf leak repair with depuy (N/A)  DISKECTOMY AND FUSION-ANTERIOR CERVICAL (ACDF)- REVISON C5-C7   1 Day Post-Op     Interval History: To OR yesterday for washout of fluid collection.    Medications:  Continuous Infusions:  Scheduled Meds:   albuterol-ipratropium 2.5mg-0.5mg/3mL  3 mL Nebulization Q4H    atorvastatin  20 mg Per NG tube Nightly    ceFEPime (MAXIPIME) IVPB  2 g Intravenous Q8H    [START ON 4/30/2018] citalopram  10 mg Per NG tube Daily    diltiaZEM  30 mg Per NG tube Q6H    heparin (porcine)  5,000 Units Subcutaneous Q8H    insulin aspart U-100  6 Units Subcutaneous 6 times per day    insulin detemir U-100  13 Units Subcutaneous BID    metoprolol tartrate  12.5 mg Per NG tube Q8H    metronidazole  500 mg Intravenous Q8H    tamsulosin  0.4 mg Per NG tube Daily    traZODone  50 mg Per NG tube QHS    valsartan  40 mg Per NG tube Daily     PRN Meds:acetaminophen, aluminum-magnesium hydroxide-simethicone, benzonatate, dextrose 50%, fentaNYL, glucagon (human recombinant), hydrocodone-acetaminophen 5-325mg, insulin aspart U-100, labetalol, lidocaine HCL 2%, omnipaque, ondansetron     Review of Systems    Objective:     Weight: 99.5 kg (219 lb 5.7 oz)  Body mass index is 29.75 kg/m².  Vital Signs (Most Recent):  Temp: 98.1 °F (36.7  °C) (04/29/18 1502)  Pulse: (!) 53 (04/29/18 1543)  Resp: 16 (04/29/18 1543)  BP: (!) 144/109 (04/29/18 1502)  SpO2: 100 % (04/29/18 1543) Vital Signs (24h Range):  Temp:  [97.9 °F (36.6 °C)-99.3 °F (37.4 °C)] 98.1 °F (36.7 °C)  Pulse:  [53-80] 53  Resp:  [11-19] 16  SpO2:  [94 %-100 %] 100 %  BP: (131-167)/() 144/109  Arterial Line BP: (152-177)/(64-80) 177/68       Date 04/29/18 0700 - 04/30/18 0659   Shift 4584-6140 3830-2082 9719-0180 24 Hour Total   I  N  T  A  K  E   NG/   130    IV Piggyback 200   200    Shift Total  (mL/kg) 330  (3.3)   330  (3.3)   O  U  T  P  U  T   Urine  (mL/kg/hr) 1010  (1.3) 150  1160    Shift Total  (mL/kg) 1010  (10.2) 150  (1.5)  1160  (11.7)   Weight (kg) 99.5 99.5 99.5 99.5            NG/OG Tube 04/21/18 2300 Cortrak Right nostril (Active)   Placement Check placement verified by aspirate characteristics;placement verified by distal tube length measurement 4/26/2018  8:45 PM   Distal Tube Length (cm) 75 4/25/2018  7:05 AM   Tolerance no signs/symptoms of discomfort 4/26/2018  8:45 PM   Securement anchored to nostril center w/ adhesive device 4/26/2018  8:45 PM   Clamp Status/Tolerance unclamped;no abdominal distention;no emesis;no nausea;no residual;no restlessness 4/26/2018  8:45 PM   Insertion Site Appearance no redness, warmth, tenderness, skin breakdown, drainage 4/26/2018  8:45 PM   Drainage None 4/25/2018 11:05 AM   Flush/Irrigation flushed w/;water;no resistance met 4/26/2018  8:45 PM   Feeding Method continuous 4/26/2018  8:45 PM   Current Rate (mL/hr) 55 mL/hr 4/26/2018  8:45 PM   Goal Rate (mL/hr) 55 mL/hr 4/26/2018  8:45 PM   Intake (mL) 30 mL 4/24/2018  5:00 PM   Water Bolus (mL) 100 mL 4/26/2018  5:00 AM   Rate Formula Tube Feeding (mL/hr) 55 mL/hr 4/24/2018  4:00 PM   Intake (mL) - Formula Tube Feeding 513 4/26/2018  5:00 AM   Residual Amount (ml) 0 ml 4/24/2018  7:05 AM       Male External Urinary Catheter 04/23/18 1345 Medium (Active)   Collection  Container Standard drainage bag 4/26/2018  8:45 PM   Skin reddened 4/26/2018  8:45 PM   Tolerance no signs/symptoms of discomfort 4/26/2018  8:45 PM   Output (mL) 575 mL 4/27/2018  6:00 AM   Catheter Change Date 04/26/18 4/26/2018  8:45 PM   Catheter Change Time 2300 4/26/2018  8:45 PM       Neurosurgery Physical Exam    General: well developed, well nourished. no acute distress. Generalized deconditioning   Neurologic: Awake and oriented to self and hospital. Thought content appropriate.  Head: normocephalic, atraumatic   GCS: Motor: 6/Verbal: 4/Eyes: 4 GCS Total: 14  Cranial nerves: face symmetric, tongue midline, pupils equal, round, reactive to light with accomodation, extraocular muscles intact. CN II-XII grossly intact.   Language: no aphasia  Speech:  dysarthria   Sensory: decrease response to light touch in BUE  Motor Strength: Moves all extremities spontaneously with good tone.      Strength   Deltoids Triceps Biceps Wrist Extension Wrist Flexion Hand    Upper: R 2/5 3/5 3/5 3/5 3/5 3/5     L 3/5 4-/5 4-/5 3/5 3/5 4-/5       Iliopsoas Quadriceps Knee  Flexion Tibialis  anterior Gastro- cnemius EHL   Lower: R 2/5 2/5 2/5 4-/5 4-/5 4-/5     L 4-/5 3/5 3/5 4+/5 4+/5 4+/5         [place]: present on right   Clonus: absent     wound is c/d/i, no erythema, ttp, or drainage.  wound edges are well approximated.       Significant Labs:    Recent Labs  Lab 04/28/18  0454 04/29/18  0348   * 99    142   K 4.3 4.1    108   CO2 31* 27   BUN 22 18   CREATININE 0.7 0.6   CALCIUM 11.1* 10.7*   MG 2.1 2.2       Recent Labs  Lab 04/28/18  0454 04/29/18  0348   WBC 13.92* 14.79*   HGB 10.1* 10.5*   HCT 33.9* 33.7*    279     No results for input(s): LABPT, INR, APTT in the last 48 hours.  Microbiology Results (last 7 days)     Procedure Component Value Units Date/Time    Aerobic culture [[id number]] Collected:  04/28/18 1413    Order Status:  Completed Specimen:  Wound from Neck Updated:  04/29/18  0807     Aerobic Bacterial Culture No growth    Narrative:       Wound fluid    Aerobic culture [[id number]] Collected:  04/28/18 1417    Order Status:  Completed Specimen:  Wound from Neck Updated:  04/29/18 0754     Aerobic Bacterial Culture No growth    Narrative:       Wound fluid    Urine culture [[id number]] Collected:  04/27/18 1218    Order Status:  Completed Specimen:  Urine from Urine, Catheterized Updated:  04/28/18 2020     Urine Culture, Routine No growth    Gram stain [[id number]] Collected:  04/28/18 1417    Order Status:  Completed Specimen:  Wound from Neck Updated:  04/28/18 2009     Gram Stain Result Rare WBC's      No organisms seen    Narrative:       Wound fluid    Gram stain [[id number]] Collected:  04/28/18 1413    Order Status:  Completed Specimen:  Wound from Neck Updated:  04/28/18 2004     Gram Stain Result Rare WBC's      No organisms seen    Narrative:       Wound fluid    Culture, Anaerobe [[id number]] Collected:  04/28/18 1417    Order Status:  Sent Specimen:  Wound from Neck Updated:  04/28/18 1431    AFB Culture & Smear [[id number]] Collected:  04/28/18 1417    Order Status:  Sent Specimen:  Wound from Neck Updated:  04/28/18 1430    Fungus culture [[id number]] Collected:  04/28/18 1417    Order Status:  Sent Specimen:  Wound from Neck Updated:  04/28/18 1430    Culture, Anaerobe [[id number]] Collected:  04/28/18 1413    Order Status:  Sent Specimen:  Wound from Neck Updated:  04/28/18 1429    Fungus culture [[id number]] Collected:  04/28/18 1413    Order Status:  Sent Specimen:  Wound from Neck Updated:  04/28/18 1429    AFB Culture & Smear [[id number]] Collected:  04/28/18 1413    Order Status:  Sent Specimen:  Wound from Neck Updated:  04/28/18 1428    Urine culture [[id number]] Collected:  04/27/18 1218    Order Status:  Sent Specimen:  Urine from Urine, Catheterized Updated:  04/27/18 1218    Blood culture [[id number]] Collected:  04/21/18 1335    Order Status:  Completed Specimen:  Blood  Updated:  04/26/18 1612     Blood Culture, Routine No growth after 5 days.    Narrative:       Blood cultures from 2 different sites. 4 bottles total.  Please draw before starting antibiotics.    Blood culture [[id number]] Collected:  04/21/18 1335    Order Status:  Completed Specimen:  Blood Updated:  04/26/18 1612     Blood Culture, Routine No growth after 5 days.    Narrative:       Blood cultures x 2 different sites. 4 bottles total. Please  draw cultures before administering antibiotics.    C Diff Toxin by PCR [[id number]] Collected:  04/24/18 1151    Order Status:  Completed Updated:  04/24/18 2330     C. diff PCR Negative    Clostridium difficile EIA [[id number]]  (Abnormal) Collected:  04/24/18 1151    Order Status:  Completed Specimen:  Stool from Stool Updated:  04/24/18 2238     C. diff Antigen Positive (A)     C difficile Toxins A+B, EIA Negative     Comment: Testing not recommended for children <24 months old.       Blood culture [[id number]] Collected:  04/19/18 1005    Order Status:  Completed Specimen:  Blood from Peripheral, Wrist, Right Updated:  04/24/18 1412     Blood Culture, Routine No growth after 5 days.    Narrative:       Blood cultures from 2 different sites. 4 bottles total.  Please draw before starting antibiotics.    Blood culture [[id number]] Collected:  04/19/18 0952    Order Status:  Completed Specimen:  Blood from Peripheral, Hand, Left Updated:  04/24/18 1412     Blood Culture, Routine No growth after 5 days.    Narrative:       Blood cultures x 2 different sites. 4 bottles total. Please  draw cultures before administering antibiotics.    Urine culture [[id number]]  (Susceptibility) Collected:  04/21/18 1430    Order Status:  Completed Specimen:  Urine from Urine, Catheterized Updated:  04/23/18 1800     Urine Culture, Routine --     PSEUDOMONAS AERUGINOSA  10,000 - 49,999 cfu/ml              Significant Diagnostics:  MRI cervical spine 4/27/18 reviewed and agree with findings.   Bryan,  5.7 x 4.1 x 7.6 cm homogeneous fluid collection involving the soft tissues of the right anterior neck, interdigitating and between the right thyroid lobe and trachea with associated retropharyngeal extension as well as apparent involvement of the operative bed at the C5 through C7 level with abnormal fluid signal insinuating about spinal fixation hardware and intervertebral disc spaces at the C5-C6 and C6-C7 levels.  This fluid collection is increased in size when compared to prior CT examination of 04/17/2018.  Findings are concerning for possible postoperative seroma, hematoma, infected postoperative fluid collection.  Given the close proximity of this fluid collection to the thecal sac/anterior epidural space, possible component of CSF leak not definitively excluded.  Clinical correlation advised.    Subsequent mass effect on the trachea and cervical esophagus with leftward displacement secondary to aforementioned large fluid collection within the soft tissues of the right neck as well as associated retropharyngeal component    Assessment/Plan:     * Cervical myelopathy with cervical radiculopathy    82 y.o. male s/p C3-5 ACDF for cervical myelopathy on 4/10 with Dr. Hess, POD#17, and now s/p evacuation/washout of fluid collection with CSF leak repair.     -Flat today.  -CTH stable.  -NPO per ST--unsure when patient will progress. IR consulted for PEG placement 4/27/18. Will post-pone PEG at this time.  -CV goal normotension. Continue home meds. PRN meds SBP >160.  -Urinary retention- condom cath in place, continue Flomax  -Continue PILI/SCDs/SQH  -Continue bowel regimen daily.  -Continue to hold Eliquis. Per , indications unclear.  does not recommend continuing upon discharge.  -Continue aggressive PT/OT/ST  -Cervical collar when OOB or with activity  -IS to bed side. Patient to use atleast 10x every hour. Scheduled CPT/duonebs.   -Neurovascular checks q1h      Dispo: pending improvement                   Rosalio Johns MD  Neurosurgery  [facility]

## 2018-04-29 NOTE — PROGRESS NOTES
Patient arrived from PACU with PACU RN's, Dr. Lozoya notified of pt's arrival, VS stable, see flowsheet for full assessment

## 2018-04-29 NOTE — PLAN OF CARE
Problem: Patient Care Overview  Goal: Plan of Care Review  Outcome: Ongoing (interventions implemented as appropriate)  Vital signs stable. Afebrile. Drowsy but oriented and following commands. Slightly confused to place and time at times. Pain controlled with PRN pain meds. Denies nausea. TONJA cleared for use by [facility] resident. Accuchecks continuing q1h.  Dubois intact and draining clear, yellow urine. Surgical incision remains well approximated and without redness or drainage. IV antibiotics continuing per MD orders. Skin otherwise CDI. Heel protectors in place. Turning and repositioning q2h and as needed for comfort. Daughter updated regarding multiple times throughout shift. Awaiting transfer to [facility]

## 2018-04-29 NOTE — ASSESSMENT & PLAN NOTE
81 y/o male with a history of cervical spondylosis with myelopathy who underwent anterior cervical diskectomy and fusion with plating of C5-6 and C6-7 on 4/13.  This has been complicated by a probable CSF leak as MRI cervical spine revealed large fluid collection pushing on trachea and esophagus causing displacement. Also with some low grade fevers and leukocytosis, now down trending. He was taken back to the OR for wound washout and CSF leak repair on 4/28.  Patient extubated post op. Surgical cx are pending. On broad spectrum abx. Afebrile. VSS. Awaiting operative note.    Plan  - Continue vancomycin, cefepime and metronidazole. Vanc trough before 4th dose.  - Chest physiotherapy and aspiration precautions as patient has copious secretions  - Will follow surgical cultures and tailor antibiotics accordingly  - ID will follow.

## 2018-04-29 NOTE — PROGRESS NOTES
Dr Lozoya notified regarding discrepancy between ABP and NIBP. Also, feeding tube possibly dislodged during surgery. Verbal order obtained for SAMANTHA BA to see pt shortly.

## 2018-04-29 NOTE — ASSESSMENT & PLAN NOTE
82 y.o. male s/p C3-5 ACDF for cervical myelopathy on 4/10 with Dr. Hess, POD#17, and now s/p evacuation/washout of fluid collection with CSF leak repair.     -Flat today.  -CTH stable.  -NPO per ST--unsure when patient will progress. IR consulted for PEG placement 4/27/18. Will post-pone PEG at this time.  -CV goal normotension. Continue home meds. PRN meds SBP >160.  -Urinary retention- condom cath in place, continue Flomax  -Continue PILI/SCDs/SQH  -Continue bowel regimen daily.  -Continue to hold Eliquis. Per , indications unclear.  does not recommend continuing upon discharge.  -Continue aggressive PT/OT/ST  -Cervical collar when OOB or with activity  -IS to bed side. Patient to use atleast 10x every hour. Scheduled CPT/duonebs.   -Neurovascular checks q1h      Dispo: pending improvement

## 2018-04-29 NOTE — PROGRESS NOTES
[facility] Team notified of irregular pedal pulses. Left foot pulse in not in derek with left. MD stated he would pass on to morning team. Will continue to monitor.

## 2018-04-29 NOTE — PROGRESS NOTES
Daughter updated via telephone. Will update when pt goes to room. All questions/concerns addressed.

## 2018-04-29 NOTE — PLAN OF CARE
Problem: Patient Care Overview  Goal: Plan of Care Review  POC reviewed with pt. Questions and concerns addressed.  Pt HOB restrictions to lay flat were lifted by neurosurgery and is now okay to have the HOB lifted.No acute events today.  Pt progressing toward goals. Will continue to monitor. See flowsheets for full assessment and VS info.

## 2018-04-29 NOTE — PLAN OF CARE
Problem: Patient Care Overview  Goal: Plan of Care Review  Outcome: Ongoing (interventions implemented as appropriate)  POC reviewed with pt at 0600. Pt verbalized understanding. Questions and concerns addressed. No acute events overnight. Pt taken for scheduled CT scan of head and abdomen without incident. Pt progressing toward goals. Will continue to monitor. See flowsheets for full assessment and VS info

## 2018-04-29 NOTE — HPI
83 y/o male with a history of cervical spondylosis with myelopathy who underwent anterior cervical diskectomy and fusion with plating of C5-6 and C6-7 on 4/13.  This has been complicated by a probable CSF leak as MRI cervical spine revealed large fluid collection pushing on trachea and esophagus causing displacement. Also with some low grade fevers and leukocytosis, now down trending. He was taken back to the OR for wound washout and CSF leak repair on 4/28.  Patient extubated post op. On broad spectrum abx. Surgical cx are pending. Awaiting op note.

## 2018-04-29 NOTE — ASSESSMENT & PLAN NOTE
S/p ACDF on 4/13 complicated by CSF leak, lumbar drain earlier in admission, now out  4/28 after stepping patient down, MRI cervical spine revealed large fluid collection pushing on trachea and esophagus causing displacement, NSGY planning to take to OR emergently, intubated  4/29 s/p wound washout and CSF leak repair POD 1

## 2018-04-29 NOTE — NURSING TRANSFER
Nursing Transfer Note      4/29/2018     Transfer to 70    Transfer via bed    Transfer with portable tele and cervical collar    Transported by JAZMIN Mattson, HUMBERTO & STEVE Dooley RN    Medicines sent: Labetalol and Levamir Pen    Chart send with patient: yes    Notified: daughter notified via telephone.    Patient reassessed at: 4/29/18 @ 0100    Upon arrival to floor: transferred to ICU bed, placed on bedside monitor and updates given at bedside.

## 2018-04-29 NOTE — SUBJECTIVE & OBJECTIVE
Interval History: To OR yesterday for washout of fluid collection.    Medications:  Continuous Infusions:  Scheduled Meds:   albuterol-ipratropium 2.5mg-0.5mg/3mL  3 mL Nebulization Q4H    atorvastatin  20 mg Per NG tube Nightly    ceFEPime (MAXIPIME) IVPB  2 g Intravenous Q8H    [START ON 4/30/2018] citalopram  10 mg Per NG tube Daily    diltiaZEM  30 mg Per NG tube Q6H    heparin (porcine)  5,000 Units Subcutaneous Q8H    insulin aspart U-100  6 Units Subcutaneous 6 times per day    insulin detemir U-100  13 Units Subcutaneous BID    metoprolol tartrate  12.5 mg Per NG tube Q8H    metronidazole  500 mg Intravenous Q8H    tamsulosin  0.4 mg Per NG tube Daily    traZODone  50 mg Per NG tube QHS    valsartan  40 mg Per NG tube Daily     PRN Meds:acetaminophen, aluminum-magnesium hydroxide-simethicone, benzonatate, dextrose 50%, fentaNYL, glucagon (human recombinant), hydrocodone-acetaminophen 5-325mg, insulin aspart U-100, labetalol, lidocaine HCL 2%, omnipaque, ondansetron     Review of Systems    Objective:     Weight: 99.5 kg (219 lb 5.7 oz)  Body mass index is 29.75 kg/m².  Vital Signs (Most Recent):  Temp: 98.1 °F (36.7 °C) (04/29/18 1502)  Pulse: (!) 53 (04/29/18 1543)  Resp: 16 (04/29/18 1543)  BP: (!) 144/109 (04/29/18 1502)  SpO2: 100 % (04/29/18 1543) Vital Signs (24h Range):  Temp:  [97.9 °F (36.6 °C)-99.3 °F (37.4 °C)] 98.1 °F (36.7 °C)  Pulse:  [53-80] 53  Resp:  [11-19] 16  SpO2:  [94 %-100 %] 100 %  BP: (131-167)/() 144/109  Arterial Line BP: (152-177)/(64-80) 177/68       Date 04/29/18 0700 - 04/30/18 0659   Shift 4328-3147 1392-0736 8706-2658 24 Hour Total   I  N  T  A  K  E   NG/   130    IV Piggyback 200   200    Shift Total  (mL/kg) 330  (3.3)   330  (3.3)   O  U  T  P  U  T   Urine  (mL/kg/hr) 1010  (1.3) 150  1160    Shift Total  (mL/kg) 1010  (10.2) 150  (1.5)  1160  (11.7)   Weight (kg) 99.5 99.5 99.5 99.5            NG/OG Tube 04/21/18 2300 Cortrak Right nostril  (Active)   Placement Check placement verified by aspirate characteristics;placement verified by distal tube length measurement 4/26/2018  8:45 PM   Distal Tube Length (cm) 75 4/25/2018  7:05 AM   Tolerance no signs/symptoms of discomfort 4/26/2018  8:45 PM   Securement anchored to nostril center w/ adhesive device 4/26/2018  8:45 PM   Clamp Status/Tolerance unclamped;no abdominal distention;no emesis;no nausea;no residual;no restlessness 4/26/2018  8:45 PM   Insertion Site Appearance no redness, warmth, tenderness, skin breakdown, drainage 4/26/2018  8:45 PM   Drainage None 4/25/2018 11:05 AM   Flush/Irrigation flushed w/;water;no resistance met 4/26/2018  8:45 PM   Feeding Method continuous 4/26/2018  8:45 PM   Current Rate (mL/hr) 55 mL/hr 4/26/2018  8:45 PM   Goal Rate (mL/hr) 55 mL/hr 4/26/2018  8:45 PM   Intake (mL) 30 mL 4/24/2018  5:00 PM   Water Bolus (mL) 100 mL 4/26/2018  5:00 AM   Rate Formula Tube Feeding (mL/hr) 55 mL/hr 4/24/2018  4:00 PM   Intake (mL) - Formula Tube Feeding 513 4/26/2018  5:00 AM   Residual Amount (ml) 0 ml 4/24/2018  7:05 AM       Male External Urinary Catheter 04/23/18 1345 Medium (Active)   Collection Container Standard drainage bag 4/26/2018  8:45 PM   Skin reddened 4/26/2018  8:45 PM   Tolerance no signs/symptoms of discomfort 4/26/2018  8:45 PM   Output (mL) 575 mL 4/27/2018  6:00 AM   Catheter Change Date 04/26/18 4/26/2018  8:45 PM   Catheter Change Time 2300 4/26/2018  8:45 PM       Neurosurgery Physical Exam    General: well developed, well nourished. no acute distress. Generalized deconditioning   Neurologic: Awake and oriented to self and hospital. Thought content appropriate.  Head: normocephalic, atraumatic   GCS: Motor: 6/Verbal: 4/Eyes: 4 GCS Total: 14  Cranial nerves: face symmetric, tongue midline, pupils equal, round, reactive to light with accomodation, extraocular muscles intact. CN II-XII grossly intact.   Language: no aphasia  Speech:  dysarthria   Sensory:  decrease response to light touch in BUE  Motor Strength: Moves all extremities spontaneously with good tone.      Strength   Deltoids Triceps Biceps Wrist Extension Wrist Flexion Hand    Upper: R 2/5 3/5 3/5 3/5 3/5 3/5     L 3/5 4-/5 4-/5 3/5 3/5 4-/5       Iliopsoas Quadriceps Knee  Flexion Tibialis  anterior Gastro- cnemius EHL   Lower: R 2/5 2/5 2/5 4-/5 4-/5 4-/5     L 4-/5 3/5 3/5 4+/5 4+/5 4+/5         [place]: present on right   Clonus: absent     wound is c/d/i, no erythema, ttp, or drainage.  wound edges are well approximated.       Significant Labs:    Recent Labs  Lab 04/28/18 0454 04/29/18 0348   * 99    142   K 4.3 4.1    108   CO2 31* 27   BUN 22 18   CREATININE 0.7 0.6   CALCIUM 11.1* 10.7*   MG 2.1 2.2       Recent Labs  Lab 04/28/18 0454 04/29/18  0348   WBC 13.92* 14.79*   HGB 10.1* 10.5*   HCT 33.9* 33.7*    279     No results for input(s): LABPT, INR, APTT in the last 48 hours.  Microbiology Results (last 7 days)     Procedure Component Value Units Date/Time    Aerobic culture [[id number]] Collected:  04/28/18 1413    Order Status:  Completed Specimen:  Wound from Neck Updated:  04/29/18 0807     Aerobic Bacterial Culture No growth    Narrative:       Wound fluid    Aerobic culture [[id number]] Collected:  04/28/18 1417    Order Status:  Completed Specimen:  Wound from Neck Updated:  04/29/18 0754     Aerobic Bacterial Culture No growth    Narrative:       Wound fluid    Urine culture [[id number]] Collected:  04/27/18 1218    Order Status:  Completed Specimen:  Urine from Urine, Catheterized Updated:  04/28/18 2020     Urine Culture, Routine No growth    Gram stain [[id number]] Collected:  04/28/18 1417    Order Status:  Completed Specimen:  Wound from Neck Updated:  04/28/18 2009     Gram Stain Result Rare WBC's      No organisms seen    Narrative:       Wound fluid    Gram stain [[id number]] Collected:  04/28/18 1413    Order Status:  Completed Specimen:  Wound from  Neck Updated:  04/28/18 2004     Gram Stain Result Rare WBC's      No organisms seen    Narrative:       Wound fluid    Culture, Anaerobe [[id number]] Collected:  04/28/18 1417    Order Status:  Sent Specimen:  Wound from Neck Updated:  04/28/18 1431    AFB Culture & Smear [[id number]] Collected:  04/28/18 1417    Order Status:  Sent Specimen:  Wound from Neck Updated:  04/28/18 1430    Fungus culture [[id number]] Collected:  04/28/18 1417    Order Status:  Sent Specimen:  Wound from Neck Updated:  04/28/18 1430    Culture, Anaerobe [[id number]] Collected:  04/28/18 1413    Order Status:  Sent Specimen:  Wound from Neck Updated:  04/28/18 1429    Fungus culture [[id number]] Collected:  04/28/18 1413    Order Status:  Sent Specimen:  Wound from Neck Updated:  04/28/18 1429    AFB Culture & Smear [[id number]] Collected:  04/28/18 1413    Order Status:  Sent Specimen:  Wound from Neck Updated:  04/28/18 1428    Urine culture [[id number]] Collected:  04/27/18 1218    Order Status:  Sent Specimen:  Urine from Urine, Catheterized Updated:  04/27/18 1218    Blood culture [[id number]] Collected:  04/21/18 1335    Order Status:  Completed Specimen:  Blood Updated:  04/26/18 1612     Blood Culture, Routine No growth after 5 days.    Narrative:       Blood cultures from 2 different sites. 4 bottles total.  Please draw before starting antibiotics.    Blood culture [[id number]] Collected:  04/21/18 1335    Order Status:  Completed Specimen:  Blood Updated:  04/26/18 1612     Blood Culture, Routine No growth after 5 days.    Narrative:       Blood cultures x 2 different sites. 4 bottles total. Please  draw cultures before administering antibiotics.    C Diff Toxin by PCR [[id number]] Collected:  04/24/18 1151    Order Status:  Completed Updated:  04/24/18 2330     C. diff PCR Negative    Clostridium difficile EIA [[id number]]  (Abnormal) Collected:  04/24/18 1151    Order Status:  Completed Specimen:  Stool from Stool Updated:  04/24/18  2238     C. diff Antigen Positive (A)     C difficile Toxins A+B, EIA Negative     Comment: Testing not recommended for children <24 months old.       Blood culture [[id number]] Collected:  04/19/18 1005    Order Status:  Completed Specimen:  Blood from Peripheral, Wrist, Right Updated:  04/24/18 1412     Blood Culture, Routine No growth after 5 days.    Narrative:       Blood cultures from 2 different sites. 4 bottles total.  Please draw before starting antibiotics.    Blood culture [[id number]] Collected:  04/19/18 0952    Order Status:  Completed Specimen:  Blood from Peripheral, Hand, Left Updated:  04/24/18 1412     Blood Culture, Routine No growth after 5 days.    Narrative:       Blood cultures x 2 different sites. 4 bottles total. Please  draw cultures before administering antibiotics.    Urine culture [[id number]]  (Susceptibility) Collected:  04/21/18 1430    Order Status:  Completed Specimen:  Urine from Urine, Catheterized Updated:  04/23/18 1800     Urine Culture, Routine --     PSEUDOMONAS AERUGINOSA  10,000 - 49,999 cfu/ml              Significant Diagnostics:  MRI cervical spine 4/27/18 reviewed and agree with findings.   Large, 5.7 x 4.1 x 7.6 cm homogeneous fluid collection involving the soft tissues of the right anterior neck, interdigitating and between the right thyroid lobe and trachea with associated retropharyngeal extension as well as apparent involvement of the operative bed at the C5 through C7 level with abnormal fluid signal insinuating about spinal fixation hardware and intervertebral disc spaces at the C5-C6 and C6-C7 levels.  This fluid collection is increased in size when compared to prior CT examination of 04/17/2018.  Findings are concerning for possible postoperative seroma, hematoma, infected postoperative fluid collection.  Given the close proximity of this fluid collection to the thecal sac/anterior epidural space, possible component of CSF leak not definitively excluded.   Clinical correlation advised.    Subsequent mass effect on the trachea and cervical esophagus with leftward displacement secondary to aforementioned large fluid collection within the soft tissues of the right neck as well as associated retropharyngeal component

## 2018-04-29 NOTE — CONSULTS
[facility]  Infectious Disease  Consult Note    Patient Name: Hollis Pitts  MRN: [id number]  Admission Date: 4/10/2018  Hospital Length of Stay: 19 days  Attending Physician: Anoop Peng MD  Primary Care Provider: Vanessa Fontaine NP     Isolation Status: No active isolations    Patient information was obtained from patient and past medical records.      Consults  Assessment/Plan:     CSF leak       83 y/o male with a history of cervical spondylosis with myelopathy who underwent anterior cervical diskectomy and fusion with plating of C5-6 and C6-7 on 4/13.  This has been complicated by a probable CSF leak as MRI cervical spine revealed large fluid collection pushing on trachea and esophagus causing displacement. Also with some low grade fevers and leukocytosis, now down trending. He was taken back to the OR for wound washout and CSF leak repair on 4/28.  Patient extubated post op. Surgical cx are pending. On broad spectrum abx. Afebrile. VSS. Awaiting operative note.    Plan  - Continue vancomycin, cefepime and metronidazole. Vanc trough before 4th dose.  - Chest physiotherapy and aspiration precautions has patient has copious secretions  - Will follow surgical cultures and tailor antibiotics accordingly  - ID will follow.            Thank you for the consult. Please call for any questions.  Jolanta Ortiz PA-C  Phone: [phone]  Pager: [phone]    Subjective:     Principal Problem: Cervical myelopathy with cervical radiculopathy    HPI: 83 y/o male with a history of cervical spondylosis with myelopathy who underwent anterior cervical diskectomy and fusion with plating of C5-6 and C6-7 on 4/13.  This has been complicated by a probable CSF leak as  MRI cervical spine revealed large fluid collection pushing on trachea and esophagus causing displacement. Also with some low grade fevers and leukocytosis, now down trending. He was taken back to the OR for wound washout and CSF leak repair on 4/28.   "Patient extubated post op. On broad spectrum abx. Surgical cx are pending. Awaiting op note.    Past Medical History:   Diagnosis Date    Benign prostatic hyperplasia with urinary retention 4/11/2018    Cervical myelopathy with cervical radiculopathy 4/10/2018    Depression     Dyslipidemia     Essential hypertension 4/11/2018    Glaucoma (increased eye pressure)     Mild cognitive impairment     Traumatic brain injury 07/2017    Type 2 diabetes mellitus with hyperglycemia, without long-term current use of insulin 4/11/2018       Past Surgical History:   Procedure Laterality Date    CATARACT EXTRACTION W/  INTRAOCULAR LENS IMPLANT         Review of patient's allergies indicates:  No Known Allergies    Medications:  Prescriptions Prior to Admission   Medication Sig    atorvastatin (LIPITOR) 20 MG tablet Take 20 mg by mouth nightly.    bisacodyl (DULCOLAX) 5 mg EC tablet     CARTIA  mg Cp24 Take 90 mg by mouth.     citalopram (CELEXA) 20 MG tablet Take 20 mg by mouth once daily.    gabapentin (NEURONTIN) 100 MG capsule Take 100 mg by mouth 3 (three) times daily as needed.     SENSIPAR 30 mg Tab Take 30 mg by mouth daily with breakfast.     traZODone (DESYREL) 50 MG tablet Take 50 mg by mouth every evening.    valsartan (DIOVAN) 80 MG tablet Take 80 mg by mouth once daily.    vitamin B comp with C no.4 150 mg Tab Take vitamin B complex 1 a day.  Over-the-counter is an appropriate alternative.    BD INSULIN PEN NEEDLE UF SHORT 31 gauge x 5/16" Ndle USE WITH LANTUS PEN DAILY    CONSTULOSE 10 gram/15 mL solution TAKE  30 MILLILITERS BY MOUTH 3 TIMES A DAY UNTIL BOWEL MOVEMENT ...  (REFER TO PRESCRIPTION NOTES).    ELIQUIS 2.5 mg Tab Take 2.5 mg by mouth 2 (two) times daily.     FREESTYLE TEST Strp     omega-3 fatty acids-fish oil (FISH OIL) 360-1,200 mg Cap Take 1,200 mg by mouth once daily.     Antibiotics     Start     Stop Route Frequency Ordered    04/27/18 1445  metronidazole IVPB 500 mg "      -- IV Every 8 hours (non-standard times) 04/27/18 1338        Antifungals     None        Antivirals     None           Immunization History   Administered Date(s) Administered    PPD Test 04/25/2018       Family History     Problem Relation (Age of Onset)    Cancer Maternal Grandmother    Clotting disorder Mother    Paranoid behavior Mother        Social History     Social History    Marital status:      Spouse name: N/A    Number of children: N/A    Years of education: N/A     Social History Main Topics    Smoking status: Former Smoker     Quit date: 2/6/1950    Smokeless tobacco: Never Used    Alcohol use 4.2 oz/week     7 Shots of liquor per week    Drug use: No    Sexual activity: Yes     Partners: Female     Other Topics Concern    None     Social History Narrative    None     Review of Systems   Constitutional: Negative for chills and fatigue.   HENT: Negative for congestion, rhinorrhea and sore throat.    Eyes: Negative for visual disturbance.   Respiratory: Positive for cough and shortness of breath.    Cardiovascular: Negative for chest pain.   Gastrointestinal: Negative for abdominal distention, abdominal pain, diarrhea and nausea.   Endocrine: Negative for polyuria.   Genitourinary: Negative for dysuria and urgency.   Musculoskeletal: Positive for back pain. Negative for myalgias.   Skin: Negative for rash and wound.   Allergic/Immunologic: Negative for immunocompromised state.   Neurological: Positive for weakness and numbness. Negative for dizziness.   Psychiatric/Behavioral: Negative for confusion. The patient is not nervous/anxious.      Objective:     Vital Signs (Most Recent):  Temp: 98.3 °F (36.8 °C) (04/29/18 1102)  Pulse: 61 (04/29/18 1312)  Resp: 18 (04/29/18 1312)  BP: (!) 163/76 (04/29/18 1200)  SpO2: 99 % (04/29/18 1312) Vital Signs (24h Range):  Temp:  [97.9 °F (36.6 °C)-99.3 °F (37.4 °C)] 98.3 °F (36.8 °C)  Pulse:  [55-80] 61  Resp:  [11-19] 18  SpO2:  [94 %-100 %]  99 %  BP: (131-167)/() 163/76  Arterial Line BP: (152-177)/(64-80) 177/68     Weight: 99.5 kg (219 lb 5.7 oz)  Body mass index is 29.75 kg/m².    Estimated Creatinine Clearance: 120 mL/min (based on SCr of 0.6 mg/dL).    Physical Exam   Constitutional: He appears well-developed and well-nourished. No distress.   HENT:   Head: Normocephalic and atraumatic.   Eyes: Pupils are equal, round, and reactive to light. No scleral icterus.   Neck: Neck supple.   Anterior surgical incision c/d/i   Cardiovascular: Normal rate, regular rhythm and normal heart sounds.    Pulmonary/Chest: He is in respiratory distress (mild). He has no wheezes. He has rales.   Lots of secretions   Abdominal: Soft. Bowel sounds are normal. He exhibits no distension. There is no tenderness.   Musculoskeletal: He exhibits no edema.   Skin: Skin is warm and dry. He is not diaphoretic. No erythema.   Psychiatric: He has a normal mood and affect. His behavior is normal.       Significant Labs:   Blood Culture:   Recent Labs  Lab 04/19/18  0952 04/19/18  1005 04/21/18  1335   LABBLOO No growth after 5 days. No growth after 5 days. No growth after 5 days.  No growth after 5 days.     CBC:   Recent Labs  Lab 04/28/18  0454 04/29/18  0348   WBC 13.92* 14.79*   HGB 10.1* 10.5*   HCT 33.9* 33.7*    279     CMP:   Recent Labs  Lab 04/28/18  0454 04/29/18  0348    142   K 4.3 4.1    108   CO2 31* 27   * 99   BUN 22 18   CREATININE 0.7 0.6   CALCIUM 11.1* 10.7*   PROT 5.8* 6.0   ALBUMIN 2.0* 2.1*   BILITOT 0.4 0.4   ALKPHOS 82 75   AST 15 17   ALT 10 9*   ANIONGAP 4* 7*   EGFRNONAA >60.0 >60.0     HIV 1/2 Antibodies: No results for input(s): [id number] in the last 48 hours.  HIV Rapid: No results for input(s): HIVRAPID in the last 48 hours.  Lactic Acid: No results for input(s): LACTATE in the last 48 hours.  Lipase: No results for input(s): LIPASE in the last 48 hours.  Procalcitonin: No results for input(s): PROCAL in the last  48 hours.  Quantiferon: No results for input(s): NIL, TBAG, TBAGNIL, MITOGENNIL, TBGOLD in the last 48 hours.  Respiratory Culture:   Recent Labs  Lab 04/19/18  0231   GSRESP <10 epithelial cells per low power field.  Many WBC's  Many Gram positive cocci  Many Gram negative rods  Rare Gram positive rods   RESPIRATORYC Normal respiratory mitzy     Urine Culture:   Recent Labs  Lab 03/29/18  1230 04/21/18  1430 04/27/18  1218   LABURIN No significant growth PSEUDOMONAS LBMLIWAZHH04,000 - 49,999 cfu/ml No growth     Urine Studies:   Recent Labs  Lab 04/19/18  1128 04/21/18  1430   COLORU Yellow Yellow   APPEARANCEUA Clear Cloudy*   PHUR 6.0 5.0   SPECGRAV 1.020 1.025   PROTEINUA Negative 2+*   GLUCUA 3+* 3+*   KETONESU Trace* 1+*   BILIRUBINUA Negative Negative   OCCULTUA Negative 3+*   NITRITE Negative Negative   UROBILINOGEN 2.0 Negative   LEUKOCYTESUR Negative Negative   RBCUA 2 1   WBCUA 2 2   BACTERIA None Rare   SQUAMEPITHEL 0  --    HYALINECASTS  --  0     Wound Culture:   Recent Labs  Lab 04/28/18  1413 04/28/18  1417   LABAERO No growth No growth       Significant Imaging: I have reviewed all pertinent imaging results/findings within the past 24 hours.

## 2018-04-29 NOTE — PT/OT/SLP DISCHARGE
Physical Therapy Discharge Summary    Name: Hollis Pitts  MRN: [id number]   Principal Problem: Cervical myelopathy with cervical radiculopathy     Patient Discharged from acute Physical Therapy on 2018.  Please refer to prior PT noted date on 2018 for functional status.     Assessment:     Patient transferred to lower level of care secondary to surgical procedure    Objective:     GOALS:    Physical Therapy Goals        Problem: Physical Therapy Goal    Goal Priority Disciplines Outcome Goal Variances Interventions   Physical Therapy Goal     PT/OT, PT Ongoing (interventions implemented as appropriate)     Description:  Goals to be met by: 2018     Patient will increase functional independence with mobility by performin. Supine to sit with Moderate Assistance  2. Sit to supine with Moderate Assistance  3. Sit to stand transfer with Max Assistance  4. Bed to chair transfer with Max Assistance using Rolling Walker  5. Lower extremity exercise program x20 reps per handout, with supervision   6. Pt will perform sitting at EOB x 10 minutes with supervision to improve trunk control                          Reasons for Discontinuation of Therapy Services  Patient is unable to continue work toward goals because of medical or psychosocial complications.    Pt transferred to Neuro ICU after undergoing WASHOUT-CERVICAL-wound washout and csf leak repair with depuy  Plan:     Patient Discharged to: neuro ICU; 7085.    Edna Beck, PT, DPT  2018

## 2018-04-29 NOTE — PLAN OF CARE
Problem: Physical Therapy Goal  Goal: Physical Therapy Goal  Goals to be met by: 2018     Patient will increase functional independence with mobility by performin. Supine to sit with Moderate Assistance  2. Sit to supine with Moderate Assistance  3. Sit to stand transfer with Max Assistance  4. Bed to chair transfer with Max Assistance using Rolling Walker  5. Lower extremity exercise program x20 reps per handout, with supervision   6. Pt will perform sitting at EOB x 10 minutes with supervision to improve trunk control         Outcome: Outcome(s) achieved Date Met: 18  Pt discharged to Neuro ICU sec to undergoing cervical washout on . Need new orders when medically appropriate.    Edna Beck, PT, DPT  2018

## 2018-04-30 PROBLEM — G96.00 CSF LEAK: Status: ACTIVE | Noted: 2018-04-30

## 2018-04-30 LAB
ALBUMIN SERPL BCP-MCNC: 2 G/DL
ALP SERPL-CCNC: 73 U/L
ALT SERPL W/O P-5'-P-CCNC: 12 U/L
ANION GAP SERPL CALC-SCNC: 8 MMOL/L
AST SERPL-CCNC: 20 U/L
BASOPHILS # BLD AUTO: 0.02 K/UL
BASOPHILS NFR BLD: 0.2 %
BILIRUB SERPL-MCNC: 0.5 MG/DL
BUN SERPL-MCNC: 16 MG/DL
CALCIUM SERPL-MCNC: 10.9 MG/DL
CHLORIDE SERPL-SCNC: 106 MMOL/L
CO2 SERPL-SCNC: 26 MMOL/L
CREAT SERPL-MCNC: 0.7 MG/DL
DIFFERENTIAL METHOD: ABNORMAL
EOSINOPHIL # BLD AUTO: 0.1 K/UL
EOSINOPHIL NFR BLD: 0.5 %
ERYTHROCYTE [DISTWIDTH] IN BLOOD BY AUTOMATED COUNT: 15 %
EST. GFR  (AFRICAN AMERICAN): >60 ML/MIN/1.73 M^2
EST. GFR  (NON AFRICAN AMERICAN): >60 ML/MIN/1.73 M^2
GLUCOSE SERPL-MCNC: 128 MG/DL
HCT VFR BLD AUTO: 33.7 %
HGB BLD-MCNC: 10.6 G/DL
IMM GRANULOCYTES # BLD AUTO: 0.26 K/UL
IMM GRANULOCYTES NFR BLD AUTO: 2.1 %
INR PPP: 1.3
LYMPHOCYTES # BLD AUTO: 1 K/UL
LYMPHOCYTES NFR BLD: 8.2 %
MAGNESIUM SERPL-MCNC: 1.9 MG/DL
MCH RBC QN AUTO: 31.2 PG
MCHC RBC AUTO-ENTMCNC: 31.5 G/DL
MCV RBC AUTO: 99 FL
MONOCYTES # BLD AUTO: 1 K/UL
MONOCYTES NFR BLD: 7.6 %
NEUTROPHILS # BLD AUTO: 10.3 K/UL
NEUTROPHILS NFR BLD: 81.4 %
NRBC BLD-RTO: 0 /100 WBC
PHOSPHATE SERPL-MCNC: 2.3 MG/DL
PLATELET # BLD AUTO: 305 K/UL
PMV BLD AUTO: 9.6 FL
POCT GLUCOSE: 121 MG/DL (ref 70–110)
POCT GLUCOSE: 132 MG/DL (ref 70–110)
POCT GLUCOSE: 76 MG/DL (ref 70–110)
POCT GLUCOSE: 87 MG/DL (ref 70–110)
POCT GLUCOSE: 89 MG/DL (ref 70–110)
POTASSIUM SERPL-SCNC: 3.7 MMOL/L
PROT SERPL-MCNC: 5.9 G/DL
PROTHROMBIN TIME: 12.8 SEC
RBC # BLD AUTO: 3.4 M/UL
SODIUM SERPL-SCNC: 140 MMOL/L
WBC # BLD AUTO: 12.68 K/UL

## 2018-04-30 PROCEDURE — 20000000 HC ICU ROOM

## 2018-04-30 PROCEDURE — 99233 SBSQ HOSP IP/OBS HIGH 50: CPT | Mod: ,,, | Performed by: PHYSICIAN ASSISTANT

## 2018-04-30 PROCEDURE — 25000003 PHARM REV CODE 250: Performed by: STUDENT IN AN ORGANIZED HEALTH CARE EDUCATION/TRAINING PROGRAM

## 2018-04-30 PROCEDURE — 63600175 PHARM REV CODE 636 W HCPCS: Performed by: PHYSICIAN ASSISTANT

## 2018-04-30 PROCEDURE — 84100 ASSAY OF PHOSPHORUS: CPT

## 2018-04-30 PROCEDURE — 63600175 PHARM REV CODE 636 W HCPCS: Performed by: STUDENT IN AN ORGANIZED HEALTH CARE EDUCATION/TRAINING PROGRAM

## 2018-04-30 PROCEDURE — 94761 N-INVAS EAR/PLS OXIMETRY MLT: CPT

## 2018-04-30 PROCEDURE — S0030 INJECTION, METRONIDAZOLE: HCPCS | Performed by: STUDENT IN AN ORGANIZED HEALTH CARE EDUCATION/TRAINING PROGRAM

## 2018-04-30 PROCEDURE — 25000003 PHARM REV CODE 250: Performed by: ANESTHESIOLOGY

## 2018-04-30 PROCEDURE — 94640 AIRWAY INHALATION TREATMENT: CPT

## 2018-04-30 PROCEDURE — 25000003 PHARM REV CODE 250: Performed by: PHYSICIAN ASSISTANT

## 2018-04-30 PROCEDURE — 25000242 PHARM REV CODE 250 ALT 637 W/ HCPCS: Performed by: PHYSICIAN ASSISTANT

## 2018-04-30 PROCEDURE — 80053 COMPREHEN METABOLIC PANEL: CPT

## 2018-04-30 PROCEDURE — 25000003 PHARM REV CODE 250: Performed by: NURSE PRACTITIONER

## 2018-04-30 PROCEDURE — 85025 COMPLETE CBC W/AUTO DIFF WBC: CPT

## 2018-04-30 PROCEDURE — 85610 PROTHROMBIN TIME: CPT

## 2018-04-30 PROCEDURE — 63600175 PHARM REV CODE 636 W HCPCS: Performed by: PSYCHIATRY & NEUROLOGY

## 2018-04-30 PROCEDURE — 94668 MNPJ CHEST WALL SBSQ: CPT

## 2018-04-30 PROCEDURE — 83735 ASSAY OF MAGNESIUM: CPT

## 2018-04-30 RX ORDER — HYDROCODONE BITARTRATE AND ACETAMINOPHEN 5; 325 MG/1; MG/1
1 TABLET ORAL EVERY 4 HOURS PRN
Status: DISCONTINUED | OUTPATIENT
Start: 2018-04-30 | End: 2018-05-04

## 2018-04-30 RX ORDER — LEVETIRACETAM 500 MG/1
500 TABLET ORAL 2 TIMES DAILY
Status: DISPENSED | OUTPATIENT
Start: 2018-04-30 | End: 2018-05-07

## 2018-04-30 RX ORDER — CARVEDILOL 12.5 MG/1
12.5 TABLET ORAL 2 TIMES DAILY
Status: DISCONTINUED | OUTPATIENT
Start: 2018-04-30 | End: 2018-05-02

## 2018-04-30 RX ORDER — VANCOMYCIN/0.9 % SOD CHLORIDE 1 G/100 ML
1000 PLASTIC BAG, INJECTION (ML) INTRAVENOUS
Status: DISPENSED | OUTPATIENT
Start: 2018-04-30 | End: 2018-05-07

## 2018-04-30 RX ORDER — IPRATROPIUM BROMIDE AND ALBUTEROL SULFATE 2.5; .5 MG/3ML; MG/3ML
3 SOLUTION RESPIRATORY (INHALATION) EVERY 4 HOURS PRN
Status: DISCONTINUED | OUTPATIENT
Start: 2018-04-30 | End: 2018-05-14

## 2018-04-30 RX ORDER — CARVEDILOL 12.5 MG/1
12.5 TABLET ORAL 2 TIMES DAILY
Status: DISCONTINUED | OUTPATIENT
Start: 2018-04-30 | End: 2018-04-30

## 2018-04-30 RX ORDER — CEFTRIAXONE 1 G/1
1 INJECTION, POWDER, FOR SOLUTION INTRAMUSCULAR; INTRAVENOUS
Status: DISCONTINUED | OUTPATIENT
Start: 2018-04-30 | End: 2018-05-01

## 2018-04-30 RX ORDER — AMOXICILLIN 250 MG
1 CAPSULE ORAL 2 TIMES DAILY
Status: DISCONTINUED | OUTPATIENT
Start: 2018-04-30 | End: 2018-05-08

## 2018-04-30 RX ADMIN — DILTIAZEM HYDROCHLORIDE 30 MG: 30 TABLET, FILM COATED ORAL at 05:04

## 2018-04-30 RX ADMIN — CARVEDILOL 12.5 MG: 12.5 TABLET, FILM COATED ORAL at 08:04

## 2018-04-30 RX ADMIN — HYDRALAZINE HYDROCHLORIDE 10 MG: 20 INJECTION INTRAMUSCULAR; INTRAVENOUS at 08:04

## 2018-04-30 RX ADMIN — TAMSULOSIN HYDROCHLORIDE 0.4 MG: 0.4 CAPSULE ORAL at 09:04

## 2018-04-30 RX ADMIN — IPRATROPIUM BROMIDE AND ALBUTEROL SULFATE 3 ML: .5; 3 SOLUTION RESPIRATORY (INHALATION) at 04:04

## 2018-04-30 RX ADMIN — DILTIAZEM HYDROCHLORIDE 30 MG: 30 TABLET, FILM COATED ORAL at 12:04

## 2018-04-30 RX ADMIN — CARVEDILOL 12.5 MG: 12.5 TABLET, FILM COATED ORAL at 12:04

## 2018-04-30 RX ADMIN — HEPARIN SODIUM 5000 UNITS: 5000 INJECTION, SOLUTION INTRAVENOUS; SUBCUTANEOUS at 09:04

## 2018-04-30 RX ADMIN — DILTIAZEM HYDROCHLORIDE 30 MG: 30 TABLET, FILM COATED ORAL at 06:04

## 2018-04-30 RX ADMIN — STANDARDIZED SENNA CONCENTRATE AND DOCUSATE SODIUM 1 TABLET: 8.6; 5 TABLET, FILM COATED ORAL at 08:04

## 2018-04-30 RX ADMIN — CEFEPIME 2 G: 2 INJECTION, POWDER, FOR SOLUTION INTRAVENOUS at 02:04

## 2018-04-30 RX ADMIN — LEVETIRACETAM 500 MG: 500 TABLET, FILM COATED ORAL at 08:04

## 2018-04-30 RX ADMIN — HEPARIN SODIUM 5000 UNITS: 5000 INJECTION, SOLUTION INTRAVENOUS; SUBCUTANEOUS at 04:04

## 2018-04-30 RX ADMIN — LABETALOL HYDROCHLORIDE 10 MG: 5 INJECTION, SOLUTION INTRAVENOUS at 11:04

## 2018-04-30 RX ADMIN — ATORVASTATIN CALCIUM 20 MG: 20 TABLET, FILM COATED ORAL at 08:04

## 2018-04-30 RX ADMIN — INSULIN DETEMIR 13 UNITS: 100 INJECTION, SOLUTION SUBCUTANEOUS at 10:04

## 2018-04-30 RX ADMIN — METRONIDAZOLE 500 MG: 500 INJECTION, SOLUTION INTRAVENOUS at 04:04

## 2018-04-30 RX ADMIN — TRAZODONE HYDROCHLORIDE 50 MG: 50 TABLET ORAL at 08:04

## 2018-04-30 RX ADMIN — STANDARDIZED SENNA CONCENTRATE AND DOCUSATE SODIUM 1 TABLET: 8.6; 5 TABLET, FILM COATED ORAL at 12:04

## 2018-04-30 RX ADMIN — LEVETIRACETAM 500 MG: 500 TABLET, FILM COATED ORAL at 12:04

## 2018-04-30 RX ADMIN — VANCOMYCIN HYDROCHLORIDE 1 G: 10 INJECTION, POWDER, LYOPHILIZED, FOR SOLUTION INTRAVENOUS at 02:04

## 2018-04-30 RX ADMIN — CITALOPRAM HYDROBROMIDE 10 MG: 10 TABLET ORAL at 09:04

## 2018-04-30 RX ADMIN — CEFTRIAXONE SODIUM 1 G: 1 INJECTION, POWDER, FOR SOLUTION INTRAMUSCULAR; INTRAVENOUS at 08:04

## 2018-04-30 RX ADMIN — CEFEPIME 2 G: 2 INJECTION, POWDER, FOR SOLUTION INTRAVENOUS at 07:04

## 2018-04-30 RX ADMIN — IPRATROPIUM BROMIDE AND ALBUTEROL SULFATE 3 ML: .5; 3 SOLUTION RESPIRATORY (INHALATION) at 09:04

## 2018-04-30 RX ADMIN — VALSARTAN 40 MG: 40 TABLET ORAL at 09:04

## 2018-04-30 RX ADMIN — DILTIAZEM HYDROCHLORIDE 30 MG: 30 TABLET, FILM COATED ORAL at 11:04

## 2018-04-30 RX ADMIN — METRONIDAZOLE 500 MG: 500 INJECTION, SOLUTION INTRAVENOUS at 05:04

## 2018-04-30 RX ADMIN — Medication 12.5 MG: at 05:04

## 2018-04-30 RX ADMIN — HEPARIN SODIUM 5000 UNITS: 5000 INJECTION, SOLUTION INTRAVENOUS; SUBCUTANEOUS at 05:04

## 2018-04-30 NOTE — ASSESSMENT & PLAN NOTE
82 y.o. male s/p C3-5 ACDF for cervical myelopathy on 4/10 with Dr. Hess, POD#18, and now s/p evacuation/washout of fluid collection with CSF leak repair POD#2     Neuro stable  Cont neuro checks  More awake and alert. Cancel PEG. Will re-eval w/ ST.   -CV goal normotension. Continue home meds. PRN meds SBP >160.  -Urinary retention- condom cath in place, continue Flomax  -Continue PILI/SCDs/SQH  -Continue bowel regimen daily.  -Continue to hold Eliquis. Per , indications unclear.  does not recommend continuing upon discharge.  -Continue aggressive PT/OT/ST  -Cervical collar when OOB or with activity  -IS to bed side. Patient to use atleast 10x every hour. Scheduled CPT/duonebs.     Dispo: pending improvement

## 2018-04-30 NOTE — PT/OT/SLP DISCHARGE
Occupational Therapy Discharge Summary    Hollis Pitts  MRN: [id number]   Principal Problem: Cervical myelopathy with cervical radiculopathy      Patient Discharged from acute Occupational Therapy on 4/28/2018.  Please refer to prior OT note dated 4/27/2018 for functional status.    Assessment:      Patient has not met goals.    Objective:     GOALS:    Occupational Therapy Goals     Not on file          Multidisciplinary Problems (Resolved)        Problem: Occupational Therapy Goal    Goal Priority Disciplines Outcome Interventions   Occupational Therapy Goal   (Resolved)     OT, PT/OT Outcome(s) achieved    Description:  Goals to be met by: 5/9  Patient will increase functional independence with ADLs by performing:    Squat pivot t/f to wheelchair with total(A).  Supine<>sit ; sit<>supine with Moderate Assistance.  Grooming while seated EOB with minimal assistance to wash face.  (Keep for consistency)  Pt will tolerate EOB activity ~15 min duration with min(A) for postural control.   Family/caregiver demo understanding for ROM and positioning for B UE.                          Reasons for Discontinuation of Therapy Services  Patient is unable to continue work toward goals because of medical or psychosocial complications.    Pt t/f to [facility] for higher level of care.     Plan:     Will require new therapy orders when/if appropriate for re eval and treat.     MADDIE Quintanilla  4/30/2018

## 2018-04-30 NOTE — SUBJECTIVE & OBJECTIVE
Interval History: No AEON. AFebrile and WBC now normalized to 12.68 from peak of 23.01.  S/P washout of neck fluid and CSF repair.  OP reports showed CSF leak, fluid did not appear infected, and CSF leak repaired.  The patient denies any recent fever, chills, n/v/d, sob, or sweats.      Review of Systems   Constitutional: Negative for chills, diaphoresis and fever.   Respiratory: Negative for shortness of breath.    Cardiovascular: Negative for chest pain.   Gastrointestinal: Negative for abdominal pain, diarrhea, nausea and vomiting.   Genitourinary: Negative for dysuria and hematuria.     Objective:     Vital Signs (Most Recent):  Temp: 97.7 °F (36.5 °C) (04/30/18 0305)  Pulse: 73 (04/30/18 0908)  Resp: 18 (04/30/18 0908)  BP: (!) 189/82 (04/30/18 0806)  SpO2: 98 % (04/30/18 0505) Vital Signs (24h Range):  Temp:  [97.5 °F (36.4 °C)-98.3 °F (36.8 °C)] 97.7 °F (36.5 °C)  Pulse:  [53-84] 73  Resp:  [13-20] 18  SpO2:  [97 %-100 %] 98 %  BP: (143-189)/() 189/82     Weight: 99.5 kg (219 lb 5.7 oz)  Body mass index is 29.75 kg/m².    Estimated Creatinine Clearance: 102.9 mL/min (based on SCr of 0.7 mg/dL).    Physical Exam   Constitutional: He appears well-developed and well-nourished. No distress.   HENT:   Head: Normocephalic and atraumatic.   Eyes: Pupils are equal, round, and reactive to light. No scleral icterus.   Neck: Neck supple.   Anterior surgical incision c/d/i   Cardiovascular: Normal rate, regular rhythm and normal heart sounds.    Pulmonary/Chest: No respiratory distress. He has no wheezes. He has rales.   + secretions   Abdominal: Soft. Bowel sounds are normal. He exhibits no distension. There is no tenderness.   Musculoskeletal: He exhibits no edema.   Skin: Skin is warm and dry. He is not diaphoretic. No erythema.   Psychiatric: He has a normal mood and affect. His behavior is normal.       Significant Labs:   Blood Culture:   Recent Labs  Lab 04/19/18  0952 04/19/18  1005 04/21/18  1335   LABBLOO  No growth after 5 days. No growth after 5 days. No growth after 5 days.  No growth after 5 days.     CBC:   Recent Labs  Lab 04/29/18  0348 04/30/18  0237   WBC 14.79* 12.68   HGB 10.5* 10.6*   HCT 33.7* 33.7*    305     CMP:   Recent Labs  Lab 04/29/18  0348 04/30/18  0237    140   K 4.1 3.7    106   CO2 27 26   GLU 99 128*   BUN 18 16   CREATININE 0.6 0.7   CALCIUM 10.7* 10.9*   PROT 6.0 5.9*   ALBUMIN 2.1* 2.0*   BILITOT 0.4 0.5   ALKPHOS 75 73   AST 17 20   ALT 9* 12   ANIONGAP 7* 8   EGFRNONAA >60.0 >60.0     Urine Culture:   Recent Labs  Lab 03/29/18  1230 04/21/18  1430 04/27/18  1218   LABURIN No significant growth PSEUDOMONAS [id number],000 - 49,999 cfu/ml No growth     Urine Studies:   Recent Labs  Lab 04/19/18  1128 04/21/18  1430   COLORU Yellow Yellow   APPEARANCEUA Clear Cloudy*   PHUR 6.0 5.0   SPECGRAV 1.020 1.025   PROTEINUA Negative 2+*   GLUCUA 3+* 3+*   KETONESU Trace* 1+*   BILIRUBINUA Negative Negative   OCCULTUA Negative 3+*   NITRITE Negative Negative   UROBILINOGEN 2.0 Negative   LEUKOCYTESUR Negative Negative   RBCUA 2 1   WBCUA 2 2   BACTERIA None Rare   SQUAMEPITHEL 0  --    HYALINECASTS  --  0     Wound Culture:   Recent Labs  Lab 04/28/18  1413 04/28/18  1417   LABAERO No growth No growth     All pertinent labs within the past 24 hours have been reviewed.    Significant Imaging: I have reviewed all pertinent imaging results/findings within the past 24 hours.   CT Abdomen Without Contrast [[id number]] (Abnormal) Resulted: 04/29/18 0953   Order Status: Completed Updated: 04/29/18 0956   Narrative:     EXAMINATION:  CT ABDOMEN WITHOUT CONTRAST    CLINICAL HISTORY:  pre G tube evaluation;    TECHNIQUE:  Low dose axial images, sagittal and coronal reformations were obtained from the lung bases to the pelvis.  Contrast was not administered    COMPARISON:  None    FINDINGS:  Heart: Normal in size. No pericardial effusion.    Lung Bases: There is volume loss within the  right lower lobe, inferior lingular and left lower lobe with opacification and air bronchograms.  No pleural fluid.    Liver: Normal in size and attenuation, with no focal hepatic lesions.    Gallbladder: No calcified gallstones.    Bile Ducts: No evidence of dilated ducts.    Pancreas: No mass or peripancreatic fat stranding.    Spleen: Unremarkable.    Adrenals: There is nodular thickening of the left adrenal gland measuring 1.4 cm of soft tissue density.  Right adrenal gland is unremarkable.    Kidneys/ Ureters: There are 2 fluid density lesions within the left kidney consistent with simple renal cysts.  No hydronephrosis or nephrolithiasis.    Bladder: The bladder is partially visualized there appears to be a Dubois catheter in place.    GI Tract/Mesentery: No evidence of bowel obstruction or inflammation.  Scattered colonic diverticula without evidence of diverticulitis.  Enteric tube with tip terminating in the distal stomach.  The colon is noted anterior to the stomach.    Peritoneal Space: No ascites. No free air.    Retroperitoneum: No significant adenopathy.    Abdominal wall: Unremarkable.    Vasculature: There is mild calcific atherosclerosis of the abdominal aorta and its branch vessels.  No aneurysm identified.    Bones: No acute fracture.  There is a nonaggressive appearing lytic lesion within the right iliac crest.  Age-appropriate degenerative changes.   Impression:       1. Volume loss with opacification and air bronchograms within the bilateral lung bases which may represent pneumonia, atelectasis, or aspiration.  Recommend clinical correlation.  2. Left adrenal gland nodule measuring 1.4 cm.  This lesion is incompletely evaluated on this noncontrast study.  Recommend correlation with prior studies or CT adrenal protocol.  3. Simple renal cyst.  4. Diverticulosis.  This report was flagged in Epic as abnormal.    Electronically signed by resident: Robert Chapman  Date: 04/29/2018  Time:  07:55    Electronically signed by: Rogelio Mac MD  Date: 04/29/2018  Time: 09:53   CT Head Without Contrast [[id number]] Resulted: 04/29/18 0913   Order Status: Completed Updated: 04/29/18 0916   Narrative:     EXAMINATION:  CT HEAD WITHOUT CONTRAST    CLINICAL HISTORY:  monitor sdh;    TECHNIQUE:  Low dose axial images were obtained through the head.  Coronal and sagittal reformations were also performed. Contrast was not administered.    COMPARISON:  04/27/2018    FINDINGS:  There are bilateral subdural fluid collections noted overlying the cerebral hemispheres, right greater than left.  There is slight, 3 mm of right to left midline shift, unchanged.  There is linear increased attenuation in the right subdural overlying the right frontoparietal lobe and increased attenuation overlying the occipital lobes, suggesting subacute subdural hemorrhage, unchanged.  Probable small amount of subarachnoid hemorrhage in the left parietal lobe, unchanged.  No new hemorrhage identified.  Maximum subdural thickness on the right is 1.0 cm and 0.4 cm on the left.  There is preservation of gray-white differentiation.  Ventricles slightly effaced on the right side unchanged.  No hydrocephalus.  Paranasal sinuses and mastoid air cells are clear   Impression:       No detrimental change since the 04/27/2018 exam.  Bilateral subdural fluid collections and small amount of subarachnoid hemorrhage, unchanged.  No new hemorrhage identified.      Electronically signed by: Ayan Vadlovinos MD  Date: 04/29/2018  Time: 09:13   X-Ray Abdomen AP 1 View [[id number]] Resulted: 04/28/18 2219   Order Status: Completed Updated: 04/28/18 2222   Narrative:     EXAMINATION:  XR ABDOMEN AP 1 VIEW    CLINICAL HISTORY:  Feeding tube placement;    TECHNIQUE:  Abdomen radiograph one view.    COMPARISON:  Abdomen radiograph 04/25/2018.    FINDINGS:  Lung bases appear clear.  Enteric tube tip extends below the diaphragm terminating in the stomach.  Nonspecific  bowel gas pattern.  Old contrast material in the colon noted.   Impression:       Enteric tube extends below the diaphragm terminating in the stomach.    Electronically signed by resident: Gen Mary  Date: 04/28/2018  Time: 22:09    Electronically signed by: Adan Mckenna MD  Date: 04/28/2018  Time: 22:19   SURG Fl Surgery FLuoro Greater Than 1 Hour [[id number]] Resulted: 04/28/18 1635   Order Status: Completed Updated: 04/28/18 1635   Narrative:     See OP Notes for results.    Impression:     See OP Notes for results.             This procedure was auto-finalized by: [facility]     MRI Cervical Spine W WO Cont [[id number]] (Abnormal) Resulted: 04/28/18 0045   Order Status: Completed Updated: 04/28/18 0048   Narrative:     EXAMINATION:  MRI CERVICAL SPINE W WO CONTRAST    CLINICAL HISTORY:  s/p acdf, continued dysphagia;    TECHNIQUE:  Multiplanar, multisequence MR images of the cervical spine were obtained before and after the administration of 10 cc gadolinium based IV contrast.    COMPARISON:  - Cervical spine CT 04/17/2018    - MRI cervical spine 01/12/2018    FINDINGS:  Examination is limited due to patient motion artifact.    There is postoperative change of prior anterior instrumented fusion of C5 through C7.  Please note that subsequent artifact from metallic hardware does limit evaluation at these levels.  There is a  large, approximately 5.7 x 4.1 x 7.6 cm (TV by AP by CC diameter) T2 hyperintense, T1 hypointense fluid collection involving the soft tissues of the right neck which exerts subsequent mass effect on the adjacent trachea and cervical esophagus, with associated leftward tracheal displacement.  This fluid appears to interdigitate between the right thyroid lobe and trachea with associated retropharyngeal extension at the C4 vertebral body level (axial series 15, image 33).  This appears result in mass effect on the adjacent esophagus and airway.  This fluid also appears to extend  into the operative bed, insinuating about the spinal fixation hardware and extending into C5-C6 and C6-C7 intervertebral disc spaces (axial series 15, images 42-53).    There is straightening and minimal residual reversal of normal cervical lordosis, similar to prior CT examination.  There is also multilevel degenerative change of the cervical spine, similar to multiple prior imaging examinations, with severe spinal canal stenosis at the C6-C7 level.  No definite abnormal intramedullary cord signal is identified.   Impression:       Large, 5.7 x 4.1 x 7.6 cm homogeneous fluid collection involving the soft tissues of the right anterior neck, interdigitating and between the right thyroid lobe and trachea with associated retropharyngeal extension as well as apparent involvement of the operative bed at the C5 through C7 level with abnormal fluid signal insinuating about spinal fixation hardware and intervertebral disc spaces at the C5-C6 and C6-C7 levels.  This fluid collection is increased in size when compared to prior CT examination of 04/17/2018.  Findings are concerning for possible postoperative seroma, hematoma, infected postoperative fluid collection.  Given the close proximity of this fluid collection to the thecal sac/anterior epidural space, possible component of CSF leak not definitively excluded.  Clinical correlation advised.    Subsequent mass effect on the trachea and cervical esophagus with leftward displacement secondary to aforementioned large fluid collection within the soft tissues of the right neck as well as associated retropharyngeal component    Postoperative change of prior anterior instrumented fusion of C5-C7 with multilevel degenerative change of the cervical spine, similar to prior imaging examinations.    This report was flagged in Epic as abnormal.      Electronically signed by: Zenia Armas MD  Date: 04/28/2018  Time: 00:45   CT Head Without Contrast [[id number]] Resulted: 04/27/18 1514    Order Status: Completed Updated: 04/27/18 1519   Narrative:     EXAMINATION:  CT HEAD WITHOUT CONTRAST    CLINICAL HISTORY:  PER RADIOLOGY TO CLEAR FOR MRI C SPINE;    TECHNIQUE:  Low dose axial CT images obtained throughout the head without intravenous contrast. Sagittal and coronal reconstructions were performed.    COMPARISON:  None.    FINDINGS:  There is generalized cerebral volume loss.  There is prominent extra-axial subdural CSF attenuation fluid over both cerebral hemispheres with superimposed linear stranding high density material along both the right frontal and temporal lobes.  Areas of subdural hyperattenuation are also seen along the falx suspicious for thin fall seen subdural hematomas.  There is also scattered high density subarachnoid blood in a left parietal sulcus suggesting subarachnoid hemorrhage.    Ventricles and sulci are normal in size for age without evidence of hydrocephalus.    No parenchymal mass, intraparenchymal hemorrhage, edema or major vascular distribution infarct.    Skull/extracranial contents (limited evaluation): No fracture. Mastoid air cells and paranasal sinuses are essentially clear.   Impression:       Bilateral chronic subdural effusions with a small volume of linear acute blood products along the right frontotemporal convexity.  Thin interhemispheric subdural hemorrhage and left parietal subarachnoid blood also suspected.    COMMUNICATION  This critical result was discovered/received at 04/27/2018 at 2:37 p.m..  The critical information above was relayed directly by me by telephone to LUIS ALBERTO Vaca with Neurosurgery on 04/27/2018 at 2:38 p.m.    Electronically signed by resident: Glynn Gordillo  Date: 04/27/2018  Time: 13:48    Electronically signed by: Osvaldo Baldwin MD  Date: 04/27/2018  Time: 15:15   X-Ray Chest 1 View [[id number]] Resulted: 04/27/18 1226   Order Status: Completed Updated: 04/27/18 1229   Narrative:     EXAMINATION:  XR CHEST 1 VIEW    CLINICAL  HISTORY:  leukocytosis;    COMPARISON:  Comparison is made to 04/23/2018.    FINDINGS:  Allowing for considerable differences in positioning, with patient rotation to the left on the current exam, there has been no significant detrimental interval change in the appearance of the chest since 04/23/2018.   Impression:       As above      Electronically signed by: Rosalio Gilman MD  Date: 04/27/2018  Time: 12:26   X-Ray Abdomen AP 1 View [[id number]] Resulted: 04/25/18 1331   Order Status: Completed Updated: 04/25/18 1334   Narrative:     EXAMINATION:  XR ABDOMEN AP 1 VIEW    CLINICAL HISTORY:  NGT placement;    TECHNIQUE:  Single frontal view of the chest was performed.    COMPARISON:  Chest one view 03/19/2018    FINDINGS:  The NG tube tip is below the diaphragm and likely in the stomach.    Nonspecific bowel gas pattern with no evidence of bowel obstruction.  No free air.  Soft tissues and osseous structures are otherwise unremarkable.   Impression:       As above.      Electronically signed by: Evelyn Coon MD  Date: 04/25/2018  Time: 13:31

## 2018-04-30 NOTE — ASSESSMENT & PLAN NOTE
81 y/o male with a history of cervical spondylosis with myelopathy who underwent anterior cervical diskectomy and fusion with plating of C5-6 and C6-7 on 4/13.  This has been complicated by a probable CSF leak as MRI cervical spine revealed large fluid collection pushing on trachea and esophagus causing displacement. Also with some low grade fevers and leukocytosis, now down trending. He was taken back to the OR for wound washout and CSF leak repair on 4/28.  Patient extubated post op. Surgical cx are pending. On broad spectrum abx. Afebrile. VSS. Operative note reports fluid not infected appearing and CSF leak identified and repaired. On Vanc, Cefepime and Flagyl but has been     Plan  - Continue vancomycin, DC cefepime and start Ceftriaxone, and DC metronidazole. Vanc trough before 4th dose. Trough Goal 15-20  - Plan for 7d of treatment from sx 4/28  - Follow surgical cultures - contact ID if any growth on cultures  - ID will sign off  - plan communicated to [facility]

## 2018-04-30 NOTE — PROGRESS NOTES
[facility]  Infectious Disease  Progress Note    Patient Name: Hollis Pitts  MRN: [id number]  Admission Date: 4/10/2018  Length of Stay: 20 days  Attending Physician: Felice Al MD  Primary Care Provider: Vanessa Fontaine NP    Isolation Status: No active isolations  Assessment/Plan:      CSF leak       81 y/o male with a history of cervical spondylosis with myelopathy who underwent anterior cervical diskectomy and fusion with plating of C5-6 and C6-7 on 4/13.  This has been complicated by a probable CSF leak as MRI cervical spine revealed large fluid collection pushing on trachea and esophagus causing displacement. Also with some low grade fevers and leukocytosis, now down trending. He was taken back to the OR for wound washout and CSF leak repair on 4/28.  Patient extubated post op. Surgical cx are pending. On broad spectrum abx. Afebrile. VSS. Operative note reports fluid not infected appearing and CSF leak identified and repaired. On Vanc, Cefepime and Flagyl but has been     Plan  - Continue vancomycin, DC cefepime and start Ceftriaxone, and DC metronidazole. Vanc trough before 4th dose. Trough Goal 15-20  - Plan for 7d of treatment from sx 4/28  - Follow surgical cultures - contact ID if any growth on cultures  - ID will sign off            Anticipated Disposition: tbd    Thank you for your consult. I will sign off. Please contact us if you have any additional questions.    LUIS ALBERTO Sosa  Infectious Disease  [facility]    Subjective:     Principal Problem:Cervical myelopathy with cervical radiculopathy    HPI: 81 y/o male with a history of cervical spondylosis with myelopathy who underwent anterior cervical diskectomy and fusion with plating of C5-6 and C6-7 on 4/13.  This has been complicated by a probable CSF leak as  MRI cervical spine revealed large fluid collection pushing on trachea and esophagus causing displacement. Also with some low grade fevers  and leukocytosis, now down trending. He was taken back to the OR for wound washout and CSF leak repair on 4/28.  Patient extubated post op. On broad spectrum abx. Surgical cx are pending. Awaiting op note.  Interval History: No AEON. AFebrile and WBC now normalized to 12.68 from peak of 23.01.  S/P washout of neck fluid and CSF repair.  OP reports showed CSF leak, fluid did not appear infected, and CSF leak repaired.  The patient denies any recent fever, chills, n/v/d, sob, or sweats.      Review of Systems   Constitutional: Negative for chills, diaphoresis and fever.   Respiratory: Negative for shortness of breath.    Cardiovascular: Negative for chest pain.   Gastrointestinal: Negative for abdominal pain, diarrhea, nausea and vomiting.   Genitourinary: Negative for dysuria and hematuria.     Objective:     Vital Signs (Most Recent):  Temp: 97.7 °F (36.5 °C) (04/30/18 0305)  Pulse: 73 (04/30/18 0908)  Resp: 18 (04/30/18 0908)  BP: (!) 189/82 (04/30/18 0806)  SpO2: 98 % (04/30/18 0505) Vital Signs (24h Range):  Temp:  [97.5 °F (36.4 °C)-98.3 °F (36.8 °C)] 97.7 °F (36.5 °C)  Pulse:  [53-84] 73  Resp:  [13-20] 18  SpO2:  [97 %-100 %] 98 %  BP: (143-189)/() 189/82     Weight: 99.5 kg (219 lb 5.7 oz)  Body mass index is 29.75 kg/m².    Estimated Creatinine Clearance: 102.9 mL/min (based on SCr of 0.7 mg/dL).    Physical Exam   Constitutional: He appears well-developed and well-nourished. No distress.   HENT:   Head: Normocephalic and atraumatic.   Eyes: Pupils are equal, round, and reactive to light. No scleral icterus.   Neck: Neck supple.   Anterior surgical incision c/d/i   Cardiovascular: Normal rate, regular rhythm and normal heart sounds.    Pulmonary/Chest: No respiratory distress. He has no wheezes. He has rales.   + secretions   Abdominal: Soft. Bowel sounds are normal. He exhibits no distension. There is no tenderness.   Musculoskeletal: He exhibits no edema.   Skin: Skin is warm and dry. He is not  diaphoretic. No erythema.   Psychiatric: He has a normal mood and affect. His behavior is normal.       Significant Labs:   Blood Culture:   Recent Labs  Lab 04/19/18  0952 04/19/18  1005 04/21/18  1335   LABBLOO No growth after 5 days. No growth after 5 days. No growth after 5 days.  No growth after 5 days.     CBC:   Recent Labs  Lab 04/29/18  0348 04/30/18  0237   WBC 14.79* 12.68   HGB 10.5* 10.6*   HCT 33.7* 33.7*    305     CMP:   Recent Labs  Lab 04/29/18  0348 04/30/18  0237    140   K 4.1 3.7    106   CO2 27 26   GLU 99 128*   BUN 18 16   CREATININE 0.6 0.7   CALCIUM 10.7* 10.9*   PROT 6.0 5.9*   ALBUMIN 2.1* 2.0*   BILITOT 0.4 0.5   ALKPHOS 75 73   AST 17 20   ALT 9* 12   ANIONGAP 7* 8   EGFRNONAA >60.0 >60.0     Urine Culture:   Recent Labs  Lab 03/29/18  1230 04/21/18  1430 04/27/18  1218   LABURIN No significant growth PSEUDOMONAS [id number],000 - 49,999 cfu/ml No growth     Urine Studies:   Recent Labs  Lab 04/19/18  1128 04/21/18  1430   COLORU Yellow Yellow   APPEARANCEUA Clear Cloudy*   PHUR 6.0 5.0   SPECGRAV 1.020 1.025   PROTEINUA Negative 2+*   GLUCUA 3+* 3+*   KETONESU Trace* 1+*   BILIRUBINUA Negative Negative   OCCULTUA Negative 3+*   NITRITE Negative Negative   UROBILINOGEN 2.0 Negative   LEUKOCYTESUR Negative Negative   RBCUA 2 1   WBCUA 2 2   BACTERIA None Rare   SQUAMEPITHEL 0  --    HYALINECASTS  --  0     Wound Culture:   Recent Labs  Lab 04/28/18  1413 04/28/18  1417   LABAERO No growth No growth     All pertinent labs within the past 24 hours have been reviewed.    Significant Imaging: I have reviewed all pertinent imaging results/findings within the past 24 hours.   CT Abdomen Without Contrast [[id number]] (Abnormal) Resulted: 04/29/18 0953   Order Status: Completed Updated: 04/29/18 0956   Narrative:     EXAMINATION:  CT ABDOMEN WITHOUT CONTRAST    CLINICAL HISTORY:  pre G tube evaluation;    TECHNIQUE:  Low dose axial images, sagittal and coronal reformations  were obtained from the lung bases to the pelvis.  Contrast was not administered    COMPARISON:  None    FINDINGS:  Heart: Normal in size. No pericardial effusion.    Lung Bases: There is volume loss within the right lower lobe, inferior lingular and left lower lobe with opacification and air bronchograms.  No pleural fluid.    Liver: Normal in size and attenuation, with no focal hepatic lesions.    Gallbladder: No calcified gallstones.    Bile Ducts: No evidence of dilated ducts.    Pancreas: No mass or peripancreatic fat stranding.    Spleen: Unremarkable.    Adrenals: There is nodular thickening of the left adrenal gland measuring 1.4 cm of soft tissue density.  Right adrenal gland is unremarkable.    Kidneys/ Ureters: There are 2 fluid density lesions within the left kidney consistent with simple renal cysts.  No hydronephrosis or nephrolithiasis.    Bladder: The bladder is partially visualized there appears to be a Dubois catheter in place.    GI Tract/Mesentery: No evidence of bowel obstruction or inflammation.  Scattered colonic diverticula without evidence of diverticulitis.  Enteric tube with tip terminating in the distal stomach.  The colon is noted anterior to the stomach.    Peritoneal Space: No ascites. No free air.    Retroperitoneum: No significant adenopathy.    Abdominal wall: Unremarkable.    Vasculature: There is mild calcific atherosclerosis of the abdominal aorta and its branch vessels.  No aneurysm identified.    Bones: No acute fracture.  There is a nonaggressive appearing lytic lesion within the right iliac crest.  Age-appropriate degenerative changes.   Impression:       1. Volume loss with opacification and air bronchograms within the bilateral lung bases which may represent pneumonia, atelectasis, or aspiration.  Recommend clinical correlation.  2. Left adrenal gland nodule measuring 1.4 cm.  This lesion is incompletely evaluated on this noncontrast study.  Recommend correlation with prior  studies or CT adrenal protocol.  3. Simple renal cyst.  4. Diverticulosis.  This report was flagged in Epic as abnormal.    Electronically signed by resident: Robert Chapman  Date: 04/29/2018  Time: 07:55    Electronically signed by: Rogelio Mac MD  Date: 04/29/2018  Time: 09:53   CT Head Without Contrast [[id number]] Resulted: 04/29/18 0913   Order Status: Completed Updated: 04/29/18 0916   Narrative:     EXAMINATION:  CT HEAD WITHOUT CONTRAST    CLINICAL HISTORY:  monitor sdh;    TECHNIQUE:  Low dose axial images were obtained through the head.  Coronal and sagittal reformations were also performed. Contrast was not administered.    COMPARISON:  04/27/2018    FINDINGS:  There are bilateral subdural fluid collections noted overlying the cerebral hemispheres, right greater than left.  There is slight, 3 mm of right to left midline shift, unchanged.  There is linear increased attenuation in the right subdural overlying the right frontoparietal lobe and increased attenuation overlying the occipital lobes, suggesting subacute subdural hemorrhage, unchanged.  Probable small amount of subarachnoid hemorrhage in the left parietal lobe, unchanged.  No new hemorrhage identified.  Maximum subdural thickness on the right is 1.0 cm and 0.4 cm on the left.  There is preservation of gray-white differentiation.  Ventricles slightly effaced on the right side unchanged.  No hydrocephalus.  Paranasal sinuses and mastoid air cells are clear   Impression:       No detrimental change since the 04/27/2018 exam.  Bilateral subdural fluid collections and small amount of subarachnoid hemorrhage, unchanged.  No new hemorrhage identified.      Electronically signed by: Ayan Valdovinos MD  Date: 04/29/2018  Time: 09:13   X-Ray Abdomen AP 1 View [[id number]] Resulted: 04/28/18 2219   Order Status: Completed Updated: 04/28/18 2222   Narrative:     EXAMINATION:  XR ABDOMEN AP 1 VIEW    CLINICAL HISTORY:  Feeding tube  placement;    TECHNIQUE:  Abdomen radiograph one view.    COMPARISON:  Abdomen radiograph 04/25/2018.    FINDINGS:  Lung bases appear clear.  Enteric tube tip extends below the diaphragm terminating in the stomach.  Nonspecific bowel gas pattern.  Old contrast material in the colon noted.   Impression:       Enteric tube extends below the diaphragm terminating in the stomach.    Electronically signed by resident: Gen Mary  Date: 04/28/2018  Time: 22:09    Electronically signed by: Adan Mckenna MD  Date: 04/28/2018  Time: 22:19   SURG Fl Surgery FLuoro Greater Than 1 Hour [[id number]] Resulted: 04/28/18 1635   Order Status: Completed Updated: 04/28/18 1635   Narrative:     See OP Notes for results.    Impression:     See OP Notes for results.             This procedure was auto-finalized by: Virtual Radiologist     MRI Cervical Spine W WO Cont [[id number]] (Abnormal) Resulted: 04/28/18 0045   Order Status: Completed Updated: 04/28/18 0048   Narrative:     EXAMINATION:  MRI CERVICAL SPINE W WO CONTRAST    CLINICAL HISTORY:  s/p acdf, continued dysphagia;    TECHNIQUE:  Multiplanar, multisequence MR images of the cervical spine were obtained before and after the administration of 10 cc gadolinium based IV contrast.    COMPARISON:  - Cervical spine CT 04/17/2018    - MRI cervical spine 01/12/2018    FINDINGS:  Examination is limited due to patient motion artifact.    There is postoperative change of prior anterior instrumented fusion of C5 through C7.  Please note that subsequent artifact from metallic hardware does limit evaluation at these levels.  There is a  large, approximately 5.7 x 4.1 x 7.6 cm (TV by AP by CC diameter) T2 hyperintense, T1 hypointense fluid collection involving the soft tissues of the right neck which exerts subsequent mass effect on the adjacent trachea and cervical esophagus, with associated leftward tracheal displacement.  This fluid appears to interdigitate between the right thyroid  lobe and trachea with associated retropharyngeal extension at the C4 vertebral body level (axial series 15, image 33).  This appears result in mass effect on the adjacent esophagus and airway.  This fluid also appears to extend into the operative bed, insinuating about the spinal fixation hardware and extending into C5-C6 and C6-C7 intervertebral disc spaces (axial series 15, images 42-53).    There is straightening and minimal residual reversal of normal cervical lordosis, similar to prior CT examination.  There is also multilevel degenerative change of the cervical spine, similar to multiple prior imaging examinations, with severe spinal canal stenosis at the C6-C7 level.  No definite abnormal intramedullary cord signal is identified.   Impression:       Large, 5.7 x 4.1 x 7.6 cm homogeneous fluid collection involving the soft tissues of the right anterior neck, interdigitating and between the right thyroid lobe and trachea with associated retropharyngeal extension as well as apparent involvement of the operative bed at the C5 through C7 level with abnormal fluid signal insinuating about spinal fixation hardware and intervertebral disc spaces at the C5-C6 and C6-C7 levels.  This fluid collection is increased in size when compared to prior CT examination of 04/17/2018.  Findings are concerning for possible postoperative seroma, hematoma, infected postoperative fluid collection.  Given the close proximity of this fluid collection to the thecal sac/anterior epidural space, possible component of CSF leak not definitively excluded.  Clinical correlation advised.    Subsequent mass effect on the trachea and cervical esophagus with leftward displacement secondary to aforementioned large fluid collection within the soft tissues of the right neck as well as associated retropharyngeal component    Postoperative change of prior anterior instrumented fusion of C5-C7 with multilevel degenerative change of the cervical spine,  similar to prior imaging examinations.    This report was flagged in Epic as abnormal.      Electronically signed by: Zenia Armas MD  Date: 04/28/2018  Time: 00:45   CT Head Without Contrast [[id number]] Resulted: 04/27/18 1515   Order Status: Completed Updated: 04/27/18 1519   Narrative:     EXAMINATION:  CT HEAD WITHOUT CONTRAST    CLINICAL HISTORY:  PER RADIOLOGY TO CLEAR FOR MRI C SPINE;    TECHNIQUE:  Low dose axial CT images obtained throughout the head without intravenous contrast. Sagittal and coronal reconstructions were performed.    COMPARISON:  None.    FINDINGS:  There is generalized cerebral volume loss.  There is prominent extra-axial subdural CSF attenuation fluid over both cerebral hemispheres with superimposed linear stranding high density material along both the right frontal and temporal lobes.  Areas of subdural hyperattenuation are also seen along the falx suspicious for thin fall seen subdural hematomas.  There is also scattered high density subarachnoid blood in a left parietal sulcus suggesting subarachnoid hemorrhage.    Ventricles and sulci are normal in size for age without evidence of hydrocephalus.    No parenchymal mass, intraparenchymal hemorrhage, edema or major vascular distribution infarct.    Skull/extracranial contents (limited evaluation): No fracture. Mastoid air cells and paranasal sinuses are essentially clear.   Impression:       Bilateral chronic subdural effusions with a small volume of linear acute blood products along the right frontotemporal convexity.  Thin interhemispheric subdural hemorrhage and left parietal subarachnoid blood also suspected.    COMMUNICATION  This critical result was discovered/received at 04/27/2018 at 2:37 p.m..  The critical information above was relayed directly by me by telephone to LUIS ALBERTO Vaca with Neurosurgery on 04/27/2018 at 2:38 p.m.    Electronically signed by resident: Glynn Gordillo  Date: 04/27/2018  Time: 13:48    Electronically  signed by: Osvaldo Baldwin MD  Date: 04/27/2018  Time: 15:15   X-Ray Chest 1 View [[id number]] Resulted: 04/27/18 1226   Order Status: Completed Updated: 04/27/18 1229   Narrative:     EXAMINATION:  XR CHEST 1 VIEW    CLINICAL HISTORY:  leukocytosis;    COMPARISON:  Comparison is made to 04/23/2018.    FINDINGS:  Allowing for considerable differences in positioning, with patient rotation to the left on the current exam, there has been no significant detrimental interval change in the appearance of the chest since 04/23/2018.   Impression:       As above      Electronically signed by: Rosalio Gilamn MD  Date: 04/27/2018  Time: 12:26   X-Ray Abdomen AP 1 View [[id number]] Resulted: 04/25/18 1331   Order Status: Completed Updated: 04/25/18 1334   Narrative:     EXAMINATION:  XR ABDOMEN AP 1 VIEW    CLINICAL HISTORY:  NGT placement;    TECHNIQUE:  Single frontal view of the chest was performed.    COMPARISON:  Chest one view 03/19/2018    FINDINGS:  The NG tube tip is below the diaphragm and likely in the stomach.    Nonspecific bowel gas pattern with no evidence of bowel obstruction.  No free air.  Soft tissues and osseous structures are otherwise unremarkable.   Impression:       As above.      Electronically signed by: Evelyn Coon MD  Date: 04/25/2018  Time: 13:31

## 2018-04-30 NOTE — PLAN OF CARE
Problem: Patient Care Overview  Goal: Plan of Care Review  Outcome: Ongoing (interventions implemented as appropriate)  POC reviewed with pt at 0900. Pt verbalized understanding. Questions and concerns addressed. No acute events today. Pt progressing toward goals. Will continue to monitor. See flowsheets for full assessment and VS info.     TF restarted, femoral line removed with occulsive dressing, rouse removed and condom cath applied.

## 2018-04-30 NOTE — PLAN OF CARE
Problem: Patient Care Overview  Goal: Plan of Care Review  Outcome: Ongoing (interventions implemented as appropriate)  POC reviewed with pt at 0500. Pt verbalized understanding. Questions and concerns addressed. No acute events overnight. Pt progressing toward goals. Will continue to monitor. See flowsheets for full assessment and VS info

## 2018-04-30 NOTE — OP NOTE
DATE OF SURGERY: 4/28/18    PREOPERATIVE DIAGNOSIS:  1. CSF leak and possible surgical site infection of anterior cervical spine  2. Recent 2 level ACD for cervical deformity with myelopathy    POSTOPERATIVE DIAGNOSIS:  1. Same.    PROCEDURE PERFORMED:  1. Exploration and revision of anterior cervical discectomy and fusion with plating, C5-6 and C6-7  2. Explantation and reinsertion of carbon fiber interbody cage, C6-7 (Depuy Synthes)  3. Interbody fusion, C6-7  4. Explanation and reapplication of anterior cervical plate, C5-7 (Depuy Synthes)  5. Exploration, wound washout, and dural repair, anterior cervical spine  6. Use of intraoperative microscope for microsurgical technique  7. Use of neuromonitoring with MEPs  8. Use of intraoperative fluoroscopy  9. Vivigen bone grafting    SURGEON: Osvaldo Hess M.D.    ASSISTANT: Shilo Borrego M.D.    ANESTHESIA: GETA    ESTIMATED BLOOD LOSS: Minimal    COMPLICATIONS: None    DRAINS: None    SPECIMENS SENT: CSF for culture    INDICATIONS:    This is an 82M who recently underwent a 2 level ACD during this admission for cervical deformity and myelopathy, which was complicated by a durotomy. Despite packing this leak in surgery, he developed a persistent CSF leak through the cervical incision, which did not respond to patient positioning or lumbar drainage. He was failing to thrive, had worsening dysphagia and tracheal deviation. An MRI C spine was acquired, which showed a ring enhancing fluid collection in the retropharyngeal area tracking to the C6-7 level, and significant tracheal deviation. This finding along with leukocytosis were suspicious for infection. As soon as the MRI was seen we took him for emergent wound washout, exploration and repair of CSF leak under an emergency consent.    PROCEDURE:    The patient was brought into the operating room where he was intubated and placed under general anesthesia without difficulty. All lines were placed. The patient was  positioned supine onto the operating table with appropriate padding of all pressure points. The right neck was marked, prepped and draped in the usual sterile fashion. A timeout was performed prior to the procedure.    The previous incision was reopened by spreading the skin edges with hemostats and the previous sutures were removed. A rush of clear fluid, presumed CSF, rushed out of the wound and was cultured. The wound and fluid did not look infected. The fluid tracked through an open corridor in the soft tissues to the anterior cervical spine, and no dissection was required. We could visualize the cervical plate and Trimline retractors were placed. All of the CSF was suctioned. The anterior cervical plate from C5-7 was removed and soaked in [place]. We also removed the C6-7 carbon fiber cage, the level of the known durotomy, and soaked the cage in [place]ady[place]. The microscope was brought into the field for microsurgical dissection.    There was ongoing CSF egress around the previous packing material. We removed this material and saw a large dural defect with active CSF egress. It was not amenable to primary repair; therefore, we harvested a myofascial plug from the platysma with Metzenbaum scissors. We were able plug the dural tear with this plug and we secured it to the dura with 6-0 Prolene sutures. A watertight closure was achieved and confirmed by Valsalva maneuver. Fibrin glue was placed over top. Next we reimplanted the C6-7 interbody cage, packed with new Vivigen bone graft for arthrodesis at this level. The microscope was taken out of the field.    The anterior cervical plate from C5-7 was reapplied and secured with larger diameter screws. The screws were finally tightened. AP and lateral xray showed excellent position of all hardware.    The wound was copiously irrigated with normal saline and hemostasis was achieved with bipolar electrocautery.  One gram of Vancomycin powder was placed into the wound. The  platysma was reapproximated with interrupted inverted 3.0 Vicryl sutures and a subcuticular stitch was placed with 4.0 Monocryl. Dermabond was placed at the skin.    The patient tolerated the procedure well from a hemodynamic and neuromonitoring standpoint. MEPs were stable throughout the case. I was present for all critical portions of the case, and at the end of the case all counts were correct. The patient was repositioned supine onto the hospital bed where he was extubated and allowed to emerge from anesthesia without difficulty. The patient was sent to the PACU in stable condition for recovery.

## 2018-04-30 NOTE — SUBJECTIVE & OBJECTIVE
Interval History: no AE. AFVSS.     Medications:  Continuous Infusions:  Scheduled Meds:   albuterol-ipratropium 2.5mg-0.5mg/3mL  3 mL Nebulization Q4H    atorvastatin  20 mg Per NG tube Nightly    ceFEPime (MAXIPIME) IVPB  2 g Intravenous Q8H    citalopram  10 mg Per NG tube Daily    diltiaZEM  30 mg Per NG tube Q6H    heparin (porcine)  5,000 Units Subcutaneous Q8H    insulin aspart U-100  6 Units Subcutaneous 6 times per day    insulin detemir U-100  13 Units Subcutaneous BID    metoprolol tartrate  12.5 mg Per NG tube Q8H    metronidazole  500 mg Intravenous Q8H    tamsulosin  0.4 mg Per NG tube Daily    traZODone  50 mg Per NG tube QHS    valsartan  40 mg Per NG tube Daily     PRN Meds:acetaminophen, aluminum-magnesium hydroxide-simethicone, benzonatate, dextrose 50%, fentaNYL, glucagon (human recombinant), hydrALAZINE, hydrocodone-acetaminophen 5-325mg, insulin aspart U-100, labetalol, lidocaine HCL 2%, omnipaque, ondansetron     Review of Systems    Objective:     Weight: 99.5 kg (219 lb 5.7 oz)  Body mass index is 29.75 kg/m².  Vital Signs (Most Recent):  Temp: 97.7 °F (36.5 °C) (04/30/18 0305)  Pulse: 73 (04/30/18 0505)  Resp: 16 (04/30/18 0505)  BP: (!) 189/82 (04/30/18 0806)  SpO2: 98 % (04/30/18 0505) Vital Signs (24h Range):  Temp:  [97.5 °F (36.4 °C)-98.3 °F (36.8 °C)] 97.7 °F (36.5 °C)  Pulse:  [53-84] 73  Resp:  [13-20] 16  SpO2:  [97 %-100 %] 98 %  BP: (143-189)/() 189/82               NG/OG Tube 04/21/18 2300 Cortrak Right nostril (Active)   Placement Check placement verified by aspirate characteristics;placement verified by distal tube length measurement 4/26/2018  8:45 PM   Distal Tube Length (cm) 75 4/25/2018  7:05 AM   Tolerance no signs/symptoms of discomfort 4/26/2018  8:45 PM   Securement anchored to nostril center w/ adhesive device 4/26/2018  8:45 PM   Clamp Status/Tolerance unclamped;no abdominal distention;no emesis;no nausea;no residual;no restlessness 4/26/2018  8:45  PM   Insertion Site Appearance no redness, warmth, tenderness, skin breakdown, drainage 4/26/2018  8:45 PM   Drainage None 4/25/2018 11:05 AM   Flush/Irrigation flushed w/;water;no resistance met 4/26/2018  8:45 PM   Feeding Method continuous 4/26/2018  8:45 PM   Current Rate (mL/hr) 55 mL/hr 4/26/2018  8:45 PM   Goal Rate (mL/hr) 55 mL/hr 4/26/2018  8:45 PM   Intake (mL) 30 mL 4/24/2018  5:00 PM   Water Bolus (mL) 100 mL 4/26/2018  5:00 AM   Rate Formula Tube Feeding (mL/hr) 55 mL/hr 4/24/2018  4:00 PM   Intake (mL) - Formula Tube Feeding 513 4/26/2018  5:00 AM   Residual Amount (ml) 0 ml 4/24/2018  7:05 AM       Male External Urinary Catheter 04/23/18 1345 Medium (Active)   Collection Container Standard drainage bag 4/26/2018  8:45 PM   Skin reddened 4/26/2018  8:45 PM   Tolerance no signs/symptoms of discomfort 4/26/2018  8:45 PM   Output (mL) 575 mL 4/27/2018  6:00 AM   Catheter Change Date 04/26/18 4/26/2018  8:45 PM   Catheter Change Time 2300 4/26/2018  8:45 PM       Neurosurgery Physical Exam    General: well developed, well nourished. no acute distress. Generalized deconditioning   Neurologic: Awake and oriented to self and hospital. Thought content appropriate.  Head: normocephalic, atraumatic   GCS: Motor: 6/Verbal: 4/Eyes: 4 GCS Total: 14  Cranial nerves: face symmetric, tongue midline, pupils equal, round, reactive to light with accomodation, extraocular muscles intact. CN II-XII grossly intact.   Language: no aphasia  Speech:  dysarthria   Sensory: decrease response to light touch in BUE  Motor Strength: Moves all extremities spontaneously with good tone.      Strength   Deltoids Triceps Biceps Wrist Extension Wrist Flexion Hand    Upper: R 2/5 3/5 3/5 3/5 3/5 3/5     L 3/5 4-/5 4-/5 3/5 3/5 4-/5       Iliopsoas Quadriceps Knee  Flexion Tibialis  anterior Gastro- cnemius EHL   Lower: R 2/5 2/5 2/5 4-/5 4-/5 4-/5     L 4-/5 3/5 3/5 4+/5 4+/5 4+/5         [place]: present on right   Clonus:  absent     wound is c/d/i, no erythema, ttp, or drainage.  wound edges are well approximated.       Significant Labs:    Recent Labs  Lab 04/29/18  0348 04/30/18  0237   GLU 99 128*    140   K 4.1 3.7    106   CO2 27 26   BUN 18 16   CREATININE 0.6 0.7   CALCIUM 10.7* 10.9*   MG 2.2 1.9       Recent Labs  Lab 04/29/18  0348 04/30/18  0237   WBC 14.79* 12.68   HGB 10.5* 10.6*   HCT 33.7* 33.7*    305     No results for input(s): LABPT, INR, APTT in the last 48 hours.  Microbiology Results (last 7 days)     Procedure Component Value Units Date/Time    AFB Culture & Smear [[id number]] Collected:  04/28/18 1413    Order Status:  Completed Specimen:  Wound from Neck Updated:  04/29/18 2127     AFB Culture & Smear Culture in progress    Narrative:       Wound fluid    AFB Culture & Smear [[id number]] Collected:  04/28/18 1417    Order Status:  Completed Specimen:  Wound from Neck Updated:  04/29/18 2127     AFB Culture & Smear Culture in progress    Narrative:       Wound fluid    Aerobic culture [[id number]] Collected:  04/28/18 1413    Order Status:  Completed Specimen:  Wound from Neck Updated:  04/29/18 0807     Aerobic Bacterial Culture No growth    Narrative:       Wound fluid    Aerobic culture [[id number]] Collected:  04/28/18 1417    Order Status:  Completed Specimen:  Wound from Neck Updated:  04/29/18 0754     Aerobic Bacterial Culture No growth    Narrative:       Wound fluid    Urine culture [[id number]] Collected:  04/27/18 1218    Order Status:  Completed Specimen:  Urine from Urine, Catheterized Updated:  04/28/18 2020     Urine Culture, Routine No growth    Gram stain [[id number]] Collected:  04/28/18 1417    Order Status:  Completed Specimen:  Wound from Neck Updated:  04/28/18 2009     Gram Stain Result Rare WBC's      No organisms seen    Narrative:       Wound fluid    Gram stain [[id number]] Collected:  04/28/18 1413    Order Status:  Completed Specimen:  Wound from Neck Updated:   04/28/18 2004     Gram Stain Result Rare WBC's      No organisms seen    Narrative:       Wound fluid    Culture, Anaerobe [[id number]] Collected:  04/28/18 1417    Order Status:  Sent Specimen:  Wound from Neck Updated:  04/28/18 1431    Fungus culture [[id number]] Collected:  04/28/18 1417    Order Status:  Sent Specimen:  Wound from Neck Updated:  04/28/18 1430    Culture, Anaerobe [[id number]] Collected:  04/28/18 1413    Order Status:  Sent Specimen:  Wound from Neck Updated:  04/28/18 1429    Fungus culture [[id number]] Collected:  04/28/18 1413    Order Status:  Sent Specimen:  Wound from Neck Updated:  04/28/18 1429    Urine culture [[id number]] Collected:  04/27/18 1218    Order Status:  Sent Specimen:  Urine from Urine, Catheterized Updated:  04/27/18 1218    Blood culture [[id number]] Collected:  04/21/18 1335    Order Status:  Completed Specimen:  Blood Updated:  04/26/18 1612     Blood Culture, Routine No growth after 5 days.    Narrative:       Blood cultures from 2 different sites. 4 bottles total.  Please draw before starting antibiotics.    Blood culture [[id number]] Collected:  04/21/18 1335    Order Status:  Completed Specimen:  Blood Updated:  04/26/18 1612     Blood Culture, Routine No growth after 5 days.    Narrative:       Blood cultures x 2 different sites. 4 bottles total. Please  draw cultures before administering antibiotics.    C Diff Toxin by PCR [[id number]] Collected:  04/24/18 1151    Order Status:  Completed Updated:  04/24/18 2330     C. diff PCR Negative    Clostridium difficile EIA [[id number]]  (Abnormal) Collected:  04/24/18 1151    Order Status:  Completed Specimen:  Stool from Stool Updated:  04/24/18 2238     C. diff Antigen Positive (A)     C difficile Toxins A+B, EIA Negative     Comment: Testing not recommended for children <24 months old.       Blood culture [[id number]] Collected:  04/19/18 1005    Order Status:  Completed Specimen:  Blood from Peripheral, Wrist, Right Updated:   04/24/18 1412     Blood Culture, Routine No growth after 5 days.    Narrative:       Blood cultures from 2 different sites. 4 bottles total.  Please draw before starting antibiotics.    Blood culture [[id number]] Collected:  04/19/18 0952    Order Status:  Completed Specimen:  Blood from Peripheral, Hand, Left Updated:  04/24/18 1412     Blood Culture, Routine No growth after 5 days.    Narrative:       Blood cultures x 2 different sites. 4 bottles total. Please  draw cultures before administering antibiotics.    Urine culture [[id number]]  (Susceptibility) Collected:  04/21/18 1430    Order Status:  Completed Specimen:  Urine from Urine, Catheterized Updated:  04/23/18 1800     Urine Culture, Routine --     PSEUDOMONAS AERUGINOSA  10,000 - 49,999 cfu/ml              Significant Diagnostics:

## 2018-04-30 NOTE — PROGRESS NOTES
[facility]  Neurosurgery  Progress Note    Subjective:     History of Present Illness: 82 y.o.  male with type 2 diabetes, who presents today for follow up evaluation one week prior to 2 level ACDF scheduled for 4/10/2018. Pt reports    Pt presents today wearing a cervical collar and in a wheelchair. He would like to proceed with surgery. Per pt's family, his PCP would like to see him on 4/9, the day before surgery. He notes continued neck pain and BUE numbness/tingling as well as BUE weakness. Pt is only able to walk a few steps unassisted. He has never received neck surgery.     Post-Op Info:  Procedure(s) (LRB):  WASHOUT-CERVICAL-wound washout and csf leak repair with depuy (N/A)  DISKECTOMY AND FUSION-ANTERIOR CERVICAL (ACDF)- REVISON C5-C7   2 Days Post-Op     Interval History: no AE. AFVSS.     Medications:  Continuous Infusions:  Scheduled Meds:   albuterol-ipratropium 2.5mg-0.5mg/3mL  3 mL Nebulization Q4H    atorvastatin  20 mg Per NG tube Nightly    ceFEPime (MAXIPIME) IVPB  2 g Intravenous Q8H    citalopram  10 mg Per NG tube Daily    diltiaZEM  30 mg Per NG tube Q6H    heparin (porcine)  5,000 Units Subcutaneous Q8H    insulin aspart U-100  6 Units Subcutaneous 6 times per day    insulin detemir U-100  13 Units Subcutaneous BID    metoprolol tartrate  12.5 mg Per NG tube Q8H    metronidazole  500 mg Intravenous Q8H    tamsulosin  0.4 mg Per NG tube Daily    traZODone  50 mg Per NG tube QHS    valsartan  40 mg Per NG tube Daily     PRN Meds:acetaminophen, aluminum-magnesium hydroxide-simethicone, benzonatate, dextrose 50%, fentaNYL, glucagon (human recombinant), hydrALAZINE, hydrocodone-acetaminophen 5-325mg, insulin aspart U-100, labetalol, lidocaine HCL 2%, omnipaque, ondansetron     Review of Systems    Objective:     Weight: 99.5 kg (219 lb 5.7 oz)  Body mass index is 29.75 kg/m².  Vital Signs (Most Recent):  Temp: 97.7 °F (36.5 °C) (04/30/18 0305)  Pulse: 73 (04/30/18  0505)  Resp: 16 (04/30/18 0505)  BP: (!) 189/82 (04/30/18 0806)  SpO2: 98 % (04/30/18 0505) Vital Signs (24h Range):  Temp:  [97.5 °F (36.4 °C)-98.3 °F (36.8 °C)] 97.7 °F (36.5 °C)  Pulse:  [53-84] 73  Resp:  [13-20] 16  SpO2:  [97 %-100 %] 98 %  BP: (143-189)/() 189/82               NG/OG Tube 04/21/18 2300 Cortrak Right nostril (Active)   Placement Check placement verified by aspirate characteristics;placement verified by distal tube length measurement 4/26/2018  8:45 PM   Distal Tube Length (cm) 75 4/25/2018  7:05 AM   Tolerance no signs/symptoms of discomfort 4/26/2018  8:45 PM   Securement anchored to nostril center w/ adhesive device 4/26/2018  8:45 PM   Clamp Status/Tolerance unclamped;no abdominal distention;no emesis;no nausea;no residual;no restlessness 4/26/2018  8:45 PM   Insertion Site Appearance no redness, warmth, tenderness, skin breakdown, drainage 4/26/2018  8:45 PM   Drainage None 4/25/2018 11:05 AM   Flush/Irrigation flushed w/;water;no resistance met 4/26/2018  8:45 PM   Feeding Method continuous 4/26/2018  8:45 PM   Current Rate (mL/hr) 55 mL/hr 4/26/2018  8:45 PM   Goal Rate (mL/hr) 55 mL/hr 4/26/2018  8:45 PM   Intake (mL) 30 mL 4/24/2018  5:00 PM   Water Bolus (mL) 100 mL 4/26/2018  5:00 AM   Rate Formula Tube Feeding (mL/hr) 55 mL/hr 4/24/2018  4:00 PM   Intake (mL) - Formula Tube Feeding 513 4/26/2018  5:00 AM   Residual Amount (ml) 0 ml 4/24/2018  7:05 AM       Male External Urinary Catheter 04/23/18 1345 Medium (Active)   Collection Container Standard drainage bag 4/26/2018  8:45 PM   Skin reddened 4/26/2018  8:45 PM   Tolerance no signs/symptoms of discomfort 4/26/2018  8:45 PM   Output (mL) 575 mL 4/27/2018  6:00 AM   Catheter Change Date 04/26/18 4/26/2018  8:45 PM   Catheter Change Time 2300 4/26/2018  8:45 PM       Neurosurgery Physical Exam    General: well developed, well nourished. no acute distress. Generalized deconditioning   Neurologic: Awake and oriented to self and  hospital. Thought content appropriate.  Head: normocephalic, atraumatic   GCS: Motor: 6/Verbal: 4/Eyes: 4 GCS Total: 14  Cranial nerves: face symmetric, tongue midline, pupils equal, round, reactive to light with accomodation, extraocular muscles intact. CN II-XII grossly intact.   Language: no aphasia  Speech:  dysarthria   Sensory: decrease response to light touch in BUE  Motor Strength: Moves all extremities spontaneously with good tone.      Strength   Deltoids Triceps Biceps Wrist Extension Wrist Flexion Hand    Upper: R 2/5 3/5 3/5 3/5 3/5 3/5     L 3/5 4-/5 4-/5 3/5 3/5 4-/5       Iliopsoas Quadriceps Knee  Flexion Tibialis  anterior Gastro- cnemius EHL   Lower: R 2/5 2/5 2/5 4-/5 4-/5 4-/5     L 4-/5 3/5 3/5 4+/5 4+/5 4+/5         [place]: present on right   Clonus: absent     wound is c/d/i, no erythema, ttp, or drainage.  wound edges are well approximated.       Significant Labs:    Recent Labs  Lab 04/29/18  0348 04/30/18  0237   GLU 99 128*    140   K 4.1 3.7    106   CO2 27 26   BUN 18 16   CREATININE 0.6 0.7   CALCIUM 10.7* 10.9*   MG 2.2 1.9       Recent Labs  Lab 04/29/18  0348 04/30/18  0237   WBC 14.79* 12.68   HGB 10.5* 10.6*   HCT 33.7* 33.7*    305     No results for input(s): LABPT, INR, APTT in the last 48 hours.  Microbiology Results (last 7 days)     Procedure Component Value Units Date/Time    AFB Culture & Smear [[id number]] Collected:  04/28/18 1413    Order Status:  Completed Specimen:  Wound from Neck Updated:  04/29/18 2127     AFB Culture & Smear Culture in progress    Narrative:       Wound fluid    AFB Culture & Smear [[id number]] Collected:  04/28/18 1417    Order Status:  Completed Specimen:  Wound from Neck Updated:  04/29/18 2127     AFB Culture & Smear Culture in progress    Narrative:       Wound fluid    Aerobic culture [[id number]] Collected:  04/28/18 1413    Order Status:  Completed Specimen:  Wound from Neck Updated:  04/29/18 0807     Aerobic  Bacterial Culture No growth    Narrative:       Wound fluid    Aerobic culture [[id number]] Collected:  04/28/18 1417    Order Status:  Completed Specimen:  Wound from Neck Updated:  04/29/18 0754     Aerobic Bacterial Culture No growth    Narrative:       Wound fluid    Urine culture [[id number]] Collected:  04/27/18 1218    Order Status:  Completed Specimen:  Urine from Urine, Catheterized Updated:  04/28/18 2020     Urine Culture, Routine No growth    Gram stain [[id number]] Collected:  04/28/18 1417    Order Status:  Completed Specimen:  Wound from Neck Updated:  04/28/18 2009     Gram Stain Result Rare WBC's      No organisms seen    Narrative:       Wound fluid    Gram stain [[id number]] Collected:  04/28/18 1413    Order Status:  Completed Specimen:  Wound from Neck Updated:  04/28/18 2004     Gram Stain Result Rare WBC's      No organisms seen    Narrative:       Wound fluid    Culture, Anaerobe [[id number]] Collected:  04/28/18 1417    Order Status:  Sent Specimen:  Wound from Neck Updated:  04/28/18 1431    Fungus culture [[id number]] Collected:  04/28/18 1417    Order Status:  Sent Specimen:  Wound from Neck Updated:  04/28/18 1430    Culture, Anaerobe [[id number]] Collected:  04/28/18 1413    Order Status:  Sent Specimen:  Wound from Neck Updated:  04/28/18 1429    Fungus culture [[id number]] Collected:  04/28/18 1413    Order Status:  Sent Specimen:  Wound from Neck Updated:  04/28/18 1429    Urine culture [[id number]] Collected:  04/27/18 1218    Order Status:  Sent Specimen:  Urine from Urine, Catheterized Updated:  04/27/18 1218    Blood culture [[id number]] Collected:  04/21/18 1335    Order Status:  Completed Specimen:  Blood Updated:  04/26/18 1612     Blood Culture, Routine No growth after 5 days.    Narrative:       Blood cultures from 2 different sites. 4 bottles total.  Please draw before starting antibiotics.    Blood culture [[id number]] Collected:  04/21/18 1335    Order Status:  Completed Specimen:   Blood Updated:  04/26/18 1612     Blood Culture, Routine No growth after 5 days.    Narrative:       Blood cultures x 2 different sites. 4 bottles total. Please  draw cultures before administering antibiotics.    C Diff Toxin by PCR [[id number]] Collected:  04/24/18 1151    Order Status:  Completed Updated:  04/24/18 2330     C. diff PCR Negative    Clostridium difficile EIA [[id number]]  (Abnormal) Collected:  04/24/18 1151    Order Status:  Completed Specimen:  Stool from Stool Updated:  04/24/18 2238     C. diff Antigen Positive (A)     C difficile Toxins A+B, EIA Negative     Comment: Testing not recommended for children <24 months old.       Blood culture [[id number]] Collected:  04/19/18 1005    Order Status:  Completed Specimen:  Blood from Peripheral, Wrist, Right Updated:  04/24/18 1412     Blood Culture, Routine No growth after 5 days.    Narrative:       Blood cultures from 2 different sites. 4 bottles total.  Please draw before starting antibiotics.    Blood culture [[id number]] Collected:  04/19/18 0952    Order Status:  Completed Specimen:  Blood from Peripheral, Hand, Left Updated:  04/24/18 1412     Blood Culture, Routine No growth after 5 days.    Narrative:       Blood cultures x 2 different sites. 4 bottles total. Please  draw cultures before administering antibiotics.    Urine culture [[id number]]  (Susceptibility) Collected:  04/21/18 1430    Order Status:  Completed Specimen:  Urine from Urine, Catheterized Updated:  04/23/18 1800     Urine Culture, Routine --     PSEUDOMONAS AERUGINOSA  10,000 - 49,999 cfu/ml              Significant Diagnostics:      Assessment/Plan:     * Cervical myelopathy with cervical radiculopathy    82 y.o. male s/p C3-5 ACDF for cervical myelopathy on 4/10 with Dr. Hess, POD#18, and now s/p evacuation/washout of fluid collection with CSF leak repair POD#2     Neuro stable  Cont neuro checks  More awake and alert. Cancel PEG. Will re-eval w/ ST.   -CV goal normotension.  Continue home meds. PRN meds SBP >160.  -Urinary retention- condom cath in place, continue Flomax  -Continue PILI/SCDs/SQH  -Continue bowel regimen daily.  -Continue to hold Eliquis. Per HM, indications unclear.  does not recommend continuing upon discharge.  -Continue aggressive PT/OT/ST  -Cervical collar when OOB or with activity  -IS to bed side. Patient to use atleast 10x every hour. Scheduled CPT/duonebs.     Dispo: pending improvement                  Orion Be MD  Neurosurgery  [facility]

## 2018-05-01 LAB
ALBUMIN SERPL BCP-MCNC: 2.1 G/DL
ALP SERPL-CCNC: 70 U/L
ALT SERPL W/O P-5'-P-CCNC: 14 U/L
ANION GAP SERPL CALC-SCNC: 8 MMOL/L
AST SERPL-CCNC: 30 U/L
BACTERIA SPEC AEROBE CULT: NO GROWTH
BACTERIA SPEC AEROBE CULT: NO GROWTH
BASOPHILS # BLD AUTO: 0.03 K/UL
BASOPHILS NFR BLD: 0.3 %
BILIRUB SERPL-MCNC: 0.4 MG/DL
BUN SERPL-MCNC: 17 MG/DL
CALCIUM SERPL-MCNC: 10.6 MG/DL
CHLORIDE SERPL-SCNC: 106 MMOL/L
CO2 SERPL-SCNC: 24 MMOL/L
CREAT SERPL-MCNC: 0.6 MG/DL
DIFFERENTIAL METHOD: ABNORMAL
EOSINOPHIL # BLD AUTO: 0.1 K/UL
EOSINOPHIL NFR BLD: 0.8 %
ERYTHROCYTE [DISTWIDTH] IN BLOOD BY AUTOMATED COUNT: 15.2 %
EST. GFR  (AFRICAN AMERICAN): >60 ML/MIN/1.73 M^2
EST. GFR  (NON AFRICAN AMERICAN): >60 ML/MIN/1.73 M^2
GLUCOSE SERPL-MCNC: 119 MG/DL
HCT VFR BLD AUTO: 33.3 %
HGB BLD-MCNC: 10.4 G/DL
IMM GRANULOCYTES # BLD AUTO: 0.26 K/UL
IMM GRANULOCYTES NFR BLD AUTO: 2.3 %
INR PPP: 1.3
LYMPHOCYTES # BLD AUTO: 1.4 K/UL
LYMPHOCYTES NFR BLD: 12.5 %
MAGNESIUM SERPL-MCNC: 1.9 MG/DL
MCH RBC QN AUTO: 30.5 PG
MCHC RBC AUTO-ENTMCNC: 31.2 G/DL
MCV RBC AUTO: 98 FL
MONOCYTES # BLD AUTO: 0.9 K/UL
MONOCYTES NFR BLD: 8.1 %
NEUTROPHILS # BLD AUTO: 8.7 K/UL
NEUTROPHILS NFR BLD: 76 %
NRBC BLD-RTO: 0 /100 WBC
PHOSPHATE SERPL-MCNC: 2 MG/DL
PLATELET # BLD AUTO: 296 K/UL
PMV BLD AUTO: 9.9 FL
POCT GLUCOSE: 193 MG/DL (ref 70–110)
POCT GLUCOSE: 195 MG/DL (ref 70–110)
POCT GLUCOSE: 195 MG/DL (ref 70–110)
POCT GLUCOSE: 197 MG/DL (ref 70–110)
POTASSIUM SERPL-SCNC: 3.3 MMOL/L
POTASSIUM SERPL-SCNC: 5.3 MMOL/L
PROT SERPL-MCNC: 5.8 G/DL
PROTHROMBIN TIME: 13.1 SEC
RBC # BLD AUTO: 3.41 M/UL
SODIUM SERPL-SCNC: 138 MMOL/L
WBC # BLD AUTO: 11.46 K/UL

## 2018-05-01 PROCEDURE — 25000003 PHARM REV CODE 250: Performed by: PSYCHIATRY & NEUROLOGY

## 2018-05-01 PROCEDURE — 85610 PROTHROMBIN TIME: CPT

## 2018-05-01 PROCEDURE — 25000003 PHARM REV CODE 250: Performed by: STUDENT IN AN ORGANIZED HEALTH CARE EDUCATION/TRAINING PROGRAM

## 2018-05-01 PROCEDURE — 85025 COMPLETE CBC W/AUTO DIFF WBC: CPT

## 2018-05-01 PROCEDURE — 63600175 PHARM REV CODE 636 W HCPCS: Performed by: PSYCHIATRY & NEUROLOGY

## 2018-05-01 PROCEDURE — 63600175 PHARM REV CODE 636 W HCPCS: Performed by: PHYSICIAN ASSISTANT

## 2018-05-01 PROCEDURE — 63600175 PHARM REV CODE 636 W HCPCS: Performed by: STUDENT IN AN ORGANIZED HEALTH CARE EDUCATION/TRAINING PROGRAM

## 2018-05-01 PROCEDURE — 63600175 PHARM REV CODE 636 W HCPCS: Performed by: NURSE PRACTITIONER

## 2018-05-01 PROCEDURE — 97803 MED NUTRITION INDIV SUBSEQ: CPT

## 2018-05-01 PROCEDURE — 25000003 PHARM REV CODE 250: Performed by: PHYSICIAN ASSISTANT

## 2018-05-01 PROCEDURE — 86580 TB INTRADERMAL TEST: CPT | Performed by: NURSE PRACTITIONER

## 2018-05-01 PROCEDURE — 25000003 PHARM REV CODE 250: Performed by: ANESTHESIOLOGY

## 2018-05-01 PROCEDURE — 20600001 HC STEP DOWN PRIVATE ROOM

## 2018-05-01 PROCEDURE — 97164 PT RE-EVAL EST PLAN CARE: CPT

## 2018-05-01 PROCEDURE — 84132 ASSAY OF SERUM POTASSIUM: CPT

## 2018-05-01 PROCEDURE — 84100 ASSAY OF PHOSPHORUS: CPT

## 2018-05-01 PROCEDURE — 80053 COMPREHEN METABOLIC PANEL: CPT

## 2018-05-01 PROCEDURE — 99232 SBSQ HOSP IP/OBS MODERATE 35: CPT | Mod: ,,, | Performed by: NURSE PRACTITIONER

## 2018-05-01 PROCEDURE — 97112 NEUROMUSCULAR REEDUCATION: CPT

## 2018-05-01 PROCEDURE — 83735 ASSAY OF MAGNESIUM: CPT

## 2018-05-01 RX ORDER — SODIUM,POTASSIUM PHOSPHATES 280-250MG
2 POWDER IN PACKET (EA) ORAL
Status: DISCONTINUED | OUTPATIENT
Start: 2018-05-01 | End: 2018-05-13

## 2018-05-01 RX ORDER — CEFTRIAXONE 1 G/1
1 INJECTION, POWDER, FOR SOLUTION INTRAMUSCULAR; INTRAVENOUS
Status: CANCELLED | OUTPATIENT
Start: 2018-05-01 | End: 2018-05-07

## 2018-05-01 RX ORDER — POLYETHYLENE GLYCOL 3350 17 G/17G
17 POWDER, FOR SOLUTION ORAL 2 TIMES DAILY
Status: DISCONTINUED | OUTPATIENT
Start: 2018-05-01 | End: 2018-05-08

## 2018-05-01 RX ORDER — POTASSIUM CHLORIDE 20 MEQ/15ML
60 SOLUTION ORAL
Status: DISCONTINUED | OUTPATIENT
Start: 2018-05-01 | End: 2018-05-13

## 2018-05-01 RX ORDER — POTASSIUM CHLORIDE 20 MEQ/15ML
40 SOLUTION ORAL
Status: DISCONTINUED | OUTPATIENT
Start: 2018-05-01 | End: 2018-05-13

## 2018-05-01 RX ADMIN — Medication 5 UNITS: at 03:05

## 2018-05-01 RX ADMIN — CITALOPRAM HYDROBROMIDE 10 MG: 10 TABLET ORAL at 09:05

## 2018-05-01 RX ADMIN — POTASSIUM CHLORIDE 40 MEQ: 20 SOLUTION ORAL at 09:05

## 2018-05-01 RX ADMIN — CEFTRIAXONE SODIUM 2 G: 2 INJECTION, POWDER, FOR SOLUTION INTRAMUSCULAR; INTRAVENOUS at 08:05

## 2018-05-01 RX ADMIN — POLYETHYLENE GLYCOL 3350 17 G: 17 POWDER, FOR SOLUTION ORAL at 12:05

## 2018-05-01 RX ADMIN — HYDRALAZINE HYDROCHLORIDE 10 MG: 20 INJECTION INTRAMUSCULAR; INTRAVENOUS at 09:05

## 2018-05-01 RX ADMIN — INSULIN DETEMIR 13 UNITS: 100 INJECTION, SOLUTION SUBCUTANEOUS at 09:05

## 2018-05-01 RX ADMIN — STANDARDIZED SENNA CONCENTRATE AND DOCUSATE SODIUM 1 TABLET: 8.6; 5 TABLET, FILM COATED ORAL at 09:05

## 2018-05-01 RX ADMIN — DILTIAZEM HYDROCHLORIDE 30 MG: 30 TABLET, FILM COATED ORAL at 05:05

## 2018-05-01 RX ADMIN — LEVETIRACETAM 500 MG: 500 TABLET, FILM COATED ORAL at 09:05

## 2018-05-01 RX ADMIN — CARVEDILOL 12.5 MG: 12.5 TABLET, FILM COATED ORAL at 09:05

## 2018-05-01 RX ADMIN — VANCOMYCIN HYDROCHLORIDE 1 G: 10 INJECTION, POWDER, LYOPHILIZED, FOR SOLUTION INTRAVENOUS at 02:05

## 2018-05-01 RX ADMIN — POTASSIUM CHLORIDE 40 MEQ: 20 SOLUTION ORAL at 06:05

## 2018-05-01 RX ADMIN — INSULIN ASPART 2 UNITS: 100 INJECTION, SOLUTION INTRAVENOUS; SUBCUTANEOUS at 12:05

## 2018-05-01 RX ADMIN — INSULIN ASPART 2 UNITS: 100 INJECTION, SOLUTION INTRAVENOUS; SUBCUTANEOUS at 05:05

## 2018-05-01 RX ADMIN — INSULIN ASPART 2 UNITS: 100 INJECTION, SOLUTION INTRAVENOUS; SUBCUTANEOUS at 09:05

## 2018-05-01 RX ADMIN — POTASSIUM & SODIUM PHOSPHATES POWDER PACK 280-160-250 MG 2 PACKET: 280-160-250 PACK at 09:05

## 2018-05-01 RX ADMIN — HEPARIN SODIUM 5000 UNITS: 5000 INJECTION, SOLUTION INTRAVENOUS; SUBCUTANEOUS at 09:05

## 2018-05-01 RX ADMIN — TAMSULOSIN HYDROCHLORIDE 0.4 MG: 0.4 CAPSULE ORAL at 09:05

## 2018-05-01 RX ADMIN — POTASSIUM & SODIUM PHOSPHATES POWDER PACK 280-160-250 MG 2 PACKET: 280-160-250 PACK at 12:05

## 2018-05-01 RX ADMIN — POTASSIUM & SODIUM PHOSPHATES POWDER PACK 280-160-250 MG 2 PACKET: 280-160-250 PACK at 06:05

## 2018-05-01 RX ADMIN — VALSARTAN 40 MG: 40 TABLET ORAL at 09:05

## 2018-05-01 RX ADMIN — ATORVASTATIN CALCIUM 20 MG: 20 TABLET, FILM COATED ORAL at 09:05

## 2018-05-01 RX ADMIN — POLYETHYLENE GLYCOL 3350 17 G: 17 POWDER, FOR SOLUTION ORAL at 09:05

## 2018-05-01 RX ADMIN — DILTIAZEM HYDROCHLORIDE 30 MG: 30 TABLET, FILM COATED ORAL at 12:05

## 2018-05-01 RX ADMIN — HEPARIN SODIUM 5000 UNITS: 5000 INJECTION, SOLUTION INTRAVENOUS; SUBCUTANEOUS at 06:05

## 2018-05-01 RX ADMIN — DILTIAZEM HYDROCHLORIDE 30 MG: 30 TABLET, FILM COATED ORAL at 06:05

## 2018-05-01 RX ADMIN — VANCOMYCIN HYDROCHLORIDE 1 G: 10 INJECTION, POWDER, LYOPHILIZED, FOR SOLUTION INTRAVENOUS at 03:05

## 2018-05-01 RX ADMIN — TRAZODONE HYDROCHLORIDE 50 MG: 50 TABLET ORAL at 09:05

## 2018-05-01 RX ADMIN — HEPARIN SODIUM 5000 UNITS: 5000 INJECTION, SOLUTION INTRAVENOUS; SUBCUTANEOUS at 03:05

## 2018-05-01 NOTE — SUBJECTIVE & OBJECTIVE
Interval History:  Transfer to [facility]    Review of Systems    Review of symptoms  Constitutional: Denies fevers or chills.  Pulmonary: Denies shortness of breath or cough.  Cardiology: Denies chest pain or palpitations.  GI: Denies abdominal pain or constipation.  Neurologic: Denies new weakness,  headache, or paresthesias.  Objective:     Vitals:  Temp: 97.3 °F (36.3 °C)  Pulse: 76  Rhythm: normal sinus rhythm  BP: 134/67  MAP (mmHg): 87  Resp: 11  SpO2: 96 %  O2 Device (Oxygen Therapy): room air    Temp  Min: 97 °F (36.1 °C)  Max: 98.8 °F (37.1 °C)  Pulse  Min: 64  Max: 91  BP  Min: 125/71  Max: 187/73  MAP (mmHg)  Min: 82  Max: 114  Resp  Min: 9  Max: 23  SpO2  Min: 96 %  Max: 100 %    04/30 0701 - 05/01 0700  In: 1055 [I.V.:5]  Out: 1955 [Urine:1955]   Unmeasured Output  Urine Occurrence: 4  Stool Occurrence: 0  Emesis Occurrence: 0  Pad Count: 1       Physical Exam      Physical Exam:  GA: Alert, comfortable, no acute distress.   HEENT: No scleral icterus or JVD.   Pulmonary: Clear to auscultation A/L.   Cardiac: RRR S1 & S2 w/o rubs/murmurs/gallops.   Abdominal: Bowel sounds present x 4. .  Skin: No jaundice, rashes, or visible lesions.  Neuro:  --GCS: E4 V5 M5  --Mental Status: awake alert follows commands confused as to time and location   --Pupils 3mm, PERRL.   --Corneal reflex, gag, cough intact.  --LUE strength: 4/5  --RUE strength: 4/5  --LLE strength: 3/5  --RLE strength: 3/5    Unable to test gait due to level of consciousness.    Medications:  Continuous Scheduled  atorvastatin 20 mg Nightly   carvedilol 12.5 mg BID   cefTRIAXone (ROCEPHIN) IVPB 2 g Q24H   citalopram 10 mg Daily   diltiaZEM 30 mg Q6H   heparin (porcine) 5,000 Units Q8H   insulin detemir U-100 13 Units BID   levETIRAcetam 500 mg BID   polyethylene glycol 17 g BID   senna-docusate 8.6-50 mg 1 tablet BID   tamsulosin 0.4 mg Daily   traZODone 50 mg QHS   valsartan 40 mg Daily   vancomycin (VANCOCIN) IVPB 1,000 mg Q12H   PRN  acetaminophen  650 mg Q4H PRN   albuterol-ipratropium 2.5mg-0.5mg/3mL 3 mL Q4H PRN   aluminum-magnesium hydroxide-simethicone 30 mL Q4H PRN   dextrose 50% 12.5 g PRN   fentaNYL 12.5 mcg Q2H PRN   glucagon (human recombinant) 1 mg PRN   hydrALAZINE 10 mg Q6H PRN   hydrocodone-acetaminophen 5-325mg 1 tablet Q4H PRN   insulin aspart U-100 1-10 Units Q4H PRN   labetalol 10 mg Q6H PRN   lidocaine HCL 2%  PRN   ondansetron 8 mg Q6H PRN   potassium chloride 10% 40 mEq PRN   potassium chloride 10% 40 mEq PRN   potassium chloride 10% 60 mEq PRN   potassium, sodium phosphates 2 packet PRN   potassium, sodium phosphates 2 packet PRN   potassium, sodium phosphates 2 packet PRN     Today I personally reviewed pertinent medications, lines/drains/airways, imaging, cardiology, lab results, microbiology results,     Diet  Diet NPO  Diet NPO

## 2018-05-01 NOTE — ASSESSMENT & PLAN NOTE
82 y.o. male s/p C3-5 ACDF for cervical myelopathy on 4/10 with Dr. Hess, POD#19, and now s/p evacuation/washout of fluid collection with CSF leak repair POD#3     Neuro stable  Cont neuro checks  Awaiting ST eval still.   -CV goal normotension. Continue home meds. PRN meds SBP >160.  -Urinary retention- condom cath in place, continue Flomax  -Continue PILI/SCDs/SQH  -Continue bowel regimen daily.  -Continue to hold Eliquis. Per , indications unclear.  does not recommend continuing upon discharge.  -Continue aggressive PT/OT/ST  -Cervical collar when OOB or with activity  -IS to bed side. Patient to use atleast 10x every hour. Scheduled CPT/duonebs.   Heme/ID- 7d total Abx from 4/28.     Dispo: pending improvement. F/u ST. Possible TTF

## 2018-05-01 NOTE — PROGRESS NOTES
[facility]  Neurocritical Care  Progress Note    Admit Date: 4/10/2018  Service Date: 04/30/2018  Length of Stay: 20    Subjective:     Chief Complaint: Cervical myelopathy with cervical radiculopathy    History of Present Illness: Mr. Pitts is an 82-year-old man with HTN, HPLD, DMII, BPH, MDD, and cervical spondylosis with myelopathy who was admitted to [facility] for ACDF. Per chart review he was in his usual state of health until July 2017 when he was hit by a large beam of wood, after which he has been non-ambulatory and confused. While very pleasant, he is unfortunately somewhat of a poor historian. His symptoms prior to admission include bilateral hand numbness, tingling, and restricted upper extremity range of motion. CT C-spine without contrast showed multilevel spondylosis of there cervical spine with mild stenosis at C5-6 and C6-7 as well as severe extensive neuroforaminal narrowing. Yesterday he underwent two level ACDF at C5/6 and C6/7 without immediate complication. Reported blood loss in the brief op note is 1050 mL. He was given dexamethasone as well. Hospital medicine consulted for hyperglycemia and comanagement of his medical conditions.He had developed a CSF leak and will have Luumbar drain placed by nsgy. He is being admitted to [facility] for a higher level of care.     Hospital Course: 4/13: Admit NCC s/p ACDF with new csf leak pending lumbar drain  4/14: ok to sit up with lumbar drain clamped, passed sp eval started diet   4/16: No leaking noted from drain insertion site. Will follow I/O, Serum Na. Discussed brandyoquhal, no documented reason/patient unaware why taking it.   4/17 CSF drainage notified Neurosurgery; Drain is clamped; Possible OR tomorrow in AM - However, final decision to be determined tonight.   4/18: Insulin increased. Dilt. Dose adjusted.   4/19: Discuss transfer with NSGY,   4/21: patient with SIRS criteria, ill appearing, transfer back to [facility], empiric ABX,  cultures  4/22: more alert today, start tube feeds, CXR stable  4/23- GNR in urine culture. WBC remains elevated but stabilized. Afebrile.  4/24: No LP per NSGY,continue abx,  c.diff pending, increased aspart, stepdown to NSGY today  4/25: NAEON, transfer to NSGY  4/28: today on floor patient having increasing dysphagia, MRI cervical spine revealed fluid collection pushing on trachea and esophagus causing displacement, NCC consulted and NSGY took patient emergently to OR, admit to NCC  4/29: s/p wound washout and CSF leak repair POD 1. Patient extubated post op, on RA sat 98. NAEON.  4/30:  per ID will change abx regimen to vanc and cef    Interval History: per ID will change abx regimen to vanc and cef    Review of Systems  Unable to obtain complete ROS, intermittently answers some ROS questions  Review of symptoms:  Pulmonary: Denies shortness of breath  Cardiology: Denies chest pain  Neuro: headache  Objective:     Vitals:  Temp: 98.2 °F (36.8 °C)  Pulse: 69  Rhythm: normal sinus rhythm  BP: (!) 150/68  MAP (mmHg): 98  Resp: 13  SpO2: 99 %    Temp  Min: 97.7 °F (36.5 °C)  Max: 98.2 °F (36.8 °C)  Pulse  Min: 58  Max: 84  BP  Min: 125/71  Max: 189/82  MAP (mmHg)  Min: 93  Max: 143  Resp  Min: 12  Max: 20  SpO2  Min: 97 %  Max: 100 %    04/29 0701 - 04/30 0700  In: 535 [I.V.:5]  Out: 1940 [Urine:1940]   Unmeasured Output  Urine Occurrence: 4  Stool Occurrence: 0  Emesis Occurrence: 0  Pad Count: 1       Physical Exam    Physical Exam:  GA: sleeping, arousable to voice, comfortable, no acute distress.   HEENT: No scleral icterus or JVD.   Pulmonary: Clear to auscultation Anteriorly. No wheezing, crackles, or rhonchi.  Cardiac: RRR S1 & S2 w/o rubs/murmurs/gallops.   Abdominal: Bowel sounds present x 4. No appreciable hepatosplenomegaly.  Skin: No jaundice, rashes, or visible lesions.  Neuro:  --GCS: E3 V4 M6  --Mental Status:  Drowsy, arousable to voice, follows simple commands, oriented to self, neuro exam waxes in  orientation to time and place  --Pupils 3mm, PERRL.   --VAN spontaneously and to command  Unable to test gait due to level of consciousness.    Medications:  Continuous Scheduled  atorvastatin 20 mg Nightly   carvedilol 12.5 mg BID   cefTRIAXone (ROCEPHIN) IVPB 1 g Q24H   citalopram 10 mg Daily   diltiaZEM 30 mg Q6H   heparin (porcine) 5,000 Units Q8H   insulin detemir U-100 13 Units BID   levETIRAcetam 500 mg BID   senna-docusate 8.6-50 mg 1 tablet BID   tamsulosin 0.4 mg Daily   traZODone 50 mg QHS   valsartan 40 mg Daily   vancomycin (VANCOCIN) IVPB 1,000 mg Q12H   PRN  acetaminophen 650 mg Q4H PRN   albuterol-ipratropium 2.5mg-0.5mg/3mL 3 mL Q4H PRN   aluminum-magnesium hydroxide-simethicone 30 mL Q4H PRN   benzonatate 100 mg TID PRN   dextrose 50% 12.5 g PRN   fentaNYL 12.5 mcg Q2H PRN   glucagon (human recombinant) 1 mg PRN   hydrALAZINE 10 mg Q6H PRN   hydrocodone-acetaminophen 5-325mg 1 tablet Q4H PRN   insulin aspart U-100 1-10 Units Q4H PRN   labetalol 10 mg Q6H PRN   lidocaine HCL 2%  PRN   ondansetron 8 mg Q6H PRN     Today I personally reviewed pertinent medications, lines/drains/airways, imaging, cardiology, lab results, microbiology results,     Diet  Diet NPO  Diet NPO      Assessment/Plan:     Neuro   * Cervical myelopathy with cervical radiculopathy    S/p ACDF on 4/13 complicated by CSF leak, lumbar drain earlier in admission, now out  4/28 after stepping patient down, MRI cervical spine revealed large fluid collection pushing on trachea and esophagus causing displacement, NSGY planning to take to OR emergently, intubated  4/29 s/p wound washout and CSF leak repair POD 2  ID consulted, recommend vanc and cef          CSF leak    - see cervical myelopathy        Psychiatric   Anxiety    - continue trazadone and citalopram        Cardiac/Vascular   Tachycardia    Cardizem   metoprolol PO changed to carvedilol        Other hyperlipidemia    - Continue atorvastatin        Essential hypertension     Continue current regimen of PO BP meds          Renal/   Benign prostatic hyperplasia with urinary retention    Continue tamsulosin        Endocrine   Type 2 diabetes mellitus with hyperglycemia, without long-term current use of insulin    -- d/c Aspart 6units q 4 hrs  -- Continue Detemir 13  units BID  -- SSI        Other   Fluid collection at surgical site    - see cervical myelopathy            Prophylaxis:  Venous Thromboembolism: mechanical chemical  Stress Ulcer: None  Ventilator Pneumonia: not applicable     Activity Orders          None        Full Code    Willa Maxwell PA-C  Neurocritical Care  [facility]

## 2018-05-01 NOTE — ASSESSMENT & PLAN NOTE
S/p ACDF on 4/13 complicated by CSF leak, lumbar drain earlier in admission, now out  4/28 after stepping patient down, MRI cervical spine revealed large fluid collection pushing on trachea and esophagus causing displacement, NSGY planning to take to OR emergently, intubated  4/29 s/p wound washout and CSF leak repair POD 2  ID consulted, recommend vanc and cef

## 2018-05-01 NOTE — PT/OT/SLP EVAL
Speech Language Pathology Evaluation  Bedside Swallow    Patient Name:  Hollis Pitts   MRN:  [id number]  Admitting Diagnosis: Cervical myelopathy with cervical radiculopathy    Recommendations:                 General Recommendations:  Dysphagia therapy  Diet recommendations:  NPO, NPO   Aspiration Precautions: Strict aspiration precautions   General Precautions: Standard, aspiration    History:     Past Medical History:   Diagnosis Date    Benign prostatic hyperplasia with urinary retention 4/11/2018    Cervical myelopathy with cervical radiculopathy 4/10/2018    Depression     Dyslipidemia     Essential hypertension 4/11/2018    Glaucoma (increased eye pressure)     Mild cognitive impairment     Traumatic brain injury 07/2017    Type 2 diabetes mellitus with hyperglycemia, without long-term current use of insulin 4/11/2018       Past Surgical History:   Procedure Laterality Date    CATARACT EXTRACTION W/  INTRAOCULAR LENS IMPLANT         Chest X-Rays: 4/27 Allowing for considerable differences in positioning, with patient rotation to the left on the current exam, there has been no significant detrimental interval change in the appearance of the chest since 04/23/2018.    Prior diet: Regular/thin.    Subjective     Awake/alert    Pain/Comfort:  · Pain Rating 1: 0/10  · Pain Rating Post-Intervention 1: 0/10    Objective:     Oral Musculature Evaluation  · Oral Musculature: general weakness  · Dentition:  (upper dentures not present)  · Secretion Management: adequate  · Mucosal Quality: dry, sticky  · Mandibular Strength and Mobility: impaired  · Oral Labial Strength and Mobility: functional pursing, functional seal  · Lingual Strength and Mobility: impaired strength, impaired left lateral movement, impaired right lateral movement  · Velar Elevation: WFL  · Buccal Strength and Mobility: WFL  · Volitional Cough: weak  · Volitional Swallow: delayed, max cues, decreased rise  · Voice Prior to PO Intake:  decreased intensity, breathy    Bedside Swallow Eval:   Consistencies Assessed:  · Thin liquids tsp x1  · Puree tsp x1     Oral Phase:   · Slow oral transit time    Pharyngeal Phase:   · Coughing/choking immediately post puree trials, and delayed post thin trial  · decreased hyolaryngeal excursion to palpation  · delayed swallow initiation   · multiple spontaneous swallows post all trials  · wet vocal quality after swallow post all trials, SLP provided suction post all trials with notable vanilla pudding in [place] tubing.       Assessment:     Hollis Pitts is a 82 y.o. male with an SLP diagnosis of Dysphagia.    Goals:    SLP Goals        Problem: SLP Goal    Goal Priority Disciplines Outcome   SLP Goal     SLP Ongoing (interventions implemented as appropriate)   Description:  Speech Language Pathology Goals  Goals expected to be met by 5/8    1. Pt will participate in ongoing swallow assessment  2. Pt will participate in speech language cognitive assessment        Speech Language Pathology Goals  Goals expected to be met by 4/29:    1. Pt will participate in ongoing assessment of swallow.                             Plan:     · Patient to be seen:  4 x/week   · Plan of Care expires:  05/22/18  · Plan of Care reviewed with:  patient   · SLP Follow-Up:  Yes       Discharge recommendations:  nursing facility, skilled       Time Tracking:     SLP Treatment Date:   05/01/18  Speech Start Time:  0824  Speech Stop Time:  0832     Speech Total Time (min):  8 min    Billable Minutes: Eval Swallow and Oral Function 8    Humera Wadsworth CCC-SLP  05/01/2018

## 2018-05-01 NOTE — PROGRESS NOTES
[facility]  Adult Nutrition  Progress Note    SUMMARY       Recommendations    Recommendation/Intervention:   Recommend increasing TF goal rate to better meet pt's needs.   Diabetisource @ 70mL/hr.   - Provides 2016kcals, 101g protein and 1374mL free water.   -Hold for residuals >500mL.     RD to monitor.    Goals: Meet % EEN, EPN  Nutrition Goal Status: progressing towards goal  Communication of RD Recs: discussed on rounds    Reason for Assessment    Reason for Assessment: RD follow-up  Diagnosis: other (see comments) (Cervical myelopathy)  Relevant Medical History: DM, HTN  Interdisciplinary Rounds: attended  General Information Comments: Pt transferred to [facility] s/p wound washout and CSF leak repair. Extubated post op and now with neck brace. Remains NPO with NG tube in place. TF at goal, tolerating with minimal residuals.  Nutrition Discharge Planning: Unable to determine    Nutrition Risk Screen    Nutrition Risk Screen: no indicators present    Nutrition/Diet History    Patient Reported Diet/Restrictions/Preferences: general  Do you have any cultural, spiritual, Druze conflicts, given your current situation?: none reported   Factors Affecting Nutritional Intake: NPO, difficulty/impaired swallowing    Anthropometrics    Temp: 97 °F (36.1 °C)  Height Method: Stated  Height: 6' (182.9 cm)  Height (inches): 72 in  Weight Method: Bed Scale  Weight: 99.5 kg (219 lb 5.7 oz)  Weight (lb): 219.36 lb  Ideal Body Weight (IBW), Male: 178 lb  % Ideal Body Weight, Male (lb): 123.24 lb  BMI (Calculated): 29.8  BMI Grade: 25 - 29.9 - overweight       Lab/Procedures/Meds    Pertinent Labs Reviewed: reviewed  Pertinent Labs Comments: K 3.3, POCT Glu   Pertinent Medications Reviewed: reviewed  Pertinent Medications Comments: Statin, Insulin    Physical Findings/Assessment    Overall Physical Appearance: nourished, weak  Tubes: nasogastric tube  Oral/Mouth Cavity: WDL  Skin:  incision(s)    Estimated/Assessed Needs    Weight Used For Calorie Calculations: 99.5 kg (219 lb 5.7 oz)  Energy Calorie Requirements (kcal): 2092 kcal/d  Energy Need Method: Acadia-St Jeor (1.2 PAL)  Protein Requirements: 100-120 g/d (1-1.2 g/kg)  Weight Used For Protein Calculations: 99.5 kg (219 lb 5.7 oz)     Fluid Need Method: other (see comments) (Per MD or 1 mL/kcal)  RDA Method (mL): 2092  CHO Requirement: 50% total kcals      Nutrition Prescription Ordered    Current Diet Order: NPO  Nutrition Order Comments: TF at goal  Current Nutrition Support Formula Ordered: Diabetisource AC  Current Nutrition Support Rate Ordered: 55 (ml)  Current Nutrition Support Frequency Ordered: mL/hr    Evaluation of Received Nutrient/Fluid Intake    Enteral Calories (kcal): 1584  Enteral Protein (gm): 79  Enteral (Free Water) Fluid (mL): 1080    % Kcal Needs: 76%  % Protein Needs: 79%    Energy Calories Required: not meeting needs  Protein Required: not meeting needs  Fluid Required: other (see comments) (Per MD or 1 mL/kcal)    Comments: 5mL residuals 4/27  Tolerance: tolerating    % Intake of Estimated Energy Needs: 50 - 75 %  % Meal Intake: NPO    Nutrition Risk    Level of Risk/Frequency of Follow-up:  (f/u 1x/week)     Assessment and Plan    Nutrition Problem  Inadequate energy intake     Related to (etiology):   Current TF regimen     Signs and Symptoms (as evidenced by):   TF meeting <80% EEN, EPN     Nutrition Diagnosis Status:   Continues    Monitor and Evaluation    Food and Nutrient Intake: energy intake, food and beverage intake, enteral nutrition intake  Food and Nutrient Adminstration: diet order, enteral and parenteral nutrition administration  Physical Activity and Function: nutrition-related ADLs and IADLs  Anthropometric Measurements: weight, weight change  Biochemical Data, Medical Tests and Procedures: lipid profile, inflammatory profile, glucose/endocrine profile, gastrointestinal profile, electrolyte and  renal panel  Nutrition-Focused Physical Findings: overall appearance     Nutrition Follow-Up    RD Follow-up?: Yes

## 2018-05-01 NOTE — ASSESSMENT & PLAN NOTE
S/p ACDF on 4/13 complicated by CSF leak, lumbar drain earlier in admission, now out  4/28 after stepping patient down, MRI cervical spine revealed large fluid collection pushing on trachea and esophagus causing displacement, NSGY planning to take to OR emergently, intubated  4/29 s/p wound washout and CSF leak repair POD 2  ID consulted, recommend vanc and cef x 7 days

## 2018-05-01 NOTE — PROGRESS NOTES
[facility]  Neurocritical Care  Progress Note    Admit Date: 4/10/2018  Service Date: 05/01/2018  Length of Stay: 21    Subjective:     Chief Complaint: Cervical myelopathy with cervical radiculopathy    History of Present Illness: Mr. Pitts is an 82-year-old man with HTN, HPLD, DMII, BPH, MDD, and cervical spondylosis with myelopathy who was admitted to [facility] for ACDF. Per chart review he was in his usual state of health until July 2017 when he was hit by a large beam of wood, after which he has been non-ambulatory and confused. While very pleasant, he is unfortunately somewhat of a poor historian. His symptoms prior to admission include bilateral hand numbness, tingling, and restricted upper extremity range of motion. CT C-spine without contrast showed multilevel spondylosis of there cervical spine with mild stenosis at C5-6 and C6-7 as well as severe extensive neuroforaminal narrowing. Yesterday he underwent two level ACDF at C5/6 and C6/7 without immediate complication. Reported blood loss in the brief op note is 1050 mL. He was given dexamethasone as well. Hospital medicine consulted for hyperglycemia and comanagement of his medical conditions.He had developed a CSF leak and will have Luumbar drain placed by nsgy. He is being admitted to [facility] for a higher level of care.     Hospital Course: 4/13: Admit NCC s/p ACDF with new csf leak pending lumbar drain  4/14: ok to sit up with lumbar drain clamped, passed sp eval started diet   4/16: No leaking noted from drain insertion site. Will follow I/O, Serum Na. Discussed brandyoquhal, no documented reason/patient unaware why taking it.   4/17 CSF drainage notified Neurosurgery; Drain is clamped; Possible OR tomorrow in AM - However, final decision to be determined tonight.   4/18: Insulin increased. Dilt. Dose adjusted.   4/19: Discuss transfer with NSGY,   4/21: patient with SIRS criteria, ill appearing, transfer back to [facility], empiric ABX,  cultures  4/22: more alert today, start tube feeds, CXR stable  4/23- GNR in urine culture. WBC remains elevated but stabilized. Afebrile.  4/24: No LP per NSGY,continue abx,  c.diff pending, increased aspart, stepdown to NSGY today  4/25: NAEON, transfer to [facility]  4/28: today on floor patient having increasing dysphagia, MRI cervical spine revealed fluid collection pushing on trachea and esophagus causing displacement, NCC consulted and NSGY took patient emergently to OR, admit to NCC  4/29: s/p wound washout and CSF leak repair POD 1. Patient extubated post op, on RA sat 98. NAEON.  4/30:  per ID will change abx regimen to vanc and cef  5/1: evauation by sp, [place] [facility]     Interval History:  Transfer to [facility]    Review of Systems    Review of symptoms  Constitutional: Denies fevers or chills.  Pulmonary: Denies shortness of breath or cough.  Cardiology: Denies chest pain or palpitations.  GI: Denies abdominal pain or constipation.  Neurologic: Denies new weakness,  headache, or paresthesias.  Objective:     Vitals:  Temp: 97.3 °F (36.3 °C)  Pulse: 76  Rhythm: normal sinus rhythm  BP: 134/67  MAP (mmHg): 87  Resp: 11  SpO2: 96 %  O2 Device (Oxygen Therapy): room air    Temp  Min: 97 °F (36.1 °C)  Max: 98.8 °F (37.1 °C)  Pulse  Min: 64  Max: 91  BP  Min: 125/71  Max: 187/73  MAP (mmHg)  Min: 82  Max: 114  Resp  Min: 9  Max: 23  SpO2  Min: 96 %  Max: 100 %    04/30 0701 - 05/01 0700  In: 1055 [I.V.:5]  Out: 1955 [Urine:1955]   Unmeasured Output  Urine Occurrence: 4  Stool Occurrence: 0  Emesis Occurrence: 0  Pad Count: 1       Physical Exam      Physical Exam:  GA: Alert, comfortable, no acute distress.   HEENT: No scleral icterus or JVD.   Pulmonary: Clear to auscultation A/L.   Cardiac: RRR S1 & S2 w/o rubs/murmurs/gallops.   Abdominal: Bowel sounds present x 4. .  Skin: No jaundice, rashes, or visible lesions.  Neuro:  --GCS: E4 V5 M5  --Mental Status: awake alert follows commands confused as to time and  location   --Pupils 3mm, PERRL.   --Corneal reflex, gag, cough intact.  --LUE strength: 4/5  --RUE strength: 4/5  --LLE strength: 3/5  --RLE strength: 3/5    Unable to test gait due to level of consciousness.    Medications:  Continuous Scheduled  atorvastatin 20 mg Nightly   carvedilol 12.5 mg BID   cefTRIAXone (ROCEPHIN) IVPB 2 g Q24H   citalopram 10 mg Daily   diltiaZEM 30 mg Q6H   heparin (porcine) 5,000 Units Q8H   insulin detemir U-100 13 Units BID   levETIRAcetam 500 mg BID   polyethylene glycol 17 g BID   senna-docusate 8.6-50 mg 1 tablet BID   tamsulosin 0.4 mg Daily   traZODone 50 mg QHS   valsartan 40 mg Daily   vancomycin (VANCOCIN) IVPB 1,000 mg Q12H   PRN  acetaminophen 650 mg Q4H PRN   albuterol-ipratropium 2.5mg-0.5mg/3mL 3 mL Q4H PRN   aluminum-magnesium hydroxide-simethicone 30 mL Q4H PRN   dextrose 50% 12.5 g PRN   fentaNYL 12.5 mcg Q2H PRN   glucagon (human recombinant) 1 mg PRN   hydrALAZINE 10 mg Q6H PRN   hydrocodone-acetaminophen 5-325mg 1 tablet Q4H PRN   insulin aspart U-100 1-10 Units Q4H PRN   labetalol 10 mg Q6H PRN   lidocaine HCL 2%  PRN   ondansetron 8 mg Q6H PRN   potassium chloride 10% 40 mEq PRN   potassium chloride 10% 40 mEq PRN   potassium chloride 10% 60 mEq PRN   potassium, sodium phosphates 2 packet PRN   potassium, sodium phosphates 2 packet PRN   potassium, sodium phosphates 2 packet PRN     Today I personally reviewed pertinent medications, lines/drains/airways, imaging, cardiology, lab results, microbiology results,     Diet  Diet NPO  Diet NPO          Assessment/Plan:     Neuro   * Cervical myelopathy with cervical radiculopathy    S/p ACDF on 4/13 complicated by CSF leak, lumbar drain earlier in admission, now out  4/28 after stepping patient down, MRI cervical spine revealed large fluid collection pushing on trachea and esophagus causing displacement, NSGY planning to take to OR emergently, intubated  4/29 s/p wound washout and CSF leak repair POD 2  ID consulted,  recommend vanc and cef x 7 days          CSF leak    - see cervical myelopathy        Pulmonary   Aspiration pneumonia of both lower lobes due to gastric secretions    - on cefepime and flagyl  - duonebs  - NPO  - TF per NGT   - SP eval and treat        Cardiac/Vascular   Tachycardia    Cardizem   metoprolol PO changed to carvedilol        Other hyperlipidemia    - Continue atorvastatin        Essential hypertension    Continue current regimen of PO BP meds          Renal/   Benign prostatic hyperplasia with urinary retention    Continue tamsulosin        Endocrine   Type 2 diabetes mellitus with hyperglycemia, without long-term current use of insulin    -- d/c Aspart 6units q 4 hrs  -- Continue Detemir 13  units BID  -- SSI            Prophylaxis:  Venous Thromboembolism: mechanical chemical  Stress Ulcer: None  Ventilator Pneumonia: not applicable     Activity Orders          None        Full Code    Daljit Macdonald NP  Neurocritical Care  [facility]

## 2018-05-01 NOTE — PLAN OF CARE
Covering  sent updates to [facility] via [facility].    Janina Fournier, [facility] [phone]

## 2018-05-01 NOTE — SUBJECTIVE & OBJECTIVE
Interval History: per ID will change abx regimen to vanc and cef    Review of Systems  Unable to obtain complete ROS, intermittently answers some ROS questions  Review of symptoms:  Pulmonary: Denies shortness of breath  Cardiology: Denies chest pain  Neuro: headache  Objective:     Vitals:  Temp: 98.2 °F (36.8 °C)  Pulse: 69  Rhythm: normal sinus rhythm  BP: (!) 150/68  MAP (mmHg): 98  Resp: 13  SpO2: 99 %    Temp  Min: 97.7 °F (36.5 °C)  Max: 98.2 °F (36.8 °C)  Pulse  Min: 58  Max: 84  BP  Min: 125/71  Max: 189/82  MAP (mmHg)  Min: 93  Max: 143  Resp  Min: 12  Max: 20  SpO2  Min: 97 %  Max: 100 %    04/29 0701 - 04/30 0700  In: 535 [I.V.:5]  Out: 1940 [Urine:1940]   Unmeasured Output  Urine Occurrence: 4  Stool Occurrence: 0  Emesis Occurrence: 0  Pad Count: 1       Physical Exam    Physical Exam:  GA: sleeping, arousable to voice, comfortable, no acute distress.   HEENT: No scleral icterus or JVD.   Pulmonary: Clear to auscultation Anteriorly. No wheezing, crackles, or rhonchi.  Cardiac: RRR S1 & S2 w/o rubs/murmurs/gallops.   Abdominal: Bowel sounds present x 4. No appreciable hepatosplenomegaly.  Skin: No jaundice, rashes, or visible lesions.  Neuro:  --GCS: E3 V4 M6  --Mental Status:  Drowsy, arousable to voice, follows simple commands, oriented to self, neuro exam waxes in orientation to time and place  --Pupils 3mm, PERRL.   --VAN spontaneously and to command  Unable to test gait due to level of consciousness.    Medications:  Continuous Scheduled  atorvastatin 20 mg Nightly   carvedilol 12.5 mg BID   cefTRIAXone (ROCEPHIN) IVPB 1 g Q24H   citalopram 10 mg Daily   diltiaZEM 30 mg Q6H   heparin (porcine) 5,000 Units Q8H   insulin detemir U-100 13 Units BID   levETIRAcetam 500 mg BID   senna-docusate 8.6-50 mg 1 tablet BID   tamsulosin 0.4 mg Daily   traZODone 50 mg QHS   valsartan 40 mg Daily   vancomycin (VANCOCIN) IVPB 1,000 mg Q12H   PRN  acetaminophen 650 mg Q4H PRN   albuterol-ipratropium 2.5mg-0.5mg/3mL  3 mL Q4H PRN   aluminum-magnesium hydroxide-simethicone 30 mL Q4H PRN   benzonatate 100 mg TID PRN   dextrose 50% 12.5 g PRN   fentaNYL 12.5 mcg Q2H PRN   glucagon (human recombinant) 1 mg PRN   hydrALAZINE 10 mg Q6H PRN   hydrocodone-acetaminophen 5-325mg 1 tablet Q4H PRN   insulin aspart U-100 1-10 Units Q4H PRN   labetalol 10 mg Q6H PRN   lidocaine HCL 2%  PRN   ondansetron 8 mg Q6H PRN     Today I personally reviewed pertinent medications, lines/drains/airways, imaging, cardiology, lab results, microbiology results,     Diet  Diet NPO  Diet NPO

## 2018-05-01 NOTE — PT/OT/SLP RE-EVAL
Physical Therapy Re-evaluation    Patient Name:  Hollis Pitts   MRN:  [id number]    Recommendations:     Discharge Recommendations:  nursing facility, skilled (In NH)   Discharge Equipment Recommendations: hospital bed, lift device   Barriers to discharge: Decreased caregiver support    Assessment:     Hollis Pitts is a 82 y.o. male admitted with a medical diagnosis of Cervical myelopathy with cervical radiculopathy.  He presents with the following impairments/functional limitations:  weakness, impaired endurance, impaired self care skills, impaired functional mobilty, decreased upper extremity function, impaired balance, decreased lower extremity function, decreased safety awareness.  Pt able to complete re-evaluation, performing Supine-to-Sit Total A (Assist of 2), and 15 min sitting EOB Mod/Max A for trunk control.  Pt appropriate and would benefit from skilled PT 4x/week.  PT recommending pt d/c to SNF within a NH.    Rehab Prognosis:  Good; patient would benefit from acute skilled PT services to address these deficits and reach maximum level of function.      Recent Surgery: Procedure(s) (LRB):  WASHOUT-CERVICAL-wound washout and csf leak repair with depuy (N/A)  DISKECTOMY AND FUSION-ANTERIOR CERVICAL (ACDF)- REVISON C5-C7 3 Days Post-Op    Plan:     During this hospitalization, patient to be seen 3 x/week to address the above listed problems via therapeutic activities, therapeutic exercises, neuromuscular re-education, wheelchair management/training  · Plan of Care Expires:  06/01/18   Plan of Care Reviewed with: patient    Subjective     Communicated with RN prior to session.  Patient found supine in bed with HOB elevated upon PT entry to room, agreeable to evaluation.      Chief Complaint: Pt denies pain at rest, some discomfort with mobility.  Patient comments/goals: Willing to participate in PT.  Pain/Comfort:  · Pain Rating 1: 0/10  · Pain Rating Post-Intervention 1: 0/10    Patients cultural,  spiritual, Jew conflicts given the current situation: None reported      Objective:     Patient found with: peripheral IV, telemetry, pulse ox (continuous), Condom Catheter, SCD, NG tube, pressure relief boots, oxygen     General Precautions: Standard, aspiration   Orthopedic Precautions:spinal precautions   Braces: Aspen collar     Exams:  · Cognitive Exam:  Patient is oriented to Person, Place, Time and Situation and able to follow one-step commands.  · Postural Exam:  Patient presented with the following abnormalities:    · -       Rounded shoulders  · -       Posterior pelvic tilt  · -       Abnormal trunk flexion  · Sensation:    · -       Intact  light/touch B LE  · Skin Integrity/Edema:      · -       Skin integrity: Visible skin intact  · RLE ROM: WFL  · RLE Strength: Deficits: Knee ext 3-/5, knee flex 2/5, DF 2+/5  · LLE ROM: WFL  · LLE Strength: Deficits: Knee ext 2+/5, knee flex 2/5, DF 2+/5    Functional Mobility:  · Bed Mobility:     · Supine to Sit: total assistance x 2, PT assist at trunk and LE, pt able to assist some with LE positioning    · Balance:   · 15 min Sitting EOB Mod/Max A  · Deficits:  PPT, flexed posture and rounded shoulders, limited B UE support  · PT assist at trunk for upright posture and sitting balance, & positioning of hands for B UE WB    AM-PAC 6 CLICK MOBILITY  Total Score:6       Therapeutic Activities and Exercises:      Seated EOB Activity:   - Pt sat EOB for 15 min   - Pt performed B UE reaching forward   - PT facilitated B UE WB in seated position    PT educated pt on:  - Role of PT & PT POC  - Importance of OOB activity  - Trunk control in seated position    Pt not safe to transfer with RN staff to [facility].    Patient left with bed in chair position with all lines intact, call button in reach and RN notified.    GOALS:    Physical Therapy Goals        Problem: Physical Therapy Goal    Goal Priority Disciplines Outcome Goal Variances Interventions   Physical Therapy Goal      PT/OT, PT Ongoing (interventions implemented as appropriate)     Description:  Goals to be met by: 2018     Patient will increase functional independence with mobility by performin. Supine to sit with Moderate Assistance  2. Sit to supine with Moderate Assistance  3. Sit to stand transfer with Max Assistance  4. Bed to chair transfer with Max Assistance using Rolling Walker  5. Lower extremity exercise program x20 reps per handout, with supervision   6. Pt will perform sitting at EOB x 10 minutes with Contact Guard Assistance to improve trunk control                           History:     Past Medical History:   Diagnosis Date    Benign prostatic hyperplasia with urinary retention 2018    Cervical myelopathy with cervical radiculopathy 4/10/2018    Depression     Dyslipidemia     Essential hypertension 2018    Glaucoma (increased eye pressure)     Mild cognitive impairment     Traumatic brain injury 2017    Type 2 diabetes mellitus with hyperglycemia, without long-term current use of insulin 2018       Past Surgical History:   Procedure Laterality Date    CATARACT EXTRACTION W/  INTRAOCULAR LENS IMPLANT           Time Tracking:     PT Received On: 18  PT Start Time: 929     PT Stop Time: 957  PT Total Time (min): 28 min     Billable Minutes: Re-eval 13 and Neuromuscular Re-education 15      Gallito Odell, SPT  2018

## 2018-05-01 NOTE — NURSING TRANSFER
Nursing Transfer Note      5/1/2018     Transfer- TO:[facility]/FROM:[id number]    Transfer via bed    Transfer with personal belongings, and telemetry monitor    Transported by Leonor PAUL and Tari BUSH    Medicines sent: levemier and novolog pens, rocephin IVPB    Chart send with patient: YES     Notified: Bud pt's daughter of transfer and room number      Upon arrival to floor: pt transferred to bed, reconnected to tube feedings and KVO, bed locked and low, bed alarm set, Report given to Yoly PAUL to assume care

## 2018-05-01 NOTE — SUBJECTIVE & OBJECTIVE
Interval History: no AE. AFVSS.     Medications:  Continuous Infusions:  Scheduled Meds:   atorvastatin  20 mg Per NG tube Nightly    carvedilol  12.5 mg Per NG tube BID    cefTRIAXone (ROCEPHIN) IVPB  2 g Intravenous Q24H    citalopram  10 mg Per NG tube Daily    diltiaZEM  30 mg Per NG tube Q6H    heparin (porcine)  5,000 Units Subcutaneous Q8H    insulin detemir U-100  13 Units Subcutaneous BID    levETIRAcetam  500 mg Per NG tube BID    polyethylene glycol  17 g Per NG tube BID    senna-docusate 8.6-50 mg  1 tablet Per NG tube BID    tamsulosin  0.4 mg Per NG tube Daily    traZODone  50 mg Per NG tube QHS    valsartan  40 mg Per NG tube Daily    vancomycin (VANCOCIN) IVPB  1,000 mg Intravenous Q12H     PRN Meds:acetaminophen, albuterol-ipratropium 2.5mg-0.5mg/3mL, aluminum-magnesium hydroxide-simethicone, dextrose 50%, fentaNYL, glucagon (human recombinant), hydrALAZINE, hydrocodone-acetaminophen 5-325mg, insulin aspart U-100, labetalol, lidocaine HCL 2%, ondansetron, potassium chloride 10%, potassium chloride 10%, potassium chloride 10%, potassium, sodium phosphates, potassium, sodium phosphates, potassium, sodium phosphates     Review of Systems    Objective:     Weight: 99.5 kg (219 lb 5.7 oz)  Body mass index is 29.75 kg/m².  Vital Signs (Most Recent):  Temp: 97 °F (36.1 °C) (05/01/18 0705)  Pulse: 75 (05/01/18 1005)  Resp: 12 (05/01/18 1005)  BP: (!) 140/68 (05/01/18 1005)  SpO2: 96 % (05/01/18 1005) Vital Signs (24h Range):  Temp:  [97 °F (36.1 °C)-98.8 °F (37.1 °C)] 97 °F (36.1 °C)  Pulse:  [64-91] 75  Resp:  [12-23] 12  SpO2:  [96 %-100 %] 96 %  BP: (125-187)/(58-88) 140/68       Date 05/01/18 0700 - 05/02/18 0659   Shift 4393-7547 1484-9980 0566-8436 24 Hour Total   I  N  T  A  K  E   I.V.  (mL/kg) 15  (0.2)   15  (0.2)    NG/   210    Shift Total  (mL/kg) 225  (2.3)   225  (2.3)   O  U  T  P  U  T   Urine  (mL/kg/hr) 175   175    Shift Total  (mL/kg) 175  (1.8)   175  (1.8)   Weight  (kg) 99.5 99.5 99.5 99.5            NG/OG Tube 04/21/18 2300 Cortrak Right nostril (Active)   Placement Check placement verified by aspirate characteristics;placement verified by distal tube length measurement 4/26/2018  8:45 PM   Distal Tube Length (cm) 75 4/25/2018  7:05 AM   Tolerance no signs/symptoms of discomfort 4/26/2018  8:45 PM   Securement anchored to nostril center w/ adhesive device 4/26/2018  8:45 PM   Clamp Status/Tolerance unclamped;no abdominal distention;no emesis;no nausea;no residual;no restlessness 4/26/2018  8:45 PM   Insertion Site Appearance no redness, warmth, tenderness, skin breakdown, drainage 4/26/2018  8:45 PM   Drainage None 4/25/2018 11:05 AM   Flush/Irrigation flushed w/;water;no resistance met 4/26/2018  8:45 PM   Feeding Method continuous 4/26/2018  8:45 PM   Current Rate (mL/hr) 55 mL/hr 4/26/2018  8:45 PM   Goal Rate (mL/hr) 55 mL/hr 4/26/2018  8:45 PM   Intake (mL) 30 mL 4/24/2018  5:00 PM   Water Bolus (mL) 100 mL 4/26/2018  5:00 AM   Rate Formula Tube Feeding (mL/hr) 55 mL/hr 4/24/2018  4:00 PM   Intake (mL) - Formula Tube Feeding 513 4/26/2018  5:00 AM   Residual Amount (ml) 0 ml 4/24/2018  7:05 AM       Male External Urinary Catheter 04/23/18 1345 Medium (Active)   Collection Container Standard drainage bag 4/26/2018  8:45 PM   Skin reddened 4/26/2018  8:45 PM   Tolerance no signs/symptoms of discomfort 4/26/2018  8:45 PM   Output (mL) 575 mL 4/27/2018  6:00 AM   Catheter Change Date 04/26/18 4/26/2018  8:45 PM   Catheter Change Time 2300 4/26/2018  8:45 PM       Neurosurgery Physical Exam    General: well developed, well nourished. no acute distress. Generalized deconditioning   Neurologic: Awake and oriented to self and hospital. Thought content appropriate.  Head: normocephalic, atraumatic   GCS: Motor: 6/Verbal: 4/Eyes: 4 GCS Total: 14  Cranial nerves: face symmetric, tongue midline, pupils equal, round, reactive to light with accomodation, extraocular muscles intact. CN  II-XII grossly intact.   Language: no aphasia  Speech:  dysarthria   Sensory: decrease response to light touch in BUE  Motor Strength: Moves all extremities spontaneously with good tone.      Strength   Deltoids Triceps Biceps Wrist Extension Wrist Flexion Hand    Upper: R 2/5 3/5 3/5 3/5 3/5 3/5     L 3/5 4-/5 4-/5 3/5 3/5 4-/5       Iliopsoas Quadriceps Knee  Flexion Tibialis  anterior Gastro- cnemius EHL   Lower: R 2/5 2/5 2/5 4-/5 4-/5 4-/5     L 4-/5 3/5 3/5 4+/5 4+/5 4+/5         [place]: present on right   Clonus: absent     wound is c/d/i, no erythema, ttp, or drainage.  wound edges are well approximated.       Significant Labs:    Recent Labs  Lab 04/30/18  0237 05/01/18  0245   * 119*    138   K 3.7 3.3*    106   CO2 26 24   BUN 16 17   CREATININE 0.7 0.6   CALCIUM 10.9* 10.6*   MG 1.9 1.9       Recent Labs  Lab 04/30/18  0237 05/01/18  0245   WBC 12.68 11.46   HGB 10.6* 10.4*   HCT 33.7* 33.3*    296       Recent Labs  Lab 04/30/18  1228 05/01/18  0245   INR 1.3* 1.3*     Microbiology Results (last 7 days)     Procedure Component Value Units Date/Time    Aerobic culture [[id number]] Collected:  04/28/18 1417    Order Status:  Completed Specimen:  Wound from Neck Updated:  05/01/18 0852     Aerobic Bacterial Culture No growth    Narrative:       Wound fluid    Aerobic culture [[id number]] Collected:  04/28/18 1413    Order Status:  Completed Specimen:  Wound from Neck Updated:  05/01/18 0852     Aerobic Bacterial Culture No growth    Narrative:       Wound fluid    Culture, Anaerobe [[id number]] Collected:  04/28/18 1417    Order Status:  Completed Specimen:  Wound from Neck Updated:  04/30/18 1448     Anaerobic Culture Culture in progress    Narrative:       Wound fluid    Culture, Anaerobe [[id number]] Collected:  04/28/18 1413    Order Status:  Completed Specimen:  Wound from Neck Updated:  04/30/18 1448     Anaerobic Culture Culture in progress    Narrative:       Wound fluid     AFB Culture & Smear [[id number]] Collected:  04/28/18 1417    Order Status:  Completed Specimen:  Wound from Neck Updated:  04/30/18 1313     AFB Culture & Smear Culture in progress     AFB CULTURE STAIN No acid fast bacilli seen.    Narrative:       Wound fluid    AFB Culture & Smear [[id number]] Collected:  04/28/18 1413    Order Status:  Completed Specimen:  Wound from Neck Updated:  04/30/18 1313     AFB Culture & Smear Culture in progress     AFB CULTURE STAIN No acid fast bacilli seen.    Narrative:       Wound fluid    Urine culture [[id number]] Collected:  04/27/18 1218    Order Status:  Completed Specimen:  Urine from Urine, Catheterized Updated:  04/28/18 2020     Urine Culture, Routine No growth    Gram stain [[id number]] Collected:  04/28/18 1417    Order Status:  Completed Specimen:  Wound from Neck Updated:  04/28/18 2009     Gram Stain Result Rare WBC's      No organisms seen    Narrative:       Wound fluid    Gram stain [[id number]] Collected:  04/28/18 1413    Order Status:  Completed Specimen:  Wound from Neck Updated:  04/28/18 2004     Gram Stain Result Rare WBC's      No organisms seen    Narrative:       Wound fluid    Fungus culture [[id number]] Collected:  04/28/18 1417    Order Status:  Sent Specimen:  Wound from Neck Updated:  04/28/18 1430    Fungus culture [[id number]] Collected:  04/28/18 1413    Order Status:  Sent Specimen:  Wound from Neck Updated:  04/28/18 1429    Urine culture [[id number]] Collected:  04/27/18 1218    Order Status:  Sent Specimen:  Urine from Urine, Catheterized Updated:  04/27/18 1218    Blood culture [[id number]] Collected:  04/21/18 1335    Order Status:  Completed Specimen:  Blood Updated:  04/26/18 1612     Blood Culture, Routine No growth after 5 days.    Narrative:       Blood cultures from 2 different sites. 4 bottles total.  Please draw before starting antibiotics.    Blood culture [[id number]] Collected:  04/21/18 1335    Order Status:  Completed Specimen:   Blood Updated:  04/26/18 1612     Blood Culture, Routine No growth after 5 days.    Narrative:       Blood cultures x 2 different sites. 4 bottles total. Please  draw cultures before administering antibiotics.    C Diff Toxin by PCR [[id number]] Collected:  04/24/18 1151    Order Status:  Completed Updated:  04/24/18 2330     C. diff PCR Negative    Clostridium difficile EIA [[id number]]  (Abnormal) Collected:  04/24/18 1151    Order Status:  Completed Specimen:  Stool from Stool Updated:  04/24/18 2238     C. diff Antigen Positive (A)     C difficile Toxins A+B, EIA Negative     Comment: Testing not recommended for children <24 months old.       Blood culture [[id number]] Collected:  04/19/18 1005    Order Status:  Completed Specimen:  Blood from Peripheral, Wrist, Right Updated:  04/24/18 1412     Blood Culture, Routine No growth after 5 days.    Narrative:       Blood cultures from 2 different sites. 4 bottles total.  Please draw before starting antibiotics.    Blood culture [[id number]] Collected:  04/19/18 0952    Order Status:  Completed Specimen:  Blood from Peripheral, Hand, Left Updated:  04/24/18 1412     Blood Culture, Routine No growth after 5 days.    Narrative:       Blood cultures x 2 different sites. 4 bottles total. Please  draw cultures before administering antibiotics.            Significant Diagnostics:

## 2018-05-01 NOTE — PROGRESS NOTES
[facility]  Neurosurgery  Progress Note    Subjective:     History of Present Illness: 82 y.o.  male with type 2 diabetes, who presents today for follow up evaluation one week prior to 2 level ACDF scheduled for 4/10/2018. Pt reports    Pt presents today wearing a cervical collar and in a wheelchair. He would like to proceed with surgery. Per pt's family, his PCP would like to see him on 4/9, the day before surgery. He notes continued neck pain and BUE numbness/tingling as well as BUE weakness. Pt is only able to walk a few steps unassisted. He has never received neck surgery.     Post-Op Info:  Procedure(s) (LRB):  WASHOUT-CERVICAL-wound washout and csf leak repair with depuy (N/A)  DISKECTOMY AND FUSION-ANTERIOR CERVICAL (ACDF)- REVISON C5-C7   3 Days Post-Op     Interval History: no AE. AFVSS.     Medications:  Continuous Infusions:  Scheduled Meds:   atorvastatin  20 mg Per NG tube Nightly    carvedilol  12.5 mg Per NG tube BID    cefTRIAXone (ROCEPHIN) IVPB  2 g Intravenous Q24H    citalopram  10 mg Per NG tube Daily    diltiaZEM  30 mg Per NG tube Q6H    heparin (porcine)  5,000 Units Subcutaneous Q8H    insulin detemir U-100  13 Units Subcutaneous BID    levETIRAcetam  500 mg Per NG tube BID    polyethylene glycol  17 g Per NG tube BID    senna-docusate 8.6-50 mg  1 tablet Per NG tube BID    tamsulosin  0.4 mg Per NG tube Daily    traZODone  50 mg Per NG tube QHS    valsartan  40 mg Per NG tube Daily    vancomycin (VANCOCIN) IVPB  1,000 mg Intravenous Q12H     PRN Meds:acetaminophen, albuterol-ipratropium 2.5mg-0.5mg/3mL, aluminum-magnesium hydroxide-simethicone, dextrose 50%, fentaNYL, glucagon (human recombinant), hydrALAZINE, hydrocodone-acetaminophen 5-325mg, insulin aspart U-100, labetalol, lidocaine HCL 2%, ondansetron, potassium chloride 10%, potassium chloride 10%, potassium chloride 10%, potassium, sodium phosphates, potassium, sodium phosphates, potassium, sodium  phosphates     Review of Systems    Objective:     Weight: 99.5 kg (219 lb 5.7 oz)  Body mass index is 29.75 kg/m².  Vital Signs (Most Recent):  Temp: 97 °F (36.1 °C) (05/01/18 0705)  Pulse: 75 (05/01/18 1005)  Resp: 12 (05/01/18 1005)  BP: (!) 140/68 (05/01/18 1005)  SpO2: 96 % (05/01/18 1005) Vital Signs (24h Range):  Temp:  [97 °F (36.1 °C)-98.8 °F (37.1 °C)] 97 °F (36.1 °C)  Pulse:  [64-91] 75  Resp:  [12-23] 12  SpO2:  [96 %-100 %] 96 %  BP: (125-187)/(58-88) 140/68       Date 05/01/18 0700 - 05/02/18 0659   Shift 2353-6167 7163-5354 6260-5892 24 Hour Total   I  N  T  A  K  E   I.V.  (mL/kg) 15  (0.2)   15  (0.2)    NG/   210    Shift Total  (mL/kg) 225  (2.3)   225  (2.3)   O  U  T  P  U  T   Urine  (mL/kg/hr) 175   175    Shift Total  (mL/kg) 175  (1.8)   175  (1.8)   Weight (kg) 99.5 99.5 99.5 99.5            NG/OG Tube 04/21/18 2300 [facility]rak Right nostril (Active)   Placement Check placement verified by aspirate characteristics;placement verified by distal tube length measurement 4/26/2018  8:45 PM   Distal Tube Length (cm) 75 4/25/2018  7:05 AM   Tolerance no signs/symptoms of discomfort 4/26/2018  8:45 PM   Securement anchored to nostril center w/ adhesive device 4/26/2018  8:45 PM   Clamp Status/Tolerance unclamped;no abdominal distention;no emesis;no nausea;no residual;no restlessness 4/26/2018  8:45 PM   Insertion Site Appearance no redness, warmth, tenderness, skin breakdown, drainage 4/26/2018  8:45 PM   Drainage None 4/25/2018 11:05 AM   Flush/Irrigation flushed w/;water;no resistance met 4/26/2018  8:45 PM   Feeding Method continuous 4/26/2018  8:45 PM   Current Rate (mL/hr) 55 mL/hr 4/26/2018  8:45 PM   Goal Rate (mL/hr) 55 mL/hr 4/26/2018  8:45 PM   Intake (mL) 30 mL 4/24/2018  5:00 PM   Water Bolus (mL) 100 mL 4/26/2018  5:00 AM   Rate Formula Tube Feeding (mL/hr) 55 mL/hr 4/24/2018  4:00 PM   Intake (mL) - Formula Tube Feeding 513 4/26/2018  5:00 AM   Residual Amount (ml) 0 ml 4/24/2018   7:05 AM       Male External Urinary Catheter 04/23/18 1345 Medium (Active)   Collection Container Standard drainage bag 4/26/2018  8:45 PM   Skin reddened 4/26/2018  8:45 PM   Tolerance no signs/symptoms of discomfort 4/26/2018  8:45 PM   Output (mL) 575 mL 4/27/2018  6:00 AM   Catheter Change Date 04/26/18 4/26/2018  8:45 PM   Catheter Change Time 2300 4/26/2018  8:45 PM       Neurosurgery Physical Exam    General: well developed, well nourished. no acute distress. Generalized deconditioning   Neurologic: Awake and oriented to self and hospital. Thought content appropriate.  Head: normocephalic, atraumatic   GCS: Motor: 6/Verbal: 4/Eyes: 4 GCS Total: 14  Cranial nerves: face symmetric, tongue midline, pupils equal, round, reactive to light with accomodation, extraocular muscles intact. CN II-XII grossly intact.   Language: no aphasia  Speech:  dysarthria   Sensory: decrease response to light touch in BUE  Motor Strength: Moves all extremities spontaneously with good tone.      Strength   Deltoids Triceps Biceps Wrist Extension Wrist Flexion Hand    Upper: R 2/5 3/5 3/5 3/5 3/5 3/5     L 3/5 4-/5 4-/5 3/5 3/5 4-/5       Iliopsoas Quadriceps Knee  Flexion Tibialis  anterior Gastro- cnemius EHL   Lower: R 2/5 2/5 2/5 4-/5 4-/5 4-/5     L 4-/5 3/5 3/5 4+/5 4+/5 4+/5         [place]: present on right   Clonus: absent     wound is c/d/i, no erythema, ttp, or drainage.  wound edges are well approximated.       Significant Labs:    Recent Labs  Lab 04/30/18  0237 05/01/18  0245   * 119*    138   K 3.7 3.3*    106   CO2 26 24   BUN 16 17   CREATININE 0.7 0.6   CALCIUM 10.9* 10.6*   MG 1.9 1.9       Recent Labs  Lab 04/30/18  0237 05/01/18  0245   WBC 12.68 11.46   HGB 10.6* 10.4*   HCT 33.7* 33.3*    296       Recent Labs  Lab 04/30/18  1228 05/01/18  0245   INR 1.3* 1.3*     Microbiology Results (last 7 days)     Procedure Component Value Units Date/Time    Aerobic culture [[id number]]  Collected:  04/28/18 1417    Order Status:  Completed Specimen:  Wound from Neck Updated:  05/01/18 0852     Aerobic Bacterial Culture No growth    Narrative:       Wound fluid    Aerobic culture [[id number]] Collected:  04/28/18 1413    Order Status:  Completed Specimen:  Wound from Neck Updated:  05/01/18 0852     Aerobic Bacterial Culture No growth    Narrative:       Wound fluid    Culture, Anaerobe [[id number]] Collected:  04/28/18 1417    Order Status:  Completed Specimen:  Wound from Neck Updated:  04/30/18 1448     Anaerobic Culture Culture in progress    Narrative:       Wound fluid    Culture, Anaerobe [[id number]] Collected:  04/28/18 1413    Order Status:  Completed Specimen:  Wound from Neck Updated:  04/30/18 1448     Anaerobic Culture Culture in progress    Narrative:       Wound fluid    AFB Culture & Smear [[id number]] Collected:  04/28/18 1417    Order Status:  Completed Specimen:  Wound from Neck Updated:  04/30/18 1313     AFB Culture & Smear Culture in progress     AFB CULTURE STAIN No acid fast bacilli seen.    Narrative:       Wound fluid    AFB Culture & Smear [[id number]] Collected:  04/28/18 1413    Order Status:  Completed Specimen:  Wound from Neck Updated:  04/30/18 1313     AFB Culture & Smear Culture in progress     AFB CULTURE STAIN No acid fast bacilli seen.    Narrative:       Wound fluid    Urine culture [[id number]] Collected:  04/27/18 1218    Order Status:  Completed Specimen:  Urine from Urine, Catheterized Updated:  04/28/18 2020     Urine Culture, Routine No growth    Gram stain [[id number]] Collected:  04/28/18 1417    Order Status:  Completed Specimen:  Wound from Neck Updated:  04/28/18 2009     Gram Stain Result Rare WBC's      No organisms seen    Narrative:       Wound fluid    Gram stain [[id number]] Collected:  04/28/18 1413    Order Status:  Completed Specimen:  Wound from Neck Updated:  04/28/18 2004     Gram Stain Result Rare WBC's      No organisms seen    Narrative:        Wound fluid    Fungus culture [[id number]] Collected:  04/28/18 1417    Order Status:  Sent Specimen:  Wound from Neck Updated:  04/28/18 1430    Fungus culture [[id number]] Collected:  04/28/18 1413    Order Status:  Sent Specimen:  Wound from Neck Updated:  04/28/18 1429    Urine culture [[id number]] Collected:  04/27/18 1218    Order Status:  Sent Specimen:  Urine from Urine, Catheterized Updated:  04/27/18 1218    Blood culture [[id number]] Collected:  04/21/18 1335    Order Status:  Completed Specimen:  Blood Updated:  04/26/18 1612     Blood Culture, Routine No growth after 5 days.    Narrative:       Blood cultures from 2 different sites. 4 bottles total.  Please draw before starting antibiotics.    Blood culture [[id number]] Collected:  04/21/18 1335    Order Status:  Completed Specimen:  Blood Updated:  04/26/18 1612     Blood Culture, Routine No growth after 5 days.    Narrative:       Blood cultures x 2 different sites. 4 bottles total. Please  draw cultures before administering antibiotics.    C Diff Toxin by PCR [[id number]] Collected:  04/24/18 1151    Order Status:  Completed Updated:  04/24/18 2330     C. diff PCR Negative    Clostridium difficile EIA [[id number]]  (Abnormal) Collected:  04/24/18 1151    Order Status:  Completed Specimen:  Stool from Stool Updated:  04/24/18 2238     C. diff Antigen Positive (A)     C difficile Toxins A+B, EIA Negative     Comment: Testing not recommended for children <24 months old.       Blood culture [[id number]] Collected:  04/19/18 1005    Order Status:  Completed Specimen:  Blood from Peripheral, Wrist, Right Updated:  04/24/18 1412     Blood Culture, Routine No growth after 5 days.    Narrative:       Blood cultures from 2 different sites. 4 bottles total.  Please draw before starting antibiotics.    Blood culture [[id number]] Collected:  04/19/18 0952    Order Status:  Completed Specimen:  Blood from Peripheral, Hand, Left Updated:  04/24/18 1412     Blood Culture,  Routine No growth after 5 days.    Narrative:       Blood cultures x 2 different sites. 4 bottles total. Please  draw cultures before administering antibiotics.            Significant Diagnostics:      Assessment/Plan:     * Cervical myelopathy with cervical radiculopathy    82 y.o. male s/p C3-5 ACDF for cervical myelopathy on 4/10 with Dr. Hess, POD#19, and now s/p evacuation/washout of fluid collection with CSF leak repair POD#3     Neuro stable  Cont neuro checks  Awaiting ST eval still.   -CV goal normotension. Continue home meds. PRN meds SBP >160.  -Urinary retention- condom cath in place, continue Flomax  -Continue IPLI/SCDs/SQH  -Continue bowel regimen daily.  -Continue to hold Eliquis. Per , indications unclear.  does not recommend continuing upon discharge.  -Continue aggressive PT/OT/ST  -Cervical collar when OOB or with activity  -IS to bed side. Patient to use atleast 10x every hour. Scheduled CPT/duonebs.   Heme/ID- 7d total Abx from 4/28.     Dispo: pending improvement. F/u ST. Possible TTF                  Orion Be MD  Neurosurgery  [facility]

## 2018-05-02 PROBLEM — E83.52 HYPERCALCEMIA: Status: ACTIVE | Noted: 2018-05-02

## 2018-05-02 LAB
ALBUMIN SERPL BCP-MCNC: 2.2 G/DL
ALP SERPL-CCNC: 91 U/L
ALT SERPL W/O P-5'-P-CCNC: 18 U/L
ANION GAP SERPL CALC-SCNC: 6 MMOL/L
AST SERPL-CCNC: 21 U/L
BASOPHILS # BLD AUTO: 0.03 K/UL
BASOPHILS NFR BLD: 0.3 %
BILIRUB SERPL-MCNC: 0.4 MG/DL
BLD PROD TYP BPU: NORMAL
BLD PROD TYP BPU: NORMAL
BLOOD UNIT EXPIRATION DATE: NORMAL
BLOOD UNIT EXPIRATION DATE: NORMAL
BLOOD UNIT TYPE CODE: 5100
BLOOD UNIT TYPE CODE: 5100
BLOOD UNIT TYPE: NORMAL
BLOOD UNIT TYPE: NORMAL
BUN SERPL-MCNC: 22 MG/DL
CALCIUM SERPL-MCNC: 11.1 MG/DL
CHLORIDE SERPL-SCNC: 109 MMOL/L
CO2 SERPL-SCNC: 26 MMOL/L
CODING SYSTEM: NORMAL
CODING SYSTEM: NORMAL
CREAT SERPL-MCNC: 0.7 MG/DL
DIFFERENTIAL METHOD: ABNORMAL
DISPENSE STATUS: NORMAL
DISPENSE STATUS: NORMAL
EOSINOPHIL # BLD AUTO: 0.1 K/UL
EOSINOPHIL NFR BLD: 0.8 %
ERYTHROCYTE [DISTWIDTH] IN BLOOD BY AUTOMATED COUNT: 15.6 %
EST. GFR  (AFRICAN AMERICAN): >60 ML/MIN/1.73 M^2
EST. GFR  (NON AFRICAN AMERICAN): >60 ML/MIN/1.73 M^2
GLUCOSE SERPL-MCNC: 232 MG/DL
HCT VFR BLD AUTO: 34.3 %
HGB BLD-MCNC: 10.6 G/DL
IMM GRANULOCYTES # BLD AUTO: 0.28 K/UL
IMM GRANULOCYTES NFR BLD AUTO: 2.7 %
INR PPP: 1.2
LYMPHOCYTES # BLD AUTO: 1.5 K/UL
LYMPHOCYTES NFR BLD: 14.2 %
MAGNESIUM SERPL-MCNC: 2.1 MG/DL
MCH RBC QN AUTO: 30.2 PG
MCHC RBC AUTO-ENTMCNC: 30.9 G/DL
MCV RBC AUTO: 98 FL
MONOCYTES # BLD AUTO: 1 K/UL
MONOCYTES NFR BLD: 9.1 %
NEUTROPHILS # BLD AUTO: 7.7 K/UL
NEUTROPHILS NFR BLD: 72.9 %
NRBC BLD-RTO: 0 /100 WBC
PHOSPHATE SERPL-MCNC: 2.3 MG/DL
PLATELET # BLD AUTO: 347 K/UL
PMV BLD AUTO: 9.8 FL
POCT GLUCOSE: 192 MG/DL (ref 70–110)
POCT GLUCOSE: 194 MG/DL (ref 70–110)
POCT GLUCOSE: 205 MG/DL (ref 70–110)
POCT GLUCOSE: 218 MG/DL (ref 70–110)
POCT GLUCOSE: 218 MG/DL (ref 70–110)
POCT GLUCOSE: 240 MG/DL (ref 70–110)
POTASSIUM SERPL-SCNC: 4.1 MMOL/L
PROT SERPL-MCNC: 5.9 G/DL
PROTHROMBIN TIME: 12.5 SEC
RBC # BLD AUTO: 3.51 M/UL
SODIUM SERPL-SCNC: 141 MMOL/L
TRANS ERYTHROCYTES VOL PATIENT: NORMAL ML
TRANS ERYTHROCYTES VOL PATIENT: NORMAL ML
VANCOMYCIN TROUGH SERPL-MCNC: 18.3 UG/ML
WBC # BLD AUTO: 10.51 K/UL

## 2018-05-02 PROCEDURE — 83735 ASSAY OF MAGNESIUM: CPT

## 2018-05-02 PROCEDURE — 36415 COLL VENOUS BLD VENIPUNCTURE: CPT

## 2018-05-02 PROCEDURE — 97530 THERAPEUTIC ACTIVITIES: CPT

## 2018-05-02 PROCEDURE — 85025 COMPLETE CBC W/AUTO DIFF WBC: CPT

## 2018-05-02 PROCEDURE — 25000003 PHARM REV CODE 250: Performed by: PSYCHIATRY & NEUROLOGY

## 2018-05-02 PROCEDURE — 63600175 PHARM REV CODE 636 W HCPCS: Performed by: INTERNAL MEDICINE

## 2018-05-02 PROCEDURE — 63600175 PHARM REV CODE 636 W HCPCS: Performed by: PSYCHIATRY & NEUROLOGY

## 2018-05-02 PROCEDURE — 63600175 PHARM REV CODE 636 W HCPCS: Performed by: PHYSICIAN ASSISTANT

## 2018-05-02 PROCEDURE — 25000003 PHARM REV CODE 250: Performed by: INTERNAL MEDICINE

## 2018-05-02 PROCEDURE — 97168 OT RE-EVAL EST PLAN CARE: CPT

## 2018-05-02 PROCEDURE — 99222 1ST HOSP IP/OBS MODERATE 55: CPT | Mod: ,,, | Performed by: HOSPITALIST

## 2018-05-02 PROCEDURE — 80202 ASSAY OF VANCOMYCIN: CPT

## 2018-05-02 PROCEDURE — 63600175 PHARM REV CODE 636 W HCPCS: Performed by: STUDENT IN AN ORGANIZED HEALTH CARE EDUCATION/TRAINING PROGRAM

## 2018-05-02 PROCEDURE — 92523 SPEECH SOUND LANG COMPREHEN: CPT

## 2018-05-02 PROCEDURE — 84100 ASSAY OF PHOSPHORUS: CPT

## 2018-05-02 PROCEDURE — 25000003 PHARM REV CODE 250: Performed by: STUDENT IN AN ORGANIZED HEALTH CARE EDUCATION/TRAINING PROGRAM

## 2018-05-02 PROCEDURE — 20600001 HC STEP DOWN PRIVATE ROOM

## 2018-05-02 PROCEDURE — 25000003 PHARM REV CODE 250: Performed by: PHYSICIAN ASSISTANT

## 2018-05-02 PROCEDURE — 25000003 PHARM REV CODE 250: Performed by: ANESTHESIOLOGY

## 2018-05-02 PROCEDURE — 80053 COMPREHEN METABOLIC PANEL: CPT

## 2018-05-02 PROCEDURE — 85610 PROTHROMBIN TIME: CPT

## 2018-05-02 PROCEDURE — 97110 THERAPEUTIC EXERCISES: CPT

## 2018-05-02 RX ORDER — VALSARTAN 40 MG/1
80 TABLET ORAL DAILY
Status: DISCONTINUED | OUTPATIENT
Start: 2018-05-03 | End: 2018-05-02

## 2018-05-02 RX ORDER — VALSARTAN 40 MG/1
80 TABLET ORAL DAILY
Status: DISCONTINUED | OUTPATIENT
Start: 2018-05-03 | End: 2018-05-05

## 2018-05-02 RX ORDER — CARVEDILOL 12.5 MG/1
12.5 TABLET ORAL 2 TIMES DAILY
Status: COMPLETED | OUTPATIENT
Start: 2018-05-02 | End: 2018-05-02

## 2018-05-02 RX ORDER — GLUCAGON 1 MG
1 KIT INJECTION
Status: DISCONTINUED | OUTPATIENT
Start: 2018-05-02 | End: 2018-05-18 | Stop reason: HOSPADM

## 2018-05-02 RX ORDER — CINACALCET 30 MG/1
30 TABLET, FILM COATED ORAL
Status: DISCONTINUED | OUTPATIENT
Start: 2018-05-03 | End: 2018-05-02

## 2018-05-02 RX ORDER — INSULIN ASPART 100 [IU]/ML
0-5 INJECTION, SOLUTION INTRAVENOUS; SUBCUTANEOUS EVERY 4 HOURS PRN
Status: DISCONTINUED | OUTPATIENT
Start: 2018-05-02 | End: 2018-05-18 | Stop reason: HOSPADM

## 2018-05-02 RX ORDER — VALSARTAN 40 MG/1
40 TABLET ORAL DAILY
Status: DISCONTINUED | OUTPATIENT
Start: 2018-05-03 | End: 2018-05-02

## 2018-05-02 RX ORDER — CINACALCET 30 MG/1
30 TABLET, FILM COATED ORAL
Status: DISCONTINUED | OUTPATIENT
Start: 2018-05-03 | End: 2018-05-14

## 2018-05-02 RX ORDER — INSULIN ASPART 100 [IU]/ML
2 INJECTION, SOLUTION INTRAVENOUS; SUBCUTANEOUS EVERY 4 HOURS
Status: DISCONTINUED | OUTPATIENT
Start: 2018-05-02 | End: 2018-05-08

## 2018-05-02 RX ADMIN — INSULIN ASPART 2 UNITS: 100 INJECTION, SOLUTION INTRAVENOUS; SUBCUTANEOUS at 05:05

## 2018-05-02 RX ADMIN — INSULIN DETEMIR 13 UNITS: 100 INJECTION, SOLUTION SUBCUTANEOUS at 09:05

## 2018-05-02 RX ADMIN — TAMSULOSIN HYDROCHLORIDE 0.4 MG: 0.4 CAPSULE ORAL at 08:05

## 2018-05-02 RX ADMIN — CARVEDILOL 12.5 MG: 12.5 TABLET, FILM COATED ORAL at 09:05

## 2018-05-02 RX ADMIN — CARVEDILOL 12.5 MG: 12.5 TABLET, FILM COATED ORAL at 08:05

## 2018-05-02 RX ADMIN — VANCOMYCIN HYDROCHLORIDE 1 G: 10 INJECTION, POWDER, LYOPHILIZED, FOR SOLUTION INTRAVENOUS at 03:05

## 2018-05-02 RX ADMIN — HYDROCODONE BITARTRATE AND ACETAMINOPHEN 1 TABLET: 5; 325 TABLET ORAL at 05:05

## 2018-05-02 RX ADMIN — INSULIN ASPART 2 UNITS: 100 INJECTION, SOLUTION INTRAVENOUS; SUBCUTANEOUS at 09:05

## 2018-05-02 RX ADMIN — HEPARIN SODIUM 5000 UNITS: 5000 INJECTION, SOLUTION INTRAVENOUS; SUBCUTANEOUS at 01:05

## 2018-05-02 RX ADMIN — DILTIAZEM HYDROCHLORIDE 30 MG: 30 TABLET, FILM COATED ORAL at 05:05

## 2018-05-02 RX ADMIN — HEPARIN SODIUM 5000 UNITS: 5000 INJECTION, SOLUTION INTRAVENOUS; SUBCUTANEOUS at 05:05

## 2018-05-02 RX ADMIN — INSULIN ASPART 4 UNITS: 100 INJECTION, SOLUTION INTRAVENOUS; SUBCUTANEOUS at 02:05

## 2018-05-02 RX ADMIN — TRAZODONE HYDROCHLORIDE 50 MG: 50 TABLET ORAL at 09:05

## 2018-05-02 RX ADMIN — STANDARDIZED SENNA CONCENTRATE AND DOCUSATE SODIUM 1 TABLET: 8.6; 5 TABLET, FILM COATED ORAL at 09:05

## 2018-05-02 RX ADMIN — STANDARDIZED SENNA CONCENTRATE AND DOCUSATE SODIUM 1 TABLET: 8.6; 5 TABLET, FILM COATED ORAL at 08:05

## 2018-05-02 RX ADMIN — DILTIAZEM HYDROCHLORIDE 30 MG: 30 TABLET, FILM COATED ORAL at 11:05

## 2018-05-02 RX ADMIN — POLYETHYLENE GLYCOL 3350 17 G: 17 POWDER, FOR SOLUTION ORAL at 08:05

## 2018-05-02 RX ADMIN — ACETAMINOPHEN 650 MG: 325 TABLET ORAL at 08:05

## 2018-05-02 RX ADMIN — VALSARTAN 40 MG: 40 TABLET ORAL at 08:05

## 2018-05-02 RX ADMIN — VANCOMYCIN HYDROCHLORIDE 1 G: 10 INJECTION, POWDER, LYOPHILIZED, FOR SOLUTION INTRAVENOUS at 01:05

## 2018-05-02 RX ADMIN — POLYETHYLENE GLYCOL 3350 17 G: 17 POWDER, FOR SOLUTION ORAL at 09:05

## 2018-05-02 RX ADMIN — ATORVASTATIN CALCIUM 20 MG: 20 TABLET, FILM COATED ORAL at 09:05

## 2018-05-02 RX ADMIN — CITALOPRAM HYDROBROMIDE 10 MG: 10 TABLET ORAL at 08:05

## 2018-05-02 RX ADMIN — DILTIAZEM HYDROCHLORIDE 30 MG: 30 TABLET, FILM COATED ORAL at 12:05

## 2018-05-02 RX ADMIN — HEPARIN SODIUM 5000 UNITS: 5000 INJECTION, SOLUTION INTRAVENOUS; SUBCUTANEOUS at 09:05

## 2018-05-02 RX ADMIN — LEVETIRACETAM 500 MG: 500 TABLET, FILM COATED ORAL at 08:05

## 2018-05-02 RX ADMIN — LEVETIRACETAM 500 MG: 500 TABLET, FILM COATED ORAL at 09:05

## 2018-05-02 RX ADMIN — CEFTRIAXONE SODIUM 2 G: 2 INJECTION, POWDER, FOR SOLUTION INTRAMUSCULAR; INTRAVENOUS at 09:05

## 2018-05-02 RX ADMIN — INSULIN DETEMIR 13 UNITS: 100 INJECTION, SOLUTION SUBCUTANEOUS at 08:05

## 2018-05-02 RX ADMIN — HYDROCODONE BITARTRATE AND ACETAMINOPHEN 1 TABLET: 5; 325 TABLET ORAL at 11:05

## 2018-05-02 RX ADMIN — INSULIN ASPART 2 UNITS: 100 INJECTION, SOLUTION INTRAVENOUS; SUBCUTANEOUS at 12:05

## 2018-05-02 NOTE — PROGRESS NOTES
[facility]  Neurosurgery  Progress Note    Subjective:     History of Present Illness: 82 y.o.  male with type 2 diabetes, who presents today for follow up evaluation one week prior to 2 level ACDF scheduled for 4/10/2018. Pt reports    Pt presents today wearing a cervical collar and in a wheelchair. He would like to proceed with surgery. Per pt's family, his PCP would like to see him on 4/9, the day before surgery. He notes continued neck pain and BUE numbness/tingling as well as BUE weakness. Pt is only able to walk a few steps unassisted. He has never received neck surgery.     Post-Op Info:  Procedure(s) (LRB):  WASHOUT-CERVICAL-wound washout and csf leak repair with depuy (N/A)  DISKECTOMY AND FUSION-ANTERIOR CERVICAL (ACDF)- REVISON C5-C7   4 Days Post-Op     Interval History: Patient stepped down to the floor yesterday. He is drowsy during exam today, requires constant stim to participate. Weakness unchanged. Remains NPO per ST. Leukocytosis resolved on vanc and cefepime.     Medications:  Continuous Infusions:  Scheduled Meds:   atorvastatin  20 mg Per NG tube Nightly    carvedilol  12.5 mg Per NG tube BID    cefTRIAXone (ROCEPHIN) IVPB  2 g Intravenous Q24H    citalopram  10 mg Per NG tube Daily    diltiaZEM  30 mg Per NG tube Q6H    heparin (porcine)  5,000 Units Subcutaneous Q8H    insulin detemir U-100  13 Units Subcutaneous BID    levETIRAcetam  500 mg Per NG tube BID    polyethylene glycol  17 g Per NG tube BID    senna-docusate 8.6-50 mg  1 tablet Per NG tube BID    tamsulosin  0.4 mg Per NG tube Daily    traZODone  50 mg Per NG tube QHS    valsartan  40 mg Per NG tube Daily    vancomycin (VANCOCIN) IVPB  1,000 mg Intravenous Q12H     PRN Meds:acetaminophen, albuterol-ipratropium 2.5mg-0.5mg/3mL, aluminum-magnesium hydroxide-simethicone, dextrose 50%, fentaNYL, glucagon (human recombinant), hydrALAZINE, hydrocodone-acetaminophen 5-325mg, insulin aspart U-100,  labetalol, lidocaine HCL 2%, ondansetron, potassium chloride 10%, potassium chloride 10%, potassium chloride 10%, potassium, sodium phosphates, potassium, sodium phosphates, potassium, sodium phosphates     Review of Systems    Objective:     Weight: 99.5 kg (219 lb 5.7 oz)  Body mass index is 29.75 kg/m².  Vital Signs (Most Recent):  Temp: 97.1 °F (36.2 °C) (05/02/18 1129)  Pulse: 64 (05/02/18 1129)  Resp: 18 (05/02/18 1129)  BP: 121/71 (05/02/18 1129)  SpO2: 96 % (05/02/18 1129) Vital Signs (24h Range):  Temp:  [97.1 °F (36.2 °C)-98.8 °F (37.1 °C)] 97.1 °F (36.2 °C)  Pulse:  [64-89] 64  Resp:  [11-20] 18  SpO2:  [93 %-99 %] 96 %  BP: (121-180)/(60-83) 121/71       Date 05/02/18 0700 - 05/03/18 0659   Shift 3287-2716 3352-6035 7729-2432 24 Hour Total   I  N  T  A  K  E   NG/   500    Shift Total  (mL/kg) 500  (5)   500  (5)   O  U  T  P  U  T   Shift Total  (mL/kg)       Weight (kg) 99.5 99.5 99.5 99.5            [facility]   Placement Check placement verified by aspirate characteristics;placement verified by distal tube length measurement 4/26/2018  8:45 PM   Distal Tube Length (cm) 75 4/25/2018  7:05 AM   Tolerance no signs/symptoms of discomfort 4/26/2018  8:45 PM   Securement anchored to nostril center w/ adhesive device 4/26/2018  8:45 PM   Clamp Status/Tolerance unclamped;no abdominal distention;no emesis;no nausea;no residual;no restlessness 4/26/2018  8:45 PM   Insertion Site Appearance no redness, warmth, tenderness, skin breakdown, drainage 4/26/2018  8:45 PM   Drainage None 4/25/2018 11:05 AM   Flush/Irrigation flushed w/;water;no resistance met 4/26/2018  8:45 PM   Feeding Method continuous 4/26/2018  8:45 PM   Current Rate (mL/hr) 55 mL/hr 4/26/2018  8:45 PM   Goal Rate (mL/hr) 55 mL/hr 4/26/2018  8:45 PM   Intake (mL) 30 mL 4/24/2018  5:00 PM   Water Bolus (mL) 100 mL 4/26/2018  5:00 AM   Rate Formula Tube Feeding (mL/hr) 55 mL/hr 4/24/2018  4:00 PM    Intake (mL) - Formula Tube Feeding 513 4/26/2018  5:00 AM   Residual Amount (ml) 0 ml 4/24/2018  7:05 AM       Male External Urinary Catheter 04/23/18 1345 Medium (Active)   Collection Container Standard drainage bag 4/26/2018  8:45 PM   Skin reddened 4/26/2018  8:45 PM   Tolerance no signs/symptoms of discomfort 4/26/2018  8:45 PM   Output (mL) 575 mL 4/27/2018  6:00 AM   Catheter Change Date 04/26/18 4/26/2018  8:45 PM   Catheter Change Time 2300 4/26/2018  8:45 PM       Neurosurgery Physical Exam    General: well developed, well nourished. no acute distress. Generalized deconditioning   Neurologic: lethargic. Requires persistant stim to participate. Oriented x 3. Thought content appropriate.  Head: normocephalic, atraumatic   GCS: Motor: 6/Verbal: 5/Eyes: 4 GCS Total: 15  Cranial nerves: face symmetric, tongue midline, pupils equal, round, reactive to light with accomodation, extraocular muscles intact. CN II-XII grossly intact.   Language: no aphasia  Speech:  dysarthria   Sensory: decrease response to light touch in BUE  Motor Strength: Moves all extremities spontaneously with good tone.      Strength   Deltoids Triceps Biceps Wrist Extension Wrist Flexion Hand    Upper: R 2/5 3/5 3/5 2/5 3/5 3/5     L 3/5 4-/5 4-/5 3/5 3/5 3/5       Iliopsoas Quadriceps Knee  Flexion Tibialis  anterior Gastro- cnemius EHL   Lower: R 3/5 3/5 3/5 4-/5 4-/5 4-/5     L 4-/5 3/5 3/5 4/5 4/5 4/5         [place]: present on right   Clonus: absent     wound is c/d/i, no erythema, ttp, or drainage.  wound edges are well approximated. No surrounding edema       Significant Labs:    Recent Labs  Lab 05/01/18  0245 05/01/18  1228 05/02/18  0457   *  --  232*     --  141   K 3.3* 5.3* 4.1     --  109   CO2 24  --  26   BUN 17  --  22   CREATININE 0.6  --  0.7   CALCIUM 10.6*  --  11.1*   MG 1.9  --  2.1       Recent Labs  Lab 05/01/18  0245 05/02/18  0457   WBC 11.46 10.51   HGB 10.4* 10.6*   HCT 33.3* 34.3*   PLT  296 347       Recent Labs  Lab 05/01/18  0245 05/02/18  0457   INR 1.3* 1.2     Microbiology Results (last 7 days)     Procedure Component Value Units Date/Time    Culture, Anaerobe [[id number]] Collected:  04/28/18 1417    Order Status:  Completed Specimen:  Wound from Neck Updated:  05/01/18 1105     Anaerobic Culture Culture in progress    Narrative:       Wound fluid    Culture, Anaerobe [[id number]] Collected:  04/28/18 1413    Order Status:  Completed Specimen:  Wound from Neck Updated:  05/01/18 1103     Anaerobic Culture Culture in progress    Narrative:       Wound fluid    Aerobic culture [[id number]] Collected:  04/28/18 1417    Order Status:  Completed Specimen:  Wound from Neck Updated:  05/01/18 0852     Aerobic Bacterial Culture No growth    Narrative:       Wound fluid    Aerobic culture [[id number]] Collected:  04/28/18 1413    Order Status:  Completed Specimen:  Wound from Neck Updated:  05/01/18 0852     Aerobic Bacterial Culture No growth    Narrative:       Wound fluid    AFB Culture & Smear [[id number]] Collected:  04/28/18 1417    Order Status:  Completed Specimen:  Wound from Neck Updated:  04/30/18 1313     AFB Culture & Smear Culture in progress     AFB CULTURE STAIN No acid fast bacilli seen.    Narrative:       Wound fluid    AFB Culture & Smear [[id number]] Collected:  04/28/18 1413    Order Status:  Completed Specimen:  Wound from Neck Updated:  04/30/18 1313     AFB Culture & Smear Culture in progress     AFB CULTURE STAIN No acid fast bacilli seen.    Narrative:       Wound fluid    Urine culture [[id number]] Collected:  04/27/18 1218    Order Status:  Completed Specimen:  Urine from Urine, Catheterized Updated:  04/28/18 2020     Urine Culture, Routine No growth    Gram stain [[id number]] Collected:  04/28/18 1417    Order Status:  Completed Specimen:  Wound from Neck Updated:  04/28/18 2009     Gram Stain Result Rare WBC's      No organisms seen    Narrative:       Wound fluid    Gram stain  [[id number]] Collected:  04/28/18 1413    Order Status:  Completed Specimen:  Wound from Neck Updated:  04/28/18 2004     Gram Stain Result Rare WBC's      No organisms seen    Narrative:       Wound fluid    Fungus culture [[id number]] Collected:  04/28/18 1417    Order Status:  Sent Specimen:  Wound from Neck Updated:  04/28/18 1430    Fungus culture [[id number]] Collected:  04/28/18 1413    Order Status:  Sent Specimen:  Wound from Neck Updated:  04/28/18 1429    Urine culture [[id number]] Collected:  04/27/18 1218    Order Status:  Sent Specimen:  Urine from Urine, Catheterized Updated:  04/27/18 1218    Blood culture [[id number]] Collected:  04/21/18 1335    Order Status:  Completed Specimen:  Blood Updated:  04/26/18 1612     Blood Culture, Routine No growth after 5 days.    Narrative:       Blood cultures from 2 different sites. 4 bottles total.  Please draw before starting antibiotics.    Blood culture [[id number]] Collected:  04/21/18 1335    Order Status:  Completed Specimen:  Blood Updated:  04/26/18 1612     Blood Culture, Routine No growth after 5 days.    Narrative:       Blood cultures x 2 different sites. 4 bottles total. Please  draw cultures before administering antibiotics.            Significant Diagnostics:      Assessment/Plan:     * Cervical myelopathy with cervical radiculopathy    82 y.o. male s/p C3-5 ACDF for cervical myelopathy on 4/10 with Dr. Hess, and now s/p evacuation/washout of fluid collection with CSF leak repair POD#4     -Pt lethargic today. Will repeat CT Head given evidence of bilateral hygromas on last imaging   -Cont neuro checks q4h  -NPO per ST, discussed MBSS/PEG placement with them today. Will plan for PEG placement with IR Friday   -ID: continue vanc and ceftriaxone x 7 days from surgery (4/28) Vanc trough before 4th dose. Trough Goal 15-20. Surgical cultures NGTD  -CV goal normotension. Continue home meds. PRN meds SBP >160.  -Urinary retention- condom cath in place,  continue Flomax  -Continue PILI/SCDs/SQH  -Continue bowel regimen daily.  -Continue to hold Eliquis. Per , indications unclear.  does not recommend continuing upon discharge.  -Continue aggressive PT/OT/ST  -Cervical collar when OOB or with activity  -IS to bed side. Patient to use atleast 10x every hour. Scheduled CPT/duonebs.   -Discussed with Dr. Deshawn Carranza PAWoodC  Neurosurgery  [facility]

## 2018-05-02 NOTE — PLAN OF CARE
TAN following for DC needs. TAN in communication with CM.    TAN spoke to the patient's daughter, Tabatha, regarding discharge planning. Tabatha stated that she still wants [facility], [facility], and [facility] as her choices for SNF.     TAN sent updates to the above facilities. Tabatha, patient's daughter stated that she will begin to tour the facilities.     Yvonne Ellington, GARY  [id number]

## 2018-05-02 NOTE — PT/OT/SLP EVAL
Speech Language Pathology Evaluation  Cognitive Communication    Patient Name:  Hollis Pitts   MRN:  [id number]  Admitting Diagnosis: Cervical myelopathy with cervical radiculopathy    Recommendations:     Recommendations:                General Recommendations:  Dysphagia therapy and Speech/language therapy  Diet recommendations:  NPO, NPO   Aspiration Precautions: Strict aspiration precautions   General Precautions: Standard, aspiration, fall  Communication strategies:  yes/no questions only and provide increased time to answer    History:     Past Medical History:   Diagnosis Date    Benign prostatic hyperplasia with urinary retention 4/11/2018    Cervical myelopathy with cervical radiculopathy 4/10/2018    Depression     Dyslipidemia     Essential hypertension 4/11/2018    Glaucoma (increased eye pressure)     Mild cognitive impairment     Traumatic brain injury 07/2017    Type 2 diabetes mellitus with hyperglycemia, without long-term current use of insulin 4/11/2018       Past Surgical History:   Procedure Laterality Date    CATARACT EXTRACTION W/  INTRAOCULAR LENS IMPLANT         Prior diet: Regular/thin        Subjective     Awake yet lethargic    Pain/Comfort:  · Pain Rating 1: 0/10  · Pain Rating Post-Intervention 1: 0/10    Objective:   Cognitive Status:    Arousal/Alertness Delayed response to stimuli   and Inconsistent responses    Orientation Person      Receptive Language:   Comprehension:   Questions Simple yes/no 3/5 accy  Commands  One step 6/8    Expressive Language:  Verbal:    Automatic Speech  Counting 1-3 provided mod cues and Days of the week 0% with max cues  Naming Confrontation 50%        Motor Speech:  Dysarthria      Voice:   Quality Hoarse and Wet    Visual-Spatial:  TBD    Reading:   TBD     Written Expression:   TBD    Treatment: Pt tolerated ice chip x1 and puree x1 with poor oral manipulation and no pharyngeal swallow initiated. SLP provided oral suction to remove bolus  from oral cavity. Recommend NPO diet with strict aspiration precautions.     Assessment:   Hollis Pitts is a 82 y.o. male with an SLP diagnosis of Dysphagia.    Goals:    SLP Goals        Problem: SLP Goal    Goal Priority Disciplines Outcome   SLP Goal     SLP Ongoing (interventions implemented as appropriate)   Description:  Speech Language Pathology Goals  Goals expected to be met by 5/8    1. Pt will participate in ongoing swallow assessment  2. Pt will participate in speech language cognitive assessment MEt  3. Pt will complete rote speech tasks with 80% accy and mod cues  4. Pt will follow 1 step commands with 90% accuracy and occasional cues  5. Pt will participate in ongoing speech language cognitive assessment.         Speech Language Pathology Goals  Goals expected to be met by 4/29:    1. Pt will participate in ongoing assessment of swallow.                              Plan:   · Patient to be seen:  4 x/week   · Plan of Care expires:  05/22/18  · Plan of Care reviewed with:  patient   · SLP Follow-Up:  Yes       Discharge recommendations:  Discharge Facility/Level Of Care Needs: nursing facility, skilled       Time Tracking:   SLP Treatment Date:   05/02/18  Speech Start Time:  0955  Speech Stop Time:  1007     Speech Total Time (min):  12 min    Billable Minutes: Eval 12     Humera Wadsworth CCC-SLP  05/02/2018

## 2018-05-02 NOTE — SUBJECTIVE & OBJECTIVE
Interval History: Patient stepped down to the floor yesterday. He is drowsy during exam today, requires constant stim to participate. Weakness unchanged. Remains NPO per ST. Leukocytosis resolved on vanc and cefepime.     Medications:  Continuous Infusions:  Scheduled Meds:   atorvastatin  20 mg Per NG tube Nightly    carvedilol  12.5 mg Per NG tube BID    cefTRIAXone (ROCEPHIN) IVPB  2 g Intravenous Q24H    citalopram  10 mg Per NG tube Daily    diltiaZEM  30 mg Per NG tube Q6H    heparin (porcine)  5,000 Units Subcutaneous Q8H    insulin detemir U-100  13 Units Subcutaneous BID    levETIRAcetam  500 mg Per NG tube BID    polyethylene glycol  17 g Per NG tube BID    senna-docusate 8.6-50 mg  1 tablet Per NG tube BID    tamsulosin  0.4 mg Per NG tube Daily    traZODone  50 mg Per NG tube QHS    valsartan  40 mg Per NG tube Daily    vancomycin (VANCOCIN) IVPB  1,000 mg Intravenous Q12H     PRN Meds:acetaminophen, albuterol-ipratropium 2.5mg-0.5mg/3mL, aluminum-magnesium hydroxide-simethicone, dextrose 50%, fentaNYL, glucagon (human recombinant), hydrALAZINE, hydrocodone-acetaminophen 5-325mg, insulin aspart U-100, labetalol, lidocaine HCL 2%, ondansetron, potassium chloride 10%, potassium chloride 10%, potassium chloride 10%, potassium, sodium phosphates, potassium, sodium phosphates, potassium, sodium phosphates     Review of Systems    Objective:     Weight: 99.5 kg (219 lb 5.7 oz)  Body mass index is 29.75 kg/m².  Vital Signs (Most Recent):  Temp: 97.1 °F (36.2 °C) (05/02/18 1129)  Pulse: 64 (05/02/18 1129)  Resp: 18 (05/02/18 1129)  BP: 121/71 (05/02/18 1129)  SpO2: 96 % (05/02/18 1129) Vital Signs (24h Range):  Temp:  [97.1 °F (36.2 °C)-98.8 °F (37.1 °C)] 97.1 °F (36.2 °C)  Pulse:  [64-89] 64  Resp:  [11-20] 18  SpO2:  [93 %-99 %] 96 %  BP: (121-180)/(60-83) 121/71       Date 05/02/18 0700 - 05/03/18 0659   Shift 3058-1980 6912-6940 6992-4765 24 Hour Total   I  N  T  A  K  E   NG/   500     Shift Total  (mL/kg) 500  (5)   500  (5)   O  U  T  P  U  T   Shift Total  (mL/kg)       Weight (kg) 99.5 99.5 99.5 99.5            NG/OG Tube 04/21/18 2300 Cortrak Right nostril (Active)   Placement Check placement verified by aspirate characteristics;placement verified by distal tube length measurement 4/26/2018  8:45 PM   Distal Tube Length (cm) 75 4/25/2018  7:05 AM   Tolerance no signs/symptoms of discomfort 4/26/2018  8:45 PM   Securement anchored to nostril center w/ adhesive device 4/26/2018  8:45 PM   Clamp Status/Tolerance unclamped;no abdominal distention;no emesis;no nausea;no residual;no restlessness 4/26/2018  8:45 PM   Insertion Site Appearance no redness, warmth, tenderness, skin breakdown, drainage 4/26/2018  8:45 PM   Drainage None 4/25/2018 11:05 AM   Flush/Irrigation flushed w/;water;no resistance met 4/26/2018  8:45 PM   Feeding Method continuous 4/26/2018  8:45 PM   Current Rate (mL/hr) 55 mL/hr 4/26/2018  8:45 PM   Goal Rate (mL/hr) 55 mL/hr 4/26/2018  8:45 PM   Intake (mL) 30 mL 4/24/2018  5:00 PM   Water Bolus (mL) 100 mL 4/26/2018  5:00 AM   Rate Formula Tube Feeding (mL/hr) 55 mL/hr 4/24/2018  4:00 PM   Intake (mL) - Formula Tube Feeding 513 4/26/2018  5:00 AM   Residual Amount (ml) 0 ml 4/24/2018  7:05 AM       Male External Urinary Catheter 04/23/18 1345 Medium (Active)   Collection Container Standard drainage bag 4/26/2018  8:45 PM   Skin reddened 4/26/2018  8:45 PM   Tolerance no signs/symptoms of discomfort 4/26/2018  8:45 PM   Output (mL) 575 mL 4/27/2018  6:00 AM   Catheter Change Date 04/26/18 4/26/2018  8:45 PM   Catheter Change Time 2300 4/26/2018  8:45 PM       Neurosurgery Physical Exam    General: well developed, well nourished. no acute distress. Generalized deconditioning   Neurologic: lethargic. Requires persistant stim to participate. Oriented x 3. Thought content appropriate.  Head: normocephalic, atraumatic   GCS: Motor: 6/Verbal: 5/Eyes: 4 GCS Total: 15  Cranial nerves:  face symmetric, tongue midline, pupils equal, round, reactive to light with accomodation, extraocular muscles intact. CN II-XII grossly intact.   Language: no aphasia  Speech:  dysarthria   Sensory: decrease response to light touch in BUE  Motor Strength: Moves all extremities spontaneously with good tone.      Strength   Deltoids Triceps Biceps Wrist Extension Wrist Flexion Hand    Upper: R 2/5 3/5 3/5 2/5 3/5 3/5     L 3/5 4-/5 4-/5 3/5 3/5 3/5       Iliopsoas Quadriceps Knee  Flexion Tibialis  anterior Gastro- cnemius EHL   Lower: R 3/5 3/5 3/5 4-/5 4-/5 4-/5     L 4-/5 3/5 3/5 4/5 4/5 4/5         [place]: present on right   Clonus: absent     wound is c/d/i, no erythema, ttp, or drainage.  wound edges are well approximated. No surrounding edema       Significant Labs:    Recent Labs  Lab 05/01/18  0245 05/01/18  1228 05/02/18  0457   *  --  232*     --  141   K 3.3* 5.3* 4.1     --  109   CO2 24  --  26   BUN 17  --  22   CREATININE 0.6  --  0.7   CALCIUM 10.6*  --  11.1*   MG 1.9  --  2.1       Recent Labs  Lab 05/01/18  0245 05/02/18  0457   WBC 11.46 10.51   HGB 10.4* 10.6*   HCT 33.3* 34.3*    347       Recent Labs  Lab 05/01/18 0245 05/02/18  0457   INR 1.3* 1.2     Microbiology Results (last 7 days)     Procedure Component Value Units Date/Time    Culture, Anaerobe [[id number]] Collected:  04/28/18 1417    Order Status:  Completed Specimen:  Wound from Neck Updated:  05/01/18 1105     Anaerobic Culture Culture in progress    Narrative:       Wound fluid    Culture, Anaerobe [[id number]] Collected:  04/28/18 1413    Order Status:  Completed Specimen:  Wound from Neck Updated:  05/01/18 1103     Anaerobic Culture Culture in progress    Narrative:       Wound fluid    Aerobic culture [[id number]] Collected:  04/28/18 1417    Order Status:  Completed Specimen:  Wound from Neck Updated:  05/01/18 0852     Aerobic Bacterial Culture No growth    Narrative:       Wound fluid    Aerobic  culture [[id number]] Collected:  04/28/18 1413    Order Status:  Completed Specimen:  Wound from Neck Updated:  05/01/18 0852     Aerobic Bacterial Culture No growth    Narrative:       Wound fluid    AFB Culture & Smear [[id number]] Collected:  04/28/18 1417    Order Status:  Completed Specimen:  Wound from Neck Updated:  04/30/18 1313     AFB Culture & Smear Culture in progress     AFB CULTURE STAIN No acid fast bacilli seen.    Narrative:       Wound fluid    AFB Culture & Smear [[id number]] Collected:  04/28/18 1413    Order Status:  Completed Specimen:  Wound from Neck Updated:  04/30/18 1313     AFB Culture & Smear Culture in progress     AFB CULTURE STAIN No acid fast bacilli seen.    Narrative:       Wound fluid    Urine culture [[id number]] Collected:  04/27/18 1218    Order Status:  Completed Specimen:  Urine from Urine, Catheterized Updated:  04/28/18 2020     Urine Culture, Routine No growth    Gram stain [[id number]] Collected:  04/28/18 1417    Order Status:  Completed Specimen:  Wound from Neck Updated:  04/28/18 2009     Gram Stain Result Rare WBC's      No organisms seen    Narrative:       Wound fluid    Gram stain [[id number]] Collected:  04/28/18 1413    Order Status:  Completed Specimen:  Wound from Neck Updated:  04/28/18 2004     Gram Stain Result Rare WBC's      No organisms seen    Narrative:       Wound fluid    Fungus culture [[id number]] Collected:  04/28/18 1417    Order Status:  Sent Specimen:  Wound from Neck Updated:  04/28/18 1430    Fungus culture [[id number]] Collected:  04/28/18 1413    Order Status:  Sent Specimen:  Wound from Neck Updated:  04/28/18 1429    Urine culture [[id number]] Collected:  04/27/18 1218    Order Status:  Sent Specimen:  Urine from Urine, Catheterized Updated:  04/27/18 1218    Blood culture [[id number]] Collected:  04/21/18 1335    Order Status:  Completed Specimen:  Blood Updated:  04/26/18 1612     Blood Culture, Routine No growth after 5 days.    Narrative:        Blood cultures from 2 different sites. 4 bottles total.  Please draw before starting antibiotics.    Blood culture [[id number]] Collected:  04/21/18 1335    Order Status:  Completed Specimen:  Blood Updated:  04/26/18 1612     Blood Culture, Routine No growth after 5 days.    Narrative:       Blood cultures x 2 different sites. 4 bottles total. Please  draw cultures before administering antibiotics.            Significant Diagnostics:
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
## 2018-05-02 NOTE — ASSESSMENT & PLAN NOTE
-- Management per primary NSRGY team  -- s/p anterior cervical disectomy and fusion with plating of C5-6, C6-7 on 4/10  -- s/p lumbar drain placement 4/13 for post-op CSF leak, drain removed 4/20  -- s/p washout 4/28 with repair of CSF leak

## 2018-05-02 NOTE — ASSESSMENT & PLAN NOTE
-- Stable, continue flomax  -- Recommend monitoring closely for urinary obstruction, agree with scheduled bladder scans

## 2018-05-02 NOTE — SUBJECTIVE & OBJECTIVE
"Past Medical History:   Diagnosis Date    Benign prostatic hyperplasia with urinary retention 4/11/2018    Cervical myelopathy with cervical radiculopathy 4/10/2018    Depression     Dyslipidemia     Essential hypertension 4/11/2018    Glaucoma (increased eye pressure)     Mild cognitive impairment     Traumatic brain injury 07/2017    Type 2 diabetes mellitus with hyperglycemia, without long-term current use of insulin 4/11/2018       Past Surgical History:   Procedure Laterality Date    CATARACT EXTRACTION W/  INTRAOCULAR LENS IMPLANT         Review of patient's allergies indicates:  No Known Allergies    No current facility-administered medications on file prior to encounter.      Current Outpatient Prescriptions on File Prior to Encounter   Medication Sig    atorvastatin (LIPITOR) 20 MG tablet Take 20 mg by mouth nightly.    bisacodyl (DULCOLAX) 5 mg EC tablet     CARTIA  mg Cp24 Take 90 mg by mouth.     citalopram (CELEXA) 20 MG tablet Take 20 mg by mouth once daily.    gabapentin (NEURONTIN) 100 MG capsule Take 100 mg by mouth 3 (three) times daily as needed.     SENSIPAR 30 mg Tab Take 30 mg by mouth daily with breakfast.     traZODone (DESYREL) 50 MG tablet Take 50 mg by mouth every evening.    valsartan (DIOVAN) 80 MG tablet Take 80 mg by mouth once daily.    vitamin B comp with C no.4 150 mg Tab Take vitamin B complex 1 a day.  Over-the-counter is an appropriate alternative.    BD INSULIN PEN NEEDLE UF SHORT 31 gauge x 5/16" Ndle USE WITH LANTUS PEN DAILY    CONSTULOSE 10 gram/15 mL solution TAKE  30 MILLILITERS BY MOUTH 3 TIMES A DAY UNTIL BOWEL MOVEMENT ...  (REFER TO PRESCRIPTION NOTES).    ELIQUIS 2.5 mg Tab Take 2.5 mg by mouth 2 (two) times daily.     FREESTYLE TEST Strp      Family History     Problem Relation (Age of Onset)    Cancer Maternal Grandmother    Clotting disorder Mother    Paranoid behavior Mother        Social History Main Topics    Smoking status: Former " Smoker     Quit date: 2/6/1950    Smokeless tobacco: Never Used    Alcohol use 4.2 oz/week     7 Shots of liquor per week    Drug use: No    Sexual activity: Yes     Partners: Female     Review of Systems   Unable to perform ROS: Mental status change     Objective:     Vital Signs (Most Recent):  Temp: 97.1 °F (36.2 °C) (05/02/18 1129)  Pulse: 64 (05/02/18 1519)  Resp: 18 (05/02/18 1129)  BP: 121/71 (05/02/18 1129)  SpO2: 96 % (05/02/18 1129) Vital Signs (24h Range):  Temp:  [97.1 °F (36.2 °C)-98.8 °F (37.1 °C)] 97.1 °F (36.2 °C)  Pulse:  [64-89] 64  Resp:  [16-20] 18  SpO2:  [93 %-99 %] 96 %  BP: (121-180)/(61-83) 121/71     Weight: 99.5 kg (219 lb 5.7 oz)  Body mass index is 29.75 kg/m².    Physical Exam   Constitutional: He appears well-developed. No distress.   HENT:   Head: Normocephalic.   Eyes: Conjunctivae are normal. Right eye exhibits no discharge. Left eye exhibits no discharge.   Neck:   Cervical collar in place   Cardiovascular: Normal rate and regular rhythm.    Pulmonary/Chest: No respiratory distress. He has no wheezes. He has rales.   Coarse breath sounds bilaterally   Abdominal: Soft. Bowel sounds are normal. He exhibits no distension. There is no tenderness.   Musculoskeletal: He exhibits no edema or tenderness.   Neurological:   Somnolent, does not follow commands.   Skin: Skin is warm and dry. He is not diaphoretic.   Vitals reviewed.      Significant Labs: All pertinent labs within the past 24 hours have been reviewed.    Significant Imaging: I have reviewed all pertinent imaging results/findings within the past 24 hours.

## 2018-05-02 NOTE — ASSESSMENT & PLAN NOTE
-- A1C 5.6, glucoses 190s-230s inpast 24 hours  -- per chart review patient had reported being controlled by lifestyle modification alone at home   -- currently on continuous tube feeds with diabetisource   -- cont detemir 13 units BID  -- added 2 units q4 and decreased to low dose SSI

## 2018-05-02 NOTE — PLAN OF CARE
Problem: Physical Therapy Goal  Goal: Physical Therapy Goal  Goals to be met by: 2018     Patient will increase functional independence with mobility by performin. Supine to sit with Moderate Assistance  2. Sit to supine with Moderate Assistance  3. Sit to stand transfer with Max Assistance  4. Bed to chair transfer with Max Assistance using Rolling Walker  5. Lower extremity exercise program x20 reps per handout, with supervision   6. Pt will perform sitting at EOB x 10 minutes with Contact Guard Assistance to improve trunk control          Outcome: Ongoing (interventions implemented as appropriate)  Pt goals remain appropriate.    DARRELL ALY, PT  2018

## 2018-05-02 NOTE — PT/OT/SLP PROGRESS
Physical Therapy Treatment    Patient Name:  Hollis Pitts   MRN:  [id number]    Recommendations:     Discharge Recommendations:  nursing facility, skilled (in NH)   Discharge Equipment Recommendations: hospital bed, lift device   Barriers to discharge: Decreased caregiver support    Assessment:     Hollis Pitts is a 82 y.o. male admitted with a medical diagnosis of Cervical myelopathy with cervical radiculopathy.  He presents with the following impairments/functional limitations:  weakness, gait instability, decreased upper extremity function, decreased lower extremity function, impaired balance, impaired endurance, decreased safety awareness, impaired fine motor, impaired coordination, impaired cognition, impaired functional mobilty, decreased coordination, impaired sensation, impaired self care skills. Pt performed bed mobility total A and sat EOB for ~15min with max A. Pt will continue to benefit from skilled PT to improve deficits and increase overall functional mobility.     Rehab Prognosis:  Good; patient would benefit from acute skilled PT services to address these deficits and reach maximum level of function.      Recent Surgery: Procedure(s) (LRB):  WASHOUT-CERVICAL-wound washout and csf leak repair with depuy (N/A)  DISKECTOMY AND FUSION-ANTERIOR CERVICAL (ACDF)- REVISON C5-C7 4 Days Post-Op    Plan:     During this hospitalization, patient to be seen 3 x/week to address the above listed problems via gait training, therapeutic activities, therapeutic exercises, neuromuscular re-education, wheelchair management/training  · Plan of Care Expires:  06/01/18   Plan of Care Reviewed with: patient    Subjective     Communicated with RN prior to session.  Patient found supine in bed upon PT entry to room, agreeable to treatment.      Chief Complaint: NA  Pain/Comfort:  · Pain Rating 1: 0/10  · Pain Rating Post-Intervention 1: 0/10    Patients cultural, spiritual, Jainism conflicts given the current  situation: None reported    Objective:     Patient found with: peripheral IV, NG tube, telemetry, Condom Catheter, pressure relief boots, SCD     General Precautions: Standard, aspiration   Orthopedic Precautions:spinal precautions   Braces: Aspen collar     Functional Mobility:  Bed Mobility:     · Scooting: total assistance and of 2 persons  · Supine to Sit: total assistance and of 2 persons  · Sit to Supine: total assistance and of 2 persons    AM-PAC 6 CLICK MOBILITY  Turning over in bed (including adjusting bedclothes, sheets and blankets)?: 1  Sitting down on and standing up from a chair with arms (e.g., wheelchair, bedside commode, etc.): 1  Moving from lying on back to sitting on the side of the bed?: 1  Moving to and from a bed to a chair (including a wheelchair)?: 1  Need to walk in hospital room?: 1  Climbing 3-5 steps with a railing?: 1  Total Score: 6     Therapeutic Activities and Exercises:  Pt required max verbal cues to keep eyes open and attend to task.   Pt sat EOB for ~15min with max A for post lean.  PT facilitated thoracic ext and scap retraction and ADD for upright posture.  PT applied static stretch to ant chest musculature for ~30sec hold x3 trials.   PT facilitated trunk activation with B UE reaching.   Pt positioned with bed in chair position with B UE propped.     Patient left with bed in chair position with all lines intact, call button in reach and RN notified..    GOALS:    Physical Therapy Goals        Problem: Physical Therapy Goal    Goal Priority Disciplines Outcome Goal Variances Interventions   Physical Therapy Goal     PT/OT, PT Ongoing (interventions implemented as appropriate)     Description:  Goals to be met by: 2018     Patient will increase functional independence with mobility by performin. Supine to sit with Moderate Assistance  2. Sit to supine with Moderate Assistance  3. Sit to stand transfer with Max Assistance  4. Bed to chair transfer with Max  Assistance using Rolling Walker  5. Lower extremity exercise program x20 reps per handout, with supervision   6. Pt will perform sitting at EOB x 10 minutes with Contact Guard Assistance to improve trunk control                           Time Tracking:     PT Received On: 05/02/18  PT Start Time: 1012     PT Stop Time: 1039  PT Total Time (min): 27 min     Billable Minutes: Therapeutic Activity 27    Treatment Type: Treatment  PT/PTA: PT     PTA Visit Number: 0     DARRELL ALY, PT  05/02/2018

## 2018-05-02 NOTE — ASSESSMENT & PLAN NOTE
82 y.o. male s/p C3-5 ACDF for cervical myelopathy on 4/10 with Dr. Hess, and now s/p evacuation/washout of fluid collection with CSF leak repair POD#4     -Pt lethargic today. Will repeat CT Head given evidence of bilateral hygromas on last imaging   -Cont neuro checks q4h  -NPO per ST, will discuss MBSS/PEG placement with them today  -ID: continue vanc and ceftriaxone x 7 days from surgery (4/28) Vanc trough before 4th dose. Trough Goal 15-20. Surgical cultures NGTD  -CV goal normotension. Continue home meds. PRN meds SBP >160.  -Urinary retention- condom cath in place, continue Flomax  -Continue PILI/SCDs/SQH  -Continue bowel regimen daily.  -Continue to hold Eliquis. Per , indications unclear.  does not recommend continuing upon discharge.  -Continue aggressive PT/OT/ST  -Cervical collar when OOB or with activity  -IS to bed side. Patient to use atleast 10x every hour. Scheduled CPT/duonebs.   -Discussed with Dr. Hess

## 2018-05-02 NOTE — PLAN OF CARE
Planned discharge is Skilled Nursing - Plan (A) or home with family and home health - Plan (B).     05/02/18 0939   Discharge Reassessment   Assessment Type Discharge Planning Reassessment   Provided patient/caregiver education on the expected discharge date and the discharge plan No   Discharge Plan A Skilled Nursing Facility   Discharge Plan B Home with family;Home Health   Patient choice form signed by patient/caregiver N/A   Can the patient answer the patient profile reliably? Yes, cognitively intact   Describe the patient's ability to walk at the present time. Major restrictions/daily assistance from another person   How often would a person be available to care for the patient? Often   Number of comorbid conditions (as recorded on the chart) Four   During the past month, has the patient often been bothered by feeling down, depressed or hopeless? No   During the past month, has the patient often been bothered by little interest or pleasure in doing things? No

## 2018-05-02 NOTE — ASSESSMENT & PLAN NOTE
-- home meds include diltiazem XR 90 mg daily and valsartan 80 mg daily  -- continue current diltiazem regimen, stopped coreg and increased valsartan to 80 mg daily  -- will cont to monitor and adjust meds prn

## 2018-05-03 LAB
ALBUMIN SERPL BCP-MCNC: 2.1 G/DL
ALP SERPL-CCNC: 86 U/L
ALT SERPL W/O P-5'-P-CCNC: 16 U/L
ANION GAP SERPL CALC-SCNC: 6 MMOL/L
AST SERPL-CCNC: 16 U/L
BASOPHILS # BLD AUTO: 0.04 K/UL
BASOPHILS NFR BLD: 0.4 %
BILIRUB SERPL-MCNC: 0.3 MG/DL
BUN SERPL-MCNC: 18 MG/DL
CALCIUM SERPL-MCNC: 10.9 MG/DL
CHLORIDE SERPL-SCNC: 106 MMOL/L
CO2 SERPL-SCNC: 27 MMOL/L
CREAT SERPL-MCNC: 0.7 MG/DL
DIFFERENTIAL METHOD: ABNORMAL
EOSINOPHIL # BLD AUTO: 0.1 K/UL
EOSINOPHIL NFR BLD: 1.3 %
ERYTHROCYTE [DISTWIDTH] IN BLOOD BY AUTOMATED COUNT: 15.6 %
EST. GFR  (AFRICAN AMERICAN): >60 ML/MIN/1.73 M^2
EST. GFR  (NON AFRICAN AMERICAN): >60 ML/MIN/1.73 M^2
GLUCOSE SERPL-MCNC: 224 MG/DL
HCT VFR BLD AUTO: 34 %
HGB BLD-MCNC: 10.6 G/DL
IMM GRANULOCYTES # BLD AUTO: 0.2 K/UL
IMM GRANULOCYTES NFR BLD AUTO: 2.2 %
INR PPP: 1.1
LYMPHOCYTES # BLD AUTO: 1.5 K/UL
LYMPHOCYTES NFR BLD: 16.6 %
MAGNESIUM SERPL-MCNC: 2 MG/DL
MCH RBC QN AUTO: 31 PG
MCHC RBC AUTO-ENTMCNC: 31.2 G/DL
MCV RBC AUTO: 99 FL
MONOCYTES # BLD AUTO: 0.9 K/UL
MONOCYTES NFR BLD: 10.1 %
NEUTROPHILS # BLD AUTO: 6.5 K/UL
NEUTROPHILS NFR BLD: 69.4 %
NRBC BLD-RTO: 0 /100 WBC
PHOSPHATE SERPL-MCNC: 2.1 MG/DL
PLATELET # BLD AUTO: 303 K/UL
PMV BLD AUTO: 10.8 FL
POCT GLUCOSE: 172 MG/DL (ref 70–110)
POCT GLUCOSE: 190 MG/DL (ref 70–110)
POCT GLUCOSE: 209 MG/DL (ref 70–110)
POCT GLUCOSE: 213 MG/DL (ref 70–110)
POCT GLUCOSE: 217 MG/DL (ref 70–110)
POCT GLUCOSE: 223 MG/DL (ref 70–110)
POTASSIUM SERPL-SCNC: 4.5 MMOL/L
PROT SERPL-MCNC: 6 G/DL
PROTHROMBIN TIME: 11.7 SEC
RBC # BLD AUTO: 3.42 M/UL
SODIUM SERPL-SCNC: 139 MMOL/L
T4 FREE SERPL-MCNC: 1.33 NG/DL
TB INDURATION 48 - 72 HR READ: 0 MM
TSH SERPL DL<=0.005 MIU/L-ACNC: 0.38 UIU/ML
WBC # BLD AUTO: 9.3 K/UL

## 2018-05-03 PROCEDURE — 25000003 PHARM REV CODE 250: Performed by: STUDENT IN AN ORGANIZED HEALTH CARE EDUCATION/TRAINING PROGRAM

## 2018-05-03 PROCEDURE — 84100 ASSAY OF PHOSPHORUS: CPT

## 2018-05-03 PROCEDURE — 36415 COLL VENOUS BLD VENIPUNCTURE: CPT

## 2018-05-03 PROCEDURE — 85610 PROTHROMBIN TIME: CPT

## 2018-05-03 PROCEDURE — 84439 ASSAY OF FREE THYROXINE: CPT

## 2018-05-03 PROCEDURE — 84443 ASSAY THYROID STIM HORMONE: CPT

## 2018-05-03 PROCEDURE — 85025 COMPLETE CBC W/AUTO DIFF WBC: CPT

## 2018-05-03 PROCEDURE — 83735 ASSAY OF MAGNESIUM: CPT

## 2018-05-03 PROCEDURE — 80053 COMPREHEN METABOLIC PANEL: CPT

## 2018-05-03 PROCEDURE — 63600175 PHARM REV CODE 636 W HCPCS: Performed by: STUDENT IN AN ORGANIZED HEALTH CARE EDUCATION/TRAINING PROGRAM

## 2018-05-03 PROCEDURE — 63600175 PHARM REV CODE 636 W HCPCS: Performed by: PSYCHIATRY & NEUROLOGY

## 2018-05-03 PROCEDURE — 25000003 PHARM REV CODE 250: Performed by: PHYSICIAN ASSISTANT

## 2018-05-03 PROCEDURE — 25000003 PHARM REV CODE 250: Performed by: PSYCHIATRY & NEUROLOGY

## 2018-05-03 PROCEDURE — 63600175 PHARM REV CODE 636 W HCPCS: Performed by: PHYSICIAN ASSISTANT

## 2018-05-03 PROCEDURE — 99232 SBSQ HOSP IP/OBS MODERATE 35: CPT | Mod: ,,, | Performed by: HOSPITALIST

## 2018-05-03 PROCEDURE — 63600175 PHARM REV CODE 636 W HCPCS: Performed by: INTERNAL MEDICINE

## 2018-05-03 PROCEDURE — 25000003 PHARM REV CODE 250: Performed by: ANESTHESIOLOGY

## 2018-05-03 PROCEDURE — 20600001 HC STEP DOWN PRIVATE ROOM

## 2018-05-03 PROCEDURE — 25000003 PHARM REV CODE 250: Performed by: INTERNAL MEDICINE

## 2018-05-03 PROCEDURE — 99024 POSTOP FOLLOW-UP VISIT: CPT | Mod: POP,,, | Performed by: PHYSICIAN ASSISTANT

## 2018-05-03 RX ORDER — PSEUDOEPHEDRINE/ACETAMINOPHEN 30MG-500MG
100 TABLET ORAL
Status: COMPLETED | OUTPATIENT
Start: 2018-05-03 | End: 2018-05-03

## 2018-05-03 RX ORDER — SYRING-NEEDL,DISP,INSUL,0.3 ML 29 G X1/2"
296 SYRINGE, EMPTY DISPOSABLE MISCELLANEOUS
Status: COMPLETED | OUTPATIENT
Start: 2018-05-03 | End: 2018-05-03

## 2018-05-03 RX ADMIN — SODIUM CHLORIDE 500 ML: 9 INJECTION, SOLUTION INTRAVENOUS at 04:05

## 2018-05-03 RX ADMIN — DILTIAZEM HYDROCHLORIDE 30 MG: 30 TABLET, FILM COATED ORAL at 12:05

## 2018-05-03 RX ADMIN — HEPARIN SODIUM 5000 UNITS: 5000 INJECTION, SOLUTION INTRAVENOUS; SUBCUTANEOUS at 05:05

## 2018-05-03 RX ADMIN — INSULIN ASPART 2 UNITS: 100 INJECTION, SOLUTION INTRAVENOUS; SUBCUTANEOUS at 10:05

## 2018-05-03 RX ADMIN — VANCOMYCIN HYDROCHLORIDE 1 G: 10 INJECTION, POWDER, LYOPHILIZED, FOR SOLUTION INTRAVENOUS at 06:05

## 2018-05-03 RX ADMIN — CITALOPRAM HYDROBROMIDE 10 MG: 10 TABLET ORAL at 10:05

## 2018-05-03 RX ADMIN — VALSARTAN 80 MG: 40 TABLET ORAL at 09:05

## 2018-05-03 RX ADMIN — INSULIN ASPART 2 UNITS: 100 INJECTION, SOLUTION INTRAVENOUS; SUBCUTANEOUS at 05:05

## 2018-05-03 RX ADMIN — CEFTRIAXONE SODIUM 2 G: 2 INJECTION, POWDER, FOR SOLUTION INTRAMUSCULAR; INTRAVENOUS at 08:05

## 2018-05-03 RX ADMIN — HEPARIN SODIUM 5000 UNITS: 5000 INJECTION, SOLUTION INTRAVENOUS; SUBCUTANEOUS at 09:05

## 2018-05-03 RX ADMIN — INSULIN DETEMIR 13 UNITS: 100 INJECTION, SOLUTION SUBCUTANEOUS at 09:05

## 2018-05-03 RX ADMIN — MAGESIUM CITRATE 296 ML: 1.75 LIQUID ORAL at 04:05

## 2018-05-03 RX ADMIN — INSULIN ASPART 2 UNITS: 100 INJECTION, SOLUTION INTRAVENOUS; SUBCUTANEOUS at 01:05

## 2018-05-03 RX ADMIN — TRAZODONE HYDROCHLORIDE 50 MG: 50 TABLET ORAL at 09:05

## 2018-05-03 RX ADMIN — INSULIN ASPART 2 UNITS: 100 INJECTION, SOLUTION INTRAVENOUS; SUBCUTANEOUS at 02:05

## 2018-05-03 RX ADMIN — POLYETHYLENE GLYCOL 3350 17 G: 17 POWDER, FOR SOLUTION ORAL at 09:05

## 2018-05-03 RX ADMIN — DILTIAZEM HYDROCHLORIDE 30 MG: 30 TABLET, FILM COATED ORAL at 05:05

## 2018-05-03 RX ADMIN — STANDARDIZED SENNA CONCENTRATE AND DOCUSATE SODIUM 1 TABLET: 8.6; 5 TABLET, FILM COATED ORAL at 10:05

## 2018-05-03 RX ADMIN — POLYETHYLENE GLYCOL 3350 17 G: 17 POWDER, FOR SOLUTION ORAL at 10:05

## 2018-05-03 RX ADMIN — ATORVASTATIN CALCIUM 20 MG: 20 TABLET, FILM COATED ORAL at 09:05

## 2018-05-03 RX ADMIN — HEPARIN SODIUM 5000 UNITS: 5000 INJECTION, SOLUTION INTRAVENOUS; SUBCUTANEOUS at 01:05

## 2018-05-03 RX ADMIN — CINACALCET HYDROCHLORIDE 30 MG: 30 TABLET, COATED ORAL at 10:05

## 2018-05-03 RX ADMIN — STANDARDIZED SENNA CONCENTRATE AND DOCUSATE SODIUM 1 TABLET: 8.6; 5 TABLET, FILM COATED ORAL at 09:05

## 2018-05-03 RX ADMIN — INSULIN DETEMIR 13 UNITS: 100 INJECTION, SOLUTION SUBCUTANEOUS at 10:05

## 2018-05-03 RX ADMIN — TAMSULOSIN HYDROCHLORIDE 0.4 MG: 0.4 CAPSULE ORAL at 10:05

## 2018-05-03 RX ADMIN — LEVETIRACETAM 500 MG: 500 TABLET, FILM COATED ORAL at 09:05

## 2018-05-03 RX ADMIN — ALUMINUM HYDROXIDE, MAGNESIUM HYDROXIDE, AND SIMETHICONE 30 ML: 200; 200; 20 SUSPENSION ORAL at 05:05

## 2018-05-03 RX ADMIN — Medication 100 ML: at 04:05

## 2018-05-03 RX ADMIN — LEVETIRACETAM 500 MG: 500 TABLET, FILM COATED ORAL at 10:05

## 2018-05-03 NOTE — PT/OT/SLP PROGRESS
Speech Language Pathology  Pt not seen    Hollis Pitts  MRN: [id number]    Patient not seen today secondary to pt somnolent. Unable to arouse with max verbal, tactile and thermal cues.    Humera Wadsworth CCC-SLP  5/3/2018
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
## 2018-05-03 NOTE — ASSESSMENT & PLAN NOTE
-- home meds include diltiazem XR 90 mg daily and valsartan 80 mg daily  -- continue current diltiazem regimen, stopped coreg and increased valsartan to 80 mg daily  -- BP 24 hrs 120-140s  -- will cont to monitor and adjust meds prn

## 2018-05-03 NOTE — PT/OT/SLP RE-EVAL
Occupational Therapy   Re-evaluation    Name: Hollis Pitts  MRN: [id number]  Admitting Diagnosis:  Cervical myelopathy with cervical radiculopathy 4 Days Post-Op    Recommendations:     Discharge Recommendations: nursing facility, skilled  Discharge Equipment Recommendations:  hospital bed, lift device  Barriers to discharge:  None    History:     Past Medical History:   Diagnosis Date    Benign prostatic hyperplasia with urinary retention 4/11/2018    Cervical myelopathy with cervical radiculopathy 4/10/2018    Depression     Dyslipidemia     Essential hypertension 4/11/2018    Glaucoma (increased eye pressure)     Mild cognitive impairment     Traumatic brain injury 07/2017    Type 2 diabetes mellitus with hyperglycemia, without long-term current use of insulin 4/11/2018       Past Surgical History:   Procedure Laterality Date    CATARACT EXTRACTION W/  INTRAOCULAR LENS IMPLANT         Subjective     Chief Complaint: None stated  Patient/Family stated goals: None stated; will assess in upcoming sessions  Communicated with: RN prior to session.  Pain/Comfort:  · Pain Rating 1: 0/10  · Pain Rating Post-Intervention 2: 0/10    Objective:     Patient found with: peripheral IV, NG tube, telemetry, Condom Catheter, pressure relief boots, SCD, bed alarm    General Precautions: Standard, aspiration, fall   Orthopedic Precautions:spinal precautions   Braces: Aspen collar     Occupational Performance:    Bed Mobility:    · Patient completed Rolling/Turning to Left with  total assistance  · Patient completed Scooting/Bridging with total assistance  · Patient completed Supine to Sit with total assistance  · Patient completed Sit to Supine with total assistance    Functional Mobility/Transfers:  · Pt not appropriate for task at this time 2* decreased level of alertness    Activities of Daily Living:  · Grooming: total assistance for washing face with cloth while seated EOB.    Cognitive/Visual  Perceptual:  Cognitive/Psychosocial Skills:  -       Oriented to: Person   -       Follows Commands/attention:Follows one-step commands  -       Communication: Pt non-verbal during session  -       Memory: Possible deficits  -       Safety awareness/insight to disability: impaired   -       Mood/Affect/Coping skills/emotional control: Lethargic    Physical Exam:  Postural examination/scapula alignment: -       Rounded shoulders  -       Forward head  Skin integrity: Visible skin intact  Edema:  None noted  Sensation: -       Intact  Motor Planning: -       Deficits noted  Dominant hand: -       Right  Upper Extremity Range of Motion:    -       Right Upper Extremity: Deficits noted; pt able to initiate some movement, but unable to move through full ROM  -       Left Upper Extremity: Deficits noted; pt able to initiate some movement, but unable to move through full ROM  Upper Extremity Strength:   -       Right Upper Extremity: Deficits: Unable to formally assess; grossly 1/5 for all muscle groups  -       Left Upper Extremity: Deficits: Unalbe to formally assess; grossly 1/5 for all muscle groups   Strength: Pt did not squeeze to command  Fine Motor Coordination:  Impaired  Gross motor coordination: WFL    Balance: Max A-Total A while seated EOB    Patient left HOB elevated with all lines intact, call button in reach, bed alarm on and RN present    AMPA 6 Click:  [facility] Total Score: 7    Treatment & Education:  *PROM performed on (B) UE incorporating all joints: 4 sets x 10 reps  *OT instructed pt to reach forward to touch her hand to address hand-eye coordination: 1 set x 4 reps  *POC reviewed with pt; education provided on role of OT and POC discussed  Education:    Assessment:     Hollis Pitts is a 82 y.o. male with a medical diagnosis of Cervical myelopathy with cervical radiculopathy.  He presents with the following performance deficits affecting function: weakness, decreased lower extremity function,  "decreased upper extremity function, impaired cognition, impaired functional mobilty, impaired self care skills, impaired balance, decreased coordination, decreased safety awareness.  Pt lethargic during session with eyes closed almost entire time.  While seated EOB Max A-Total A required to maintain upright position.  Pt demonstrates decreased strength and ROM in (B) UE impacting ability to perform ADLs and mobilize.  Pt would benefit from skilled OT services to address problems listed below and increase independence with ADLs.  It is recommended that pt receive therapy services in NH upon d/c from hospital.         Rehab Prognosis:  Good; patient would benefit from acute skilled OT services to address these deficits and reach maximum level of function.         Clinical Decision Makin.  OT Low:  "Pt evaluation falls under low complexity for evaluation coding due to performance deficits noted in 1-3 areas as stated above and 0 co-morbities affecting current functional status. Data obtained from problem focused assessments. No modifications or assistance was required for completion of evaluation. Only brief occupational profile and history review completed."     Plan:     Patient to be seen 2 x/week to address the above listed problems via self-care/home management, therapeutic activities, therapeutic exercises, cognitive retraining, neuromuscular re-education, wheelchair management/training  · Plan of Care Expires: 18  · Plan of Care Reviewed with: patient    This Plan of care has been discussed with the patient who was involved in its development and understands and is in agreement with the identified goals and treatment plan    GOALS:    Occupational Therapy Goals        Problem: Occupational Therapy Goal    Goal Priority Disciplines Outcome Interventions   Occupational Therapy Goal     OT, PT/OT     Description:  Goals to be met by:   Patient will increase functional independence with ADLs by " performing:    Squat pivot t/f to wheelchair with total(A).  Supine<>sit ; sit<>supine with Moderate Assistance.  Grooming while seated EOB with minimal assistance to wash face.    Pt will tolerate EOB activity ~15 min duration with min(A) for postural control.   Pt will keep eyes open 85% of session.  Pt will follow 50% of simple one step-commands during session.  Family/caregiver demo understanding for ROM and positioning for B UE.                           Time Tracking:     OT Date of Treatment: 05/02/18  OT Start Time: 1012  OT Stop Time: 1039  OT Total Time (min): 27 min    Billable Minutes:Re-eval 14  Therapeutic Exercise 13   *Co-Tx with PT 2* pt with decreased activity tolerance    MADDIE Farooq  5/2/2018

## 2018-05-03 NOTE — ASSESSMENT & PLAN NOTE
-- A1C 5.6, glucoses 190s-220s inpast 24 hours  -- per chart review patient had reported being controlled by lifestyle modification alone at home   -- currently on continuous tube feeds with diabetisource 70/hr  -- cont detemir 13 units BID  -- yesterday added 2 units q4 and decreased to low dose SSI

## 2018-05-03 NOTE — PLAN OF CARE
Planned discharge is Skilled Nursing - Plan (A) or home with family and home health - Plan (B).     05/03/18 1551   Discharge Reassessment   Assessment Type Discharge Planning Reassessment   Provided patient/caregiver education on the expected discharge date and the discharge plan No   Do you have any problems affording any of your prescribed medications? No   Discharge Plan A Skilled Nursing Facility   Discharge Plan B Home with family;Home Health   Patient choice form signed by patient/caregiver N/A   Can the patient answer the patient profile reliably? No, cognitively impaired   Describe the patient's ability to walk at the present time. Major restrictions/daily assistance from another person   How often would a person be available to care for the patient? Often   Number of comorbid conditions (as recorded on the chart) Four   During the past month, has the patient often been bothered by feeling down, depressed or hopeless? No   During the past month, has the patient often been bothered by little interest or pleasure in doing things? No

## 2018-05-03 NOTE — SUBJECTIVE & OBJECTIVE
Interval History: NAEON. Patient remains lethargic on exam but answers questions appropriately and follow commands. Plan for PEG tomorrow    Medications:  Continuous Infusions:  Scheduled Meds:   atorvastatin  20 mg Per NG tube Nightly    [START ON 5/4/2018] barium  450 mL Oral Once    [START ON 5/4/2018] barium  450 mL Oral Once    cefTRIAXone (ROCEPHIN) IVPB  2 g Intravenous Q24H    cinacalcet  30 mg Oral Daily with breakfast    citalopram  10 mg Per NG tube Daily    diltiaZEM  30 mg Per NG tube Q6H    heparin (porcine)  5,000 Units Subcutaneous Q8H    insulin aspart U-100  2 Units Subcutaneous Q4H    insulin detemir U-100  13 Units Subcutaneous BID    levETIRAcetam  500 mg Per NG tube BID    polyethylene glycol  17 g Per NG tube BID    senna-docusate 8.6-50 mg  1 tablet Per NG tube BID    tamsulosin  0.4 mg Per NG tube Daily    traZODone  50 mg Per NG tube QHS    valsartan  80 mg Per NG tube Daily    vancomycin (VANCOCIN) IVPB  1,000 mg Intravenous Q12H     PRN Meds:acetaminophen, albuterol-ipratropium 2.5mg-0.5mg/3mL, aluminum-magnesium hydroxide-simethicone, dextrose 50%, fentaNYL, glucagon (human recombinant), hydrocodone-acetaminophen 5-325mg, insulin aspart U-100, lidocaine HCL 2%, ondansetron, potassium chloride 10%, potassium chloride 10%, potassium chloride 10%, potassium, sodium phosphates, potassium, sodium phosphates, potassium, sodium phosphates     Review of Systems    Objective:     Weight: 99.5 kg (219 lb 5.7 oz)  Body mass index is 29.75 kg/m².  Vital Signs (Most Recent):  Temp: 97.3 °F (36.3 °C) (05/03/18 1146)  Pulse: 66 (05/03/18 1146)  Resp: 20 (05/03/18 1146)  BP: 139/80 (05/03/18 1146)  SpO2: 96 % (05/03/18 1146) Vital Signs (24h Range):  Temp:  [96.4 °F (35.8 °C)-97.8 °F (36.6 °C)] 97.3 °F (36.3 °C)  Pulse:  [61-72] 66  Resp:  [16-20] 20  SpO2:  [93 %-96 %] 96 %  BP: (134-148)/(65-80) 139/80               NG/OG Tube 04/21/18 2300 Cortrak Right nostril (Active)   Placement  Check placement verified by aspirate characteristics;placement verified by distal tube length measurement 4/26/2018  8:45 PM   Distal Tube Length (cm) 75 4/25/2018  7:05 AM   Tolerance no signs/symptoms of discomfort 4/26/2018  8:45 PM   Securement anchored to nostril center w/ adhesive device 4/26/2018  8:45 PM   Clamp Status/Tolerance unclamped;no abdominal distention;no emesis;no nausea;no residual;no restlessness 4/26/2018  8:45 PM   Insertion Site Appearance no redness, warmth, tenderness, skin breakdown, drainage 4/26/2018  8:45 PM   Drainage None 4/25/2018 11:05 AM   Flush/Irrigation flushed w/;water;no resistance met 4/26/2018  8:45 PM   Feeding Method continuous 4/26/2018  8:45 PM   Current Rate (mL/hr) 55 mL/hr 4/26/2018  8:45 PM   Goal Rate (mL/hr) 55 mL/hr 4/26/2018  8:45 PM   Intake (mL) 30 mL 4/24/2018  5:00 PM   Water Bolus (mL) 100 mL 4/26/2018  5:00 AM   Rate Formula Tube Feeding (mL/hr) 55 mL/hr 4/24/2018  4:00 PM   Intake (mL) - Formula Tube Feeding 513 4/26/2018  5:00 AM   Residual Amount (ml) 0 ml 4/24/2018  7:05 AM       Male External Urinary Catheter 04/23/18 1345 Medium (Active)   Collection Container Standard drainage bag 4/26/2018  8:45 PM   Skin reddened 4/26/2018  8:45 PM   Tolerance no signs/symptoms of discomfort 4/26/2018  8:45 PM   Output (mL) 575 mL 4/27/2018  6:00 AM   Catheter Change Date 04/26/18 4/26/2018  8:45 PM   Catheter Change Time 2300 4/26/2018  8:45 PM       Neurosurgery Physical Exam    General: well developed, well nourished. no acute distress. Generalized deconditioning   Neurologic: lethargic. Requires persistant stim to participate. Oriented x 3. Thought content appropriate. Follows commands   Head: normocephalic, atraumatic   GCS: Motor: 6/Verbal: 5/Eyes: 4 GCS Total: 15  Cranial nerves: face symmetric, tongue midline, pupils equal, round, reactive to light with accomodation, extraocular muscles intact. CN II-XII grossly intact.   Language: no aphasia  Speech:   dysarthria   Sensory: decrease response to light touch in BUE  Motor Strength: Moves all extremities spontaneously with good tone.      Strength   Deltoids Triceps Biceps Wrist Extension Wrist Flexion Hand    Upper: R 2/5 3/5 3/5 2/5 2/5 3/5     L 3/5 4-/5 4-/5 3/5 3/5 3/5       Iliopsoas Quadriceps Knee  Flexion Tibialis  anterior Gastro- cnemius EHL   Lower: R 3/5 3/5 3/5 4-/5 4-/5 4-/5     L 4-/5 3/5 3/5 4/5 4/5 4/5         [place]: present on right   Clonus: absent     wound is c/d/i, no erythema, ttp, or drainage.  wound edges are well approximated. No surrounding edema       Significant Labs:    Recent Labs  Lab 05/02/18 0457 05/03/18  0422   * 224*    139   K 4.1 4.5    106   CO2 26 27   BUN 22 18   CREATININE 0.7 0.7   CALCIUM 11.1* 10.9*   MG 2.1 2.0       Recent Labs  Lab 05/02/18  0457 05/03/18  0422   WBC 10.51 9.30   HGB 10.6* 10.6*   HCT 34.3* 34.0*    303       Recent Labs  Lab 05/02/18  0457 05/03/18  0422   INR 1.2 1.1     Microbiology Results (last 7 days)     Procedure Component Value Units Date/Time    Culture, Anaerobe [[id number]] Collected:  04/28/18 1417    Order Status:  Completed Specimen:  Wound from Neck Updated:  05/03/18 1244     Anaerobic Culture Culture in progress    Narrative:       Wound fluid    Culture, Anaerobe [[id number]] Collected:  04/28/18 1413    Order Status:  Completed Specimen:  Wound from Neck Updated:  05/03/18 1244     Anaerobic Culture Culture in progress    Narrative:       Wound fluid    Fungus culture [[id number]] Collected:  04/28/18 1413    Order Status:  Completed Specimen:  Wound from Neck Updated:  05/03/18 1039     Fungus (Mycology) Culture Culture in progress    Narrative:       Wound fluid    Fungus culture [[id number]] Collected:  04/28/18 1417    Order Status:  Completed Specimen:  Wound from Neck Updated:  05/03/18 1039     Fungus (Mycology) Culture Culture in progress    Narrative:       Wound fluid    Aerobic culture  [[id number]] Collected:  04/28/18 1417    Order Status:  Completed Specimen:  Wound from Neck Updated:  05/01/18 0852     Aerobic Bacterial Culture No growth    Narrative:       Wound fluid    Aerobic culture [[id number]] Collected:  04/28/18 1413    Order Status:  Completed Specimen:  Wound from Neck Updated:  05/01/18 0852     Aerobic Bacterial Culture No growth    Narrative:       Wound fluid    AFB Culture & Smear [[id number]] Collected:  04/28/18 1417    Order Status:  Completed Specimen:  Wound from Neck Updated:  04/30/18 1313     AFB Culture & Smear Culture in progress     AFB CULTURE STAIN No acid fast bacilli seen.    Narrative:       Wound fluid    AFB Culture & Smear [[id number]] Collected:  04/28/18 1413    Order Status:  Completed Specimen:  Wound from Neck Updated:  04/30/18 1313     AFB Culture & Smear Culture in progress     AFB CULTURE STAIN No acid fast bacilli seen.    Narrative:       Wound fluid    Urine culture [[id number]] Collected:  04/27/18 1218    Order Status:  Completed Specimen:  Urine from Urine, Catheterized Updated:  04/28/18 2020     Urine Culture, Routine No growth    Gram stain [[id number]] Collected:  04/28/18 1417    Order Status:  Completed Specimen:  Wound from Neck Updated:  04/28/18 2009     Gram Stain Result Rare WBC's      No organisms seen    Narrative:       Wound fluid    Gram stain [[id number]] Collected:  04/28/18 1413    Order Status:  Completed Specimen:  Wound from Neck Updated:  04/28/18 2004     Gram Stain Result Rare WBC's      No organisms seen    Narrative:       Wound fluid    Urine culture [[id number]] Collected:  04/27/18 1218    Order Status:  Sent Specimen:  Urine from Urine, Catheterized Updated:  04/27/18 1218    Blood culture [[id number]] Collected:  04/21/18 1335    Order Status:  Completed Specimen:  Blood Updated:  04/26/18 1612     Blood Culture, Routine No growth after 5 days.    Narrative:       Blood cultures from 2 different sites. 4 bottles  total.  Please draw before starting antibiotics.    Blood culture [[id number]] Collected:  04/21/18 3704    Order Status:  Completed Specimen:  Blood Updated:  04/26/18 1612     Blood Culture, Routine No growth after 5 days.    Narrative:       Blood cultures x 2 different sites. 4 bottles total. Please  draw cultures before administering antibiotics.            Significant Diagnostics:  CT head 5/02 stable from previous

## 2018-05-03 NOTE — PROGRESS NOTES
Patient's family requesting respiratory therapy to treat patient d/t patient's gurgling noises while breathing. Multiple attempts made by RN to clear patient's airway by suctioning with yonker and [place] catheter, but not able to reach down far enough with suction to get it. Paged neurosurgery for order. Patient's oxygen saturation has not decreased. Patient is unable to cough up mucus due to having a weak cough.

## 2018-05-03 NOTE — ASSESSMENT & PLAN NOTE
--speech continues to recommend NPO  --currently receiving TFs   --planning on IR peg tube placement this Friday

## 2018-05-03 NOTE — PLAN OF CARE
Problem: Occupational Therapy Goal  Goal: Occupational Therapy Goal  Goals to be met by: 5/9  Patient will increase functional independence with ADLs by performing:    Squat pivot t/f to wheelchair with total(A).  Supine<>sit ; sit<>supine with Moderate Assistance.  Grooming while seated EOB with minimal assistance to wash face.    Pt will tolerate EOB activity ~15 min duration with min(A) for postural control.   Pt will keep eyes open 85% of session.  Pt will follow 50% of simple one step-commands during session.  Family/caregiver demo understanding for ROM and positioning for B UE.           OT re-evaluation complete and POC established.  SNF in NH is recommended upon d/c from hospital.    MADDIE Farooq  5/2/2018

## 2018-05-03 NOTE — PROGRESS NOTES
[facility]  Neurosurgery  Progress Note    Subjective:     History of Present Illness: 82 y.o.  male with type 2 diabetes, who presents today for follow up evaluation one week prior to 2 level ACDF scheduled for 4/10/2018. Pt reports    Pt presents today wearing a cervical collar and in a wheelchair. He would like to proceed with surgery. Per pt's family, his PCP would like to see him on 4/9, the day before surgery. He notes continued neck pain and BUE numbness/tingling as well as BUE weakness. Pt is only able to walk a few steps unassisted. He has never received neck surgery.     Post-Op Info:  Procedure(s) (LRB):  WASHOUT-CERVICAL-wound washout and csf leak repair with depuy (N/A)  DISKECTOMY AND FUSION-ANTERIOR CERVICAL (ACDF)- REVISON C5-C7   5 Days Post-Op     Interval History: NAEON. Patient remains lethargic on exam but answers questions appropriately and follow commands. Plan for PEG tomorrow    Medications:  Continuous Infusions:  Scheduled Meds:   atorvastatin  20 mg Per NG tube Nightly    [START ON 5/4/2018] barium  450 mL Oral Once    [START ON 5/4/2018] barium  450 mL Oral Once    cefTRIAXone (ROCEPHIN) IVPB  2 g Intravenous Q24H    cinacalcet  30 mg Oral Daily with breakfast    citalopram  10 mg Per NG tube Daily    diltiaZEM  30 mg Per NG tube Q6H    heparin (porcine)  5,000 Units Subcutaneous Q8H    insulin aspart U-100  2 Units Subcutaneous Q4H    insulin detemir U-100  13 Units Subcutaneous BID    levETIRAcetam  500 mg Per NG tube BID    polyethylene glycol  17 g Per NG tube BID    senna-docusate 8.6-50 mg  1 tablet Per NG tube BID    tamsulosin  0.4 mg Per NG tube Daily    traZODone  50 mg Per NG tube QHS    valsartan  80 mg Per NG tube Daily    vancomycin (VANCOCIN) IVPB  1,000 mg Intravenous Q12H     PRN Meds:acetaminophen, albuterol-ipratropium 2.5mg-0.5mg/3mL, aluminum-magnesium hydroxide-simethicone, dextrose 50%, fentaNYL, glucagon (human recombinant),  hydrocodone-acetaminophen 5-325mg, insulin aspart U-100, lidocaine HCL 2%, ondansetron, potassium chloride 10%, potassium chloride 10%, potassium chloride 10%, potassium, sodium phosphates, potassium, sodium phosphates, potassium, sodium phosphates     Review of Systems    Objective:     Weight: 99.5 kg (219 lb 5.7 oz)  Body mass index is 29.75 kg/m².  Vital Signs (Most Recent):  Temp: 97.3 °F (36.3 °C) (05/03/18 1146)  Pulse: 66 (05/03/18 1146)  Resp: 20 (05/03/18 1146)  BP: 139/80 (05/03/18 1146)  SpO2: 96 % (05/03/18 1146) Vital Signs (24h Range):  Temp:  [96.4 °F (35.8 °C)-97.8 °F (36.6 °C)] 97.3 °F (36.3 °C)  Pulse:  [61-72] 66  Resp:  [16-20] 20  SpO2:  [93 %-96 %] 96 %  BP: (134-148)/(65-80) 139/80               NG/OG Tube 04/21/18 2300 Cortrak Right nostril (Active)   Placement Check placement verified by aspirate characteristics;placement verified by distal tube length measurement 4/26/2018  8:45 PM   Distal Tube Length (cm) 75 4/25/2018  7:05 AM   Tolerance no signs/symptoms of discomfort 4/26/2018  8:45 PM   Securement anchored to nostril center w/ adhesive device 4/26/2018  8:45 PM   Clamp Status/Tolerance unclamped;no abdominal distention;no emesis;no nausea;no residual;no restlessness 4/26/2018  8:45 PM   Insertion Site Appearance no redness, warmth, tenderness, skin breakdown, drainage 4/26/2018  8:45 PM   Drainage None 4/25/2018 11:05 AM   Flush/Irrigation flushed w/;water;no resistance met 4/26/2018  8:45 PM   Feeding Method continuous 4/26/2018  8:45 PM   Current Rate (mL/hr) 55 mL/hr 4/26/2018  8:45 PM   Goal Rate (mL/hr) 55 mL/hr 4/26/2018  8:45 PM   Intake (mL) 30 mL 4/24/2018  5:00 PM   Water Bolus (mL) 100 mL 4/26/2018  5:00 AM   Rate Formula Tube Feeding (mL/hr) 55 mL/hr 4/24/2018  4:00 PM   Intake (mL) - Formula Tube Feeding 513 4/26/2018  5:00 AM   Residual Amount (ml) 0 ml 4/24/2018  7:05 AM       Male External Urinary Catheter 04/23/18 1345 Medium (Active)   Collection Container Standard  drainage bag 4/26/2018  8:45 PM   Skin reddened 4/26/2018  8:45 PM   Tolerance no signs/symptoms of discomfort 4/26/2018  8:45 PM   Output (mL) 575 mL 4/27/2018  6:00 AM   Catheter Change Date 04/26/18 4/26/2018  8:45 PM   Catheter Change Time 2300 4/26/2018  8:45 PM       Neurosurgery Physical Exam    General: well developed, well nourished. no acute distress. Generalized deconditioning   Neurologic: lethargic. Requires persistant stim to participate. Oriented x 3. Thought content appropriate. Follows commands   Head: normocephalic, atraumatic   GCS: Motor: 6/Verbal: 5/Eyes: 4 GCS Total: 15  Cranial nerves: face symmetric, tongue midline, pupils equal, round, reactive to light with accomodation, extraocular muscles intact. CN II-XII grossly intact.   Language: no aphasia  Speech:  dysarthria   Sensory: decrease response to light touch in BUE  Motor Strength: Moves all extremities spontaneously with good tone.      Strength   Deltoids Triceps Biceps Wrist Extension Wrist Flexion Hand    Upper: R 2/5 3/5 3/5 2/5 2/5 3/5     L 3/5 4-/5 4-/5 3/5 3/5 3/5       Iliopsoas Quadriceps Knee  Flexion Tibialis  anterior Gastro- cnemius EHL   Lower: R 3/5 3/5 3/5 4-/5 4-/5 4-/5     L 4-/5 3/5 3/5 4/5 4/5 4/5         [place]: present on right   Clonus: absent     wound is c/d/i, no erythema, ttp, or drainage.  wound edges are well approximated. No surrounding edema       Significant Labs:    Recent Labs  Lab 05/02/18 0457 05/03/18  0422   * 224*    139   K 4.1 4.5    106   CO2 26 27   BUN 22 18   CREATININE 0.7 0.7   CALCIUM 11.1* 10.9*   MG 2.1 2.0       Recent Labs  Lab 05/02/18 0457 05/03/18  0422   WBC 10.51 9.30   HGB 10.6* 10.6*   HCT 34.3* 34.0*    303       Recent Labs  Lab 05/02/18 0457 05/03/18  0422   INR 1.2 1.1     Microbiology Results (last 7 days)     Procedure Component Value Units Date/Time    Culture, Anaerobe [[id number]] Collected:  04/28/18 1417    Order Status:  Completed  Specimen:  Wound from Neck Updated:  05/03/18 1244     Anaerobic Culture Culture in progress    Narrative:       Wound fluid    Culture, Anaerobe [[id number]] Collected:  04/28/18 1413    Order Status:  Completed Specimen:  Wound from Neck Updated:  05/03/18 1244     Anaerobic Culture Culture in progress    Narrative:       Wound fluid    Fungus culture [[id number]] Collected:  04/28/18 1413    Order Status:  Completed Specimen:  Wound from Neck Updated:  05/03/18 1039     Fungus (Mycology) Culture Culture in progress    Narrative:       Wound fluid    Fungus culture [[id number]] Collected:  04/28/18 1417    Order Status:  Completed Specimen:  Wound from Neck Updated:  05/03/18 1039     Fungus (Mycology) Culture Culture in progress    Narrative:       Wound fluid    Aerobic culture [[id number]] Collected:  04/28/18 1417    Order Status:  Completed Specimen:  Wound from Neck Updated:  05/01/18 0852     Aerobic Bacterial Culture No growth    Narrative:       Wound fluid    Aerobic culture [[id number]] Collected:  04/28/18 1413    Order Status:  Completed Specimen:  Wound from Neck Updated:  05/01/18 0852     Aerobic Bacterial Culture No growth    Narrative:       Wound fluid    AFB Culture & Smear [[id number]] Collected:  04/28/18 1417    Order Status:  Completed Specimen:  Wound from Neck Updated:  04/30/18 1313     AFB Culture & Smear Culture in progress     AFB CULTURE STAIN No acid fast bacilli seen.    Narrative:       Wound fluid    AFB Culture & Smear [[id number]] Collected:  04/28/18 1413    Order Status:  Completed Specimen:  Wound from Neck Updated:  04/30/18 1313     AFB Culture & Smear Culture in progress     AFB CULTURE STAIN No acid fast bacilli seen.    Narrative:       Wound fluid    Urine culture [[id number]] Collected:  04/27/18 1218    Order Status:  Completed Specimen:  Urine from Urine, Catheterized Updated:  04/28/18 2020     Urine Culture, Routine No growth    Gram stain [[id number]] Collected:  04/28/18  1417    Order Status:  Completed Specimen:  Wound from Neck Updated:  04/28/18 2009     Gram Stain Result Rare WBC's      No organisms seen    Narrative:       Wound fluid    Gram stain [[id number]] Collected:  04/28/18 1413    Order Status:  Completed Specimen:  Wound from Neck Updated:  04/28/18 2004     Gram Stain Result Rare WBC's      No organisms seen    Narrative:       Wound fluid    Urine culture [[id number]] Collected:  04/27/18 1218    Order Status:  Sent Specimen:  Urine from Urine, Catheterized Updated:  04/27/18 1218    Blood culture [[id number]] Collected:  04/21/18 1335    Order Status:  Completed Specimen:  Blood Updated:  04/26/18 1612     Blood Culture, Routine No growth after 5 days.    Narrative:       Blood cultures from 2 different sites. 4 bottles total.  Please draw before starting antibiotics.    Blood culture [[id number]] Collected:  04/21/18 1335    Order Status:  Completed Specimen:  Blood Updated:  04/26/18 1612     Blood Culture, Routine No growth after 5 days.    Narrative:       Blood cultures x 2 different sites. 4 bottles total. Please  draw cultures before administering antibiotics.            Significant Diagnostics:  CT head 5/02 stable from previous    Assessment/Plan:     * Cervical myelopathy with cervical radiculopathy    82 y.o. male s/p C3-5 ACDF for cervical myelopathy on 4/10 with Dr. Hess, and now s/p evacuation/washout of fluid collection with CSF leak repair POD#5     -Repeat CT Head stable   -Cont neuro checks q4h   -NPO per ST, Plan for PEG placement tomorrow with IR   -ID: continue vanc and ceftriaxone x 7 days from surgery (4/28) Vanc trough before 4th dose, 18. Trough Goal 15-20. Surgical cultures NGTD  -CV goal normotension. Continue home meds. PRN meds SBP >160.   -Urinary retention- condom cath in place, continue Flomax  -Continue PILI/SCDs/SQH  -Continue bowel regimen daily. Brown bomb today. Pt without abdominal tenderness or distention, unclear when last BM  was   -Continue to hold Eliquis. Per , indications unclear.  does not recommend continuing upon discharge.  -Appreciate  assistance with management  -Hypercalcemia: home dose sensipar restarted, improved today will CTM   -Continue aggressive PT/OT/ST  -Cervical collar when OOB or with activity  -IS to bed side. Patient to use atleast 10x every hour. Scheduled CPT/duonebs.   -SNF pending. CM/SW to pursue   -Discussed with Dr. Deshawn Carranza PA-C  Neurosurgery  [facility]

## 2018-05-03 NOTE — ASSESSMENT & PLAN NOTE
82 y.o. male s/p C3-5 ACDF for cervical myelopathy on 4/10 with Dr. Hess, and now s/p evacuation/washout of fluid collection with CSF leak repair POD#5     -Repeat CT Head stable   -Cont neuro checks q4h   -NPO per ST, Plan for PEG placement tomorrow with IR   -ID: continue vanc and ceftriaxone x 7 days from surgery (4/28) Vanc trough before 4th dose, 18. Trough Goal 15-20. Surgical cultures NGTD  -CV goal normotension. Continue home meds. PRN meds SBP >160.   -Urinary retention- condom cath in place, continue Flomax  -Continue PILI/SCDs/SQH  -Continue bowel regimen daily. Brown bomb today. Pt without abdominal tenderness or distention, unclear when last BM was   -Continue to hold Eliquis. Per , indications unclear.  does not recommend continuing upon discharge.  -Appreciate  assistance with management  -Hypercalcemia: home dose sensipar restarted, improved today will CTM   -Continue aggressive PT/OT/ST  -Cervical collar when OOB or with activity  -IS to bed side. Patient to use atleast 10x every hour. Scheduled CPT/duonebs.   -SNF pending. CM/SW to pursue   -Discussed with Dr. Hess

## 2018-05-03 NOTE — H&P
"Inpatient Radiology Pre-procedure Note    History of Present Illness:  Hollis Pitts is a 82 y.o. male who presents for PEG tube placement.    Admission H&P reviewed.  Past Medical History:   Diagnosis Date    Benign prostatic hyperplasia with urinary retention 4/11/2018    Cervical myelopathy with cervical radiculopathy 4/10/2018    Depression     Dyslipidemia     Essential hypertension 4/11/2018    Glaucoma (increased eye pressure)     Mild cognitive impairment     Traumatic brain injury 07/2017    Type 2 diabetes mellitus with hyperglycemia, without long-term current use of insulin 4/11/2018     Past Surgical History:   Procedure Laterality Date    CATARACT EXTRACTION W/  INTRAOCULAR LENS IMPLANT         Review of Systems:   As documented in primary team H&P    Home Meds:   Prior to Admission medications    Medication Sig Start Date End Date Taking? Authorizing Provider   atorvastatin (LIPITOR) 20 MG tablet Take 20 mg by mouth nightly. 11/2/17  Yes Historical Provider, MD   bisacodyl (DULCOLAX) 5 mg EC tablet  2/12/18  Yes Historical Provider, MD   CARTIA  mg Cp24 Take 90 mg by mouth.  11/2/17  Yes Historical Provider, MD   citalopram (CELEXA) 20 MG tablet Take 20 mg by mouth once daily. 2/18/15  Yes Historical Provider, MD   gabapentin (NEURONTIN) 100 MG capsule Take 100 mg by mouth 3 (three) times daily as needed.  11/2/17  Yes Historical Provider, MD   SENSIPAR 30 mg Tab Take 30 mg by mouth daily with breakfast.  11/16/17  Yes Historical Provider, MD   traZODone (DESYREL) 50 MG tablet Take 50 mg by mouth every evening. 12/6/17  Yes Historical Provider, MD   valsartan (DIOVAN) 80 MG tablet Take 80 mg by mouth once daily. 11/2/17  Yes Historical Provider, MD   vitamin B comp with C no.4 150 mg Tab Take vitamin B complex 1 a day.  Over-the-counter is an appropriate alternative. 4/19/16  Yes Carter Hidalgo MD   BD INSULIN PEN NEEDLE UF SHORT 31 gauge x 5/16" Ndle USE WITH LANTUS PEN DAILY " 11/4/17   Historical Provider, MD   CONSTULOSE 10 gram/15 mL solution TAKE  30 MILLILITERS BY MOUTH 3 TIMES A DAY UNTIL BOWEL MOVEMENT ...  (REFER TO PRESCRIPTION NOTES). 12/6/17   Historical Provider, MD   ELIQUIS 2.5 mg Tab Take 2.5 mg by mouth 2 (two) times daily.  11/29/17   Historical Provider, MD   FREESTYLE TEST Strp  2/17/15   Historical Provider, MD   omega-3 fatty acids-fish oil (FISH OIL) 360-1,200 mg Cap Take 1,200 mg by mouth once daily.    Historical Provider, MD     Scheduled Meds:    atorvastatin  20 mg Per NG tube Nightly    cefTRIAXone (ROCEPHIN) IVPB  2 g Intravenous Q24H    cinacalcet  30 mg Oral Daily with breakfast    citalopram  10 mg Per NG tube Daily    diltiaZEM  30 mg Per NG tube Q6H    heparin (porcine)  5,000 Units Subcutaneous Q8H    insulin aspart U-100  2 Units Subcutaneous Q4H    insulin detemir U-100  13 Units Subcutaneous BID    levETIRAcetam  500 mg Per NG tube BID    polyethylene glycol  17 g Per NG tube BID    senna-docusate 8.6-50 mg  1 tablet Per NG tube BID    tamsulosin  0.4 mg Per NG tube Daily    traZODone  50 mg Per NG tube QHS    valsartan  80 mg Per NG tube Daily    vancomycin (VANCOCIN) IVPB  1,000 mg Intravenous Q12H     Continuous Infusions:   PRN Meds:acetaminophen, albuterol-ipratropium 2.5mg-0.5mg/3mL, aluminum-magnesium hydroxide-simethicone, dextrose 50%, fentaNYL, glucagon (human recombinant), hydrocodone-acetaminophen 5-325mg, insulin aspart U-100, lidocaine HCL 2%, ondansetron, potassium chloride 10%, potassium chloride 10%, potassium chloride 10%, potassium, sodium phosphates, potassium, sodium phosphates, potassium, sodium phosphates  Anticoagulants/Antiplatelets: no anticoagulation outpatient sherita D/C'ed    Allergies: Review of patient's allergies indicates:  No Known Allergies  Sedation Hx: have not been any systemic reactions    Labs:    Recent Labs  Lab 05/03/18  0422   INR 1.1       Recent Labs  Lab 05/03/18  0422   WBC 9.30   HGB  10.6*   HCT 34.0*   MCV 99*         Recent Labs  Lab 05/03/18  0422   *      K 4.5      CO2 27   BUN 18   CREATININE 0.7   CALCIUM 10.9*   MG 2.0   ALT 16   AST 16   ALBUMIN 2.1*   BILITOT 0.3         Vitals:  Temp: 97.8 °F (36.6 °C) (05/03/18 0755)  Pulse: 61 (05/03/18 0755)  Resp: 16 (05/03/18 0755)  BP: 134/73 (05/03/18 0755)  SpO2: (!) 93 % (05/03/18 0755)     Physical Exam:  ASA: 3  Mallampati: unclear, patient unable to fully cooperate.    General: no acute distress  Mental Status: unclear  HEENT: normocephalic, postsurgical, C collar in place  Chest: unlabored breathing  Abdomen: nondistended  Extremity: intact.    Plan: PEG tube placement Friday, will administer barium contrast via NGT prior to procedure.    Sedation Plan: moderate    Vikash Beck MD  Radiology

## 2018-05-03 NOTE — SUBJECTIVE & OBJECTIVE
"Interval History: NAEON. Patient is lethargic, but rouses to stimulation. He is intermittently answering questions and not following commands. He has no complaints.    Past Medical History:    Diagnosis Date    Benign prostatic hyperplasia with urinary retention 4/11/2018    Cervical myelopathy with cervical radiculopathy 4/10/2018    Depression     Dyslipidemia     Essential hypertension 4/11/2018    Glaucoma (increased eye pressure)     Mild cognitive impairment     Traumatic brain injury 07/2017    Type 2 diabetes mellitus with hyperglycemia, without long-term current use of insulin 4/11/2018       Past Surgical History:   Procedure Laterality Date    CATARACT EXTRACTION W/  INTRAOCULAR LENS IMPLANT         Review of patient's allergies indicates:  No Known Allergies    No current facility-administered medications on file prior to encounter.      Current Outpatient Prescriptions on File Prior to Encounter   Medication Sig    atorvastatin (LIPITOR) 20 MG tablet Take 20 mg by mouth nightly.    bisacodyl (DULCOLAX) 5 mg EC tablet     CARTIA  mg Cp24 Take 90 mg by mouth.     citalopram (CELEXA) 20 MG tablet Take 20 mg by mouth once daily.    gabapentin (NEURONTIN) 100 MG capsule Take 100 mg by mouth 3 (three) times daily as needed.     SENSIPAR 30 mg Tab Take 30 mg by mouth daily with breakfast.     traZODone (DESYREL) 50 MG tablet Take 50 mg by mouth every evening.    valsartan (DIOVAN) 80 MG tablet Take 80 mg by mouth once daily.    vitamin B comp with C no.4 150 mg Tab Take vitamin B complex 1 a day.  Over-the-counter is an appropriate alternative.    BD INSULIN PEN NEEDLE UF SHORT 31 gauge x 5/16" Ndle USE WITH LANTUS PEN DAILY    CONSTULOSE 10 gram/15 mL solution TAKE  30 MILLILITERS BY MOUTH 3 TIMES A DAY UNTIL BOWEL MOVEMENT ...  (REFER TO PRESCRIPTION NOTES).    ELIQUIS 2.5 mg Tab Take 2.5 mg by mouth 2 (two) times daily.     FREESTYLE TEST Strp      Family History     Problem " Relation (Age of Onset)    Cancer Maternal Grandmother    Clotting disorder Mother    Paranoid behavior Mother        Social History Main Topics    Smoking status: Former Smoker     Quit date: 2/6/1950    Smokeless tobacco: Never Used    Alcohol use 4.2 oz/week     7 Shots of liquor per week    Drug use: No    Sexual activity: Yes     Partners: Female     Review of Systems   Unable to perform ROS: Mental status change     Objective:     Vital Signs (Most Recent):  Temp: 97.8 °F (36.6 °C) (05/03/18 0755)  Pulse: 61 (05/03/18 0755)  Resp: 16 (05/03/18 0755)  BP: 134/73 (05/03/18 0755)  SpO2: (!) 93 % (05/03/18 0755) Vital Signs (24h Range):  Temp:  [96.4 °F (35.8 °C)-97.8 °F (36.6 °C)] 97.8 °F (36.6 °C)  Pulse:  [61-72] 61  Resp:  [16-18] 16  SpO2:  [93 %-96 %] 93 %  BP: (121-148)/(65-73) 134/73     Weight: 99.5 kg (219 lb 5.7 oz)  Body mass index is 29.75 kg/m².    Physical Exam   Constitutional: He appears well-developed. No distress.   HENT:   Head: Normocephalic.   Eyes: Conjunctivae are normal. Right eye exhibits no discharge. Left eye exhibits no discharge.   Neck:   Cervical collar in place   Cardiovascular: Normal rate and regular rhythm.    Pulmonary/Chest: No respiratory distress. He has no wheezes. He has rales.   Coarse breath sounds bilaterally   Abdominal: Soft. Bowel sounds are normal. He exhibits no distension. There is no tenderness.   Musculoskeletal: He exhibits no edema or tenderness.   Neurological:   Somnolent, does not follow commands.   Skin: Skin is warm and dry. He is not diaphoretic.   Vitals reviewed.      Significant Labs: All pertinent labs within the past 24 hours have been reviewed.    Significant Imaging: I have reviewed all pertinent imaging results/findings within the past 24 hours.

## 2018-05-04 LAB
ALBUMIN SERPL BCP-MCNC: 2.2 G/DL
ALP SERPL-CCNC: 83 U/L
ALT SERPL W/O P-5'-P-CCNC: 16 U/L
ANION GAP SERPL CALC-SCNC: 2 MMOL/L
AST SERPL-CCNC: 18 U/L
BASOPHILS # BLD AUTO: 0.04 K/UL
BASOPHILS NFR BLD: 0.4 %
BILIRUB SERPL-MCNC: 0.3 MG/DL
BUN SERPL-MCNC: 17 MG/DL
CALCIUM SERPL-MCNC: 11 MG/DL
CHLORIDE SERPL-SCNC: 105 MMOL/L
CO2 SERPL-SCNC: 30 MMOL/L
CREAT SERPL-MCNC: 0.6 MG/DL
DIFFERENTIAL METHOD: ABNORMAL
EOSINOPHIL # BLD AUTO: 0.1 K/UL
EOSINOPHIL NFR BLD: 0.9 %
ERYTHROCYTE [DISTWIDTH] IN BLOOD BY AUTOMATED COUNT: 15.7 %
EST. GFR  (AFRICAN AMERICAN): >60 ML/MIN/1.73 M^2
EST. GFR  (NON AFRICAN AMERICAN): >60 ML/MIN/1.73 M^2
GLUCOSE SERPL-MCNC: 128 MG/DL
HCT VFR BLD AUTO: 34.6 %
HGB BLD-MCNC: 10.7 G/DL
IMM GRANULOCYTES # BLD AUTO: 0.22 K/UL
IMM GRANULOCYTES NFR BLD AUTO: 2.3 %
INR PPP: 1.1
LYMPHOCYTES # BLD AUTO: 1.7 K/UL
LYMPHOCYTES NFR BLD: 17.6 %
MAGNESIUM SERPL-MCNC: 2 MG/DL
MCH RBC QN AUTO: 30.8 PG
MCHC RBC AUTO-ENTMCNC: 30.9 G/DL
MCV RBC AUTO: 100 FL
MONOCYTES # BLD AUTO: 0.9 K/UL
MONOCYTES NFR BLD: 9.5 %
NEUTROPHILS # BLD AUTO: 6.6 K/UL
NEUTROPHILS NFR BLD: 69.3 %
NRBC BLD-RTO: 0 /100 WBC
PHOSPHATE SERPL-MCNC: 2.2 MG/DL
PLATELET # BLD AUTO: 295 K/UL
PMV BLD AUTO: 10.8 FL
POCT GLUCOSE: 152 MG/DL (ref 70–110)
POTASSIUM SERPL-SCNC: 4.4 MMOL/L
PROT SERPL-MCNC: 5.9 G/DL
PROTHROMBIN TIME: 11.6 SEC
RBC # BLD AUTO: 3.47 M/UL
SODIUM SERPL-SCNC: 137 MMOL/L
WBC # BLD AUTO: 9.54 K/UL

## 2018-05-04 PROCEDURE — 25000003 PHARM REV CODE 250: Performed by: PSYCHIATRY & NEUROLOGY

## 2018-05-04 PROCEDURE — 83735 ASSAY OF MAGNESIUM: CPT

## 2018-05-04 PROCEDURE — 85610 PROTHROMBIN TIME: CPT

## 2018-05-04 PROCEDURE — 63600175 PHARM REV CODE 636 W HCPCS: Mod: JG | Performed by: RADIOLOGY

## 2018-05-04 PROCEDURE — 80053 COMPREHEN METABOLIC PANEL: CPT

## 2018-05-04 PROCEDURE — 25000003 PHARM REV CODE 250: Performed by: PHYSICIAN ASSISTANT

## 2018-05-04 PROCEDURE — 99232 SBSQ HOSP IP/OBS MODERATE 35: CPT | Mod: ,,, | Performed by: HOSPITALIST

## 2018-05-04 PROCEDURE — A9698 NON-RAD CONTRAST MATERIALNOC: HCPCS | Performed by: STUDENT IN AN ORGANIZED HEALTH CARE EDUCATION/TRAINING PROGRAM

## 2018-05-04 PROCEDURE — 94760 N-INVAS EAR/PLS OXIMETRY 1: CPT

## 2018-05-04 PROCEDURE — 0DH63UZ INSERTION OF FEEDING DEVICE INTO STOMACH, PERCUTANEOUS APPROACH: ICD-10-PCS | Performed by: RADIOLOGY

## 2018-05-04 PROCEDURE — 85025 COMPLETE CBC W/AUTO DIFF WBC: CPT

## 2018-05-04 PROCEDURE — 63600175 PHARM REV CODE 636 W HCPCS: Performed by: PHYSICIAN ASSISTANT

## 2018-05-04 PROCEDURE — 99024 POSTOP FOLLOW-UP VISIT: CPT | Mod: POP,,, | Performed by: PHYSICIAN ASSISTANT

## 2018-05-04 PROCEDURE — 94761 N-INVAS EAR/PLS OXIMETRY MLT: CPT

## 2018-05-04 PROCEDURE — 92526 ORAL FUNCTION THERAPY: CPT

## 2018-05-04 PROCEDURE — 25000003 PHARM REV CODE 250: Performed by: STUDENT IN AN ORGANIZED HEALTH CARE EDUCATION/TRAINING PROGRAM

## 2018-05-04 PROCEDURE — 84100 ASSAY OF PHOSPHORUS: CPT

## 2018-05-04 PROCEDURE — 63600175 PHARM REV CODE 636 W HCPCS: Performed by: PSYCHIATRY & NEUROLOGY

## 2018-05-04 PROCEDURE — 36415 COLL VENOUS BLD VENIPUNCTURE: CPT

## 2018-05-04 PROCEDURE — 25500020 PHARM REV CODE 255: Performed by: STUDENT IN AN ORGANIZED HEALTH CARE EDUCATION/TRAINING PROGRAM

## 2018-05-04 PROCEDURE — 63600175 PHARM REV CODE 636 W HCPCS: Performed by: STUDENT IN AN ORGANIZED HEALTH CARE EDUCATION/TRAINING PROGRAM

## 2018-05-04 PROCEDURE — 94668 MNPJ CHEST WALL SBSQ: CPT

## 2018-05-04 PROCEDURE — 20600001 HC STEP DOWN PRIVATE ROOM

## 2018-05-04 RX ORDER — FENTANYL CITRATE 50 UG/ML
INJECTION, SOLUTION INTRAMUSCULAR; INTRAVENOUS CODE/TRAUMA/SEDATION MEDICATION
Status: COMPLETED | OUTPATIENT
Start: 2018-05-04 | End: 2018-05-04

## 2018-05-04 RX ORDER — GLUCAGON 1 MG
KIT INJECTION CODE/TRAUMA/SEDATION MEDICATION
Status: COMPLETED | OUTPATIENT
Start: 2018-05-04 | End: 2018-05-04

## 2018-05-04 RX ADMIN — DILTIAZEM HYDROCHLORIDE 30 MG: 30 TABLET, FILM COATED ORAL at 12:05

## 2018-05-04 RX ADMIN — CEFTRIAXONE SODIUM 2 G: 2 INJECTION, POWDER, FOR SOLUTION INTRAMUSCULAR; INTRAVENOUS at 06:05

## 2018-05-04 RX ADMIN — INSULIN DETEMIR 13 UNITS: 100 INJECTION, SOLUTION SUBCUTANEOUS at 09:05

## 2018-05-04 RX ADMIN — FENTANYL CITRATE 25 MCG: 50 INJECTION, SOLUTION INTRAMUSCULAR; INTRAVENOUS at 03:05

## 2018-05-04 RX ADMIN — GLUCAGON HYDROCHLORIDE 1 MG: KIT at 03:05

## 2018-05-04 RX ADMIN — HEPARIN SODIUM 5000 UNITS: 5000 INJECTION, SOLUTION INTRAVENOUS; SUBCUTANEOUS at 10:05

## 2018-05-04 RX ADMIN — HEPARIN SODIUM 5000 UNITS: 5000 INJECTION, SOLUTION INTRAVENOUS; SUBCUTANEOUS at 05:05

## 2018-05-04 RX ADMIN — STANDARDIZED SENNA CONCENTRATE AND DOCUSATE SODIUM 1 TABLET: 8.6; 5 TABLET, FILM COATED ORAL at 10:05

## 2018-05-04 RX ADMIN — Medication 450 ML: at 08:05

## 2018-05-04 RX ADMIN — VANCOMYCIN HYDROCHLORIDE 1 G: 10 INJECTION, POWDER, LYOPHILIZED, FOR SOLUTION INTRAVENOUS at 07:05

## 2018-05-04 RX ADMIN — INSULIN ASPART 2 UNITS: 100 INJECTION, SOLUTION INTRAVENOUS; SUBCUTANEOUS at 10:05

## 2018-05-04 RX ADMIN — Medication 450 ML: at 09:05

## 2018-05-04 RX ADMIN — ATORVASTATIN CALCIUM 20 MG: 20 TABLET, FILM COATED ORAL at 10:05

## 2018-05-04 RX ADMIN — DILTIAZEM HYDROCHLORIDE 30 MG: 30 TABLET, FILM COATED ORAL at 05:05

## 2018-05-04 RX ADMIN — LEVETIRACETAM 500 MG: 500 TABLET, FILM COATED ORAL at 10:05

## 2018-05-04 NOTE — ASSESSMENT & PLAN NOTE
-- home meds include diltiazem XR 90 mg daily and valsartan 80 mg daily  -- continue current diltiazem regimen, stopped coreg and increased valsartan from 40 to 80 mg daily  -- will cont to monitor and adjust meds prn

## 2018-05-04 NOTE — ASSESSMENT & PLAN NOTE
82 y.o. male s/p C3-5 ACDF for cervical myelopathy on 4/10 with Dr. Hess, and now s/p evacuation/washout of fluid collection with CSF leak repair POD#6     -Repeat CT Head stable   -Cont neuro checks q4h   -Will follow up KUB and repeat UA/Ucx given persistent lethargy  -NPO per ST, Plan for PEG placement today with IR. TF held at midnight  -ID: continue vanc and ceftriaxone x 7 days from surgery (4/28) Vanc trough before 4th dose, 18. Trough Goal 15-20. Surgical cultures NGTD  -CV goal normotension. Continue home meds. PRN meds SBP >160.   -Urinary retention- condom cath in place, continue Flomax. Replace and get repeat UA today that was ordered 4/27  -Continue PILI/SCDs/SQH  -Continue bowel regimen daily. Brown bomb 5/3 with +BM  -Continue to hold Eliquis. Per , indications unclear.  does not recommend continuing upon discharge.  -Appreciate  assistance with management  -Hypercalcemia: home dose sensipar restarted per HM, CTM   -Continue aggressive PT/OT/ST  -Cervical collar when OOB or with activity  -Scheduled CPT q4 hours   -Aggressive oral care per nursing staff daily  -SNF pending. CM/SW to pursue   -Discuss with Dr. Hess

## 2018-05-04 NOTE — ASSESSMENT & PLAN NOTE
-- A1C 5.6, glucoses 172-217 inpast 24 hours  -- per chart review patient had reported being controlled by lifestyle modification alone at home   -- currently on continuous tube feeds with diabetisource   -- cont detemir 13 units BID  -- aspart 2 units q4 + low dose SSI

## 2018-05-04 NOTE — PLAN OF CARE
Problem: SLP Goal  Goal: SLP Goal  Speech Language Pathology Goals  Goals expected to be met by 5/8    1. Pt will participate in ongoing swallow assessment  2. Pt will participate in speech language cognitive assessment MEt  3. Pt will complete rote speech tasks with 80% accy and mod cues  4. Pt will follow 1 step commands with 90% accuracy and occasional cues  5. Pt will participate in ongoing speech language cognitive assessment.         Speech Language Pathology Goals  Goals expected to be met by 4/29:    1. Pt will participate in ongoing assessment of swallow.             Outcome: Ongoing (interventions implemented as appropriate)  Goals remain appropriate, cont POC. RADHA Condon, CCC/SLP  5/4/2018

## 2018-05-04 NOTE — ASSESSMENT & PLAN NOTE
-- Management per primary NSRGY team  -- s/p anterior cervical disectomy and fusion with plating of C5-6, C6-7 on 4/10  -- s/p lumbar drain placement 4/13 for post-op CSF leak, drain removed 4/20  -- s/p washout 4/28 with repair of CSF leak  -- pt with weak cough and gurgling post-operatively, agree with scheduled CPT, incentive spirometry, suctioning with respiratory therapy, and aggressive PT/OT/SLP

## 2018-05-04 NOTE — SUBJECTIVE & OBJECTIVE
Interval History: NAEON. Patient remains lethargic today. He is arousable to constant stim, follows commands and oriented x 2. +BM overnight. TF held at midnight in preparation for PEG placement today.     Medications:  Continuous Infusions:  Scheduled Meds:   atorvastatin  20 mg Per NG tube Nightly    cefTRIAXone (ROCEPHIN) IVPB  2 g Intravenous Q24H    cinacalcet  30 mg Oral Daily with breakfast    citalopram  10 mg Per NG tube Daily    diltiaZEM  30 mg Per NG tube Q6H    heparin (porcine)  5,000 Units Subcutaneous Q8H    insulin aspart U-100  2 Units Subcutaneous Q4H    insulin detemir U-100  13 Units Subcutaneous BID    levETIRAcetam  500 mg Per NG tube BID    polyethylene glycol  17 g Per NG tube BID    senna-docusate 8.6-50 mg  1 tablet Per NG tube BID    tamsulosin  0.4 mg Per NG tube Daily    valsartan  80 mg Per NG tube Daily    vancomycin (VANCOCIN) IVPB  1,000 mg Intravenous Q12H     PRN Meds:acetaminophen, albuterol-ipratropium 2.5mg-0.5mg/3mL, aluminum-magnesium hydroxide-simethicone, dextrose 50%, fentaNYL, glucagon (human recombinant), hydrocodone-acetaminophen 5-325mg, insulin aspart U-100, lidocaine HCL 2%, ondansetron, potassium chloride 10%, potassium chloride 10%, potassium chloride 10%, potassium, sodium phosphates, potassium, sodium phosphates, potassium, sodium phosphates     Review of Systems  Objective:     Weight: 99.5 kg (219 lb 5.7 oz)  Body mass index is 29.75 kg/m².  Vital Signs (Most Recent):  Temp: 96.4 °F (35.8 °C) (05/04/18 0815)  Pulse: 79 (05/04/18 1100)  Resp: 16 (05/04/18 0815)  BP: (!) 149/66 (05/04/18 0815)  SpO2: 97 % (05/04/18 0948) Vital Signs (24h Range):  Temp:  [96.1 °F (35.6 °C)-98.6 °F (37 °C)] 96.4 °F (35.8 °C)  Pulse:  [63-79] 79  Resp:  [16-20] 16  SpO2:  [92 %-97 %] 97 %  BP: (139-165)/(66-80) 149/66                           NG/OG Tube 04/21/18 2300 Cortrak Right nostril (Active)   Placement Check placement verified by aspirate characteristics  5/3/2018  8:00 PM   Distal Tube Length (cm) 70 5/3/2018  8:00 PM   Tolerance no signs/symptoms of discomfort 5/3/2018  8:00 PM   Securement anchored to nostril center w/ adhesive device 5/3/2018  8:00 PM   Clamp Status/Tolerance unclamped 5/3/2018  8:00 PM   Insertion Site Appearance no redness, warmth, tenderness, skin breakdown, drainage 5/3/2018  8:00 PM   Drainage None 5/3/2018  8:00 PM   Flush/Irrigation flushed w/;water 5/3/2018  8:00 PM   Feeding Method continuous 5/3/2018  8:00 PM   Current Rate (mL/hr) 55 mL/hr 5/3/2018  8:00 PM   Goal Rate (mL/hr) 55 mL/hr 5/3/2018  8:00 PM   Intake (mL) 60 mL 5/3/2018  8:00 PM   Water Bolus (mL) 250 mL 5/3/2018  4:41 PM   Intake (mL) - Breast Milk Tube Feeding 0 4/29/2018 11:05 PM   Rate Formula Tube Feeding (mL/hr) 55 mL/hr 5/2/2018 10:00 PM   Intake (mL) - Formula Tube Feeding 570 5/3/2018  5:00 AM   Residual Amount (ml) 0 ml 5/2/2018 10:00 PM       Male External Urinary Catheter 04/30/18 1600 (Active)   Collection Container Urimeter 5/3/2018  8:00 PM   Securement Method secured to top of thigh w/ adhesive device 5/3/2018  8:00 PM   Skin no redness;no breakdown 5/3/2018  8:00 PM   Tolerance no signs/symptoms of discomfort 5/2/2018 10:00 PM   Output (mL) 700 mL 5/3/2018  4:41 PM       Neurosurgery Physical Exam  General: well developed, well nourished. no acute distress. Generalized deconditioning   Neurologic: lethargic. Requires persistant stim to participate. Oriented x 2. Thought content appropriate. Follows commands   Head: normocephalic, atraumatic   GCS: Motor: 6/Verbal: 5/Eyes: 4 GCS Total: 15  Cranial nerves: face symmetric, tongue midline, pupils equal, round, reactive to light with accomodation, extraocular muscles intact. CN II-XII grossly intact.   Language: no aphasia  Speech:  dysarthria   Sensory: decrease response to light touch in BUE  Motor Strength: Moves all extremities spontaneously with good tone.      Strength   Deltoids Triceps Biceps Wrist  Extension Wrist Flexion Hand    Upper: R 2/5 3/5 3/5 2/5 2/5 3/5     L 3/5 4-/5 4-/5 3/5 3/5 3/5       Iliopsoas Quadriceps Knee  Flexion Tibialis  anterior Gastro- cnemius EHL   Lower: R 3/5 3/5 3/5 4-/5 4-/5 4-/5     L 3/5 3/5 3/5 4/5 4/5 4/5          [place]: present on right   Clonus: absent     wound is c/d/i, no erythema, ttp, or drainage.  wound edges are well approximated. No surrounding edema     Significant Labs:    Recent Labs  Lab 05/03/18  0422 05/04/18  0429   * 128*    137   K 4.5 4.4    105   CO2 27 30*   BUN 18 17   CREATININE 0.7 0.6   CALCIUM 10.9* 11.0*   MG 2.0 2.0       Recent Labs  Lab 05/03/18  0422 05/04/18  0429   WBC 9.30 9.54   HGB 10.6* 10.7*   HCT 34.0* 34.6*    295       Recent Labs  Lab 05/03/18  0422 05/04/18  0429   INR 1.1 1.1     Microbiology Results (last 7 days)     Procedure Component Value Units Date/Time    Culture, Anaerobe [[id number]] Collected:  04/28/18 1417    Order Status:  Completed Specimen:  Wound from Neck Updated:  05/03/18 1244     Anaerobic Culture Culture in progress    Narrative:       Wound fluid    Culture, Anaerobe [[id number]] Collected:  04/28/18 1413    Order Status:  Completed Specimen:  Wound from Neck Updated:  05/03/18 1244     Anaerobic Culture Culture in progress    Narrative:       Wound fluid    Fungus culture [[id number]] Collected:  04/28/18 1413    Order Status:  Completed Specimen:  Wound from Neck Updated:  05/03/18 1039     Fungus (Mycology) Culture Culture in progress    Narrative:       Wound fluid    Fungus culture [[id number]] Collected:  04/28/18 1417    Order Status:  Completed Specimen:  Wound from Neck Updated:  05/03/18 1039     Fungus (Mycology) Culture Culture in progress    Narrative:       Wound fluid    Aerobic culture [[id number]] Collected:  04/28/18 1417    Order Status:  Completed Specimen:  Wound from Neck Updated:  05/01/18 0852     Aerobic Bacterial Culture No growth    Narrative:       Wound  fluid    Aerobic culture [[id number]] Collected:  04/28/18 1413    Order Status:  Completed Specimen:  Wound from Neck Updated:  05/01/18 0852     Aerobic Bacterial Culture No growth    Narrative:       Wound fluid    AFB Culture & Smear [[id number]] Collected:  04/28/18 1417    Order Status:  Completed Specimen:  Wound from Neck Updated:  04/30/18 1313     AFB Culture & Smear Culture in progress     AFB CULTURE STAIN No acid fast bacilli seen.    Narrative:       Wound fluid    AFB Culture & Smear [[id number]] Collected:  04/28/18 1413    Order Status:  Completed Specimen:  Wound from Neck Updated:  04/30/18 1313     AFB Culture & Smear Culture in progress     AFB CULTURE STAIN No acid fast bacilli seen.    Narrative:       Wound fluid    Urine culture [[id number]] Collected:  04/27/18 1218    Order Status:  Completed Specimen:  Urine from Urine, Catheterized Updated:  04/28/18 2020     Urine Culture, Routine No growth    Gram stain [[id number]] Collected:  04/28/18 1417    Order Status:  Completed Specimen:  Wound from Neck Updated:  04/28/18 2009     Gram Stain Result Rare WBC's      No organisms seen    Narrative:       Wound fluid    Gram stain [[id number]] Collected:  04/28/18 1413    Order Status:  Completed Specimen:  Wound from Neck Updated:  04/28/18 2004     Gram Stain Result Rare WBC's      No organisms seen    Narrative:       Wound fluid    Urine culture [[id number]] Collected:  04/27/18 1218    Order Status:  Sent Specimen:  Urine from Urine, Catheterized Updated:  04/27/18 1218        All pertinent labs from the last 24 hours have been reviewed.    Significant Diagnostics:  None new

## 2018-05-04 NOTE — ASSESSMENT & PLAN NOTE
--speech continues to recommend NPO  --currently receiving continuous TFs   --planning on IR peg tube placement 5/4

## 2018-05-04 NOTE — PLAN OF CARE
TAN following for DC needs. TAN in communication with CM.    TAN received call from Kristi, patient's wife, who stated that she and Tabatha, patient's daughter, want referral sent to [facility]. TAN explained that [facility] is an LT[facility] and he will likely no meet criteria. Kristi stated that the patient is on 2 IVAB. TAN then explained that those antibiotics end on May 7.     TAN spoke to patient's daughter, Tabatha, at [phone]. Tabatha stated that she wants the referral sent to  [facility]. TAN explained that the patient's antibiotics will be finished by May 7 and then the patient likely will not qualify for LTAC. TAN explained that we need to continue to pursue SNF and that SW has sent updated notes but the SNF will want to see notes once the patient has his peg before accepting.     Yvonne Ellington, [facility] [place]  [phone]

## 2018-05-04 NOTE — SUBJECTIVE & OBJECTIVE
"Interval History: NAEON. Patient is lethargic, but rouses to stimulation. He is not not following commands.    Past Medical History:    Diagnosis Date    Benign prostatic hyperplasia with urinary retention 4/11/2018    Cervical myelopathy with cervical radiculopathy 4/10/2018    Depression     Dyslipidemia     Essential hypertension 4/11/2018    Glaucoma (increased eye pressure)     Mild cognitive impairment     Traumatic brain injury 07/2017    Type 2 diabetes mellitus with hyperglycemia, without long-term current use of insulin 4/11/2018       Past Surgical History:   Procedure Laterality Date    CATARACT EXTRACTION W/  INTRAOCULAR LENS IMPLANT         Review of patient's allergies indicates:  No Known Allergies    No current facility-administered medications on file prior to encounter.      Current Outpatient Prescriptions on File Prior to Encounter   Medication Sig    atorvastatin (LIPITOR) 20 MG tablet Take 20 mg by mouth nightly.    bisacodyl (DULCOLAX) 5 mg EC tablet     CARTIA  mg Cp24 Take 90 mg by mouth.     citalopram (CELEXA) 20 MG tablet Take 20 mg by mouth once daily.    gabapentin (NEURONTIN) 100 MG capsule Take 100 mg by mouth 3 (three) times daily as needed.     SENSIPAR 30 mg Tab Take 30 mg by mouth daily with breakfast.     traZODone (DESYREL) 50 MG tablet Take 50 mg by mouth every evening.    valsartan (DIOVAN) 80 MG tablet Take 80 mg by mouth once daily.    vitamin B comp with C no.4 150 mg Tab Take vitamin B complex 1 a day.  Over-the-counter is an appropriate alternative.    BD INSULIN PEN NEEDLE UF SHORT 31 gauge x 5/16" Ndle USE WITH LANTUS PEN DAILY    CONSTULOSE 10 gram/15 mL solution TAKE  30 MILLILITERS BY MOUTH 3 TIMES A DAY UNTIL BOWEL MOVEMENT ...  (REFER TO PRESCRIPTION NOTES).    ELIQUIS 2.5 mg Tab Take 2.5 mg by mouth 2 (two) times daily.     FREESTYLE TEST Strp      Family History     Problem Relation (Age of Onset)    Cancer Maternal Grandmother    " Clotting disorder Mother    Paranoid behavior Mother        Social History Main Topics    Smoking status: Former Smoker     Quit date: 2/6/1950    Smokeless tobacco: Never Used    Alcohol use 4.2 oz/week     7 Shots of liquor per week    Drug use: No    Sexual activity: Yes     Partners: Female     Review of Systems   Unable to perform ROS: Mental status change     Objective:     Vital Signs (Most Recent):  Temp: 98 °F (36.7 °C) (05/04/18 1242)  Pulse: 74 (05/04/18 1242)  Resp: 18 (05/04/18 1242)  BP: (!) 156/88 (05/04/18 1242)  SpO2: (!) 94 % (05/04/18 1242) Vital Signs (24h Range):  Temp:  [96.1 °F (35.6 °C)-98.6 °F (37 °C)] 98 °F (36.7 °C)  Pulse:  [63-79] 74  Resp:  [16-18] 18  SpO2:  [92 %-97 %] 94 %  BP: (149-165)/(66-88) 156/88     Weight: 99.5 kg (219 lb 5.7 oz)  Body mass index is 29.75 kg/m².    Physical Exam   Constitutional: He appears well-developed. No distress.   HENT:   Head: Normocephalic.   Eyes: Conjunctivae are normal. Right eye exhibits no discharge. Left eye exhibits no discharge.   Neck:   Cervical collar in place   Cardiovascular: Normal rate and regular rhythm.    Pulmonary/Chest: No respiratory distress. He has no wheezes. He has rales.   Coarse breath sounds bilaterally   Abdominal: Soft. Bowel sounds are normal. He exhibits no distension. There is no tenderness.   Musculoskeletal: He exhibits no edema or tenderness.   Neurological:   Somnolent, does not follow commands.   Skin: Skin is warm and dry. He is not diaphoretic.   Vitals reviewed.      Significant Labs: All pertinent labs within the past 24 hours have been reviewed.    Significant Imaging: I have reviewed all pertinent imaging results/findings within the past 24 hours.

## 2018-05-04 NOTE — PROGRESS NOTES
[facility]  Neurosurgery  Progress Note    Subjective:     History of Present Illness: 82 y.o.  male with type 2 diabetes, who presents today for follow up evaluation one week prior to 2 level ACDF scheduled for 4/10/2018. Pt reports    Pt presents today wearing a cervical collar and in a wheelchair. He would like to proceed with surgery. Per pt's family, his PCP would like to see him on 4/9, the day before surgery. He notes continued neck pain and BUE numbness/tingling as well as BUE weakness. Pt is only able to walk a few steps unassisted. He has never received neck surgery.     Post-Op Info:  Procedure(s) (LRB):  WASHOUT-CERVICAL-wound washout and csf leak repair with depuy (N/A)  DISKECTOMY AND FUSION-ANTERIOR CERVICAL (ACDF)- REVISON C5-C7   6 Days Post-Op     Interval History: NAEON. Patient remains lethargic today. He is arousable to constant stim, follows commands and oriented x 2. +BM overnight. TF held at midnight in preparation for PEG placement today.     Medications:  Continuous Infusions:  Scheduled Meds:   atorvastatin  20 mg Per NG tube Nightly    cefTRIAXone (ROCEPHIN) IVPB  2 g Intravenous Q24H    cinacalcet  30 mg Oral Daily with breakfast    citalopram  10 mg Per NG tube Daily    diltiaZEM  30 mg Per NG tube Q6H    heparin (porcine)  5,000 Units Subcutaneous Q8H    insulin aspart U-100  2 Units Subcutaneous Q4H    insulin detemir U-100  13 Units Subcutaneous BID    levETIRAcetam  500 mg Per NG tube BID    polyethylene glycol  17 g Per NG tube BID    senna-docusate 8.6-50 mg  1 tablet Per NG tube BID    tamsulosin  0.4 mg Per NG tube Daily    valsartan  80 mg Per NG tube Daily    vancomycin (VANCOCIN) IVPB  1,000 mg Intravenous Q12H     PRN Meds:acetaminophen, albuterol-ipratropium 2.5mg-0.5mg/3mL, aluminum-magnesium hydroxide-simethicone, dextrose 50%, fentaNYL, glucagon (human recombinant), hydrocodone-acetaminophen 5-325mg, insulin aspart U-100, lidocaine HCL  2%, ondansetron, potassium chloride 10%, potassium chloride 10%, potassium chloride 10%, potassium, sodium phosphates, potassium, sodium phosphates, potassium, sodium phosphates     Review of Systems  Objective:     Weight: 99.5 kg (219 lb 5.7 oz)  Body mass index is 29.75 kg/m².  Vital Signs (Most Recent):  Temp: 96.4 °F (35.8 °C) (05/04/18 0815)  Pulse: 79 (05/04/18 1100)  Resp: 16 (05/04/18 0815)  BP: (!) 149/66 (05/04/18 0815)  SpO2: 97 % (05/04/18 0948) Vital Signs (24h Range):  Temp:  [96.1 °F (35.6 °C)-98.6 °F (37 °C)] 96.4 °F (35.8 °C)  Pulse:  [63-79] 79  Resp:  [16-20] 16  SpO2:  [92 %-97 %] 97 %  BP: (139-165)/(66-80) 149/66                           NG/OG Tube 04/21/18 2300 Cortrak Right nostril (Active)   Placement Check placement verified by aspirate characteristics 5/3/2018  8:00 PM   Distal Tube Length (cm) 70 5/3/2018  8:00 PM   Tolerance no signs/symptoms of discomfort 5/3/2018  8:00 PM   Securement anchored to nostril center w/ adhesive device 5/3/2018  8:00 PM   Clamp Status/Tolerance unclamped 5/3/2018  8:00 PM   Insertion Site Appearance no redness, warmth, tenderness, skin breakdown, drainage 5/3/2018  8:00 PM   Drainage None 5/3/2018  8:00 PM   Flush/Irrigation flushed w/;water 5/3/2018  8:00 PM   Feeding Method continuous 5/3/2018  8:00 PM   Current Rate (mL/hr) 55 mL/hr 5/3/2018  8:00 PM   Goal Rate (mL/hr) 55 mL/hr 5/3/2018  8:00 PM   Intake (mL) 60 mL 5/3/2018  8:00 PM   Water Bolus (mL) 250 mL 5/3/2018  4:41 PM   Intake (mL) - Breast Milk Tube Feeding 0 4/29/2018 11:05 PM   Rate Formula Tube Feeding (mL/hr) 55 mL/hr 5/2/2018 10:00 PM   Intake (mL) - Formula Tube Feeding 570 5/3/2018  5:00 AM   Residual Amount (ml) 0 ml 5/2/2018 10:00 PM       Male External Urinary Catheter 04/30/18 1600 (Active)   Collection Container Urimeter 5/3/2018  8:00 PM   Securement Method secured to top of thigh w/ adhesive device 5/3/2018  8:00 PM   Skin no redness;no breakdown 5/3/2018  8:00 PM   Tolerance no  signs/symptoms of discomfort 5/2/2018 10:00 PM   Output (mL) 700 mL 5/3/2018  4:41 PM       Neurosurgery Physical Exam  General: well developed, well nourished. no acute distress. Generalized deconditioning   Neurologic: lethargic. Requires persistant stim to participate. Oriented x 2. Thought content appropriate. Follows commands   Head: normocephalic, atraumatic   GCS: Motor: 6/Verbal: 5/Eyes: 4 GCS Total: 15  Cranial nerves: face symmetric, tongue midline, pupils equal, round, reactive to light with accomodation, extraocular muscles intact. CN II-XII grossly intact.   Language: no aphasia  Speech:  dysarthria   Sensory: decrease response to light touch in BUE  Motor Strength: Moves all extremities spontaneously with good tone.      Strength   Deltoids Triceps Biceps Wrist Extension Wrist Flexion Hand    Upper: R 2/5 3/5 3/5 2/5 2/5 3/5     L 3/5 4-/5 4-/5 3/5 3/5 3/5       Iliopsoas Quadriceps Knee  Flexion Tibialis  anterior Gastro- cnemius EHL   Lower: R 3/5 3/5 3/5 4-/5 4-/5 4-/5     L 3/5 3/5 3/5 4/5 4/5 4/5          [place]: present on right   Clonus: absent     wound is c/d/i, no erythema, ttp, or drainage.  wound edges are well approximated. No surrounding edema     Significant Labs:    Recent Labs  Lab 05/03/18  0422 05/04/18 0429   * 128*    137   K 4.5 4.4    105   CO2 27 30*   BUN 18 17   CREATININE 0.7 0.6   CALCIUM 10.9* 11.0*   MG 2.0 2.0       Recent Labs  Lab 05/03/18 0422 05/04/18 0429   WBC 9.30 9.54   HGB 10.6* 10.7*   HCT 34.0* 34.6*    295       Recent Labs  Lab 05/03/18 0422 05/04/18 0429   INR 1.1 1.1     Microbiology Results (last 7 days)     Procedure Component Value Units Date/Time    Culture, Anaerobe [[id number]] Collected:  04/28/18 1417    Order Status:  Completed Specimen:  Wound from Neck Updated:  05/03/18 1244     Anaerobic Culture Culture in progress    Narrative:       Wound fluid    Culture, Anaerobe [[id number]] Collected:  04/28/18 1416     Order Status:  Completed Specimen:  Wound from Neck Updated:  05/03/18 1244     Anaerobic Culture Culture in progress    Narrative:       Wound fluid    Fungus culture [[id number]] Collected:  04/28/18 1413    Order Status:  Completed Specimen:  Wound from Neck Updated:  05/03/18 1039     Fungus (Mycology) Culture Culture in progress    Narrative:       Wound fluid    Fungus culture [[id number]] Collected:  04/28/18 1417    Order Status:  Completed Specimen:  Wound from Neck Updated:  05/03/18 1039     Fungus (Mycology) Culture Culture in progress    Narrative:       Wound fluid    Aerobic culture [[id number]] Collected:  04/28/18 1417    Order Status:  Completed Specimen:  Wound from Neck Updated:  05/01/18 0852     Aerobic Bacterial Culture No growth    Narrative:       Wound fluid    Aerobic culture [[id number]] Collected:  04/28/18 1413    Order Status:  Completed Specimen:  Wound from Neck Updated:  05/01/18 0852     Aerobic Bacterial Culture No growth    Narrative:       Wound fluid    AFB Culture & Smear [[id number]] Collected:  04/28/18 1417    Order Status:  Completed Specimen:  Wound from Neck Updated:  04/30/18 1313     AFB Culture & Smear Culture in progress     AFB CULTURE STAIN No acid fast bacilli seen.    Narrative:       Wound fluid    AFB Culture & Smear [[id number]] Collected:  04/28/18 1413    Order Status:  Completed Specimen:  Wound from Neck Updated:  04/30/18 1313     AFB Culture & Smear Culture in progress     AFB CULTURE STAIN No acid fast bacilli seen.    Narrative:       Wound fluid    Urine culture [[id number]] Collected:  04/27/18 1218    Order Status:  Completed Specimen:  Urine from Urine, Catheterized Updated:  04/28/18 2020     Urine Culture, Routine No growth    Gram stain [[id number]] Collected:  04/28/18 1417    Order Status:  Completed Specimen:  Wound from Neck Updated:  04/28/18 2009     Gram Stain Result Rare WBC's      No organisms seen    Narrative:       Wound fluid    Gram stain  [[id number]] Collected:  04/28/18 1413    Order Status:  Completed Specimen:  Wound from Neck Updated:  04/28/18 2004     Gram Stain Result Rare WBC's      No organisms seen    Narrative:       Wound fluid    Urine culture [[id number]] Collected:  04/27/18 1218    Order Status:  Sent Specimen:  Urine from Urine, Catheterized Updated:  04/27/18 1218        All pertinent labs from the last 24 hours have been reviewed.    Significant Diagnostics:  None new     Assessment/Plan:     * Cervical myelopathy with cervical radiculopathy    82 y.o. male s/p C3-5 ACDF for cervical myelopathy on 4/10 with Dr. Hess, and now s/p evacuation/washout of fluid collection with CSF leak repair POD#6     -Repeat CT Head stable   -Cont neuro checks q4h   -Will follow up KUB and repeat UA/Ucx given persistent lethargy  -NPO per ST, Plan for PEG placement today with IR. TF held at midnight  -ID: continue vanc and ceftriaxone x 7 days from surgery (4/28) Vanc trough before 4th dose, 18. Trough Goal 15-20. Surgical cultures NGTD  -CV goal normotension. Continue home meds. PRN meds SBP >160.   -Urinary retention- condom cath in place, continue Flomax. Replace and get repeat UA today that was ordered 4/27  -Continue PILI/SCDs/SQH  -Continue bowel regimen daily. Brown bomb 5/3 with +BM  -Continue to hold Eliquis. Per , indications unclear.  does not recommend continuing upon discharge.  -Appreciate  assistance with management  -Hypercalcemia: home dose sensipar restarted per HM, CTM   -Continue aggressive PT/OT/ST  -Cervical collar when OOB or with activity  -Scheduled CPT q4 hours   -Aggressive oral care per nursing staff daily  -SNF pending. CM/SW to pursue   -Discuss with Dr. Deshawn Carranza PA-C  Neurosurgery  [facility]

## 2018-05-04 NOTE — ASSESSMENT & PLAN NOTE
-- delirium precautions:    - Keep shades open and room lit during day and room dim at night in order to promote healthy circadian rhythms.  - Encourage family at bedside.  - Minimize use of restraints.  - Keep whiteboard in patient's room current with the date and name of the members of patient's team for easy patient self re-orientation.  - Avoid benzodiazepines, antihistamines, anticholinergics, hypnotics, and minimize opiates while controlling for pain as these medications may exacerbate delirium.

## 2018-05-04 NOTE — PROCEDURES
Radiology Post-Procedure Note    Pre Op Diagnosis: need for long term enteral access.    Post Op Diagnosis: Same    Procedure: gastrostomy tube placement    Procedure Performed by: Dr. Beck, Dr. Han    Written Informed Consent Obtained: Yes    Specimen Removed: No    Estimated Blood Loss: Minimal    Findings:     Consent was obtained from the patients family. Under fluoroscopy, the stomach was inflated demonstrating an adequate window for gastrostomy tube placement. A needle was placed into the stomach and contrast was injected confirming the location. The wire was advanced into the stomach. The tract was dilated, and a pull type gastrostomy tube was placed.     The patient tolerated procedure well.  Please see Imaging report for further details.    Please see PACS dictation for further details.    Vikash Beck MD  Radiology

## 2018-05-04 NOTE — NURSING
Patients tube feeding has been off since midnight. I have been attempting to get the contrast from pharmacy as far back as 2330 last night. [facility] just called and let me know that they were waiting for a delivery from another facility and she would send it up as soon as she could.

## 2018-05-04 NOTE — PT/OT/SLP PROGRESS
Physical Therapy      Patient Name:  Hollis Pitts   MRN:  [id number]    Patient not seen today secondary to Unavailable (Comment). Pt attempted in PM x2; pt with SLP then x-ray. PT unable to re-attempt later in PM. Will follow-up when next scheduled.    DARRELL ALY, PT   5/4/2018

## 2018-05-04 NOTE — PROGRESS NOTES
G-tube placement  complete. Pt tolerated well. VSS. No signs or symptoms of distress noted.  Pt will be transferred to [facility] pending patient transport.

## 2018-05-04 NOTE — PROGRESS NOTES
Pt arrived to  for G-tube placement.  Name verified using two identifiers.  Allergies verified.  Will continue to monitor.

## 2018-05-04 NOTE — PT/OT/SLP PROGRESS
"Speech Language Pathology Treatment    Patient Name:  Hollis Pitts   MRN:  [id number]  Admitting Diagnosis: Cervical myelopathy with cervical radiculopathy    Recommendations:                 General Recommendations:  Dysphagia therapy, Speech/language therapy and Cognitive-linguistic therapy  Diet recommendations:  NPO, Liquid Diet Level: NPO   Aspiration Precautions: Continue alternate means of nutrition and Strict aspiration precautions   General Precautions: Standard, aspiration, fall  Communication strategies:  provide increased time to answer and go to room if call light pushed    Subjective     "I'm alright."  "Why does he gurgle like that?" friend      Pain/Comfort:  · Pain Rating 1: 0/10  · Pain Rating Post-Intervention 1: 0/10    Objective:     Has the patient been evaluated by SLP for swallowing?   Yes  Keep patient NPO? Yes   Current Respiratory Status: nasal cannula      Pt seen bedside, lethargic though roused easily.  Consistent stim needed to maintain alertness.  Mr. Shafer was pair to follow simple commands given max cues and increased time.  Significant wet vocal quality with frequent coughing on secretions noted.  No po attempted.  Pt also scheduled for peg tube placement today.  Given max cues pt able to elicit weak throat clear with intermittent spontaneous cough though cough was unproductive.  Yaunker suction utilized though pt able to clear secretions.  Pt did elicit dry swallow x3 on command in attempt to complete effortful swallow though no visible increase in effort observed.  Minimal hyolaryngeal rise palpated. Falsetto /i/ and lingual resistance excs completed x5 each given max cues with poor-fair ability.  Education provided on ongoing SLP POC, simple dysphagia excs, and ongoing npo.  No response by pt, friend verbalized understanding.  White board updated.           Assessment:     Hollis Pitts is a 82 y.o. male with an SLP diagnosis of Dysphagia, Dysarthria and Cognitive-Linguistic " Impairment.  He presents with poor management of secretions.    Goals:    SLP Goals        Problem: SLP Goal    Goal Priority Disciplines Outcome   SLP Goal     SLP Ongoing (interventions implemented as appropriate)   Description:  Speech Language Pathology Goals  Goals expected to be met by 5/8    1. Pt will participate in ongoing swallow assessment  2. Pt will participate in speech language cognitive assessment MEt  3. Pt will complete rote speech tasks with 80% accy and mod cues  4. Pt will follow 1 step commands with 90% accuracy and occasional cues  5. Pt will participate in ongoing speech language cognitive assessment.         Speech Language Pathology Goals  Goals expected to be met by 4/29:    1. Pt will participate in ongoing assessment of swallow.                              Plan:     · Patient to be seen:  4 x/week   · Plan of Care expires:  05/22/18  · Plan of Care reviewed with:  patient, friend   · SLP Follow-Up:  Yes       Discharge recommendations:  nursing facility, skilled   Barriers to Discharge:  Level of Skilled Assistance Needed      Time Tracking:     SLP Treatment Date:   05/04/18  Speech Start Time:  1213  Speech Stop Time:  1225     Speech Total Time (min):  12 min    Billable Minutes: Treatment Swallowing Dysfunction 12    RADHA Condon, CCC-SLP  05/04/2018
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
## 2018-05-05 LAB
ALBUMIN SERPL BCP-MCNC: 2.4 G/DL
ALP SERPL-CCNC: 82 U/L
ALT SERPL W/O P-5'-P-CCNC: 20 U/L
ANION GAP SERPL CALC-SCNC: 7 MMOL/L
AST SERPL-CCNC: 23 U/L
BASOPHILS # BLD AUTO: 0.04 K/UL
BASOPHILS NFR BLD: 0.5 %
BILIRUB SERPL-MCNC: 0.4 MG/DL
BUN SERPL-MCNC: 14 MG/DL
CALCIUM SERPL-MCNC: 11 MG/DL
CHLORIDE SERPL-SCNC: 102 MMOL/L
CO2 SERPL-SCNC: 27 MMOL/L
CREAT SERPL-MCNC: 0.6 MG/DL
DIFFERENTIAL METHOD: ABNORMAL
EOSINOPHIL # BLD AUTO: 0 K/UL
EOSINOPHIL NFR BLD: 0.5 %
ERYTHROCYTE [DISTWIDTH] IN BLOOD BY AUTOMATED COUNT: 15.5 %
EST. GFR  (AFRICAN AMERICAN): >60 ML/MIN/1.73 M^2
EST. GFR  (NON AFRICAN AMERICAN): >60 ML/MIN/1.73 M^2
GLUCOSE SERPL-MCNC: 150 MG/DL
HCT VFR BLD AUTO: 36.7 %
HGB BLD-MCNC: 11.4 G/DL
IMM GRANULOCYTES # BLD AUTO: 0.19 K/UL
IMM GRANULOCYTES NFR BLD AUTO: 2.2 %
INR PPP: 1.2
LYMPHOCYTES # BLD AUTO: 1.7 K/UL
LYMPHOCYTES NFR BLD: 19.5 %
MAGNESIUM SERPL-MCNC: 1.8 MG/DL
MCH RBC QN AUTO: 30.6 PG
MCHC RBC AUTO-ENTMCNC: 31.1 G/DL
MCV RBC AUTO: 99 FL
MONOCYTES # BLD AUTO: 1 K/UL
MONOCYTES NFR BLD: 10.9 %
NEUTROPHILS # BLD AUTO: 5.8 K/UL
NEUTROPHILS NFR BLD: 66.4 %
NRBC BLD-RTO: 0 /100 WBC
PHOSPHATE SERPL-MCNC: 2.4 MG/DL
PLATELET # BLD AUTO: 218 K/UL
PMV BLD AUTO: 11.3 FL
POCT GLUCOSE: 144 MG/DL (ref 70–110)
POCT GLUCOSE: 151 MG/DL (ref 70–110)
POCT GLUCOSE: 159 MG/DL (ref 70–110)
POCT GLUCOSE: 164 MG/DL (ref 70–110)
POCT GLUCOSE: 164 MG/DL (ref 70–110)
POCT GLUCOSE: 167 MG/DL (ref 70–110)
POTASSIUM SERPL-SCNC: 4.5 MMOL/L
PROT SERPL-MCNC: 6.4 G/DL
PROTHROMBIN TIME: 11.8 SEC
RBC # BLD AUTO: 3.72 M/UL
SODIUM SERPL-SCNC: 136 MMOL/L
WBC # BLD AUTO: 8.75 K/UL

## 2018-05-05 PROCEDURE — 94668 MNPJ CHEST WALL SBSQ: CPT

## 2018-05-05 PROCEDURE — 94761 N-INVAS EAR/PLS OXIMETRY MLT: CPT

## 2018-05-05 PROCEDURE — 63600175 PHARM REV CODE 636 W HCPCS: Performed by: PHYSICIAN ASSISTANT

## 2018-05-05 PROCEDURE — 63600175 PHARM REV CODE 636 W HCPCS: Performed by: STUDENT IN AN ORGANIZED HEALTH CARE EDUCATION/TRAINING PROGRAM

## 2018-05-05 PROCEDURE — 25000003 PHARM REV CODE 250: Performed by: SURGERY

## 2018-05-05 PROCEDURE — 25000003 PHARM REV CODE 250: Performed by: PHYSICIAN ASSISTANT

## 2018-05-05 PROCEDURE — 25000242 PHARM REV CODE 250 ALT 637 W/ HCPCS: Performed by: PSYCHIATRY & NEUROLOGY

## 2018-05-05 PROCEDURE — 80053 COMPREHEN METABOLIC PANEL: CPT

## 2018-05-05 PROCEDURE — 85610 PROTHROMBIN TIME: CPT

## 2018-05-05 PROCEDURE — 84100 ASSAY OF PHOSPHORUS: CPT

## 2018-05-05 PROCEDURE — 25000003 PHARM REV CODE 250: Performed by: STUDENT IN AN ORGANIZED HEALTH CARE EDUCATION/TRAINING PROGRAM

## 2018-05-05 PROCEDURE — 20600001 HC STEP DOWN PRIVATE ROOM

## 2018-05-05 PROCEDURE — 99024 POSTOP FOLLOW-UP VISIT: CPT | Mod: GC,,, | Performed by: NEUROLOGICAL SURGERY

## 2018-05-05 PROCEDURE — 94760 N-INVAS EAR/PLS OXIMETRY 1: CPT

## 2018-05-05 PROCEDURE — 85025 COMPLETE CBC W/AUTO DIFF WBC: CPT

## 2018-05-05 PROCEDURE — 25000003 PHARM REV CODE 250: Performed by: ANESTHESIOLOGY

## 2018-05-05 PROCEDURE — 25000003 PHARM REV CODE 250: Performed by: PSYCHIATRY & NEUROLOGY

## 2018-05-05 PROCEDURE — 36415 COLL VENOUS BLD VENIPUNCTURE: CPT

## 2018-05-05 PROCEDURE — 94640 AIRWAY INHALATION TREATMENT: CPT

## 2018-05-05 PROCEDURE — 83735 ASSAY OF MAGNESIUM: CPT

## 2018-05-05 PROCEDURE — 63600175 PHARM REV CODE 636 W HCPCS: Performed by: PSYCHIATRY & NEUROLOGY

## 2018-05-05 RX ORDER — VALSARTAN 40 MG/1
120 TABLET ORAL DAILY
Status: DISCONTINUED | OUTPATIENT
Start: 2018-05-05 | End: 2018-05-08

## 2018-05-05 RX ADMIN — LEVETIRACETAM 500 MG: 500 TABLET, FILM COATED ORAL at 09:05

## 2018-05-05 RX ADMIN — VALSARTAN 120 MG: 40 TABLET ORAL at 09:05

## 2018-05-05 RX ADMIN — STANDARDIZED SENNA CONCENTRATE AND DOCUSATE SODIUM 1 TABLET: 8.6; 5 TABLET, FILM COATED ORAL at 09:05

## 2018-05-05 RX ADMIN — HEPARIN SODIUM 5000 UNITS: 5000 INJECTION, SOLUTION INTRAVENOUS; SUBCUTANEOUS at 01:05

## 2018-05-05 RX ADMIN — POLYETHYLENE GLYCOL 3350 17 G: 17 POWDER, FOR SOLUTION ORAL at 09:05

## 2018-05-05 RX ADMIN — INSULIN DETEMIR 13 UNITS: 100 INJECTION, SOLUTION SUBCUTANEOUS at 09:05

## 2018-05-05 RX ADMIN — CEFTRIAXONE SODIUM 2 G: 2 INJECTION, POWDER, FOR SOLUTION INTRAMUSCULAR; INTRAVENOUS at 07:05

## 2018-05-05 RX ADMIN — VANCOMYCIN HYDROCHLORIDE 1 G: 10 INJECTION, POWDER, LYOPHILIZED, FOR SOLUTION INTRAVENOUS at 07:05

## 2018-05-05 RX ADMIN — CITALOPRAM HYDROBROMIDE 10 MG: 10 TABLET ORAL at 09:05

## 2018-05-05 RX ADMIN — TAMSULOSIN HYDROCHLORIDE 0.4 MG: 0.4 CAPSULE ORAL at 09:05

## 2018-05-05 RX ADMIN — HEPARIN SODIUM 5000 UNITS: 5000 INJECTION, SOLUTION INTRAVENOUS; SUBCUTANEOUS at 06:05

## 2018-05-05 RX ADMIN — INSULIN ASPART 2 UNITS: 100 INJECTION, SOLUTION INTRAVENOUS; SUBCUTANEOUS at 10:05

## 2018-05-05 RX ADMIN — ATORVASTATIN CALCIUM 20 MG: 20 TABLET, FILM COATED ORAL at 09:05

## 2018-05-05 RX ADMIN — HEPARIN SODIUM 5000 UNITS: 5000 INJECTION, SOLUTION INTRAVENOUS; SUBCUTANEOUS at 10:05

## 2018-05-05 RX ADMIN — DILTIAZEM HYDROCHLORIDE 30 MG: 30 TABLET, FILM COATED ORAL at 12:05

## 2018-05-05 RX ADMIN — VANCOMYCIN HYDROCHLORIDE 1 G: 10 INJECTION, POWDER, LYOPHILIZED, FOR SOLUTION INTRAVENOUS at 06:05

## 2018-05-05 RX ADMIN — IPRATROPIUM BROMIDE AND ALBUTEROL SULFATE 3 ML: .5; 3 SOLUTION RESPIRATORY (INHALATION) at 01:05

## 2018-05-05 NOTE — ASSESSMENT & PLAN NOTE
82 y.o. male s/p C3-5 ACDF for cervical myelopathy on 4/10 with Dr. Hess, and now s/p evacuation/washout of fluid collection with CSF leak repair      -Repeat CT Head stable   -Cont neuro checks q4h   -Appreciate medicine recommendations  -TF restarted today, will adjust insulin per medicine recs  -ID: continue vanc and ceftriaxone x 7 days from surgery (4/28) Vanc trough before 4th dose, 18. Trough Goal 15-20. Surgical cultures NGTD, will stop antibiotics after today  -CV goal normotension. Continue home meds. PRN meds SBP >160.   -Continue PILI/SCDs/SQH  -Continue bowel regimen daily. Brown bomb 5/3 with +BM  -Continue to hold Eliquis. Per , indications unclear.  does not recommend continuing upon discharge.  -Appreciate  assistance with management  -Hypercalcemia: home dose sensipar restarted per HM, CTM   -Continue aggressive PT/OT/ST  -Cervical collar when OOB or with activity  -Scheduled CPT q4 hours   -Aggressive oral care per nursing staff daily  -SNF pending. CM/SW to pursue

## 2018-05-05 NOTE — SUBJECTIVE & OBJECTIVE
Interval History: NAEON. PEG placed yesterday by IR    Medications:  Continuous Infusions:  Scheduled Meds:   atorvastatin  20 mg Per NG tube Nightly    cefTRIAXone (ROCEPHIN) IVPB  2 g Intravenous Q24H    cinacalcet  30 mg Oral Daily with breakfast    citalopram  10 mg Per NG tube Daily    diltiaZEM  30 mg Per NG tube Q6H    heparin (porcine)  5,000 Units Subcutaneous Q8H    insulin aspart U-100  2 Units Subcutaneous Q4H    insulin detemir U-100  13 Units Subcutaneous BID    levETIRAcetam  500 mg Per NG tube BID    polyethylene glycol  17 g Per NG tube BID    senna-docusate 8.6-50 mg  1 tablet Per NG tube BID    tamsulosin  0.4 mg Per NG tube Daily    valsartan  120 mg Per NG tube Daily    vancomycin (VANCOCIN) IVPB  1,000 mg Intravenous Q12H     PRN Meds:acetaminophen, albuterol-ipratropium 2.5mg-0.5mg/3mL, aluminum-magnesium hydroxide-simethicone, dextrose 50%, glucagon (human recombinant), insulin aspart U-100, lidocaine HCL 2%, ondansetron, potassium chloride 10%, potassium chloride 10%, potassium chloride 10%, potassium, sodium phosphates, potassium, sodium phosphates, potassium, sodium phosphates     Review of Systems    Objective:     Weight: 99.5 kg (219 lb 5.7 oz)  Body mass index is 29.75 kg/m².  Vital Signs (Most Recent):  Temp: 97 °F (36.1 °C) (05/05/18 1736)  Pulse: 86 (05/05/18 1736)  Resp: 20 (05/05/18 1311)  BP: (!) 158/86 (05/05/18 1736)  SpO2: (!) 93 % (05/05/18 1736) Vital Signs (24h Range):  Temp:  [97 °F (36.1 °C)-98.2 °F (36.8 °C)] 97 °F (36.1 °C)  Pulse:  [75-88] 86  Resp:  [16-20] 20  SpO2:  [93 %-98 %] 93 %  BP: (142-176)/(82-88) 158/86                           NG/OG Tube 04/21/18 2300 Cortrak Right nostril (Active)   Placement Check placement verified by aspirate characteristics 5/3/2018  8:00 PM   Distal Tube Length (cm) 70 5/3/2018  8:00 PM   Tolerance no signs/symptoms of discomfort 5/3/2018  8:00 PM   Securement anchored to nostril center w/ adhesive device 5/3/2018   8:00 PM   Clamp Status/Tolerance unclamped 5/3/2018  8:00 PM   Insertion Site Appearance no redness, warmth, tenderness, skin breakdown, drainage 5/3/2018  8:00 PM   Drainage None 5/3/2018  8:00 PM   Flush/Irrigation flushed w/;water 5/3/2018  8:00 PM   Feeding Method continuous 5/3/2018  8:00 PM   Current Rate (mL/hr) 55 mL/hr 5/3/2018  8:00 PM   Goal Rate (mL/hr) 55 mL/hr 5/3/2018  8:00 PM   Intake (mL) 60 mL 5/3/2018  8:00 PM   Water Bolus (mL) 250 mL 5/3/2018  4:41 PM   Intake (mL) - Breast Milk Tube Feeding 0 4/29/2018 11:05 PM   Rate Formula Tube Feeding (mL/hr) 55 mL/hr 5/2/2018 10:00 PM   Intake (mL) - Formula Tube Feeding 570 5/3/2018  5:00 AM   Residual Amount (ml) 0 ml 5/2/2018 10:00 PM       Male External Urinary Catheter 04/30/18 1600 (Active)   Collection Container Urimeter 5/3/2018  8:00 PM   Securement Method secured to top of thigh w/ adhesive device 5/3/2018  8:00 PM   Skin no redness;no breakdown 5/3/2018  8:00 PM   Tolerance no signs/symptoms of discomfort 5/2/2018 10:00 PM   Output (mL) 700 mL 5/3/2018  4:41 PM       Neurosurgery Physical Exam    General: well developed, well nourished. no acute distress. Generalized deconditioning   Neurologic: lethargic. Requires persistant stim to participate. Oriented x 2. Thought content appropriate. Follows commands   Head: normocephalic, atraumatic   GCS: Motor: 6/Verbal: 5/Eyes: 4 GCS Total: 15  Cranial nerves: face symmetric, tongue midline, pupils equal, round, reactive to light with accomodation, extraocular muscles intact. CN II-XII grossly intact.   Language: no aphasia  Speech:  dysarthria   Sensory: decrease response to light touch in BUE  Motor Strength: Moves all extremities spontaneously with good tone.      Strength   Deltoids Triceps Biceps Wrist Extension Wrist Flexion Hand    Upper: R 2/5 3/5 3/5 2/5 2/5 3/5     L 3/5 4-/5 4-/5 3/5 3/5 3/5       Iliopsoas Quadriceps Knee  Flexion Tibialis  anterior Gastro- cnemius EHL   Lower: R 3/5 3/5  3/5 4-/5 4-/5 4-/5     L 3/5 3/5 3/5 4/5 4/5 4/5          [place]: present on right   Clonus: absent     wound is c/d/i, no erythema, ttp, or drainage.  wound edges are well approximated. No surrounding edema     Significant Labs:    Recent Labs  Lab 05/04/18  0429 05/05/18  0444   * 150*    136   K 4.4 4.5    102   CO2 30* 27   BUN 17 14   CREATININE 0.6 0.6   CALCIUM 11.0* 11.0*   MG 2.0 1.8       Recent Labs  Lab 05/04/18  0429 05/05/18  0444   WBC 9.54 8.75   HGB 10.7* 11.4*   HCT 34.6* 36.7*    218       Recent Labs  Lab 05/04/18  0429 05/05/18  0444   INR 1.1 1.2     Microbiology Results (last 7 days)     Procedure Component Value Units Date/Time    Culture, Anaerobe [[id number]] Collected:  04/28/18 1417    Order Status:  Completed Specimen:  Wound from Neck Updated:  05/03/18 1244     Anaerobic Culture Culture in progress    Narrative:       Wound fluid    Culture, Anaerobe [[id number]] Collected:  04/28/18 1413    Order Status:  Completed Specimen:  Wound from Neck Updated:  05/03/18 1244     Anaerobic Culture Culture in progress    Narrative:       Wound fluid    Fungus culture [[id number]] Collected:  04/28/18 1413    Order Status:  Completed Specimen:  Wound from Neck Updated:  05/03/18 1039     Fungus (Mycology) Culture Culture in progress    Narrative:       Wound fluid    Fungus culture [[id number]] Collected:  04/28/18 1417    Order Status:  Completed Specimen:  Wound from Neck Updated:  05/03/18 1039     Fungus (Mycology) Culture Culture in progress    Narrative:       Wound fluid    Aerobic culture [[id number]] Collected:  04/28/18 1417    Order Status:  Completed Specimen:  Wound from Neck Updated:  05/01/18 0852     Aerobic Bacterial Culture No growth    Narrative:       Wound fluid    Aerobic culture [[id number]] Collected:  04/28/18 1413    Order Status:  Completed Specimen:  Wound from Neck Updated:  05/01/18 0852     Aerobic Bacterial Culture No growth    Narrative:        Wound fluid    AFB Culture & Smear [[id number]] Collected:  04/28/18 1417    Order Status:  Completed Specimen:  Wound from Neck Updated:  04/30/18 1313     AFB Culture & Smear Culture in progress     AFB CULTURE STAIN No acid fast bacilli seen.    Narrative:       Wound fluid    AFB Culture & Smear [[id number]] Collected:  04/28/18 1413    Order Status:  Completed Specimen:  Wound from Neck Updated:  04/30/18 1313     AFB Culture & Smear Culture in progress     AFB CULTURE STAIN No acid fast bacilli seen.    Narrative:       Wound fluid    Urine culture [[id number]] Collected:  04/27/18 1218    Order Status:  Completed Specimen:  Urine from Urine, Catheterized Updated:  04/28/18 2020     Urine Culture, Routine No growth    Gram stain [[id number]] Collected:  04/28/18 1417    Order Status:  Completed Specimen:  Wound from Neck Updated:  04/28/18 2009     Gram Stain Result Rare WBC's      No organisms seen    Narrative:       Wound fluid    Gram stain [[id number]] Collected:  04/28/18 1413    Order Status:  Completed Specimen:  Wound from Neck Updated:  04/28/18 2004     Gram Stain Result Rare WBC's      No organisms seen    Narrative:       Wound fluid        All pertinent labs from the last 24 hours have been reviewed.    Significant Diagnostics:  None new

## 2018-05-05 NOTE — PROGRESS NOTES
Dr. Holman (NeuroSx) paged in reference to NGT bag/ TONJA Tube in place/disposition/ Order/Clarification- Possible removal of tube/ MEDS VIA Tube/ Will await MD callback.  No further.

## 2018-05-05 NOTE — PROGRESS NOTES
Neuro Surgery paged in reference to possible Tube Feeding being started/ Will await MD callback.No further

## 2018-05-05 NOTE — PLAN OF CARE
Patient's blood glucose appear to be controlled on current regimen.  Will continue to monitor and adjust insulin once tube feeds are resumed.  Patient hypertensive with SBP in the 140s-180s.  Valsartan was increased.   Will continue to follow the patient peripherally.  Please call with questions.        Santana Westbrook MD   Resident Physician  [facility]

## 2018-05-06 LAB
ALBUMIN SERPL BCP-MCNC: 2.4 G/DL
ALP SERPL-CCNC: 84 U/L
ALT SERPL W/O P-5'-P-CCNC: 22 U/L
ANION GAP SERPL CALC-SCNC: 6 MMOL/L
AST SERPL-CCNC: 32 U/L
BASOPHILS # BLD AUTO: 0.05 K/UL
BASOPHILS NFR BLD: 0.7 %
BILIRUB SERPL-MCNC: 0.4 MG/DL
BUN SERPL-MCNC: 14 MG/DL
CALCIUM SERPL-MCNC: 11.2 MG/DL
CHLORIDE SERPL-SCNC: 104 MMOL/L
CO2 SERPL-SCNC: 25 MMOL/L
CREAT SERPL-MCNC: 0.7 MG/DL
DIFFERENTIAL METHOD: ABNORMAL
EOSINOPHIL # BLD AUTO: 0.1 K/UL
EOSINOPHIL NFR BLD: 1 %
ERYTHROCYTE [DISTWIDTH] IN BLOOD BY AUTOMATED COUNT: 15.6 %
EST. GFR  (AFRICAN AMERICAN): >60 ML/MIN/1.73 M^2
EST. GFR  (NON AFRICAN AMERICAN): >60 ML/MIN/1.73 M^2
GLUCOSE SERPL-MCNC: 176 MG/DL
HCT VFR BLD AUTO: 36.9 %
HGB BLD-MCNC: 11.7 G/DL
IMM GRANULOCYTES # BLD AUTO: 0.13 K/UL
IMM GRANULOCYTES NFR BLD AUTO: 1.7 %
INR PPP: 1.2
LYMPHOCYTES # BLD AUTO: 1.7 K/UL
LYMPHOCYTES NFR BLD: 22.5 %
MAGNESIUM SERPL-MCNC: 2.6 MG/DL
MCH RBC QN AUTO: 31.3 PG
MCHC RBC AUTO-ENTMCNC: 31.7 G/DL
MCV RBC AUTO: 99 FL
MONOCYTES # BLD AUTO: 1 K/UL
MONOCYTES NFR BLD: 13.2 %
NEUTROPHILS # BLD AUTO: 4.7 K/UL
NEUTROPHILS NFR BLD: 60.9 %
NRBC BLD-RTO: 0 /100 WBC
PHOSPHATE SERPL-MCNC: 2.6 MG/DL
PLATELET # BLD AUTO: 284 K/UL
PMV BLD AUTO: 10 FL
POCT GLUCOSE: 148 MG/DL (ref 70–110)
POCT GLUCOSE: 172 MG/DL (ref 70–110)
POCT GLUCOSE: 175 MG/DL (ref 70–110)
POCT GLUCOSE: 184 MG/DL (ref 70–110)
POCT GLUCOSE: 186 MG/DL (ref 70–110)
POCT GLUCOSE: 205 MG/DL (ref 70–110)
POTASSIUM SERPL-SCNC: 5.3 MMOL/L
PROT SERPL-MCNC: 6.8 G/DL
PROTHROMBIN TIME: 11.9 SEC
RBC # BLD AUTO: 3.74 M/UL
SODIUM SERPL-SCNC: 135 MMOL/L
WBC # BLD AUTO: 7.64 K/UL

## 2018-05-06 PROCEDURE — 83735 ASSAY OF MAGNESIUM: CPT

## 2018-05-06 PROCEDURE — 63600175 PHARM REV CODE 636 W HCPCS: Performed by: PHYSICIAN ASSISTANT

## 2018-05-06 PROCEDURE — 85025 COMPLETE CBC W/AUTO DIFF WBC: CPT

## 2018-05-06 PROCEDURE — 20600001 HC STEP DOWN PRIVATE ROOM

## 2018-05-06 PROCEDURE — 36415 COLL VENOUS BLD VENIPUNCTURE: CPT

## 2018-05-06 PROCEDURE — 85610 PROTHROMBIN TIME: CPT

## 2018-05-06 PROCEDURE — 25000003 PHARM REV CODE 250: Performed by: SURGERY

## 2018-05-06 PROCEDURE — 63600175 PHARM REV CODE 636 W HCPCS: Performed by: PSYCHIATRY & NEUROLOGY

## 2018-05-06 PROCEDURE — 80053 COMPREHEN METABOLIC PANEL: CPT

## 2018-05-06 PROCEDURE — 84100 ASSAY OF PHOSPHORUS: CPT

## 2018-05-06 PROCEDURE — 25000003 PHARM REV CODE 250: Performed by: STUDENT IN AN ORGANIZED HEALTH CARE EDUCATION/TRAINING PROGRAM

## 2018-05-06 PROCEDURE — 63600175 PHARM REV CODE 636 W HCPCS: Performed by: STUDENT IN AN ORGANIZED HEALTH CARE EDUCATION/TRAINING PROGRAM

## 2018-05-06 PROCEDURE — 25000003 PHARM REV CODE 250: Performed by: ANESTHESIOLOGY

## 2018-05-06 PROCEDURE — 25000003 PHARM REV CODE 250: Performed by: HOSPITALIST

## 2018-05-06 PROCEDURE — 25000003 PHARM REV CODE 250: Performed by: PHYSICIAN ASSISTANT

## 2018-05-06 PROCEDURE — 25000003 PHARM REV CODE 250: Performed by: INTERNAL MEDICINE

## 2018-05-06 PROCEDURE — 25000003 PHARM REV CODE 250: Performed by: PSYCHIATRY & NEUROLOGY

## 2018-05-06 RX ORDER — DILTIAZEM HYDROCHLORIDE 60 MG/1
60 TABLET, FILM COATED ORAL EVERY 6 HOURS
Status: DISCONTINUED | OUTPATIENT
Start: 2018-05-06 | End: 2018-05-09

## 2018-05-06 RX ADMIN — VANCOMYCIN HYDROCHLORIDE 1 G: 10 INJECTION, POWDER, LYOPHILIZED, FOR SOLUTION INTRAVENOUS at 06:05

## 2018-05-06 RX ADMIN — POLYETHYLENE GLYCOL 3350 17 G: 17 POWDER, FOR SOLUTION ORAL at 10:05

## 2018-05-06 RX ADMIN — INSULIN DETEMIR 13 UNITS: 100 INJECTION, SOLUTION SUBCUTANEOUS at 09:05

## 2018-05-06 RX ADMIN — ATORVASTATIN CALCIUM 20 MG: 20 TABLET, FILM COATED ORAL at 10:05

## 2018-05-06 RX ADMIN — INSULIN ASPART 2 UNITS: 100 INJECTION, SOLUTION INTRAVENOUS; SUBCUTANEOUS at 12:05

## 2018-05-06 RX ADMIN — STANDARDIZED SENNA CONCENTRATE AND DOCUSATE SODIUM 1 TABLET: 8.6; 5 TABLET, FILM COATED ORAL at 09:05

## 2018-05-06 RX ADMIN — POLYETHYLENE GLYCOL 3350 17 G: 17 POWDER, FOR SOLUTION ORAL at 09:05

## 2018-05-06 RX ADMIN — VANCOMYCIN HYDROCHLORIDE 1 G: 10 INJECTION, POWDER, LYOPHILIZED, FOR SOLUTION INTRAVENOUS at 07:05

## 2018-05-06 RX ADMIN — DILTIAZEM HYDROCHLORIDE 30 MG: 30 TABLET, FILM COATED ORAL at 06:05

## 2018-05-06 RX ADMIN — TAMSULOSIN HYDROCHLORIDE 0.4 MG: 0.4 CAPSULE ORAL at 09:05

## 2018-05-06 RX ADMIN — STANDARDIZED SENNA CONCENTRATE AND DOCUSATE SODIUM 1 TABLET: 8.6; 5 TABLET, FILM COATED ORAL at 10:05

## 2018-05-06 RX ADMIN — DILTIAZEM HYDROCHLORIDE 60 MG: 60 TABLET, FILM COATED ORAL at 05:05

## 2018-05-06 RX ADMIN — VALSARTAN 120 MG: 40 TABLET ORAL at 09:05

## 2018-05-06 RX ADMIN — INSULIN ASPART 2 UNITS: 100 INJECTION, SOLUTION INTRAVENOUS; SUBCUTANEOUS at 06:05

## 2018-05-06 RX ADMIN — LEVETIRACETAM 500 MG: 500 TABLET, FILM COATED ORAL at 10:05

## 2018-05-06 RX ADMIN — CITALOPRAM HYDROBROMIDE 10 MG: 10 TABLET ORAL at 09:05

## 2018-05-06 RX ADMIN — CINACALCET HYDROCHLORIDE 30 MG: 30 TABLET, COATED ORAL at 02:05

## 2018-05-06 RX ADMIN — INSULIN ASPART 2 UNITS: 100 INJECTION, SOLUTION INTRAVENOUS; SUBCUTANEOUS at 05:05

## 2018-05-06 RX ADMIN — DILTIAZEM HYDROCHLORIDE 30 MG: 30 TABLET, FILM COATED ORAL at 12:05

## 2018-05-06 RX ADMIN — HEPARIN SODIUM 5000 UNITS: 5000 INJECTION, SOLUTION INTRAVENOUS; SUBCUTANEOUS at 06:05

## 2018-05-06 RX ADMIN — INSULIN DETEMIR 13 UNITS: 100 INJECTION, SOLUTION SUBCUTANEOUS at 10:05

## 2018-05-06 RX ADMIN — CEFTRIAXONE SODIUM 2 G: 2 INJECTION, POWDER, FOR SOLUTION INTRAMUSCULAR; INTRAVENOUS at 05:05

## 2018-05-06 RX ADMIN — DILTIAZEM HYDROCHLORIDE 60 MG: 60 TABLET, FILM COATED ORAL at 02:05

## 2018-05-06 RX ADMIN — INSULIN ASPART 2 UNITS: 100 INJECTION, SOLUTION INTRAVENOUS; SUBCUTANEOUS at 10:05

## 2018-05-06 RX ADMIN — HEPARIN SODIUM 5000 UNITS: 5000 INJECTION, SOLUTION INTRAVENOUS; SUBCUTANEOUS at 02:05

## 2018-05-06 RX ADMIN — HEPARIN SODIUM 5000 UNITS: 5000 INJECTION, SOLUTION INTRAVENOUS; SUBCUTANEOUS at 10:05

## 2018-05-06 RX ADMIN — LEVETIRACETAM 500 MG: 500 TABLET, FILM COATED ORAL at 09:05

## 2018-05-06 RX ADMIN — INSULIN ASPART 2 UNITS: 100 INJECTION, SOLUTION INTRAVENOUS; SUBCUTANEOUS at 02:05

## 2018-05-07 LAB
ALBUMIN SERPL BCP-MCNC: 2.3 G/DL
ALP SERPL-CCNC: 87 U/L
ALT SERPL W/O P-5'-P-CCNC: 15 U/L
ANION GAP SERPL CALC-SCNC: 9 MMOL/L
AST SERPL-CCNC: 14 U/L
BACTERIA SPEC ANAEROBE CULT: NORMAL
BACTERIA SPEC ANAEROBE CULT: NORMAL
BASOPHILS # BLD AUTO: 0.03 K/UL
BASOPHILS NFR BLD: 0.4 %
BILIRUB SERPL-MCNC: 0.3 MG/DL
BUN SERPL-MCNC: 24 MG/DL
CALCIUM SERPL-MCNC: 11.2 MG/DL
CHLORIDE SERPL-SCNC: 103 MMOL/L
CO2 SERPL-SCNC: 24 MMOL/L
CREAT SERPL-MCNC: 0.9 MG/DL
DIFFERENTIAL METHOD: ABNORMAL
EOSINOPHIL # BLD AUTO: 0 K/UL
EOSINOPHIL NFR BLD: 0.5 %
ERYTHROCYTE [DISTWIDTH] IN BLOOD BY AUTOMATED COUNT: 16 %
EST. GFR  (AFRICAN AMERICAN): >60 ML/MIN/1.73 M^2
EST. GFR  (NON AFRICAN AMERICAN): >60 ML/MIN/1.73 M^2
GLUCOSE SERPL-MCNC: 264 MG/DL
HCT VFR BLD AUTO: 36.9 %
HGB BLD-MCNC: 10.7 G/DL
IMM GRANULOCYTES # BLD AUTO: 0.1 K/UL
IMM GRANULOCYTES NFR BLD AUTO: 1.3 %
INR PPP: 1.1
LYMPHOCYTES # BLD AUTO: 1.6 K/UL
LYMPHOCYTES NFR BLD: 21.2 %
MAGNESIUM SERPL-MCNC: 2 MG/DL
MCH RBC QN AUTO: 30.6 PG
MCHC RBC AUTO-ENTMCNC: 29 G/DL
MCV RBC AUTO: 105 FL
MONOCYTES # BLD AUTO: 1.1 K/UL
MONOCYTES NFR BLD: 13.8 %
NEUTROPHILS # BLD AUTO: 4.8 K/UL
NEUTROPHILS NFR BLD: 62.8 %
NRBC BLD-RTO: 0 /100 WBC
PHOSPHATE SERPL-MCNC: 2.4 MG/DL
PLATELET # BLD AUTO: 119 K/UL
PMV BLD AUTO: 11.2 FL
POCT GLUCOSE: 186 MG/DL (ref 70–110)
POCT GLUCOSE: 188 MG/DL (ref 70–110)
POCT GLUCOSE: 224 MG/DL (ref 70–110)
POCT GLUCOSE: 232 MG/DL (ref 70–110)
POCT GLUCOSE: 236 MG/DL (ref 70–110)
POCT GLUCOSE: 246 MG/DL (ref 70–110)
POTASSIUM SERPL-SCNC: 4.5 MMOL/L
PROT SERPL-MCNC: 6.1 G/DL
PROTHROMBIN TIME: 11.7 SEC
RBC # BLD AUTO: 3.5 M/UL
SODIUM SERPL-SCNC: 136 MMOL/L
VANCOMYCIN SERPL-MCNC: 34.9 UG/ML
WBC # BLD AUTO: 7.6 K/UL

## 2018-05-07 PROCEDURE — 25000003 PHARM REV CODE 250: Performed by: INTERNAL MEDICINE

## 2018-05-07 PROCEDURE — 97112 NEUROMUSCULAR REEDUCATION: CPT

## 2018-05-07 PROCEDURE — 36415 COLL VENOUS BLD VENIPUNCTURE: CPT

## 2018-05-07 PROCEDURE — 80053 COMPREHEN METABOLIC PANEL: CPT

## 2018-05-07 PROCEDURE — 94668 MNPJ CHEST WALL SBSQ: CPT

## 2018-05-07 PROCEDURE — 99233 SBSQ HOSP IP/OBS HIGH 50: CPT | Mod: ,,, | Performed by: HOSPITALIST

## 2018-05-07 PROCEDURE — 85025 COMPLETE CBC W/AUTO DIFF WBC: CPT

## 2018-05-07 PROCEDURE — 97530 THERAPEUTIC ACTIVITIES: CPT

## 2018-05-07 PROCEDURE — 25000003 PHARM REV CODE 250: Performed by: PHYSICIAN ASSISTANT

## 2018-05-07 PROCEDURE — 99024 POSTOP FOLLOW-UP VISIT: CPT | Mod: POP,,, | Performed by: PHYSICIAN ASSISTANT

## 2018-05-07 PROCEDURE — 86580 TB INTRADERMAL TEST: CPT | Performed by: NEUROLOGICAL SURGERY

## 2018-05-07 PROCEDURE — 63600175 PHARM REV CODE 636 W HCPCS: Performed by: NEUROLOGICAL SURGERY

## 2018-05-07 PROCEDURE — 80202 ASSAY OF VANCOMYCIN: CPT

## 2018-05-07 PROCEDURE — 25000003 PHARM REV CODE 250: Performed by: STUDENT IN AN ORGANIZED HEALTH CARE EDUCATION/TRAINING PROGRAM

## 2018-05-07 PROCEDURE — 83735 ASSAY OF MAGNESIUM: CPT

## 2018-05-07 PROCEDURE — 84100 ASSAY OF PHOSPHORUS: CPT

## 2018-05-07 PROCEDURE — 25000003 PHARM REV CODE 250: Performed by: SURGERY

## 2018-05-07 PROCEDURE — 85610 PROTHROMBIN TIME: CPT

## 2018-05-07 PROCEDURE — 20600001 HC STEP DOWN PRIVATE ROOM

## 2018-05-07 PROCEDURE — 25000003 PHARM REV CODE 250: Performed by: ANESTHESIOLOGY

## 2018-05-07 PROCEDURE — 63600175 PHARM REV CODE 636 W HCPCS: Performed by: PHYSICIAN ASSISTANT

## 2018-05-07 PROCEDURE — 94761 N-INVAS EAR/PLS OXIMETRY MLT: CPT

## 2018-05-07 PROCEDURE — 63600175 PHARM REV CODE 636 W HCPCS: Performed by: STUDENT IN AN ORGANIZED HEALTH CARE EDUCATION/TRAINING PROGRAM

## 2018-05-07 PROCEDURE — 25000003 PHARM REV CODE 250: Performed by: HOSPITALIST

## 2018-05-07 RX ADMIN — INSULIN ASPART 2 UNITS: 100 INJECTION, SOLUTION INTRAVENOUS; SUBCUTANEOUS at 06:05

## 2018-05-07 RX ADMIN — POLYETHYLENE GLYCOL 3350 17 G: 17 POWDER, FOR SOLUTION ORAL at 10:05

## 2018-05-07 RX ADMIN — DILTIAZEM HYDROCHLORIDE 60 MG: 60 TABLET, FILM COATED ORAL at 05:05

## 2018-05-07 RX ADMIN — DILTIAZEM HYDROCHLORIDE 60 MG: 60 TABLET, FILM COATED ORAL at 06:05

## 2018-05-07 RX ADMIN — INSULIN ASPART 2 UNITS: 100 INJECTION, SOLUTION INTRAVENOUS; SUBCUTANEOUS at 01:05

## 2018-05-07 RX ADMIN — Medication 5 UNITS: at 05:05

## 2018-05-07 RX ADMIN — INSULIN ASPART 2 UNITS: 100 INJECTION, SOLUTION INTRAVENOUS; SUBCUTANEOUS at 05:05

## 2018-05-07 RX ADMIN — INSULIN ASPART 2 UNITS: 100 INJECTION, SOLUTION INTRAVENOUS; SUBCUTANEOUS at 10:05

## 2018-05-07 RX ADMIN — INSULIN ASPART 2 UNITS: 100 INJECTION, SOLUTION INTRAVENOUS; SUBCUTANEOUS at 02:05

## 2018-05-07 RX ADMIN — DILTIAZEM HYDROCHLORIDE 60 MG: 60 TABLET, FILM COATED ORAL at 01:05

## 2018-05-07 RX ADMIN — ATORVASTATIN CALCIUM 20 MG: 20 TABLET, FILM COATED ORAL at 08:05

## 2018-05-07 RX ADMIN — CITALOPRAM HYDROBROMIDE 10 MG: 10 TABLET ORAL at 09:05

## 2018-05-07 RX ADMIN — CINACALCET HYDROCHLORIDE 30 MG: 30 TABLET, COATED ORAL at 02:05

## 2018-05-07 RX ADMIN — TAMSULOSIN HYDROCHLORIDE 0.4 MG: 0.4 CAPSULE ORAL at 09:05

## 2018-05-07 RX ADMIN — HEPARIN SODIUM 5000 UNITS: 5000 INJECTION, SOLUTION INTRAVENOUS; SUBCUTANEOUS at 08:05

## 2018-05-07 RX ADMIN — STANDARDIZED SENNA CONCENTRATE AND DOCUSATE SODIUM 1 TABLET: 8.6; 5 TABLET, FILM COATED ORAL at 10:05

## 2018-05-07 RX ADMIN — INSULIN ASPART 2 UNITS: 100 INJECTION, SOLUTION INTRAVENOUS; SUBCUTANEOUS at 08:05

## 2018-05-07 RX ADMIN — POLYETHYLENE GLYCOL 3350 17 G: 17 POWDER, FOR SOLUTION ORAL at 08:05

## 2018-05-07 RX ADMIN — HEPARIN SODIUM 5000 UNITS: 5000 INJECTION, SOLUTION INTRAVENOUS; SUBCUTANEOUS at 06:05

## 2018-05-07 RX ADMIN — VANCOMYCIN HYDROCHLORIDE 1 G: 10 INJECTION, POWDER, LYOPHILIZED, FOR SOLUTION INTRAVENOUS at 06:05

## 2018-05-07 RX ADMIN — DILTIAZEM HYDROCHLORIDE 60 MG: 60 TABLET, FILM COATED ORAL at 11:05

## 2018-05-07 RX ADMIN — STANDARDIZED SENNA CONCENTRATE AND DOCUSATE SODIUM 1 TABLET: 8.6; 5 TABLET, FILM COATED ORAL at 08:05

## 2018-05-07 RX ADMIN — INSULIN ASPART 1 UNITS: 100 INJECTION, SOLUTION INTRAVENOUS; SUBCUTANEOUS at 02:05

## 2018-05-07 RX ADMIN — HEPARIN SODIUM 5000 UNITS: 5000 INJECTION, SOLUTION INTRAVENOUS; SUBCUTANEOUS at 01:05

## 2018-05-07 RX ADMIN — VALSARTAN 120 MG: 40 TABLET ORAL at 09:05

## 2018-05-07 NOTE — PLAN OF CARE
Planned discharge is Skilled Nursing - Plan (A) or  New Nursing Home assisted Placement.     05/07/18 1521   Discharge Reassessment   Assessment Type Discharge Planning Reassessment   Provided patient/caregiver education on the expected discharge date and the discharge plan No   Do you have any problems affording any of your prescribed medications? No   Discharge Plan A Skilled Nursing Facility   Discharge Plan B New Nursing Home placement - [facility] care facility   Patient choice form signed by patient/caregiver N/A   Can the patient answer the patient profile reliably? No, cognitively impaired   Describe the patient's ability to walk at the present time. Major restrictions/daily assistance from another person   How often would a person be available to care for the patient? Often   Number of comorbid conditions (as recorded on the chart) Four   During the past month, has the patient often been bothered by feeling down, depressed or hopeless? No   During the past month, has the patient often been bothered by little interest or pleasure in doing things? No

## 2018-05-07 NOTE — PLAN OF CARE
Problem: Physical Therapy Goal  Goal: Physical Therapy Goal  Goals to be met by: 2018     Patient will increase functional independence with mobility by performin. Supine to sit with Moderate Assistance  2. Sit to supine with Moderate Assistance  3. Sit to stand transfer with Max Assistance  4. Bed to chair transfer with Max Assistance using Rolling Walker  5. Lower extremity exercise program x20 reps per handout, with supervision   6. Pt will perform sitting at EOB x 10 minutes with Contact Guard Assistance to improve trunk control          Outcome: Ongoing (interventions implemented as appropriate)  PT goals still appropriate.    Gallito Odell, SPT  2018

## 2018-05-07 NOTE — SUBJECTIVE & OBJECTIVE
Interval History:  Cooperating w nurses for bed sheet change    Review of Systems   Constitutional: Negative for activity change and fever.   HENT: Negative for congestion.    Respiratory: Negative for cough and choking.    Cardiovascular: Negative for chest pain.   Genitourinary: Negative for hematuria.     Objective:     Vital Signs (Most Recent):  Temp: 97.8 °F (36.6 °C) (05/07/18 1215)  Pulse: 76 (05/07/18 1215)  Resp: 18 (05/07/18 1215)  BP: 124/63 (05/07/18 1215)  SpO2: (!) 94 % (05/07/18 1215) Vital Signs (24h Range):  Temp:  [97.4 °F (36.3 °C)-98.7 °F (37.1 °C)] 97.8 °F (36.6 °C)  Pulse:  [74-94] 76  Resp:  [17-18] 18  SpO2:  [93 %-95 %] 94 %  BP: (112-144)/(59-71) 124/63     Weight: 99.5 kg (219 lb 5.7 oz)  Body mass index is 29.75 kg/m².    Intake/Output Summary (Last 24 hours) at 05/07/18 1236  Last data filed at 05/06/18 1500   Gross per 24 hour   Intake                0 ml   Output                1 ml   Net               -1 ml      Physical Exam   Constitutional: He appears well-developed.   HENT:   Head: Normocephalic.   Wearing collar   Eyes: EOM are normal. Pupils are equal, round, and reactive to light.   Neurological: He is alert.   Not following commands       Significant Labs:   A1C:   Recent Labs  Lab 04/12/18 0518   HGBA1C 5.7*     CBC:   Recent Labs  Lab 05/06/18 0433 05/07/18  0443   WBC 7.64 7.60   HGB 11.7* 10.7*   HCT 36.9* 36.9*    119*     CMP:   Recent Labs  Lab 05/06/18 0433 05/07/18  0443   * 136   K 5.3* 4.5    103   CO2 25 24   * 264*   BUN 14 24*   CREATININE 0.7 0.9   CALCIUM 11.2* 11.2*   PROT 6.8 6.1   ALBUMIN 2.4* 2.3*   BILITOT 0.4 0.3   ALKPHOS 84 87   AST 32 14   ALT 22 15   ANIONGAP 6* 9   EGFRNONAA >60.0 >60.0

## 2018-05-07 NOTE — ASSESSMENT & PLAN NOTE
82 y.o. male s/p C3-5 ACDF for cervical myelopathy on 4/10 with Dr. Hess, and now s/p evacuation/washout of fluid collection with CSF leak repair on 4/28/18 (POD#9)    -Patient neurostable on exam with marked improvements in exam since wound revision but continues to have overall generalized deconditioning since admission. Speech slightly improved. He was wheel chair bound prior to this admission.   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  -Cont neuro checks q4h   -Appreciate medicine recommendations and participation in care of patient   -TF at goal and tolerating, will adjust insulin for DM per medicine recs  -ID: continue vanc and ceftriaxone x 7 days from surgery (4/28) Vanc trough before 4th dose, 18. Trough Goal 15-20. Surgical cultures NGTD, completed 5/6/18.  -CV goal normotension. Continue home meds. PRN meds SBP >160.   -Continue PILI/SCDs/SQH  -Continue bowel regimen daily. Brown bomb 5/3 with +BM  -Continue to hold Eliquis. Per , indications unclear.  does not recommend continuing upon discharge.  -Appreciate  assistance with management  -Hypercalcemia: home dose sensipar restarted per HM, CTM   -Continue aggressive PT/OT/ST. Discussed with therapy to evaluate patient simultaneously while pt OOB with PT.  -Cervical collar when OOB or with activity  -Scheduled CPT q4 hours   -Aggressive oral care per nursing staff daily  --Long discussion with patient and nursing staff regarding day/night re-orientation:   DAYTIME: blinds up/lights on, TV on, increase stimuli, awake with minimal naps  NIGHTTIME: blinds down, TV off, lights off, decrease any noise/stimuli   Private room to prevent hospital delirium and increase compliance for OOB  -Keep incision open to air. Do not submerge or scrub incision.   -SNF pending. CM/SW to pursue     Discussed with Dr. Hess

## 2018-05-07 NOTE — PROGRESS NOTES
[facility]  Neurosurgery  Progress Note    Subjective:     History of Present Illness: 82 y.o.  male with type 2 diabetes, who presents today for follow up evaluation one week prior to 2 level ACDF scheduled for 4/10/2018. Pt reports    Pt presents today wearing a cervical collar and in a wheelchair. He would like to proceed with surgery. Per pt's family, his PCP would like to see him on 4/9, the day before surgery. He notes continued neck pain and BUE numbness/tingling as well as BUE weakness. Pt is only able to walk a few steps unassisted. He has never received neck surgery.     Post-Op Info:  Procedure(s) (LRB):  WASHOUT-CERVICAL-wound washout and csf leak repair with depuy (N/A)  DISKECTOMY AND FUSION-ANTERIOR CERVICAL (ACDF)- REVISON C5-C7   9 Days Post-Op     Interval History: NAEON. Afebrile. VSS. Continued general deconditioning and intermittent drowsiness. Patient awake and alert on my exam but drowsy/lethargic with ST at 9:30am. He has no incisional pain, dysphagia, or any complaints on exam. Speech improved compared to pre wound revision on 4/28/18. Medicine following peripherally for co-management (adjusted cardizem for HTN and insulin for DM). Pending SNF placement. Tolerating TF. Voiding per condom cath. +flatus and BM yesterday.     Medications:  Continuous Infusions:  Scheduled Meds:   atorvastatin  20 mg Per NG tube Nightly    cinacalcet  30 mg Oral Daily with breakfast    citalopram  10 mg Per NG tube Daily    diltiaZEM  60 mg Per NG tube Q6H    heparin (porcine)  5,000 Units Subcutaneous Q8H    insulin aspart U-100  2 Units Subcutaneous Q4H    insulin detemir U-100  15 Units Subcutaneous BID    polyethylene glycol  17 g Per NG tube BID    senna-docusate 8.6-50 mg  1 tablet Per NG tube BID    tamsulosin  0.4 mg Per NG tube Daily    valsartan  120 mg Per NG tube Daily    vancomycin (VANCOCIN) IVPB  1,000 mg Intravenous Q12H     PRN Meds:acetaminophen,  albuterol-ipratropium 2.5mg-0.5mg/3mL, aluminum-magnesium hydroxide-simethicone, dextrose 50%, glucagon (human recombinant), insulin aspart U-100, lidocaine HCL 2%, ondansetron, potassium chloride 10%, potassium chloride 10%, potassium chloride 10%, potassium, sodium phosphates, potassium, sodium phosphates, potassium, sodium phosphates     Review of Systems  Objective:     Weight: 99.5 kg (219 lb 5.7 oz)  Body mass index is 29.75 kg/m².  Vital Signs (Most Recent):  Temp: 97.8 °F (36.6 °C) (05/07/18 1215)  Pulse: 76 (05/07/18 1215)  Resp: 18 (05/07/18 1215)  BP: 124/63 (05/07/18 1215)  SpO2: (!) 94 % (05/07/18 1215) Vital Signs (24h Range):  Temp:  [97.4 °F (36.3 °C)-98.7 °F (37.1 °C)] 97.8 °F (36.6 °C)  Pulse:  [74-94] 76  Resp:  [17-18] 18  SpO2:  [93 %-95 %] 94 %  BP: (112-144)/(59-71) 124/63                           NG/OG Tube 04/21/18 2300 Cortrak Right nostril (Active)   Placement Check placement verified by x-ray 5/5/2018  7:48 AM   Distal Tube Length (cm) 70 5/5/2018  7:48 AM   Tolerance no signs/symptoms of discomfort 5/4/2018  8:00 PM   Securement anchored to nostril center w/ adhesive device 5/4/2018  8:00 PM   Clamp Status/Tolerance unclamped;no abdominal distention;no emesis;no residual;no restlessness 5/4/2018  8:00 AM   Insertion Site Appearance no redness, warmth, tenderness, skin breakdown, drainage 5/4/2018  8:00 AM   Drainage None 5/4/2018  8:00 AM   Flush/Irrigation flushed w/;water 5/4/2018  8:00 AM   Feeding Method bolus by pump 5/4/2018  8:00 AM   Current Rate (mL/hr) 0 mL/hr 5/4/2018  8:00 AM   Goal Rate (mL/hr) 55 mL/hr 5/4/2018  8:00 AM   Intake (mL) 0 mL 5/4/2018  8:00 AM   Water Bolus (mL) 0 mL 5/4/2018  8:00 AM   Tube Output(mL)(Include Discarded Residual) 0 mL 5/4/2018  8:00 AM   Intake (mL) - Breast Milk Tube Feeding 0 5/4/2018  8:00 AM   Rate Formula Tube Feeding (mL/hr) 0 mL/hr 5/4/2018  8:00 AM   Intake (mL) - Formula Tube Feeding 0 5/4/2018  8:00 AM   Residual Amount (ml) 0 ml  5/4/2018  8:00 AM            Gastrostomy/Enterostomy 05/04/18 1539 Gastrostomy tube w/ balloon LUQ feeding (Active)   Securement anchored to abdomen w/ adhesive device 5/6/2018  8:00 PM   Interventions Prior to Feeding patency checked 5/6/2018  8:00 AM   Suction Setting/Drainage Method dependent drainage 5/6/2018  8:00 AM       Male External Urinary Catheter 04/30/18 1600 (Active)   Collection Container Urimeter 5/6/2018  8:00 AM   Securement Method secured to top of thigh w/ adhesive device 5/6/2018  8:00 AM   Skin no redness;no breakdown 5/6/2018  8:00 AM   Tolerance no signs/symptoms of discomfort 5/6/2018  8:00 AM   Output (mL) 1500 mL 5/4/2018  1:00 PM   Catheter Change Date 05/04/18 5/6/2018  8:00 AM   Catheter Change Time 0800 5/4/2018  8:00 AM       Neurosurgery Physical Exam  General: well developed, well nourished. no acute distress. Generalized deconditioning   Neurologic:Awake, alert, and Oriented to person, place, and time. Thought content appropriate. Follows commands   Head: normocephalic, atraumatic   GCS: Motor: 6/Verbal: 5/Eyes: 4 GCS Total: 15  Cranial nerves: face symmetric, tongue midline, pupils equal, round, reactive to light with accomodation, extraocular muscles intact. CN II-XII grossly intact.   Language: no aphasia  Speech:  dysarthria improved compared to pre-wound revision on 4/28  Sensory: decrease response to light touch in BUE  Motor Strength: Moves all extremities spontaneously with good tone.      Strength   Deltoids Triceps Biceps Wrist Extension Wrist Flexion Hand    Upper: R 2/5 3/5 4-/5 2/5 2/5 3/5     L 3/5 4-/5 4-/5 3/5 3/5 3/5       Iliopsoas Quadriceps Knee  Flexion Tibialis  anterior Gastro- cnemius EHL   Lower: R 2/5 2/5 2/5 4-/5 4-/5 4-/5     L 3/5 3/5 3/5 4/5 4/5 4/5          [place]: absent bilaterally   Clonus: absent  ENT: normal hearing with finger rub  Heart: RRR, no cyanosis, pallor, or edema.   Lungs:  normal respiratory effort on room air  Abdomen: soft,  non-tender and symmetric. G tube site c/d/i  Extremities: warm with no cyanosis, edema, or clubbing.   Pulses: palpable distal pulses  Skin: warm, dry and intact. No visible rashes or lesions.     wound is c/d/i, no erythema, ttp, or drainage.  wound edges are well approximated. No surrounding edema     condom cath in place    Significant Labs:    Recent Labs  Lab 05/06/18  0433 05/07/18  0443   * 264*   * 136   K 5.3* 4.5    103   CO2 25 24   BUN 14 24*   CREATININE 0.7 0.9   CALCIUM 11.2* 11.2*   MG 2.6 2.0       Recent Labs  Lab 05/06/18  0433 05/07/18  0443   WBC 7.64 7.60   HGB 11.7* 10.7*   HCT 36.9* 36.9*    119*       Recent Labs  Lab 05/06/18  0433 05/07/18  0443   INR 1.2 1.1     Microbiology Results (last 7 days)     Procedure Component Value Units Date/Time    Culture, Anaerobe [[id number]] Collected:  04/28/18 1413    Order Status:  Completed Specimen:  Wound from Neck Updated:  05/07/18 0659     Anaerobic Culture No anaerobes isolated    Narrative:       Wound fluid    Culture, Anaerobe [[id number]] Collected:  04/28/18 1417    Order Status:  Completed Specimen:  Wound from Neck Updated:  05/07/18 0659     Anaerobic Culture No anaerobes isolated    Narrative:       Wound fluid    Fungus culture [[id number]] Collected:  04/28/18 1413    Order Status:  Completed Specimen:  Wound from Neck Updated:  05/03/18 1039     Fungus (Mycology) Culture Culture in progress    Narrative:       Wound fluid    Fungus culture [[id number]] Collected:  04/28/18 1417    Order Status:  Completed Specimen:  Wound from Neck Updated:  05/03/18 1039     Fungus (Mycology) Culture Culture in progress    Narrative:       Wound fluid    Aerobic culture [[id number]] Collected:  04/28/18 1417    Order Status:  Completed Specimen:  Wound from Neck Updated:  05/01/18 0852     Aerobic Bacterial Culture No growth    Narrative:       Wound fluid    Aerobic culture [[id number]] Collected:  04/28/18 1413    Order Status:   Completed Specimen:  Wound from Neck Updated:  05/01/18 0852     Aerobic Bacterial Culture No growth    Narrative:       Wound fluid            Significant Diagnostics:  I have reviewed all pertinent imaging results/findings within the past 24 hours.    Assessment/Plan:     * Cervical myelopathy with cervical radiculopathy    82 y.o. male s/p C3-5 ACDF for cervical myelopathy on 4/10 with Dr. Hess, and now s/p evacuation/washout of fluid collection with CSF leak repair on 4/28/18 (POD#9)    -Patient neurostable on exam with marked improvements in exam since wound revision but continues to have overall generalized deconditioning since admission. Speech slightly improved. He was wheel chair bound prior to this admission.   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  -Cont neuro checks q4h   -Appreciate medicine recommendations and participation in care of patient   -TF at goal and tolerating, will adjust insulin for DM per medicine recs  -ID: continue vanc and ceftriaxone x 7 days from surgery (4/28) Vanc trough before 4th dose, 18. Trough Goal 15-20. Surgical cultures NGTD, completed 5/6/18.  -CV goal normotension. Continue home meds. PRN meds SBP >160.   -Continue PILI/SCDs/SQH  -Continue bowel regimen daily. Brown bomb 5/3 with +BM  -Continue to hold Eliquis. Per , indications unclear.  does not recommend continuing upon discharge.  -Appreciate  assistance with management  -Hypercalcemia: home dose sensipar restarted per , CTM   -Continue aggressive PT/OT/ST. Discussed with therapy to evaluate patient simultaneously while pt OOB with PT.  -Cervical collar when OOB or with activity  -Scheduled CPT q4 hours   -Aggressive oral care per nursing staff daily  --Long discussion with patient and nursing staff regarding day/night re-orientation:   DAYTIME: blinds up/lights on, TV on, increase stimuli, awake with minimal naps  NIGHTTIME: blinds down, TV off, lights off, decrease any  noise/stimuli   Private room to prevent hospital delirium and increase compliance for OOB  -Keep incision open to air. Do not submerge or scrub incision.   -SNF pending. CM/SW to pursue     Discussed with Dr. Deshawn Bradford PA-C  Neurosurgery  [facility]

## 2018-05-07 NOTE — PT/OT/SLP PROGRESS
Speech Language Pathology  Pt not seen    Hollis Pitts  MRN: [id number]    Patient not seen today secondary to pt unable to arouse for therapy with max verbal, thermal and tactile cues in AM. SLP will follow up.    Humera Wadsworth CCC-SLP   5/7/2018

## 2018-05-07 NOTE — PLAN OF CARE
3:51 PM  TAN followed up w/[facility] Fu (rep Amy) and Shalom Black (rep Marycarmen).  Both representatives stated they will review Pt's referral on tomorrow.  [facility]oxi corona/[place] stated that they had accepted Pt prior to him going to ICU.  States she will notify his family if not today, tomorrow to discuss his admit.    TAN Orion resent all three referrals.      Elma Chaparro, LINDA  [facility]

## 2018-05-07 NOTE — SUBJECTIVE & OBJECTIVE
Interval History: NAEON. PEG in place, pending SNF    Medications:  Continuous Infusions:  Scheduled Meds:   atorvastatin  20 mg Per NG tube Nightly    cinacalcet  30 mg Oral Daily with breakfast    citalopram  10 mg Per NG tube Daily    diltiaZEM  60 mg Per NG tube Q6H    heparin (porcine)  5,000 Units Subcutaneous Q8H    insulin aspart U-100  2 Units Subcutaneous Q4H    insulin detemir U-100  15 Units Subcutaneous BID    polyethylene glycol  17 g Per NG tube BID    senna-docusate 8.6-50 mg  1 tablet Per NG tube BID    tamsulosin  0.4 mg Per NG tube Daily    valsartan  120 mg Per NG tube Daily    vancomycin (VANCOCIN) IVPB  1,000 mg Intravenous Q12H     PRN Meds:acetaminophen, albuterol-ipratropium 2.5mg-0.5mg/3mL, aluminum-magnesium hydroxide-simethicone, dextrose 50%, glucagon (human recombinant), insulin aspart U-100, lidocaine HCL 2%, ondansetron, potassium chloride 10%, potassium chloride 10%, potassium chloride 10%, potassium, sodium phosphates, potassium, sodium phosphates, potassium, sodium phosphates     Review of Systems   Constitutional: Positive for activity change.   Objective:     Weight: 99.5 kg (219 lb 5.7 oz)  Body mass index is 29.75 kg/m².  Vital Signs (Most Recent):  Temp: 97.5 °F (36.4 °C) (05/07/18 0814)  Pulse: 76 (05/07/18 0814)  Resp: 18 (05/07/18 0814)  BP: 130/71 (05/07/18 0814)  SpO2: (!) 94 % (05/07/18 0814) Vital Signs (24h Range):  Temp:  [97.4 °F (36.3 °C)-98.7 °F (37.1 °C)] 97.5 °F (36.4 °C)  Pulse:  [76-94] 76  Resp:  [17-18] 18  SpO2:  [93 %-95 %] 94 %  BP: (112-144)/(59-76) 130/71                           NG/OG Tube 04/21/18 2300 Cortrak Right nostril (Active)   Placement Check placement verified by aspirate characteristics 5/3/2018  8:00 PM   Distal Tube Length (cm) 70 5/3/2018  8:00 PM   Tolerance no signs/symptoms of discomfort 5/3/2018  8:00 PM   Securement anchored to nostril center w/ adhesive device 5/3/2018  8:00 PM   Clamp Status/Tolerance unclamped 5/3/2018   8:00 PM   Insertion Site Appearance no redness, warmth, tenderness, skin breakdown, drainage 5/3/2018  8:00 PM   Drainage None 5/3/2018  8:00 PM   Flush/Irrigation flushed w/;water 5/3/2018  8:00 PM   Feeding Method continuous 5/3/2018  8:00 PM   Current Rate (mL/hr) 55 mL/hr 5/3/2018  8:00 PM   Goal Rate (mL/hr) 55 mL/hr 5/3/2018  8:00 PM   Intake (mL) 60 mL 5/3/2018  8:00 PM   Water Bolus (mL) 250 mL 5/3/2018  4:41 PM   Intake (mL) - Breast Milk Tube Feeding 0 4/29/2018 11:05 PM   Rate Formula Tube Feeding (mL/hr) 55 mL/hr 5/2/2018 10:00 PM   Intake (mL) - Formula Tube Feeding 570 5/3/2018  5:00 AM   Residual Amount (ml) 0 ml 5/2/2018 10:00 PM       Male External Urinary Catheter 04/30/18 1600 (Active)   Collection Container Urimeter 5/3/2018  8:00 PM   Securement Method secured to top of thigh w/ adhesive device 5/3/2018  8:00 PM   Skin no redness;no breakdown 5/3/2018  8:00 PM   Tolerance no signs/symptoms of discomfort 5/2/2018 10:00 PM   Output (mL) 700 mL 5/3/2018  4:41 PM       Neurosurgery Physical Exam    General: well developed, well nourished. no acute distress. Generalized deconditioning   Neurologic: lethargic. Requires persistant stim to participate. Oriented x 2. Thought content appropriate. Follows commands   Head: normocephalic, atraumatic   GCS: Motor: 6/Verbal: 5/Eyes: 4 GCS Total: 15  Cranial nerves: face symmetric, tongue midline, pupils equal, round, reactive to light with accomodation, extraocular muscles intact. CN II-XII grossly intact.   Language: no aphasia  Speech:  dysarthria   Sensory: decrease response to light touch in BUE  Motor Strength: Moves all extremities spontaneously with good tone.      Strength   Deltoids Triceps Biceps Wrist Extension Wrist Flexion Hand    Upper: R 2/5 3/5 3/5 2/5 2/5 3/5     L 3/5 4-/5 4-/5 3/5 3/5 3/5       Iliopsoas Quadriceps Knee  Flexion Tibialis  anterior Gastro- cnemius EHL   Lower: R 3/5 3/5 3/5 4-/5 4-/5 4-/5     L 3/5 3/5 3/5 4/5 4/5 4/5           [place]: present on right   Clonus: absent     wound is c/d/i, no erythema, ttp, or drainage.  wound edges are well approximated. No surrounding edema     Significant Labs:    Recent Labs  Lab 05/06/18  0433 05/07/18  0443   * 264*   * 136   K 5.3* 4.5    103   CO2 25 24   BUN 14 24*   CREATININE 0.7 0.9   CALCIUM 11.2* 11.2*   MG 2.6 2.0       Recent Labs  Lab 05/06/18  0433 05/07/18  0443   WBC 7.64 7.60   HGB 11.7* 10.7*   HCT 36.9* 36.9*    119*       Recent Labs  Lab 05/06/18 0433 05/07/18  0443   INR 1.2 1.1     Microbiology Results (last 7 days)     Procedure Component Value Units Date/Time    Culture, Anaerobe [[id number]] Collected:  04/28/18 1413    Order Status:  Completed Specimen:  Wound from Neck Updated:  05/07/18 0659     Anaerobic Culture No anaerobes isolated    Narrative:       Wound fluid    Culture, Anaerobe [[id number]] Collected:  04/28/18 1417    Order Status:  Completed Specimen:  Wound from Neck Updated:  05/07/18 0659     Anaerobic Culture No anaerobes isolated    Narrative:       Wound fluid    Fungus culture [[id number]] Collected:  04/28/18 1413    Order Status:  Completed Specimen:  Wound from Neck Updated:  05/03/18 1039     Fungus (Mycology) Culture Culture in progress    Narrative:       Wound fluid    Fungus culture [[id number]] Collected:  04/28/18 1417    Order Status:  Completed Specimen:  Wound from Neck Updated:  05/03/18 1039     Fungus (Mycology) Culture Culture in progress    Narrative:       Wound fluid    Aerobic culture [[id number]] Collected:  04/28/18 1417    Order Status:  Completed Specimen:  Wound from Neck Updated:  05/01/18 0852     Aerobic Bacterial Culture No growth    Narrative:       Wound fluid    Aerobic culture [[id number]] Collected:  04/28/18 1413    Order Status:  Completed Specimen:  Wound from Neck Updated:  05/01/18 0852     Aerobic Bacterial Culture No growth    Narrative:       Wound fluid    AFB Culture & Smear  [[id number]] Collected:  04/28/18 1417    Order Status:  Completed Specimen:  Wound from Neck Updated:  04/30/18 1313     AFB Culture & Smear Culture in progress     AFB CULTURE STAIN No acid fast bacilli seen.    Narrative:       Wound fluid    AFB Culture & Smear [[id number]] Collected:  04/28/18 1413    Order Status:  Completed Specimen:  Wound from Neck Updated:  04/30/18 1313     AFB Culture & Smear Culture in progress     AFB CULTURE STAIN No acid fast bacilli seen.    Narrative:       Wound fluid        All pertinent labs from the last 24 hours have been reviewed.    Significant Diagnostics:  None new

## 2018-05-07 NOTE — ASSESSMENT & PLAN NOTE
--speech continues to recommend NPO  --currently receiving continuous TFs   --planning on IR peg tube placement 5/4 5/7 - on TFs, continuous via NG

## 2018-05-07 NOTE — PT/OT/SLP PROGRESS
Physical Therapy Treatment    Patient Name:  Hollis Pitts   MRN:  [id number]    Recommendations:     Discharge Recommendations:  nursing facility, skilled   Discharge Equipment Recommendations: hospital bed, lift device   Barriers to discharge: Decreased caregiver support    Assessment:     Hollis Pitts is a 82 y.o. male admitted with a medical diagnosis of Cervical myelopathy with cervical radiculopathy.  He presents with the following impairments/functional limitations:  weakness, impaired endurance, impaired self care skills, impaired functional mobilty, impaired balance, impaired cognition, decreased coordination, decreased upper extremity function, decreased lower extremity function, decreased safety awareness.  PT completed Tx transferring pt supine<>sit Total A (Assist of 2), and sitting EOB Total A x 1 for ~10 min.  Pt was lethargic throughout session unable to maintain eyes open, inconsistently able to open eyes and attend to tasks for short duration despite verbal/tactile cues.  Pt is still appropriate and would benefit from skilled PT services pending progress.    Rehab Prognosis:  Good; patient would benefit from acute skilled PT services to address these deficits and reach maximum level of function.      Recent Surgery: Procedure(s) (LRB):  WASHOUT-CERVICAL-wound washout and csf leak repair with depuy (N/A)  DISKECTOMY AND FUSION-ANTERIOR CERVICAL (ACDF)- REVISON C5-C7 9 Days Post-Op    Plan:     During this hospitalization, patient to be seen 3 x/week to address the above listed problems via therapeutic activities, therapeutic exercises, neuromuscular re-education, wheelchair management/training  · Plan of Care Expires:  06/01/18   Plan of Care Reviewed with: patient    Subjective     Communicated with RN prior to session.  Patient found supine with HOB elevated upon PT entry to room, agreeable to treatment.      Chief Complaint: Pt did not report any pain  Patient comments/goals: Pt lethargic with  limited arousal.  Pain/Comfort:  · Pain Rating 1: 0/10  · Pain Rating Post-Intervention 1: 0/10    Patients cultural, spiritual, Sikh conflicts given the current situation: None reported    Objective:     Patient found with: peripheral IV, PEG Tube, telemetry, Condom Catheter, SCD, bed alarm     PT noted pt had limited responsiveness to PT verbal/tactile cues, unable to maintain eyes open, highly lethargic.    General Precautions: Standard, aspiration, fall   Orthopedic Precautions:spinal precautions   Braces: Aspen collar     Functional Mobility:  · Bed Mobility:     · Supine to Sit: total assistance x 2  · Sit to Supine: total assistance x 2    · Balance:   · Static Sitting Balance Total A x 1    AM-PAC 6 CLICK MOBILITY  Turning over in bed (including adjusting bedclothes, sheets and blankets)?: 1  Sitting down on and standing up from a chair with arms (e.g., wheelchair, bedside commode, etc.): 1  Moving from lying on back to sitting on the side of the bed?: 1  Moving to and from a bed to a chair (including a wheelchair)?: 1  Need to walk in hospital room?: 1  Climbing 3-5 steps with a railing?: 1  Total Score: 6       Therapeutic Activities and Exercises:     Seated EOB Activity  · ~10 min Sitting Total A of 1  · Deficits:  Posterior trunk lean, posterior-pelvic tilt, flexed posture, decreased use of B UE support, limited arousal/awarness.  · PT facilitated upright posture, B hand positioning for improved UE support/balance.  · Pt unable to attend to UE reaching tasks   · Pt inconsistently attended to B LE LAQ despite PT verbal/tactile cuing  · Pt performed toe-taps x 6 reps, unable to maintain     PT educated pt on:   - Importance of OOB activity   - Safety with sitting EOB    PT requested RN staff order abdominal brace and pressure-relief boots for pt.  Pt not safe to transfer with RN staff.    Patient left HOB elevated & B UE supported to improve UE weight-bearing with all lines intact, call button in  reach, bed alarm on and RN notified.    GOALS:    Physical Therapy Goals        Problem: Physical Therapy Goal    Goal Priority Disciplines Outcome Goal Variances Interventions   Physical Therapy Goal     PT/OT, PT Ongoing (interventions implemented as appropriate)     Description:  Goals to be met by: 2018     Patient will increase functional independence with mobility by performin. Supine to sit with Moderate Assistance  2. Sit to supine with Moderate Assistance  3. Sit to stand transfer with Max Assistance  4. Bed to chair transfer with Max Assistance using Rolling Walker  5. Lower extremity exercise program x20 reps per handout, with supervision   6. Pt will perform sitting at EOB x 10 minutes with Contact Guard Assistance to improve trunk control                           Time Tracking:     PT Received On: 18  PT Start Time: 849     PT Stop Time: 927  PT Total Time (min): 38 min     Billable Minutes: Therapeutic Activity 28 and Neuromuscular Re-education 10    Treatment Type: Treatment  PT/PTA: PT     PTA Visit Number: 0     Gallito Odell, [facility]  2018
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
## 2018-05-07 NOTE — PROGRESS NOTES
[facility]  Neurosurgery  Progress Note    Subjective:     History of Present Illness: 82 y.o.  male with type 2 diabetes, who presents today for follow up evaluation one week prior to 2 level ACDF scheduled for 4/10/2018. Pt reports    Pt presents today wearing a cervical collar and in a wheelchair. He would like to proceed with surgery. Per pt's family, his PCP would like to see him on 4/9, the day before surgery. He notes continued neck pain and BUE numbness/tingling as well as BUE weakness. Pt is only able to walk a few steps unassisted. He has never received neck surgery.     Post-Op Info:  Procedure(s) (LRB):  WASHOUT-CERVICAL-wound washout and csf leak repair with depuy (N/A)  DISKECTOMY AND FUSION-ANTERIOR CERVICAL (ACDF)- REVISON C5-C7   9 Days Post-Op     Interval History: NAEON. PEG in place, pending SNF    Medications:  Continuous Infusions:  Scheduled Meds:   atorvastatin  20 mg Per NG tube Nightly    cinacalcet  30 mg Oral Daily with breakfast    citalopram  10 mg Per NG tube Daily    diltiaZEM  60 mg Per NG tube Q6H    heparin (porcine)  5,000 Units Subcutaneous Q8H    insulin aspart U-100  2 Units Subcutaneous Q4H    insulin detemir U-100  15 Units Subcutaneous BID    polyethylene glycol  17 g Per NG tube BID    senna-docusate 8.6-50 mg  1 tablet Per NG tube BID    tamsulosin  0.4 mg Per NG tube Daily    valsartan  120 mg Per NG tube Daily    vancomycin (VANCOCIN) IVPB  1,000 mg Intravenous Q12H     PRN Meds:acetaminophen, albuterol-ipratropium 2.5mg-0.5mg/3mL, aluminum-magnesium hydroxide-simethicone, dextrose 50%, glucagon (human recombinant), insulin aspart U-100, lidocaine HCL 2%, ondansetron, potassium chloride 10%, potassium chloride 10%, potassium chloride 10%, potassium, sodium phosphates, potassium, sodium phosphates, potassium, sodium phosphates     Review of Systems   Constitutional: Positive for activity change.   Objective:     Weight: 99.5 kg (219 lb 5.7  oz)  Body mass index is 29.75 kg/m².  Vital Signs (Most Recent):  Temp: 97.5 °F (36.4 °C) (05/07/18 0814)  Pulse: 76 (05/07/18 0814)  Resp: 18 (05/07/18 0814)  BP: 130/71 (05/07/18 0814)  SpO2: (!) 94 % (05/07/18 0814) Vital Signs (24h Range):  Temp:  [97.4 °F (36.3 °C)-98.7 °F (37.1 °C)] 97.5 °F (36.4 °C)  Pulse:  [76-94] 76  Resp:  [17-18] 18  SpO2:  [93 %-95 %] 94 %  BP: (112-144)/(59-76) 130/71                           NG/OG Tube 04/21/18 2300 Cortrak Right nostril (Active)   Placement Check placement verified by aspirate characteristics 5/3/2018  8:00 PM   Distal Tube Length (cm) 70 5/3/2018  8:00 PM   Tolerance no signs/symptoms of discomfort 5/3/2018  8:00 PM   Securement anchored to nostril center w/ adhesive device 5/3/2018  8:00 PM   Clamp Status/Tolerance unclamped 5/3/2018  8:00 PM   Insertion Site Appearance no redness, warmth, tenderness, skin breakdown, drainage 5/3/2018  8:00 PM   Drainage None 5/3/2018  8:00 PM   Flush/Irrigation flushed w/;water 5/3/2018  8:00 PM   Feeding Method continuous 5/3/2018  8:00 PM   Current Rate (mL/hr) 55 mL/hr 5/3/2018  8:00 PM   Goal Rate (mL/hr) 55 mL/hr 5/3/2018  8:00 PM   Intake (mL) 60 mL 5/3/2018  8:00 PM   Water Bolus (mL) 250 mL 5/3/2018  4:41 PM   Intake (mL) - Breast Milk Tube Feeding 0 4/29/2018 11:05 PM   Rate Formula Tube Feeding (mL/hr) 55 mL/hr 5/2/2018 10:00 PM   Intake (mL) - Formula Tube Feeding 570 5/3/2018  5:00 AM   Residual Amount (ml) 0 ml 5/2/2018 10:00 PM       Male External Urinary Catheter 04/30/18 1600 (Active)   Collection Container Urimeter 5/3/2018  8:00 PM   Securement Method secured to top of thigh w/ adhesive device 5/3/2018  8:00 PM   Skin no redness;no breakdown 5/3/2018  8:00 PM   Tolerance no signs/symptoms of discomfort 5/2/2018 10:00 PM   Output (mL) 700 mL 5/3/2018  4:41 PM       Neurosurgery Physical Exam    General: well developed, well nourished. no acute distress. Generalized deconditioning   Neurologic: lethargic. Requires  persistant stim to participate. Oriented x 2. Thought content appropriate. Follows commands   Head: normocephalic, atraumatic   GCS: Motor: 6/Verbal: 5/Eyes: 4 GCS Total: 15  Cranial nerves: face symmetric, tongue midline, pupils equal, round, reactive to light with accomodation, extraocular muscles intact. CN II-XII grossly intact.   Language: no aphasia  Speech:  dysarthria   Sensory: decrease response to light touch in BUE  Motor Strength: Moves all extremities spontaneously with good tone.      Strength   Deltoids Triceps Biceps Wrist Extension Wrist Flexion Hand    Upper: R 2/5 3/5 3/5 2/5 2/5 3/5     L 3/5 4-/5 4-/5 3/5 3/5 3/5       Iliopsoas Quadriceps Knee  Flexion Tibialis  anterior Gastro- cnemius EHL   Lower: R 3/5 3/5 3/5 4-/5 4-/5 4-/5     L 3/5 3/5 3/5 4/5 4/5 4/5          [place]: present on right   Clonus: absent     wound is c/d/i, no erythema, ttp, or drainage.  wound edges are well approximated. No surrounding edema     Significant Labs:    Recent Labs  Lab 05/06/18  0433 05/07/18  0443   * 264*   * 136   K 5.3* 4.5    103   CO2 25 24   BUN 14 24*   CREATININE 0.7 0.9   CALCIUM 11.2* 11.2*   MG 2.6 2.0       Recent Labs  Lab 05/06/18  0433 05/07/18  0443   WBC 7.64 7.60   HGB 11.7* 10.7*   HCT 36.9* 36.9*    119*       Recent Labs  Lab 05/06/18  0433 05/07/18  0443   INR 1.2 1.1     Microbiology Results (last 7 days)     Procedure Component Value Units Date/Time    Culture, Anaerobe [[id number]] Collected:  04/28/18 1413    Order Status:  Completed Specimen:  Wound from Neck Updated:  05/07/18 0659     Anaerobic Culture No anaerobes isolated    Narrative:       Wound fluid    Culture, Anaerobe [[id number]] Collected:  04/28/18 1417    Order Status:  Completed Specimen:  Wound from Neck Updated:  05/07/18 0659     Anaerobic Culture No anaerobes isolated    Narrative:       Wound fluid    Fungus culture [[id number]] Collected:  04/28/18 1413    Order Status:  Completed  Specimen:  Wound from Neck Updated:  05/03/18 1039     Fungus (Mycology) Culture Culture in progress    Narrative:       Wound fluid    Fungus culture [[id number]] Collected:  04/28/18 1417    Order Status:  Completed Specimen:  Wound from Neck Updated:  05/03/18 1039     Fungus (Mycology) Culture Culture in progress    Narrative:       Wound fluid    Aerobic culture [[id number]] Collected:  04/28/18 1417    Order Status:  Completed Specimen:  Wound from Neck Updated:  05/01/18 0852     Aerobic Bacterial Culture No growth    Narrative:       Wound fluid    Aerobic culture [[id number]] Collected:  04/28/18 1413    Order Status:  Completed Specimen:  Wound from Neck Updated:  05/01/18 0852     Aerobic Bacterial Culture No growth    Narrative:       Wound fluid    AFB Culture & Smear [[id number]] Collected:  04/28/18 1417    Order Status:  Completed Specimen:  Wound from Neck Updated:  04/30/18 1313     AFB Culture & Smear Culture in progress     AFB CULTURE STAIN No acid fast bacilli seen.    Narrative:       Wound fluid    AFB Culture & Smear [[id number]] Collected:  04/28/18 1413    Order Status:  Completed Specimen:  Wound from Neck Updated:  04/30/18 1313     AFB Culture & Smear Culture in progress     AFB CULTURE STAIN No acid fast bacilli seen.    Narrative:       Wound fluid        All pertinent labs from the last 24 hours have been reviewed.    Significant Diagnostics:  None new     Assessment/Plan:     * Cervical myelopathy with cervical radiculopathy    82 y.o. male s/p C3-5 ACDF for cervical myelopathy on 4/10 with Dr. Hess, and now s/p evacuation/washout of fluid collection with CSF leak repair      -Repeat CT Head stable   -Cont neuro checks q4h   -Appreciate medicine recommendations  -TF restarted today, will adjust insulin per medicine recs  -ID: continue vanc and ceftriaxone x 7 days from surgery (4/28) Vanc trough before 4th dose, 18. Trough Goal 15-20. Surgical cultures NGTD, will stop antibiotics  after today  -CV goal normotension. Continue home meds. PRN meds SBP >160.   -Continue PILI/SCDs/SQH  -Continue bowel regimen daily. Brown bomb 5/3 with +BM  -Continue to hold Eliquis. Per , indications unclear.  does not recommend continuing upon discharge.  -Appreciate  assistance with management  -Hypercalcemia: home dose sensipar restarted per HM, CTM   -Continue aggressive PT/OT/ST  -Cervical collar when OOB or with activity  -Scheduled CPT q4 hours   -Aggressive oral care per nursing staff daily  -SNF pending. CM/SW to pursue                       Devante Holman MD  Neurosurgery  [facility]

## 2018-05-07 NOTE — ASSESSMENT & PLAN NOTE
-- A1C 5.6, glucoses 172-217 inpast 24 hours  -- per chart review patient had reported being controlled by lifestyle modification alone at home   -- currently on continuous tube feeds with diabetisource   -- cont detemir 13 units BID  -- aspart 2 units q4 + low dose SSI    Recent Labs      05/06/18   1241  05/06/18   1738  05/06/18   2231  05/07/18   0245  05/07/18   0629  05/07/18   1001   POCTGLUCOSE  205*  175*  184*  236*  246*  224*     5/7 - increase detimir 15 daily + aspart 2 units q 4 + SS

## 2018-05-07 NOTE — PLAN OF CARE
Problem: Occupational Therapy Goal  Goal: Occupational Therapy Goal  Goals to be met by: 5/9  Patient will increase functional independence with ADLs by performing:    Squat pivot t/f to wheelchair with total(A).  Supine<>sit ; sit<>supine with Moderate Assistance.  Grooming while seated EOB with minimal assistance to wash face.    Pt will tolerate EOB activity ~15 min duration with min(A) for postural control.   Pt will keep eyes open 85% of session.  Pt will follow 50% of simple one step-commands during session.  Family/caregiver demo understanding for ROM and positioning for B UE.            POC remains appropriate.    MADDIE Farooq  5/7/2018

## 2018-05-07 NOTE — ASSESSMENT & PLAN NOTE
-- home meds include diltiazem XR 90 mg daily and valsartan 80 mg daily  -- continue current diltiazem regimen, stopped coreg and increased valsartan from 40 to 80 mg daily  -- will cont to monitor and adjust meds prn    5/7 - on cardizem 60 q 6 hours.

## 2018-05-07 NOTE — PT/OT/SLP PROGRESS
Occupational Therapy   Treatment    Name: Hollis Pitts  MRN: [id number]  Admitting Diagnosis:  Cervical myelopathy with cervical radiculopathy  9 Days Post-Op    Recommendations:     Discharge Recommendations: nursing facility, skilled  Discharge Equipment Recommendations:  hospital bed, lift device  Barriers to discharge:  None    Subjective     Communicated with: RN prior to session.  Pain/Comfort:  · Pain Rating 1: 0/10  · Pain Rating Post-Intervention 1: 0/10    Patients cultural, spiritual, Syn[employer] conflicts given the current situation: None reported    Objective:     Patient found with: peripheral IV, PEG Tube, telemetry, Condom Catheter, bed alarm, SCD.  Therapy tech (Letty) assisted with session.    General Precautions: Standard, aspiration, fall   Orthopedic Precautions:spinal precautions   Braces: Aspen collar     Occupational Performance:    Bed Mobility:    · Patient completed Rolling/Turning to Left with  total assistance  · Patient completed Scooting/Bridging with total assistance  · Patient completed Supine to Sit with total assistance  · Patient completed Sit to Supine with total assistance   · With assist of 2     Functional Mobility/Transfers:  · Pt not appropriate for task at this time 2* decreased trunk control and not following commands.    Activities of Daily Living:  · Grooming: total assistance for washing face with cloth while seated EOB.      Patient left HOB elevated with all lines intact, call button in reach and bed alarm on    AMPAC 6 Click:  [facility] Total Score: 6    Treatment & Education:  *Pt sat EOB for ~20 minutes with Max A-Total A given to maintain upright position.  Pt kept eyes open for most of session; however, required cues at end of session.  *PROM/AAROM performed on (B) UE incorporating all joints: 4 sets x 10 reps.  Trace movement felt; however, Max A-Total A provided for majority of task.    *Pt performed grooming task as noted above.  *OT placed her hand ~6 inches away  from pt's hand and instructed pt to reach forward to touch hand to assess coordination, endurance, and ROM.  Pt able to perform task 1 set x 3 trials on L side with SBA.  Total A required for final two trials.  Total A required for all trials on R hand.    *POC reviewed with pt.  Education:    Assessment:     Hollis Pitts is a 82 y.o. male with a medical diagnosis of Cervical myelopathy with cervical radiculopathy.  He presents with the following performance deficits affecting function: weakness, impaired self care skills, impaired endurance, impaired functional mobilty, gait instability, impaired balance, decreased safety awareness, impaired cognition, decreased upper extremity function, decreased lower extremity function.  Pt required Total A for all bed mobility, to perform grooming task, and to maintain upright position while seated EOB.  Minimal initiation of movement detected during ROM activity.  Pt requires significant assist at this time and would continue to benefit from skilled OT services to address problems listed below and increase independence with ADLs.     Rehab Prognosis:  Good; patient would benefit from acute skilled OT services to address these deficits and reach maximum level of function.       Plan:     Patient to be seen 3 x/week to address the above listed problems via self-care/home management, therapeutic activities, therapeutic exercises, neuromuscular re-education, cognitive retraining, wheelchair management/training  · Plan of Care Expires: 05/24/18  · Plan of Care Reviewed with: patient    This Plan of care has been discussed with the patient who was involved in its development and understands and is in agreement with the identified goals and treatment plan    GOALS:    Occupational Therapy Goals        Problem: Occupational Therapy Goal    Goal Priority Disciplines Outcome Interventions   Occupational Therapy Goal     OT, PT/OT     Description:  Goals to be met by: 5/9  Patient  will increase functional independence with ADLs by performing:    Squat pivot t/f to wheelchair with total(A).  Supine<>sit ; sit<>supine with Moderate Assistance.  Grooming while seated EOB with minimal assistance to wash face.    Pt will tolerate EOB activity ~15 min duration with min(A) for postural control.   Pt will keep eyes open 85% of session.  Pt will follow 50% of simple one step-commands during session.  Family/caregiver demo understanding for ROM and positioning for B UE.                           Time Tracking:     OT Date of Treatment: 05/07/18  OT Start Time: 1054  OT Stop Time: 1121  OT Total Time (min): 27 min    Billable Minutes:Therapeutic Activity 27    MADDIE Farooq  5/7/2018

## 2018-05-07 NOTE — SUBJECTIVE & OBJECTIVE
Interval History: NAEON. Afebrile. VSS. Continued general deconditioning and intermittent drowsiness. Patient awake and alert on my exam but drowsy/lethargic with ST at 9:30am. He has no incisional pain, dysphagia, or any complaints on exam. Speech improved compared to pre wound revision on 4/28/18. Medicine following peripherally for co-management (adjusted cardizem for HTN and insulin for DM). Pending SNF placement. Tolerating TF. Voiding per condom cath. +flatus and BM yesterday.     Medications:  Continuous Infusions:  Scheduled Meds:   atorvastatin  20 mg Per NG tube Nightly    cinacalcet  30 mg Oral Daily with breakfast    citalopram  10 mg Per NG tube Daily    diltiaZEM  60 mg Per NG tube Q6H    heparin (porcine)  5,000 Units Subcutaneous Q8H    insulin aspart U-100  2 Units Subcutaneous Q4H    insulin detemir U-100  15 Units Subcutaneous BID    polyethylene glycol  17 g Per NG tube BID    senna-docusate 8.6-50 mg  1 tablet Per NG tube BID    tamsulosin  0.4 mg Per NG tube Daily    valsartan  120 mg Per NG tube Daily    vancomycin (VANCOCIN) IVPB  1,000 mg Intravenous Q12H     PRN Meds:acetaminophen, albuterol-ipratropium 2.5mg-0.5mg/3mL, aluminum-magnesium hydroxide-simethicone, dextrose 50%, glucagon (human recombinant), insulin aspart U-100, lidocaine HCL 2%, ondansetron, potassium chloride 10%, potassium chloride 10%, potassium chloride 10%, potassium, sodium phosphates, potassium, sodium phosphates, potassium, sodium phosphates     Review of Systems  Objective:     Weight: 99.5 kg (219 lb 5.7 oz)  Body mass index is 29.75 kg/m².  Vital Signs (Most Recent):  Temp: 97.8 °F (36.6 °C) (05/07/18 1215)  Pulse: 76 (05/07/18 1215)  Resp: 18 (05/07/18 1215)  BP: 124/63 (05/07/18 1215)  SpO2: (!) 94 % (05/07/18 1215) Vital Signs (24h Range):  Temp:  [97.4 °F (36.3 °C)-98.7 °F (37.1 °C)] 97.8 °F (36.6 °C)  Pulse:  [74-94] 76  Resp:  [17-18] 18  SpO2:  [93 %-95 %] 94 %  BP: (112-144)/(59-71) 124/63                            NG/OG Tube 04/21/18 2300 [place] Right nostril (Active)   Placement Check placement verified by x-ray 5/5/2018  7:48 AM   Distal Tube Length (cm) 70 5/5/2018  7:48 AM   Tolerance no signs/symptoms of discomfort 5/4/2018  8:00 PM   Securement anchored to nostril center w/ adhesive device 5/4/2018  8:00 PM   Clamp Status/Tolerance unclamped;no abdominal distention;no emesis;no residual;no restlessness 5/4/2018  8:00 AM   Insertion Site Appearance no redness, warmth, tenderness, skin breakdown, drainage 5/4/2018  8:00 AM   Drainage None 5/4/2018  8:00 AM   Flush/Irrigation flushed w/;water 5/4/2018  8:00 AM   Feeding Method bolus by pump 5/4/2018  8:00 AM   Current Rate (mL/hr) 0 mL/hr 5/4/2018  8:00 AM   Goal Rate (mL/hr) 55 mL/hr 5/4/2018  8:00 AM   Intake (mL) 0 mL 5/4/2018  8:00 AM   Water Bolus (mL) 0 mL 5/4/2018  8:00 AM   Tube Output(mL)(Include Discarded Residual) 0 mL 5/4/2018  8:00 AM   Intake (mL) - Breast Milk Tube Feeding 0 5/4/2018  8:00 AM   Rate Formula Tube Feeding (mL/hr) 0 mL/hr 5/4/2018  8:00 AM   Intake (mL) - Formula Tube Feeding 0 5/4/2018  8:00 AM   Residual Amount (ml) 0 ml 5/4/2018  8:00 AM            Gastrostomy/Enterostomy 05/04/18 1539 Gastrostomy tube w/ balloon LUQ feeding (Active)   Securement anchored to abdomen w/ adhesive device 5/6/2018  8:00 PM   Interventions Prior to Feeding patency checked 5/6/2018  8:00 AM   Suction Setting/Drainage Method dependent drainage 5/6/2018  8:00 AM       Male External Urinary Catheter 04/30/18 1600 (Active)   Collection Container Urimeter 5/6/2018  8:00 AM   Securement Method secured to top of thigh w/ adhesive device 5/6/2018  8:00 AM   Skin no redness;no breakdown 5/6/2018  8:00 AM   Tolerance no signs/symptoms of discomfort 5/6/2018  8:00 AM   Output (mL) 1500 mL 5/4/2018  1:00 PM   Catheter Change Date 05/04/18 5/6/2018  8:00 AM   Catheter Change Time 0800 5/4/2018  8:00 AM       Neurosurgery Physical Exam  General: well  developed, well nourished. no acute distress. Generalized deconditioning   Neurologic:Awake, alert, and Oriented to person, place, and time. Thought content appropriate. Follows commands   Head: normocephalic, atraumatic   GCS: Motor: 6/Verbal: 5/Eyes: 4 GCS Total: 15  Cranial nerves: face symmetric, tongue midline, pupils equal, round, reactive to light with accomodation, extraocular muscles intact. CN II-XII grossly intact.   Language: no aphasia  Speech:  dysarthria improved compared to pre-wound revision on 4/28  Sensory: decrease response to light touch in BUE  Motor Strength: Moves all extremities spontaneously with good tone.      Strength   Deltoids Triceps Biceps Wrist Extension Wrist Flexion Hand    Upper: R 2/5 3/5 4-/5 2/5 2/5 3/5     L 3/5 4-/5 4-/5 3/5 3/5 3/5       Iliopsoas Quadriceps Knee  Flexion Tibialis  anterior Gastro- cnemius EHL   Lower: R 2/5 2/5 2/5 4-/5 4-/5 4-/5     L 3/5 3/5 3/5 4/5 4/5 4/5          [place]: absent bilaterally   Clonus: absent  ENT: normal hearing with finger rub  Heart: RRR, no cyanosis, pallor, or edema.   Lungs:  normal respiratory effort on room air  Abdomen: soft, non-tender and symmetric. G tube site c/d/i  Extremities: warm with no cyanosis, edema, or clubbing.   Pulses: palpable distal pulses  Skin: warm, dry and intact. No visible rashes or lesions.     wound is c/d/i, no erythema, ttp, or drainage.  wound edges are well approximated. No surrounding edema     condom cath in place    Significant Labs:    Recent Labs  Lab 05/06/18  0433 05/07/18  0443   * 264*   * 136   K 5.3* 4.5    103   CO2 25 24   BUN 14 24*   CREATININE 0.7 0.9   CALCIUM 11.2* 11.2*   MG 2.6 2.0       Recent Labs  Lab 05/06/18  0433 05/07/18  0443   WBC 7.64 7.60   HGB 11.7* 10.7*   HCT 36.9* 36.9*    119*       Recent Labs  Lab 05/06/18  0433 05/07/18  0443   INR 1.2 1.1     Microbiology Results (last 7 days)     Procedure Component Value Units Date/Time     Culture, Anaerobe [[id number]] Collected:  04/28/18 1413    Order Status:  Completed Specimen:  Wound from Neck Updated:  05/07/18 0659     Anaerobic Culture No anaerobes isolated    Narrative:       Wound fluid    Culture, Anaerobe [[id number]] Collected:  04/28/18 1417    Order Status:  Completed Specimen:  Wound from Neck Updated:  05/07/18 0659     Anaerobic Culture No anaerobes isolated    Narrative:       Wound fluid    Fungus culture [[id number]] Collected:  04/28/18 1413    Order Status:  Completed Specimen:  Wound from Neck Updated:  05/03/18 1039     Fungus (Mycology) Culture Culture in progress    Narrative:       Wound fluid    Fungus culture [[id number]] Collected:  04/28/18 1417    Order Status:  Completed Specimen:  Wound from Neck Updated:  05/03/18 1039     Fungus (Mycology) Culture Culture in progress    Narrative:       Wound fluid    Aerobic culture [[id number]] Collected:  04/28/18 1417    Order Status:  Completed Specimen:  Wound from Neck Updated:  05/01/18 0852     Aerobic Bacterial Culture No growth    Narrative:       Wound fluid    Aerobic culture [[id number]] Collected:  04/28/18 1413    Order Status:  Completed Specimen:  Wound from Neck Updated:  05/01/18 0852     Aerobic Bacterial Culture No growth    Narrative:       Wound fluid            Significant Diagnostics:  I have reviewed all pertinent imaging results/findings within the past 24 hours.

## 2018-05-08 LAB
25(OH)D3+25(OH)D2 SERPL-MCNC: 15 NG/ML
ALBUMIN SERPL BCP-MCNC: 2.2 G/DL
ALP SERPL-CCNC: 92 U/L
ALT SERPL W/O P-5'-P-CCNC: 16 U/L
ANION GAP SERPL CALC-SCNC: 5 MMOL/L
AST SERPL-CCNC: 13 U/L
BASOPHILS # BLD AUTO: 0.03 K/UL
BASOPHILS NFR BLD: 0.3 %
BILIRUB SERPL-MCNC: 0.3 MG/DL
BUN SERPL-MCNC: 30 MG/DL
CALCIUM SERPL-MCNC: 11.1 MG/DL
CHLORIDE SERPL-SCNC: 104 MMOL/L
CO2 SERPL-SCNC: 30 MMOL/L
CREAT SERPL-MCNC: 1.1 MG/DL
DIFFERENTIAL METHOD: ABNORMAL
EOSINOPHIL # BLD AUTO: 0.1 K/UL
EOSINOPHIL NFR BLD: 0.8 %
ERYTHROCYTE [DISTWIDTH] IN BLOOD BY AUTOMATED COUNT: 15.9 %
EST. GFR  (AFRICAN AMERICAN): >60 ML/MIN/1.73 M^2
EST. GFR  (NON AFRICAN AMERICAN): >60 ML/MIN/1.73 M^2
GLUCOSE SERPL-MCNC: 234 MG/DL
HCT VFR BLD AUTO: 36.8 %
HGB BLD-MCNC: 11.5 G/DL
IMM GRANULOCYTES # BLD AUTO: 0.11 K/UL
IMM GRANULOCYTES NFR BLD AUTO: 1 %
INR PPP: 1.1
LYMPHOCYTES # BLD AUTO: 2.1 K/UL
LYMPHOCYTES NFR BLD: 20 %
MAGNESIUM SERPL-MCNC: 2.1 MG/DL
MCH RBC QN AUTO: 31.4 PG
MCHC RBC AUTO-ENTMCNC: 31.3 G/DL
MCV RBC AUTO: 101 FL
MONOCYTES # BLD AUTO: 1.5 K/UL
MONOCYTES NFR BLD: 14.2 %
NEUTROPHILS # BLD AUTO: 6.8 K/UL
NEUTROPHILS NFR BLD: 63.7 %
NRBC BLD-RTO: 0 /100 WBC
PHOSPHATE SERPL-MCNC: 2.7 MG/DL
PLATELET # BLD AUTO: 229 K/UL
PMV BLD AUTO: 10.1 FL
POCT GLUCOSE: 173 MG/DL (ref 70–110)
POCT GLUCOSE: 181 MG/DL (ref 70–110)
POCT GLUCOSE: 185 MG/DL (ref 70–110)
POCT GLUCOSE: 207 MG/DL (ref 70–110)
POCT GLUCOSE: 220 MG/DL (ref 70–110)
POCT GLUCOSE: 227 MG/DL (ref 70–110)
POTASSIUM SERPL-SCNC: 4.4 MMOL/L
PROT SERPL-MCNC: 5.9 G/DL
PROTHROMBIN TIME: 11.8 SEC
PTH-INTACT SERPL-MCNC: 134 PG/ML
RBC # BLD AUTO: 3.66 M/UL
SODIUM SERPL-SCNC: 139 MMOL/L
WBC # BLD AUTO: 10.62 K/UL

## 2018-05-08 PROCEDURE — 25000003 PHARM REV CODE 250: Performed by: PHYSICIAN ASSISTANT

## 2018-05-08 PROCEDURE — 25000242 PHARM REV CODE 250 ALT 637 W/ HCPCS: Performed by: STUDENT IN AN ORGANIZED HEALTH CARE EDUCATION/TRAINING PROGRAM

## 2018-05-08 PROCEDURE — 99900035 HC TECH TIME PER 15 MIN (STAT)

## 2018-05-08 PROCEDURE — 94668 MNPJ CHEST WALL SBSQ: CPT

## 2018-05-08 PROCEDURE — 20600001 HC STEP DOWN PRIVATE ROOM

## 2018-05-08 PROCEDURE — 97530 THERAPEUTIC ACTIVITIES: CPT

## 2018-05-08 PROCEDURE — 97535 SELF CARE MNGMENT TRAINING: CPT

## 2018-05-08 PROCEDURE — 36415 COLL VENOUS BLD VENIPUNCTURE: CPT

## 2018-05-08 PROCEDURE — 63600175 PHARM REV CODE 636 W HCPCS: Performed by: STUDENT IN AN ORGANIZED HEALTH CARE EDUCATION/TRAINING PROGRAM

## 2018-05-08 PROCEDURE — 80053 COMPREHEN METABOLIC PANEL: CPT

## 2018-05-08 PROCEDURE — 94761 N-INVAS EAR/PLS OXIMETRY MLT: CPT

## 2018-05-08 PROCEDURE — 85025 COMPLETE CBC W/AUTO DIFF WBC: CPT

## 2018-05-08 PROCEDURE — 25000003 PHARM REV CODE 250: Performed by: HOSPITALIST

## 2018-05-08 PROCEDURE — 83970 ASSAY OF PARATHORMONE: CPT

## 2018-05-08 PROCEDURE — 84100 ASSAY OF PHOSPHORUS: CPT

## 2018-05-08 PROCEDURE — 99024 POSTOP FOLLOW-UP VISIT: CPT | Mod: POP,,, | Performed by: PHYSICIAN ASSISTANT

## 2018-05-08 PROCEDURE — 92507 TX SP LANG VOICE COMM INDIV: CPT

## 2018-05-08 PROCEDURE — 85610 PROTHROMBIN TIME: CPT

## 2018-05-08 PROCEDURE — 92526 ORAL FUNCTION THERAPY: CPT

## 2018-05-08 PROCEDURE — 25000003 PHARM REV CODE 250: Performed by: STUDENT IN AN ORGANIZED HEALTH CARE EDUCATION/TRAINING PROGRAM

## 2018-05-08 PROCEDURE — 99232 SBSQ HOSP IP/OBS MODERATE 35: CPT | Mod: GC,,, | Performed by: INTERNAL MEDICINE

## 2018-05-08 PROCEDURE — 82306 VITAMIN D 25 HYDROXY: CPT

## 2018-05-08 PROCEDURE — 83735 ASSAY OF MAGNESIUM: CPT

## 2018-05-08 PROCEDURE — 94640 AIRWAY INHALATION TREATMENT: CPT

## 2018-05-08 RX ORDER — INSULIN ASPART 100 [IU]/ML
4 INJECTION, SOLUTION INTRAVENOUS; SUBCUTANEOUS EVERY 4 HOURS
Status: DISCONTINUED | OUTPATIENT
Start: 2018-05-08 | End: 2018-05-09

## 2018-05-08 RX ORDER — SODIUM CHLORIDE FOR INHALATION 3 %
4 VIAL, NEBULIZER (ML) INHALATION ONCE
Status: COMPLETED | OUTPATIENT
Start: 2018-05-08 | End: 2018-05-08

## 2018-05-08 RX ORDER — POLYETHYLENE GLYCOL 3350 17 G/17G
17 POWDER, FOR SOLUTION ORAL 2 TIMES DAILY
Status: DISCONTINUED | OUTPATIENT
Start: 2018-05-08 | End: 2018-05-18 | Stop reason: HOSPADM

## 2018-05-08 RX ORDER — VALSARTAN 40 MG/1
120 TABLET ORAL DAILY
Status: DISCONTINUED | OUTPATIENT
Start: 2018-05-08 | End: 2018-05-16

## 2018-05-08 RX ORDER — CHLORHEXIDINE GLUCONATE ORAL RINSE 1.2 MG/ML
15 SOLUTION DENTAL 2 TIMES DAILY
Status: DISCONTINUED | OUTPATIENT
Start: 2018-05-08 | End: 2018-05-18 | Stop reason: HOSPADM

## 2018-05-08 RX ORDER — AMOXICILLIN 250 MG
1 CAPSULE ORAL 2 TIMES DAILY
Status: DISCONTINUED | OUTPATIENT
Start: 2018-05-08 | End: 2018-05-18 | Stop reason: HOSPADM

## 2018-05-08 RX ORDER — ACETAMINOPHEN 325 MG/1
650 TABLET ORAL EVERY 4 HOURS PRN
Status: DISCONTINUED | OUTPATIENT
Start: 2018-05-08 | End: 2018-05-18 | Stop reason: HOSPADM

## 2018-05-08 RX ORDER — TAMSULOSIN HYDROCHLORIDE 0.4 MG/1
0.4 CAPSULE ORAL DAILY
Status: DISCONTINUED | OUTPATIENT
Start: 2018-05-08 | End: 2018-05-18 | Stop reason: HOSPADM

## 2018-05-08 RX ORDER — CITALOPRAM 10 MG/1
10 TABLET ORAL DAILY
Status: DISCONTINUED | OUTPATIENT
Start: 2018-05-08 | End: 2018-05-18 | Stop reason: HOSPADM

## 2018-05-08 RX ADMIN — ATORVASTATIN CALCIUM 20 MG: 20 TABLET, FILM COATED ORAL at 09:05

## 2018-05-08 RX ADMIN — INSULIN ASPART 4 UNITS: 100 INJECTION, SOLUTION INTRAVENOUS; SUBCUTANEOUS at 02:05

## 2018-05-08 RX ADMIN — STANDARDIZED SENNA CONCENTRATE AND DOCUSATE SODIUM 1 TABLET: 8.6; 5 TABLET, FILM COATED ORAL at 10:05

## 2018-05-08 RX ADMIN — VALSARTAN 120 MG: 40 TABLET ORAL at 10:05

## 2018-05-08 RX ADMIN — HEPARIN SODIUM 5000 UNITS: 5000 INJECTION, SOLUTION INTRAVENOUS; SUBCUTANEOUS at 09:05

## 2018-05-08 RX ADMIN — INSULIN ASPART 4 UNITS: 100 INJECTION, SOLUTION INTRAVENOUS; SUBCUTANEOUS at 09:05

## 2018-05-08 RX ADMIN — INSULIN ASPART 2 UNITS: 100 INJECTION, SOLUTION INTRAVENOUS; SUBCUTANEOUS at 05:05

## 2018-05-08 RX ADMIN — INSULIN ASPART 2 UNITS: 100 INJECTION, SOLUTION INTRAVENOUS; SUBCUTANEOUS at 02:05

## 2018-05-08 RX ADMIN — SODIUM CHLORIDE SOLN NEBU 3% 4 ML: 3 NEBU SOLN at 07:05

## 2018-05-08 RX ADMIN — INSULIN ASPART 4 UNITS: 100 INJECTION, SOLUTION INTRAVENOUS; SUBCUTANEOUS at 10:05

## 2018-05-08 RX ADMIN — ACETAMINOPHEN 650 MG: 325 TABLET ORAL at 03:05

## 2018-05-08 RX ADMIN — TAMSULOSIN HYDROCHLORIDE 0.4 MG: 0.4 CAPSULE ORAL at 01:05

## 2018-05-08 RX ADMIN — ACETAMINOPHEN 650 MG: 325 TABLET ORAL at 10:05

## 2018-05-08 RX ADMIN — DILTIAZEM HYDROCHLORIDE 60 MG: 60 TABLET, FILM COATED ORAL at 11:05

## 2018-05-08 RX ADMIN — DILTIAZEM HYDROCHLORIDE 60 MG: 60 TABLET, FILM COATED ORAL at 02:05

## 2018-05-08 RX ADMIN — INSULIN ASPART 2 UNITS: 100 INJECTION, SOLUTION INTRAVENOUS; SUBCUTANEOUS at 10:05

## 2018-05-08 RX ADMIN — CHLORHEXIDINE GLUCONATE 15 ML: 1.2 RINSE ORAL at 09:05

## 2018-05-08 RX ADMIN — POLYETHYLENE GLYCOL 3350 17 G: 17 POWDER, FOR SOLUTION ORAL at 09:05

## 2018-05-08 RX ADMIN — STANDARDIZED SENNA CONCENTRATE AND DOCUSATE SODIUM 1 TABLET: 8.6; 5 TABLET, FILM COATED ORAL at 09:05

## 2018-05-08 RX ADMIN — POLYETHYLENE GLYCOL 3350 17 G: 17 POWDER, FOR SOLUTION ORAL at 10:05

## 2018-05-08 RX ADMIN — HEPARIN SODIUM 5000 UNITS: 5000 INJECTION, SOLUTION INTRAVENOUS; SUBCUTANEOUS at 05:05

## 2018-05-08 RX ADMIN — CITALOPRAM HYDROBROMIDE 10 MG: 10 TABLET ORAL at 10:05

## 2018-05-08 RX ADMIN — DILTIAZEM HYDROCHLORIDE 60 MG: 60 TABLET, FILM COATED ORAL at 05:05

## 2018-05-08 RX ADMIN — INSULIN ASPART 4 UNITS: 100 INJECTION, SOLUTION INTRAVENOUS; SUBCUTANEOUS at 06:05

## 2018-05-08 RX ADMIN — HEPARIN SODIUM 5000 UNITS: 5000 INJECTION, SOLUTION INTRAVENOUS; SUBCUTANEOUS at 02:05

## 2018-05-08 RX ADMIN — DILTIAZEM HYDROCHLORIDE 60 MG: 60 TABLET, FILM COATED ORAL at 06:05

## 2018-05-08 NOTE — ASSESSMENT & PLAN NOTE
82 y.o. male s/p C3-5 ACDF for cervical myelopathy on 4/10 with Dr. Hess, and now s/p evacuation/washout of fluid collection with CSF leak repair on 4/28/18 (POD#10)    -Patient with waxing and waning mentation on exam and continues to have overall generalized deconditioning since admission. Speech slightly improved since wound revision. He was wheel chair bound prior to this admission.   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 Will repeat CT head today 5/8/18 to reassess given mentation.   -Cont neuro checks q4h   -Appreciate medicine recommendations and participation in care of patient   -TF at goal and tolerating, will adjust insulin for DM per medicine recs  -ID: continue vanc and ceftriaxone x 7 days from surgery (4/28) Vanc trough before 4th dose, 18. Trough Goal 15-20. Surgical cultures NGTD, completed 5/6/18.  -CV goal normotension. Continue home meds. PRN meds SBP >160.   -Continue PILI/SCDs/SQH  -Continue bowel regimen daily. Brown bomb 5/3 with +BM  -Continue to hold Eliquis. Per , indications unclear.  does not recommend continuing upon discharge.  -Appreciate  assistance with management  -Hypercalcemia: stable 11.1 today. Patient unable to resume home cinacelet since he has peg and med can not be crushed. PTH intact is 134. Defer to medicine for recs. CTM   -Continue aggressive PT/OT/ST. Discussed with therapy to evaluate patient simultaneously while pt OOB with PT.  -Cervical collar when OOB or with activity  -Scheduled CPT q4 hours   -Aggressive oral care per nursing staff daily  --Long discussion with patient and nursing staff regarding day/night re-orientation:   DAYTIME: blinds up/lights on, TV on, increase stimuli, awake with minimal naps  NIGHTTIME: blinds down, TV off, lights off, decrease any noise/stimuli   Private room to prevent hospital delirium and increase compliance for OOB  -Keep incision open to air. Do not submerge or scrub incision.   -SNF pending.  CM/SW to pursue     Patient seen with Dr. Hess
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
## 2018-05-08 NOTE — PROGRESS NOTES
[facility]  Neurosurgery  Progress Note    Subjective:     History of Present Illness: 82 y.o.  male with type 2 diabetes, who presents today for follow up evaluation one week prior to 2 level ACDF scheduled for 4/10/2018. Pt reports    Pt presents today wearing a cervical collar and in a wheelchair. He would like to proceed with surgery. Per pt's family, his PCP would like to see him on 4/9, the day before surgery. He notes continued neck pain and BUE numbness/tingling as well as BUE weakness. Pt is only able to walk a few steps unassisted. He has never received neck surgery.     Post-Op Info:  Procedure(s) (LRB):  WASHOUT-CERVICAL-wound washout and csf leak repair with depuy (N/A)  DISKECTOMY AND FUSION-ANTERIOR CERVICAL (ACDF)- REVISON C5-C7   10 Days Post-Op     Interval History:  NAEON. Afebrile. VSS. Continued general deconditioning and waxing/waning mental status. Patient awake and alert on nurse's exam early am but drowsy on my exam with Dr. Hess at 11:30am. CT head today to reassess previous hygromas.  Medicine following peripherally for co-management and evaluating hypercalcemia since patient hasn't been receiving his home cinacelet. Pending SNF placement.     Medications:  Continuous Infusions:  Scheduled Meds:   atorvastatin  20 mg Per NG tube Nightly    cinacalcet  30 mg Oral Daily with breakfast    citalopram  10 mg Per G Tube Daily    diltiaZEM  60 mg Per NG tube Q6H    heparin (porcine)  5,000 Units Subcutaneous Q8H    insulin aspart U-100  4 Units Subcutaneous Q4H    insulin detemir U-100  15 Units Subcutaneous BID    polyethylene glycol  17 g Per G Tube BID    senna-docusate 8.6-50 mg  1 tablet Per G Tube BID    tamsulosin  0.4 mg Per G Tube Daily    valsartan  120 mg Per G Tube Daily     PRN Meds:acetaminophen, albuterol-ipratropium 2.5mg-0.5mg/3mL, aluminum-magnesium hydroxide-simethicone, dextrose 50%, glucagon (human recombinant), insulin aspart U-100,  lidocaine HCL 2%, ondansetron, potassium chloride 10%, potassium chloride 10%, potassium chloride 10%, potassium, sodium phosphates, potassium, sodium phosphates, potassium, sodium phosphates     Review of Systems  Objective:     Weight: 99.5 kg (219 lb 5.7 oz)  Body mass index is 29.75 kg/m².  Vital Signs (Most Recent):  Temp: 97.7 °F (36.5 °C) (05/08/18 0800)  Pulse: 75 (05/08/18 0800)  Resp: 20 (05/08/18 0800)  BP: 138/66 (05/08/18 0800)  SpO2: 97 % (05/08/18 0800) Vital Signs (24h Range):  Temp:  [97.7 °F (36.5 °C)-98.9 °F (37.2 °C)] 97.7 °F (36.5 °C)  Pulse:  [60-76] 75  Resp:  [18-20] 20  SpO2:  [93 %-99 %] 97 %  BP: (136-156)/(61-70) 138/66                           NG/OG Tube 04/21/18 2300 Cortrak Right nostril (Active)   Placement Check placement verified by x-ray 5/5/2018  7:48 AM   Distal Tube Length (cm) 70 5/5/2018  7:48 AM   Tolerance no signs/symptoms of discomfort 5/4/2018  8:00 PM   Securement anchored to nostril center w/ adhesive device 5/4/2018  8:00 PM   Clamp Status/Tolerance unclamped;no abdominal distention;no emesis;no residual;no restlessness 5/4/2018  8:00 AM   Insertion Site Appearance no redness, warmth, tenderness, skin breakdown, drainage 5/4/2018  8:00 AM   Drainage None 5/4/2018  8:00 AM   Flush/Irrigation flushed w/;water 5/4/2018  8:00 AM   Feeding Method bolus by pump 5/4/2018  8:00 AM   Current Rate (mL/hr) 0 mL/hr 5/4/2018  8:00 AM   Goal Rate (mL/hr) 55 mL/hr 5/4/2018  8:00 AM   Intake (mL) 0 mL 5/4/2018  8:00 AM   Water Bolus (mL) 0 mL 5/4/2018  8:00 AM   Tube Output(mL)(Include Discarded Residual) 0 mL 5/4/2018  8:00 AM   Intake (mL) - Breast Milk Tube Feeding 0 5/4/2018  8:00 AM   Rate Formula Tube Feeding (mL/hr) 0 mL/hr 5/4/2018  8:00 AM   Intake (mL) - Formula Tube Feeding 0 5/4/2018  8:00 AM   Residual Amount (ml) 0 ml 5/4/2018  8:00 AM            Gastrostomy/Enterostomy 05/04/18 1539 Gastrostomy tube w/ balloon LUQ feeding (Active)   Securement anchored to abdomen w/  adhesive device 5/8/2018  8:00 AM   Interventions Prior to Feeding residual checked 5/8/2018  8:00 AM   Suction Setting/Drainage Method dependent drainage 5/6/2018  8:00 AM   Feeding Type continuous 5/8/2018  8:00 AM   Feeding Action feeding continued 5/8/2018  8:00 AM   Dressing dry and intact 5/8/2018  8:00 AM   Insertion Site no redness;no warmth;no drainage 5/8/2018  8:00 AM   Flush/Irrigation flushed w/;no resistance met;water 5/8/2018  8:00 AM   Current Rate (mL/hr) 70 mL/hr 5/8/2018  8:00 AM   Goal Rate (mL/hr) 70 mL/hr 5/8/2018  8:00 AM   Water Bolus (mL) 100 mL 5/8/2018  5:00 AM   Tube Feeding Intake (mL) 700 5/8/2018  5:00 AM   Residual Amount (ml) 15 ml 5/7/2018  8:14 AM       Male External Urinary Catheter 04/30/18 1600 (Active)   Collection Container Urimeter 5/8/2018  8:00 AM   Securement Method secured to top of thigh w/ leg strap 5/8/2018  8:00 AM   Skin no breakdown;no redness 5/8/2018  8:00 AM   Tolerance no signs/symptoms of discomfort 5/8/2018  8:00 AM   Output (mL) 850 mL 5/8/2018  5:00 AM   Catheter Change Date 05/07/18 5/7/2018  8:14 AM   Catheter Change Time 0800 5/7/2018  8:14 AM       Neurosurgery Physical Exam  General: well developed, well nourished. no acute distress. Generalized deconditioning   Neurologic: lethargic and briefly awakens to voice.Oriented to person, place, and time. Thought content appropriate. Follows simple commands   Head: normocephalic, atraumatic   GCS: Motor: 6/Verbal: 5/Eyes: 3 GCS Total: 14  Cranial nerves: face symmetric, tongue midline, pupils equal, round, reactive to light with accomodation, extraocular muscles intact. CN II-XII grossly intact.   Language: no aphasia  Speech:  dysarthria improved compared to pre-wound revision on 4/28  Sensory: decrease response to light touch in BUE    Motor Strength: Moves all extremities spontaneously with good tone.     **unable to accurately assess individual muscle group as patient drowsy on exam.       [place]: absent  bilaterally   Clonus: absent    ENT: normal hearing with finger rub  Heart: RRR, no cyanosis, pallor, or edema.   Lungs:  normal respiratory effort on room air  Abdomen: soft, non-tender and symmetric. G tube site c/d/i  Extremities: warm with no cyanosis, edema, or clubbing.   Pulses: palpable distal pulses  Skin: warm, dry and intact. No visible rashes or lesions.      wound is c/d/i, no erythema, ttp, or drainage.  wound edges are well approximated. No surrounding edema      condom cath in place      Significant Labs:    Recent Labs  Lab 05/07/18  0443 05/08/18  0414   * 234*    139   K 4.5 4.4    104   CO2 24 30*   BUN 24* 30*   CREATININE 0.9 1.1   CALCIUM 11.2* 11.1*   MG 2.0 2.1       Recent Labs  Lab 05/07/18  0443 05/08/18  0414   WBC 7.60 10.62   HGB 10.7* 11.5*   HCT 36.9* 36.8*   * 229       Recent Labs  Lab 05/07/18  0443 05/08/18  0414   INR 1.1 1.1     Microbiology Results (last 7 days)     Procedure Component Value Units Date/Time    Culture, Anaerobe [[id number]] Collected:  04/28/18 1413    Order Status:  Completed Specimen:  Wound from Neck Updated:  05/07/18 0659     Anaerobic Culture No anaerobes isolated    Narrative:       Wound fluid    Culture, Anaerobe [[id number]] Collected:  04/28/18 1417    Order Status:  Completed Specimen:  Wound from Neck Updated:  05/07/18 0659     Anaerobic Culture No anaerobes isolated    Narrative:       Wound fluid    Fungus culture [[id number]] Collected:  04/28/18 1413    Order Status:  Completed Specimen:  Wound from Neck Updated:  05/03/18 1039     Fungus (Mycology) Culture Culture in progress    Narrative:       Wound fluid    Fungus culture [[id number]] Collected:  04/28/18 1417    Order Status:  Completed Specimen:  Wound from Neck Updated:  05/03/18 1039     Fungus (Mycology) Culture Culture in progress    Narrative:       Wound fluid            Significant Diagnostics:  None new    Assessment/Plan:     * Cervical myelopathy with  cervical radiculopathy    82 y.o. male s/p C3-5 ACDF for cervical myelopathy on 4/10 with Dr. Hess, and now s/p evacuation/washout of fluid collection with CSF leak repair on 4/28/18 (POD#10)    -Patient with waxing and waning mentation on exam and continues to have overall generalized deconditioning since admission. Speech slightly improved since wound revision. He was wheel chair bound prior to this admission.   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 Will repeat CT head today 5/8/18 to reassess given mentation.   -Cont neuro checks q4h   -Appreciate medicine recommendations and participation in care of patient   -TF at goal and tolerating, will adjust insulin for DM per medicine recs  -ID: continue vanc and ceftriaxone x 7 days from surgery (4/28) Vanc trough before 4th dose, 18. Trough Goal 15-20. Surgical cultures NGTD, completed 5/6/18.  -CV goal normotension. Continue home meds. PRN meds SBP >160.   -Continue PILI/SCDs/SQH  -Continue bowel regimen daily. Brown bomb 5/3 with +BM  -Continue to hold Eliquis. Per , indications unclear.  does not recommend continuing upon discharge.  -Appreciate  assistance with management  -Hypercalcemia: stable 11.1 today. Patient unable to resume home cinacelet since he has peg and med can not be crushed. PTH intact is 134. Defer to medicine for recs. CTM   -Continue aggressive PT/OT/ST. Discussed with therapy to evaluate patient simultaneously while pt OOB with PT.  -Cervical collar when OOB or with activity  -Scheduled CPT q4 hours   -Aggressive oral care per nursing staff daily  --Long discussion with patient and nursing staff regarding day/night re-orientation:   DAYTIME: blinds up/lights on, TV on, increase stimuli, awake with minimal naps  NIGHTTIME: blinds down, TV off, lights off, decrease any noise/stimuli   Private room to prevent hospital delirium and increase compliance for OOB  -Keep incision open to air. Do not submerge or scrub  incision.   -SNF pending. CM/SW to pursue     Patient seen with KEVEN CorderoC  Neurosurgery  [facility]

## 2018-05-08 NOTE — ASSESSMENT & PLAN NOTE
-- home meds include diltiazem XR 90 mg daily and valsartan 80 mg daily  -- continue current diltiazem regimen, stopped coreg and increased valsartan from 40 to 120 mg daily  -- will cont to monitor and adjust meds prn  -- on cardizem 60 q 6 hours.

## 2018-05-08 NOTE — ASSESSMENT & PLAN NOTE
-- A1C 5.6, glucoses 184-286 inpast 24 hours  -- per chart review patient had reported being controlled by lifestyle modification alone at home   -- currently on continuous tube feeds with diabetisource   -- increased detemir to 15 units BID   -- increased aspart to 4 units q4 + low dose SSI    Recent Labs      05/07/18   1356  05/07/18   1727  05/07/18   2032  05/08/18   0223  05/08/18   0544  05/08/18   1023   POCTGLUCOSE  232*  186*  188*  207*  220*  227*

## 2018-05-08 NOTE — SUBJECTIVE & OBJECTIVE
Interval History:  NAEON. Afebrile. VSS. Continued general deconditioning and waxing/waning mental status. Patient awake and alert on nurse's exam early am but drowsy on my exam with Dr. Hess at 11:30am. CT head today to reassess previous hygromas.  Medicine following peripherally for co-management and evaluating hypercalcemia since patient hasn't been receiving his home cinacelet. Pending SNF placement.     Medications:  Continuous Infusions:  Scheduled Meds:   atorvastatin  20 mg Per NG tube Nightly    cinacalcet  30 mg Oral Daily with breakfast    citalopram  10 mg Per G Tube Daily    diltiaZEM  60 mg Per NG tube Q6H    heparin (porcine)  5,000 Units Subcutaneous Q8H    insulin aspart U-100  4 Units Subcutaneous Q4H    insulin detemir U-100  15 Units Subcutaneous BID    polyethylene glycol  17 g Per G Tube BID    senna-docusate 8.6-50 mg  1 tablet Per G Tube BID    tamsulosin  0.4 mg Per G Tube Daily    valsartan  120 mg Per G Tube Daily     PRN Meds:acetaminophen, albuterol-ipratropium 2.5mg-0.5mg/3mL, aluminum-magnesium hydroxide-simethicone, dextrose 50%, glucagon (human recombinant), insulin aspart U-100, lidocaine HCL 2%, ondansetron, potassium chloride 10%, potassium chloride 10%, potassium chloride 10%, potassium, sodium phosphates, potassium, sodium phosphates, potassium, sodium phosphates     Review of Systems  Objective:     Weight: 99.5 kg (219 lb 5.7 oz)  Body mass index is 29.75 kg/m².  Vital Signs (Most Recent):  Temp: 97.7 °F (36.5 °C) (05/08/18 0800)  Pulse: 75 (05/08/18 0800)  Resp: 20 (05/08/18 0800)  BP: 138/66 (05/08/18 0800)  SpO2: 97 % (05/08/18 0800) Vital Signs (24h Range):  Temp:  [97.7 °F (36.5 °C)-98.9 °F (37.2 °C)] 97.7 °F (36.5 °C)  Pulse:  [60-76] 75  Resp:  [18-20] 20  SpO2:  [93 %-99 %] 97 %  BP: (136-156)/(61-70) 138/66                           NG/OG Tube 04/21/18 2300 Cortrak Right nostril (Active)   Placement Check placement verified by x-ray 5/5/2018  7:48 AM    Distal Tube Length (cm) 70 5/5/2018  7:48 AM   Tolerance no signs/symptoms of discomfort 5/4/2018  8:00 PM   Securement anchored to nostril center w/ adhesive device 5/4/2018  8:00 PM   Clamp Status/Tolerance unclamped;no abdominal distention;no emesis;no residual;no restlessness 5/4/2018  8:00 AM   Insertion Site Appearance no redness, warmth, tenderness, skin breakdown, drainage 5/4/2018  8:00 AM   Drainage None 5/4/2018  8:00 AM   Flush/Irrigation flushed w/;water 5/4/2018  8:00 AM   Feeding Method bolus by pump 5/4/2018  8:00 AM   Current Rate (mL/hr) 0 mL/hr 5/4/2018  8:00 AM   Goal Rate (mL/hr) 55 mL/hr 5/4/2018  8:00 AM   Intake (mL) 0 mL 5/4/2018  8:00 AM   Water Bolus (mL) 0 mL 5/4/2018  8:00 AM   Tube Output(mL)(Include Discarded Residual) 0 mL 5/4/2018  8:00 AM   Intake (mL) - Breast Milk Tube Feeding 0 5/4/2018  8:00 AM   Rate Formula Tube Feeding (mL/hr) 0 mL/hr 5/4/2018  8:00 AM   Intake (mL) - Formula Tube Feeding 0 5/4/2018  8:00 AM   Residual Amount (ml) 0 ml 5/4/2018  8:00 AM            Gastrostomy/Enterostomy 05/04/18 1539 Gastrostomy tube w/ balloon LUQ feeding (Active)   Securement anchored to abdomen w/ adhesive device 5/8/2018  8:00 AM   Interventions Prior to Feeding residual checked 5/8/2018  8:00 AM   Suction Setting/Drainage Method dependent drainage 5/6/2018  8:00 AM   Feeding Type continuous 5/8/2018  8:00 AM   Feeding Action feeding continued 5/8/2018  8:00 AM   Dressing dry and intact 5/8/2018  8:00 AM   Insertion Site no redness;no warmth;no drainage 5/8/2018  8:00 AM   Flush/Irrigation flushed w/;no resistance met;water 5/8/2018  8:00 AM   Current Rate (mL/hr) 70 mL/hr 5/8/2018  8:00 AM   Goal Rate (mL/hr) 70 mL/hr 5/8/2018  8:00 AM   Water Bolus (mL) 100 mL 5/8/2018  5:00 AM   Tube Feeding Intake (mL) 700 5/8/2018  5:00 AM   Residual Amount (ml) 15 ml 5/7/2018  8:14 AM       Male External Urinary Catheter 04/30/18 1600 (Active)   Collection Container Urimeter 5/8/2018  8:00 AM    Securement Method secured to top of thigh w/ leg strap 5/8/2018  8:00 AM   Skin no breakdown;no redness 5/8/2018  8:00 AM   Tolerance no signs/symptoms of discomfort 5/8/2018  8:00 AM   Output (mL) 850 mL 5/8/2018  5:00 AM   Catheter Change Date 05/07/18 5/7/2018  8:14 AM   Catheter Change Time 0800 5/7/2018  8:14 AM       Neurosurgery Physical Exam  General: well developed, well nourished. no acute distress. Generalized deconditioning   Neurologic: lethargic and briefly awakens to voice.Oriented to person, place, and time. Thought content appropriate. Follows simple commands   Head: normocephalic, atraumatic   GCS: Motor: 6/Verbal: 5/Eyes: 3 GCS Total: 14  Cranial nerves: face symmetric, tongue midline, pupils equal, round, reactive to light with accomodation, extraocular muscles intact. CN II-XII grossly intact.   Language: no aphasia  Speech:  dysarthria improved compared to pre-wound revision on 4/28  Sensory: decrease response to light touch in BUE    Motor Strength: Moves all extremities spontaneously with good tone.     **unable to accurately assess individual muscle group as patient drowsy on exam.       [place]: absent bilaterally   Clonus: absent    ENT: normal hearing with finger rub  Heart: RRR, no cyanosis, pallor, or edema.   Lungs:  normal respiratory effort on room air  Abdomen: soft, non-tender and symmetric. G tube site c/d/i  Extremities: warm with no cyanosis, edema, or clubbing.   Pulses: palpable distal pulses  Skin: warm, dry and intact. No visible rashes or lesions.      wound is c/d/i, no erythema, ttp, or drainage.  wound edges are well approximated. No surrounding edema      condom cath in place      Significant Labs:    Recent Labs  Lab 05/07/18  0443 05/08/18  0414   * 234*    139   K 4.5 4.4    104   CO2 24 30*   BUN 24* 30*   CREATININE 0.9 1.1   CALCIUM 11.2* 11.1*   MG 2.0 2.1       Recent Labs  Lab 05/07/18  0443 05/08/18  0414   WBC 7.60 10.62   HGB 10.7* 11.5*    HCT 36.9* 36.8*   * 229       Recent Labs  Lab 05/07/18  0443 05/08/18  0414   INR 1.1 1.1     Microbiology Results (last 7 days)     Procedure Component Value Units Date/Time    Culture, Anaerobe [[id number]] Collected:  04/28/18 1413    Order Status:  Completed Specimen:  Wound from Neck Updated:  05/07/18 0659     Anaerobic Culture No anaerobes isolated    Narrative:       Wound fluid    Culture, Anaerobe [[id number]] Collected:  04/28/18 1417    Order Status:  Completed Specimen:  Wound from Neck Updated:  05/07/18 0659     Anaerobic Culture No anaerobes isolated    Narrative:       Wound fluid    Fungus culture [[id number]] Collected:  04/28/18 1413    Order Status:  Completed Specimen:  Wound from Neck Updated:  05/03/18 1039     Fungus (Mycology) Culture Culture in progress    Narrative:       Wound fluid    Fungus culture [[id number]] Collected:  04/28/18 1417    Order Status:  Completed Specimen:  Wound from Neck Updated:  05/03/18 1039     Fungus (Mycology) Culture Culture in progress    Narrative:       Wound fluid            Significant Diagnostics:  None new

## 2018-05-08 NOTE — ASSESSMENT & PLAN NOTE
- Home dose senispar restarted though unclear when this medication was started or what the etiology of his hypercalcemia is. It appears that this must have been an outside of [facility] medication  - will order PTH and vitamin D and assess for etiology of his hypercalcemia  - patient has not been able to get sensipar given that it cannot be crushed- calcium elevation does not require emergent treatment and would be ok to hold off treatment until vitamin D and PTH result  - will continue to monitor

## 2018-05-08 NOTE — SUBJECTIVE & OBJECTIVE
Interval History:   NAEON. BG slightly elevated out of desired range. Will increase aspart dosing. BP remains well controlled. Pending vitamin D and PTH given hypercalcemia.     Review of Systems   Constitutional: Negative for activity change and fever.   HENT: Negative for congestion.    Respiratory: Negative for cough and choking.    Cardiovascular: Negative for chest pain.   Genitourinary: Negative for hematuria.     Objective:     Vital Signs (Most Recent):  Temp: 97.7 °F (36.5 °C) (05/08/18 0800)  Pulse: 75 (05/08/18 0800)  Resp: 20 (05/08/18 0800)  BP: 138/66 (05/08/18 0800)  SpO2: 97 % (05/08/18 0800) Vital Signs (24h Range):  Temp:  [97.7 °F (36.5 °C)-98.9 °F (37.2 °C)] 97.7 °F (36.5 °C)  Pulse:  [60-76] 75  Resp:  [18-20] 20  SpO2:  [93 %-99 %] 97 %  BP: (136-156)/(61-70) 138/66     Weight: 99.5 kg (219 lb 5.7 oz)  Body mass index is 29.75 kg/m².    Intake/Output Summary (Last 24 hours) at 05/08/18 1245  Last data filed at 05/08/18 0500   Gross per 24 hour   Intake             1280 ml   Output             1150 ml   Net              130 ml      Physical Exam   Constitutional: He appears well-developed.   HENT:   Head: Normocephalic.   Wearing collar   Eyes: EOM are normal. Pupils are equal, round, and reactive to light.   Musculoskeletal:   Participating with therapy. Max assist with standing   Neurological: He is alert.       Significant Labs: All pertinent labs within the past 24 hours have been reviewed.    Significant Imaging: I have reviewed all pertinent imaging results/findings within the past 24 hours.

## 2018-05-08 NOTE — PLAN OF CARE
"TAN following for DC needs. TAN in communication with MALIKA.    TAN received call from Betsy with [facility] who stated that the patient has been clinically accepted for rehab to home. Betsy will call the daughter, Bud, to discuss this placement.     UPDATE 12:20 PM  TAN received call from Betsy that the patient is actually denied. Betsy stated that the patient was denied due to "clinically complex wounds."    TAN received call from Marycarmen with [facility] who stated that she will come to the hospital today to evaluate the patient.     Yvonne Ellington, GARY  [id number]    "

## 2018-05-08 NOTE — PLAN OF CARE
Problem: Patient Care Overview  Goal: Plan of Care Review  Recommendations     Recommendation/Intervention:   1. Continue diabetisource @ goal rate 70 mL/hr to provide 2016 kcal (108% EEN), 101 gm protein (100% EPN), and 1374 free water.   2. If water flushes desired, recommend 110 mls q 4hrs. 3. RD following.       Goals: meet >85% EEN/EPN  Nutrition Goal Status: new  Communication of RD Recs:  (POC)

## 2018-05-08 NOTE — PT/OT/SLP PROGRESS
"Speech Language Pathology Treatment    Patient Name:  Hollis Pitts   MRN:  [id number]  Admitting Diagnosis: Cervical myelopathy with cervical radiculopathy    Recommendations:                 General Recommendations:  Dysphagia therapy and Speech/language therapy  Diet recommendations:  NPO, Liquid Diet Level: NPO   Aspiration Precautions: Strict aspiration precautions   General Precautions: Standard, aspiration, fall, NPO  Communication strategies:  provide increased time to answer and go to room if call light pushed    Subjective     Awake/alert  " I need some water."     Pain/Comfort:  · Pain Rating 1: 0/10  · Pain Rating Post-Intervention 1: 0/10    Objective:     Has the patient been evaluated by SLP for swallowing?   Yes  Keep patient NPO? Yes   Current Respiratory Status: nasal cannula      Pt upright at EOB with PT/OT throughout session. Pt with wet vocal quality noted throughout session prior PO trials, pt unable to clear with cued cough and throat clear. Pt tolerated trials of 1/4 puree and ice chip x1 each with delayed swallow initiation, limited hyolaryngeal excursion to palpation and increased wet vocal quality. Oral suction frequently provided by SLP to clear secretions and vocal quality. Extensive education provided throughout session on need for NPO, risk of aspiration and dysphagia. Pt completed falsetto and effortful swallow dysphagia exercises x3 reps each provided min cues. Automatic speech tasks completed with 8/12 accuracy for months of the year provided increased time and cues. Pt counted 1-10 with 100% accy ind'ly. Pt oriented to place and person, mod cues provided for time. Continue POC at this time. Recommend continue NPO diet at this time with strict aspiration precautions.     Assessment:     Hollis Pitts is a 82 y.o. male with an SLP diagnosis of Dysphagia and Cognitive-Linguistic Impairment.      Goals:    SLP Goals        Problem: SLP Goal    Goal Priority Disciplines Outcome   SLP " Goal     SLP Ongoing (interventions implemented as appropriate)   Description:  Speech Language Pathology Goals  Goals expected to be met by 5/8-continue till 5/15    1. Pt will participate in ongoing swallow assessment  2. Pt will participate in speech language cognitive assessment MEt  3. Pt will complete rote speech tasks with 80% accy and mod cues  4. Pt will follow 1 step commands with 90% accuracy and occasional cues  5. Pt will participate in ongoing speech language cognitive assessment.         Speech Language Pathology Goals  Goals expected to be met by 4/29:    1. Pt will participate in ongoing assessment of swallow.                               Plan:     · Patient to be seen:  4 x/week   · Plan of Care expires:  05/22/18  · Plan of Care reviewed with:  patient   · SLP Follow-Up:  Yes       Discharge recommendations:  nursing facility, skilled       Time Tracking:     SLP Treatment Date:   05/08/18  Speech Start Time:  0932  Speech Stop Time:  0959     Speech Total Time (min):  27 min    Billable Minutes: Speech Therapy Individual 10 and Treatment Swallowing Dysfunction 17    Humera Wadsworth CCC-SLP  05/08/2018

## 2018-05-08 NOTE — PROGRESS NOTES
[facility]  Adult Nutrition  Progress Note    SUMMARY       Recommendations    Recommendation/Intervention:   1. Continue diabetisource @ goal rate 70 mL/hr to provide 2016 kcal (108% EEN), 101 gm protein (100% EPN), and 1374 free water.   2. If water flushes desired, recommend 110 mls q 4hrs. 3. RD following.      Goals: meet >85% EEN/EPN  Nutrition Goal Status: new  Communication of RD Recs:  (POC)    Reason for Assessment    Reason for Assessment: RD follow-up  Diagnosis:  (Cervical myelopathy)  Relevant Medical History: T2DM, HTN  Interdisciplinary Rounds: did not attend  General Information Comments: Pt recieved PEG 5/4/18. Pt s/p cervical diskectomy and fusion 4/10/18. Pt developed dysphagia likely due to post op inflammation per chart.   Nutrition Discharge Planning: meet >85% of EEN/EPN    Nutrition Risk Screen    Nutrition Risk Screen: no indicators present    Nutrition/Diet History    Patient Reported Diet/Restrictions/Preferences: general  Do you have any cultural, spiritual, Taoist conflicts, given your current situation?: None reported  Factors Affecting Nutritional Intake: difficulty/impaired swallowing (NPO)    Anthropometrics    Temp: 97.7 °F (36.5 °C)  Height Method: Stated  Height: 6' (182.9 cm)  Height (inches): 72 in  Weight Method: Bed Scale  Weight: 99.5 kg (219 lb 5.7 oz)  Weight (lb): 219.36 lb  Ideal Body Weight (IBW), Male: 178 lb  % Ideal Body Weight, Male (lb): 123.24 lb  BMI (Calculated): 29.8  BMI Grade: 25 - 29.9 - overweight       Lab/Procedures/Meds    Pertinent Labs Reviewed: reviewed  Pertinent Labs Comments: BUN 30H, glucose 234H, Alb 2.2L  Pertinent Medications Reviewed: reviewed  Pertinent Medications Comments: statin, insulin, docusate    Physical Findings/Assessment    Overall Physical Appearance: nourished  Tubes: gastrostomy tube  Oral/Mouth Cavity: WDL  Skin: incision(s)    Estimated/Assessed Needs    Weight Used For Calorie Calculations: 99.5 kg  (219 lb 5.7 oz)  Energy Calorie Requirements (kcal): 2179  Energy Need Method: [place]-St Jeor (X 1.25)  Protein Requirements: 100-120 gm/day (1.0-1.2 gm/kg)  Weight Used For Protein Calculations: 99.5 kg (219 lb 5.7 oz)     Fluid Need Method: other (see comments) (Per MD or 1 mL/kcal)  RDA Method (mL): 2179  CHO Requirement: 50% total kcals      Nutrition Prescription Ordered    Current Diet Order: NPO  Nutrition Order Comments: TF at goal  Current Nutrition Support Formula Ordered: Diabetisource AC  Current Nutrition Support Rate Ordered: 70 (ml)  Current Nutrition Support Frequency Ordered: mL/hr    Evaluation of Received Nutrient/Fluid Intake    Enteral Calories (kcal): 2016  Enteral Protein (gm): 101  Enteral (Free Water) Fluid (mL): 1374  % Kcal Needs: 108  % Protein Needs: 100  Energy Calories Required: meeting needs  Protein Required: meeting needs  Fluid Required:  (per MD)  Comments: LBM: 5/6/18  Tolerance: tolerating (per RN)  % Intake of Estimated Energy Needs: 75 - 100 %  % Meal Intake: NPO    Nutrition Risk    Level of Risk/Frequency of Follow-up: low (f/u 1 X wk)     Assessment and Plan    Inadequate dietary energy intake    Contributing Nutrition Diagnosis  Swallowing difficulties    Related to (etiology):   Dysphagia    Signs and Symptoms (as evidenced by):   NPO per SLP and TF regimen     Interventions/Recommendations (treatment strategy):  See recs     Nutrition Diagnosis Status:   New             Monitor and Evaluation    Food and Nutrient Intake: enteral nutrition intake  Food and Nutrient Adminstration: enteral and parenteral nutrition administration  Physical Activity and Function: nutrition-related ADLs and IADLs  Anthropometric Measurements: weight, weight change, body mass index  Biochemical Data, Medical Tests and Procedures: electrolyte and renal panel, gastrointestinal profile, glucose/endocrine profile, inflammatory profile, lipid profile  Nutrition-Focused Physical Findings: overall  appearance     Nutrition Follow-Up    RD Follow-up?: Yes

## 2018-05-08 NOTE — PT/OT/SLP PROGRESS
Occupational Therapy   Treatment    Name: Hollis Pitts  MRN: [id number]  Admitting Diagnosis:  Cervical myelopathy with cervical radiculopathy  10 Days Post-Op    Recommendations:     Discharge Recommendations: nursing facility, skilled  Discharge Equipment Recommendations:  hospital bed, lift device  Barriers to discharge:  Other (Comment), Decreased caregiver support (level of skilled assistance required)    Subjective     Communicated with: RN prior to session. Completed with PT and SLP. Pt agreeable to therapy session.   Pain/Comfort:  · Pain Rating 1:  (c/o pain in R shoulder/elbow)  · Pain Addressed 1: Pre-medicate for activity, Reposition (heating pack was donned)  · Pain Rating Post-Intervention 1: 0/10    Patients cultural, spiritual, Anabaptist conflicts given the current situation: None reported    Objective:     Patient found with: Condom Catheter, peripheral IV, PEG Tube, telemetry    General Precautions: Standard, aspiration, fall, NPO (delirium)   Orthopedic Precautions:spinal precautions   Braces: Aspen collar     Occupational Performance:    Bed Mobility:    · Patient completed Rolling/Turning to Left with  total assistance  · Patient completed Scooting/Bridging with total assistance and 2 persons  · Patient completed Supine to Sit with total assistance  · Patient completed Sit to Supine with total assistance     Functional Mobility/Transfers:  · Unable to complete 2/2 impaired sitting balance and fatigue    Activities of Daily Living:  · Grooming: minimum assistance with L UE to wash face; assist proximally for full rage to forehead  · LB Dressing: dependence for B socks while seated EOB    Patient left with bed in chair position with all lines intact, call button in reach, RN notified and SLP present    [facility] 6 Click:  [facility] Total Score: 9    Treatment & Education:  -Pt alert and agreeable to therapy session; oriented to person and place  -EOB ~15 min with mod(A) for postural control  *encouraged  B UE in supportive weight bearing  *completed reaching task with R UE and mild push/pull task as prep for t/f  *L UE forearm weight bearing x2 trials for core activation for return to midline  -Communication board updated ; no family at bedside for education   Education:    Assessment:     Hollis Pitts is a 82 y.o. male with a medical diagnosis of Cervical myelopathy with cervical radiculopathy.  He presents with improvement in alertness this session for activity. He cont to require added assistance with all tasks and activities at this time and would best benefit from 24 hour care at d/c.  Performance deficits affecting function are weakness, impaired endurance, impaired self care skills, impaired functional mobilty, gait instability, impaired balance, impaired cognition, decreased upper extremity function, decreased lower extremity function, decreased safety awareness, impaired fine motor, decreased ROM, impaired coordination.      Rehab Prognosis:  Fair; patient would benefit from acute skilled OT services to address these deficits and reach maximum level of function.       Plan:     Patient to be seen 3 x/week to address the above listed problems via self-care/home management, therapeutic activities, therapeutic exercises, neuromuscular re-education, cognitive retraining, wheelchair management/training  · Plan of Care Expires: 05/24/18  · Plan of Care Reviewed with: patient    This Plan of care has been discussed with the patient who was involved in its development and understands and is in agreement with the identified goals and treatment plan    GOALS:    Occupational Therapy Goals        Problem: Occupational Therapy Goal    Goal Priority Disciplines Outcome Interventions   Occupational Therapy Goal     OT, PT/OT Ongoing (interventions implemented as appropriate)    Description:  Goals to be met by: 5/15  Patient will increase functional independence with ADLs by performing:    Squat pivot t/f to  wheelchair with total(A).  Supine<>sit ; sit<>supine with Moderate Assistance.  Grooming while seated EOB with minimal assistance to wash face.  MET  *revised: with set up for grooming and min(A) for postural control  Pt will tolerate EOB activity ~15 min duration with min(A) for postural control.   Family/caregiver demo understanding for ROM and positioning for B UE.                    Time Tracking:     OT Date of Treatment: 05/08/18  OT Start Time: 0931  OT Stop Time: 0956  OT Total Time (min): 25 min    Billable Minutes:Self Care/Home Management 12    MADDIE Quintanilla  5/8/2018

## 2018-05-08 NOTE — PLAN OF CARE
Problem: Occupational Therapy Goal  Goal: Occupational Therapy Goal  Goals to be met by: 5/15  Patient will increase functional independence with ADLs by performing:    Squat pivot t/f to wheelchair with total(A).  Supine<>sit ; sit<>supine with Moderate Assistance.  Grooming while seated EOB with minimal assistance to wash face.  MET  *revised: with set up for grooming and min(A) for postural control  Pt will tolerate EOB activity ~15 min duration with min(A) for postural control.   Family/caregiver demo understanding for ROM and positioning for B UE.  Outcome: Ongoing (interventions implemented as appropriate)  Goals revised and extended at this time. MADDIE Quintanilla 5/8/2018

## 2018-05-08 NOTE — PLAN OF CARE
Problem: SLP Goal  Goal: SLP Goal  Speech Language Pathology Goals  Goals expected to be met by 5/8-continue till 5/15    1. Pt will participate in ongoing swallow assessment  2. Pt will participate in speech language cognitive assessment MEt  3. Pt will complete rote speech tasks with 80% accy and mod cues  4. Pt will follow 1 step commands with 90% accuracy and occasional cues  5. Pt will participate in ongoing speech language cognitive assessment.         Speech Language Pathology Goals  Goals expected to be met by 4/29:    1. Pt will participate in ongoing assessment of swallow.             Continue POC at this time, all goals remain appropriate at this time.   Humera Wadsworth CCC-SLP  5/8/2018

## 2018-05-08 NOTE — ASSESSMENT & PLAN NOTE
Contributing Nutrition Diagnosis  Swallowing difficulties    Related to (etiology):   Dysphagia    Signs and Symptoms (as evidenced by):   NPO per SLP and TF regimen     Interventions/Recommendations (treatment strategy):  See recs     Nutrition Diagnosis Status:   New

## 2018-05-08 NOTE — PT/OT/SLP PROGRESS
Physical Therapy Treatment    Patient Name:  Hollis Pitts   MRN:  [id number]    Recommendations:     Discharge Recommendations:  nursing facility, skilled (in NH)   Discharge Equipment Recommendations: hospital bed, lift device   Barriers to discharge: Decreased caregiver support    Assessment:     Hollis Pitts is a 82 y.o. male admitted with a medical diagnosis of Cervical myelopathy with cervical radiculopathy.  He presents with the following impairments/functional limitations:  weakness, impaired balance, impaired self care skills, decreased coordination, decreased safety awareness, impaired endurance, impaired functional mobilty, decreased upper extremity function, impaired coordination, pain, edema, impaired sensation, gait instability, impaired cognition, decreased lower extremity function, abnormal tone, impaired fine motor. Pt more alert today and able to keep eyes open throughout session. Pt performed bed mobility total A and sat EOB for ~15min with mod A for post lean. Pt will continue to benefit from skilled PT to improve deficits and increase overall functional mobility.     Rehab Prognosis:  Good; patient would benefit from acute skilled PT services to address these deficits and reach maximum level of function.      Recent Surgery: Procedure(s) (LRB):  WASHOUT-CERVICAL-wound washout and csf leak repair with depuy (N/A)  DISKECTOMY AND FUSION-ANTERIOR CERVICAL (ACDF)- REVISON C5-C7 10 Days Post-Op    Plan:     During this hospitalization, patient to be seen 3 x/week to address the above listed problems via therapeutic activities, therapeutic exercises, wheelchair management/training, neuromuscular re-education  · Plan of Care Expires:  06/01/18   Plan of Care Reviewed with: patient    Subjective     Communicated with RN prior to session.  Patient found supine in bed upon PT entry to room, agreeable to treatment.      Chief Complaint: R elbow pain  Patient comments/goals: wanting  water  Pain/Comfort:  · Pain Rating 1: other (see comments) (pt did not rate pain)  · Location - Side 1: Right  · Location - Orientation 1: generalized  · Location 1: elbow  · Pain Addressed 1: Reposition, Distraction  · Pain Rating Post-Intervention 1: 0/10    Patients cultural, spiritual, Episcopal conflicts given the current situation: None reported    Objective:     Patient found with: Condom Catheter, cervical collar, peripheral IV, PEG Tube, telemetry     General Precautions: Standard, aspiration, fall, NPO   Orthopedic Precautions:spinal precautions   Braces: Aspen collar     Functional Mobility:  Bed Mobility:     · Scooting: total assistance and of 2 persons  · Supine to Sit: total assistance and of 2 persons  · Sit to Supine: total assistance and of 2 persons    AM-PAC 6 CLICK MOBILITY  Turning over in bed (including adjusting bedclothes, sheets and blankets)?: 1  Sitting down on and standing up from a chair with arms (e.g., wheelchair, bedside commode, etc.): 1  Moving from lying on back to sitting on the side of the bed?: 1  Moving to and from a bed to a chair (including a wheelchair)?: 1  Need to walk in hospital room?: 1  Climbing 3-5 steps with a railing?: 1  Total Score: 6     Therapeutic Activities and Exercises:  Pt sat EOB for ~15min with mod A for post lean.  Pt alert and able to keep eyes open throughout session.  PT facilitated upright posture with thoracic ext and scap retract and ADD.  PT provided static stretch to ant chest musculature for ~30sec hold x2 trials.   Pt educated on importance of OOB activity and seated posture.     Patient left with bed in chair position with all lines intact, call button in reach, Rn notified and SLP present..    GOALS:    Physical Therapy Goals        Problem: Physical Therapy Goal    Goal Priority Disciplines Outcome Goal Variances Interventions   Physical Therapy Goal     PT/OT, PT Ongoing (interventions implemented as appropriate)     Description:  Goals  to be met by: 2018     Patient will increase functional independence with mobility by performin. Supine to sit with Moderate Assistance  2. Sit to supine with Moderate Assistance  3. Sit to stand transfer with Max Assistance  4. Bed to chair transfer with Max Assistance using Rolling Walker  5. Lower extremity exercise program x20 reps per handout, with supervision   6. Pt will perform sitting at EOB x 10 minutes with Contact Guard Assistance to improve trunk control                            Time Tracking:     PT Received On: 18  PT Start Time: 930     PT Stop Time: 955  PT Total Time (min): 25 min     Billable Minutes: Therapeutic Activity 15    Treatment Type: Treatment  PT/PTA: PT     PTA Visit Number: 0   Co-treat with SLP and OT    DARRELL ALY, PT  2018

## 2018-05-09 PROBLEM — A41.9 SEPSIS: Status: RESOLVED | Noted: 2018-04-21 | Resolved: 2018-05-09

## 2018-05-09 LAB
ALBUMIN SERPL BCP-MCNC: 2.3 G/DL
ALP SERPL-CCNC: 93 U/L
ALT SERPL W/O P-5'-P-CCNC: 13 U/L
ANION GAP SERPL CALC-SCNC: 8 MMOL/L
AST SERPL-CCNC: 14 U/L
BACTERIA #/AREA URNS AUTO: ABNORMAL /HPF
BASOPHILS # BLD AUTO: 0.02 K/UL
BASOPHILS NFR BLD: 0.1 %
BILIRUB SERPL-MCNC: 0.4 MG/DL
BILIRUB UR QL STRIP: NEGATIVE
BUN SERPL-MCNC: 36 MG/DL
CALCIUM SERPL-MCNC: 11.6 MG/DL
CHLORIDE SERPL-SCNC: 103 MMOL/L
CLARITY UR REFRACT.AUTO: ABNORMAL
CO2 SERPL-SCNC: 28 MMOL/L
COLOR UR AUTO: YELLOW
CREAT SERPL-MCNC: 1.1 MG/DL
DIFFERENTIAL METHOD: ABNORMAL
EOSINOPHIL # BLD AUTO: 0 K/UL
EOSINOPHIL NFR BLD: 0.2 %
ERYTHROCYTE [DISTWIDTH] IN BLOOD BY AUTOMATED COUNT: 16 %
EST. GFR  (AFRICAN AMERICAN): >60 ML/MIN/1.73 M^2
EST. GFR  (NON AFRICAN AMERICAN): >60 ML/MIN/1.73 M^2
GLUCOSE SERPL-MCNC: 203 MG/DL
GLUCOSE UR QL STRIP: ABNORMAL
HCT VFR BLD AUTO: 38.7 %
HGB BLD-MCNC: 11.6 G/DL
HGB UR QL STRIP: ABNORMAL
HYALINE CASTS UR QL AUTO: 0 /LPF
IMM GRANULOCYTES # BLD AUTO: 0.07 K/UL
IMM GRANULOCYTES NFR BLD AUTO: 0.5 %
INR PPP: 1.1
KETONES UR QL STRIP: NEGATIVE
LEUKOCYTE ESTERASE UR QL STRIP: ABNORMAL
LYMPHOCYTES # BLD AUTO: 2.5 K/UL
LYMPHOCYTES NFR BLD: 18.2 %
MAGNESIUM SERPL-MCNC: 2.2 MG/DL
MCH RBC QN AUTO: 30.4 PG
MCHC RBC AUTO-ENTMCNC: 30 G/DL
MCV RBC AUTO: 102 FL
MICROSCOPIC COMMENT: ABNORMAL
MONOCYTES # BLD AUTO: 1.7 K/UL
MONOCYTES NFR BLD: 12.5 %
NEUTROPHILS # BLD AUTO: 9.2 K/UL
NEUTROPHILS NFR BLD: 68.5 %
NITRITE UR QL STRIP: NEGATIVE
NRBC BLD-RTO: 0 /100 WBC
PH UR STRIP: 7 [PH] (ref 5–8)
PHOSPHATE SERPL-MCNC: 2.4 MG/DL
PLATELET # BLD AUTO: 241 K/UL
PMV BLD AUTO: 10 FL
POCT GLUCOSE: 166 MG/DL (ref 70–110)
POCT GLUCOSE: 172 MG/DL (ref 70–110)
POCT GLUCOSE: 173 MG/DL (ref 70–110)
POCT GLUCOSE: 212 MG/DL (ref 70–110)
POCT GLUCOSE: 215 MG/DL (ref 70–110)
POCT GLUCOSE: 228 MG/DL (ref 70–110)
POTASSIUM SERPL-SCNC: 5 MMOL/L
PROT SERPL-MCNC: 6.4 G/DL
PROT UR QL STRIP: ABNORMAL
PROTHROMBIN TIME: 11.6 SEC
RBC # BLD AUTO: 3.81 M/UL
RBC #/AREA URNS AUTO: 24 /HPF (ref 0–4)
SODIUM SERPL-SCNC: 139 MMOL/L
SP GR UR STRIP: 1.01 (ref 1–1.03)
SQUAMOUS #/AREA URNS AUTO: 11 /HPF
URN SPEC COLLECT METH UR: ABNORMAL
UROBILINOGEN UR STRIP-ACNC: NEGATIVE EU/DL
WBC # BLD AUTO: 13.5 K/UL
WBC #/AREA URNS AUTO: >100 /HPF (ref 0–5)
WBC CLUMPS UR QL AUTO: ABNORMAL
YEAST UR QL AUTO: ABNORMAL

## 2018-05-09 PROCEDURE — 99232 SBSQ HOSP IP/OBS MODERATE 35: CPT | Mod: GC,,, | Performed by: INTERNAL MEDICINE

## 2018-05-09 PROCEDURE — 81001 URINALYSIS AUTO W/SCOPE: CPT

## 2018-05-09 PROCEDURE — 20600001 HC STEP DOWN PRIVATE ROOM

## 2018-05-09 PROCEDURE — 25000003 PHARM REV CODE 250: Performed by: STUDENT IN AN ORGANIZED HEALTH CARE EDUCATION/TRAINING PROGRAM

## 2018-05-09 PROCEDURE — 25000003 PHARM REV CODE 250: Performed by: HOSPITALIST

## 2018-05-09 PROCEDURE — 36415 COLL VENOUS BLD VENIPUNCTURE: CPT

## 2018-05-09 PROCEDURE — 94761 N-INVAS EAR/PLS OXIMETRY MLT: CPT

## 2018-05-09 PROCEDURE — 85610 PROTHROMBIN TIME: CPT

## 2018-05-09 PROCEDURE — 80053 COMPREHEN METABOLIC PANEL: CPT

## 2018-05-09 PROCEDURE — 25000003 PHARM REV CODE 250: Performed by: INTERNAL MEDICINE

## 2018-05-09 PROCEDURE — 92526 ORAL FUNCTION THERAPY: CPT

## 2018-05-09 PROCEDURE — 99024 POSTOP FOLLOW-UP VISIT: CPT | Mod: POP,,, | Performed by: PHYSICIAN ASSISTANT

## 2018-05-09 PROCEDURE — 85025 COMPLETE CBC W/AUTO DIFF WBC: CPT

## 2018-05-09 PROCEDURE — 84100 ASSAY OF PHOSPHORUS: CPT

## 2018-05-09 PROCEDURE — 63600175 PHARM REV CODE 636 W HCPCS: Performed by: STUDENT IN AN ORGANIZED HEALTH CARE EDUCATION/TRAINING PROGRAM

## 2018-05-09 PROCEDURE — 83735 ASSAY OF MAGNESIUM: CPT

## 2018-05-09 PROCEDURE — 25000003 PHARM REV CODE 250: Performed by: PHYSICIAN ASSISTANT

## 2018-05-09 PROCEDURE — 92507 TX SP LANG VOICE COMM INDIV: CPT

## 2018-05-09 PROCEDURE — 63600175 PHARM REV CODE 636 W HCPCS: Performed by: INTERNAL MEDICINE

## 2018-05-09 PROCEDURE — 94668 MNPJ CHEST WALL SBSQ: CPT

## 2018-05-09 RX ORDER — SODIUM CHLORIDE 450 MG/100ML
INJECTION, SOLUTION INTRAVENOUS CONTINUOUS
Status: DISCONTINUED | OUTPATIENT
Start: 2018-05-09 | End: 2018-05-10

## 2018-05-09 RX ORDER — INSULIN ASPART 100 [IU]/ML
5 INJECTION, SOLUTION INTRAVENOUS; SUBCUTANEOUS EVERY 4 HOURS
Status: DISCONTINUED | OUTPATIENT
Start: 2018-05-09 | End: 2018-05-14

## 2018-05-09 RX ORDER — ERGOCALCIFEROL 1.25 MG/1
50000 CAPSULE ORAL
Status: DISCONTINUED | OUTPATIENT
Start: 2018-05-20 | End: 2018-05-18 | Stop reason: HOSPADM

## 2018-05-09 RX ORDER — ERGOCALCIFEROL 1.25 MG/1
50000 CAPSULE ORAL DAILY
Status: DISCONTINUED | OUTPATIENT
Start: 2018-05-09 | End: 2018-05-13

## 2018-05-09 RX ADMIN — HEPARIN SODIUM 5000 UNITS: 5000 INJECTION, SOLUTION INTRAVENOUS; SUBCUTANEOUS at 09:05

## 2018-05-09 RX ADMIN — SODIUM CHLORIDE: 0.45 INJECTION, SOLUTION INTRAVENOUS at 10:05

## 2018-05-09 RX ADMIN — STANDARDIZED SENNA CONCENTRATE AND DOCUSATE SODIUM 1 TABLET: 8.6; 5 TABLET, FILM COATED ORAL at 09:05

## 2018-05-09 RX ADMIN — TAMSULOSIN HYDROCHLORIDE 0.4 MG: 0.4 CAPSULE ORAL at 09:05

## 2018-05-09 RX ADMIN — INSULIN ASPART 4 UNITS: 100 INJECTION, SOLUTION INTRAVENOUS; SUBCUTANEOUS at 05:05

## 2018-05-09 RX ADMIN — CHLORHEXIDINE GLUCONATE 15 ML: 1.2 RINSE ORAL at 09:05

## 2018-05-09 RX ADMIN — POLYETHYLENE GLYCOL 3350 17 G: 17 POWDER, FOR SOLUTION ORAL at 09:05

## 2018-05-09 RX ADMIN — ERGOCALCIFEROL 50000 UNITS: 1.25 CAPSULE ORAL at 12:05

## 2018-05-09 RX ADMIN — DILTIAZEM HYDROCHLORIDE 60 MG: 60 TABLET, FILM COATED ORAL at 05:05

## 2018-05-09 RX ADMIN — DILTIAZEM HYDROCHLORIDE 90 MG: 60 TABLET, FILM COATED ORAL at 12:05

## 2018-05-09 RX ADMIN — INSULIN ASPART 4 UNITS: 100 INJECTION, SOLUTION INTRAVENOUS; SUBCUTANEOUS at 02:05

## 2018-05-09 RX ADMIN — INSULIN ASPART 5 UNITS: 100 INJECTION, SOLUTION INTRAVENOUS; SUBCUTANEOUS at 10:05

## 2018-05-09 RX ADMIN — INSULIN DETEMIR 16 UNITS: 100 INJECTION, SOLUTION SUBCUTANEOUS at 09:05

## 2018-05-09 RX ADMIN — INSULIN ASPART 5 UNITS: 100 INJECTION, SOLUTION INTRAVENOUS; SUBCUTANEOUS at 06:05

## 2018-05-09 RX ADMIN — HEPARIN SODIUM 5000 UNITS: 5000 INJECTION, SOLUTION INTRAVENOUS; SUBCUTANEOUS at 01:05

## 2018-05-09 RX ADMIN — INSULIN ASPART 1 UNITS: 100 INJECTION, SOLUTION INTRAVENOUS; SUBCUTANEOUS at 02:05

## 2018-05-09 RX ADMIN — VALSARTAN 120 MG: 40 TABLET ORAL at 09:05

## 2018-05-09 RX ADMIN — CITALOPRAM HYDROBROMIDE 10 MG: 10 TABLET ORAL at 09:05

## 2018-05-09 RX ADMIN — INSULIN ASPART 5 UNITS: 100 INJECTION, SOLUTION INTRAVENOUS; SUBCUTANEOUS at 09:05

## 2018-05-09 RX ADMIN — ATORVASTATIN CALCIUM 20 MG: 20 TABLET, FILM COATED ORAL at 09:05

## 2018-05-09 RX ADMIN — INSULIN ASPART 5 UNITS: 100 INJECTION, SOLUTION INTRAVENOUS; SUBCUTANEOUS at 01:05

## 2018-05-09 RX ADMIN — CINACALCET HYDROCHLORIDE 30 MG: 30 TABLET, COATED ORAL at 09:05

## 2018-05-09 RX ADMIN — DILTIAZEM HYDROCHLORIDE 90 MG: 60 TABLET, FILM COATED ORAL at 06:05

## 2018-05-09 RX ADMIN — SODIUM CHLORIDE: 0.45 INJECTION, SOLUTION INTRAVENOUS at 08:05

## 2018-05-09 RX ADMIN — HEPARIN SODIUM 5000 UNITS: 5000 INJECTION, SOLUTION INTRAVENOUS; SUBCUTANEOUS at 05:05

## 2018-05-09 NOTE — SUBJECTIVE & OBJECTIVE
Interval History:   Patient continues to work with PT/OT. Remains NPO. Vitamin D very low at 15 and PTH elevated at 134. Patient unable to receive senispar via PEG tube. IV fluids started for hypercalcemia and patient appearing dry on exam. BG in better control but still needs adjustment. Increase detemir to 16 u BID and aspart to 5 units q 4 hours. WBC minimally elevated- patient denies fevers, chills, cough, diarrhea or dysuria.     Conversant this AM. Denies any specific complaints except being thirsty and some general neck discomfort 2/2 positioning.     Review of Systems   Constitutional: Negative for activity change and fever.   HENT: Negative for congestion.    Respiratory: Negative for cough and choking.    Cardiovascular: Negative for chest pain.   Genitourinary: Negative for dysuria and hematuria.     Objective:     Vital Signs (Most Recent):  Temp: 97.6 °F (36.4 °C) (05/09/18 0340)  Pulse: 72 (05/09/18 0707)  Resp: 20 (05/08/18 2344)  BP: (!) 165/72 (05/09/18 0341)  SpO2: (!) 93 % (05/09/18 0340) Vital Signs (24h Range):  Temp:  [97.6 °F (36.4 °C)-98 °F (36.7 °C)] 97.6 °F (36.4 °C)  Pulse:  [60-73] 72  Resp:  [18-20] 20  SpO2:  [93 %-98 %] 93 %  BP: (128-179)/(64-79) 165/72     Weight: 99.5 kg (219 lb 5.7 oz)  Body mass index is 29.75 kg/m².    Intake/Output Summary (Last 24 hours) at 05/09/18 1019  Last data filed at 05/09/18 0500   Gross per 24 hour   Intake             1650 ml   Output              950 ml   Net              700 ml      Physical Exam   Constitutional: He appears well-developed.   HENT:   Head: Normocephalic.   Wearing collar. Mucous membranes dry   Eyes: EOM are normal. Pupils are equal, round, and reactive to light.   Arcus senilis present bilaterally   Cardiovascular: Normal rate.    No murmur heard.  Pulmonary/Chest: Effort normal and breath sounds normal. He has no rales.   Abdominal: Soft. There is no tenderness.   Peg tube in place   Musculoskeletal:   Participating with therapy.  Max assist with standing   Neurological: He is alert.       Significant Labs: All pertinent labs within the past 24 hours have been reviewed.    Significant Imaging: I have reviewed all pertinent imaging results/findings within the past 24 hours.

## 2018-05-09 NOTE — ASSESSMENT & PLAN NOTE
-- home meds include diltiazem XR 90 mg daily and valsartan 80 mg daily  -- continue current diltiazem regimen, stopped coreg and increased valsartan from 40 to 120 mg daily  -- will cont to monitor and adjust meds prn  -- will increase diltiazem to 90 q 6 hours.

## 2018-05-09 NOTE — PLAN OF CARE
TAN following for DC needs. TAN in communication with CM.    SW received call from [facility] stating that they accepted the patient. [facility] stated that they spoke to the daughter and she can sign paperwork tomorrow (Thursday).    TAN attempted to call daughter to follow up with her on the [facility] acceptance; no answer, left message.     UPDATE 2:52 PM  SW received call back from daughter. Daughter stated that she is going to sign paperwork tomorrow. SW informed the daughter that the patient will be discharged tomorrow.     TAN sent PASRR, 142, PPD, Chest Xray to [facility] via [facility].     Yvonne Ellington, GARY  [id number]

## 2018-05-09 NOTE — SUBJECTIVE & OBJECTIVE
Interval History:  NAEON.  Pt awake sitting up in bed.  No new complaints today.  Denies new/worsening weakness today.  Tolerating tube feeds.  Condom cath in place.  Denies F/C/CP/SOB.       Medications:  Continuous Infusions:   sodium chloride 0.45% 100 mL/hr at 05/09/18 1021     Scheduled Meds:   atorvastatin  20 mg Per NG tube Nightly    chlorhexidine  15 mL Mouth/Throat BID    cinacalcet  30 mg Oral Daily with breakfast    citalopram  10 mg Per G Tube Daily    diltiaZEM  90 mg Per G Tube Q6H    ergocalciferol  50,000 Units Per G Tube Daily    [START ON 5/20/2018] ergocalciferol  50,000 Units Per G Tube Q7 Days    heparin (porcine)  5,000 Units Subcutaneous Q8H    insulin aspart U-100  5 Units Subcutaneous Q4H    insulin detemir U-100  16 Units Subcutaneous BID    polyethylene glycol  17 g Per G Tube BID    senna-docusate 8.6-50 mg  1 tablet Per G Tube BID    tamsulosin  0.4 mg Per G Tube Daily    valsartan  120 mg Per G Tube Daily     PRN Meds:acetaminophen, albuterol-ipratropium 2.5mg-0.5mg/3mL, aluminum-magnesium hydroxide-simethicone, dextrose 50%, glucagon (human recombinant), insulin aspart U-100, lidocaine HCL 2%, ondansetron, potassium chloride 10%, potassium chloride 10%, potassium chloride 10%, potassium, sodium phosphates, potassium, sodium phosphates, potassium, sodium phosphates     Review of Systems   All other systems reviewed and are negative.    Objective:     Weight: 99.5 kg (219 lb 5.7 oz)  Body mass index is 29.75 kg/m².  Vital Signs (Most Recent):  Temp: 97.5 °F (36.4 °C) (05/09/18 1551)  Pulse: 65 (05/09/18 1551)  Resp: 18 (05/09/18 1551)  BP: (!) 124/58 (05/09/18 1551)  SpO2: 96 % (05/09/18 1551) Vital Signs (24h Range):  Temp:  [97.5 °F (36.4 °C)-98.1 °F (36.7 °C)] 97.5 °F (36.4 °C)  Pulse:  [60-76] 65  Resp:  [18-20] 18  SpO2:  [93 %-98 %] 96 %  BP: (124-179)/(57-79) 124/58                           NG/OG Tube 04/21/18 2300 Cortrak Right nostril (Active)   Placement Check  placement verified by x-ray 5/5/2018  7:48 AM   Distal Tube Length (cm) 70 5/5/2018  7:48 AM   Tolerance no signs/symptoms of discomfort 5/4/2018  8:00 PM   Securement anchored to nostril center w/ adhesive device 5/4/2018  8:00 PM   Clamp Status/Tolerance unclamped;no abdominal distention;no emesis;no residual;no restlessness 5/4/2018  8:00 AM   Insertion Site Appearance no redness, warmth, tenderness, skin breakdown, drainage 5/4/2018  8:00 AM   Drainage None 5/4/2018  8:00 AM   Flush/Irrigation flushed w/;water 5/4/2018  8:00 AM   Feeding Method bolus by pump 5/4/2018  8:00 AM   Current Rate (mL/hr) 0 mL/hr 5/4/2018  8:00 AM   Goal Rate (mL/hr) 55 mL/hr 5/4/2018  8:00 AM   Intake (mL) 0 mL 5/4/2018  8:00 AM   Water Bolus (mL) 0 mL 5/4/2018  8:00 AM   Tube Output(mL)(Include Discarded Residual) 0 mL 5/4/2018  8:00 AM   Intake (mL) - Breast Milk Tube Feeding 0 5/4/2018  8:00 AM   Rate Formula Tube Feeding (mL/hr) 0 mL/hr 5/4/2018  8:00 AM   Intake (mL) - Formula Tube Feeding 0 5/4/2018  8:00 AM   Residual Amount (ml) 0 ml 5/4/2018  8:00 AM            Gastrostomy/Enterostomy 05/04/18 1539 Gastrostomy tube w/ balloon LUQ feeding (Active)   Securement sutured to abdomen 5/9/2018  7:40 AM   Interventions Prior to Feeding patency checked 5/9/2018  7:40 AM   Suction Setting/Drainage Method dependent drainage 5/6/2018  8:00 AM   Feeding Type continuous 5/9/2018  7:40 AM   Clamp Status/Tolerance unclamped 5/9/2018  7:40 AM   Feeding Action feeding continued 5/9/2018  7:40 AM   Dressing dry and intact 5/8/2018  8:00 AM   Insertion Site no redness;no warmth;no drainage;no tenderness;no swelling 5/9/2018  7:40 AM   Flush/Irrigation flushed w/;no resistance met;water 5/8/2018  8:00 AM   Current Rate (mL/hr) 70 mL/hr 5/9/2018  7:40 AM   Goal Rate (mL/hr) 70 mL/hr 5/9/2018  7:40 AM   Water Bolus (mL) 100 mL 5/9/2018  5:00 AM   Tube Feeding Intake (mL) 1350 5/9/2018  5:00 AM   Residual Amount (ml) 0 ml 5/8/2018  8:00 PM        Male External Urinary Catheter 04/30/18 1600 (Active)   Collection Container Standard drainage bag 5/9/2018  7:40 AM   Securement Method secured to top of thigh w/ adhesive device 5/9/2018  7:40 AM   Skin no redness;no breakdown 5/9/2018  7:40 AM   Tolerance no signs/symptoms of discomfort 5/8/2018  8:00 AM   Output (mL) 950 mL 5/9/2018  5:00 AM   Catheter Change Date 05/07/18 5/7/2018  8:14 AM   Catheter Change Time 0800 5/7/2018  8:14 AM       Neurosurgery Physical Exam   General: well developed, well nourished. no acute distress. Generalized deconditioning   Neurologic: Awake, Alert, sitting in bed    Head: normocephalic, atraumatic   GCS: Motor: 6/Verbal: 5/Eyes: 4 GCS Total: 15  Cranial nerves: face symmetric, tongue midline, pupils equal, round, reactive to light with accomodation, extraocular muscles intact. CN II-XII grossly intact.   Language: no aphasia  Speech:  dysarthria improved compared to pre-wound revision on 4/28  Sensory: decrease response to light touch in BUE  Motor Strength:   Moves all extremities spontaneously with good tone.      Strength   Deltoids Triceps Biceps Wrist Extension Wrist Flexion Hand    Upper: R 2/5 3/5 4-/5 2/5 2/5 3/5     L 3/5 4-/5 4-/5 3/5 3/5 3/5       Iliopsoas Quadriceps Knee  Flexion Tibialis  anterior Gastro- cnemius EHL   Lower: R 2/5 2/5 2/5 4-/5 4-/5 4-/5     L 3/5 3/5 3/5 4/5 4/5 4/5   [place]: absent bilaterally   Clonus: absent  ENT: normal hearing with finger rub  Heart: RRR, no cyanosis, pallor, or edema.   Lungs:  normal respiratory effort on room air  Abdomen: soft, non-tender and symmetric. G tube site c/d/i  Extremities: warm with no cyanosis, edema, or clubbing.   Pulses: palpable distal pulses  Skin: warm, dry and intact. No visible rashes or lesions.      wound is c/d/i, no erythema, ttp, or drainage.  wound edges are well approximated. No surrounding edema      condom cath in place     Significant Labs:    Recent Labs  Lab 05/08/18  9215  05/09/18  0617   * 203*    139   K 4.4 5.0    103   CO2 30* 28   BUN 30* 36*   CREATININE 1.1 1.1   CALCIUM 11.1* 11.6*   MG 2.1 2.2       Recent Labs  Lab 05/08/18  0414 05/09/18 0617   WBC 10.62 13.50*   HGB 11.5* 11.6*   HCT 36.8* 38.7*    241       Recent Labs  Lab 05/08/18  0414 05/09/18 0617   INR 1.1 1.1     Microbiology Results (last 7 days)     Procedure Component Value Units Date/Time    Culture, Anaerobe [[id number]] Collected:  04/28/18 1413    Order Status:  Completed Specimen:  Wound from Neck Updated:  05/07/18 0659     Anaerobic Culture No anaerobes isolated    Narrative:       Wound fluid    Culture, Anaerobe [[id number]] Collected:  04/28/18 1417    Order Status:  Completed Specimen:  Wound from Neck Updated:  05/07/18 0659     Anaerobic Culture No anaerobes isolated    Narrative:       Wound fluid    Fungus culture [[id number]] Collected:  04/28/18 1413    Order Status:  Completed Specimen:  Wound from Neck Updated:  05/03/18 1039     Fungus (Mycology) Culture Culture in progress    Narrative:       Wound fluid    Fungus culture [[id number]] Collected:  04/28/18 1417    Order Status:  Completed Specimen:  Wound from Neck Updated:  05/03/18 1039     Fungus (Mycology) Culture Culture in progress    Narrative:       Wound fluid          Significant Diagnostics:    CXR 5/9/18:  FINDINGS:  Cardiac size remains unchanged mildly increased markings in the lower lung fields persist but there has been little change over the last several days.   Impression       Stable findings      Electronically signed by: Santana Alonso MD  Date: 05/09/2018  Time: 09:43         Head CT 5/8/18:  Evolving multicompartmental intracranial hemorrhage as above.  No evidence of new hemorrhage or other significant detrimental interval change.      Electronically signed by: Cade Pedraza MD  Date: 05/08/2018  Time: 14:17

## 2018-05-09 NOTE — ASSESSMENT & PLAN NOTE
- Home dose senispar restarted though unclear when this medication was started or what the etiology of his hypercalcemia is. It appears that this must have been an outside of [facility] medication  - PTH elevated and vitamin D exceptionally low- maybe causative etiology of elevated PTH and calcium and at very least contributing  - will start ergocalciferol 50,000 units daily for 5 days and thereafter once weekly. Needs vitamin D level drawn in 3- 6 months  - will start some IV fluids given that patient's clinically dry but will also help with hypercalcemia   - patient has not been able to get sensipar given that it cannot be crushed  - if calcium does not improve with above regimen, can consider trying bisphosphonate  - will continue to monitor
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
## 2018-05-09 NOTE — ASSESSMENT & PLAN NOTE
-- A1C 5.6, glucoses 184-286 inpast 24 hours  -- per chart review patient had reported being controlled by lifestyle modification alone at home   -- currently on continuous tube feeds with diabetisource   -- increased detemir to 16 units BID   -- increased aspart to 5 units q4 + low dose SSI    Recent Labs      05/08/18   1408  05/08/18   1850  05/08/18   2102  05/09/18   0209  05/09/18   0508  05/09/18   0957   POCTGLUCOSE  181*  173*  185*  215*  166*  228*

## 2018-05-09 NOTE — PROGRESS NOTES
[facility]  Neurosurgery  Progress Note    Subjective:     History of Present Illness: 82 y.o.  male with type 2 diabetes, who presents today for follow up evaluation one week prior to 2 level ACDF scheduled for 4/10/2018. Pt reports    Pt presents today wearing a cervical collar and in a wheelchair. He would like to proceed with surgery. Per pt's family, his PCP would like to see him on 4/9, the day before surgery. He notes continued neck pain and BUE numbness/tingling as well as BUE weakness. Pt is only able to walk a few steps unassisted. He has never received neck surgery.     Post-Op Info:  Procedure(s) (LRB):  WASHOUT-CERVICAL-wound washout and csf leak repair with depuy (N/A)  DISKECTOMY AND FUSION-ANTERIOR CERVICAL (ACDF)- REVISON C5-C7   11 Days Post-Op     Interval History:  NAEON.  Pt awake sitting up in bed.  No new complaints today.  Denies new/worsening weakness today.  Tolerating tube feeds.  Condom cath in place.  Denies F/C/CP/SOB.       Medications:  Continuous Infusions:   sodium chloride 0.45% 100 mL/hr at 05/09/18 1021     Scheduled Meds:   atorvastatin  20 mg Per NG tube Nightly    chlorhexidine  15 mL Mouth/Throat BID    cinacalcet  30 mg Oral Daily with breakfast    citalopram  10 mg Per G Tube Daily    diltiaZEM  90 mg Per G Tube Q6H    ergocalciferol  50,000 Units Per G Tube Daily    [START ON 5/20/2018] ergocalciferol  50,000 Units Per G Tube Q7 Days    heparin (porcine)  5,000 Units Subcutaneous Q8H    insulin aspart U-100  5 Units Subcutaneous Q4H    insulin detemir U-100  16 Units Subcutaneous BID    polyethylene glycol  17 g Per G Tube BID    senna-docusate 8.6-50 mg  1 tablet Per G Tube BID    tamsulosin  0.4 mg Per G Tube Daily    valsartan  120 mg Per G Tube Daily     PRN Meds:acetaminophen, albuterol-ipratropium 2.5mg-0.5mg/3mL, aluminum-magnesium hydroxide-simethicone, dextrose 50%, glucagon (human recombinant), insulin aspart U-100, lidocaine  HCL 2%, ondansetron, potassium chloride 10%, potassium chloride 10%, potassium chloride 10%, potassium, sodium phosphates, potassium, sodium phosphates, potassium, sodium phosphates     Review of Systems   All other systems reviewed and are negative.    Objective:     Weight: 99.5 kg (219 lb 5.7 oz)  Body mass index is 29.75 kg/m².  Vital Signs (Most Recent):  Temp: 97.5 °F (36.4 °C) (05/09/18 1551)  Pulse: 65 (05/09/18 1551)  Resp: 18 (05/09/18 1551)  BP: (!) 124/58 (05/09/18 1551)  SpO2: 96 % (05/09/18 1551) Vital Signs (24h Range):  Temp:  [97.5 °F (36.4 °C)-98.1 °F (36.7 °C)] 97.5 °F (36.4 °C)  Pulse:  [60-76] 65  Resp:  [18-20] 18  SpO2:  [93 %-98 %] 96 %  BP: (124-179)/(57-79) 124/58                           NG/OG Tube 04/21/18 2300 Cortrak Right nostril (Active)   Placement Check placement verified by x-ray 5/5/2018  7:48 AM   Distal Tube Length (cm) 70 5/5/2018  7:48 AM   Tolerance no signs/symptoms of discomfort 5/4/2018  8:00 PM   Securement anchored to nostril center w/ adhesive device 5/4/2018  8:00 PM   Clamp Status/Tolerance unclamped;no abdominal distention;no emesis;no residual;no restlessness 5/4/2018  8:00 AM   Insertion Site Appearance no redness, warmth, tenderness, skin breakdown, drainage 5/4/2018  8:00 AM   Drainage None 5/4/2018  8:00 AM   Flush/Irrigation flushed w/;water 5/4/2018  8:00 AM   Feeding Method bolus by pump 5/4/2018  8:00 AM   Current Rate (mL/hr) 0 mL/hr 5/4/2018  8:00 AM   Goal Rate (mL/hr) 55 mL/hr 5/4/2018  8:00 AM   Intake (mL) 0 mL 5/4/2018  8:00 AM   Water Bolus (mL) 0 mL 5/4/2018  8:00 AM   Tube Output(mL)(Include Discarded Residual) 0 mL 5/4/2018  8:00 AM   Intake (mL) - Breast Milk Tube Feeding 0 5/4/2018  8:00 AM   Rate Formula Tube Feeding (mL/hr) 0 mL/hr 5/4/2018  8:00 AM   Intake (mL) - Formula Tube Feeding 0 5/4/2018  8:00 AM   Residual Amount (ml) 0 ml 5/4/2018  8:00 AM            Gastrostomy/Enterostomy 05/04/18 1539 Gastrostomy tube w/ balloon LUQ feeding  (Active)   Securement sutured to abdomen 5/9/2018  7:40 AM   Interventions Prior to Feeding patency checked 5/9/2018  7:40 AM   Suction Setting/Drainage Method dependent drainage 5/6/2018  8:00 AM   Feeding Type continuous 5/9/2018  7:40 AM   Clamp Status/Tolerance unclamped 5/9/2018  7:40 AM   Feeding Action feeding continued 5/9/2018  7:40 AM   Dressing dry and intact 5/8/2018  8:00 AM   Insertion Site no redness;no warmth;no drainage;no tenderness;no swelling 5/9/2018  7:40 AM   Flush/Irrigation flushed w/;no resistance met;water 5/8/2018  8:00 AM   Current Rate (mL/hr) 70 mL/hr 5/9/2018  7:40 AM   Goal Rate (mL/hr) 70 mL/hr 5/9/2018  7:40 AM   Water Bolus (mL) 100 mL 5/9/2018  5:00 AM   Tube Feeding Intake (mL) 1350 5/9/2018  5:00 AM   Residual Amount (ml) 0 ml 5/8/2018  8:00 PM       Male External Urinary Catheter 04/30/18 1600 (Active)   Collection Container Standard drainage bag 5/9/2018  7:40 AM   Securement Method secured to top of thigh w/ adhesive device 5/9/2018  7:40 AM   Skin no redness;no breakdown 5/9/2018  7:40 AM   Tolerance no signs/symptoms of discomfort 5/8/2018  8:00 AM   Output (mL) 950 mL 5/9/2018  5:00 AM   Catheter Change Date 05/07/18 5/7/2018  8:14 AM   Catheter Change Time 0800 5/7/2018  8:14 AM       Neurosurgery Physical Exam   General: well developed, well nourished. no acute distress. Generalized deconditioning   Neurologic: Awake, Alert, sitting in bed    Head: normocephalic, atraumatic   GCS: Motor: 6/Verbal: 5/Eyes: 4 GCS Total: 15  Cranial nerves: face symmetric, tongue midline, pupils equal, round, reactive to light with accomodation, extraocular muscles intact. CN II-XII grossly intact.   Language: no aphasia  Speech:  dysarthria improved compared to pre-wound revision on 4/28  Sensory: decrease response to light touch in BUE  Motor Strength:   Moves all extremities spontaneously with good tone.      Strength   Deltoids Triceps Biceps Wrist Extension Wrist Flexion Hand     Upper: R 2/5 3/5 4-/5 2/5 2/5 3/5     L 3/5 4-/5 4-/5 3/5 3/5 3/5       Iliopsoas Quadriceps Knee  Flexion Tibialis  anterior Gastro- cnemius EHL   Lower: R 2/5 2/5 2/5 4-/5 4-/5 4-/5     L 3/5 3/5 3/5 4/5 4/5 4/5   [place]: absent bilaterally   Clonus: absent  ENT: normal hearing with finger rub  Heart: RRR, no cyanosis, pallor, or edema.   Lungs:  normal respiratory effort on room air  Abdomen: soft, non-tender and symmetric. G tube site c/d/i  Extremities: warm with no cyanosis, edema, or clubbing.   Pulses: palpable distal pulses  Skin: warm, dry and intact. No visible rashes or lesions.      wound is c/d/i, no erythema, ttp, or drainage.  wound edges are well approximated. No surrounding edema      condom cath in place     Significant Labs:    Recent Labs  Lab 05/08/18  0414 05/09/18  0617   * 203*    139   K 4.4 5.0    103   CO2 30* 28   BUN 30* 36*   CREATININE 1.1 1.1   CALCIUM 11.1* 11.6*   MG 2.1 2.2       Recent Labs  Lab 05/08/18  0414 05/09/18  0617   WBC 10.62 13.50*   HGB 11.5* 11.6*   HCT 36.8* 38.7*    241       Recent Labs  Lab 05/08/18  0414 05/09/18  0617   INR 1.1 1.1     Microbiology Results (last 7 days)     Procedure Component Value Units Date/Time    Culture, Anaerobe [[id number]] Collected:  04/28/18 1413    Order Status:  Completed Specimen:  Wound from Neck Updated:  05/07/18 0659     Anaerobic Culture No anaerobes isolated    Narrative:       Wound fluid    Culture, Anaerobe [[id number]] Collected:  04/28/18 1417    Order Status:  Completed Specimen:  Wound from Neck Updated:  05/07/18 0659     Anaerobic Culture No anaerobes isolated    Narrative:       Wound fluid    Fungus culture [[id number]] Collected:  04/28/18 1413    Order Status:  Completed Specimen:  Wound from Neck Updated:  05/03/18 1039     Fungus (Mycology) Culture Culture in progress    Narrative:       Wound fluid    Fungus culture [[id number]] Collected:  04/28/18 1417    Order Status:  Completed  Specimen:  Wound from Neck Updated:  05/03/18 1039     Fungus (Mycology) Culture Culture in progress    Narrative:       Wound fluid          Significant Diagnostics:    CXR 5/9/18:  FINDINGS:  Cardiac size remains unchanged mildly increased markings in the lower lung fields persist but there has been little change over the last several days.   Impression       Stable findings      Electronically signed by: Santana Alonso MD  Date: 05/09/2018  Time: 09:43         Head CT 5/8/18:  Evolving multicompartmental intracranial hemorrhage as above.  No evidence of new hemorrhage or other significant detrimental interval change.      Electronically signed by: Cade Pedraza MD  Date: 05/08/2018  Time: 14:17    Assessment/Plan:     * Cervical myelopathy with cervical radiculopathy    82 y.o. male s/p C3-5 ACDF for cervical myelopathy on 4/10 with Dr. Hess, and now s/p evacuation/washout of fluid collection with CSF leak repair on 4/28/18 POD 11  -Patient neurologically stable, more awake awake today. continued overall generalized deconditioning since admission. Speech slightly improved since wound revision. He was wheel chair bound prior to this admission.   -Repeat CT Head yesterday showed stable b/l hygromas    -Appreciate medicine recommendations and participation in care of patient   DM not at target goal, insulin dosing increased today - Levemir from 15 to 16 units nightly and Aspart from 4 to 5 units every 4 hours.  -Hypercalcemia likely related to primary hyperparathyroidism. started NS infusion.   -Vit D level low at 8 and will start Vit D replacement.   -HTN not at target,  plan to increased Diltiazem to 90 mg every 6 hours.   -TF at goal and tolerating  -ID: continue vanc and ceftriaxone x 7 days from surgery (4/28) Vanc trough before 4th dose, 18. Trough Goal 15-20. Surgical cultures NGTD, completed 5/6/18.  -CV goal normotension. Continue home meds. PRN meds SBP >160.   -Continue PILI/SCDs/SQH  -Continue  bowel regimen daily. Brown bomb 5/3 with +BM  -Continue to hold Eliquis. Per , indications unclear.  does not recommend continuing upon discharge.  -Continue aggressive PT/OT/ST. Discussed with therapy to evaluate patient simultaneously while pt OOB with PT.  -Cervical collar when OOB or with activity  -Scheduled CPT q4 hours   -Aggressive oral care per nursing staff daily  -Continue delirium precautions   DAYTIME: blinds up/lights on, TV on, increase stimuli, awake with minimal naps  NIGHTTIME: blinds down, TV off, lights off, decrease any noise/stimuli   Private room to prevent hospital delirium and increase compliance for OOB  -Keep incision open to air. Do not submerge or scrub incision.   -SW pursuing SNF placement                   LUIS ALBERTO Hua  Neurosurgery  [facility]

## 2018-05-09 NOTE — PT/OT/SLP PROGRESS
Speech Language Pathology Treatment    Patient Name:  Hollis Pitts   MRN:  [id number]  Admitting Diagnosis: Cervical myelopathy with cervical radiculopathy    Recommendations:                 General Recommendations:  Dysphagia therapy and Speech/language therapy  Diet recommendations:  NPO, Liquid Diet Level: NPO   Aspiration Precautions: Strict aspiration precautions   General Precautions: Standard, aspiration, fall, NPO  Communication strategies:  yes/no questions only and provide increased time to answer    Subjective     Awake/alert    Pain/Comfort:  · Pain Rating 1: 0/10  · Pain Rating Post-Intervention 1: 0/10    Objective:     Has the patient been evaluated by SLP for swallowing?   Yes  Keep patient NPO? Yes   Current Respiratory Status: nasal cannula      Pt awake/alert upon entry sitting uptight in bed. Pt tolerated 1/4 tsp puree, 1/4 tsp nectar thick liquids, 1/4 tsp honey thick liquids with delayed swallow and cough post all trials. Multiple swallows noted with all trials. Delayed wet vocal quality noted across all trials. SLP provided suction post all trials with notable vanilla pudding in yankuer tubing. Pt completed falsetto and effortful swallow exercises x5 reps each with min cues. Pt oriented x4 with mod cues. [place] speech tasks completed with 100% accy for months of the year provided cues to initiate. Simple problem solving task attempted; however pt was unable to complete with max cues.     Assessment:     Hollis Pitts is a 82 y.o. male with an SLP diagnosis of Dysphagia and Cognitive-Linguistic Impairment.    Goals:    SLP Goals        Problem: SLP Goal    Goal Priority Disciplines Outcome   SLP Goal     SLP Ongoing (interventions implemented as appropriate)   Description:  Speech Language Pathology Goals  Goals expected to be met by 5/8-continue till 5/15    1. Pt will participate in ongoing swallow assessment  2. Pt will participate in speech language cognitive assessment MEt  3. Pt will  complete rote speech tasks with 80% accy and mod cues  4. Pt will follow 1 step commands with 90% accuracy and occasional cues  5. Pt will participate in ongoing speech language cognitive assessment.         Speech Language Pathology Goals  Goals expected to be met by 4/29:    1. Pt will participate in ongoing assessment of swallow.                               Plan:     · Patient to be seen:  3 x/week   · Plan of Care reviewed with:  patient   · SLP Follow-Up:  Yes       Discharge recommendations:  nursing facility, skilled       Time Tracking:     SLP Treatment Date:   05/09/18  Speech Start Time:  0900  Speech Stop Time:  0916     Speech Total Time (min):  16 min    Billable Minutes: Speech Therapy Individual 8 and Treatment Swallowing Dysfunction 8    Humera Wadsworth CCC-SLP  05/09/2018

## 2018-05-09 NOTE — ASSESSMENT & PLAN NOTE
- recurred 5/9  - pending CXR and UA  - patient denies any focal complaints this AM- would hold off on abx at this time  - if spikes fever would order blood cultures and start abx

## 2018-05-09 NOTE — ASSESSMENT & PLAN NOTE
82 y.o. male s/p C3-5 ACDF for cervical myelopathy on 4/10 with Dr. Hess, and now s/p evacuation/washout of fluid collection with CSF leak repair on 4/28/18 POD 11  -Patient neurologically stable, more awake awake today. continued overall generalized deconditioning since admission. Speech slightly improved since wound revision. He was wheel chair bound prior to this admission.   -Repeat CT Head yesterday showed stable b/l hygromas    -Appreciate medicine recommendations and participation in care of patient   DM not at target goal, insulin dosing increased today - Levemir from 15 to 16 units nightly and Aspart from 4 to 5 units every 4 hours.  -Hypercalcemia likely related to primary hyperparathyroidism. started NS infusion.   -Vit D level low at 8 and will start Vit D replacement.   -HTN not at target,  plan to increased Diltiazem to 90 mg every 6 hours.   -TF at goal and tolerating  -ID: continue vanc and ceftriaxone x 7 days from surgery (4/28) Vanc trough before 4th dose, 18. Trough Goal 15-20. Surgical cultures NGTD, completed 5/6/18.  -CV goal normotension. Continue home meds. PRN meds SBP >160.   -Continue PILI/SCDs/SQH  -Continue bowel regimen daily. Brown bomb 5/3 with +BM  -Continue to hold Eliquis. Per , indications unclear.  does not recommend continuing upon discharge.  -Continue aggressive PT/OT/ST. Discussed with therapy to evaluate patient simultaneously while pt OOB with PT.  -Cervical collar when OOB or with activity  -Scheduled CPT q4 hours   -Aggressive oral care per nursing staff daily  -Continue delirium precautions   DAYTIME: blinds up/lights on, TV on, increase stimuli, awake with minimal naps  NIGHTTIME: blinds down, TV off, lights off, decrease any noise/stimuli   Private room to prevent hospital delirium and increase compliance for OOB  -Keep incision open to air. Do not submerge or scrub incision.   -SW pursuing SNF placement

## 2018-05-09 NOTE — PLAN OF CARE
Problem: SLP Goal  Goal: SLP Goal  Speech Language Pathology Goals  Goals expected to be met by 5/8-continue till 5/15    1. Pt will participate in ongoing swallow assessment  2. Pt will participate in speech language cognitive assessment MEt  3. Pt will complete rote speech tasks with 80% accy and mod cues  4. Pt will follow 1 step commands with 90% accuracy and occasional cues  5. Pt will participate in ongoing speech language cognitive assessment.         Speech Language Pathology Goals  Goals expected to be met by 4/29:    1. Pt will participate in ongoing assessment of swallow.              Continue POC at this time, NPo remains safest diet at this time.

## 2018-05-10 PROBLEM — E83.39 HYPOPHOSPHATEMIA: Status: ACTIVE | Noted: 2018-05-10

## 2018-05-10 LAB
ALBUMIN SERPL BCP-MCNC: 2.1 G/DL
ALP SERPL-CCNC: 88 U/L
ALT SERPL W/O P-5'-P-CCNC: 10 U/L
ANION GAP SERPL CALC-SCNC: 7 MMOL/L
AST SERPL-CCNC: 11 U/L
BASOPHILS # BLD AUTO: 0.02 K/UL
BASOPHILS NFR BLD: 0.2 %
BILIRUB SERPL-MCNC: 0.4 MG/DL
BUN SERPL-MCNC: 39 MG/DL
CALCIUM SERPL-MCNC: 11 MG/DL
CHLORIDE SERPL-SCNC: 99 MMOL/L
CO2 SERPL-SCNC: 28 MMOL/L
CREAT SERPL-MCNC: 1.1 MG/DL
DIFFERENTIAL METHOD: ABNORMAL
EOSINOPHIL # BLD AUTO: 0 K/UL
EOSINOPHIL NFR BLD: 0.5 %
ERYTHROCYTE [DISTWIDTH] IN BLOOD BY AUTOMATED COUNT: 15.8 %
EST. GFR  (AFRICAN AMERICAN): >60 ML/MIN/1.73 M^2
EST. GFR  (NON AFRICAN AMERICAN): >60 ML/MIN/1.73 M^2
GLUCOSE SERPL-MCNC: 187 MG/DL
HCT VFR BLD AUTO: 32.5 %
HCYS SERPL-SCNC: 10.7 UMOL/L
HGB BLD-MCNC: 10.2 G/DL
IMM GRANULOCYTES # BLD AUTO: 0.05 K/UL
IMM GRANULOCYTES NFR BLD AUTO: 0.6 %
INR PPP: 1.1
LYMPHOCYTES # BLD AUTO: 2 K/UL
LYMPHOCYTES NFR BLD: 23.5 %
MAGNESIUM SERPL-MCNC: 2.2 MG/DL
MCH RBC QN AUTO: 31 PG
MCHC RBC AUTO-ENTMCNC: 31.4 G/DL
MCV RBC AUTO: 99 FL
MONOCYTES # BLD AUTO: 1.1 K/UL
MONOCYTES NFR BLD: 12.7 %
NEUTROPHILS # BLD AUTO: 5.3 K/UL
NEUTROPHILS NFR BLD: 62.5 %
NRBC BLD-RTO: 0 /100 WBC
PHOSPHATE SERPL-MCNC: 2.2 MG/DL
PLATELET # BLD AUTO: 237 K/UL
PMV BLD AUTO: 10.7 FL
POCT GLUCOSE: 174 MG/DL (ref 70–110)
POCT GLUCOSE: 176 MG/DL (ref 70–110)
POCT GLUCOSE: 176 MG/DL (ref 70–110)
POCT GLUCOSE: 187 MG/DL (ref 70–110)
POTASSIUM SERPL-SCNC: 5 MMOL/L
PROT SERPL-MCNC: 5.9 G/DL
PROTHROMBIN TIME: 11 SEC
RBC # BLD AUTO: 3.29 M/UL
SODIUM SERPL-SCNC: 134 MMOL/L
VIT B12 SERPL-MCNC: 617 PG/ML
WBC # BLD AUTO: 8.51 K/UL

## 2018-05-10 PROCEDURE — 94668 MNPJ CHEST WALL SBSQ: CPT

## 2018-05-10 PROCEDURE — 63600175 PHARM REV CODE 636 W HCPCS: Performed by: STUDENT IN AN ORGANIZED HEALTH CARE EDUCATION/TRAINING PROGRAM

## 2018-05-10 PROCEDURE — 99900035 HC TECH TIME PER 15 MIN (STAT)

## 2018-05-10 PROCEDURE — 25000003 PHARM REV CODE 250: Performed by: INTERNAL MEDICINE

## 2018-05-10 PROCEDURE — 82607 VITAMIN B-12: CPT

## 2018-05-10 PROCEDURE — 92526 ORAL FUNCTION THERAPY: CPT

## 2018-05-10 PROCEDURE — 20600001 HC STEP DOWN PRIVATE ROOM

## 2018-05-10 PROCEDURE — 97530 THERAPEUTIC ACTIVITIES: CPT

## 2018-05-10 PROCEDURE — 83090 ASSAY OF HOMOCYSTEINE: CPT

## 2018-05-10 PROCEDURE — 94640 AIRWAY INHALATION TREATMENT: CPT

## 2018-05-10 PROCEDURE — 99232 SBSQ HOSP IP/OBS MODERATE 35: CPT | Mod: GC,,, | Performed by: INTERNAL MEDICINE

## 2018-05-10 PROCEDURE — 83735 ASSAY OF MAGNESIUM: CPT

## 2018-05-10 PROCEDURE — 85025 COMPLETE CBC W/AUTO DIFF WBC: CPT

## 2018-05-10 PROCEDURE — 84100 ASSAY OF PHOSPHORUS: CPT

## 2018-05-10 PROCEDURE — 63600175 PHARM REV CODE 636 W HCPCS: Performed by: INTERNAL MEDICINE

## 2018-05-10 PROCEDURE — 25000003 PHARM REV CODE 250: Performed by: STUDENT IN AN ORGANIZED HEALTH CARE EDUCATION/TRAINING PROGRAM

## 2018-05-10 PROCEDURE — 25000242 PHARM REV CODE 250 ALT 637 W/ HCPCS: Performed by: PSYCHIATRY & NEUROLOGY

## 2018-05-10 PROCEDURE — 80053 COMPREHEN METABOLIC PANEL: CPT

## 2018-05-10 PROCEDURE — 25000003 PHARM REV CODE 250: Performed by: PHYSICIAN ASSISTANT

## 2018-05-10 PROCEDURE — 85610 PROTHROMBIN TIME: CPT

## 2018-05-10 PROCEDURE — 83921 ORGANIC ACID SINGLE QUANT: CPT

## 2018-05-10 PROCEDURE — 94761 N-INVAS EAR/PLS OXIMETRY MLT: CPT

## 2018-05-10 PROCEDURE — 36415 COLL VENOUS BLD VENIPUNCTURE: CPT

## 2018-05-10 RX ORDER — SODIUM CHLORIDE 9 MG/ML
INJECTION, SOLUTION INTRAVENOUS CONTINUOUS
Status: DISCONTINUED | OUTPATIENT
Start: 2018-05-10 | End: 2018-05-10

## 2018-05-10 RX ADMIN — DILTIAZEM HYDROCHLORIDE 90 MG: 60 TABLET, FILM COATED ORAL at 01:05

## 2018-05-10 RX ADMIN — CHLORHEXIDINE GLUCONATE 15 ML: 1.2 RINSE ORAL at 10:05

## 2018-05-10 RX ADMIN — HEPARIN SODIUM 5000 UNITS: 5000 INJECTION, SOLUTION INTRAVENOUS; SUBCUTANEOUS at 10:05

## 2018-05-10 RX ADMIN — INSULIN ASPART 5 UNITS: 100 INJECTION, SOLUTION INTRAVENOUS; SUBCUTANEOUS at 02:05

## 2018-05-10 RX ADMIN — CITALOPRAM HYDROBROMIDE 10 MG: 10 TABLET ORAL at 10:05

## 2018-05-10 RX ADMIN — PAMIDRONATE DISODIUM 90 MG: 9 INJECTION, SOLUTION INTRAVENOUS at 01:05

## 2018-05-10 RX ADMIN — DILTIAZEM HYDROCHLORIDE 90 MG: 60 TABLET, FILM COATED ORAL at 06:05

## 2018-05-10 RX ADMIN — VALSARTAN 120 MG: 40 TABLET ORAL at 10:05

## 2018-05-10 RX ADMIN — ATORVASTATIN CALCIUM 20 MG: 20 TABLET, FILM COATED ORAL at 10:05

## 2018-05-10 RX ADMIN — DILTIAZEM HYDROCHLORIDE 90 MG: 60 TABLET, FILM COATED ORAL at 12:05

## 2018-05-10 RX ADMIN — HEPARIN SODIUM 5000 UNITS: 5000 INJECTION, SOLUTION INTRAVENOUS; SUBCUTANEOUS at 01:05

## 2018-05-10 RX ADMIN — CINACALCET HYDROCHLORIDE 30 MG: 30 TABLET, COATED ORAL at 08:05

## 2018-05-10 RX ADMIN — TAMSULOSIN HYDROCHLORIDE 0.4 MG: 0.4 CAPSULE ORAL at 10:05

## 2018-05-10 RX ADMIN — STANDARDIZED SENNA CONCENTRATE AND DOCUSATE SODIUM 1 TABLET: 8.6; 5 TABLET, FILM COATED ORAL at 10:05

## 2018-05-10 RX ADMIN — INSULIN ASPART 5 UNITS: 100 INJECTION, SOLUTION INTRAVENOUS; SUBCUTANEOUS at 06:05

## 2018-05-10 RX ADMIN — DILTIAZEM HYDROCHLORIDE 90 MG: 60 TABLET, FILM COATED ORAL at 05:05

## 2018-05-10 RX ADMIN — INSULIN ASPART 5 UNITS: 100 INJECTION, SOLUTION INTRAVENOUS; SUBCUTANEOUS at 10:05

## 2018-05-10 RX ADMIN — HEPARIN SODIUM 5000 UNITS: 5000 INJECTION, SOLUTION INTRAVENOUS; SUBCUTANEOUS at 05:05

## 2018-05-10 RX ADMIN — INSULIN ASPART 5 UNITS: 100 INJECTION, SOLUTION INTRAVENOUS; SUBCUTANEOUS at 05:05

## 2018-05-10 RX ADMIN — SODIUM CHLORIDE: 0.45 INJECTION, SOLUTION INTRAVENOUS at 05:05

## 2018-05-10 RX ADMIN — INSULIN ASPART 5 UNITS: 100 INJECTION, SOLUTION INTRAVENOUS; SUBCUTANEOUS at 11:05

## 2018-05-10 RX ADMIN — INSULIN ASPART 5 UNITS: 100 INJECTION, SOLUTION INTRAVENOUS; SUBCUTANEOUS at 03:05

## 2018-05-10 RX ADMIN — POLYETHYLENE GLYCOL 3350 17 G: 17 POWDER, FOR SOLUTION ORAL at 10:05

## 2018-05-10 RX ADMIN — IPRATROPIUM BROMIDE AND ALBUTEROL SULFATE 3 ML: .5; 3 SOLUTION RESPIRATORY (INHALATION) at 02:05

## 2018-05-10 NOTE — PLAN OF CARE
Problem: SLP Goal  Goal: SLP Goal  Speech Language Pathology Goals  Goals expected to be met by 5/8-continue till 5/15    1. Pt will participate in ongoing swallow assessment  2. Pt will participate in speech language cognitive assessment MEt  3. Pt will complete rote speech tasks with 80% accy and mod cues  4. Pt will follow 1 step commands with 90% accuracy and occasional cues  5. Pt will participate in ongoing speech language cognitive assessment.         Speech Language Pathology Goals  Goals expected to be met by 4/29:    1. Pt will participate in ongoing assessment of swallow.              Continue POC and NPO diet at this time.   Humera Wadsworth CCC-SLP  5/10/2018

## 2018-05-10 NOTE — ASSESSMENT & PLAN NOTE
-- currently well controlled  -- home meds include diltiazem XR 90 mg daily and valsartan 80 mg daily  -- continue current diltiazem regimen, stopped coreg and increased valsartan from 40 to 120 mg daily  -- will cont to monitor and adjust meds prn  -- diltiazem 90 mg q 6 hrs

## 2018-05-10 NOTE — PLAN OF CARE
Problem: Physical Therapy Goal  Goal: Physical Therapy Goal  Goals to be met by: 2018     Patient will increase functional independence with mobility by performin. Supine to sit with Moderate Assistance  2. Sit to supine with Moderate Assistance  3. Sit to stand transfer with Max Assistance  4. Bed to chair transfer with Max Assistance using Rolling Walker  5. Lower extremity exercise program x20 reps per handout, with supervision   6. Pt will perform sitting at EOB x 10 minutes with Contact Guard Assistance to improve trunk control           Outcome: Ongoing (interventions implemented as appropriate)  No goals met this visit; continue current POC.     Alexandria Joe PT, DPT   5/10/2018  Pager: [phone]

## 2018-05-10 NOTE — ASSESSMENT & PLAN NOTE
- Home dose senispar restarted though unclear when this medication was started or what the etiology of his hypercalcemia is. It appears that this must have been an outside of [facility] medication  - PTH elevated and vitamin D exceptionally low- maybe causative etiology of elevated PTH and calcium and at very least contributing  - will start ergocalciferol 50,000 units daily for 5 days and thereafter once weekly. Needs vitamin D level drawn in 3- 6 months  - IV fluids on hold given excess secretions  - patient has not been able to get sensipar given that it cannot be crushed  - will give dose of pamidronate today given that patient is likely leaving today or tomorrow  - patient needs a follow up appt regarding this before being discharged from hospital preferably with endocrine follow up  - will continue to monitor

## 2018-05-10 NOTE — ASSESSMENT & PLAN NOTE
-- A1C 5.6, glucoses 184-286 inpast 24 hours  -- per chart review patient had reported being controlled by lifestyle modification alone at home   -- currently on continuous tube feeds with diabetisource   -- increased detemir to 18 units BID   -- aspart to 5 units q4 + low dose SSI    Recent Labs      05/09/18   0957  05/09/18   1313  05/09/18   1808  05/09/18   2155  05/10/18   0215  05/10/18   0548   POCTGLUCOSE  228*  212*  172*  173*  187*  176*

## 2018-05-10 NOTE — ASSESSMENT & PLAN NOTE
- recurred 5/9  - CXR unchanged  - patient without dysuria though UA looks dirty- possibly contaminated? Leukocytosis resolved and afebrile. Would not start abx at this time  - patient denies any focal complaints this AM- would hold off on abx at this time  - if spikes fever would order blood cultures and start abx

## 2018-05-10 NOTE — SUBJECTIVE & OBJECTIVE
Interval History:  NAEON.  Pt awake sitting up in bed.  No new complaints today.  Denies new/worsening weakness today.  Tolerating tube feeds.  Condom cath in place.  Denies F/C/CP/SOB.       Medications:  Continuous Infusions:    Scheduled Meds:   atorvastatin  20 mg Per NG tube Nightly    chlorhexidine  15 mL Mouth/Throat BID    cinacalcet  30 mg Oral Daily with breakfast    citalopram  10 mg Per G Tube Daily    diltiaZEM  90 mg Per G Tube Q6H    ergocalciferol  50,000 Units Per G Tube Daily    [START ON 5/20/2018] ergocalciferol  50,000 Units Per G Tube Q7 Days    heparin (porcine)  5,000 Units Subcutaneous Q8H    insulin aspart U-100  5 Units Subcutaneous Q4H    insulin detemir U-100  18 Units Subcutaneous BID    pamidronate (AREDIA) infusion  90 mg Intravenous Once    polyethylene glycol  17 g Per G Tube BID    senna-docusate 8.6-50 mg  1 tablet Per G Tube BID    tamsulosin  0.4 mg Per G Tube Daily    valsartan  120 mg Per G Tube Daily     PRN Meds:acetaminophen, albuterol-ipratropium 2.5mg-0.5mg/3mL, aluminum-magnesium hydroxide-simethicone, dextrose 50%, glucagon (human recombinant), insulin aspart U-100, lidocaine HCL 2%, ondansetron, potassium chloride 10%, potassium chloride 10%, potassium chloride 10%, potassium, sodium phosphates, potassium, sodium phosphates, potassium, sodium phosphates     Review of Systems   All other systems reviewed and are negative.    Objective:     Weight: 99.5 kg (219 lb 5.7 oz)  Body mass index is 29.75 kg/m².  Vital Signs (Most Recent):  Temp: 97.5 °F (36.4 °C) (05/10/18 0830)  Pulse: 62 (05/10/18 1100)  Resp: 18 (05/10/18 0830)  BP: (!) 158/66 (05/10/18 0830)  SpO2: 95 % (05/10/18 0830) Vital Signs (24h Range):  Temp:  [96.1 °F (35.6 °C)-98.4 °F (36.9 °C)] 97.5 °F (36.4 °C)  Pulse:  [62-70] 62  Resp:  [16-18] 18  SpO2:  [94 %-96 %] 95 %  BP: (111-158)/(58-66) 158/66       Date 05/10/18 0700 - 05/11/18 0659   Shift 1093-3192 0574-6749 0244-7872 24 Hour Total    I  N  T  A  K  E   I.V.  (mL/kg) 400  (4)   400  (4)    NG/   280    Shift Total  (mL/kg) 680  (6.8)   680  (6.8)   O  U  T  P  U  T   Shift Total  (mL/kg)       Weight (kg) 99.5 99.5 99.5 99.5                        NG/OG Tube 04/21/18 2300 [facility] Right nostril (Active)   Placement Check placement verified by x-ray 5/5/2018  7:48 AM   Distal Tube Length (cm) 70 5/5/2018  7:48 AM   Tolerance no signs/symptoms of discomfort 5/4/2018  8:00 PM   Securement anchored to nostril center w/ adhesive device 5/4/2018  8:00 PM   Clamp Status/Tolerance unclamped;no abdominal distention;no emesis;no residual;no restlessness 5/4/2018  8:00 AM   Insertion Site Appearance no redness, warmth, tenderness, skin breakdown, drainage 5/4/2018  8:00 AM   Drainage None 5/4/2018  8:00 AM   Flush/Irrigation flushed w/;water 5/4/2018  8:00 AM   Feeding Method bolus by pump 5/4/2018  8:00 AM   Current Rate (mL/hr) 0 mL/hr 5/4/2018  8:00 AM   Goal Rate (mL/hr) 55 mL/hr 5/4/2018  8:00 AM   Intake (mL) 0 mL 5/4/2018  8:00 AM   Water Bolus (mL) 0 mL 5/4/2018  8:00 AM   Tube Output(mL)(Include Discarded Residual) 0 mL 5/4/2018  8:00 AM   Intake (mL) - Breast Milk Tube Feeding 0 5/4/2018  8:00 AM   Rate Formula Tube Feeding (mL/hr) 0 mL/hr 5/4/2018  8:00 AM   Intake (mL) - Formula Tube Feeding 0 5/4/2018  8:00 AM   Residual Amount (ml) 0 ml 5/4/2018  8:00 AM            Gastrostomy/Enterostomy 05/04/18 1539 Gastrostomy tube w/ balloon LUQ feeding (Active)   Securement sutured to abdomen 5/9/2018  7:40 AM   Interventions Prior to Feeding patency checked 5/9/2018  7:40 AM   Suction Setting/Drainage Method dependent drainage 5/6/2018  8:00 AM   Feeding Type continuous 5/9/2018  7:40 AM   Clamp Status/Tolerance unclamped 5/9/2018  7:40 AM   Feeding Action feeding continued 5/9/2018  7:40 AM   Dressing dry and intact 5/8/2018  8:00 AM   Insertion Site no redness;no warmth;no drainage;no tenderness;no swelling 5/9/2018  7:40 AM   Flush/Irrigation  flushed w/;no resistance met;water 5/8/2018  8:00 AM   Current Rate (mL/hr) 70 mL/hr 5/9/2018  7:40 AM   Goal Rate (mL/hr) 70 mL/hr 5/9/2018  7:40 AM   Water Bolus (mL) 100 mL 5/9/2018  5:00 AM   Tube Feeding Intake (mL) 1350 5/9/2018  5:00 AM   Residual Amount (ml) 0 ml 5/8/2018  8:00 PM       Male External Urinary Catheter 04/30/18 1600 (Active)   Collection Container Standard drainage bag 5/9/2018  7:40 AM   Securement Method secured to top of thigh w/ adhesive device 5/9/2018  7:40 AM   Skin no redness;no breakdown 5/9/2018  7:40 AM   Tolerance no signs/symptoms of discomfort 5/8/2018  8:00 AM   Output (mL) 950 mL 5/9/2018  5:00 AM   Catheter Change Date 05/07/18 5/7/2018  8:14 AM   Catheter Change Time 0800 5/7/2018  8:14 AM       Neurosurgery Physical Exam     General: well developed, well nourished. no acute distress. Generalized deconditioning   Neurologic: Awake, Alert, sitting in bed    Head: normocephalic, atraumatic   GCS: Motor: 6/Verbal: 5/Eyes: 4 GCS Total: 15  Cranial nerves: face symmetric, tongue midline, pupils equal, round, reactive to light with accomodation, extraocular muscles intact. CN II-XII grossly intact.   Language: no aphasia  Speech:  dysarthria improved compared to pre-wound revision on 4/28  Sensory: decrease response to light touch in BUE  Motor Strength:   Moves all extremities spontaneously with good tone.      Strength   Deltoids Triceps Biceps Wrist Extension Wrist Flexion Hand    Upper: R 2/5 3/5 4-/5 2/5 2/5 3/5     L 3/5 4-/5 4-/5 3/5 3/5 3/5       Iliopsoas Quadriceps Knee  Flexion Tibialis  anterior Gastro- cnemius EHL   Lower: R 2/5 2/5 2/5 4-/5 4-/5 4-/5     L 3/5 3/5 3/5 4/5 4/5 4/5   [place]: absent   Clonus: absent  ENT: normal hearing with finger rub  Lungs:  normal respiratory effort on room air  Abdomen: soft, non-tender and symmetric. G tube site c/d/i  Extremities: warm with no cyanosis, edema, or clubbing.   Pulses: palpable distal pulses  Skin: warm, dry and  intact. No visible rashes or lesions.      wound is c/d/i, no erythema, ttp, or drainage.  wound edges are well approximated. No surrounding edema      condom cath in place     Significant Labs:    Recent Labs  Lab 05/09/18  0617 05/10/18  0512   * 187*    134*   K 5.0 5.0    99   CO2 28 28   BUN 36* 39*   CREATININE 1.1 1.1   CALCIUM 11.6* 11.0*   MG 2.2 2.2       Recent Labs  Lab 05/09/18  0617 05/10/18  0512   WBC 13.50* 8.51   HGB 11.6* 10.2*   HCT 38.7* 32.5*    237       Recent Labs  Lab 05/09/18  0617 05/10/18  0512   INR 1.1 1.1     Microbiology Results (last 7 days)     Procedure Component Value Units Date/Time    Culture, Anaerobe [[id number]] Collected:  04/28/18 1413    Order Status:  Completed Specimen:  Wound from Neck Updated:  05/07/18 0659     Anaerobic Culture No anaerobes isolated    Narrative:       Wound fluid    Culture, Anaerobe [[id number]] Collected:  04/28/18 1417    Order Status:  Completed Specimen:  Wound from Neck Updated:  05/07/18 0659     Anaerobic Culture No anaerobes isolated    Narrative:       Wound fluid          Significant Diagnostics:    CXR 5/9/18:  FINDINGS:  Cardiac size remains unchanged mildly increased markings in the lower lung fields persist but there has been little change over the last several days.   Impression       Stable findings      Electronically signed by: Santana Alonso MD  Date: 05/09/2018  Time: 09:43         Head CT 5/8/18:  Evolving multicompartmental intracranial hemorrhage as above.  No evidence of new hemorrhage or other significant detrimental interval change.

## 2018-05-10 NOTE — PROGRESS NOTES
[facility]  Neurosurgery  Progress Note    Subjective:     History of Present Illness: 82 y.o.  male with type 2 diabetes, who presents today for follow up evaluation one week prior to 2 level ACDF scheduled for 4/10/2018. Pt reports    Pt presents today wearing a cervical collar and in a wheelchair. He would like to proceed with surgery. Per pt's family, his PCP would like to see him on 4/9, the day before surgery. He notes continued neck pain and BUE numbness/tingling as well as BUE weakness. Pt is only able to walk a few steps unassisted. He has never received neck surgery.     Post-Op Info:  Procedure(s) (LRB):  WASHOUT-CERVICAL-wound washout and csf leak repair with depuy (N/A)  DISKECTOMY AND FUSION-ANTERIOR CERVICAL (ACDF)- REVISON C5-C7   12 Days Post-Op     Interval History:  NAEON.  Pt awake sitting up in bed.  No new complaints today.  Denies new/worsening weakness today.  Tolerating tube feeds.  Condom cath in place.  Denies F/C/CP/SOB.       Medications:  Continuous Infusions:    Scheduled Meds:   atorvastatin  20 mg Per NG tube Nightly    chlorhexidine  15 mL Mouth/Throat BID    cinacalcet  30 mg Oral Daily with breakfast    citalopram  10 mg Per G Tube Daily    diltiaZEM  90 mg Per G Tube Q6H    ergocalciferol  50,000 Units Per G Tube Daily    [START ON 5/20/2018] ergocalciferol  50,000 Units Per G Tube Q7 Days    heparin (porcine)  5,000 Units Subcutaneous Q8H    insulin aspart U-100  5 Units Subcutaneous Q4H    insulin detemir U-100  18 Units Subcutaneous BID    pamidronate (AREDIA) infusion  90 mg Intravenous Once    polyethylene glycol  17 g Per G Tube BID    senna-docusate 8.6-50 mg  1 tablet Per G Tube BID    tamsulosin  0.4 mg Per G Tube Daily    valsartan  120 mg Per G Tube Daily     PRN Meds:acetaminophen, albuterol-ipratropium 2.5mg-0.5mg/3mL, aluminum-magnesium hydroxide-simethicone, dextrose 50%, glucagon (human recombinant), insulin aspart U-100,  lidocaine HCL 2%, ondansetron, potassium chloride 10%, potassium chloride 10%, potassium chloride 10%, potassium, sodium phosphates, potassium, sodium phosphates, potassium, sodium phosphates     Review of Systems   All other systems reviewed and are negative.    Objective:     Weight: 99.5 kg (219 lb 5.7 oz)  Body mass index is 29.75 kg/m².  Vital Signs (Most Recent):  Temp: 97.5 °F (36.4 °C) (05/10/18 0830)  Pulse: 62 (05/10/18 1100)  Resp: 18 (05/10/18 0830)  BP: (!) 158/66 (05/10/18 0830)  SpO2: 95 % (05/10/18 0830) Vital Signs (24h Range):  Temp:  [96.1 °F (35.6 °C)-98.4 °F (36.9 °C)] 97.5 °F (36.4 °C)  Pulse:  [62-70] 62  Resp:  [16-18] 18  SpO2:  [94 %-96 %] 95 %  BP: (111-158)/(58-66) 158/66       Date 05/10/18 0700 - 05/11/18 0659   Shift 3958-1589 6315-0920 5348-5323 24 Hour Total   I  N  T  A  K  E   I.V.  (mL/kg) 400  (4)   400  (4)    NG/   280    Shift Total  (mL/kg) 680  (6.8)   680  (6.8)   O  U  T  P  U  T   Shift Total  (mL/kg)       Weight (kg) 99.5 99.5 99.5 99.5                        NG/OG Tube 04/21/18 [facility]00 [facility] Right [facility] (Active)   Placement Check placement verified by x-ray 5/5/2018  7:48 AM   Distal Tube Length (cm) 70 5/5/2018  7:48 AM   Tolerance no signs/symptoms of discomfort 5/4/2018  8:00 PM   Securement anchored to nostril center w/ adhesive device 5/4/2018  8:00 PM   Clamp Status/Tolerance unclamped;no abdominal distention;no emesis;no residual;no restlessness 5/4/2018  8:00 AM   Insertion Site Appearance no redness, warmth, tenderness, skin breakdown, drainage 5/4/2018  8:00 AM   Drainage None 5/4/2018  8:00 AM   Flush/Irrigation flushed w/;water 5/4/2018  8:00 AM   Feeding Method bolus by pump 5/4/2018  8:00 AM   Current Rate (mL/hr) 0 mL/hr 5/4/2018  8:00 AM   Goal Rate (mL/hr) 55 mL/hr 5/4/2018  8:00 AM   Intake (mL) 0 mL 5/4/2018  8:00 AM   Water Bolus (mL) 0 mL 5/4/2018  8:00 AM   Tube Output(mL)(Include Discarded Residual) 0 mL 5/4/2018  8:00 AM   Intake (mL) -  Breast Milk Tube Feeding 0 5/4/2018  8:00 AM   Rate Formula Tube Feeding (mL/hr) 0 mL/hr 5/4/2018  8:00 AM   Intake (mL) - Formula Tube Feeding 0 5/4/2018  8:00 AM   Residual Amount (ml) 0 ml 5/4/2018  8:00 AM            Gastrostomy/Enterostomy 05/04/18 1539 Gastrostomy tube w/ balloon LUQ feeding (Active)   Securement sutured to abdomen 5/9/2018  7:40 AM   Interventions Prior to Feeding patency checked 5/9/2018  7:40 AM   Suction Setting/Drainage Method dependent drainage 5/6/2018  8:00 AM   Feeding Type continuous 5/9/2018  7:40 AM   Clamp Status/Tolerance unclamped 5/9/2018  7:40 AM   Feeding Action feeding continued 5/9/2018  7:40 AM   Dressing dry and intact 5/8/2018  8:00 AM   Insertion Site no redness;no warmth;no drainage;no tenderness;no swelling 5/9/2018  7:40 AM   Flush/Irrigation flushed w/;no resistance met;water 5/8/2018  8:00 AM   Current Rate (mL/hr) 70 mL/hr 5/9/2018  7:40 AM   Goal Rate (mL/hr) 70 mL/hr 5/9/2018  7:40 AM   Water Bolus (mL) 100 mL 5/9/2018  5:00 AM   Tube Feeding Intake (mL) 1350 5/9/2018  5:00 AM   Residual Amount (ml) 0 ml 5/8/2018  8:00 PM       Male External Urinary Catheter 04/30/18 1600 (Active)   Collection Container Standard drainage bag 5/9/2018  7:40 AM   Securement Method secured to top of thigh w/ adhesive device 5/9/2018  7:40 AM   Skin no redness;no breakdown 5/9/2018  7:40 AM   Tolerance no signs/symptoms of discomfort 5/8/2018  8:00 AM   Output (mL) 950 mL 5/9/2018  5:00 AM   Catheter Change Date 05/07/18 5/7/2018  8:14 AM   Catheter Change Time 0800 5/7/2018  8:14 AM       Neurosurgery Physical Exam     General: well developed, well nourished. no acute distress. Generalized deconditioning   Neurologic: Awake, Alert, sitting in bed    Head: normocephalic, atraumatic   GCS: Motor: 6/Verbal: 5/Eyes: 4 GCS Total: 15  Cranial nerves: face symmetric, tongue midline, pupils equal, round, reactive to light with accomodation, extraocular muscles intact. CN II-XII grossly  intact.   Language: no aphasia  Speech:  dysarthria improved compared to pre-wound revision on 4/28  Sensory: decrease response to light touch in BUE  Motor Strength:   Moves all extremities spontaneously with good tone.      Strength   Deltoids Triceps Biceps Wrist Extension Wrist Flexion Hand    Upper: R 2/5 3/5 4-/5 2/5 2/5 3/5     L 3/5 4-/5 4-/5 3/5 3/5 3/5       Iliopsoas Quadriceps Knee  Flexion Tibialis  anterior Gastro- cnemius EHL   Lower: R 2/5 2/5 2/5 4-/5 4-/5 4-/5     L 3/5 3/5 3/5 4/5 4/5 4/5   [place]: absent   Clonus: absent  ENT: normal hearing with finger rub  Lungs:  normal respiratory effort on room air  Abdomen: soft, non-tender and symmetric. G tube site c/d/i  Extremities: warm with no cyanosis, edema, or clubbing.   Pulses: palpable distal pulses  Skin: warm, dry and intact. No visible rashes or lesions.      wound is c/d/i, no erythema, ttp, or drainage.  wound edges are well approximated. No surrounding edema      condom cath in place     Significant Labs:    Recent Labs  Lab 05/09/18  0617 05/10/18  0512   * 187*    134*   K 5.0 5.0    99   CO2 28 28   BUN 36* 39*   CREATININE 1.1 1.1   CALCIUM 11.6* 11.0*   MG 2.2 2.2       Recent Labs  Lab 05/09/18  0617 05/10/18  0512   WBC 13.50* 8.51   HGB 11.6* 10.2*   HCT 38.7* 32.5*    237       Recent Labs  Lab 05/09/18  0617 05/10/18  0512   INR 1.1 1.1     Microbiology Results (last 7 days)     Procedure Component Value Units Date/Time    Culture, Anaerobe [[id number]] Collected:  04/28/18 1413    Order Status:  Completed Specimen:  Wound from Neck Updated:  05/07/18 0659     Anaerobic Culture No anaerobes isolated    Narrative:       Wound fluid    Culture, Anaerobe [[id number]] Collected:  04/28/18 1417    Order Status:  Completed Specimen:  Wound from Neck Updated:  05/07/18 0659     Anaerobic Culture No anaerobes isolated    Narrative:       Wound fluid          Significant Diagnostics:    CXR  5/9/18:  FINDINGS:  Cardiac size remains unchanged mildly increased markings in the lower lung fields persist but there has been little change over the last several days.   Impression       Stable findings      Electronically signed by: Santana Alonso MD  Date: 05/09/2018  Time: 09:43         Head CT 5/8/18:  Evolving multicompartmental intracranial hemorrhage as above.  No evidence of new hemorrhage or other significant detrimental interval change.    Assessment/Plan:     * Cervical myelopathy with cervical radiculopathy    82 y.o. male s/p C3-5 ACDF for cervical myelopathy on 4/10 with Dr. Hess, and now s/p evacuation/washout of fluid collection with CSF leak repair on 4/28/18 POD 12.    -Patient neurologically stable, continues to be more awake today. continued overall generalized deconditioning since admission. Speech slightly improved since wound revision; still requires deep suctioning to induce cough. He was wheel chair bound prior to this admission.    -Repeat CT Head showed stable b/l hygromas.  -Appreciate medicine recommendations and participation in care of patient.  DM reaching target goal, insulin dosing increased 5/9- Levemir 16 units nightly and Aspart 5 units every 4 hours.  -Hypercalcemia likely related to primary hyperparathyroidism. NS infusion stopped due to concern for fluid overload.  -Vit D level low at 8, continue Vit D replacement.   -HTN at target, continue Diltiazem 90 mg every 6 hours.   -TF at goal and tolerating  -ID: continue vanc and ceftriaxone x 7 days from surgery (4/28) Vanc trough before 4th dose, 18. Trough Goal 15-20. Surgical cultures NGTD, completed 5/6/18. UA yesterday with many WBCs, however WBC has trended down and pt is afebrile. CTM for now per .  -CV goal normotension. Continue home meds. PRN meds SBP >160.   -Continue PILI/SCDs/SQH  -Continue bowel regimen daily. Brown bomb 5/3 with +BM  -Continue to hold Eliquis. Per , indications unclear.  does not  recommend continuing upon discharge.  -Continue aggressive PT/OT/ST. Discussed with therapy to evaluate patient simultaneously while pt OOB with PT.  -Cervical collar when OOB or with activity  -Scheduled CPT q4 hours   -Aggressive oral care per nursing staff daily  -Continue delirium precautions   DAYTIME: blinds up/lights on, TV on, increase stimuli, awake with minimal naps  NIGHTTIME: blinds down, TV off, lights off, decrease any noise/stimuli   Private room to prevent hospital delirium and increase compliance for OOB  -Keep incision open to air. Do not submerge or scrub incision.   -SW pursuing SNF placement. Medically stable for discharge. Appreciate  assistance.                  Mariaelena Damico PA-C  Neurosurgery  [facility]

## 2018-05-10 NOTE — PLAN OF CARE
TAN following for DC needs. TAN in communication with CM.    TAN called [facility] to follow up on patients acceptance. ATN needs to verify that the patient can get intermittent lab draws and can be transported to his follow up appts for endocrin and NS. TAN left message for admissions at [facility].     Yvonne Ellington, GARY  [id number]

## 2018-05-10 NOTE — PT/OT/SLP PROGRESS
"Physical Therapy Treatment    Patient Name:  Hollis Pitts   MRN:  [id number]    Recommendations:     Discharge Recommendations:  nursing facility, skilled   Discharge Equipment Recommendations: hospital bed, lift device   Barriers to discharge: Decreased caregiver support    Assessment:     Hollis Pitts is a 82 y.o. male admitted with a medical diagnosis of Cervical myelopathy with cervical radiculopathy.  He presents with the following impairments/functional limitations:  weakness, impaired endurance, impaired self care skills, impaired functional mobilty, impaired balance, decreased lower extremity function, decreased upper extremity function, decreased safety awareness, edema, decreased ROM, impaired cardiopulmonary response to activity. Pt with improvement in participation and sitting balance this visit. Continues to display significant weakness and deconditioning; unable to perform sit>stand transfers. Pt would benefit from continued PT intervention to address listed deficits and maximize return to PLOF.     Rehab Prognosis: fair; patient would benefit from acute skilled PT services to address these deficits and reach maximum level of function.      Recent Surgery: Procedure(s) (LRB):  WASHOUT-CERVICAL-wound washout and csf leak repair with depuy (N/A)  DISKECTOMY AND FUSION-ANTERIOR CERVICAL (ACDF)- REVISON C5-C7 12 Days Post-Op    Plan:     During this hospitalization, patient to be seen 3 x/week to address the above listed problems via therapeutic activities, therapeutic exercises, neuromuscular re-education, wheelchair management/training  · Plan of Care Expires:  06/01/18   Plan of Care Reviewed with: patient    Subjective     Communicated with RN prior to session.  Patient found HOB elevated upon PT entry to room, agreeable to treatment.       Patient comments/goals: "I'm okay"   Pain/Comfort:  · Pain Rating 1: 0/10  · Pain Rating Post-Intervention 1: 0/10    Patients cultural, spiritual, Jehovah's witness " conflicts given the current situation: no conflicts    Objective:     Patient found with: cervical collar, peripheral IV, PEG Tube, telemetry, SCD     General Precautions: Standard, aspiration, fall, NPO   Orthopedic Precautions:spinal precautions   Braces: Aspen collar     Functional Mobility:       Bed Mobility    · Supine to sit: maximum assistance x 2   · Sit to supine: maximum assistance x 2       Transfers Attempted 1 trial of sit>stand from EOB with maximum assistance x 2; pt unable to clear body from support surface 2/2 BLE weakness      Gait    Unable to perform          Therapeutic Activities and Exercises:  Therapeutic activities aimed to:  - increase pt's independence, safety, and efficiency with bed mobility. See above for assistance levels.   - improve sitting balance and trunk control. Pt tolerated sitting EOB ~10 minutes with assistance ranging from moderate assistance to stand by assistance. Pt required frequent verbal and tactile cueing to improve midline orientation, increase weight bearing through BLEs and BUEs and engage trunk musculature. Pt able to perform minimal forward reaching of RUE in sitting. Pt maintained eyes open ~75 % of session, with cueing to remain alert and participate.   - improve BLE AROM and strength: pt performed ankle pumps x 10 reps and LAQ x 10 reps. Cueing required and min A to achieve optimal ROM for knee extension on LAQ. Elbow flexion and extension AROM performed x 10 reps bilaterally, mod A required on RUE.     Patient left HOB elevated with all lines intact, call button in reach, bed alarm on and RN notified.    AM-PAC 6 CLICK MOBILITY  Turning over in bed (including adjusting bedclothes, sheets and blankets)?: 2  Sitting down on and standing up from a chair with arms (e.g., wheelchair, bedside commode, etc.): 1  Moving from lying on back to sitting on the side of the bed?: 2  Moving to and from a bed to a chair (including a wheelchair)?: 1  Need to walk in hospital  room?: 1  Climbing 3-5 steps with a railing?: 1  Total Score: 8       GOALS:    Physical Therapy Goals        Problem: Physical Therapy Goal    Goal Priority Disciplines Outcome Goal Variances Interventions   Physical Therapy Goal     PT/OT, PT Ongoing (interventions implemented as appropriate)     Description:  Goals to be met by: 2018     Patient will increase functional independence with mobility by performin. Supine to sit with Moderate Assistance  2. Sit to supine with Moderate Assistance  3. Sit to stand transfer with Max Assistance  4. Bed to chair transfer with Max Assistance using Rolling Walker  5. Lower extremity exercise program x20 reps per handout, with supervision   6. Pt will perform sitting at EOB x 10 minutes with Contact Guard Assistance to improve trunk control                            Time Tracking:     PT Received On: 05/10/18  PT Start Time: 1337     PT Stop Time: 1400  PT Total Time (min): 23 min     Billable Minutes: Therapeutic Activity 23 min    Treatment Type: Treatment  PT/PTA: PT     PTA Visit Number: 0     Alexandria Joe PT, DPT   5/10/2018  Pager: [phone]

## 2018-05-10 NOTE — PLAN OF CARE
TAN following for DC needs. TAN in communication with CM.    TAN spoke to Roxi at [facility]. Roxi stated that the patient's daughter, Bud, signed paperwork today. [facility] can admit the patient tomorrow.     TAN inquired about lab draws at the NH and about follow up appts. Roxi stated that follow up appts are usually scheduled after SNF has been completed and she will check on the lab draws.     Yvonne Ellington, GARY  [id number]

## 2018-05-10 NOTE — SUBJECTIVE & OBJECTIVE
Interval History:   IV fluids stopped given some increased respiratory secretions. Detemir increased to 18 units BID though BG better controlled. Leukocytosis resolved. Mental status waxing and waning.     Review of Systems   Constitutional: Negative for fever.   HENT: Negative for congestion.    Respiratory: Positive for cough. Negative for shortness of breath.    Gastrointestinal: Negative for abdominal pain.   Genitourinary: Negative for dysuria.   Musculoskeletal: Positive for neck pain.   Psychiatric/Behavioral: Positive for confusion and decreased concentration.     Objective:     Vital Signs (Most Recent):  Temp: 97.5 °F (36.4 °C) (05/10/18 0830)  Pulse: 62 (05/10/18 0830)  Resp: 18 (05/10/18 0830)  BP: (!) 158/66 (05/10/18 0830)  SpO2: 95 % (05/10/18 0830) Vital Signs (24h Range):  Temp:  [96.1 °F (35.6 °C)-98.4 °F (36.9 °C)] 97.5 °F (36.4 °C)  Pulse:  [62-76] 62  Resp:  [16-18] 18  SpO2:  [94 %-96 %] 95 %  BP: (111-158)/(57-66) 158/66     Weight: 99.5 kg (219 lb 5.7 oz)  Body mass index is 29.75 kg/m².    Intake/Output Summary (Last 24 hours) at 05/10/18 1044  Last data filed at 05/10/18 0700   Gross per 24 hour   Intake             3040 ml   Output              750 ml   Net             2290 ml      Physical Exam   Constitutional: He appears well-developed.   HENT:   Head: Normocephalic.   Wearing collar. Mucous membranes dry   Eyes: EOM are normal. Pupils are equal, round, and reactive to light.   Arcus senilis present bilaterally   Cardiovascular: Normal rate.    No murmur heard.  Pulmonary/Chest: Effort normal and breath sounds normal. He has no rales.   Abdominal: Soft. There is no tenderness.   Peg tube in place   Genitourinary:   Genitourinary Comments: Stage II sacral decubitus ulcer   Musculoskeletal:   Participating with therapy. Max assist with standing   Neurological: He is alert.       Significant Labs: All pertinent labs within the past 24 hours have been reviewed.    Significant Imaging: I  have reviewed all pertinent imaging results/findings within the past 24 hours.
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
## 2018-05-10 NOTE — ASSESSMENT & PLAN NOTE
82 y.o. male s/p C3-5 ACDF for cervical myelopathy on 4/10 with Dr. Hess, and now s/p evacuation/washout of fluid collection with CSF leak repair on 4/28/18 POD 12.    -Patient neurologically stable, continues to be more awake today. continued overall generalized deconditioning since admission. Speech slightly improved since wound revision; still requires deep suctioning to induce cough. He was wheel chair bound prior to this admission.    -Repeat CT Head showed stable b/l hygromas.  -Appreciate medicine recommendations and participation in care of patient.  DM reaching target goal, insulin dosing increased 5/9- Levemir 16 units nightly and Aspart 5 units every 4 hours.  -Hypercalcemia likely related to primary hyperparathyroidism. NS infusion stopped due to concern for fluid overload.  -Vit D level low at 8, continue Vit D replacement.   -HTN at target, continue Diltiazem 90 mg every 6 hours.   -TF at goal and tolerating  -ID: continue vanc and ceftriaxone x 7 days from surgery (4/28) Vanc trough before 4th dose, 18. Trough Goal 15-20. Surgical cultures NGTD, completed 5/6/18. UA yesterday with many WBCs, however WBC has trended down and pt is afebrile. CTM for now per .  -CV goal normotension. Continue home meds. PRN meds SBP >160.   -Continue PILI/SCDs/SQH  -Continue bowel regimen daily. Brown bomb 5/3 with +BM  -Continue to hold Eliquis. Per , indications unclear.  does not recommend continuing upon discharge.  -Continue aggressive PT/OT/ST. Discussed with therapy to evaluate patient simultaneously while pt OOB with PT.  -Cervical collar when OOB or with activity  -Scheduled CPT q4 hours   -Aggressive oral care per nursing staff daily  -Continue delirium precautions   DAYTIME: blinds up/lights on, TV on, increase stimuli, awake with minimal naps  NIGHTTIME: blinds down, TV off, lights off, decrease any noise/stimuli   Private room to prevent hospital delirium and increase compliance for OOB  -Keep  incision open to air. Do not submerge or scrub incision.   -SW pursuing SNF placement. Medically stable for discharge. Appreciate  assistance.

## 2018-05-10 NOTE — PT/OT/SLP PROGRESS
Speech Language Pathology Treatment    Patient Name:  Hollis Pitts   MRN:  [id number]  Admitting Diagnosis: Cervical myelopathy with cervical radiculopathy    Recommendations:                 General Recommendations:  Dysphagia therapy and Speech/language therapy  Diet recommendations:  NPO, Liquid Diet Level: NPO   Aspiration Precautions: Strict aspiration precautions   General Precautions: Standard, aspiration, fall, NPO  Communication strategies:  provide increased time to answer    Subjective     Lethargic, aroused with mod cues    Pain/Comfort:  · Pain Rating 1: 0/10  · Pain Rating Post-Intervention 1: 0/10    Objective:     Has the patient been evaluated by SLP for swallowing?   Yes  Keep patient NPO? Yes   Current Respiratory Status: nasal cannula      Pt asleep upon entry and aroused to moderate tactile cues. Pt completed falsetto, effortful swallow and hard g/k x5 reps with max cues and decreased effort. Pt required constant cues to remain alert and participate in tx. One step commands followed with 100% accuracy and occasional cues. Pt followed basic two step commands with 25% accuracy and max cues. Pt with increased lethargy throughout session. Continue NPO diet and POC at this time.     Assessment:     Hollis Pitts is a 82 y.o. male with an SLP diagnosis of Dysphagia.    Goals:    SLP Goals        Problem: SLP Goal    Goal Priority Disciplines Outcome   SLP Goal     SLP Ongoing (interventions implemented as appropriate)   Description:  Speech Language Pathology Goals  Goals expected to be met by 5/8-continue till 5/15    1. Pt will participate in ongoing swallow assessment  2. Pt will participate in speech language cognitive assessment MEt  3. Pt will complete rote speech tasks with 80% accy and mod cues  4. Pt will follow 1 step commands with 90% accuracy and occasional cues  5. Pt will participate in ongoing speech language cognitive assessment.         Speech Language Pathology Goals  Goals  expected to be met by 4/29:    1. Pt will participate in ongoing assessment of swallow.                               Plan:     · Patient to be seen:  3 x/week   · Plan of Care expires:  05/22/18  · Plan of Care reviewed with:  patient   · SLP Follow-Up:  Yes       Discharge recommendations:  nursing facility, skilled       Time Tracking:     SLP Treatment Date:   05/10/18  Speech Start Time:  1006  Speech Stop Time:  1014     Speech Total Time (min):  8 min    Billable Minutes: Treatment Swallowing Dysfunction 8    Humera Wadsworth CCC-SLP  05/10/2018

## 2018-05-10 NOTE — NURSING
Over course of Pm shift 5/9, pt was AO x 1 but calm and cooperative, REHAN, endorses tingling in his feet, on RA, has productive cough with suction at bedside, received one breathing treatment, TF infusing via PEG at goal rate of 70, < 30 cc's residual, blood sugar checked Q 6 hr. And ordered insulin administered.  Pt was incontinent x 2 with 2 BMs; condom cath was placed twice but fell off.  Stage II pressure ulcer was noted on pt's L upper buttocks/coccyx, site cleaned and barrier cream applied. Pt. Turned Q 2 hr. With foam wedge and pillows to support pressure points. IVF infusing w/o complications via R arm.  SCDs applied, bed alarm activated, fall precautions in place. Will continue to monitor.

## 2018-05-10 NOTE — ASSESSMENT & PLAN NOTE
--speech continues to recommend NPO  --currently receiving continuous TFs   --planning on IR peg tube placement 5/4 5/7 - on TFs, continuous via peg tube

## 2018-05-11 PROBLEM — D72.829 LEUKOCYTOSIS: Status: RESOLVED | Noted: 2018-04-19 | Resolved: 2018-05-11

## 2018-05-11 LAB
ALBUMIN SERPL BCP-MCNC: 2.1 G/DL
ALP SERPL-CCNC: 82 U/L
ALT SERPL W/O P-5'-P-CCNC: 12 U/L
ANION GAP SERPL CALC-SCNC: 11 MMOL/L
AST SERPL-CCNC: 17 U/L
BASOPHILS # BLD AUTO: 0.01 K/UL
BASOPHILS NFR BLD: 0.1 %
BILIRUB SERPL-MCNC: 0.4 MG/DL
BUN SERPL-MCNC: 39 MG/DL
CALCIUM SERPL-MCNC: 10.7 MG/DL
CHLORIDE SERPL-SCNC: 100 MMOL/L
CO2 SERPL-SCNC: 24 MMOL/L
CREAT SERPL-MCNC: 1.2 MG/DL
DIFFERENTIAL METHOD: ABNORMAL
EOSINOPHIL # BLD AUTO: 0.1 K/UL
EOSINOPHIL NFR BLD: 0.9 %
ERYTHROCYTE [DISTWIDTH] IN BLOOD BY AUTOMATED COUNT: 15.5 %
EST. GFR  (AFRICAN AMERICAN): >60 ML/MIN/1.73 M^2
EST. GFR  (NON AFRICAN AMERICAN): 56.8 ML/MIN/1.73 M^2
GLUCOSE SERPL-MCNC: 150 MG/DL
HCT VFR BLD AUTO: 33.2 %
HGB BLD-MCNC: 10.3 G/DL
IMM GRANULOCYTES # BLD AUTO: 0.04 K/UL
IMM GRANULOCYTES NFR BLD AUTO: 0.4 %
INR PPP: 1
LYMPHOCYTES # BLD AUTO: 1.5 K/UL
LYMPHOCYTES NFR BLD: 17.1 %
MAGNESIUM SERPL-MCNC: 2.2 MG/DL
MCH RBC QN AUTO: 30.7 PG
MCHC RBC AUTO-ENTMCNC: 31 G/DL
MCV RBC AUTO: 99 FL
MONOCYTES # BLD AUTO: 1.2 K/UL
MONOCYTES NFR BLD: 13.2 %
NEUTROPHILS # BLD AUTO: 6.2 K/UL
NEUTROPHILS NFR BLD: 68.3 %
NRBC BLD-RTO: 0 /100 WBC
PHOSPHATE SERPL-MCNC: 2.4 MG/DL
PLATELET # BLD AUTO: 244 K/UL
PMV BLD AUTO: 10.7 FL
POCT GLUCOSE: 150 MG/DL (ref 70–110)
POCT GLUCOSE: 165 MG/DL (ref 70–110)
POCT GLUCOSE: 167 MG/DL (ref 70–110)
POCT GLUCOSE: 170 MG/DL (ref 70–110)
POCT GLUCOSE: 187 MG/DL (ref 70–110)
POCT GLUCOSE: 192 MG/DL (ref 70–110)
POTASSIUM SERPL-SCNC: 5.3 MMOL/L
PROT SERPL-MCNC: 6 G/DL
PROTHROMBIN TIME: 10.6 SEC
RBC # BLD AUTO: 3.36 M/UL
SODIUM SERPL-SCNC: 135 MMOL/L
WBC # BLD AUTO: 9.02 K/UL

## 2018-05-11 PROCEDURE — 63600175 PHARM REV CODE 636 W HCPCS: Performed by: STUDENT IN AN ORGANIZED HEALTH CARE EDUCATION/TRAINING PROGRAM

## 2018-05-11 PROCEDURE — 83735 ASSAY OF MAGNESIUM: CPT

## 2018-05-11 PROCEDURE — 99900035 HC TECH TIME PER 15 MIN (STAT)

## 2018-05-11 PROCEDURE — 99232 SBSQ HOSP IP/OBS MODERATE 35: CPT | Mod: GC,,, | Performed by: INTERNAL MEDICINE

## 2018-05-11 PROCEDURE — 36415 COLL VENOUS BLD VENIPUNCTURE: CPT

## 2018-05-11 PROCEDURE — 99024 POSTOP FOLLOW-UP VISIT: CPT | Mod: POP,,, | Performed by: PHYSICIAN ASSISTANT

## 2018-05-11 PROCEDURE — 25000003 PHARM REV CODE 250: Performed by: STUDENT IN AN ORGANIZED HEALTH CARE EDUCATION/TRAINING PROGRAM

## 2018-05-11 PROCEDURE — 80053 COMPREHEN METABOLIC PANEL: CPT

## 2018-05-11 PROCEDURE — 85025 COMPLETE CBC W/AUTO DIFF WBC: CPT

## 2018-05-11 PROCEDURE — 92507 TX SP LANG VOICE COMM INDIV: CPT

## 2018-05-11 PROCEDURE — 25000003 PHARM REV CODE 250: Performed by: PHYSICIAN ASSISTANT

## 2018-05-11 PROCEDURE — 84100 ASSAY OF PHOSPHORUS: CPT

## 2018-05-11 PROCEDURE — 25000003 PHARM REV CODE 250: Performed by: INTERNAL MEDICINE

## 2018-05-11 PROCEDURE — 20600001 HC STEP DOWN PRIVATE ROOM

## 2018-05-11 PROCEDURE — 94761 N-INVAS EAR/PLS OXIMETRY MLT: CPT

## 2018-05-11 PROCEDURE — 85610 PROTHROMBIN TIME: CPT

## 2018-05-11 RX ORDER — SODIUM,POTASSIUM PHOSPHATES 280-250MG
2 POWDER IN PACKET (EA) ORAL 4 TIMES DAILY
Status: DISCONTINUED | OUTPATIENT
Start: 2018-05-11 | End: 2018-05-11

## 2018-05-11 RX ADMIN — TAMSULOSIN HYDROCHLORIDE 0.4 MG: 0.4 CAPSULE ORAL at 09:05

## 2018-05-11 RX ADMIN — INSULIN ASPART 5 UNITS: 100 INJECTION, SOLUTION INTRAVENOUS; SUBCUTANEOUS at 10:05

## 2018-05-11 RX ADMIN — DILTIAZEM HYDROCHLORIDE 90 MG: 60 TABLET, FILM COATED ORAL at 05:05

## 2018-05-11 RX ADMIN — CINACALCET HYDROCHLORIDE 30 MG: 30 TABLET, COATED ORAL at 09:05

## 2018-05-11 RX ADMIN — CITALOPRAM HYDROBROMIDE 10 MG: 10 TABLET ORAL at 09:05

## 2018-05-11 RX ADMIN — INSULIN ASPART 5 UNITS: 100 INJECTION, SOLUTION INTRAVENOUS; SUBCUTANEOUS at 05:05

## 2018-05-11 RX ADMIN — INSULIN ASPART 5 UNITS: 100 INJECTION, SOLUTION INTRAVENOUS; SUBCUTANEOUS at 01:05

## 2018-05-11 RX ADMIN — ATORVASTATIN CALCIUM 20 MG: 20 TABLET, FILM COATED ORAL at 10:05

## 2018-05-11 RX ADMIN — CHLORHEXIDINE GLUCONATE 15 ML: 1.2 RINSE ORAL at 10:05

## 2018-05-11 RX ADMIN — HEPARIN SODIUM 5000 UNITS: 5000 INJECTION, SOLUTION INTRAVENOUS; SUBCUTANEOUS at 10:05

## 2018-05-11 RX ADMIN — SODIUM PHOSPHATE, MONOBASIC, MONOHYDRATE 30 MMOL: 276; 142 INJECTION, SOLUTION INTRAVENOUS at 12:05

## 2018-05-11 RX ADMIN — CHLORHEXIDINE GLUCONATE 15 ML: 1.2 RINSE ORAL at 09:05

## 2018-05-11 RX ADMIN — DILTIAZEM HYDROCHLORIDE 90 MG: 60 TABLET, FILM COATED ORAL at 12:05

## 2018-05-11 RX ADMIN — DILTIAZEM HYDROCHLORIDE 90 MG: 60 TABLET, FILM COATED ORAL at 11:05

## 2018-05-11 RX ADMIN — HEPARIN SODIUM 5000 UNITS: 5000 INJECTION, SOLUTION INTRAVENOUS; SUBCUTANEOUS at 01:05

## 2018-05-11 RX ADMIN — STANDARDIZED SENNA CONCENTRATE AND DOCUSATE SODIUM 1 TABLET: 8.6; 5 TABLET, FILM COATED ORAL at 09:05

## 2018-05-11 RX ADMIN — ERGOCALCIFEROL 50000 UNITS: 1.25 CAPSULE ORAL at 09:05

## 2018-05-11 RX ADMIN — INSULIN ASPART 5 UNITS: 100 INJECTION, SOLUTION INTRAVENOUS; SUBCUTANEOUS at 09:05

## 2018-05-11 RX ADMIN — VALSARTAN 120 MG: 40 TABLET ORAL at 09:05

## 2018-05-11 RX ADMIN — SODIUM POLYSTYRENE SULFONATE 30 G: 15 SUSPENSION ORAL; RECTAL at 11:05

## 2018-05-11 RX ADMIN — HEPARIN SODIUM 5000 UNITS: 5000 INJECTION, SOLUTION INTRAVENOUS; SUBCUTANEOUS at 05:05

## 2018-05-11 RX ADMIN — POLYETHYLENE GLYCOL 3350 17 G: 17 POWDER, FOR SOLUTION ORAL at 09:05

## 2018-05-11 NOTE — ASSESSMENT & PLAN NOTE
-- A1C 5.6, glucoses well controlled  -- per chart review patient had reported being controlled by lifestyle modification alone at home   -- currently on continuous tube feeds with diabetisource   -- detemir 18 units BID   -- aspart to 5 units q4 + low dose SSI    Recent Labs      05/10/18   0548  05/10/18   1140  05/10/18   1543  05/10/18   2201  05/11/18   0433  05/11/18   0853   POCTGLUCOSE  176*  176*  150*  174*  167*  192*

## 2018-05-11 NOTE — PLAN OF CARE
TAN following for DC needs. TAN in communication with CM.    TAN spoke to Roxi at [facility] who stated that they have to order the patient's Diabetisource and it will not be in until Monday so they can admit the patient on Monday.     TAN notified the PA.    Yvonne Ellington, [facility]  [id number]

## 2018-05-11 NOTE — PLAN OF CARE
Problem: SLP Goal  Goal: SLP Goal  Speech Language Pathology Goals  Goals expected to be met by 5/8-continue till 5/15    1. Pt will participate in ongoing swallow assessment  2. Pt will participate in speech language cognitive assessment MEt  3. Pt will complete rote speech tasks with 80% accy and mod cues  4. Pt will follow 1 step commands with 90% accuracy and occasional cues  5. Pt will participate in ongoing speech language cognitive assessment.         Speech Language Pathology Goals  Goals expected to be met by 4/29:    1. Pt will participate in ongoing assessment of swallow.              Outcome: Ongoing (interventions implemented as appropriate)  Remain npo with strict aspiration precautions.    Joyce Mason MA/CRYSTAL-SLP  Speech Language Pathologist  Pager [phone]  5/11/2018
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
## 2018-05-11 NOTE — PROGRESS NOTES
[facility]  Neurosurgery  Progress Note    Subjective:     History of Present Illness: 82 y.o.  male with type 2 diabetes, who presents today for follow up evaluation one week prior to 2 level ACDF scheduled for 4/10/2018. Pt reports    Pt presents today wearing a cervical collar and in a wheelchair. He would like to proceed with surgery. Per pt's family, his PCP would like to see him on 4/9, the day before surgery. He notes continued neck pain and BUE numbness/tingling as well as BUE weakness. Pt is only able to walk a few steps unassisted. He has never received neck surgery.     Post-Op Info:  Procedure(s) (LRB):  WASHOUT-CERVICAL-wound washout and csf leak repair with depuy (N/A)  DISKECTOMY AND FUSION-ANTERIOR CERVICAL (ACDF)- REVISON C5-C7   13 Days Post-Op     Interval History:  NAEON.  Pt reports mild neck pain.  Denies new/worsening weakness today.  Tolerating feeds. Condom cath in place.  No new complaints today.       Medications:  Continuous Infusions:  Scheduled Meds:   atorvastatin  20 mg Per NG tube Nightly    chlorhexidine  15 mL Mouth/Throat BID    cinacalcet  30 mg Oral Daily with breakfast    citalopram  10 mg Per G Tube Daily    diltiaZEM  90 mg Per G Tube Q6H    ergocalciferol  50,000 Units Per G Tube Daily    [START ON 5/20/2018] ergocalciferol  50,000 Units Per G Tube Q7 Days    heparin (porcine)  5,000 Units Subcutaneous Q8H    insulin aspart U-100  5 Units Subcutaneous Q4H    insulin detemir U-100  18 Units Subcutaneous BID    polyethylene glycol  17 g Per G Tube BID    senna-docusate 8.6-50 mg  1 tablet Per G Tube BID    tamsulosin  0.4 mg Per G Tube Daily    valsartan  120 mg Per G Tube Daily     PRN Meds:acetaminophen, albuterol-ipratropium 2.5mg-0.5mg/3mL, aluminum-magnesium hydroxide-simethicone, dextrose 50%, glucagon (human recombinant), insulin aspart U-100, lidocaine HCL 2%, ondansetron, potassium chloride 10%, potassium chloride 10%, potassium  chloride 10%, potassium, sodium phosphates, potassium, sodium phosphates, potassium, sodium phosphates     Review of Systems  Objective:     Weight: 99.5 kg (219 lb 5.7 oz)  Body mass index is 29.75 kg/m².  Vital Signs (Most Recent):  Temp: 97.6 °F (36.4 °C) (05/11/18 1640)  Pulse: 92 (05/11/18 1640)  Resp: 17 (05/11/18 1640)  BP: (!) 146/69 (05/11/18 1640)  SpO2: (!) 90 % (05/11/18 1640) Vital Signs (24h Range):  Temp:  [97 °F (36.1 °C)-98.4 °F (36.9 °C)] 97.6 °F (36.4 °C)  Pulse:  [62-92] 92  Resp:  [17-18] 17  SpO2:  [90 %-96 %] 90 %  BP: (121-163)/(57-96) 146/69       Date 05/11/18 0700 - 05/12/18 0659   Shift 7008-0428 1806-2976 6023-8489 24 Hour Total   I  N  T  A  K  E   NG/GT 1060 140  1200    IV Piggyback 250   250    Shift Total  (mL/kg) 1310  (13.2) 140  (1.4)  1450  (14.6)   O  U  T  P  U  T   Urine  (mL/kg/hr) 900  (1.1) 400  1300    Shift Total  (mL/kg) 900  (9) 400  (4)  1300  (13.1)   Weight (kg) 99.5 99.5 99.5 99.5                        Gastrostomy/Enterostomy 05/04/18 1539 Gastrostomy tube w/ balloon LUQ feeding (Active)   Securement anchored to abdomen w/ adhesive device 5/11/2018  7:05 AM   Interventions Prior to Feeding patency checked;residual checked 5/11/2018  7:05 AM   Suction Setting/Drainage Method dependent drainage 5/6/2018  8:00 AM   Feeding Type continuous 5/11/2018  7:05 AM   Clamp Status/Tolerance unclamped 5/11/2018  7:05 AM   Feeding Action feeding continued 5/11/2018  7:05 AM   Dressing dry and intact 5/11/2018  7:05 AM   Insertion Site dry 5/11/2018  7:05 AM   Site Care skin barrier applied;site cleansed w/ sterile normal saline 5/11/2018  7:05 AM   Flush/Irrigation flushed w/;water;no resistance met 5/11/2018  7:05 AM   Current Rate (mL/hr) 70 mL/hr 5/11/2018  7:05 AM   Goal Rate (mL/hr) 70 mL/hr 5/11/2018  7:05 AM   Water Bolus (mL) 250 mL 5/11/2018 12:05 PM   Tube Feeding Intake (mL) 70 5/11/2018  4:05 PM   Residual Amount (ml) 0 ml 5/11/2018  7:05 AM       Male External  Urinary Catheter 04/30/18 1600 (Active)   Collection Container Standard drainage bag 5/11/2018  7:05 AM   Securement Method secured to top of thigh w/ adhesive device 5/11/2018  7:05 AM   Skin no redness;no breakdown 5/11/2018  7:05 AM   Tolerance no signs/symptoms of discomfort 5/11/2018  7:05 AM   Output (mL) 400 mL 5/11/2018  4:05 PM   Catheter Change Date 05/11/18 5/11/2018  7:05 AM   Catheter Change Time 0705 5/11/2018  7:05 AM       Neurosurgery Physical Exam     General: well developed, well nourished. no acute distress. Generalized deconditioning   Neurologic: Awake, sitting upright in bed  Head: normocephalic, atraumatic   GCS: Motor: 6/Verbal: 5/Eyes: 4 GCS Total: 15  Cranial nerves: face symmetric, tongue midline, pupils equal, round, reactive to light with accomodation, extraocular muscles intact. CN II-XII grossly intact.   Language: no aphasia  Speech:  dysarthria improved compared to pre-wound revision on 4/28  Sensory: decrease response to light touch in BUE  Motor Strength:   Moves all extremities spontaneously with good tone.   Strength   Deltoids Triceps Biceps Wrist Extension Wrist Flexion Hand    Upper: R 2/5 3/5 4-/5 2/5 2/5 3/5     L 3/5 4-/5 4-/5 3/5 3/5 3/5       Iliopsoas Quadriceps Knee  Flexion Tibialis  anterior Gastro- cnemius EHL   Lower: R 2/5 2/5 2/5 4-/5 4-/5 4-/5     L 3/5 3/5 3/5 4/5 4/5 4/5   [place]: absent   Clonus: absent  ENT: normal hearing with finger rub  Lungs:  normal respiratory effort on room air  Abdomen: soft, non-tender and symmetric. G tube site c/d/i  Extremities: warm with no cyanosis, edema, or clubbing.   Pulses: palpable distal pulses  Skin: warm, dry and intact. No visible rashes or lesions.      wound is c/d/i, no erythema, ttp, or drainage.  wound edges are well approximated. No surrounding edema      condom cath in place       Significant Labs:    Recent Labs  Lab 05/10/18  0512 05/11/18  0426   * 150*   * 135*   K 5.0 5.3*   CL 99 100    CO2 28 24   BUN 39* 39*   CREATININE 1.1 1.2   CALCIUM 11.0* 10.7*   MG 2.2 2.2       Recent Labs  Lab 05/10/18  0512 05/11/18  0426   WBC 8.51 9.02   HGB 10.2* 10.3*   HCT 32.5* 33.2*    244       Recent Labs  Lab 05/10/18  0512 05/11/18  0426   INR 1.1 1.0     Microbiology Results (last 7 days)     Procedure Component Value Units Date/Time    Culture, Anaerobe [[id number]] Collected:  04/28/18 1413    Order Status:  Completed Specimen:  Wound from Neck Updated:  05/07/18 0659     Anaerobic Culture No anaerobes isolated    Narrative:       Wound fluid    Culture, Anaerobe [[id number]] Collected:  04/28/18 1417    Order Status:  Completed Specimen:  Wound from Neck Updated:  05/07/18 0659     Anaerobic Culture No anaerobes isolated    Narrative:       Wound fluid            Assessment/Plan:     * Cervical myelopathy with cervical radiculopathy    82 y.o. male s/p C3-5 ACDF for cervical myelopathy on 4/10 with Dr. Hess, and now s/p evacuation/washout of fluid collection with CSF leak repair on 4/28/18 POD 13  -Patient neurologically stable, continued waxing / waning  -Speech slightly improved since wound revision;still requires deep suctioning to induce cough.   -Repeat CT Head showed stable b/l hygromas  -Appreciate medicine recommendations and participation in care of patient   Patient medically stable from medicine prospective for discharge to SNF.  DM reaching target goal, insulin dosing increased 5/9- Levemir 16 units nightly and Aspart 5 units every 4 hours.  -Hypercalcemia likely related to primary hyperparathyroidism.   -Vit D level low at 8, continue Vit D replacement.   -HTN at target, continue Diltiazem 90 mg every 6 hours.   -TF at goal and tolerating  -ID: continue vanc and ceftriaxone x 7 days from surgery (4/28) Vanc trough before 4th dose, 18. Trough Goal 15-20. Surgical cultures NGTD, completed 5/6/18. UA yesterday with many WBCs, however WBC has trended down and pt is afebrile. CTM for now  per HM.  -CV goal normotension. Continue home meds. PRN meds SBP >160.   -Continue PILI/SCDs/SQH  -Continue bowel regimen daily. Brown bomb 5/3 with +BM  -Continue to hold Eliquis. Per HM, indications unclear.  does not recommend continuing upon discharge.  -Continue aggressive PT/OT/ST. Discussed with therapy to evaluate patient simultaneously while pt OOB with PT.  -Cervical collar when OOB or with activity  -Scheduled CPT q4 hours   -Aggressive oral care per nursing staff daily  -Continue delirium precautions   DAYTIME: blinds up/lights on, TV on, increase stimuli, awake with minimal naps  NIGHTTIME: blinds down, TV off, lights off, decrease any noise/stimuli   Private room to prevent hospital delirium and increase compliance for OOB  -Keep incision open to air. Do not submerge or scrub incision.   -SW pursuing SNF, able to transfer on Monday  -Discussed with Dr. Deshawn Silverman PA  Neurosurgery  [facility]

## 2018-05-11 NOTE — ASSESSMENT & PLAN NOTE
82 y.o. male s/p C3-5 ACDF for cervical myelopathy on 4/10 with Dr. Hess, and now s/p evacuation/washout of fluid collection with CSF leak repair on 4/28/18 POD 13  -Patient neurologically stable, continued waxing / waning  -Speech slightly improved since wound revision;still requires deep suctioning to induce cough.   -Repeat CT Head showed stable b/l hygromas  -Appreciate medicine recommendations and participation in care of patient   Patient medically stable from medicine prospective for discharge to SNF.  DM reaching target goal, insulin dosing increased 5/9- Levemir 16 units nightly and Aspart 5 units every 4 hours.  -Hypercalcemia likely related to primary hyperparathyroidism.   -Vit D level low at 8, continue Vit D replacement.   -HTN at target, continue Diltiazem 90 mg every 6 hours.   -TF at goal and tolerating  -ID: continue vanc and ceftriaxone x 7 days from surgery (4/28) Vanc trough before 4th dose, 18. Trough Goal 15-20. Surgical cultures NGTD, completed 5/6/18. UA yesterday with many WBCs, however WBC has trended down and pt is afebrile. CTM for now per .  -CV goal normotension. Continue home meds. PRN meds SBP >160.   -Continue PILI/SCDs/SQH  -Continue bowel regimen daily. Brown bomb 5/3 with +BM  -Continue to hold Eliquis. Per , indications unclear.  does not recommend continuing upon discharge.  -Continue aggressive PT/OT/ST. Discussed with therapy to evaluate patient simultaneously while pt OOB with PT.  -Cervical collar when OOB or with activity  -Scheduled CPT q4 hours   -Aggressive oral care per nursing staff daily  -Continue delirium precautions   DAYTIME: blinds up/lights on, TV on, increase stimuli, awake with minimal naps  NIGHTTIME: blinds down, TV off, lights off, decrease any noise/stimuli   Private room to prevent hospital delirium and increase compliance for OOB  -Keep incision open to air. Do not submerge or scrub incision.   -SW pursuing SNF, able to transfer on  Monday  -Discussed with Dr. Hess

## 2018-05-11 NOTE — SUBJECTIVE & OBJECTIVE
Interval History:   NAEON. BG well controlled. S/p one dose of pamidronate with improvement in calcium. BP well controlled. Likely to dc to SNF.    Review of Systems   Constitutional: Negative for fever.   HENT: Negative for congestion.    Respiratory: Positive for cough. Negative for shortness of breath.    Gastrointestinal: Negative for abdominal pain.   Genitourinary: Negative for dysuria.   Musculoskeletal: Positive for neck pain.   Psychiatric/Behavioral: Positive for confusion and decreased concentration.     Objective:     Vital Signs (Most Recent):  Temp: 97.7 °F (36.5 °C) (05/11/18 0802)  Pulse: 62 (05/11/18 0818)  Resp: 18 (05/11/18 0802)  BP: (!) 145/66 (05/11/18 0802)  SpO2: 96 % (05/11/18 0818) Vital Signs (24h Range):  Temp:  [97 °F (36.1 °C)-98 °F (36.7 °C)] 97.7 °F (36.5 °C)  Pulse:  [59-74] 62  Resp:  [18] 18  SpO2:  [90 %-97 %] 96 %  BP: (112-146)/(53-66) 145/66     Weight: 99.5 kg (219 lb 5.7 oz)  Body mass index is 29.75 kg/m².    Intake/Output Summary (Last 24 hours) at 05/11/18 1043  Last data filed at 05/11/18 1005   Gross per 24 hour   Intake             1210 ml   Output              660 ml   Net              550 ml      Physical Exam   Constitutional: He appears well-developed.   HENT:   Head: Normocephalic.   Wearing collar. Mucous membranes dry   Eyes: EOM are normal. Pupils are equal, round, and reactive to light.   Arcus senilis present bilaterally   Cardiovascular: Normal rate.    No murmur heard.  Pulmonary/Chest: Effort normal and breath sounds normal. He has no rales.   Abdominal: Soft. There is no tenderness.   Peg tube in place   Genitourinary:   Genitourinary Comments: Stage II sacral decubitus ulcer   Neurological: He is alert.       Significant Labs: All pertinent labs within the past 24 hours have been reviewed.    Significant Imaging: I have reviewed all pertinent imaging results/findings within the past 24 hours.

## 2018-05-11 NOTE — PT/OT/SLP PROGRESS
"Speech Language Pathology Treatment    Patient Name:  Hollis Pitts   MRN:  [id number]  Admitting Diagnosis: Cervical myelopathy with cervical radiculopathy    Recommendations:                 General Recommendations:  Dysphagia therapy and Speech/language therapy  Diet recommendations:  NPO, Liquid Diet Level: NPO   Aspiration Precautions: Strict aspiration precautions   General Precautions: Standard, aspiration, fall, NPO  Communication strategies:  none    Subjective     "ok" when asked how he felt  Patient goals: did not state    Pain/Comfort:  · Pain Rating 1: 0/10  · Pain Rating Post-Intervention 1: 0/10    Objective:     Has the patient been evaluated by SLP for swallowing?   Yes  Keep patient NPO? Yes   Current Respiratory Status: nasal cannula      Pt asleep upon entry and aroused to moderate tactile cues. Pt completed bot x5 reps with max cues and decreased effort. Poor ability to produce dry swallows.  Pt required constant cues to remain alert and participate in tx. One step commands followed with 100% accuracy.  Vocal quality remained wet with poor speech intelligibility in conversation.  He produed weak cough and throat clear.  Poor ablity to clear secretions with wet breath sounds noted.  NO po intake attempted due to high risk of aspiration.        Assessment:     Hollis Pitts is a 82 y.o. male with an SLP diagnosis of Dysphagia, Cognitive-Linguistic Impairment and Dysphonia.    Goals:    SLP Goals        Problem: SLP Goal    Goal Priority Disciplines Outcome   SLP Goal     SLP Ongoing (interventions implemented as appropriate)   Description:  Speech Language Pathology Goals  Goals expected to be met by 5/8-continue till 5/15    1. Pt will participate in ongoing swallow assessment  2. Pt will participate in speech language cognitive assessment MEt  3. Pt will complete rote speech tasks with 80% accy and mod cues  4. Pt will follow 1 step commands with 90% accuracy and occasional cues  5. Pt will " participate in ongoing speech language cognitive assessment.         Speech Language Pathology Goals  Goals expected to be met by 4/29:    1. Pt will participate in ongoing assessment of swallow.                               Plan:     · Patient to be seen:  3 x/week   · Plan of Care expires:  05/22/18  · Plan of Care reviewed with:  patient   · SLP Follow-Up:  Yes       Discharge recommendations:  nursing facility, skilled       Time Tracking:     SLP Treatment Date:   05/11/18  Speech Start Time:  0755  Speech Stop Time:  0805     Speech Total Time (min):  10 min    Billable Minutes: Treatment Swallowing Dysfunction 10    Joyce Mason MA, CCC-SLP  05/11/2018

## 2018-05-11 NOTE — ASSESSMENT & PLAN NOTE
- Home dose senispar restarted though unclear when this medication was started or what the etiology of his hypercalcemia is. It appears that this must have been an outside of [facility] medication  - PTH elevated and vitamin D exceptionally low- maybe causative etiology of elevated PTH and calcium and at very least contributing  - will start ergocalciferol 50,000 units daily for 5 days and thereafter once weekly. Needs vitamin D level drawn in 3- 6 months  - IV fluids on hold given excess secretions  - patient has not been able to get sensipar given that it cannot be crushed  - s/p pamidronate 5/10  - patient needs a follow up appt regarding this before being discharged from hospital preferably with endocrine follow up  - will continue to monitor

## 2018-05-11 NOTE — SUBJECTIVE & OBJECTIVE
Interval History:  NAEON.  Pt reports mild neck pain.  Denies new/worsening weakness today.  Tolerating feeds. Condom cath in place.  No new complaints today.       Medications:  Continuous Infusions:  Scheduled Meds:   atorvastatin  20 mg Per NG tube Nightly    chlorhexidine  15 mL Mouth/Throat BID    cinacalcet  30 mg Oral Daily with breakfast    citalopram  10 mg Per G Tube Daily    diltiaZEM  90 mg Per G Tube Q6H    ergocalciferol  50,000 Units Per G Tube Daily    [START ON 5/20/2018] ergocalciferol  50,000 Units Per G Tube Q7 Days    heparin (porcine)  5,000 Units Subcutaneous Q8H    insulin aspart U-100  5 Units Subcutaneous Q4H    insulin detemir U-100  18 Units Subcutaneous BID    polyethylene glycol  17 g Per G Tube BID    senna-docusate 8.6-50 mg  1 tablet Per G Tube BID    tamsulosin  0.4 mg Per G Tube Daily    valsartan  120 mg Per G Tube Daily     PRN Meds:acetaminophen, albuterol-ipratropium 2.5mg-0.5mg/3mL, aluminum-magnesium hydroxide-simethicone, dextrose 50%, glucagon (human recombinant), insulin aspart U-100, lidocaine HCL 2%, ondansetron, potassium chloride 10%, potassium chloride 10%, potassium chloride 10%, potassium, sodium phosphates, potassium, sodium phosphates, potassium, sodium phosphates     Review of Systems  Objective:     Weight: 99.5 kg (219 lb 5.7 oz)  Body mass index is 29.75 kg/m².  Vital Signs (Most Recent):  Temp: 97.6 °F (36.4 °C) (05/11/18 1640)  Pulse: 92 (05/11/18 1640)  Resp: 17 (05/11/18 1640)  BP: (!) 146/69 (05/11/18 1640)  SpO2: (!) 90 % (05/11/18 1640) Vital Signs (24h Range):  Temp:  [97 °F (36.1 °C)-98.4 °F (36.9 °C)] 97.6 °F (36.4 °C)  Pulse:  [62-92] 92  Resp:  [17-18] 17  SpO2:  [90 %-96 %] 90 %  BP: (121-163)/(57-96) 146/69       Date 05/11/18 0700 - 05/12/18 0659   Shift 8191-7304 8964-1133 4673-4636 24 Hour Total   I  N  T  A  K  E   NG/GT 1060 140  1200    IV Piggyback 250   250    Shift Total  (mL/kg) 1310  (13.2) 140  (1.4)  1450  (14.6)    O  U  T  P  U  T   Urine  (mL/kg/hr) 900  (1.1) 400  1300    Shift Total  (mL/kg) 900  (9) 400  (4)  1300  (13.1)   Weight (kg) 99.5 99.5 99.5 99.5                        Gastrostomy/Enterostomy 05/04/18 1539 Gastrostomy tube w/ balloon LUQ feeding (Active)   Securement anchored to abdomen w/ adhesive device 5/11/2018  7:05 AM   Interventions Prior to Feeding patency checked;residual checked 5/11/2018  7:05 AM   Suction Setting/Drainage Method dependent drainage 5/6/2018  8:00 AM   Feeding Type continuous 5/11/2018  7:05 AM   Clamp Status/Tolerance unclamped 5/11/2018  7:05 AM   Feeding Action feeding continued 5/11/2018  7:05 AM   Dressing dry and intact 5/11/2018  7:05 AM   Insertion Site dry 5/11/2018  7:05 AM   Site Care skin barrier applied;site cleansed w/ sterile normal saline 5/11/2018  7:05 AM   Flush/Irrigation flushed w/;water;no resistance met 5/11/2018  7:05 AM   Current Rate (mL/hr) 70 mL/hr 5/11/2018  7:05 AM   Goal Rate (mL/hr) 70 mL/hr 5/11/2018  7:05 AM   Water Bolus (mL) 250 mL 5/11/2018 12:05 PM   Tube Feeding Intake (mL) 70 5/11/2018  4:05 PM   Residual Amount (ml) 0 ml 5/11/2018  7:05 AM       Male External Urinary Catheter 04/30/18 1600 (Active)   Collection Container Standard drainage bag 5/11/2018  7:05 AM   Securement Method secured to top of thigh w/ adhesive device 5/11/2018  7:05 AM   Skin no redness;no breakdown 5/11/2018  7:05 AM   Tolerance no signs/symptoms of discomfort 5/11/2018  7:05 AM   Output (mL) 400 mL 5/11/2018  4:05 PM   Catheter Change Date 05/11/18 5/11/2018  7:05 AM   Catheter Change Time 0705 5/11/2018  7:05 AM       Neurosurgery Physical Exam     General: well developed, well nourished. no acute distress. Generalized deconditioning   Neurologic: Awake, sitting upright in bed  Head: normocephalic, atraumatic   GCS: Motor: 6/Verbal: 5/Eyes: 4 GCS Total: 15  Cranial nerves: face symmetric, tongue midline, pupils equal, round, reactive to light with accomodation,  extraocular muscles intact. CN II-XII grossly intact.   Language: no aphasia  Speech:  dysarthria improved compared to pre-wound revision on 4/28  Sensory: decrease response to light touch in BUE  Motor Strength:   Moves all extremities spontaneously with good tone.   Strength   Deltoids Triceps Biceps Wrist Extension Wrist Flexion Hand    Upper: R 2/5 3/5 4-/5 2/5 2/5 3/5     L 3/5 4-/5 4-/5 3/5 3/5 3/5       Iliopsoas Quadriceps Knee  Flexion Tibialis  anterior Gastro- cnemius EHL   Lower: R 2/5 2/5 2/5 4-/5 4-/5 4-/5     L 3/5 3/5 3/5 4/5 4/5 4/5   [place]: absent   Clonus: absent  ENT: normal hearing with finger rub  Lungs:  normal respiratory effort on room air  Abdomen: soft, non-tender and symmetric. G tube site c/d/i  Extremities: warm with no cyanosis, edema, or clubbing.   Pulses: palpable distal pulses  Skin: warm, dry and intact. No visible rashes or lesions.      wound is c/d/i, no erythema, ttp, or drainage.  wound edges are well approximated. No surrounding edema      condom cath in place       Significant Labs:    Recent Labs  Lab 05/10/18  0512 05/11/18 0426   * 150*   * 135*   K 5.0 5.3*   CL 99 100   CO2 28 24   BUN 39* 39*   CREATININE 1.1 1.2   CALCIUM 11.0* 10.7*   MG 2.2 2.2       Recent Labs  Lab 05/10/18  0512 05/11/18  0426   WBC 8.51 9.02   HGB 10.2* 10.3*   HCT 32.5* 33.2*    244       Recent Labs  Lab 05/10/18  0512 05/11/18  0426   INR 1.1 1.0     Microbiology Results (last 7 days)     Procedure Component Value Units Date/Time    Culture, Anaerobe [[id number]] Collected:  04/28/18 1413    Order Status:  Completed Specimen:  Wound from Neck Updated:  05/07/18 0659     Anaerobic Culture No anaerobes isolated    Narrative:       Wound fluid    Culture, Anaerobe [[id number]] Collected:  04/28/18 1417    Order Status:  Completed Specimen:  Wound from Neck Updated:  05/07/18 0659     Anaerobic Culture No anaerobes isolated    Narrative:       Wound fluid

## 2018-05-11 NOTE — PLAN OF CARE
Problem: Patient Care Overview  Goal: Plan of Care Review  Outcome: Ongoing (interventions implemented as appropriate)  POC reviewed with pt and family at bedside by RN and MD. Pt verbalized understanding. Questions and concerns addressed. No acute events today. Pt progressing toward goals. Tolerating current tube feeding at this time. No acute distress. Will continue to monitor. See flowsheets for full assessment and VS info.

## 2018-05-12 LAB
ALBUMIN SERPL BCP-MCNC: 2.1 G/DL
ALP SERPL-CCNC: 81 U/L
ALT SERPL W/O P-5'-P-CCNC: 10 U/L
ANION GAP SERPL CALC-SCNC: 8 MMOL/L
AST SERPL-CCNC: 13 U/L
BILIRUB SERPL-MCNC: 0.4 MG/DL
BUN SERPL-MCNC: 35 MG/DL
CALCIUM SERPL-MCNC: 9.8 MG/DL
CHLORIDE SERPL-SCNC: 98 MMOL/L
CO2 SERPL-SCNC: 29 MMOL/L
CREAT SERPL-MCNC: 1.1 MG/DL
EST. GFR  (AFRICAN AMERICAN): >60 ML/MIN/1.73 M^2
EST. GFR  (NON AFRICAN AMERICAN): >60 ML/MIN/1.73 M^2
GLUCOSE SERPL-MCNC: 187 MG/DL
INR PPP: 1.1
MAGNESIUM SERPL-MCNC: 2 MG/DL
METHYLMALONATE SERPL-SCNC: 0.45 UMOL/L
PHOSPHATE SERPL-MCNC: 2.7 MG/DL
POCT GLUCOSE: 150 MG/DL (ref 70–110)
POCT GLUCOSE: 171 MG/DL (ref 70–110)
POCT GLUCOSE: 183 MG/DL (ref 70–110)
POCT GLUCOSE: 184 MG/DL (ref 70–110)
POCT GLUCOSE: 202 MG/DL (ref 70–110)
POTASSIUM SERPL-SCNC: 4.2 MMOL/L
PROT SERPL-MCNC: 6 G/DL
PROTHROMBIN TIME: 11.3 SEC
SODIUM SERPL-SCNC: 135 MMOL/L

## 2018-05-12 PROCEDURE — 25000003 PHARM REV CODE 250: Performed by: INTERNAL MEDICINE

## 2018-05-12 PROCEDURE — 94667 MNPJ CHEST WALL 1ST: CPT

## 2018-05-12 PROCEDURE — 83735 ASSAY OF MAGNESIUM: CPT

## 2018-05-12 PROCEDURE — 85610 PROTHROMBIN TIME: CPT

## 2018-05-12 PROCEDURE — 99900035 HC TECH TIME PER 15 MIN (STAT)

## 2018-05-12 PROCEDURE — 36415 COLL VENOUS BLD VENIPUNCTURE: CPT

## 2018-05-12 PROCEDURE — 25000003 PHARM REV CODE 250: Performed by: PHYSICIAN ASSISTANT

## 2018-05-12 PROCEDURE — 99024 POSTOP FOLLOW-UP VISIT: CPT | Mod: POP,,, | Performed by: PHYSICIAN ASSISTANT

## 2018-05-12 PROCEDURE — 25000003 PHARM REV CODE 250: Performed by: STUDENT IN AN ORGANIZED HEALTH CARE EDUCATION/TRAINING PROGRAM

## 2018-05-12 PROCEDURE — 80053 COMPREHEN METABOLIC PANEL: CPT

## 2018-05-12 PROCEDURE — 84100 ASSAY OF PHOSPHORUS: CPT

## 2018-05-12 PROCEDURE — 20600001 HC STEP DOWN PRIVATE ROOM

## 2018-05-12 PROCEDURE — 63600175 PHARM REV CODE 636 W HCPCS: Performed by: STUDENT IN AN ORGANIZED HEALTH CARE EDUCATION/TRAINING PROGRAM

## 2018-05-12 RX ADMIN — CITALOPRAM HYDROBROMIDE 10 MG: 10 TABLET ORAL at 08:05

## 2018-05-12 RX ADMIN — INSULIN ASPART 5 UNITS: 100 INJECTION, SOLUTION INTRAVENOUS; SUBCUTANEOUS at 05:05

## 2018-05-12 RX ADMIN — INSULIN ASPART 5 UNITS: 100 INJECTION, SOLUTION INTRAVENOUS; SUBCUTANEOUS at 02:05

## 2018-05-12 RX ADMIN — HEPARIN SODIUM 5000 UNITS: 5000 INJECTION, SOLUTION INTRAVENOUS; SUBCUTANEOUS at 10:05

## 2018-05-12 RX ADMIN — INSULIN ASPART 5 UNITS: 100 INJECTION, SOLUTION INTRAVENOUS; SUBCUTANEOUS at 10:05

## 2018-05-12 RX ADMIN — ERGOCALCIFEROL 50000 UNITS: 1.25 CAPSULE ORAL at 08:05

## 2018-05-12 RX ADMIN — HEPARIN SODIUM 5000 UNITS: 5000 INJECTION, SOLUTION INTRAVENOUS; SUBCUTANEOUS at 01:05

## 2018-05-12 RX ADMIN — DILTIAZEM HYDROCHLORIDE 90 MG: 60 TABLET, FILM COATED ORAL at 05:05

## 2018-05-12 RX ADMIN — ATORVASTATIN CALCIUM 20 MG: 20 TABLET, FILM COATED ORAL at 10:05

## 2018-05-12 RX ADMIN — CINACALCET HYDROCHLORIDE 30 MG: 30 TABLET, COATED ORAL at 07:05

## 2018-05-12 RX ADMIN — TAMSULOSIN HYDROCHLORIDE 0.4 MG: 0.4 CAPSULE ORAL at 08:05

## 2018-05-12 RX ADMIN — INSULIN ASPART 5 UNITS: 100 INJECTION, SOLUTION INTRAVENOUS; SUBCUTANEOUS at 08:05

## 2018-05-12 RX ADMIN — HEPARIN SODIUM 5000 UNITS: 5000 INJECTION, SOLUTION INTRAVENOUS; SUBCUTANEOUS at 05:05

## 2018-05-12 RX ADMIN — DILTIAZEM HYDROCHLORIDE 90 MG: 60 TABLET, FILM COATED ORAL at 12:05

## 2018-05-12 RX ADMIN — CHLORHEXIDINE GLUCONATE 15 ML: 1.2 RINSE ORAL at 08:05

## 2018-05-12 RX ADMIN — CHLORHEXIDINE GLUCONATE 15 ML: 1.2 RINSE ORAL at 10:05

## 2018-05-12 RX ADMIN — VALSARTAN 120 MG: 40 TABLET ORAL at 08:05

## 2018-05-12 RX ADMIN — INSULIN ASPART 1 UNITS: 100 INJECTION, SOLUTION INTRAVENOUS; SUBCUTANEOUS at 10:05

## 2018-05-12 RX ADMIN — POLYETHYLENE GLYCOL 3350 17 G: 17 POWDER, FOR SOLUTION ORAL at 08:05

## 2018-05-12 NOTE — PLAN OF CARE
Hospital Medicine Consult Service:   Patient's chart checked for labs, blood sugars and vital signs in past 24 hours. Patient's HTN remains stable on current regimen and would continue on current antihypertensives. Patient;'s blood sugars stable and in target range and would continue current insulin regimen. Labs stable and hypercalcemia improving after Pamidronate given on 5/10. Will continue to follow.    GAYLE CARBALLO MD  Attending Staff Physician   Department of Hospital Medicine, [facility] on [facility]  Pager: [phone]  Spectralink: [phone]

## 2018-05-12 NOTE — PROGRESS NOTES
[facility]  Neurosurgery  Progress Note    Subjective:     History of Present Illness: 82 y.o.  male with type 2 diabetes, who presents today for follow up evaluation one week prior to 2 level ACDF scheduled for 4/10/2018. Pt reports    Pt presents today wearing a cervical collar and in a wheelchair. He would like to proceed with surgery. Per pt's family, his PCP would like to see him on 4/9, the day before surgery. He notes continued neck pain and BUE numbness/tingling as well as BUE weakness. Pt is only able to walk a few steps unassisted. He has never received neck surgery.     Post-Op Info:  Procedure(s) (LRB):  WASHOUT-CERVICAL-wound washout and csf leak repair with depuy (N/A)  DISKECTOMY AND FUSION-ANTERIOR CERVICAL (ACDF)- REVISON C5-C7   14 Days Post-Op     Interval History:  NAEON.  Pt states would like to get out of bed into chair.  Denies new/worsening weakness today.  Tolerating diet.  No new complaints.  Denies F/C/CP/SOB.     Medications:  Continuous Infusions:  Scheduled Meds:   atorvastatin  20 mg Per NG tube Nightly    chlorhexidine  15 mL Mouth/Throat BID    cinacalcet  30 mg Oral Daily with breakfast    citalopram  10 mg Per G Tube Daily    diltiaZEM  90 mg Per G Tube Q6H    ergocalciferol  50,000 Units Per G Tube Daily    [START ON 5/20/2018] ergocalciferol  50,000 Units Per G Tube Q7 Days    heparin (porcine)  5,000 Units Subcutaneous Q8H    insulin aspart U-100  5 Units Subcutaneous Q4H    insulin detemir U-100  18 Units Subcutaneous BID    polyethylene glycol  17 g Per G Tube BID    senna-docusate 8.6-50 mg  1 tablet Per G Tube BID    tamsulosin  0.4 mg Per G Tube Daily    valsartan  120 mg Per G Tube Daily     PRN Meds:acetaminophen, albuterol-ipratropium 2.5mg-0.5mg/3mL, aluminum-magnesium hydroxide-simethicone, dextrose 50%, glucagon (human recombinant), insulin aspart U-100, lidocaine HCL 2%, ondansetron, potassium chloride 10%, potassium chloride 10%,  potassium chloride 10%, potassium, sodium phosphates, potassium, sodium phosphates, potassium, sodium phosphates     Review of Systems  Objective:     Weight: 99.5 kg (219 lb 5.7 oz)  Body mass index is 29.75 kg/m².  Vital Signs (Most Recent):  Temp: 98.6 °F (37 °C) (05/12/18 0745)  Pulse: 90 (05/12/18 1100)  Resp: 17 (05/12/18 0745)  BP: 136/72 (05/12/18 0745)  SpO2: 97 % (05/12/18 0745) Vital Signs (24h Range):  Temp:  [97.6 °F (36.4 °C)-99.4 °F (37.4 °C)] 98.6 °F (37 °C)  Pulse:  [72-92] 90  Resp:  [17-18] 17  SpO2:  [90 %-97 %] 97 %  BP: (115-163)/(54-96) 136/72       Date 05/12/18 0700 - 05/13/18 0659   Shift 1335-2010 8563-2060 2296-7407 24 Hour Total   I  N  T  A  K  E   Shift Total  (mL/kg)       O  U  T  P  U  T   Urine  (mL/kg/hr) 775   775    Shift Total  (mL/kg) 775  (7.8)   775  (7.8)   Weight (kg) 99.5 99.5 99.5 99.5                        Gastrostomy/Enterostomy 05/04/18 1539 Gastrostomy tube w/ balloon LUQ feeding (Active)   Securement anchored to abdomen w/ adhesive device 5/11/2018  8:00 PM   Interventions Prior to Feeding patency checked;residual checked;residual returned 5/11/2018  8:00 PM   Suction Setting/Drainage Method dependent drainage 5/6/2018  8:00 AM   Feeding Type continuous 5/11/2018  8:00 PM   Clamp Status/Tolerance no restlessness 5/11/2018  8:00 PM   Feeding Action feeding continued 5/11/2018  8:00 PM   Dressing dry and intact 5/11/2018  8:00 PM   Insertion Site dry 5/11/2018  8:00 PM   Site Care skin barrier applied;site cleansed w/ sterile normal saline 5/11/2018  7:05 AM   Flush/Irrigation flushed w/;water;no resistance met 5/11/2018  7:05 AM   Current Rate (mL/hr) 70 mL/hr 5/11/2018  8:00 PM   Goal Rate (mL/hr) 70 mL/hr 5/11/2018  8:00 PM   Water Bolus (mL) 180 mL 5/11/2018 11:00 PM   Tube Feeding Intake (mL) 70 5/11/2018  6:05 PM   Residual Amount (ml) 0 ml 5/11/2018  7:05 AM       Male External Urinary Catheter 04/30/18 1600 (Active)   Collection Container Urimeter 5/11/2018   8:00 PM   Securement Method secured to top of thigh w/ adhesive device 5/11/2018  8:00 PM   Skin no redness;no breakdown 5/11/2018  8:00 PM   Tolerance no signs/symptoms of discomfort 5/11/2018  8:00 PM   Output (mL) 775 mL 5/12/2018  7:00 AM   Catheter Change Date 05/11/18 5/11/2018  7:05 AM   Catheter Change Time 0705 5/11/2018  7:05 AM       Neurosurgery Physical Exam   General: well developed, well nourished. no acute distress. Generalized deconditioning   Neurologic: Awake, Alert, sitting in bed    Head: normocephalic, atraumatic   GCS: Motor: 6/Verbal: 5/Eyes: 4 GCS Total: 15  Cranial nerves: face symmetric, tongue midline, pupils equal, round, reactive to light with accomodation, extraocular muscles intact. CN II-XII grossly intact.   Language: no aphasia  Speech:  dysarthria improved compared to pre-wound revision on 4/28  Sensory: decrease response to light touch in BUE  Motor Strength:   Moves all extremities spontaneously with good tone.      Strength   Deltoids Triceps Biceps Wrist Extension Wrist Flexion Hand    Upper: R 2/5 3/5 4-/5 2/5 2/5 3/5     L 3/5 4-/5 4-/5 3/5 3/5 3/5       Iliopsoas Quadriceps Knee  Flexion Tibialis  anterior Gastro- cnemius EHL   Lower: R 3/5 3/5 3/5 4-/5 4-/5 4-/5     L 3/5 3/5 3/5 4/5 4/5 4/5   [place]: absent   Clonus: absent  ENT: normal hearing with finger rub  Lungs:  normal respiratory effort on room air  Abdomen: soft, non-tender and symmetric. G tube site c/d/i  Extremities: warm with no cyanosis, edema, or clubbing.   Pulses: palpable distal pulses  Skin: warm, dry and intact. No visible rashes or lesions.      wound is c/d/i, no erythema, ttp, or drainage.  wound edges are well approximated. No surrounding edema      condom cath in place          Significant Labs:    Recent Labs  Lab 05/11/18  0426 05/12/18  0623   * 187*   * 135*   K 5.3* 4.2    98   CO2 24 29   BUN 39* 35*   CREATININE 1.2 1.1   CALCIUM 10.7* 9.8   MG 2.2 2.0       Recent  Labs  Lab 05/11/18  0426   WBC 9.02   HGB 10.3*   HCT 33.2*          Recent Labs  Lab 05/11/18  0426 05/12/18  0623   INR 1.0 1.1     Microbiology Results (last 7 days)     Procedure Component Value Units Date/Time    Culture, Anaerobe [[id number]] Collected:  04/28/18 1413    Order Status:  Completed Specimen:  Wound from Neck Updated:  05/07/18 0659     Anaerobic Culture No anaerobes isolated    Narrative:       Wound fluid    Culture, Anaerobe [[id number]] Collected:  04/28/18 1417    Order Status:  Completed Specimen:  Wound from Neck Updated:  05/07/18 0659     Anaerobic Culture No anaerobes isolated    Narrative:       Wound fluid            Assessment/Plan:     * Cervical myelopathy with cervical radiculopathy    82 y.o. male s/p C3-5 ACDF for cervical myelopathy on 4/10 with Dr. Hess, and now s/p evacuation/washout of fluid collection with CSF leak repair on 4/28/18 POD 14  -Patient neurologically stable, continued waxing / waning.  Awake this AM requesting to get OOB to chair  -Cervical collar when OOB or with activity, Up to chair as tolerated   -Speech gradually improving since wound revision;still requires deep suctioning to induce cough.   -Repeat CT Head showed stable b/l hygromas  -Appreciate medicine recommendations and participation in care of patient   Patient medically stable from medicine prospective for discharge to SNF.  DM reaching target goal, insulin dosing increased 5/9- Levemir 16 units nightly and Aspart 5 units every 4 hours.  -Hypercalcemia likely related to primary hyperparathyroidism.   -Vit D level low at 8, continue Vit D replacement.   -HTN at target, continue Diltiazem 90 mg every 6 hours.   -TF at goal and tolerating  -ID: continue vanc and ceftriaxone x 7 days from surgery (4/28) Vanc trough before 4th dose, 18. Trough Goal 15-20. Surgical cultures NGTD, completed 5/6/18. UA yesterday with many WBCs, however WBC has trended down and pt is afebrile. CTM for now per  HM.  -CV goal normotension. Continue home meds. PRN meds SBP >160.   -Continue PILI/SCDs/SQH  -Continue bowel regimen daily. Brown bomb 5/3 with +BM  -Continue to hold Eliquis. Per , indications unclear.  does not recommend continuing upon discharge.  -Continue aggressive PT/OT/ST. Discussed with therapy to evaluate patient simultaneously while pt OOB with PT.  -Scheduled CPT q4 hours   -Aggressive oral care per nursing staff daily  -Continue delirium precautions   DAYTIME: blinds up/lights on, TV on, increase stimuli, awake with minimal naps  NIGHTTIME: blinds down, TV off, lights off, decrease any noise/stimuli   Private room to prevent hospital delirium and increase compliance for OOB  -Keep incision open to air. Do not submerge or scrub incision.   -Pending SNF placement, hopeful  For acceptance on Monday   -Discussed with LUIS ALBERTO Zuniga  Neurosurgery  [facility]

## 2018-05-12 NOTE — SUBJECTIVE & OBJECTIVE
Interval History:  NAEON.  Pt states would like to get out of bed into chair.  Denies new/worsening weakness today.  Tolerating diet.  No new complaints.  Denies F/C/CP/SOB.     Medications:  Continuous Infusions:  Scheduled Meds:   atorvastatin  20 mg Per NG tube Nightly    chlorhexidine  15 mL Mouth/Throat BID    cinacalcet  30 mg Oral Daily with breakfast    citalopram  10 mg Per G Tube Daily    diltiaZEM  90 mg Per G Tube Q6H    ergocalciferol  50,000 Units Per G Tube Daily    [START ON 5/20/2018] ergocalciferol  50,000 Units Per G Tube Q7 Days    heparin (porcine)  5,000 Units Subcutaneous Q8H    insulin aspart U-100  5 Units Subcutaneous Q4H    insulin detemir U-100  18 Units Subcutaneous BID    polyethylene glycol  17 g Per G Tube BID    senna-docusate 8.6-50 mg  1 tablet Per G Tube BID    tamsulosin  0.4 mg Per G Tube Daily    valsartan  120 mg Per G Tube Daily     PRN Meds:acetaminophen, albuterol-ipratropium 2.5mg-0.5mg/3mL, aluminum-magnesium hydroxide-simethicone, dextrose 50%, glucagon (human recombinant), insulin aspart U-100, lidocaine HCL 2%, ondansetron, potassium chloride 10%, potassium chloride 10%, potassium chloride 10%, potassium, sodium phosphates, potassium, sodium phosphates, potassium, sodium phosphates     Review of Systems  Objective:     Weight: 99.5 kg (219 lb 5.7 oz)  Body mass index is 29.75 kg/m².  Vital Signs (Most Recent):  Temp: 98.6 °F (37 °C) (05/12/18 0745)  Pulse: 90 (05/12/18 1100)  Resp: 17 (05/12/18 0745)  BP: 136/72 (05/12/18 0745)  SpO2: 97 % (05/12/18 0745) Vital Signs (24h Range):  Temp:  [97.6 °F (36.4 °C)-99.4 °F (37.4 °C)] 98.6 °F (37 °C)  Pulse:  [72-92] 90  Resp:  [17-18] 17  SpO2:  [90 %-97 %] 97 %  BP: (115-163)/(54-96) 136/72       Date 05/12/18 0700 - 05/13/18 0659   Shift 1544-4121 1140-6982 2370-0908 24 Hour Total   I  N  T  A  K  E   Shift Total  (mL/kg)       O  U  T  P  U  T   Urine  (mL/kg/hr) 775   775    Shift Total  (mL/kg) 775  (7.8)    775  (7.8)   Weight (kg) 99.5 99.5 99.5 99.5                        Gastrostomy/Enterostomy 05/04/18 1539 Gastrostomy tube w/ balloon LUQ feeding (Active)   Securement anchored to abdomen w/ adhesive device 5/11/2018  8:00 PM   Interventions Prior to Feeding patency checked;residual checked;residual returned 5/11/2018  8:00 PM   Suction Setting/Drainage Method dependent drainage 5/6/2018  8:00 AM   Feeding Type continuous 5/11/2018  8:00 PM   Clamp Status/Tolerance no restlessness 5/11/2018  8:00 PM   Feeding Action feeding continued 5/11/2018  8:00 PM   Dressing dry and intact 5/11/2018  8:00 PM   Insertion Site dry 5/11/2018  8:00 PM   Site Care skin barrier applied;site cleansed w/ sterile normal saline 5/11/2018  7:05 AM   Flush/Irrigation flushed w/;water;no resistance met 5/11/2018  7:05 AM   Current Rate (mL/hr) 70 mL/hr 5/11/2018  8:00 PM   Goal Rate (mL/hr) 70 mL/hr 5/11/2018  8:00 PM   Water Bolus (mL) 180 mL 5/11/2018 11:00 PM   Tube Feeding Intake (mL) 70 5/11/2018  6:05 PM   Residual Amount (ml) 0 ml 5/11/2018  7:05 AM       Male External Urinary Catheter 04/30/18 1600 (Active)   Collection Container Urimeter 5/11/2018  8:00 PM   Securement Method secured to top of thigh w/ adhesive device 5/11/2018  8:00 PM   Skin no redness;no breakdown 5/11/2018  8:00 PM   Tolerance no signs/symptoms of discomfort 5/11/2018  8:00 PM   Output (mL) 775 mL 5/12/2018  7:00 AM   Catheter Change Date 05/11/18 5/11/2018  7:05 AM   Catheter Change Time 0705 5/11/2018  7:05 AM       Neurosurgery Physical Exam   General: well developed, well nourished. no acute distress. Generalized deconditioning   Neurologic: Awake, Alert, sitting in bed    Head: normocephalic, atraumatic   GCS: Motor: 6/Verbal: 5/Eyes: 4 GCS Total: 15  Cranial nerves: face symmetric, tongue midline, pupils equal, round, reactive to light with accomodation, extraocular muscles intact. CN II-XII grossly intact.   Language: no aphasia  Speech:  dysarthria  improved compared to pre-wound revision on 4/28  Sensory: decrease response to light touch in BUE  Motor Strength:   Moves all extremities spontaneously with good tone.      Strength   Deltoids Triceps Biceps Wrist Extension Wrist Flexion Hand    Upper: R 2/5 3/5 4-/5 2/5 2/5 3/5     L 3/5 4-/5 4-/5 3/5 3/5 3/5       Iliopsoas Quadriceps Knee  Flexion Tibialis  anterior Gastro- cnemius EHL   Lower: R 3/5 3/5 3/5 4-/5 4-/5 4-/5     L 3/5 3/5 3/5 4/5 4/5 4/5   [place]: absent   Clonus: absent  ENT: normal hearing with finger rub  Lungs:  normal respiratory effort on room air  Abdomen: soft, non-tender and symmetric. G tube site c/d/i  Extremities: warm with no cyanosis, edema, or clubbing.   Pulses: palpable distal pulses  Skin: warm, dry and intact. No visible rashes or lesions.      wound is c/d/i, no erythema, ttp, or drainage.  wound edges are well approximated. No surrounding edema      condom cath in place          Significant Labs:    Recent Labs  Lab 05/11/18  0426 05/12/18  0623   * 187*   * 135*   K 5.3* 4.2    98   CO2 24 29   BUN 39* 35*   CREATININE 1.2 1.1   CALCIUM 10.7* 9.8   MG 2.2 2.0       Recent Labs  Lab 05/11/18  0426   WBC 9.02   HGB 10.3*   HCT 33.2*          Recent Labs  Lab 05/11/18  0426 05/12/18  0623   INR 1.0 1.1     Microbiology Results (last 7 days)     Procedure Component Value Units Date/Time    Culture, Anaerobe [[id number]] Collected:  04/28/18 1413    Order Status:  Completed Specimen:  Wound from Neck Updated:  05/07/18 0659     Anaerobic Culture No anaerobes isolated    Narrative:       Wound fluid    Culture, Anaerobe [[id number]] Collected:  04/28/18 1417    Order Status:  Completed Specimen:  Wound from Neck Updated:  05/07/18 0659     Anaerobic Culture No anaerobes isolated    Narrative:       Wound fluid

## 2018-05-12 NOTE — ASSESSMENT & PLAN NOTE
82 y.o. male s/p C3-5 ACDF for cervical myelopathy on 4/10 with Dr. Hess, and now s/p evacuation/washout of fluid collection with CSF leak repair on 4/28/18 POD 14  -Patient neurologically stable, continued waxing / waning.  Awake this AM requesting to get OOB to chair  -Cervical collar when OOB or with activity, Up to chair as tolerated   -Speech gradually improving since wound revision;still requires deep suctioning to induce cough.   -Repeat CT Head showed stable b/l hygromas  -Appreciate medicine recommendations and participation in care of patient   Patient medically stable from medicine prospective for discharge to SNF.  DM reaching target goal, insulin dosing increased 5/9- Levemir 16 units nightly and Aspart 5 units every 4 hours.  -Hypercalcemia likely related to primary hyperparathyroidism.   -Vit D level low at 8, continue Vit D replacement.   -HTN at target, continue Diltiazem 90 mg every 6 hours.   -TF at goal and tolerating  -ID: continue vanc and ceftriaxone x 7 days from surgery (4/28) Vanc trough before 4th dose, 18. Trough Goal 15-20. Surgical cultures NGTD, completed 5/6/18. UA yesterday with many WBCs, however WBC has trended down and pt is afebrile. CTM for now per .  -CV goal normotension. Continue home meds. PRN meds SBP >160.   -Continue PILI/SCDs/SQH  -Continue bowel regimen daily. Brown bomb 5/3 with +BM  -Continue to hold Eliquis. Per , indications unclear.  does not recommend continuing upon discharge.  -Continue aggressive PT/OT/ST. Discussed with therapy to evaluate patient simultaneously while pt OOB with PT.  -Scheduled CPT q4 hours   -Aggressive oral care per nursing staff daily  -Continue delirium precautions   DAYTIME: blinds up/lights on, TV on, increase stimuli, awake with minimal naps  NIGHTTIME: blinds down, TV off, lights off, decrease any noise/stimuli   Private room to prevent hospital delirium and increase compliance for OOB  -Keep incision open to air. Do not  submerge or scrub incision.   -Pending SNF placement, hopeful  For acceptance on Monday   -Discussed with Dr. Hess

## 2018-05-13 LAB
ALBUMIN SERPL BCP-MCNC: 1.9 G/DL
ALP SERPL-CCNC: 74 U/L
ALT SERPL W/O P-5'-P-CCNC: 12 U/L
ANION GAP SERPL CALC-SCNC: 6 MMOL/L
AST SERPL-CCNC: 14 U/L
BILIRUB SERPL-MCNC: 0.3 MG/DL
BUN SERPL-MCNC: 36 MG/DL
CALCIUM SERPL-MCNC: 9.4 MG/DL
CHLORIDE SERPL-SCNC: 102 MMOL/L
CO2 SERPL-SCNC: 29 MMOL/L
CREAT SERPL-MCNC: 1.2 MG/DL
EST. GFR  (AFRICAN AMERICAN): >60 ML/MIN/1.73 M^2
EST. GFR  (NON AFRICAN AMERICAN): 56.8 ML/MIN/1.73 M^2
GLUCOSE SERPL-MCNC: 131 MG/DL
INR PPP: 1.1
MAGNESIUM SERPL-MCNC: 1.9 MG/DL
PHOSPHATE SERPL-MCNC: 2.4 MG/DL
POCT GLUCOSE: 112 MG/DL (ref 70–110)
POCT GLUCOSE: 118 MG/DL (ref 70–110)
POCT GLUCOSE: 143 MG/DL (ref 70–110)
POCT GLUCOSE: 163 MG/DL (ref 70–110)
POCT GLUCOSE: 189 MG/DL (ref 70–110)
POCT GLUCOSE: 200 MG/DL (ref 70–110)
POTASSIUM SERPL-SCNC: 4.5 MMOL/L
PROT SERPL-MCNC: 5.6 G/DL
PROTHROMBIN TIME: 11.4 SEC
SODIUM SERPL-SCNC: 137 MMOL/L

## 2018-05-13 PROCEDURE — 25000003 PHARM REV CODE 250: Performed by: PHYSICIAN ASSISTANT

## 2018-05-13 PROCEDURE — 94668 MNPJ CHEST WALL SBSQ: CPT

## 2018-05-13 PROCEDURE — 99900035 HC TECH TIME PER 15 MIN (STAT)

## 2018-05-13 PROCEDURE — 97530 THERAPEUTIC ACTIVITIES: CPT

## 2018-05-13 PROCEDURE — 25000003 PHARM REV CODE 250: Performed by: STUDENT IN AN ORGANIZED HEALTH CARE EDUCATION/TRAINING PROGRAM

## 2018-05-13 PROCEDURE — 85610 PROTHROMBIN TIME: CPT

## 2018-05-13 PROCEDURE — 83735 ASSAY OF MAGNESIUM: CPT

## 2018-05-13 PROCEDURE — 36415 COLL VENOUS BLD VENIPUNCTURE: CPT

## 2018-05-13 PROCEDURE — 20600001 HC STEP DOWN PRIVATE ROOM

## 2018-05-13 PROCEDURE — 25000003 PHARM REV CODE 250: Performed by: INTERNAL MEDICINE

## 2018-05-13 PROCEDURE — 80053 COMPREHEN METABOLIC PANEL: CPT

## 2018-05-13 PROCEDURE — 63600175 PHARM REV CODE 636 W HCPCS: Performed by: STUDENT IN AN ORGANIZED HEALTH CARE EDUCATION/TRAINING PROGRAM

## 2018-05-13 PROCEDURE — 84100 ASSAY OF PHOSPHORUS: CPT

## 2018-05-13 RX ADMIN — DILTIAZEM HYDROCHLORIDE 90 MG: 60 TABLET, FILM COATED ORAL at 11:05

## 2018-05-13 RX ADMIN — DILTIAZEM HYDROCHLORIDE 90 MG: 60 TABLET, FILM COATED ORAL at 06:05

## 2018-05-13 RX ADMIN — TAMSULOSIN HYDROCHLORIDE 0.4 MG: 0.4 CAPSULE ORAL at 08:05

## 2018-05-13 RX ADMIN — INSULIN ASPART 5 UNITS: 100 INJECTION, SOLUTION INTRAVENOUS; SUBCUTANEOUS at 01:05

## 2018-05-13 RX ADMIN — CITALOPRAM HYDROBROMIDE 10 MG: 10 TABLET ORAL at 08:05

## 2018-05-13 RX ADMIN — ACETAMINOPHEN 650 MG: 325 TABLET ORAL at 01:05

## 2018-05-13 RX ADMIN — HEPARIN SODIUM 5000 UNITS: 5000 INJECTION, SOLUTION INTRAVENOUS; SUBCUTANEOUS at 05:05

## 2018-05-13 RX ADMIN — HEPARIN SODIUM 5000 UNITS: 5000 INJECTION, SOLUTION INTRAVENOUS; SUBCUTANEOUS at 11:05

## 2018-05-13 RX ADMIN — INSULIN ASPART 5 UNITS: 100 INJECTION, SOLUTION INTRAVENOUS; SUBCUTANEOUS at 05:05

## 2018-05-13 RX ADMIN — ERGOCALCIFEROL 50000 UNITS: 1.25 CAPSULE ORAL at 08:05

## 2018-05-13 RX ADMIN — INSULIN ASPART 5 UNITS: 100 INJECTION, SOLUTION INTRAVENOUS; SUBCUTANEOUS at 06:05

## 2018-05-13 RX ADMIN — DILTIAZEM HYDROCHLORIDE 90 MG: 60 TABLET, FILM COATED ORAL at 01:05

## 2018-05-13 RX ADMIN — INSULIN ASPART 5 UNITS: 100 INJECTION, SOLUTION INTRAVENOUS; SUBCUTANEOUS at 08:05

## 2018-05-13 RX ADMIN — ATORVASTATIN CALCIUM 20 MG: 20 TABLET, FILM COATED ORAL at 11:05

## 2018-05-13 RX ADMIN — POLYETHYLENE GLYCOL 3350 17 G: 17 POWDER, FOR SOLUTION ORAL at 08:05

## 2018-05-13 RX ADMIN — VALSARTAN 120 MG: 40 TABLET ORAL at 08:05

## 2018-05-13 RX ADMIN — HEPARIN SODIUM 5000 UNITS: 5000 INJECTION, SOLUTION INTRAVENOUS; SUBCUTANEOUS at 01:05

## 2018-05-13 RX ADMIN — DILTIAZEM HYDROCHLORIDE 90 MG: 60 TABLET, FILM COATED ORAL at 05:05

## 2018-05-13 RX ADMIN — INSULIN ASPART 5 UNITS: 100 INJECTION, SOLUTION INTRAVENOUS; SUBCUTANEOUS at 11:05

## 2018-05-13 RX ADMIN — CHLORHEXIDINE GLUCONATE 15 ML: 1.2 RINSE ORAL at 08:05

## 2018-05-13 RX ADMIN — CHLORHEXIDINE GLUCONATE 15 ML: 1.2 RINSE ORAL at 11:05

## 2018-05-13 RX ADMIN — STANDARDIZED SENNA CONCENTRATE AND DOCUSATE SODIUM 1 TABLET: 8.6; 5 TABLET, FILM COATED ORAL at 08:05

## 2018-05-13 NOTE — PLAN OF CARE
Problem: Occupational Therapy Goal  Goal: Occupational Therapy Goal  Goals to be met by: 5/15  Patient will increase functional independence with ADLs by performing:    Squat pivot t/f to wheelchair with total(A).  Supine<>sit ; sit<>supine with Moderate Assistance.  Grooming while seated EOB with minimal assistance to wash face.  MET  *revised: with set up for grooming and min(A) for postural control  Pt will tolerate EOB activity ~15 min duration with min(A) for postural control.   Family/caregiver demo understanding for ROM and positioning for B UE.     Pt is making progress towards goals.    MADDIE Farooq  5/13/2018

## 2018-05-13 NOTE — PT/OT/SLP PROGRESS
"Occupational Therapy   Treatment    Name: Hollis Pitts  MRN: [id number]  Admitting Diagnosis:  Cervical myelopathy with cervical radiculopathy  15 Days Post-Op    Recommendations:     Discharge Recommendations: nursing facility, skilled  Discharge Equipment Recommendations:  hospital bed, lift device  Barriers to discharge:  Decreased caregiver support (requires increased level of skilled assist at this time)    Subjective     Several times during session pt stated, "I want to lie down."  OT educated pt on importance of participating with therapy in EOB/OOB activity and provided encouragement.  With encouragement pt agreeable to participating in therapy.    Communicated with: RN prior to session.  Pain/Comfort:  · Pain Rating 1: 0/10  · Pain Rating Post-Intervention 1: 0/10    Patients cultural, spiritual, Religion conflicts given the current situation: None reported    Objective:     Patient found with: cervical collar, peripheral IV, PEG Tube, telemetry, SCD.  Therapy tech (Namita) assisted with session.    General Precautions: Standard, aspiration, fall, NPO   Orthopedic Precautions:spinal precautions   Braces: Aspen collar     Occupational Performance:    Bed Mobility:    · Patient completed Rolling/Turning to Left with  maximal assistance  · Patient completed Scooting/Bridging with maximal assistance  · Patient completed Supine to Sit with maximal assistance  · Patient completed Sit to Supine with maximal assistance     Functional Mobility/Transfers:  · Pt declined activity this date 2* feeling fatigued.    Activities of Daily Living:  · LB Dressing: total assistance for adjusting sock while seated EOB.    Patient left HOB elevated with all lines intact, call button in reach and bed alarm on    AMPAC 6 Click:  AMPA Total Score: 11    Treatment & Education:  *Pt practiced scooting forward towards EOB x 4 trials.    *Pt sat EOB for ~18 minutes with Mod A-Max A required to maintain upright position with L " lateral lean noted.  First portion of session focused on helping pt achieve full upright position and adjust posture by activating core muscles and utilizing LUE to push upwards back to center.  Tactile and verbal cues given with fair follow through.    *Pt reached forward with LUE to touch hand of therapist placed near shoulder height: 1 set x 10 reps.  Cues required to return hand to bed each rep.  *Pt reached forward with RUE to touch hand of therapist: 1 set x 10 reps with Max A.  Pt initiated minimal movement.   *Pt performed 2 UE exercises to promote increased endurance needed for ADLs: 2 sets x 10 reps with Min A-Mod A provided for LUE  -Shoulder flexion  -Chest press  *Pt performed seated sit ups to address postural control and core strength.  Pt placed into a slightly leaned back position then grasped onto hands of therapist and utilized core and UE strength to pull body forwards towards OT: 1 set x 10 reps.  Min A-Mod A required.  *Education provided on importance of participating in EOB activity and participating with therapy.  *POC reviewed with pt.  Education:    Assessment:     Hollis Pitts is a 82 y.o. male with a medical diagnosis of Cervical myelopathy with cervical radiculopathy.  He presents with the following performance deficits affecting function are weakness, impaired endurance, impaired self care skills, impaired functional mobilty, impaired balance, decreased upper extremity function, decreased lower extremity function, decreased safety awareness, decreased ROM, impaired cardiopulmonary response to activity.  While seated EOB pt displayed L lateral lean requiring Min A-Mod A to correct and achieve full upright position.  Pt demonstrated improvement with level of alertness and was able to initiate more movement in (L) UE than previous sessions.  Pt continues to demonstrate decreased ROM and strength in RUE; however, displayed increased activity tolerance during bilateral UE exercises.   Encouragement required throughout therapy 2* pt expressing fatigue.  Pt is making progress towards goals and would continue to benefit from skilled OT services to address problems listed below and increase independence with ADLs.      Rehab Prognosis:  Good; patient would benefit from acute skilled OT services to address these deficits and reach maximum level of function.       Plan:     Patient to be seen 3 x/week to address the above listed problems via self-care/home management, therapeutic activities, therapeutic exercises, neuromuscular re-education, cognitive retraining  · Plan of Care Expires: 05/24/18  · Plan of Care Reviewed with: patient    This Plan of care has been discussed with the patient who was involved in its development and understands and is in agreement with the identified goals and treatment plan    GOALS:    Occupational Therapy Goals        Problem: Occupational Therapy Goal    Goal Priority Disciplines Outcome Interventions   Occupational Therapy Goal     OT, PT/OT Ongoing (interventions implemented as appropriate)    Description:  Goals to be met by: 5/15  Patient will increase functional independence with ADLs by performing:    Squat pivot t/f to wheelchair with total(A).  Supine<>sit ; sit<>supine with Moderate Assistance.  Grooming while seated EOB with minimal assistance to wash face.  MET  *revised: with set up for grooming and min(A) for postural control  Pt will tolerate EOB activity ~15 min duration with min(A) for postural control.   Family/caregiver demo understanding for ROM and positioning for B UE.                    Time Tracking:     OT Date of Treatment: 05/13/18  OT Start Time: 1413  OT Stop Time: 1436  OT Total Time (min): 23 min    Billable Minutes:Therapeutic Activity 23    MADDIE Farooq  5/13/2018

## 2018-05-13 NOTE — PLAN OF CARE
Hospital Medicine Consult Service:   Patient's chart checked for labs, blood sugars and vital signs in past 24 hours. Patient's HTN remains stable on current regimen and would continue on current antihypertensives. Patient's blood sugars stable and in target range and would continue current insulin regimen. Labs stable and hypercalcemia improving after Pamidronate given on 5/10. Will continue to follow.    GAYLE CARBALLO MD  Attending Staff Physician   Department of Hospital Medicine, [facility]  Pager: [phone]  Spectralink: [phone]

## 2018-05-13 NOTE — PROGRESS NOTES
Wound care complete at this time. Patient given bath by Nurse. Mepalex/Aquacell applied to wound after being cleaned by N.S.  Tube Feeding bag changed/ Residual 0/ Resume feedings at 70cc/hr. No further

## 2018-05-14 PROBLEM — J69.0 ASPIRATION PNEUMONITIS: Status: ACTIVE | Noted: 2018-05-14

## 2018-05-14 PROBLEM — E63.9 INADEQUATE DIETARY ENERGY INTAKE: Status: RESOLVED | Noted: 2018-04-22 | Resolved: 2018-05-14

## 2018-05-14 LAB
ALBUMIN SERPL BCP-MCNC: 2.3 G/DL
ALP SERPL-CCNC: 85 U/L
ALT SERPL W/O P-5'-P-CCNC: 15 U/L
ANION GAP SERPL CALC-SCNC: 8 MMOL/L
AST SERPL-CCNC: 18 U/L
BASOPHILS # BLD AUTO: 0.03 K/UL
BASOPHILS NFR BLD: 0.3 %
BILIRUB SERPL-MCNC: 0.4 MG/DL
BUN SERPL-MCNC: 36 MG/DL
CALCIUM SERPL-MCNC: 9.7 MG/DL
CHLORIDE SERPL-SCNC: 102 MMOL/L
CO2 SERPL-SCNC: 25 MMOL/L
CREAT SERPL-MCNC: 1.2 MG/DL
DIFFERENTIAL METHOD: ABNORMAL
EOSINOPHIL # BLD AUTO: 0.1 K/UL
EOSINOPHIL NFR BLD: 0.7 %
ERYTHROCYTE [DISTWIDTH] IN BLOOD BY AUTOMATED COUNT: 15.2 %
EST. GFR  (AFRICAN AMERICAN): >60 ML/MIN/1.73 M^2
EST. GFR  (NON AFRICAN AMERICAN): 56.8 ML/MIN/1.73 M^2
GLUCOSE SERPL-MCNC: 181 MG/DL
HCT VFR BLD AUTO: 33.3 %
HGB BLD-MCNC: 10.6 G/DL
IMM GRANULOCYTES # BLD AUTO: 0.05 K/UL
IMM GRANULOCYTES NFR BLD AUTO: 0.5 %
LYMPHOCYTES # BLD AUTO: 2.6 K/UL
LYMPHOCYTES NFR BLD: 26.6 %
MCH RBC QN AUTO: 30.7 PG
MCHC RBC AUTO-ENTMCNC: 31.8 G/DL
MCV RBC AUTO: 97 FL
MONOCYTES # BLD AUTO: 1.3 K/UL
MONOCYTES NFR BLD: 13 %
NEUTROPHILS # BLD AUTO: 5.7 K/UL
NEUTROPHILS NFR BLD: 58.9 %
NRBC BLD-RTO: 0 /100 WBC
PLATELET # BLD AUTO: 291 K/UL
PMV BLD AUTO: 10.1 FL
POCT GLUCOSE: 165 MG/DL (ref 70–110)
POCT GLUCOSE: 179 MG/DL (ref 70–110)
POCT GLUCOSE: 188 MG/DL (ref 70–110)
POCT GLUCOSE: 201 MG/DL (ref 70–110)
POCT GLUCOSE: 208 MG/DL (ref 70–110)
POCT GLUCOSE: 209 MG/DL (ref 70–110)
POTASSIUM SERPL-SCNC: 5.1 MMOL/L
PROT SERPL-MCNC: 6.8 G/DL
RBC # BLD AUTO: 3.45 M/UL
SODIUM SERPL-SCNC: 135 MMOL/L
WBC # BLD AUTO: 9.76 K/UL

## 2018-05-14 PROCEDURE — 36415 COLL VENOUS BLD VENIPUNCTURE: CPT

## 2018-05-14 PROCEDURE — 82803 BLOOD GASES ANY COMBINATION: CPT

## 2018-05-14 PROCEDURE — 99900035 HC TECH TIME PER 15 MIN (STAT)

## 2018-05-14 PROCEDURE — 85025 COMPLETE CBC W/AUTO DIFF WBC: CPT

## 2018-05-14 PROCEDURE — 94640 AIRWAY INHALATION TREATMENT: CPT

## 2018-05-14 PROCEDURE — 94761 N-INVAS EAR/PLS OXIMETRY MLT: CPT

## 2018-05-14 PROCEDURE — 99232 SBSQ HOSP IP/OBS MODERATE 35: CPT | Mod: GC,,, | Performed by: INTERNAL MEDICINE

## 2018-05-14 PROCEDURE — 99024 POSTOP FOLLOW-UP VISIT: CPT | Mod: POP,,, | Performed by: PHYSICIAN ASSISTANT

## 2018-05-14 PROCEDURE — 63600175 PHARM REV CODE 636 W HCPCS: Performed by: STUDENT IN AN ORGANIZED HEALTH CARE EDUCATION/TRAINING PROGRAM

## 2018-05-14 PROCEDURE — 25000003 PHARM REV CODE 250: Performed by: INTERNAL MEDICINE

## 2018-05-14 PROCEDURE — 36600 WITHDRAWAL OF ARTERIAL BLOOD: CPT

## 2018-05-14 PROCEDURE — 20600001 HC STEP DOWN PRIVATE ROOM

## 2018-05-14 PROCEDURE — 94668 MNPJ CHEST WALL SBSQ: CPT

## 2018-05-14 PROCEDURE — 63600175 PHARM REV CODE 636 W HCPCS: Performed by: PHYSICIAN ASSISTANT

## 2018-05-14 PROCEDURE — 25000003 PHARM REV CODE 250: Performed by: PHYSICIAN ASSISTANT

## 2018-05-14 PROCEDURE — 80053 COMPREHEN METABOLIC PANEL: CPT

## 2018-05-14 PROCEDURE — 25000242 PHARM REV CODE 250 ALT 637 W/ HCPCS: Performed by: PHYSICIAN ASSISTANT

## 2018-05-14 RX ORDER — INSULIN ASPART 100 [IU]/ML
6 INJECTION, SOLUTION INTRAVENOUS; SUBCUTANEOUS EVERY 4 HOURS
Status: DISCONTINUED | OUTPATIENT
Start: 2018-05-14 | End: 2018-05-14

## 2018-05-14 RX ORDER — VANCOMYCIN/0.9 % SOD CHLORIDE 1 G/100 ML
1000 PLASTIC BAG, INJECTION (ML) INTRAVENOUS
Status: DISCONTINUED | OUTPATIENT
Start: 2018-05-14 | End: 2018-05-15

## 2018-05-14 RX ORDER — IPRATROPIUM BROMIDE AND ALBUTEROL SULFATE 2.5; .5 MG/3ML; MG/3ML
3 SOLUTION RESPIRATORY (INHALATION) EVERY 4 HOURS
Status: DISCONTINUED | OUTPATIENT
Start: 2018-05-14 | End: 2018-05-18 | Stop reason: HOSPADM

## 2018-05-14 RX ORDER — CEFTRIAXONE 1 G/1
1 INJECTION, POWDER, FOR SOLUTION INTRAMUSCULAR; INTRAVENOUS
Status: DISCONTINUED | OUTPATIENT
Start: 2018-05-14 | End: 2018-05-15

## 2018-05-14 RX ORDER — LABETALOL HYDROCHLORIDE 5 MG/ML
10 INJECTION, SOLUTION INTRAVENOUS EVERY 6 HOURS PRN
Status: DISCONTINUED | OUTPATIENT
Start: 2018-05-14 | End: 2018-05-18 | Stop reason: HOSPADM

## 2018-05-14 RX ORDER — INSULIN ASPART 100 [IU]/ML
6 INJECTION, SOLUTION INTRAVENOUS; SUBCUTANEOUS EVERY 4 HOURS
Status: DISCONTINUED | OUTPATIENT
Start: 2018-05-15 | End: 2018-05-15

## 2018-05-14 RX ORDER — HYDRALAZINE HYDROCHLORIDE 20 MG/ML
10 INJECTION INTRAMUSCULAR; INTRAVENOUS EVERY 6 HOURS PRN
Status: DISCONTINUED | OUTPATIENT
Start: 2018-05-14 | End: 2018-05-18 | Stop reason: HOSPADM

## 2018-05-14 RX ORDER — PROCHLORPERAZINE EDISYLATE 5 MG/ML
2.5 INJECTION INTRAMUSCULAR; INTRAVENOUS ONCE
Status: COMPLETED | OUTPATIENT
Start: 2018-05-14 | End: 2018-05-14

## 2018-05-14 RX ADMIN — DILTIAZEM HYDROCHLORIDE 90 MG: 60 TABLET, FILM COATED ORAL at 05:05

## 2018-05-14 RX ADMIN — PROCHLORPERAZINE EDISYLATE 2.5 MG: 5 INJECTION INTRAMUSCULAR; INTRAVENOUS at 06:05

## 2018-05-14 RX ADMIN — INSULIN ASPART 2 UNITS: 100 INJECTION, SOLUTION INTRAVENOUS; SUBCUTANEOUS at 05:05

## 2018-05-14 RX ADMIN — LABETALOL HYDROCHLORIDE 10 MG: 5 INJECTION, SOLUTION INTRAVENOUS at 05:05

## 2018-05-14 RX ADMIN — HEPARIN SODIUM 5000 UNITS: 5000 INJECTION, SOLUTION INTRAVENOUS; SUBCUTANEOUS at 05:05

## 2018-05-14 RX ADMIN — CEFTRIAXONE SODIUM 1 G: 1 INJECTION, POWDER, FOR SOLUTION INTRAMUSCULAR; INTRAVENOUS at 10:05

## 2018-05-14 RX ADMIN — INSULIN ASPART 5 UNITS: 100 INJECTION, SOLUTION INTRAVENOUS; SUBCUTANEOUS at 05:05

## 2018-05-14 RX ADMIN — IPRATROPIUM BROMIDE AND ALBUTEROL SULFATE 3 ML: .5; 3 SOLUTION RESPIRATORY (INHALATION) at 11:05

## 2018-05-14 RX ADMIN — SODIUM CHLORIDE 1 G: 9 INJECTION, SOLUTION INTRAVENOUS at 10:05

## 2018-05-14 RX ADMIN — IPRATROPIUM BROMIDE AND ALBUTEROL SULFATE 3 ML: .5; 3 SOLUTION RESPIRATORY (INHALATION) at 07:05

## 2018-05-14 RX ADMIN — HEPARIN SODIUM 5000 UNITS: 5000 INJECTION, SOLUTION INTRAVENOUS; SUBCUTANEOUS at 01:05

## 2018-05-14 RX ADMIN — HEPARIN SODIUM 5000 UNITS: 5000 INJECTION, SOLUTION INTRAVENOUS; SUBCUTANEOUS at 10:05

## 2018-05-14 NOTE — PT/OT/SLP PROGRESS
Speech Language Pathology      Hollis Pitts  MRN: [id number]    SLP attempted to see pt this AM. However, patient not seen today secondary to Nursing hold. RN informed SLP that MD team ordered strict NPO for pt 2/2 recent aspiration episodes from vomiting tube feeds and ongoing assessment by team.  SLP will follow-up next date.    VALENTINO Hudson., CF-SLP  Speech-Language Pathologist   5/14/2018

## 2018-05-14 NOTE — SUBJECTIVE & OBJECTIVE
Interval History: Pt had aspiration event this am after vomiting. Now on 2L nasal cannula.  Medicine following. Pt c/o increased coughing.  Sitting up in bed. Family at bedside. Denies numbness/tingling, increased weakness, or fever/chills. Condom catheter in place.     Medications:  Continuous Infusions:  Scheduled Meds:   atorvastatin  20 mg Per NG tube Nightly    chlorhexidine  15 mL Mouth/Throat BID    cinacalcet  30 mg Oral Daily with breakfast    citalopram  10 mg Per G Tube Daily    diltiaZEM  90 mg Per G Tube Q6H    [START ON 5/20/2018] ergocalciferol  50,000 Units Per G Tube Q7 Days    heparin (porcine)  5,000 Units Subcutaneous Q8H    [START ON 5/15/2018] insulin aspart U-100  6 Units Subcutaneous Q4H    insulin detemir U-100  18 Units Subcutaneous BID    polyethylene glycol  17 g Per G Tube BID    senna-docusate 8.6-50 mg  1 tablet Per G Tube BID    tamsulosin  0.4 mg Per G Tube Daily    valsartan  120 mg Per G Tube Daily     PRN Meds:acetaminophen, albuterol-ipratropium 2.5mg-0.5mg/3mL, aluminum-magnesium hydroxide-simethicone, dextrose 50%, glucagon (human recombinant), insulin aspart U-100, lidocaine HCL 2%, ondansetron     Review of Systems  Objective:     Weight: 99.5 kg (219 lb 5.7 oz)  Body mass index is 29.75 kg/m².  Vital Signs (Most Recent):  Temp: (!) 102.1 °F (38.9 °C) (05/14/18 1236)  Pulse: (!) 127 (05/14/18 1236)  Resp: 18 (05/14/18 1236)  BP: (!) 152/75 (05/14/18 1236)  SpO2: (!) 93 % (05/14/18 1236) Vital Signs (24h Range):  Temp:  [97.1 °F (36.2 °C)-102.1 °F (38.9 °C)] 102.1 °F (38.9 °C)  Pulse:  [] 127  Resp:  [16-22] 18  SpO2:  [93 %-97 %] 93 %  BP: (115-173)/(55-82) 152/75                           Gastrostomy/Enterostomy 05/04/18 1539 Gastrostomy tube w/ balloon LUQ feeding (Active)   Securement anchored to abdomen w/ adhesive device 5/13/2018  8:00 PM   Interventions Prior to Feeding patency checked;residual checked;residual returned 5/13/2018  8:00 PM   Suction  Setting/Drainage Method dependent drainage 5/6/2018  8:00 AM   Feeding Type continuous 5/13/2018  8:00 PM   Clamp Status/Tolerance no restlessness 5/13/2018  8:00 PM   Feeding Action feeding continued 5/13/2018  8:00 PM   Dressing dry and intact 5/13/2018  8:00 PM   Insertion Site dry 5/12/2018  8:03 PM   Site Care outer bumper rotated;sterile precut T-slit dressing applied 5/12/2018  8:03 PM   Flush/Irrigation flushed w/;water;no resistance met 5/12/2018  8:03 PM   Current Rate (mL/hr) 70 mL/hr 5/13/2018  7:20 AM   Goal Rate (mL/hr) 70 mL/hr 5/13/2018  7:20 AM   Water Bolus (mL) 160 mL 5/12/2018 11:52 PM   Tube Feeding Intake (mL) 70 5/13/2018  8:00 PM   Residual Amount (ml) 0 ml 5/13/2018  7:20 AM       Male External Urinary Catheter 04/30/18 1600 (Active)   Collection Container Standard drainage bag 5/13/2018  8:00 PM   Securement Method secured to top of thigh w/ adhesive device 5/13/2018  8:00 PM   Skin no breakdown 5/12/2018  8:03 PM   Tolerance no signs/symptoms of discomfort 5/12/2018  8:03 PM   Output (mL) 900 mL 5/13/2018  1:00 PM   Catheter Change Date 05/11/18 5/11/2018  7:05 AM   Catheter Change Time 0705 5/11/2018  7:05 AM       Neurosurgery Physical Exam     General: well developed, well nourished. no acute distress. Generalized deconditioning   Neurologic: Awake, Alert, sitting in bed. Oriented to person and place.   Head: normocephalic, atraumatic   GCS: Motor: 6/Verbal: 5/Eyes: 4 GCS Total: 15  Cranial nerves: face symmetric, tongue midline, pupils equal, round, reactive to light with accomodation, extraocular muscles intact. CN II-XII grossly intact.   Language: no aphasia  Speech:  dysarthria improved compared to pre-wound revision on 4/28  Sensory: decrease response to light touch in BUE  Motor Strength:   Moves all extremities spontaneously with good tone. Strength exam effort limited.     Strength   Deltoids Triceps Biceps Wrist Extension Wrist Flexion Hand    Upper: R 2/5 3/5 3/5 2/5 2/5  3/5     L 3/5 3/5 3/5 3/5 3/5 3/5       Iliopsoas Quadriceps Knee  Flexion Tibialis  anterior Gastro- cnemius EHL   Lower: R 3/5 3/5 3/5 4-/5 4-/5 4-/5     L 3/5 3/5 3/5 4/5 4/5 4/5     [place]: absent  Clonus: absent  Lungs:  normal respiratory effort on 2L nasal cannula. Productive cough.   Abdomen: soft, non-tender and symmetric. G tube site c/d/i  Extremities: warm with no cyanosis, edema, or clubbing.   Pulses: palpable distal pulses  Skin: warm, dry and intact. No visible rashes or lesions.   Incision c/d/i, no erythema, edema, or drainage.   C-collar in place    Significant Labs:    Recent Labs  Lab 05/13/18  0449 05/14/18  0430   * 181*    135*   K 4.5 5.1    102   CO2 29 25   BUN 36* 36*   CREATININE 1.2 1.2   CALCIUM 9.4 9.7   MG 1.9  --        Recent Labs  Lab 05/14/18  0430   WBC 9.76   HGB 10.6*   HCT 33.3*          Recent Labs  Lab 05/13/18  0449   INR 1.1     Microbiology Results (last 7 days)     ** No results found for the last 168 hours. **        Recent Lab Results       05/14/18  1201 05/14/18  0923 05/14/18  0558 05/14/18  0430 05/13/18  2337      Immature Granulocytes    0.5      Immature Grans (Abs)    0.05  Comment:  Mild elevation in immature granulocytes is non specific and   can be seen in a variety of conditions including stress response,   acute inflammation, trauma and pregnancy. Correlation with other   laboratory and clinical findings is essential.  (H)      Albumin    2.3(L)      Alkaline Phosphatase    85      ALT    15      Anion Gap    8      AST    18      Baso #    0.03      Basophil%    0.3      Total Bilirubin    0.4  Comment:  For infants and newborns, interpretation of results should be based  on gestational age, weight and in agreement with clinical  observations.  Premature Infant recommended reference ranges:  Up to 24 hours.............<8.0 mg/dL  Up to 48 hours............<12.0 mg/dL  3-5 days..................<15.0 mg/dL  6-29  days.................<15.0 mg/dL        BUN, Bld    36(H)      Calcium    9.7      Chloride    102      CO2    25      Creatinine    1.2      Differential Method    Automated      eGFR if     >60.0      eGFR if non     56.8  Comment:  Calculation used to obtain the estimated glomerular filtration  rate (eGFR) is the CKD-EPI equation.   (A)      Eos #    0.1      Eosinophil%    0.7      Glucose    181(H)      Gran # (ANC)    5.7      Gran%    58.9      Hematocrit    33.3(L)      Hemoglobin    10.6(L)      Lymph #    2.6      Lymph%    26.6      MCH    30.7      MCHC    31.8(L)      MCV    97      Mono #    1.3(H)      Mono%    13.0      MPV    10.1      nRBC    0      Platelets    291      POCT Glucose 208(H) 188(H) 209(H)  189(H)     Potassium    5.1      Total Protein    6.8      RBC    3.45(L)      RDW    15.2(H)      Sodium    135(L)      WBC    9.76                  05/13/18  1815      Immature Granulocytes      Immature Grans (Abs)      Albumin      Alkaline Phosphatase      ALT      Anion Gap      AST      Baso #      Basophil%      Total Bilirubin      BUN, Bld      Calcium      Chloride      CO2      Creatinine      Differential Method      eGFR if       eGFR if non       Eos #      Eosinophil%      Glucose      Gran # (ANC)      Gran%      Hematocrit      Hemoglobin      Lymph #      Lymph%      MCH      MCHC      MCV      Mono #      Mono%      MPV      nRBC      Platelets      POCT Glucose 200(H)     Potassium      Total Protein      RBC      RDW      Sodium      WBC          All pertinent labs from the last 24 hours have been reviewed.    Significant Diagnostics:    CXR 5/14/17: Postoperative changes in the cervical spine as before.  Heart size is normal.  Accentuation of the basilar interstitial markings and small subsegmental atelectatic changes at the lung bases.  The upper lung fields are clear    XR Abdomen 5/14/18:   I have reviewed  all pertinent imaging results/findings within the past 24 hours.

## 2018-05-14 NOTE — PLAN OF CARE
Problem: Patient Care Overview  Goal: Plan of Care Review  Recommendations    1. Continue Diabetisource @ 70mL/hr, as tolerated.   2. If able to advance diet, recommend 2000 kcal ADA diet (texture per SLP).     RD to monitor.     Goals: Meet >90% EEN/EPN from TFs  Nutrition Goal Status:  (Continues)

## 2018-05-14 NOTE — SUBJECTIVE & OBJECTIVE
Interval History:  NAEON.  .  Denies F/C/CP/SOB.     Medications:  Continuous Infusions:  Scheduled Meds:   atorvastatin  20 mg Per NG tube Nightly    chlorhexidine  15 mL Mouth/Throat BID    cinacalcet  30 mg Oral Daily with breakfast    citalopram  10 mg Per G Tube Daily    diltiaZEM  90 mg Per G Tube Q6H    [START ON 5/20/2018] ergocalciferol  50,000 Units Per G Tube Q7 Days    heparin (porcine)  5,000 Units Subcutaneous Q8H    insulin aspart U-100  5 Units Subcutaneous Q4H    insulin detemir U-100  18 Units Subcutaneous BID    polyethylene glycol  17 g Per G Tube BID    senna-docusate 8.6-50 mg  1 tablet Per G Tube BID    tamsulosin  0.4 mg Per G Tube Daily    valsartan  120 mg Per G Tube Daily     PRN Meds:acetaminophen, albuterol-ipratropium 2.5mg-0.5mg/3mL, aluminum-magnesium hydroxide-simethicone, dextrose 50%, glucagon (human recombinant), insulin aspart U-100, lidocaine HCL 2%, ondansetron     Review of Systems    Objective:     Weight: 99.5 kg (219 lb 5.7 oz)  Body mass index is 29.75 kg/m².  Vital Signs (Most Recent):  Temp: 97.1 °F (36.2 °C) (05/13/18 1511)  Pulse: 91 (05/13/18 2000)  Resp: 18 (05/13/18 1511)  BP: (!) 115/58 (05/13/18 1511)  SpO2: 97 % (05/13/18 1511) Vital Signs (24h Range):  Temp:  [97.1 °F (36.2 °C)-98.6 °F (37 °C)] 97.1 °F (36.2 °C)  Pulse:  [69-91] 91  Resp:  [17-18] 18  SpO2:  [94 %-97 %] 97 %  BP: (110-131)/(58-70) 115/58       Date 05/13/18 0700 - 05/14/18 0659   Shift 6527-2135 4188-4281 0221-1222 24 Hour Total   I  N  T  A  K  E   Shift Total  (mL/kg)       O  U  T  P  U  T   Urine  (mL/kg/hr) 900  (1.1)   900    Shift Total  (mL/kg) 900  (9)   900  (9)   Weight (kg) 99.5 99.5 99.5 99.5                        Gastrostomy/Enterostomy 05/04/18 1539 Gastrostomy tube w/ balloon LUQ feeding (Active)   Securement anchored to abdomen w/ adhesive device 5/11/2018  8:00 PM   Interventions Prior to Feeding patency checked;residual checked;residual returned 5/11/2018  8:00  PM   Suction Setting/Drainage Method dependent drainage 5/6/2018  8:00 AM   Feeding Type continuous 5/11/2018  8:00 PM   Clamp Status/Tolerance no restlessness 5/11/2018  8:00 PM   Feeding Action feeding continued 5/11/2018  8:00 PM   Dressing dry and intact 5/11/2018  8:00 PM   Insertion Site dry 5/11/2018  8:00 PM   Site Care skin barrier applied;site cleansed w/ sterile normal saline 5/11/2018  7:05 AM   Flush/Irrigation flushed w/;water;no resistance met 5/11/2018  7:05 AM   Current Rate (mL/hr) 70 mL/hr 5/11/2018  8:00 PM   Goal Rate (mL/hr) 70 mL/hr 5/11/2018  8:00 PM   Water Bolus (mL) 180 mL 5/11/2018 11:00 PM   Tube Feeding Intake (mL) 70 5/11/2018  6:05 PM   Residual Amount (ml) 0 ml 5/11/2018  7:05 AM       Male External Urinary Catheter 04/30/18 1600 (Active)   Collection Container Urimeter 5/11/2018  8:00 PM   Securement Method secured to top of thigh w/ adhesive device 5/11/2018  8:00 PM   Skin no redness;no breakdown 5/11/2018  8:00 PM   Tolerance no signs/symptoms of discomfort 5/11/2018  8:00 PM   Output (mL) 775 mL 5/12/2018  7:00 AM   Catheter Change Date 05/11/18 5/11/2018  7:05 AM   Catheter Change Time 0705 5/11/2018  7:05 AM       Neurosurgery Physical Exam     General: well developed, well nourished. no acute distress. Generalized deconditioning   Neurologic: Awake, Alert, sitting in bed    Head: normocephalic, atraumatic   GCS: Motor: 6/Verbal: 5/Eyes: 4 GCS Total: 15  Cranial nerves: face symmetric, tongue midline, pupils equal, round, reactive to light with accomodation, extraocular muscles intact. CN II-XII grossly intact.   Language: no aphasia  Speech:  dysarthria improved compared to pre-wound revision on 4/28  Sensory: decrease response to light touch in BUE  Motor Strength:   Moves all extremities spontaneously with good tone.      Strength   Deltoids Triceps Biceps Wrist Extension Wrist Flexion Hand    Upper: R 2/5 3/5 4-/5 2/5 2/5 3/5     L 3/5 4-/5 4-/5 3/5 3/5 3/5        Iliopsoas Quadriceps Knee  Flexion Tibialis  anterior Gastro- cnemius EHL   Lower: R 3/5 3/5 3/5 4-/5 4-/5 4-/5     L 3/5 3/5 3/5 4/5 4/5 4/5   [place]: absent   Clonus: absent  ENT: normal hearing with finger rub  Lungs:  normal respiratory effort on room air  Abdomen: soft, non-tender and symmetric. G tube site c/d/i  Extremities: warm with no cyanosis, edema, or clubbing.   Pulses: palpable distal pulses  Skin: warm, dry and intact. No visible rashes or lesions.      wound is c/d/i, no erythema, ttp, or drainage.  wound edges are well approximated. No surrounding edema      condom cath in place          Significant Labs:    Recent Labs  Lab 05/12/18  0623 05/13/18 0449   * 131*   * 137   K 4.2 4.5   CL 98 102   CO2 29 29   BUN 35* 36*   CREATININE 1.1 1.2   CALCIUM 9.8 9.4   MG 2.0 1.9     No results for input(s): WBC, HGB, HCT, PLT in the last 48 hours.    Recent Labs  Lab 05/12/18  0623 05/13/18 0449   INR 1.1 1.1     Microbiology Results (last 7 days)     Procedure Component Value Units Date/Time    Culture, Anaerobe [[id number]] Collected:  04/28/18 1413    Order Status:  Completed Specimen:  Wound from Neck Updated:  05/07/18 0659     Anaerobic Culture No anaerobes isolated    Narrative:       Wound fluid    Culture, Anaerobe [[id number]] Collected:  04/28/18 1417    Order Status:  Completed Specimen:  Wound from Neck Updated:  05/07/18 0659     Anaerobic Culture No anaerobes isolated    Narrative:       Wound fluid

## 2018-05-14 NOTE — PLAN OF CARE
Planned discharge is Skilled Nursing - Plan (A) or New Nursing Home long-term Placement - Plan (B).     05/14/18 9899   Discharge Reassessment   Assessment Type Discharge Planning Reassessment   Provided patient/caregiver education on the expected discharge date and the discharge plan No   Do you have any problems affording any of your prescribed medications? No   Discharge Plan A Skilled Nursing Facility   Discharge Plan B New Nursing Home placement - snf care facility   Patient choice form signed by patient/caregiver N/A   Can the patient answer the patient profile reliably? No, cognitively impaired   Describe the patient's ability to walk at the present time. Major restrictions/daily assistance from another person   How often would a person be available to care for the patient? Often   Number of comorbid conditions (as recorded on the chart) Four   During the past month, has the patient often been bothered by feeling down, depressed or hopeless? No   During the past month, has the patient often been bothered by little interest or pleasure in doing things? No

## 2018-05-14 NOTE — ASSESSMENT & PLAN NOTE
82 y.o. male s/p C3-5 ACDF for cervical myelopathy on 4/10 with Dr. Hess, and now s/p evacuation/washout of fluid collection with CSF leak repair on 4/28/18 POD 14  -Patient neurologically stable, continued waxing / waning.  Awake this AM requesting to get OOB to chair  -Cervical collar when OOB or with activity, Up to chair as tolerated   -Speech gradually improving since wound revision;still requires deep suctioning to induce cough.   -Repeat CT Head showed stable b/l hygromas  -Appreciate medicine recommendations and participation in care of patient   Patient medically stable from medicine prospective for discharge to SNF.  DM reaching target goal, insulin dosing increased 5/9- Levemir 16 units nightly and Aspart 5 units every 4 hours.  -Hypercalcemia likely related to primary hyperparathyroidism.   -Vit D level low at 8, continue Vit D replacement.   -HTN at target, continue Diltiazem 90 mg every 6 hours.   -TF at goal and tolerating  -ID: continue vanc and ceftriaxone x 7 days from surgery (4/28) Vanc trough before 4th dose, 18. Trough Goal 15-20. Surgical cultures NGTD, completed 5/6/18. UA yesterday with many WBCs, however WBC has trended down and pt is afebrile. CTM for now per .  -CV goal normotension. Continue home meds. PRN meds SBP >160.   -Continue PILI/SCDs/SQH  -Continue bowel regimen daily. Brown bomb 5/3 with +BM  -Continue to hold Eliquis. Per , indications unclear.  does not recommend continuing upon discharge.  -Continue aggressive PT/OT/ST. Discussed with therapy to evaluate patient simultaneously while pt OOB with PT.  -Scheduled CPT q4 hours   -Aggressive oral care per nursing staff daily  -Continue delirium precautions   DAYTIME: blinds up/lights on, TV on, increase stimuli, awake with minimal naps  NIGHTTIME: blinds down, TV off, lights off, decrease any noise/stimuli   Private room to prevent hospital delirium and increase compliance for OOB  -Keep incision open to air. Do not  submerge or scrub incision.   -Pending SNF placement, hopeful  For acceptance on Monday

## 2018-05-14 NOTE — PROGRESS NOTES
[facility]  Adult Nutrition  Progress Note    SUMMARY       Recommendations    1. Continue Diabetisource @ 70mL/hr, as tolerated.   2. If able to advance diet, recommend 2000 kcal ADA diet (texture per SLP).     RD to monitor.     Goals: Meet >90% EEN/EPN from TFs  Nutrition Goal Status:  (Continues)  Communication of RD Recs:  (POC)    Reason for Assessment    Reason for Assessment: RD follow-up  Diagnosis:  (Cervical myelopathy)  Relevant Medical History: T2DM, HTN    General Information Comments: Pt on nursing hold 2/2 aspiration precautions as he has been vomiting TFs.     Nutrition Discharge Planning: meet >85% of EEN/EPN    Nutrition Risk Screen    Nutrition Risk Screen: no indicators present    Nutrition/Diet History    Patient Reported Diet/Restrictions/Preferences: general  Do you have any cultural, spiritual, Faith conflicts, given your current situation?: no  Food Allergies: NKFA  Factors Affecting Nutritional Intake: NPO, difficulty/impaired swallowing    Anthropometrics    Temp: (!) 102.1 °F (38.9 °C)  Height Method: Stated  Height: 6' (182.9 cm)  Height (inches): 72 in  Weight Method: Bed Scale  Weight: 99.5 kg (219 lb 5.7 oz)  Weight (lb): 219.36 lb  Ideal Body Weight (IBW), Male: 178 lb  % Ideal Body Weight, Male (lb): 123.24 lb  BMI (Calculated): 29.8  BMI Grade: 25 - 29.9 - overweight     Lab/Procedures/Meds    Pertinent Labs Reviewed: reviewed  Pertinent Labs Comments: POCT Gluc: 118-209, Na 135  Pertinent Medications Reviewed: reviewed  Pertinent Medications Comments: statin, insulin, senna     Physical Findings/Assessment    Overall Physical Appearance: nourished  Tubes: gastrostomy tube  Oral/Mouth Cavity: WDL  Skin: incision(s)    Estimated/Assessed Needs    Weight Used For Calorie Calculations: 99.5 kg (219 lb 5.7 oz)  Energy Calorie Requirements (kcal): 2179  Energy Need Method: [place] (X 1.25)  Protein Requirements: 100-120 gm/day (1.0-1.2 gm/kg)  Weight  Used For Protein Calculations: 99.5 kg (219 lb 5.7 oz)  Fluid Need Method: other (see comments) (Per MD or 1 mL/kcal)  RDA Method (mL): 2179  CHO Requirement: 50% total kcals    Nutrition Prescription Ordered    Current Diet Order: NPO  Nutrition Order Comments: TF at goal  Current Nutrition Support Formula Ordered: Diabetisource AC  Current Nutrition Support Rate Ordered: 70 (ml)  Current Nutrition Support Frequency Ordered: mL/hr    Evaluation of Received Nutrient/Fluid Intake    Enteral Calories (kcal): 2016  Enteral Protein (gm): 101  Enteral (Free Water) Fluid (mL): 1374  Free Water: 1000 mL  Total Fluid: 2374 mL  % Kcal Needs: 108  % Protein Needs: 100  Energy Calories Required: meeting needs  Protein Required: meeting needs  Fluid Required:  (per MD)  Comments: LBM 5/11    % Intake of Estimated Energy Needs: 75 - 100 %  % Meal Intake: NPO    Nutrition Risk    Level of Risk/Frequency of Follow-up: low (f/u 1 X wk)     Assessment and Plan    Inadequate dietary energy intake-resolved as of 5/14/2018    Contributing Nutrition Diagnosis  Swallowing difficulties    Related to (etiology):   Dysphagia    Signs and Symptoms (as evidenced by):   NPO per SLP and TF regimen     Interventions/Recommendations (treatment strategy):  See recs     Nutrition Diagnosis Status:   Continues               Monitor and Evaluation    Food and Nutrient Intake: enteral nutrition intake  Food and Nutrient Adminstration: enteral and parenteral nutrition administration  Physical Activity and Function: nutrition-related ADLs and IADLs  Anthropometric Measurements: weight, weight change, body mass index  Biochemical Data, Medical Tests and Procedures: electrolyte and renal panel, gastrointestinal profile, glucose/endocrine profile, inflammatory profile, lipid profile  Nutrition-Focused Physical Findings: overall appearance     Nutrition Follow-Up    RD Follow-up?: Yes

## 2018-05-14 NOTE — NURSING
Labetalol 10mg administered per MDs orders. Pt monitored with pro pack no changes noted to rhythm. Pt tolerated well.
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
## 2018-05-14 NOTE — ASSESSMENT & PLAN NOTE
Contributing Nutrition Diagnosis  Swallowing difficulties    Related to (etiology):   Dysphagia    Signs and Symptoms (as evidenced by):   NPO per SLP and TF regimen     Interventions/Recommendations (treatment strategy):  See recs     Nutrition Diagnosis Status:   Continues

## 2018-05-14 NOTE — NURSING
Notified [facility] neurosurgery of night RN reporting patient vomiting tube feeds and all feedings stopped. RN called to clarify if medications can be given and chest x- ray still pending. Instructed to perform vital signs and hold all am medications.

## 2018-05-14 NOTE — PROGRESS NOTES
[facility]  Neurosurgery  Progress Note    Subjective:     History of Present Illness: 82 y.o.  male with type 2 diabetes, who presents today for follow up evaluation one week prior to 2 level ACDF scheduled for 4/10/2018. Pt reports    Pt presents today wearing a cervical collar and in a wheelchair. He would like to proceed with surgery. Per pt's family, his PCP would like to see him on 4/9, the day before surgery. He notes continued neck pain and BUE numbness/tingling as well as BUE weakness. Pt is only able to walk a few steps unassisted. He has never received neck surgery.     Post-Op Info:  Procedure(s) (LRB):  WASHOUT-CERVICAL-wound washout and csf leak repair with depuy (N/A)  DISKECTOMY AND FUSION-ANTERIOR CERVICAL (ACDF)- REVISON C5-C7   15 Days Post-Op     Interval History:  NAEON.  .  Denies F/C/CP/SOB.     Medications:  Continuous Infusions:  Scheduled Meds:   atorvastatin  20 mg Per NG tube Nightly    chlorhexidine  15 mL Mouth/Throat BID    cinacalcet  30 mg Oral Daily with breakfast    citalopram  10 mg Per G Tube Daily    diltiaZEM  90 mg Per G Tube Q6H    [START ON 5/20/2018] ergocalciferol  50,000 Units Per G Tube Q7 Days    heparin (porcine)  5,000 Units Subcutaneous Q8H    insulin aspart U-100  5 Units Subcutaneous Q4H    insulin detemir U-100  18 Units Subcutaneous BID    polyethylene glycol  17 g Per G Tube BID    senna-docusate 8.6-50 mg  1 tablet Per G Tube BID    tamsulosin  0.4 mg Per G Tube Daily    valsartan  120 mg Per G Tube Daily     PRN Meds:acetaminophen, albuterol-ipratropium 2.5mg-0.5mg/3mL, aluminum-magnesium hydroxide-simethicone, dextrose 50%, glucagon (human recombinant), insulin aspart U-100, lidocaine HCL 2%, ondansetron     Review of Systems    Objective:     Weight: 99.5 kg (219 lb 5.7 oz)  Body mass index is 29.75 kg/m².  Vital Signs (Most Recent):  Temp: 97.1 °F (36.2 °C) (05/13/18 1511)  Pulse: 91 (05/13/18 2000)  Resp: 18 (05/13/18  1511)  BP: (!) 115/58 (05/13/18 1511)  SpO2: 97 % (05/13/18 1511) Vital Signs (24h Range):  Temp:  [97.1 °F (36.2 °C)-98.6 °F (37 °C)] 97.1 °F (36.2 °C)  Pulse:  [69-91] 91  Resp:  [17-18] 18  SpO2:  [94 %-97 %] 97 %  BP: (110-131)/(58-70) 115/58       Date 05/13/18 0700 - 05/14/18 0659   Shift 0381-1123 2799-6600 7520-8630 24 Hour Total   I  N  T  A  K  E   Shift Total  (mL/kg)       O  U  T  P  U  T   Urine  (mL/kg/hr) 900  (1.1)   900    Shift Total  (mL/kg) 900  (9)   900  (9)   Weight (kg) 99.5 99.5 99.5 99.5                        Gastrostomy/Enterostomy 05/04/18 1539 Gastrostomy tube w/ balloon LUQ feeding (Active)   Securement anchored to abdomen w/ adhesive device 5/11/2018  8:00 PM   Interventions Prior to Feeding patency checked;residual checked;residual returned 5/11/2018  8:00 PM   Suction Setting/Drainage Method dependent drainage 5/6/2018  8:00 AM   Feeding Type continuous 5/11/2018  8:00 PM   Clamp Status/Tolerance no restlessness 5/11/2018  8:00 PM   Feeding Action feeding continued 5/11/2018  8:00 PM   Dressing dry and intact 5/11/2018  8:00 PM   Insertion Site dry 5/11/2018  8:00 PM   Site Care skin barrier applied;site cleansed w/ sterile normal saline 5/11/2018  7:05 AM   Flush/Irrigation flushed w/;water;no resistance met 5/11/2018  7:05 AM   Current Rate (mL/hr) 70 mL/hr 5/11/2018  8:00 PM   Goal Rate (mL/hr) 70 mL/hr 5/11/2018  8:00 PM   Water Bolus (mL) 180 mL 5/11/2018 11:00 PM   Tube Feeding Intake (mL) 70 5/11/2018  6:05 PM   Residual Amount (ml) 0 ml 5/11/2018  7:05 AM       Male External Urinary Catheter 04/30/18 1600 (Active)   Collection Container Urimeter 5/11/2018  8:00 PM   Securement Method secured to top of thigh w/ adhesive device 5/11/2018  8:00 PM   Skin no redness;no breakdown 5/11/2018  8:00 PM   Tolerance no signs/symptoms of discomfort 5/11/2018  8:00 PM   Output (mL) 775 mL 5/12/2018  7:00 AM   Catheter Change Date 05/11/18 5/11/2018  7:05 AM   Catheter Change Time 0705  5/11/2018  7:05 AM       Neurosurgery Physical Exam     General: well developed, well nourished. no acute distress. Generalized deconditioning   Neurologic: Awake, Alert, sitting in bed    Head: normocephalic, atraumatic   GCS: Motor: 6/Verbal: 5/Eyes: 4 GCS Total: 15  Cranial nerves: face symmetric, tongue midline, pupils equal, round, reactive to light with accomodation, extraocular muscles intact. CN II-XII grossly intact.   Language: no aphasia  Speech:  dysarthria improved compared to pre-wound revision on 4/28  Sensory: decrease response to light touch in BUE  Motor Strength:   Moves all extremities spontaneously with good tone.      Strength   Deltoids Triceps Biceps Wrist Extension Wrist Flexion Hand    Upper: R 2/5 3/5 4-/5 2/5 2/5 3/5     L 3/5 4-/5 4-/5 3/5 3/5 3/5       Iliopsoas Quadriceps Knee  Flexion Tibialis  anterior Gastro- cnemius EHL   Lower: R 3/5 3/5 3/5 4-/5 4-/5 4-/5     L 3/5 3/5 3/5 4/5 4/5 4/5   [place]: absent   Clonus: absent  ENT: normal hearing with finger rub  Lungs:  normal respiratory effort on room air  Abdomen: soft, non-tender and symmetric. G tube site c/d/i  Extremities: warm with no cyanosis, edema, or clubbing.   Pulses: palpable distal pulses  Skin: warm, dry and intact. No visible rashes or lesions.      wound is c/d/i, no erythema, ttp, or drainage.  wound edges are well approximated. No surrounding edema      condom cath in place          Significant Labs:    Recent Labs  Lab 05/12/18  0623 05/13/18  0449   * 131*   * 137   K 4.2 4.5   CL 98 102   CO2 29 29   BUN 35* 36*   CREATININE 1.1 1.2   CALCIUM 9.8 9.4   MG 2.0 1.9     No results for input(s): WBC, HGB, HCT, PLT in the last 48 hours.    Recent Labs  Lab 05/12/18  0623 05/13/18  0449   INR 1.1 1.1     Microbiology Results (last 7 days)     Procedure Component Value Units Date/Time    Culture, Anaerobe [[id number]] Collected:  04/28/18 1413    Order Status:  Completed Specimen:  Wound from Neck  Updated:  05/07/18 0659     Anaerobic Culture No anaerobes isolated    Narrative:       Wound fluid    Culture, Anaerobe [[id number]] Collected:  04/28/18 1417    Order Status:  Completed Specimen:  Wound from Neck Updated:  05/07/18 0659     Anaerobic Culture No anaerobes isolated    Narrative:       Wound fluid            Assessment/Plan:     * Cervical myelopathy with cervical radiculopathy    82 y.o. male s/p C3-5 ACDF for cervical myelopathy on 4/10 with Dr. Hess, and now s/p evacuation/washout of fluid collection with CSF leak repair on 4/28/18 POD 14  -Patient neurologically stable, continued waxing / waning.  Awake this AM requesting to get OOB to chair  -Cervical collar when OOB or with activity, Up to chair as tolerated   -Speech gradually improving since wound revision;still requires deep suctioning to induce cough.   -Repeat CT Head showed stable b/l hygromas  -Appreciate medicine recommendations and participation in care of patient   Patient medically stable from medicine prospective for discharge to SNF.  DM reaching target goal, insulin dosing increased 5/9- Levemir 16 units nightly and Aspart 5 units every 4 hours.  -Hypercalcemia likely related to primary hyperparathyroidism.   -Vit D level low at 8, continue Vit D replacement.   -HTN at target, continue Diltiazem 90 mg every 6 hours.   -TF at goal and tolerating  -ID: continue vanc and ceftriaxone x 7 days from surgery (4/28) Vanc trough before 4th dose, 18. Trough Goal 15-20. Surgical cultures NGTD, completed 5/6/18. UA yesterday with many WBCs, however WBC has trended down and pt is afebrile. CTM for now per .  -CV goal normotension. Continue home meds. PRN meds SBP >160.   -Continue PILI/SCDs/SQH  -Continue bowel regimen daily. Brown bomb 5/3 with +BM  -Continue to hold Eliquis. Per , indications unclear.  does not recommend continuing upon discharge.  -Continue aggressive PT/OT/ST. Discussed with therapy to evaluate patient  simultaneously while pt OOB with PT.  -Scheduled CPT q4 hours   -Aggressive oral care per nursing staff daily  -Continue delirium precautions   DAYTIME: blinds up/lights on, TV on, increase stimuli, awake with minimal naps  NIGHTTIME: blinds down, TV off, lights off, decrease any noise/stimuli   Private room to prevent hospital delirium and increase compliance for OOB  -Keep incision open to air. Do not submerge or scrub incision.   -Pending SNF placement, hopeful  For acceptance on Monday                      Devante Holman MD  Neurosurgery  [facility]

## 2018-05-14 NOTE — PT/OT/SLP PROGRESS
Occupational Therapy      Patient Name:  Hlolis Pitts   MRN:  [id number]    Patient not seen today secondary to Nursing care. Writing therapist attempted pt in AM, but pt in nursing care. RN stated pt is spiking a temp and not breathing well at this time. Will follow-up as scheduled.    Arash Easley, OT  5/14/2018

## 2018-05-14 NOTE — NURSING
Telemetry called RN pt HR sustain in the 120s. Notified Telemetry Neurosurgery team is aware of HR and are monitoring patient. Telemetry tech request nurse change parameters. RN explained to Telemetry tech the parameters are 120 and the nurse can not authorize changing parameters on the monitor.

## 2018-05-14 NOTE — PLAN OF CARE
Hospital Medicine Consult Service:   Patient's chart checked for labs, blood sugars and vital signs in past 24 hours. Patient's HTN remains stable on current regimen and would continue on current antihypertensives. BG minimally elevated in last 24 hours- will increase aspart to 6 units q 4 hours and continue detemir 18 BID. Labs stable and hypercalcemia improving after Pamidronate given on 5/10. Will continue to follow.    Renee Addison MD, PGY3  Pager [phone]

## 2018-05-14 NOTE — ASSESSMENT & PLAN NOTE
-- A1C 5.6, glucoses well controlled  -- per chart review patient had reported being controlled by lifestyle modification alone at home   -- currently on continuous tube feeds with diabetisource   -- detemir 18 units BID   -- aspart to 6 units q4 + low dose SSI (would hold if tube feeds are held)    Recent Labs      05/13/18   1057  05/13/18   1329  05/13/18   1815  05/13/18   2337  05/14/18   0558  05/14/18   0923   POCTGLUCOSE  112*  143*  200*  189*  209*  188*

## 2018-05-14 NOTE — NURSING
Daisy [facility] neurosurgery notified of patient /85  Temp 100. PA will consult with medicine team. RN will continue to monitor.

## 2018-05-14 NOTE — SUBJECTIVE & OBJECTIVE
Interval History:  Patient with aspirational event after vomiting this AM. Thick copious secretions and minimal hypoxia now on NC. Mental status status quo. Complaining of diffuse pain. Discussed with family member at bedside that if patient were to decompensate, they would want everything done.     Review of Systems   Constitutional: Positive for fever. Negative for chills.   HENT: Positive for congestion and trouble swallowing.    Respiratory: Positive for shortness of breath.    Cardiovascular: Negative for chest pain.   Gastrointestinal: Positive for abdominal pain. Negative for constipation.   Genitourinary: Negative for dysuria.   Musculoskeletal: Positive for neck pain and neck stiffness.   Psychiatric/Behavioral: Positive for confusion and decreased concentration.     Objective:     Vital Signs (Most Recent):  Temp: (!) 101.5 °F (38.6 °C) (05/14/18 0915)  Pulse: (!) 118 (05/14/18 0915)  Resp: 16 (05/14/18 0915)  BP: (!) 148/73 (05/14/18 0915)  SpO2: (!) 94 % (05/14/18 0915) Vital Signs (24h Range):  Temp:  [97.1 °F (36.2 °C)-101.5 °F (38.6 °C)] 101.5 °F (38.6 °C)  Pulse:  [] 118  Resp:  [16-18] 16  SpO2:  [93 %-97 %] 94 %  BP: (115-173)/(55-82) 148/73     Weight: 99.5 kg (219 lb 5.7 oz)  Body mass index is 29.75 kg/m².    Intake/Output Summary (Last 24 hours) at 05/14/18 1101  Last data filed at 05/13/18 2000   Gross per 24 hour   Intake               70 ml   Output              900 ml   Net             -830 ml      Physical Exam   Constitutional: He appears well-developed.   HENT:   Head: Normocephalic.   Wearing collar. Mucous membranes dry   Eyes: EOM are normal. Pupils are equal, round, and reactive to light.   Arcus senilis present bilaterally   Cardiovascular: Normal rate.    No murmur heard.  Pulmonary/Chest: He has rales.   NC in place. Coarse crackles.    Abdominal: Soft. There is no tenderness.   Peg tube in place   Genitourinary:   Genitourinary Comments: Stage II sacral decubitus ulcer    Neurological: He is alert.       Significant Labs: All pertinent labs within the past 24 hours have been reviewed.    Significant Imaging: I have reviewed all pertinent imaging results/findings within the past 24 hours.

## 2018-05-14 NOTE — PROGRESS NOTES
[facility]  Neurosurgery  Progress Note    Subjective:     History of Present Illness: 82 y.o.  male with type 2 diabetes, who presents today for follow up evaluation one week prior to 2 level ACDF scheduled for 4/10/2018. Pt reports    Pt presents today wearing a cervical collar and in a wheelchair. He would like to proceed with surgery. Per pt's family, his PCP would like to see him on 4/9, the day before surgery. He notes continued neck pain and BUE numbness/tingling as well as BUE weakness. Pt is only able to walk a few steps unassisted. He has never received neck surgery.     Post-Op Info:  Procedure(s) (LRB):  WASHOUT-CERVICAL-wound washout and csf leak repair with depuy (N/A)  DISKECTOMY AND FUSION-ANTERIOR CERVICAL (ACDF)- REVISON C5-C7   16 Days Post-Op     Interval History: Pt had aspiration event this am after vomiting. Now on 2L nasal cannula.  Medicine following. Pt c/o increased coughing.  Sitting up in bed. Family at bedside. Denies numbness/tingling, increased weakness, or fever/chills. Condom catheter in place.     Medications:  Continuous Infusions:  Scheduled Meds:   atorvastatin  20 mg Per NG tube Nightly    chlorhexidine  15 mL Mouth/Throat BID    cinacalcet  30 mg Oral Daily with breakfast    citalopram  10 mg Per G Tube Daily    diltiaZEM  90 mg Per G Tube Q6H    [START ON 5/20/2018] ergocalciferol  50,000 Units Per G Tube Q7 Days    heparin (porcine)  5,000 Units Subcutaneous Q8H    [START ON 5/15/2018] insulin aspart U-100  6 Units Subcutaneous Q4H    insulin detemir U-100  18 Units Subcutaneous BID    polyethylene glycol  17 g Per G Tube BID    senna-docusate 8.6-50 mg  1 tablet Per G Tube BID    tamsulosin  0.4 mg Per G Tube Daily    valsartan  120 mg Per G Tube Daily     PRN Meds:acetaminophen, albuterol-ipratropium 2.5mg-0.5mg/3mL, aluminum-magnesium hydroxide-simethicone, dextrose 50%, glucagon (human recombinant), insulin aspart U-100, lidocaine HCL 2%,  ondansetron     Review of Systems  Objective:     Weight: 99.5 kg (219 lb 5.7 oz)  Body mass index is 29.75 kg/m².  Vital Signs (Most Recent):  Temp: (!) 102.1 °F (38.9 °C) (05/14/18 1236)  Pulse: (!) 127 (05/14/18 1236)  Resp: 18 (05/14/18 1236)  BP: (!) 152/75 (05/14/18 1236)  SpO2: (!) 93 % (05/14/18 1236) Vital Signs (24h Range):  Temp:  [97.1 °F (36.2 °C)-102.1 °F (38.9 °C)] 102.1 °F (38.9 °C)  Pulse:  [] 127  Resp:  [16-22] 18  SpO2:  [93 %-97 %] 93 %  BP: (115-173)/(55-82) 152/75                           Gastrostomy/Enterostomy 05/04/18 1539 Gastrostomy tube w/ balloon LUQ feeding (Active)   Securement anchored to abdomen w/ adhesive device 5/13/2018  8:00 PM   Interventions Prior to Feeding patency checked;residual checked;residual returned 5/13/2018  8:00 PM   Suction Setting/Drainage Method dependent drainage 5/6/2018  8:00 AM   Feeding Type continuous 5/13/2018  8:00 PM   Clamp Status/Tolerance no restlessness 5/13/2018  8:00 PM   Feeding Action feeding continued 5/13/2018  8:00 PM   Dressing dry and intact 5/13/2018  8:00 PM   Insertion Site dry 5/12/2018  8:03 PM   Site Care outer bumper rotated;sterile precut T-slit dressing applied 5/12/2018  8:03 PM   Flush/Irrigation flushed w/;water;no resistance met 5/12/2018  8:03 PM   Current Rate (mL/hr) 70 mL/hr 5/13/2018  7:20 AM   Goal Rate (mL/hr) 70 mL/hr 5/13/2018  7:20 AM   Water Bolus (mL) 160 mL 5/12/2018 11:52 PM   Tube Feeding Intake (mL) 70 5/13/2018  8:00 PM   Residual Amount (ml) 0 ml 5/13/2018  7:20 AM       Male External Urinary Catheter 04/30/18 1600 (Active)   Collection Container Standard drainage bag 5/13/2018  8:00 PM   Securement Method secured to top of thigh w/ adhesive device 5/13/2018  8:00 PM   Skin no breakdown 5/12/2018  8:03 PM   Tolerance no signs/symptoms of discomfort 5/12/2018  8:03 PM   Output (mL) 900 mL 5/13/2018  1:00 PM   Catheter Change Date 05/11/18 5/11/2018  7:05 AM   Catheter Change Time 0705 5/11/2018  7:05 AM        Neurosurgery Physical Exam     General: well developed, well nourished. no acute distress. Generalized deconditioning   Neurologic: Awake, Alert, sitting in bed. Oriented to person and place.   Head: normocephalic, atraumatic   GCS: Motor: 6/Verbal: 5/Eyes: 4 GCS Total: 15  Cranial nerves: face symmetric, tongue midline, pupils equal, round, reactive to light with accomodation, extraocular muscles intact. CN II-XII grossly intact.   Language: no aphasia  Speech:  dysarthria improved compared to pre-wound revision on 4/28  Sensory: decrease response to light touch in BUE  Motor Strength:   Moves all extremities spontaneously with good tone. Strength exam effort limited.     Strength   Deltoids Triceps Biceps Wrist Extension Wrist Flexion Hand    Upper: R 2/5 3/5 3/5 2/5 2/5 3/5     L 3/5 3/5 3/5 3/5 3/5 3/5       Iliopsoas Quadriceps Knee  Flexion Tibialis  anterior Gastro- cnemius EHL   Lower: R 3/5 3/5 3/5 4-/5 4-/5 4-/5     L 3/5 3/5 3/5 4/5 4/5 4/5     [place]: absent  Clonus: absent  Lungs:  normal respiratory effort on 2L nasal cannula. Productive cough.   Abdomen: soft, non-tender and symmetric. G tube site c/d/i  Extremities: warm with no cyanosis, edema, or clubbing.   Pulses: palpable distal pulses  Skin: warm, dry and intact. No visible rashes or lesions.   Incision c/d/i, no erythema, edema, or drainage.   C-collar in place    Significant Labs:    Recent Labs  Lab 05/13/18  0449 05/14/18  0430   * 181*    135*   K 4.5 5.1    102   CO2 29 25   BUN 36* 36*   CREATININE 1.2 1.2   CALCIUM 9.4 9.7   MG 1.9  --        Recent Labs  Lab 05/14/18  0430   WBC 9.76   HGB 10.6*   HCT 33.3*          Recent Labs  Lab 05/13/18  0449   INR 1.1     Microbiology Results (last 7 days)     ** No results found for the last 168 hours. **        Recent Lab Results       05/14/18  1201 05/14/18  0923 05/14/18  0558 05/14/18  0430 05/13/18  1037      Immature Granulocytes    0.5      Immature  Grans (Abs)    0.05  Comment:  Mild elevation in immature granulocytes is non specific and   can be seen in a variety of conditions including stress response,   acute inflammation, trauma and pregnancy. Correlation with other   laboratory and clinical findings is essential.  (H)      Albumin    2.3(L)      Alkaline Phosphatase    85      ALT    15      Anion Gap    8      AST    18      Baso #    0.03      Basophil%    0.3      Total Bilirubin    0.4  Comment:  For infants and newborns, interpretation of results should be based  on gestational age, weight and in agreement with clinical  observations.  Premature Infant recommended reference ranges:  Up to 24 hours.............<8.0 mg/dL  Up to 48 hours............<12.0 mg/dL  3-5 days..................<15.0 mg/dL  6-29 days.................<15.0 mg/dL        BUN, Bld    36(H)      Calcium    9.7      Chloride    102      CO2    25      Creatinine    1.2      Differential Method    Automated      eGFR if     >60.0      eGFR if non     56.8  Comment:  Calculation used to obtain the estimated glomerular filtration  rate (eGFR) is the CKD-EPI equation.   (A)      Eos #    0.1      Eosinophil%    0.7      Glucose    181(H)      Gran # (ANC)    5.7      Gran%    58.9      Hematocrit    33.3(L)      Hemoglobin    10.6(L)      Lymph #    2.6      Lymph%    26.6      MCH    30.7      MCHC    31.8(L)      MCV    97      Mono #    1.3(H)      Mono%    13.0      MPV    10.1      nRBC    0      Platelets    291      POCT Glucose 208(H) 188(H) 209(H)  189(H)     Potassium    5.1      Total Protein    6.8      RBC    3.45(L)      RDW    15.2(H)      Sodium    135(L)      WBC    9.76                  05/13/18  1815      Immature Granulocytes      Immature Grans (Abs)      Albumin      Alkaline Phosphatase      ALT      Anion Gap      AST      Baso #      Basophil%      Total Bilirubin      BUN, Bld      Calcium      Chloride      CO2      Creatinine       Differential Method      eGFR if       eGFR if non       Eos #      Eosinophil%      Glucose      Gran # (ANC)      Gran%      Hematocrit      Hemoglobin      Lymph #      Lymph%      MCH      MCHC      MCV      Mono #      Mono%      MPV      nRBC      Platelets      POCT Glucose 200(H)     Potassium      Total Protein      RBC      RDW      Sodium      WBC          All pertinent labs from the last 24 hours have been reviewed.    Significant Diagnostics:    CXR 5/14/17: Postoperative changes in the cervical spine as before.  Heart size is normal.  Accentuation of the basilar interstitial markings and small subsegmental atelectatic changes at the lung bases.  The upper lung fields are clear    XR Abdomen 5/14/18: No significant bowel dilatation identified.  Mild-to-moderate constipation  I have reviewed all pertinent imaging results/findings within the past 24 hours.    Assessment/Plan:     * Cervical myelopathy with cervical radiculopathy    82 y.o. male s/p C3-5 ACDF for cervical myelopathy on 4/10 with Dr. Hess, and now s/p evacuation/washout of fluid collection with CSF leak repair on 4/28/18 POD 16    -Patient neurologically stable, continued waxing / waning.   -Cervical collar when OOB or with activity, Up to chair as tolerated   -Speech gradually improving since wound revision;still requires deep suctioning to induce cough.   -Repeat CT Head 5/8 showed stable b/l hygromas  -Appreciate medicine recommendations and participation in care of patient  -DM reaching target goal, insulin dosing increased 5/9- Levemir 16 units nightly and Aspart 5 units every 4 hours.  -Hypercalcemia likely related to primary hyperparathyroidism.   -Vit D level low at 8, continue Vit D replacement.   -HTN at target, continue Diltiazem 90 mg every 6 hours. PRN meds SBP >160.   -Continue PILI/SCDs/SQH  -Continue bowel regimen daily. Brown bomb 5/3 with +BM  -Continue to hold Eliquis. Per ,  indications unclear.  does not recommend continuing upon discharge.  -Continue aggressive PT/OT/ST. Discussed with therapy to evaluate patient simultaneously while pt OOB with PT.  -Scheduled CPT q4 hours. Scheduled Duonebs q4h.   -Aggressive oral care per nursing staff daily  -Continue delirium precautions   DAYTIME: blinds up/lights on, TV on, increase stimuli, awake with minimal naps  NIGHTTIME: blinds down, TV off, lights off, decrease any noise/stimuli   Private room to prevent hospital delirium and increase compliance for OOB  -Keep incision open to air. Do not submerge or scrub incision.   -Aspirational event 5/14, pt with 94% on 2L NC. Febrile and tachycardic. Discussed with Hospital medicine, will hold abx at this time. They do not believe pt is ICU candidate at this time. O2 sats stable on nasal cannula. Notify NSGY if O2 sats are below 88%. Strict aspiration precautions. Hold tube feeds.  Elevate HOB >45 degrees. Continuous pulse ox and telemetry. Neuro checks q2h. Aggressive N/V control with anti-emetics. CPT/acapella treatment ordered. CXR & KUB stable. Will order ABG. Will continue to monitor closely.   -PRN meds for HTN while tube feeds are held.   -Accepted to [facility] when medically stable.                      Daisy Camacho PA-C  Neurosurgery  [facility]

## 2018-05-14 NOTE — ASSESSMENT & PLAN NOTE
- 2/2 to vomiting episode   - hold tube feeds for now  - aggressive N/V control with anti-emetics  - CPT/acapella treatment   - oxygen as needed   - aspiration precautions- keep head of bed up at all times  - no indication for antibiotics at this time  - CXR without signs of infiltrate  - can expect patient to be intermittently febrile, tachycardic and hypoxic- no indications for ICU at this time

## 2018-05-14 NOTE — PLAN OF CARE
SW following for DC needs. SW in communication with CM.    Patient accepted at [facility] when medically stable.     Yvonne Ellington, GARY  [id number]

## 2018-05-14 NOTE — ASSESSMENT & PLAN NOTE
82 y.o. male s/p C3-5 ACDF for cervical myelopathy on 4/10 with Dr. Hess, and now s/p evacuation/washout of fluid collection with CSF leak repair on 4/28/18 POD 16    -Patient neurologically stable, continued waxing / waning.   -Cervical collar when OOB or with activity, Up to chair as tolerated   -Speech gradually improving since wound revision;still requires deep suctioning to induce cough.   -Repeat CT Head 5/8 showed stable b/l hygromas  -Appreciate medicine recommendations and participation in care of patient  -DM reaching target goal, insulin dosing increased 5/9- Levemir 16 units nightly and Aspart 5 units every 4 hours.  -Hypercalcemia likely related to primary hyperparathyroidism.   -Vit D level low at 8, continue Vit D replacement.   -HTN at target, continue Diltiazem 90 mg every 6 hours. PRN meds SBP >160.   -Continue PILI/SCDs/SQH  -Continue bowel regimen daily. Brown bomb 5/3 with +BM  -Continue to hold Eliquis. Per , indications unclear.  does not recommend continuing upon discharge.  -Continue aggressive PT/OT/ST. Discussed with therapy to evaluate patient simultaneously while pt OOB with PT.  -Scheduled CPT q4 hours   -Aggressive oral care per nursing staff daily  -Continue delirium precautions   DAYTIME: blinds up/lights on, TV on, increase stimuli, awake with minimal naps  NIGHTTIME: blinds down, TV off, lights off, decrease any noise/stimuli   Private room to prevent hospital delirium and increase compliance for OOB  -Keep incision open to air. Do not submerge or scrub incision.   -Aspirational event 5/14, pt with 94% on 2L NC. Febrile and tachycardic. Discussed with Hospital medicine, will hold abx at this time. Hold tube feeds.  Elevate HOB >45 degrees. Strict aspiration precautions. Continuous pulse ox and telemetry. Neuro checks q2h. Aggressive N/V control with anti-emetics. CPT/acapella treatment ordered. CXR stable. Will continue to monitor.   -Accepted to [facility] when  medically stable.

## 2018-05-14 NOTE — NURSING
Daisy SAHU notified of pt abnormal v/s and x- ray performed but not resulted. Pt suctioned white and yellow color mix. Will continue to monitor.

## 2018-05-14 NOTE — PT/OT/SLP PROGRESS
Physical Therapy      Patient Name:  Hollis Pitts   MRN:  [id number]    Patient not seen today secondary to per nsg to hold, aspiration issues, awaiting xray, unable to follow up 2nd attempt. Will follow-up next PT session    Lashae Roman PTA

## 2018-05-15 PROBLEM — E83.39 HYPOPHOSPHATEMIA: Status: RESOLVED | Noted: 2018-05-10 | Resolved: 2018-05-15

## 2018-05-15 LAB
ALBUMIN SERPL BCP-MCNC: 2 G/DL
ALLENS TEST: ABNORMAL
ALP SERPL-CCNC: 71 U/L
ALT SERPL W/O P-5'-P-CCNC: 13 U/L
ANION GAP SERPL CALC-SCNC: 9 MMOL/L
AST SERPL-CCNC: 15 U/L
BACTERIA #/AREA URNS AUTO: ABNORMAL /HPF
BASOPHILS # BLD AUTO: 0.02 K/UL
BASOPHILS NFR BLD: 0.2 %
BILIRUB SERPL-MCNC: 0.5 MG/DL
BILIRUB UR QL STRIP: NEGATIVE
BUN SERPL-MCNC: 37 MG/DL
CALCIUM SERPL-MCNC: 9.5 MG/DL
CHLORIDE SERPL-SCNC: 105 MMOL/L
CLARITY UR REFRACT.AUTO: ABNORMAL
CO2 SERPL-SCNC: 24 MMOL/L
COLOR UR AUTO: YELLOW
CREAT SERPL-MCNC: 1.3 MG/DL
CREAT UR-MCNC: 59 MG/DL
DELSYS: ABNORMAL
DIFFERENTIAL METHOD: ABNORMAL
EOSINOPHIL # BLD AUTO: 0 K/UL
EOSINOPHIL NFR BLD: 0 %
EOSINOPHIL URNS QL WRIGHT STN: NORMAL
ERYTHROCYTE [DISTWIDTH] IN BLOOD BY AUTOMATED COUNT: 15.4 %
EST. GFR  (AFRICAN AMERICAN): 59.6 ML/MIN/1.73 M^2
EST. GFR  (NON AFRICAN AMERICAN): 51.5 ML/MIN/1.73 M^2
GLUCOSE SERPL-MCNC: 169 MG/DL
GLUCOSE UR QL STRIP: ABNORMAL
HCO3 UR-SCNC: 27.7 MMOL/L (ref 24–28)
HCT VFR BLD AUTO: 33.5 %
HGB BLD-MCNC: 10.6 G/DL
HGB UR QL STRIP: ABNORMAL
HYALINE CASTS UR QL AUTO: 0 /LPF
IMM GRANULOCYTES # BLD AUTO: 0.06 K/UL
IMM GRANULOCYTES NFR BLD AUTO: 0.5 %
KETONES UR QL STRIP: NEGATIVE
LEUKOCYTE ESTERASE UR QL STRIP: ABNORMAL
LYMPHOCYTES # BLD AUTO: 2.1 K/UL
LYMPHOCYTES NFR BLD: 18.1 %
MCH RBC QN AUTO: 30.7 PG
MCHC RBC AUTO-ENTMCNC: 31.6 G/DL
MCV RBC AUTO: 97 FL
MICROSCOPIC COMMENT: ABNORMAL
MONOCYTES # BLD AUTO: 1.7 K/UL
MONOCYTES NFR BLD: 14.4 %
NEUTROPHILS # BLD AUTO: 7.7 K/UL
NEUTROPHILS NFR BLD: 66.8 %
NITRITE UR QL STRIP: NEGATIVE
NRBC BLD-RTO: 0 /100 WBC
PCO2 BLDA: 35.7 MMHG (ref 35–45)
PH SMN: 7.5 [PH] (ref 7.35–7.45)
PH UR STRIP: 7 [PH] (ref 5–8)
PLATELET # BLD AUTO: 295 K/UL
PMV BLD AUTO: 10.2 FL
PO2 BLDA: 80 MMHG (ref 80–100)
POC BE: 5 MMOL/L
POC SATURATED O2: 97 % (ref 95–100)
POC TCO2: 29 MMOL/L (ref 23–27)
POCT GLUCOSE: 118 MG/DL (ref 70–110)
POCT GLUCOSE: 127 MG/DL (ref 70–110)
POCT GLUCOSE: 152 MG/DL (ref 70–110)
POCT GLUCOSE: 162 MG/DL (ref 70–110)
POCT GLUCOSE: 89 MG/DL (ref 70–110)
POTASSIUM SERPL-SCNC: 4.8 MMOL/L
PROCALCITONIN SERPL IA-MCNC: 0.59 NG/ML
PROT SERPL-MCNC: 6.9 G/DL
PROT UR QL STRIP: ABNORMAL
PROT UR-MCNC: 102 MG/DL
PROT/CREAT RATIO, UR: 1.73
RBC # BLD AUTO: 3.45 M/UL
RBC #/AREA URNS AUTO: 4 /HPF (ref 0–4)
SAMPLE: ABNORMAL
SITE: ABNORMAL
SODIUM SERPL-SCNC: 138 MMOL/L
SP GR UR STRIP: 1.01 (ref 1–1.03)
URN SPEC COLLECT METH UR: ABNORMAL
UROBILINOGEN UR STRIP-ACNC: NEGATIVE EU/DL
WBC # BLD AUTO: 11.49 K/UL
WBC #/AREA URNS AUTO: 52 /HPF (ref 0–5)

## 2018-05-15 PROCEDURE — 92526 ORAL FUNCTION THERAPY: CPT

## 2018-05-15 PROCEDURE — 97530 THERAPEUTIC ACTIVITIES: CPT

## 2018-05-15 PROCEDURE — 94761 N-INVAS EAR/PLS OXIMETRY MLT: CPT

## 2018-05-15 PROCEDURE — 87088 URINE BACTERIA CULTURE: CPT

## 2018-05-15 PROCEDURE — 25000003 PHARM REV CODE 250: Performed by: INTERNAL MEDICINE

## 2018-05-15 PROCEDURE — 87086 URINE CULTURE/COLONY COUNT: CPT

## 2018-05-15 PROCEDURE — 84145 PROCALCITONIN (PCT): CPT

## 2018-05-15 PROCEDURE — 63600175 PHARM REV CODE 636 W HCPCS: Performed by: STUDENT IN AN ORGANIZED HEALTH CARE EDUCATION/TRAINING PROGRAM

## 2018-05-15 PROCEDURE — 25000003 PHARM REV CODE 250: Performed by: PHYSICIAN ASSISTANT

## 2018-05-15 PROCEDURE — 87186 SC STD MICRODIL/AGAR DIL: CPT

## 2018-05-15 PROCEDURE — 27000221 HC OXYGEN, UP TO 24 HOURS

## 2018-05-15 PROCEDURE — 81001 URINALYSIS AUTO W/SCOPE: CPT

## 2018-05-15 PROCEDURE — 25000242 PHARM REV CODE 250 ALT 637 W/ HCPCS: Performed by: PHYSICIAN ASSISTANT

## 2018-05-15 PROCEDURE — 87077 CULTURE AEROBIC IDENTIFY: CPT

## 2018-05-15 PROCEDURE — 85025 COMPLETE CBC W/AUTO DIFF WBC: CPT

## 2018-05-15 PROCEDURE — 25000003 PHARM REV CODE 250: Performed by: STUDENT IN AN ORGANIZED HEALTH CARE EDUCATION/TRAINING PROGRAM

## 2018-05-15 PROCEDURE — 94640 AIRWAY INHALATION TREATMENT: CPT

## 2018-05-15 PROCEDURE — 63600175 PHARM REV CODE 636 W HCPCS: Performed by: PHYSICIAN ASSISTANT

## 2018-05-15 PROCEDURE — 97110 THERAPEUTIC EXERCISES: CPT

## 2018-05-15 PROCEDURE — 99232 SBSQ HOSP IP/OBS MODERATE 35: CPT | Mod: ,,, | Performed by: HOSPITALIST

## 2018-05-15 PROCEDURE — 20600001 HC STEP DOWN PRIVATE ROOM

## 2018-05-15 PROCEDURE — 94668 MNPJ CHEST WALL SBSQ: CPT

## 2018-05-15 PROCEDURE — 87205 SMEAR GRAM STAIN: CPT

## 2018-05-15 PROCEDURE — 36415 COLL VENOUS BLD VENIPUNCTURE: CPT

## 2018-05-15 PROCEDURE — 82570 ASSAY OF URINE CREATININE: CPT

## 2018-05-15 PROCEDURE — 99024 POSTOP FOLLOW-UP VISIT: CPT | Mod: POP,,, | Performed by: PHYSICIAN ASSISTANT

## 2018-05-15 PROCEDURE — 80053 COMPREHEN METABOLIC PANEL: CPT

## 2018-05-15 RX ORDER — AMOXICILLIN AND CLAVULANATE POTASSIUM 400; 57 MG/5ML; MG/5ML
500 POWDER, FOR SUSPENSION ORAL EVERY 8 HOURS
Status: DISCONTINUED | OUTPATIENT
Start: 2018-05-15 | End: 2018-05-16

## 2018-05-15 RX ORDER — CIPROFLOXACIN 500 MG/1
500 TABLET ORAL EVERY 12 HOURS
Status: DISCONTINUED | OUTPATIENT
Start: 2018-05-15 | End: 2018-05-16

## 2018-05-15 RX ADMIN — DILTIAZEM HYDROCHLORIDE 90 MG: 60 TABLET, FILM COATED ORAL at 11:05

## 2018-05-15 RX ADMIN — DILTIAZEM HYDROCHLORIDE 90 MG: 60 TABLET, FILM COATED ORAL at 12:05

## 2018-05-15 RX ADMIN — STANDARDIZED SENNA CONCENTRATE AND DOCUSATE SODIUM 1 TABLET: 8.6; 5 TABLET, FILM COATED ORAL at 08:05

## 2018-05-15 RX ADMIN — CHLORHEXIDINE GLUCONATE 15 ML: 1.2 RINSE ORAL at 08:05

## 2018-05-15 RX ADMIN — INSULIN ASPART 6 UNITS: 100 INJECTION, SOLUTION INTRAVENOUS; SUBCUTANEOUS at 08:05

## 2018-05-15 RX ADMIN — ACETAMINOPHEN 650 MG: 325 TABLET ORAL at 06:05

## 2018-05-15 RX ADMIN — ATORVASTATIN CALCIUM 20 MG: 20 TABLET, FILM COATED ORAL at 10:05

## 2018-05-15 RX ADMIN — CIPROFLOXACIN HYDROCHLORIDE 500 MG: 500 TABLET, FILM COATED ORAL at 04:05

## 2018-05-15 RX ADMIN — HEPARIN SODIUM 5000 UNITS: 5000 INJECTION, SOLUTION INTRAVENOUS; SUBCUTANEOUS at 02:05

## 2018-05-15 RX ADMIN — HEPARIN SODIUM 5000 UNITS: 5000 INJECTION, SOLUTION INTRAVENOUS; SUBCUTANEOUS at 10:05

## 2018-05-15 RX ADMIN — IPRATROPIUM BROMIDE AND ALBUTEROL SULFATE 3 ML: .5; 3 SOLUTION RESPIRATORY (INHALATION) at 08:05

## 2018-05-15 RX ADMIN — CEFTRIAXONE SODIUM 1 G: 1 INJECTION, POWDER, FOR SOLUTION INTRAMUSCULAR; INTRAVENOUS at 08:05

## 2018-05-15 RX ADMIN — AMOXICILLIN AND CLAVULANATE POTASSIUM 500 MG: 400; 57 POWDER, FOR SUSPENSION ORAL at 11:05

## 2018-05-15 RX ADMIN — SODIUM CHLORIDE 1 G: 9 INJECTION, SOLUTION INTRAVENOUS at 08:05

## 2018-05-15 RX ADMIN — AMOXICILLIN AND CLAVULANATE POTASSIUM 500 MG: 400; 57 POWDER, FOR SUSPENSION ORAL at 04:05

## 2018-05-15 RX ADMIN — DILTIAZEM HYDROCHLORIDE 90 MG: 60 TABLET, FILM COATED ORAL at 06:05

## 2018-05-15 RX ADMIN — IPRATROPIUM BROMIDE AND ALBUTEROL SULFATE 3 ML: .5; 3 SOLUTION RESPIRATORY (INHALATION) at 03:05

## 2018-05-15 RX ADMIN — VALSARTAN 120 MG: 40 TABLET ORAL at 08:05

## 2018-05-15 RX ADMIN — CHLORHEXIDINE GLUCONATE 15 ML: 1.2 RINSE ORAL at 10:05

## 2018-05-15 RX ADMIN — IPRATROPIUM BROMIDE AND ALBUTEROL SULFATE 3 ML: .5; 3 SOLUTION RESPIRATORY (INHALATION) at 01:05

## 2018-05-15 RX ADMIN — STANDARDIZED SENNA CONCENTRATE AND DOCUSATE SODIUM 1 TABLET: 8.6; 5 TABLET, FILM COATED ORAL at 10:05

## 2018-05-15 RX ADMIN — CITALOPRAM HYDROBROMIDE 10 MG: 10 TABLET ORAL at 08:05

## 2018-05-15 RX ADMIN — HEPARIN SODIUM 5000 UNITS: 5000 INJECTION, SOLUTION INTRAVENOUS; SUBCUTANEOUS at 06:05

## 2018-05-15 RX ADMIN — POLYETHYLENE GLYCOL 3350 17 G: 17 POWDER, FOR SOLUTION ORAL at 10:05

## 2018-05-15 RX ADMIN — TAMSULOSIN HYDROCHLORIDE 0.4 MG: 0.4 CAPSULE ORAL at 08:05

## 2018-05-15 NOTE — PROGRESS NOTES
[facility]  Neurosurgery  Progress Note    Subjective:     History of Present Illness: 82 y.o.  male with type 2 diabetes, who presents today for follow up evaluation one week prior to 2 level ACDF scheduled for 4/10/2018. Pt reports    Pt presents today wearing a cervical collar and in a wheelchair. He would like to proceed with surgery. Per pt's family, his PCP would like to see him on 4/9, the day before surgery. He notes continued neck pain and BUE numbness/tingling as well as BUE weakness. Pt is only able to walk a few steps unassisted. He has never received neck surgery.     Post-Op Info:  Procedure(s) (LRB):  WASHOUT-CERVICAL-wound washout and csf leak repair with depuy (N/A)  DISKECTOMY AND FUSION-ANTERIOR CERVICAL (ACDF)- REVISON C5-C7   17 Days Post-Op     Interval History:   NAEON.  Tolerating 2L O2 via NC.  Sitting in bed, continued waxing/waning mental status.  Denies new/worsening weakness today.  Tube feeds restarted today.         Medications:  Continuous Infusions:  Scheduled Meds:   albuterol-ipratropium 2.5mg-0.5mg/3mL  3 mL Nebulization Q4H    amoxicillin-pot clavulanate 250-62.5 mg/5ml  500 mg Per G Tube Q8H    atorvastatin  20 mg Per NG tube Nightly    chlorhexidine  15 mL Mouth/Throat BID    citalopram  10 mg Per G Tube Daily    diltiaZEM  90 mg Per G Tube Q6H    [START ON 5/20/2018] ergocalciferol  50,000 Units Per G Tube Q7 Days    heparin (porcine)  5,000 Units Subcutaneous Q8H    insulin aspart U-100  6 Units Subcutaneous Q4H    insulin detemir U-100  18 Units Subcutaneous BID    polyethylene glycol  17 g Per G Tube BID    senna-docusate 8.6-50 mg  1 tablet Per G Tube BID    tamsulosin  0.4 mg Per G Tube Daily    valsartan  120 mg Per G Tube Daily    zinc oxide   Topical (Top) Daily     PRN Meds:acetaminophen, aluminum-magnesium hydroxide-simethicone, dextrose 50%, glucagon (human recombinant), hydrALAZINE, insulin aspart U-100, labetalol, lidocaine HCL  2%, ondansetron     Review of Systems  Objective:     Weight: 99.5 kg (219 lb 5.7 oz)  Body mass index is 29.75 kg/m².  Vital Signs (Most Recent):  Temp: 98.6 °F (37 °C) (05/15/18 1400)  Pulse: 103 (05/15/18 1400)  Resp: 18 (05/15/18 1400)  BP: (!) 118/58 (05/15/18 1400)  SpO2: 95 % (05/15/18 1400) Vital Signs (24h Range):  Temp:  [98 °F (36.7 °C)-101.7 °F (38.7 °C)] 98.6 °F (37 °C)  Pulse:  [] 103  Resp:  [14-20] 18  SpO2:  [92 %-99 %] 95 %  BP: (116-170)/(58-92) 118/58                           Gastrostomy/Enterostomy 05/04/18 1539 Gastrostomy tube w/ balloon LUQ feeding (Active)   Securement anchored to abdomen w/ adhesive device 5/14/2018  9:00 AM   Interventions Prior to Feeding patency checked;residual checked;residual returned 5/13/2018  8:00 PM   Suction Setting/Drainage Method dependent drainage 5/6/2018  8:00 AM   Feeding Type continuous 5/13/2018  8:00 PM   Clamp Status/Tolerance no restlessness 5/13/2018  8:00 PM   Feeding Action feeding held 5/14/2018 10:30 PM   Dressing dry and intact 5/13/2018  8:00 PM   Insertion Site dry 5/12/2018  8:03 PM   Site Care outer bumper rotated;sterile precut T-slit dressing applied 5/12/2018  8:03 PM   Flush/Irrigation flushed w/;water;no resistance met 5/12/2018  8:03 PM   Current Rate (mL/hr) 70 mL/hr 5/13/2018  7:20 AM   Goal Rate (mL/hr) 70 mL/hr 5/13/2018  7:20 AM   Water Bolus (mL) 160 mL 5/12/2018 11:52 PM   Tube Feeding Intake (mL) 70 5/13/2018  8:00 PM   Residual Amount (ml) 0 ml 5/13/2018  7:20 AM       Male External Urinary Catheter 04/30/18 1600 (Active)   Collection Container Standard drainage bag 5/13/2018  8:00 PM   Securement Method secured to top of thigh w/ adhesive device 5/13/2018  8:00 PM   Skin no breakdown 5/12/2018  8:03 PM   Tolerance no signs/symptoms of discomfort 5/12/2018  8:03 PM   Output (mL) 900 mL 5/13/2018  1:00 PM   Catheter Change Date 05/11/18 5/11/2018  7:05 AM   Catheter Change Time 0705 5/11/2018  7:05 AM       Neurosurgery  Physical Exam   General: no acute distress. Generalized deconditioning   Neurologic: Awake, sitting upright in bed. Oriented to person and place.   Head: normocephalic, atraumatic   GCS: Motor: 6/Verbal: 5/Eyes: 4 GCS Total: 15  Cranial nerves: face symmetric, tongue midline, pupils equal, round, reactive to light with accomodation, extraocular muscles intact. CN II-XII grossly intact.   Language: no aphasia  Speech:  dysarthria improved compared to pre-wound revision on 4/28  Sensory: decrease response to light touch in BUE  Motor Strength:   Strength   Deltoids Triceps Biceps Wrist Extension Wrist Flexion Hand    Upper: R 2/5 3/5 3/5 2/5 2/5 3/5     L 3/5 3/5 3/5 3/5 3/5 3/5       Iliopsoas Quadriceps Knee  Flexion Tibialis  anterior Gastro- cnemius EHL   Lower: R 3/5 3/5 3/5 4-/5 4-/5 4-/5     L 3/5 3/5 3/5 4/5 4/5 4/5   [place]: absent  Clonus: absent  Lungs:  normal respiratory effort on 2L nasal cannula.   Abdomen: soft, non-tender and symmetric. G tube site c/d/i  Extremities: warm with no cyanosis, edema, or clubbing.   Pulses: palpable distal pulses  Skin: warm, dry and intact. No visible rashes or lesions.   Incision c/d/i, no erythema, edema, or drainage.   C-collar in place        Significant Labs:    Recent Labs  Lab 05/14/18  0430 05/15/18  0450   * 169*   * 138   K 5.1 4.8    105   CO2 25 24   BUN 36* 37*   CREATININE 1.2 1.3   CALCIUM 9.7 9.5       Recent Labs  Lab 05/14/18  0430 05/15/18  0450   WBC 9.76 11.49   HGB 10.6* 10.6*   HCT 33.3* 33.5*    295     No results for input(s): LABPT, INR, APTT in the last 48 hours.  Microbiology Results (last 7 days)     ** No results found for the last 168 hours. **          Significant Diagnostics:    CXR 5/15/18:     XR CHEST 1 VIEW    CLINICAL HISTORY:  aspiration;    COMPARISON:  Comparison is made to 05/14/2018.    FINDINGS:  No significant interval change in the appearance of the chest since 05/14/2018 at 8:47 a.m. is  appreciated.   Impression       As above      Electronically signed by: Rosalio Gilman MD  Date: 05/15/2018  Time: 07:32         Assessment/Plan:     * Cervical myelopathy with cervical radiculopathy    82 y.o. male s/p C3-5 ACDF for cervical myelopathy on 4/10 with Dr. Hess, and now s/p evacuation/washout of fluid collection with CSF leak repair on 4/28/18 POD 17  -Patient neurologically stable  -Pt s/p aspiration yesterday 2/2 to N/V.  O2 sats remaining stable on 2L O2 via nasal canula. Will DC Vanc/Rocephin and start po Augmentin for prophylaxis s/p aspiration.  CXR today stable.  D/w with HM, low concern for pneumonia at this time.  Pt with intermittent low grade fevers. Restarted trickle feeds today. aggressive N/V control with anti-emetics. CPT/acapella treatment q 4 hours.  aspiration precautions- keep head of bed up at all times. CXR without signs of infiltrate.  Apprecicate recommendations.   -DM reaching target goal, insulin dosing increased 5/9- Levemir 16 units nightly and Aspart 5 units every 4 hours.  -Hypercalcemia likely related to primary hyperparathyroidism.   -Vit D level low at 8, continue Vit D replacement.   -HTN at target, continue Diltiazem 90 mg every 6 hours. PRN meds SBP >160.   -Cervical collar when OOB or with activity, Up to chair as tolerated.  Surgical incision well healed.   -Speech gradually improving since wound revision;still requires deep suctioning to induce cough.   -Scheduled CPT q4 hours   -Aggressive oral care per nursing staff daily  -Repeat CT Head 5/8 showed stable b/l hygromas   -Continue PILI/SCDs/SQH  -Continue bowel regimen daily.  -Continue to hold Eliquis. Per , indications unclear.  does not recommend continuing upon discharge.  -Continue aggressive PT/OT/ST. Discussed with therapy to evaluate patient simultaneously while pt OOB with PT.  -Continue delirium precautions   -Will attempt to transfer to SNf when intermittent fevers resolve and more stable from  medical standpoint   -Discussed with LUIS ALBERTO Zuniga  Neurosurgery  [facility]

## 2018-05-15 NOTE — PT/OT/SLP PROGRESS
"Speech Language Pathology Treatment    Patient Name:  Hollis Pitts   MRN:  [id number]  Admitting Diagnosis: Cervical myelopathy with cervical radiculopathy    Recommendations:                 General Recommendations:  Dysphagia therapy and Speech/language therapy  Diet recommendations:  NPO, Liquid Diet Level: NPO   Aspiration Precautions: Continue alternate means of nutrition and Strict aspiration precautions   General Precautions: Standard, aspiration, aphasia, NPO, respiratory  Communication strategies:  provide increased time to answer and go to room if call light pushed    Subjective     SLP reviewed Pt with nurse pre/post session   Pt presents fatigued  Pt says "ah"   Pt's daughter explains, "They gave him some medicine a little while ago."    Pain/Comfort:  · Pain Rating 1: 2/10  · Location - Side 1: Right  · Location - Orientation 1: lower  · Location 1: arm  · Pain Addressed 1: Nurse notified  · Pain Rating Post-Intervention 1: 2/10    Objective:     Has the patient been evaluated by SLP for swallowing?   Yes  Keep patient NPO? Yes   Current Respiratory Status: nasal cannula      Pt seen for Dysphagia therapy. Pt found asleep in bed, daughter and family friend at bedside. Patient upright in bed donning cervical collar. Patient requires MAX verbal and tactile stimulation to keep eyes open greater than 15 seconds at a time. Patient completes reps of falsetto /i/ x3 ea provided MAX A. Pt DEP for lingual protrusion and effortful swallow tasks.  Patient easily falling asleep between exercises, requiring MAX verbal and tactile cueing from SLP and daughter to redirect and attend to tasks. Patient and family educated on SLP role, Dysphagia and SLP POC. Patient asleep falling back to sleep t/o education and not able to demonstrate understanding. Daughter verbalizes understanding and then asks if she can speak to nurse about medications. Nurse notified. No further questions noted. Whiteboard current.     Assessment: "     Hollis Pitts is a 82 y.o. male with an SLP diagnosis of Aphasia and Dysphagia.  He presents with increased lethargy today. Patient ongoing with current goals. ST to continue to follow.    Goals:    SLP Goals        Problem: SLP Goal    Goal Priority Disciplines Outcome   SLP Goal     SLP Ongoing (interventions implemented as appropriate)   Description:  Speech Language Pathology Goals  Goals expected to be met by 5/8-continue till 5/15    1. Pt will participate in ongoing swallow assessment  2. Pt will participate in speech language cognitive assessment MEt  3. Pt will complete rote speech tasks with 80% accy and mod cues  4. Pt will follow 1 step commands with 90% accuracy and occasional cues  5. Pt will participate in ongoing speech language cognitive assessment.         Speech Language Pathology Goals  Goals expected to be met by 4/29:    1. Pt will participate in ongoing assessment of swallow.                               Plan:     · Patient to be seen:  3 x/week   · Plan of Care expires:  05/22/18  · Plan of Care reviewed with:  patient, daughter, friend   · SLP Follow-Up:  Yes       Discharge recommendations:  nursing facility, skilled   Barriers to Discharge:  Level of Skilled Assistance Needed     Time Tracking:     SLP Treatment Date:   05/15/18  Speech Start Time:  1736  Speech Stop Time:  1752     Speech Total Time (min):  16 min    Billable Minutes: Treatment Swallowing Dysfunction 16    VALENTINO Shelby., [facility]-SLP  Speech-Language Pathology  Pager: [phone]      05/15/2018

## 2018-05-15 NOTE — PLAN OF CARE
Problem: Physical Therapy Goal  Goal: Physical Therapy Goal  Goals to be met by: 2018     Patient will increase functional independence with mobility by performin. Supine to sit with Moderate Assistance  2. Sit to supine with Moderate Assistance  3. Sit to stand transfer with Max Assistance  4. Bed to chair transfer with Max Assistance using Rolling Walker  5. Lower extremity exercise program x20 reps per handout, with supervision   6. Pt will perform sitting at EOB x 10 minutes with Contact Guard Assistance to improve trunk control           Outcome: Ongoing (interventions implemented as appropriate)  Pt's goals remain appropriate and pt will continue to benefit from skilled PT services to work towards improved functional mobility including: bed mobility, transfers, and therapeutic exercise.   Enriqueta Healy, PT  5/15/2018

## 2018-05-15 NOTE — SUBJECTIVE & OBJECTIVE
Interval History:   Fever curve improving. Patient maintaining sats on 2 L. Having intermittent cough. Started on abx overnight as per primary team. Denies pain. ROS as per below.    Review of Systems   Constitutional: Positive for fever. Negative for chills.   HENT: Positive for congestion and trouble swallowing.    Respiratory: Positive for cough and shortness of breath.    Cardiovascular: Negative for chest pain.   Gastrointestinal: Negative for abdominal pain, constipation, diarrhea and nausea.   Genitourinary: Negative for dysuria.   Musculoskeletal: Positive for neck pain and neck stiffness.   Psychiatric/Behavioral: Positive for confusion and decreased concentration.     Objective:     Vital Signs (Most Recent):  Temp: (!) 100.4 °F (38 °C) (05/15/18 0933)  Pulse: (!) 121 (05/15/18 0933)  Resp: 20 (05/15/18 0933)  BP: (!) 154/72 (05/15/18 0933)  SpO2: (!) 92 % (05/15/18 0933) Vital Signs (24h Range):  Temp:  [98 °F (36.7 °C)-102.1 °F (38.9 °C)] 100.4 °F (38 °C)  Pulse:  [] 121  Resp:  [14-22] 20  SpO2:  [92 %-99 %] 92 %  BP: (116-170)/(72-92) 154/72     Weight: 99.5 kg (219 lb 5.7 oz)  Body mass index is 29.75 kg/m².    Intake/Output Summary (Last 24 hours) at 05/15/18 1031  Last data filed at 05/15/18 0608   Gross per 24 hour   Intake                0 ml   Output              300 ml   Net             -300 ml      Physical Exam   Constitutional: He appears well-developed.   HENT:   Head: Normocephalic.   Wearing collar. Mucous membranes dry   Eyes: EOM are normal. Pupils are equal, round, and reactive to light.   Arcus senilis present bilaterally   Cardiovascular: Normal rate.    No murmur heard.  Pulmonary/Chest: He has rales.   NC in place. Crackles improved   Abdominal: Soft. Bowel sounds are normal. There is no tenderness.   Peg tube in place   Genitourinary:   Genitourinary Comments: Stage II sacral decubitus ulcer   Neurological: He is alert.       Significant Labs: All pertinent labs within the  past 24 hours have been reviewed.    Significant Imaging: I have reviewed all pertinent imaging results/findings within the past 24 hours.

## 2018-05-15 NOTE — ASSESSMENT & PLAN NOTE
- 2/2 to vomiting episode   - restarted trickle feeds   - aggressive N/V control with anti-emetics  - CPT/acapella treatment q 4 hours  - oxygen as needed   - aspiration precautions- keep head of bed up at all times  - vanc and rocephin started 5/14 as per NSGY though from hospital medicine standpoint do not feel that patient needs antibiotics at this time. Vanc and rocephin not covering typical aspiration pathogens. If abx strongly desired by primary team, would switch to unasyn IV or augmentin for PO.   - CXR without signs of infiltrate  - can expect patient to be intermittently febrile, tachycardic and hypoxic- no indications for ICU at this time

## 2018-05-15 NOTE — PLAN OF CARE
Problem: Fall Risk (Adult)  Goal: Identify Related Risk Factors and Signs and Symptoms  Related risk factors and signs and symptoms are identified upon initiation of Human Response Clinical Practice Guideline (CPG)   Outcome: Ongoing (interventions implemented as appropriate)  Pt will remain free of falls during stay.

## 2018-05-15 NOTE — PT/OT/SLP PROGRESS
Physical Therapy Treatment    Patient Name:  Hollis Pitts   MRN:  [id number]    Recommendations:     Discharge Recommendations:  nursing facility, skilled (long term)   Discharge Equipment Recommendations: lift device, hospital bed   Barriers to discharge: Decreased caregiver support    Assessment:     Hollis Pitts is a 82 y.o. male admitted with a medical diagnosis of Cervical myelopathy with cervical radiculopathy.  He presents with the following impairments/functional limitations:  weakness, impaired endurance, impaired self care skills, impaired functional mobilty, impaired balance, decreased upper extremity function, decreased lower extremity function, decreased safety awareness, decreased ROM, impaired cardiopulmonary response to activity . Pt lethargic at times during treatment requiring increased assist.    Rehab Prognosis:  fair; patient would benefit from acute skilled PT services to address these deficits and reach maximum level of function.      Recent Surgery: Procedure(s) (LRB):  WASHOUT-CERVICAL-wound washout and csf leak repair with depuy (N/A)  DISKECTOMY AND FUSION-ANTERIOR CERVICAL (ACDF)- REVISON C5-C7 17 Days Post-Op    Plan:     During this hospitalization, patient to be seen 3 x/week to address the above listed problems via therapeutic activities, therapeutic exercises, neuromuscular re-education, wheelchair management/training  · Plan of Care Expires:  06/01/18   Plan of Care Reviewed with: patient, family    Subjective     Communicated with nurse prior to session.  Patient found supine upon PT entry to room, agreeable to treatment.    Pt with no verbal communication during treatment  Pain/Comfort:  · Pain Rating 1:  (pt did not c/o pain during treatment)    Patients cultural, spiritual, Christian conflicts given the current situation: none noted    Objective:     Patient found with: cervical collar, PEG Tube, oxygen, peripheral IV, telemetry, SCD (condom catheter)     General  Precautions: Standard, fall, aspiration   Orthopedic Precautions:spinal precautions   Braces: Aspen collar     Functional Mobility:  · Bed Mobility:     · Rolling Left:  total assistance; rolled to L and R x 3 trials to change linens and to place wedges  · Rolling Right: total assistance  · Supine to Sit: total assistance  · Sit to Supine: total assistance  · Transfers:     · Sit to Stand:  Attempted sit to stand with total assistance with no AD with minimal clearance of his buttocks from the bed x 3 trials    AM-PAC 6 CLICK MOBILITY  Turning over in bed (including adjusting bedclothes, sheets and blankets)?: 2  Sitting down on and standing up from a chair with arms (e.g., wheelchair, bedside commode, etc.): 1  Moving from lying on back to sitting on the side of the bed?: 2  Moving to and from a bed to a chair (including a wheelchair)?: 1  Need to walk in hospital room?: 1  Climbing 3-5 steps with a railing?: 1  Total Score: 8     Therapeutic Activities and Exercises:   Pt performed/received AAROM to B UE/LE within available planes of movement x 10 reps in sitting. Pt sat on the EOB ~ 20 min with mod to total assist due to L sided and posterior LOB. Pt given manual cues for upright posture. Pt became fatigued and lethargic at times during sitting trial requiring verbal and manual cues to attend to activity.    Patient left supine with all lines intact, call button in reach, nurse notified and nurse and family present..    GOALS:    Physical Therapy Goals        Problem: Physical Therapy Goal    Goal Priority Disciplines Outcome Goal Variances Interventions   Physical Therapy Goal     PT/OT, PT Ongoing (interventions implemented as appropriate)     Description:  Goals to be met by: 2018     Patient will increase functional independence with mobility by performin. Supine to sit with Moderate Assistance  2. Sit to supine with Moderate Assistance  3. Sit to stand transfer with Max Assistance  4. Bed to chair  transfer with Max Assistance using Rolling Walker  5. Lower extremity exercise program x20 reps per handout, with supervision   6. Pt will perform sitting at EOB x 10 minutes with Contact Guard Assistance to improve trunk control                             Time Tracking:     PT Received On: 05/15/18  PT Start Time: 1034     PT Stop Time: 1106  PT Total Time (min): 32 min     Billable Minutes: Therapeutic Activity 17 and Therapeutic Exercise 15    Treatment Type: Treatment  PT/PTA: PT     PTA Visit Number: 0     Enriqueta Healy, PT  05/15/2018

## 2018-05-15 NOTE — PLAN OF CARE
Problem: Occupational Therapy Goal  Goal: Occupational Therapy Goal  Goals to be met by: 5/29  Patient will increase functional independence with ADLs by performing:    Pt will actively engage in therapeutic task for 8 min duration with 0 added cues/facilitation.  Pt will complete L UE reaching task EOB with moderate assistance for postural control.   Supine<>sit ; sit<>supine with Max Assistance.  Grooming while seated EOB with set up for grooming and mod(A) for postural control  Pt will tolerate EOB activity ~15 min duration with mod(A) for postural control.   Family/caregiver demo understanding for ROM and positioning for B UE.   Outcome: Ongoing (interventions implemented as appropriate)  Goals updated and extended. MADDIE Quintanilla 5/15/2018

## 2018-05-15 NOTE — CARE UPDATE
Chart check completed, abnormal VS noted, bedside RNJoann contacted, no concerns verbalized at this time.  Patient with aspiration PNA from tube feeds, started on vancomycin and ceftriaxone, frequent respiratory treatments, CPT, and suctioning.  Sinus tachycardia in 110s onset with fevers.  Instructed to call [phone] for further concerns or assistance.    Vitals:    05/15/18 0158   BP: 132/76   Pulse: (!) 116   Resp: 17   Temp: 99 °F (37.2 °C)

## 2018-05-15 NOTE — PLAN OF CARE
TAN following for DC needs. TAN in communication with MALIKA.    TAN called [facility] to inform Roxi of the patient's tube feeds.  TAN left a message for Roxi at [facility].     UPDATE 12:40 PM  Per team during huddle, patient not medically ready for DC.    Yvonne Ellington, TAN  [id number]

## 2018-05-15 NOTE — ASSESSMENT & PLAN NOTE
-- A1C 5.6, glucoses well controlled  -- per chart review patient had reported being controlled by lifestyle modification alone at home   -- currently on continuous tube feeds with diabetisource   -- detemir 18 units BID   -- aspart to 6 units q4 + low dose SSI (would hold if tube feeds are held)    Recent Labs      05/14/18   1201  05/14/18   1415  05/14/18   1805  05/14/18   2232  05/15/18   0200  05/15/18   0845   POCTGLUCOSE  208*  201*  165*  179*  162*  152*

## 2018-05-15 NOTE — PT/OT/SLP PROGRESS
Occupational Therapy   Treatment    Name: Hollis Pitts  MRN: [id number]  Admitting Diagnosis:  Cervical myelopathy with cervical radiculopathy  17 Days Post-Op    Recommendations:     Discharge Recommendations: nursing facility, skilled (longterm)  Discharge Equipment Recommendations:  hospital bed, lift device  Barriers to discharge:  Decreased caregiver support, Other (Comment) (level of skilled assistance required)    Subjective     Communicated with: RN prior to session. Completed with rehab Letty copeland.  Pain/Comfort:  · Pain Rating 1:  (c/o B shoulder pain)  · Pain Addressed 1: Reposition, Nurse notified  · Pain Rating Post-Intervention 1:  (remained throughout)    Patients cultural, spiritual, Restorationist conflicts given the current situation: None reported    Objective:     Patient found with: cervical collar, peripheral IV, telemetry, PEG Tube, SCD    General Precautions: Standard, aspiration, fall, aphasia, NPO, respiratory (delirium)   Orthopedic Precautions:spinal precautions   Braces: Aspen collar     Occupational Performance:    Bed Mobility:    · Patient completed Rolling/Turning to Left with  total assistance and 2 persons  · Patient completed Scooting/Bridging with dependent  · Patient completed Supine to Sit with total assistance  · Patient completed Sit to Supine with total assistance and 2 persons     Functional Mobility/Transfers:  · Unable to complete 2/2 impaired sitting balance and lethargic nature     Activities of Daily Living:  · Feeding:  NPO PEG  · Grooming: total assistance L hand to wipe eyes/face' required proximal support for ROM to face and assistance for sustained grasp on item  · UB Dressing: total assistance to don/doff gown as jacket  · LB Dressing: dependence to don B non slip socks    Patient left HOB elevated with all lines intact, call button in reach, bed alarm on and RN notified    [facility] 6 Click:  [facility] Total Score: 7    Treatment & Education:  -Pt required increased tactile  and verbal cues for eyes open and arousal for session; Pt agreeable to OT session  -EOB ~18 min with total(A) for postural control 2/2 L posterior lean  *facilitation for thoracic extension and scapular protraction   *Aspen collar back on upside down; completed readjustment and correct placement while seated   *R forearm weight bearing x2 trials with toal(A)x2 persons for return to neutral midline  -Return to supine: B UE positioned for approximation of GH joint; open palm grasp and abduction; cervical spine in neutral midline  -Communication board updated; questions/concerns addressed within OT scope of practice  Education:    Assessment:     Hollis Pitts is a 82 y.o. male with a medical diagnosis of Cervical myelopathy with cervical radiculopathy.  He presents with decline in active participation at this time. He required increased assistance for all functional task, activities and ADLs on this date.  Performance deficits affecting function are weakness, impaired endurance, impaired sensation, impaired self care skills, impaired functional mobilty, gait instability, impaired balance, decreased upper extremity function, decreased lower extremity function, pain, impaired coordination, impaired fine motor, impaired skin, impaired cardiopulmonary response to activity, decreased ROM.      Rehab Prognosis:  Fair/Limited; patient would benefit from acute skilled OT services to address these deficits and reach maximum level of function.       Plan:     Patient to be seen 3 x/week to address the above listed problems via self-care/home management, therapeutic activities, therapeutic exercises, neuromuscular re-education, cognitive retraining  · Plan of Care Expires: 05/31/18  · Plan of Care Reviewed with: patient    This Plan of care has been discussed with the patient who was involved in its development and understands and is in agreement with the identified goals and treatment plan    GOALS:    Occupational Therapy  Goals        Problem: Occupational Therapy Goal    Goal Priority Disciplines Outcome Interventions   Occupational Therapy Goal     OT, PT/OT Ongoing (interventions implemented as appropriate)    Description:  Goals to be met by: 5/29  Patient will increase functional independence with ADLs by performing:    Pt will actively engage in therapeutic task for 8 min duration with 0 added cues/facilitation.  Pt will complete L UE reaching task EOB with moderate assistance for postural control.   Supine<>sit ; sit<>supine with Max Assistance.  Grooming while seated EOB with set up for grooming and mod(A) for postural control  Pt will tolerate EOB activity ~15 min duration with mod(A) for postural control.   Family/caregiver demo understanding for ROM and positioning for B UE.                     Time Tracking:     OT Date of Treatment: 05/15/18  OT Start Time: 1318  OT Stop Time: 1342  OT Total Time (min): 24 min    Billable Minutes:Therapeutic Activity 24    MADDIE Quintanilla  5/15/2018
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
## 2018-05-15 NOTE — ASSESSMENT & PLAN NOTE
82 y.o. male s/p C3-5 ACDF for cervical myelopathy on 4/10 with Dr. Hess, and now s/p evacuation/washout of fluid collection with CSF leak repair on 4/28/18 POD 17  -Patient neurologically stable  -Pt s/p aspiration yesterday 2/2 to N/V.  O2 sats remaining stable on 2L O2 via nasal canula. Will DC Vanc/Rocephin and start po Augmentin for prophylaxis s/p aspiration.  CXR today stable.  D/w with , low concern for pneumonia at this time.  Pt with intermittent low grade fevers. Restarted trickle feeds today. aggressive N/V control with anti-emetics. CPT/acapella treatment q 4 hours.  aspiration precautions- keep head of bed up at all times. CXR without signs of infiltrate.  Apprecicate recommendations.   -DM reaching target goal, insulin dosing increased 5/9- Levemir 16 units nightly and Aspart 5 units every 4 hours.  -Hypercalcemia likely related to primary hyperparathyroidism.   -Vit D level low at 8, continue Vit D replacement.   -HTN at target, continue Diltiazem 90 mg every 6 hours. PRN meds SBP >160.   -Cervical collar when OOB or with activity, Up to chair as tolerated.  Surgical incision well healed.   -Speech gradually improving since wound revision;still requires deep suctioning to induce cough.   -Scheduled CPT q4 hours   -Aggressive oral care per nursing staff daily  -Repeat CT Head 5/8 showed stable b/l hygromas   -Continue PILI/SCDs/SQH  -Continue bowel regimen daily.  -Continue to hold Eliquis. Per , indications unclear.  does not recommend continuing upon discharge.  -Continue aggressive PT/OT/ST. Discussed with therapy to evaluate patient simultaneously while pt OOB with PT.  -Continue delirium precautions   -Will attempt to transfer to SNf when intermittent fevers resolve and more stable from medical standpoint   -Discussed with Dr. Hess

## 2018-05-15 NOTE — NURSING
Pt HR in 130s per tele. Pt trying to stand with physical therapy at time of elevated HR. Pt returned to bed.

## 2018-05-15 NOTE — PLAN OF CARE
Problem: SLP Goal  Goal: SLP Goal  Speech Language Pathology Goals  Goals expected to be met by 5/8-continue till 5/15    1. Pt will participate in ongoing swallow assessment  2. Pt will participate in speech language cognitive assessment MEt  3. Pt will complete rote speech tasks with 80% accy and mod cues  4. Pt will follow 1 step commands with 90% accuracy and occasional cues  5. Pt will participate in ongoing speech language cognitive assessment.         Speech Language Pathology Goals  Goals expected to be met by 4/29:    1. Pt will participate in ongoing assessment of swallow.              Outcome: Ongoing (interventions implemented as appropriate)  Pt ongoing with current goals 2/2 lethargy. Goals extended to 5/22/18. See note for full details. ST to continue to follow.     VALENTINO Shelby., [facility]-SLP  Speech-Language Pathology  Pager: [phone]  5/15/2018

## 2018-05-15 NOTE — SUBJECTIVE & OBJECTIVE
Interval History:   NAEON.  Tolerating 2L O2 via NC.  Sitting in bed, continued waxing/waning mental status.  Denies new/worsening weakness today.  Tube feeds restarted today.         Medications:  Continuous Infusions:  Scheduled Meds:   albuterol-ipratropium 2.5mg-0.5mg/3mL  3 mL Nebulization Q4H    amoxicillin-pot clavulanate 250-62.5 mg/5ml  500 mg Per G Tube Q8H    atorvastatin  20 mg Per NG tube Nightly    chlorhexidine  15 mL Mouth/Throat BID    citalopram  10 mg Per G Tube Daily    diltiaZEM  90 mg Per G Tube Q6H    [START ON 5/20/2018] ergocalciferol  50,000 Units Per G Tube Q7 Days    heparin (porcine)  5,000 Units Subcutaneous Q8H    insulin aspart U-100  6 Units Subcutaneous Q4H    insulin detemir U-100  18 Units Subcutaneous BID    polyethylene glycol  17 g Per G Tube BID    senna-docusate 8.6-50 mg  1 tablet Per G Tube BID    tamsulosin  0.4 mg Per G Tube Daily    valsartan  120 mg Per G Tube Daily    zinc oxide   Topical (Top) Daily     PRN Meds:acetaminophen, aluminum-magnesium hydroxide-simethicone, dextrose 50%, glucagon (human recombinant), hydrALAZINE, insulin aspart U-100, labetalol, lidocaine HCL 2%, ondansetron     Review of Systems  Objective:     Weight: 99.5 kg (219 lb 5.7 oz)  Body mass index is 29.75 kg/m².  Vital Signs (Most Recent):  Temp: 98.6 °F (37 °C) (05/15/18 1400)  Pulse: 103 (05/15/18 1400)  Resp: 18 (05/15/18 1400)  BP: (!) 118/58 (05/15/18 1400)  SpO2: 95 % (05/15/18 1400) Vital Signs (24h Range):  Temp:  [98 °F (36.7 °C)-101.7 °F (38.7 °C)] 98.6 °F (37 °C)  Pulse:  [] 103  Resp:  [14-20] 18  SpO2:  [92 %-99 %] 95 %  BP: (116-170)/(58-92) 118/58                           Gastrostomy/Enterostomy 05/04/18 1539 Gastrostomy tube w/ balloon LUQ feeding (Active)   Securement anchored to abdomen w/ adhesive device 5/14/2018  9:00 AM   Interventions Prior to Feeding patency checked;residual checked;residual returned 5/13/2018  8:00 PM   Suction Setting/Drainage  Method dependent drainage 5/6/2018  8:00 AM   Feeding Type continuous 5/13/2018  8:00 PM   Clamp Status/Tolerance no restlessness 5/13/2018  8:00 PM   Feeding Action feeding held 5/14/2018 10:30 PM   Dressing dry and intact 5/13/2018  8:00 PM   Insertion Site dry 5/12/2018  8:03 PM   Site Care outer bumper rotated;sterile precut T-slit dressing applied 5/12/2018  8:03 PM   Flush/Irrigation flushed w/;water;no resistance met 5/12/2018  8:03 PM   Current Rate (mL/hr) 70 mL/hr 5/13/2018  7:20 AM   Goal Rate (mL/hr) 70 mL/hr 5/13/2018  7:20 AM   Water Bolus (mL) 160 mL 5/12/2018 11:52 PM   Tube Feeding Intake (mL) 70 5/13/2018  8:00 PM   Residual Amount (ml) 0 ml 5/13/2018  7:20 AM       Male External Urinary Catheter 04/30/18 1600 (Active)   Collection Container Standard drainage bag 5/13/2018  8:00 PM   Securement Method secured to top of thigh w/ adhesive device 5/13/2018  8:00 PM   Skin no breakdown 5/12/2018  8:03 PM   Tolerance no signs/symptoms of discomfort 5/12/2018  8:03 PM   Output (mL) 900 mL 5/13/2018  1:00 PM   Catheter Change Date 05/11/18 5/11/2018  7:05 AM   Catheter Change Time 0705 5/11/2018  7:05 AM       Neurosurgery Physical Exam   General: no acute distress. Generalized deconditioning   Neurologic: Awake, sitting upright in bed. Oriented to person and place.   Head: normocephalic, atraumatic   GCS: Motor: 6/Verbal: 5/Eyes: 4 GCS Total: 15  Cranial nerves: face symmetric, tongue midline, pupils equal, round, reactive to light with accomodation, extraocular muscles intact. CN II-XII grossly intact.   Language: no aphasia  Speech:  dysarthria improved compared to pre-wound revision on 4/28  Sensory: decrease response to light touch in BUE  Motor Strength:   Strength   Deltoids Triceps Biceps Wrist Extension Wrist Flexion Hand    Upper: R 2/5 3/5 3/5 2/5 2/5 3/5     L 3/5 3/5 3/5 3/5 3/5 3/5       Iliopsoas Quadriceps Knee  Flexion Tibialis  anterior Gastro- cnemius EHL   Lower: R 3/5 3/5 3/5 4-/5  4-/5 4-/5     L 3/5 3/5 3/5 4/5 4/5 4/5   [place]: absent  Clonus: absent  Lungs:  normal respiratory effort on 2L nasal cannula.   Abdomen: soft, non-tender and symmetric. G tube site c/d/i  Extremities: warm with no cyanosis, edema, or clubbing.   Pulses: palpable distal pulses  Skin: warm, dry and intact. No visible rashes or lesions.   Incision c/d/i, no erythema, edema, or drainage.   C-collar in place        Significant Labs:    Recent Labs  Lab 05/14/18  0430 05/15/18  0450   * 169*   * 138   K 5.1 4.8    105   CO2 25 24   BUN 36* 37*   CREATININE 1.2 1.3   CALCIUM 9.7 9.5       Recent Labs  Lab 05/14/18  0430 05/15/18  0450   WBC 9.76 11.49   HGB 10.6* 10.6*   HCT 33.3* 33.5*    295     No results for input(s): LABPT, INR, APTT in the last 48 hours.  Microbiology Results (last 7 days)     ** No results found for the last 168 hours. **          Significant Diagnostics:    CXR 5/15/18:     XR CHEST 1 VIEW    CLINICAL HISTORY:  aspiration;    COMPARISON:  Comparison is made to 05/14/2018.    FINDINGS:  No significant interval change in the appearance of the chest since 05/14/2018 at 8:47 a.m. is appreciated.   Impression       As above      Electronically signed by: Rosalio Gilman MD  Date: 05/15/2018  Time: 07:32

## 2018-05-16 PROBLEM — N17.9 AKI (ACUTE KIDNEY INJURY): Status: ACTIVE | Noted: 2018-05-16

## 2018-05-16 LAB
ALBUMIN SERPL BCP-MCNC: 2 G/DL
ALP SERPL-CCNC: 85 U/L
ALT SERPL W/O P-5'-P-CCNC: 22 U/L
ANION GAP SERPL CALC-SCNC: 9 MMOL/L
ANION GAP SERPL CALC-SCNC: 9 MMOL/L
AST SERPL-CCNC: 25 U/L
BASOPHILS # BLD AUTO: 0.02 K/UL
BASOPHILS NFR BLD: 0.1 %
BILIRUB SERPL-MCNC: 0.5 MG/DL
BUN SERPL-MCNC: 51 MG/DL
BUN SERPL-MCNC: 53 MG/DL
CALCIUM SERPL-MCNC: 8.9 MG/DL
CALCIUM SERPL-MCNC: 9 MG/DL
CHLORIDE SERPL-SCNC: 106 MMOL/L
CHLORIDE SERPL-SCNC: 107 MMOL/L
CK SERPL-CCNC: 79 U/L
CO2 SERPL-SCNC: 21 MMOL/L
CO2 SERPL-SCNC: 24 MMOL/L
CREAT SERPL-MCNC: 1.7 MG/DL
CREAT SERPL-MCNC: 1.8 MG/DL
CREAT UR-MCNC: 59 MG/DL
DIFFERENTIAL METHOD: ABNORMAL
EOSINOPHIL # BLD AUTO: 0 K/UL
EOSINOPHIL NFR BLD: 0 %
ERYTHROCYTE [DISTWIDTH] IN BLOOD BY AUTOMATED COUNT: 15.6 %
EST. GFR  (AFRICAN AMERICAN): 40.2 ML/MIN/1.73 M^2
EST. GFR  (AFRICAN AMERICAN): 43.1 ML/MIN/1.73 M^2
EST. GFR  (NON AFRICAN AMERICAN): 34.8 ML/MIN/1.73 M^2
EST. GFR  (NON AFRICAN AMERICAN): 37.3 ML/MIN/1.73 M^2
GLUCOSE SERPL-MCNC: 287 MG/DL
GLUCOSE SERPL-MCNC: 296 MG/DL
HCT VFR BLD AUTO: 30.5 %
HGB BLD-MCNC: 9.5 G/DL
IMM GRANULOCYTES # BLD AUTO: 0.08 K/UL
IMM GRANULOCYTES NFR BLD AUTO: 0.6 %
LACTATE SERPL-SCNC: 1 MMOL/L
LYMPHOCYTES # BLD AUTO: 1.7 K/UL
LYMPHOCYTES NFR BLD: 12.7 %
MCH RBC QN AUTO: 30.8 PG
MCHC RBC AUTO-ENTMCNC: 31.1 G/DL
MCV RBC AUTO: 99 FL
MONOCYTES # BLD AUTO: 1.4 K/UL
MONOCYTES NFR BLD: 9.9 %
NEUTROPHILS # BLD AUTO: 10.4 K/UL
NEUTROPHILS NFR BLD: 76.7 %
NRBC BLD-RTO: 0 /100 WBC
PLATELET # BLD AUTO: 270 K/UL
PMV BLD AUTO: 10 FL
POCT GLUCOSE: 178 MG/DL (ref 70–110)
POCT GLUCOSE: 227 MG/DL (ref 70–110)
POCT GLUCOSE: 239 MG/DL (ref 70–110)
POCT GLUCOSE: 247 MG/DL (ref 70–110)
POCT GLUCOSE: 256 MG/DL (ref 70–110)
POTASSIUM SERPL-SCNC: 4.1 MMOL/L
POTASSIUM SERPL-SCNC: 4.2 MMOL/L
PROCALCITONIN SERPL IA-MCNC: 0.8 NG/ML
PROT SERPL-MCNC: 6.4 G/DL
RBC # BLD AUTO: 3.08 M/UL
SODIUM SERPL-SCNC: 137 MMOL/L
SODIUM SERPL-SCNC: 139 MMOL/L
SODIUM UR-SCNC: 24 MMOL/L
WBC # BLD AUTO: 13.58 K/UL

## 2018-05-16 PROCEDURE — 27000221 HC OXYGEN, UP TO 24 HOURS

## 2018-05-16 PROCEDURE — 87040 BLOOD CULTURE FOR BACTERIA: CPT

## 2018-05-16 PROCEDURE — 85025 COMPLETE CBC W/AUTO DIFF WBC: CPT

## 2018-05-16 PROCEDURE — 99024 POSTOP FOLLOW-UP VISIT: CPT | Mod: POP,,, | Performed by: PHYSICIAN ASSISTANT

## 2018-05-16 PROCEDURE — 25000242 PHARM REV CODE 250 ALT 637 W/ HCPCS: Performed by: PHYSICIAN ASSISTANT

## 2018-05-16 PROCEDURE — 80048 BASIC METABOLIC PNL TOTAL CA: CPT

## 2018-05-16 PROCEDURE — 83605 ASSAY OF LACTIC ACID: CPT

## 2018-05-16 PROCEDURE — 82550 ASSAY OF CK (CPK): CPT

## 2018-05-16 PROCEDURE — 25000003 PHARM REV CODE 250: Performed by: PHYSICIAN ASSISTANT

## 2018-05-16 PROCEDURE — 94668 MNPJ CHEST WALL SBSQ: CPT

## 2018-05-16 PROCEDURE — 94799 UNLISTED PULMONARY SVC/PX: CPT

## 2018-05-16 PROCEDURE — 20600001 HC STEP DOWN PRIVATE ROOM

## 2018-05-16 PROCEDURE — 94640 AIRWAY INHALATION TREATMENT: CPT

## 2018-05-16 PROCEDURE — 84145 PROCALCITONIN (PCT): CPT

## 2018-05-16 PROCEDURE — 63600175 PHARM REV CODE 636 W HCPCS: Performed by: INTERNAL MEDICINE

## 2018-05-16 PROCEDURE — 25000003 PHARM REV CODE 250: Performed by: INTERNAL MEDICINE

## 2018-05-16 PROCEDURE — 36415 COLL VENOUS BLD VENIPUNCTURE: CPT

## 2018-05-16 PROCEDURE — 63600175 PHARM REV CODE 636 W HCPCS: Performed by: STUDENT IN AN ORGANIZED HEALTH CARE EDUCATION/TRAINING PROGRAM

## 2018-05-16 PROCEDURE — 80053 COMPREHEN METABOLIC PANEL: CPT

## 2018-05-16 PROCEDURE — 82570 ASSAY OF URINE CREATININE: CPT

## 2018-05-16 PROCEDURE — 94761 N-INVAS EAR/PLS OXIMETRY MLT: CPT

## 2018-05-16 PROCEDURE — 84300 ASSAY OF URINE SODIUM: CPT

## 2018-05-16 PROCEDURE — 99233 SBSQ HOSP IP/OBS HIGH 50: CPT | Mod: GC,,, | Performed by: HOSPITALIST

## 2018-05-16 PROCEDURE — 25000003 PHARM REV CODE 250: Performed by: STUDENT IN AN ORGANIZED HEALTH CARE EDUCATION/TRAINING PROGRAM

## 2018-05-16 PROCEDURE — 92526 ORAL FUNCTION THERAPY: CPT

## 2018-05-16 RX ORDER — CIPROFLOXACIN 500 MG/1
500 TABLET ORAL EVERY 12 HOURS
Status: DISCONTINUED | OUTPATIENT
Start: 2018-05-16 | End: 2018-05-18 | Stop reason: HOSPADM

## 2018-05-16 RX ORDER — INSULIN ASPART 100 [IU]/ML
6 INJECTION, SOLUTION INTRAVENOUS; SUBCUTANEOUS EVERY 4 HOURS
Status: DISCONTINUED | OUTPATIENT
Start: 2018-05-16 | End: 2018-05-18 | Stop reason: HOSPADM

## 2018-05-16 RX ORDER — INSULIN ASPART 100 [IU]/ML
4 INJECTION, SOLUTION INTRAVENOUS; SUBCUTANEOUS EVERY 4 HOURS
Status: DISCONTINUED | OUTPATIENT
Start: 2018-05-16 | End: 2018-05-16

## 2018-05-16 RX ORDER — FOLIC ACID 1 MG/1
1 TABLET ORAL DAILY
Status: DISCONTINUED | OUTPATIENT
Start: 2018-05-16 | End: 2018-05-18 | Stop reason: HOSPADM

## 2018-05-16 RX ORDER — AMOXICILLIN AND CLAVULANATE POTASSIUM 400; 57 MG/5ML; MG/5ML
500 POWDER, FOR SUSPENSION ORAL EVERY 8 HOURS
Status: DISCONTINUED | OUTPATIENT
Start: 2018-05-16 | End: 2018-05-18 | Stop reason: HOSPADM

## 2018-05-16 RX ADMIN — FOLIC ACID 1 MG: 1 TABLET ORAL at 12:05

## 2018-05-16 RX ADMIN — VALSARTAN 120 MG: 40 TABLET ORAL at 09:05

## 2018-05-16 RX ADMIN — INSULIN ASPART 6 UNITS: 100 INJECTION, SOLUTION INTRAVENOUS; SUBCUTANEOUS at 06:05

## 2018-05-16 RX ADMIN — CIPROFLOXACIN HYDROCHLORIDE 500 MG: 500 TABLET, FILM COATED ORAL at 09:05

## 2018-05-16 RX ADMIN — SODIUM CHLORIDE, SODIUM LACTATE, POTASSIUM CHLORIDE, AND CALCIUM CHLORIDE 1000 ML: .6; .31; .03; .02 INJECTION, SOLUTION INTRAVENOUS at 04:05

## 2018-05-16 RX ADMIN — AMOXICILLIN AND CLAVULANATE POTASSIUM 500 MG: 400; 57 POWDER, FOR SUSPENSION ORAL at 09:05

## 2018-05-16 RX ADMIN — CHLORHEXIDINE GLUCONATE 15 ML: 1.2 RINSE ORAL at 09:05

## 2018-05-16 RX ADMIN — POLYETHYLENE GLYCOL 3350 17 G: 17 POWDER, FOR SOLUTION ORAL at 09:05

## 2018-05-16 RX ADMIN — INSULIN ASPART 3 UNITS: 100 INJECTION, SOLUTION INTRAVENOUS; SUBCUTANEOUS at 05:05

## 2018-05-16 RX ADMIN — IPRATROPIUM BROMIDE AND ALBUTEROL SULFATE 3 ML: .5; 3 SOLUTION RESPIRATORY (INHALATION) at 03:05

## 2018-05-16 RX ADMIN — INSULIN ASPART 6 UNITS: 100 INJECTION, SOLUTION INTRAVENOUS; SUBCUTANEOUS at 09:05

## 2018-05-16 RX ADMIN — IPRATROPIUM BROMIDE AND ALBUTEROL SULFATE 3 ML: .5; 3 SOLUTION RESPIRATORY (INHALATION) at 07:05

## 2018-05-16 RX ADMIN — INSULIN ASPART 4 UNITS: 100 INJECTION, SOLUTION INTRAVENOUS; SUBCUTANEOUS at 01:05

## 2018-05-16 RX ADMIN — INSULIN ASPART 1 UNITS: 100 INJECTION, SOLUTION INTRAVENOUS; SUBCUTANEOUS at 02:05

## 2018-05-16 RX ADMIN — SODIUM CHLORIDE, SODIUM LACTATE, POTASSIUM CHLORIDE, AND CALCIUM CHLORIDE 1000 ML: .6; .31; .03; .02 INJECTION, SOLUTION INTRAVENOUS at 12:05

## 2018-05-16 RX ADMIN — STANDARDIZED SENNA CONCENTRATE AND DOCUSATE SODIUM 1 TABLET: 8.6; 5 TABLET, FILM COATED ORAL at 09:05

## 2018-05-16 RX ADMIN — CITALOPRAM HYDROBROMIDE 10 MG: 10 TABLET ORAL at 09:05

## 2018-05-16 RX ADMIN — ACETAMINOPHEN 650 MG: 325 TABLET ORAL at 02:05

## 2018-05-16 RX ADMIN — HEPARIN SODIUM 5000 UNITS: 5000 INJECTION, SOLUTION INTRAVENOUS; SUBCUTANEOUS at 04:05

## 2018-05-16 RX ADMIN — IPRATROPIUM BROMIDE AND ALBUTEROL SULFATE 3 ML: .5; 3 SOLUTION RESPIRATORY (INHALATION) at 12:05

## 2018-05-16 RX ADMIN — SODIUM CHLORIDE, SODIUM LACTATE, POTASSIUM CHLORIDE, AND CALCIUM CHLORIDE 1000 ML: .6; .31; .03; .02 INJECTION, SOLUTION INTRAVENOUS at 06:05

## 2018-05-16 RX ADMIN — AMOXICILLIN AND CLAVULANATE POTASSIUM 500 MG: 400; 57 POWDER, FOR SUSPENSION ORAL at 01:05

## 2018-05-16 RX ADMIN — IPRATROPIUM BROMIDE AND ALBUTEROL SULFATE 3 ML: .5; 3 SOLUTION RESPIRATORY (INHALATION) at 08:05

## 2018-05-16 RX ADMIN — DILTIAZEM HYDROCHLORIDE 90 MG: 60 TABLET, FILM COATED ORAL at 05:05

## 2018-05-16 RX ADMIN — AMOXICILLIN AND CLAVULANATE POTASSIUM 500 MG: 400; 57 POWDER, FOR SUSPENSION ORAL at 05:05

## 2018-05-16 RX ADMIN — ATORVASTATIN CALCIUM 20 MG: 20 TABLET, FILM COATED ORAL at 09:05

## 2018-05-16 RX ADMIN — TAMSULOSIN HYDROCHLORIDE 0.4 MG: 0.4 CAPSULE ORAL at 09:05

## 2018-05-16 RX ADMIN — IPRATROPIUM BROMIDE AND ALBUTEROL SULFATE 3 ML: .5; 3 SOLUTION RESPIRATORY (INHALATION) at 05:05

## 2018-05-16 RX ADMIN — IPRATROPIUM BROMIDE AND ALBUTEROL SULFATE 3 ML: .5; 3 SOLUTION RESPIRATORY (INHALATION) at 11:05

## 2018-05-16 RX ADMIN — HEPARIN SODIUM 5000 UNITS: 5000 INJECTION, SOLUTION INTRAVENOUS; SUBCUTANEOUS at 05:05

## 2018-05-16 RX ADMIN — HEPARIN SODIUM 5000 UNITS: 5000 INJECTION, SOLUTION INTRAVENOUS; SUBCUTANEOUS at 09:05

## 2018-05-16 RX ADMIN — DILTIAZEM HYDROCHLORIDE 90 MG: 60 TABLET, FILM COATED ORAL at 12:05

## 2018-05-16 RX ADMIN — INSULIN ASPART 4 UNITS: 100 INJECTION, SOLUTION INTRAVENOUS; SUBCUTANEOUS at 09:05

## 2018-05-16 NOTE — SUBJECTIVE & OBJECTIVE
Interval History:   Patient febrile up to 102.7. ANGELIA developed overnight likely from sepsis and pre-renal injury. On cirpo and augmentin. Patient's mental status markedly improved. More talkative today. Coached on incentive spirometry- patient with very poor inspiratory effort and unable to pull 100 cc even.  Acapella and CPT q 4 hours.     Review of Systems   Constitutional: Positive for fever. Negative for chills.   HENT: Positive for trouble swallowing. Negative for congestion.    Respiratory: Positive for cough and shortness of breath.    Cardiovascular: Negative for chest pain.   Gastrointestinal: Negative for abdominal pain, constipation, diarrhea and nausea.   Genitourinary: Negative for dysuria.   Musculoskeletal: Positive for neck pain and neck stiffness.   Psychiatric/Behavioral: Negative for confusion and decreased concentration.     Objective:     Vital Signs (Most Recent):  Temp: 98.2 °F (36.8 °C) (05/16/18 1224)  Pulse: 89 (05/16/18 1224)  Resp: 20 (05/16/18 1224)  BP: (!) 109/57 (05/16/18 1224)  SpO2: 96 % (05/16/18 1224) Vital Signs (24h Range):  Temp:  [98.2 °F (36.8 °C)-102.7 °F (39.3 °C)] 98.2 °F (36.8 °C)  Pulse:  [] 89  Resp:  [16-90] 20  SpO2:  [92 %-96 %] 96 %  BP: (105-118)/(53-58) 109/57     Weight: 99.5 kg (219 lb 5.7 oz)  Body mass index is 29.75 kg/m².    Intake/Output Summary (Last 24 hours) at 05/16/18 1314  Last data filed at 05/15/18 1830   Gross per 24 hour   Intake              100 ml   Output              200 ml   Net             -100 ml      Physical Exam   Constitutional: He appears well-developed.   HENT:   Head: Normocephalic.   Wearing collar. Mucous membranes dry   Eyes: EOM are normal. Pupils are equal, round, and reactive to light.   Arcus senilis present bilaterally   Cardiovascular: Normal rate.    No murmur heard.  Pulmonary/Chest: He has rales.   NC in place. Diffuse coarse crackles   Abdominal: Soft. Bowel sounds are normal. There is no tenderness.   Peg tube in  place   Genitourinary:   Genitourinary Comments: Stage II sacral decubitus ulcer   Neurological: He is alert.   Skin:   Poor skin turgor       Significant Labs: All pertinent labs within the past 24 hours have been reviewed.    Significant Imaging: I have reviewed all pertinent imaging results/findings within the past 24 hours.

## 2018-05-16 NOTE — ASSESSMENT & PLAN NOTE
- BUN/Cr had been minimally rising over last 2 weeks for unclear reasons though acute increase over 5/16 likely secondary to no PO intake and held tube feeds causing pre-renal injury   - bolusing 2 L of lactated ringers and will repeat BMP this afternoon  - pending urine studies   - will hold valsartan   - renally dose all medications  - avoid nephrotoxic medications

## 2018-05-16 NOTE — ASSESSMENT & PLAN NOTE
82 y.o. male s/p C3-5 ACDF for cervical myelopathy on 4/10 with Dr. Hess, and now s/p evacuation/washout of fluid collection with CSF leak repair on 4/28/18 POD#18    -Patient neurologically stable  -Pt s/p aspiration on Monday 5/14/18 which is 2/2 to N/V of TF.  O2 sats remaining stable on 2L O2 via nasal canula (ranging 92-96%). Today 5/16 with leukocytosis 13.5k. Started po Augmentin on 5/15/18 x7day s/p aspiration.  CXR 5/14/16 stable. Per HM, patient will continue to have intermittent low grade fevers, tachycardia, and hypotension as cytokine response to aspiration. Restarted trickle feeds 5/15. aggressive N/V control with anti-emetics. CPT/acapella treatment q 4 hours.  aspiration precautions- keep head of bed up at all times. CXR without signs of infiltrate.  Apprecicate recommendations.   -DM; hosp med adjusting since recent TF off and now back at goal 70cc/hr. On Levemir and Aspart.  -Hypercalcemia likely related to primary hyperparathyroidism.   -Vit D level low at 8, continue Vit D replacement.   -HTN at target, continue Diltiazem 90 mg every 6 hours. PRN meds SBP >160.   -ANGELIA overnight 5/16, likely d/t dehydration from holding TF. Hosp med managing and given 1L LR bolus. Okay with 100cc free water bolus QID.   -UTI on UA 5/15. Hosp med managing and started Cipro x 7 days 5/16/18.   -Cervical collar when OOB or with activity, Up to chair as tolerated.  Surgical incision well healed.   -Speech gradually improving since wound revision;still requires deep suctioning to induce cough.   -Scheduled CPT & acapella treatment q 4 hours to prevent atelectasis  -Aggressive oral care per nursing staff daily  -Repeat CT Head 5/8 showed stable b/l hygromas   -Continue PILI/SCDs/SQH  -Continue bowel regimen daily.  -Continue to hold Eliquis. Per , indications unclear.  does not recommend continuing upon discharge.  -Continue aggressive PT/OT/ST. Discussed with therapy to evaluate patient simultaneously while pt  OOB with PT.  -Continue delirium precautions   -Will attempt to transfer to SNF when intermittent fevers resolve and more stable from medical standpoint       Discussed with Dr. Hess

## 2018-05-16 NOTE — PT/OT/SLP PROGRESS
"Speech Language Pathology Treatment    Patient Name:  Hollis Pitts   MRN:  [id number]  Admitting Diagnosis: Cervical myelopathy with cervical radiculopathy    Recommendations:                 General Recommendations:  Dysphagia therapy and Speech/language therapy  Diet recommendations:  NPO, Liquid Diet Level: NPO   Aspiration Precautions: Continue alternate means of nutrition, Frequent oral care and Strict aspiration precautions   General Precautions: Standard, aspiration, aphasia, NPO, respiratory, hearing impaired  Communication strategies:  provide increased time to answer and go to room if call light pushed. Note, Patient is [place]    Subjective     SLP reviewed Pt with nurse  Pt presents awake and calm  He says, "I don't want to swallow because it hurts"  Pt reports mild throat pain, nurse at bedside and aware of Pt c/o pain   Pain/Comfort:  · Pain Rating 1: 2/10  · Location - Orientation 1: generalized  · Location 1: throat  · Pain Addressed 1: Nurse notified  · Pain Rating Post-Intervention 1: 2/10    Objective:     Has the patient been evaluated by SLP for swallowing?   Yes  Keep patient NPO? Yes   Current Respiratory Status: nasal cannula      Pt seen for Dysphagia and speech therapy. Pt found awake in bed, HOB elevated. Nurse enters room and at bedside during start of session. Nurse reports Pt afebrile this afternoon. Patient follows basic 1-step commands with 80% accuracy provided moderate visual and verbal cueing from SLP. He is oriented to person and place only. He recalls CRYSTAL for immediate recall only, DEP for recall of CRYSTAL post 1 minute filled delay. He completes reps of lingual protrusion and laryngeal falsetto exercises x5 ea with MOD-MAX cueing. Pt with sustained phonation up to 1-2 seconds each attempt. Vocal quality noted to be wet intermittently t/o session. Pt unable to clear vocal quality with cues for throat clear and effortful swallows x3 ea. Patient with moderately delayed initiation of " swallow palpated upon attempts at dry, effortful swallows. Pt educated on SLP role, swallow exercises, compensatory strategies for STM/orientation and ongoing SLP POC. Pt not pari to demonstrate understanding and would benefit from ongoing review. No further questions noted. Whiteboard current.     Assessment:     Hollis Pitts is a 82 y.o. male with an SLP diagnosis of Aphasia and Dysphagia.  He presents with increased alertness and command following today.    Goals:    SLP Goals        Problem: SLP Goal    Goal Priority Disciplines Outcome   SLP Goal     SLP Ongoing (interventions implemented as appropriate)   Description:  Speech Language Pathology Goals  Goals expected to be met by 5/8-continue till 5/22    1. Pt will participate in ongoing swallow assessment  2. Pt will participate in speech language cognitive assessment MEt  3. Pt will complete rote speech tasks with 80% accy and mod cues  4. Pt will follow 1 step commands with 90% accuracy and occasional cues  5. Pt will participate in ongoing speech language cognitive assessment.         Speech Language Pathology Goals  Goals expected to be met by 4/29:    1. Pt will participate in ongoing assessment of swallow.                                Plan:     · Patient to be seen:  3 x/week   · Plan of Care expires:  05/22/18  · Plan of Care reviewed with:  patient, daughter, friend   · SLP Follow-Up:  Yes       Discharge recommendations:  nursing facility, skilled   Barriers to Discharge:  Skilled level of assistance needed    Time Tracking:     SLP Treatment Date:   05/16/18  Speech Start Time:  1430  Speech Stop Time:  1445     Speech Total Time (min):  15 min    Billable Minutes: Treatment Swallowing Dysfunction 15    ISABEL Shelby, [facility]-SLP  Speech-Language Pathology  Pager: [phone]      05/16/2018

## 2018-05-16 NOTE — PLAN OF CARE
SW following for DC needs. SW in communication with CM.    Patient accepted at [facility]. Patient is not medically stable for DC.     Yvonne Ellington, GARY  [id number]

## 2018-05-16 NOTE — ASSESSMENT & PLAN NOTE
- 2/2 to vomiting episode 5/14  - restarted trickle feeds   - aggressive N/V control with anti-emetics  - CPT/acapella treatment q 4 hours  - oxygen as needed   - aspiration precautions- keep head of bed up at all times  - on augmentin since 5/15  - CXR without signs of infiltrate  - can expect patient to be intermittently febrile, tachycardic and hypoxic- no indications for ICU at this time

## 2018-05-16 NOTE — ASSESSMENT & PLAN NOTE
-- A1C 5.6, glucoses moderately elevated likely secondary to holding meal insulin while tube feeds were being titrated upwards  -- per chart review patient had reported being controlled by lifestyle modification alone at home   -- currently on continuous tube feeds with diabetisource   -- detemir 18 units BID   -- restarting aspart 4 units q 4 hours while still on decreased tube feeds     Recent Labs      05/15/18   0845  05/15/18   1432  05/15/18   1820  05/15/18   1957  05/16/18   0222  05/16/18   0550   [id number]  152*  89  118*  127*  227*  256*
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
## 2018-05-16 NOTE — PROGRESS NOTES
[facility]  Neurosurgery  Progress Note    Subjective:     History of Present Illness: 82 y.o.  male with type 2 diabetes, who presents today for follow up evaluation one week prior to 2 level ACDF scheduled for 4/10/2018. Pt reports    Pt presents today wearing a cervical collar and in a wheelchair. He would like to proceed with surgery. Per pt's family, his PCP would like to see him on 4/9, the day before surgery. He notes continued neck pain and BUE numbness/tingling as well as BUE weakness. Pt is only able to walk a few steps unassisted. He has never received neck surgery.     Post-Op Info:  Procedure(s) (LRB):  WASHOUT-CERVICAL-wound washout and csf leak repair with depuy (N/A)  DISKECTOMY AND FUSION-ANTERIOR CERVICAL (ACDF)- REVISON C5-C7   18 Days Post-Op     Interval History: Intermittent low grade fevers again overnight with Tmax 102.7 at 1824 and leukocytosis. O2 sats ranging 92-96% on 2L. TF restarted yesterday; now at goal. Medicine comanaging and given 1L LR bolus for ANGELIA crea 1.8. Medicine also started cipro for UTI. Patient awake and alert on exam with improving speech. He has no complaints on exam. No CP, SOB, or abd pain. Pending SNF placement when medically stable.     Medications:  Continuous Infusions:  Scheduled Meds:   albuterol-ipratropium 2.5mg-0.5mg/3mL  3 mL Nebulization Q4H    amoxicillin-clavulanate  500 mg Per G Tube Q8H    atorvastatin  20 mg Per NG tube Nightly    chlorhexidine  15 mL Mouth/Throat BID    ciprofloxacin HCl  500 mg Per G Tube Q12H    citalopram  10 mg Per G Tube Daily    diltiaZEM  90 mg Per G Tube Q6H    [START ON 5/20/2018] ergocalciferol  50,000 Units Per G Tube Q7 Days    folic acid  1 mg Per G Tube Daily    heparin (porcine)  5,000 Units Subcutaneous Q8H    insulin aspart U-100  4 Units Subcutaneous Q4H    insulin detemir U-100  18 Units Subcutaneous BID    lactated ringers  1,000 mL Intravenous Once    lactated ringers  1,000 mL  Intravenous Once    polyethylene glycol  17 g Per G Tube BID    senna-docusate 8.6-50 mg  1 tablet Per G Tube BID    tamsulosin  0.4 mg Per G Tube Daily    zinc oxide   Topical (Top) Daily     PRN Meds:acetaminophen, aluminum-magnesium hydroxide-simethicone, dextrose 50%, glucagon (human recombinant), hydrALAZINE, insulin aspart U-100, labetalol, lidocaine HCL 2%, ondansetron     Review of Systems   As above  Objective:     Weight: 99.5 kg (219 lb 5.7 oz)  Body mass index is 29.75 kg/m².  Vital Signs (Most Recent):  Temp: 98.2 °F (36.8 °C) (05/16/18 1224)  Pulse: 89 (05/16/18 1224)  Resp: 20 (05/16/18 1224)  BP: (!) 109/57 (05/16/18 1224)  SpO2: 96 % (05/16/18 1224) Vital Signs (24h Range):  Temp:  [98.2 °F (36.8 °C)-102.7 °F (39.3 °C)] 98.2 °F (36.8 °C)  Pulse:  [] 89  Resp:  [16-90] 20  SpO2:  [92 %-96 %] 96 %  BP: (105-118)/(53-58) 109/57                 Oxygen Concentration (%):  [28] 28         Gastrostomy/Enterostomy 05/04/18 1539 Gastrostomy tube w/ balloon LUQ feeding (Active)   Securement anchored to abdomen w/ adhesive device 5/15/2018  1:12 PM   Interventions Prior to Feeding residual checked;patency checked 5/15/2018  1:12 PM   Suction Setting/Drainage Method dependent drainage 5/6/2018  8:00 AM   Feeding Type continuous 5/15/2018  1:12 PM   Clamp Status/Tolerance no abdominal discomfort;no abdominal distention;no emesis;no nausea;no restlessness;unclamped 5/15/2018  1:12 PM   Feeding Action feeding continued 5/15/2018  7:59 PM   Dressing dry and intact 5/13/2018  8:00 PM   Insertion Site dry 5/12/2018  8:03 PM   Site Care outer bumper rotated;sterile precut T-slit dressing applied 5/12/2018  8:03 PM   Flush/Irrigation flushed w/;water;no resistance met 5/15/2018  1:12 PM   Current Rate (mL/hr) 60 mL/hr 5/16/2018  6:38 AM   Goal Rate (mL/hr) 70 mL/hr 5/16/2018  6:38 AM   Water Bolus (mL) 100 mL 5/15/2018  6:30 PM   Tube Feeding Intake (mL) 40 5/15/2018  1:12 PM   Residual Amount (ml) 10 ml  5/16/2018  6:38 AM       Male External Urinary Catheter 04/30/18 1600 (Active)   Collection Container Standard drainage bag 5/15/2018  6:30 PM   Securement Method secured to top of thigh w/ adhesive device 5/15/2018  6:30 PM   Skin no redness;no breakdown 5/15/2018  6:30 PM   Tolerance no signs/symptoms of discomfort 5/15/2018  6:30 PM   Output (mL) 200 mL 5/15/2018  6:30 PM   Catheter Change Date 05/15/18 5/15/2018  6:30 PM   Catheter Change Time 1830 5/15/2018  6:30 PM       Neurosurgery Physical Exam  General: no acute distress. Generalized deconditioning   Neurologic: Awake, sitting upright in bed. Oriented x3  Head: normocephalic, atraumatic   GCS: Motor: 6/Verbal: 5/Eyes: 4 GCS Total: 15  Cranial nerves: face symmetric, tongue midline, pupils equal, round, reactive to light with accomodation, extraocular muscles intact. CN II-XII grossly intact.   Language: no aphasia  Speech:  dysarthria improved, nearly back to baseline   Sensory: decrease response to light touch in BUE  Motor Strength:   Strength   Deltoids Triceps Biceps Wrist Extension Wrist Flexion Hand    Upper: R 3/5 3/5 3/5 3/5 3/5 3/5     L 3/5 4-/5 4-/5 3/5 3/5 4-/5       Iliopsoas Quadriceps Knee  Flexion Tibialis  anterior Gastro- cnemius EHL   Lower: R 3/5 3/5 3/5 4-/5 4-/5 4-/5     L 4-/5 3/5 3/5 4/5 4/5 4/5   [place]: absent  Clonus: absent  Lungs:  normal respiratory effort on 2L nasal cannula.   Abdomen: soft, non-tender and symmetric. G tube site c/d/i  Extremities: warm with no cyanosis, edema, or clubbing.   Pulses: palpable distal pulses  Skin: warm, dry and intact. No visible rashes or lesions.   Incision  is healing well. c/d/i, no erythema, edema, or drainage.   C-collar in place    Significant Labs:    Recent Labs  Lab 05/15/18  0450 05/16/18  0534 05/16/18  1208   * 287* 296*    137 139   K 4.8 4.1 4.2    107 106   CO2 24 21* 24   BUN 37* 51* 53*   CREATININE 1.3 1.8* 1.7*   CALCIUM 9.5 8.9 9.0       Recent  Labs  Lab 05/15/18  0450 05/16/18  0534   WBC 11.49 13.58*   HGB 10.6* 9.5*   HCT 33.5* 30.5*    270     No results for input(s): LABPT, INR, APTT in the last 48 hours.  Microbiology Results (last 7 days)     Procedure Component Value Units Date/Time    Blood culture [[id number]] Collected:  05/16/18 0854    Order Status:  Sent Specimen:  Blood Updated:  05/16/18 0944    Blood culture [[id number]] Collected:  05/16/18 0854    Order Status:  Sent Specimen:  Blood Updated:  05/16/18 0908    Urine culture [[id number]] Collected:  05/15/18 1526    Order Status:  No result Specimen:  Urine Updated:  05/15/18 1527    Urine culture [[id number]]     Order Status:  Completed Specimen:  Urine           Significant Diagnostics:  I have reviewed and interpreted all pertinent imaging results/findings within the past 24 hours.     CXR 5/16  No significant interval change in the appearance of the chest since 05/15/2018 at 8:10 p.m. is appreciated.    CXR 5/15  Heart size and the appearance of the cardiomediastinal silhouette have not changed appreciably since the previous exam.  Minor bibasilar airspace consolidation/volume loss is observed, but the lung zones appear essentially stable since the prior study, with no significant new areas of airspace consolidation or volume loss having developed.  No pleural fluid of any substantial volume is seen on either side.  No pneumothorax.  Incidental note is made of instrumentation related to a prior multilevel cervical spinal fusion procedure.    CXR 5/14 Postoperative changes in the cervical spine as before.  Heart size is normal.  Accentuation of the basilar interstitial markings and small subsegmental atelectatic changes at the lung bases.  The upper lung fields are clear    KUB 5/14  No significant bowel dilatation identified.  Mild-to-moderate constipation    Assessment/Plan:     * Cervical myelopathy with cervical radiculopathy    82 y.o. male s/p C3-5 ACDF for cervical  myelopathy on 4/10 with Dr. Hess, and now s/p evacuation/washout of fluid collection with CSF leak repair on 4/28/18 POD#18    -Patient neurologically stable  -Pt s/p aspiration on Monday 5/14/18 which is 2/2 to N/V of TF.  O2 sats remaining stable on 2L O2 via nasal canula (ranging 92-96%). Today 5/16 with leukocytosis 13.5k. Started po Augmentin on 5/15/18 x7day s/p aspiration.  CXR 5/14/16 stable. Per HM, patient will continue to have intermittent low grade fevers, tachycardia, and hypotension as cytokine response to aspiration. Restarted trickle feeds 5/15. aggressive N/V control with anti-emetics. CPT/acapella treatment q 4 hours.  aspiration precautions- keep head of bed up at all times. CXR without signs of infiltrate.  Apprecicate recommendations.   -DM; hosp med adjusting since recent TF off and now back at goal 70cc/hr. On Levemir and Aspart.  -Hypercalcemia likely related to primary hyperparathyroidism.   -Vit D level low at 8, continue Vit D replacement.   -HTN at target, continue Diltiazem 90 mg every 6 hours. PRN meds SBP >160.   -ANGELIA overnight 5/16, likely d/t dehydration from holding TF. Hosp med managing and given 1L LR bolus. Okay with 100cc free water bolus QID.   -UTI on UA 5/15. Hosp med managing and started Cipro x 7 days 5/16/18.   -Cervical collar when OOB or with activity, Up to chair as tolerated.  Surgical incision well healed.   -Speech gradually improving since wound revision;still requires deep suctioning to induce cough.   -Scheduled CPT & acapella treatment q 4 hours to prevent atelectasis  -Aggressive oral care per nursing staff daily  -Repeat CT Head 5/8 showed stable b/l hygromas   -Continue PILI/SCDs/SQH  -Continue bowel regimen daily.  -Continue to hold Eliquis. Per , indications unclear.  does not recommend continuing upon discharge.  -Continue aggressive PT/OT/ST. Discussed with therapy to evaluate patient simultaneously while pt OOB with PT.  -Continue delirium  precautions   -Will attempt to transfer to SNF when intermittent fevers resolve and more stable from medical standpoint       Discussed with Dr. Deshawn Bradford PA-C  Neurosurgery  [facility]

## 2018-05-16 NOTE — ASSESSMENT & PLAN NOTE
-- currently well controlled  -- home meds include diltiazem XR 90 mg daily  -- valsartan held for ANGELIA  -- continue current diltiazem regimen, stopped coreg   -- will cont to monitor and adjust meds prn  -- diltiazem 90 mg q 6 hrs

## 2018-05-16 NOTE — SUBJECTIVE & OBJECTIVE
Interval History: Intermittent low grade fevers again overnight with Tmax 102.7 at 1824 and leukocytosis. O2 sats ranging 92-96% on 2L. TF restarted yesterday; now at goal. Medicine comanaging and given 1L LR bolus for ANGELIA crea 1.8. Medicine also started cipro for UTI. Patient awake and alert on exam with improving speech. He has no complaints on exam. No CP, SOB, or abd pain. Pending SNF placement when medically stable.     Medications:  Continuous Infusions:  Scheduled Meds:   albuterol-ipratropium 2.5mg-0.5mg/3mL  3 mL Nebulization Q4H    amoxicillin-clavulanate  500 mg Per G Tube Q8H    atorvastatin  20 mg Per NG tube Nightly    chlorhexidine  15 mL Mouth/Throat BID    ciprofloxacin HCl  500 mg Per G Tube Q12H    citalopram  10 mg Per G Tube Daily    diltiaZEM  90 mg Per G Tube Q6H    [START ON 5/20/2018] ergocalciferol  50,000 Units Per G Tube Q7 Days    folic acid  1 mg Per G Tube Daily    heparin (porcine)  5,000 Units Subcutaneous Q8H    insulin aspart U-100  4 Units Subcutaneous Q4H    insulin detemir U-100  18 Units Subcutaneous BID    lactated ringers  1,000 mL Intravenous Once    lactated ringers  1,000 mL Intravenous Once    polyethylene glycol  17 g Per G Tube BID    senna-docusate 8.6-50 mg  1 tablet Per G Tube BID    tamsulosin  0.4 mg Per G Tube Daily    zinc oxide   Topical (Top) Daily     PRN Meds:acetaminophen, aluminum-magnesium hydroxide-simethicone, dextrose 50%, glucagon (human recombinant), hydrALAZINE, insulin aspart U-100, labetalol, lidocaine HCL 2%, ondansetron     Review of Systems   As above  Objective:     Weight: 99.5 kg (219 lb 5.7 oz)  Body mass index is 29.75 kg/m².  Vital Signs (Most Recent):  Temp: 98.2 °F (36.8 °C) (05/16/18 1224)  Pulse: 89 (05/16/18 1224)  Resp: 20 (05/16/18 1224)  BP: (!) 109/57 (05/16/18 1224)  SpO2: 96 % (05/16/18 1224) Vital Signs (24h Range):  Temp:  [98.2 °F (36.8 °C)-102.7 °F (39.3 °C)] 98.2 °F (36.8 °C)  Pulse:  [] 89  Resp:   [16-90] 20  SpO2:  [92 %-96 %] 96 %  BP: (105-118)/(53-58) 109/57                 Oxygen Concentration (%):  [28] 28         Gastrostomy/Enterostomy 05/04/18 1539 Gastrostomy tube w/ balloon LUQ feeding (Active)   Securement anchored to abdomen w/ adhesive device 5/15/2018  1:12 PM   Interventions Prior to Feeding residual checked;patency checked 5/15/2018  1:12 PM   Suction Setting/Drainage Method dependent drainage 5/6/2018  8:00 AM   Feeding Type continuous 5/15/2018  1:12 PM   Clamp Status/Tolerance no abdominal discomfort;no abdominal distention;no emesis;no nausea;no restlessness;unclamped 5/15/2018  1:12 PM   Feeding Action feeding continued 5/15/2018  7:59 PM   Dressing dry and intact 5/13/2018  8:00 PM   Insertion Site dry 5/12/2018  8:03 PM   Site Care outer bumper rotated;sterile precut T-slit dressing applied 5/12/2018  8:03 PM   Flush/Irrigation flushed w/;water;no resistance met 5/15/2018  1:12 PM   Current Rate (mL/hr) 60 mL/hr 5/16/2018  6:38 AM   Goal Rate (mL/hr) 70 mL/hr 5/16/2018  6:38 AM   Water Bolus (mL) 100 mL 5/15/2018  6:30 PM   Tube Feeding Intake (mL) 40 5/15/2018  1:12 PM   Residual Amount (ml) 10 ml 5/16/2018  6:38 AM       Male External Urinary Catheter 04/30/18 1600 (Active)   Collection Container Standard drainage bag 5/15/2018  6:30 PM   Securement Method secured to top of thigh w/ adhesive device 5/15/2018  6:30 PM   Skin no redness;no breakdown 5/15/2018  6:30 PM   Tolerance no signs/symptoms of discomfort 5/15/2018  6:30 PM   Output (mL) 200 mL 5/15/2018  6:30 PM   Catheter Change Date 05/15/18 5/15/2018  6:30 PM   Catheter Change Time 1830 5/15/2018  6:30 PM       Neurosurgery Physical Exam  General: no acute distress. Generalized deconditioning   Neurologic: Awake, sitting upright in bed. Oriented x3  Head: normocephalic, atraumatic   GCS: Motor: 6/Verbal: 5/Eyes: 4 GCS Total: 15  Cranial nerves: face symmetric, tongue midline, pupils equal, round, reactive to light with  accomodation, extraocular muscles intact. CN II-XII grossly intact.   Language: no aphasia  Speech:  dysarthria improved, nearly back to baseline   Sensory: decrease response to light touch in BUE  Motor Strength:   Strength   Deltoids Triceps Biceps Wrist Extension Wrist Flexion Hand    Upper: R 3/5 3/5 3/5 3/5 3/5 3/5     L 3/5 4-/5 4-/5 3/5 3/5 4-/5       Iliopsoas Quadriceps Knee  Flexion Tibialis  anterior Gastro- cnemius EHL   Lower: R 3/5 3/5 3/5 4-/5 4-/5 4-/5     L 4-/5 3/5 3/5 4/5 4/5 4/5   [place]: absent  Clonus: absent  Lungs:  normal respiratory effort on 2L nasal cannula.   Abdomen: soft, non-tender and symmetric. G tube site c/d/i  Extremities: warm with no cyanosis, edema, or clubbing.   Pulses: palpable distal pulses  Skin: warm, dry and intact. No visible rashes or lesions.   Incision  is healing well. c/d/i, no erythema, edema, or drainage.   C-collar in place    Significant Labs:    Recent Labs  Lab 05/15/18  0450 05/16/18  0534 05/16/18  1208   * 287* 296*    137 139   K 4.8 4.1 4.2    107 106   CO2 24 21* 24   BUN 37* 51* 53*   CREATININE 1.3 1.8* 1.7*   CALCIUM 9.5 8.9 9.0       Recent Labs  Lab 05/15/18  0450 05/16/18  0534   WBC 11.49 13.58*   HGB 10.6* 9.5*   HCT 33.5* 30.5*    270     No results for input(s): LABPT, INR, APTT in the last 48 hours.  Microbiology Results (last 7 days)     Procedure Component Value Units Date/Time    Blood culture [[id number]] Collected:  05/16/18 0854    Order Status:  Sent Specimen:  Blood Updated:  05/16/18 0944    Blood culture [[id number]] Collected:  05/16/18 0854    Order Status:  Sent Specimen:  Blood Updated:  05/16/18 0908    Urine culture [[id number]] Collected:  05/15/18 1526    Order Status:  No result Specimen:  Urine Updated:  05/15/18 1527    Urine culture [[id number]]     Order Status:  Completed Specimen:  Urine           Significant Diagnostics:  I have reviewed and interpreted all pertinent imaging  results/findings within the past 24 hours.     CXR 5/16  No significant interval change in the appearance of the chest since 05/15/2018 at 8:10 p.m. is appreciated.    CXR 5/15  Heart size and the appearance of the cardiomediastinal silhouette have not changed appreciably since the previous exam.  Minor bibasilar airspace consolidation/volume loss is observed, but the lung zones appear essentially stable since the prior study, with no significant new areas of airspace consolidation or volume loss having developed.  No pleural fluid of any substantial volume is seen on either side.  No pneumothorax.  Incidental note is made of instrumentation related to a prior multilevel cervical spinal fusion procedure.    CXR 5/14 Postoperative changes in the cervical spine as before.  Heart size is normal.  Accentuation of the basilar interstitial markings and small subsegmental atelectatic changes at the lung bases.  The upper lung fields are clear    KUB 5/14  No significant bowel dilatation identified.  Mild-to-moderate constipation

## 2018-05-16 NOTE — PLAN OF CARE
Problem: SLP Goal  Goal: SLP Goal  Speech Language Pathology Goals  Goals expected to be met by 5/8-continue till 5/22    1. Pt will participate in ongoing swallow assessment  2. Pt will participate in speech language cognitive assessment MEt  3. Pt will complete rote speech tasks with 80% accy and mod cues  4. Pt will follow 1 step commands with 90% accuracy and occasional cues  5. Pt will participate in ongoing speech language cognitive assessment.         Speech Language Pathology Goals  Goals expected to be met by 4/29:    1. Pt will participate in ongoing assessment of swallow.               Outcome: Ongoing (interventions implemented as appropriate)  Pt more alert this service day and participates with ST. Pt goals remain appropriate. ST to continue to follow.     VALENTINO Shelby., [facility]-SLP  Speech-Language Pathology  Pager: [phone]  5/16/2018

## 2018-05-17 LAB
ALBUMIN SERPL BCP-MCNC: 1.8 G/DL
ALP SERPL-CCNC: 100 U/L
ALT SERPL W/O P-5'-P-CCNC: 23 U/L
ANION GAP SERPL CALC-SCNC: 9 MMOL/L
AST SERPL-CCNC: 23 U/L
BASOPHILS # BLD AUTO: 0.01 K/UL
BASOPHILS NFR BLD: 0.1 %
BILIRUB SERPL-MCNC: 0.4 MG/DL
BUN SERPL-MCNC: 51 MG/DL
CALCIUM SERPL-MCNC: 8.9 MG/DL
CHLORIDE SERPL-SCNC: 108 MMOL/L
CO2 SERPL-SCNC: 25 MMOL/L
CREAT SERPL-MCNC: 1.5 MG/DL
DIFFERENTIAL METHOD: ABNORMAL
EOSINOPHIL # BLD AUTO: 0 K/UL
EOSINOPHIL NFR BLD: 0.2 %
ERYTHROCYTE [DISTWIDTH] IN BLOOD BY AUTOMATED COUNT: 15.3 %
EST. GFR  (AFRICAN AMERICAN): 50.1 ML/MIN/1.73 M^2
EST. GFR  (NON AFRICAN AMERICAN): 43.3 ML/MIN/1.73 M^2
GLUCOSE SERPL-MCNC: 198 MG/DL
HCT VFR BLD AUTO: 27.8 %
HGB BLD-MCNC: 8.7 G/DL
IMM GRANULOCYTES # BLD AUTO: 0.07 K/UL
IMM GRANULOCYTES NFR BLD AUTO: 0.6 %
LYMPHOCYTES # BLD AUTO: 1.7 K/UL
LYMPHOCYTES NFR BLD: 15.5 %
MCH RBC QN AUTO: 30.7 PG
MCHC RBC AUTO-ENTMCNC: 31.3 G/DL
MCV RBC AUTO: 98 FL
MONOCYTES # BLD AUTO: 1.2 K/UL
MONOCYTES NFR BLD: 10.5 %
NEUTROPHILS # BLD AUTO: 8.1 K/UL
NEUTROPHILS NFR BLD: 73.1 %
NRBC BLD-RTO: 0 /100 WBC
PLATELET # BLD AUTO: 289 K/UL
PMV BLD AUTO: 10 FL
POCT GLUCOSE: 120 MG/DL (ref 70–110)
POCT GLUCOSE: 151 MG/DL (ref 70–110)
POCT GLUCOSE: 163 MG/DL (ref 70–110)
POCT GLUCOSE: 168 MG/DL (ref 70–110)
POCT GLUCOSE: 187 MG/DL (ref 70–110)
POCT GLUCOSE: 53 MG/DL (ref 70–110)
POCT GLUCOSE: 62 MG/DL (ref 70–110)
POCT GLUCOSE: 76 MG/DL (ref 70–110)
POTASSIUM SERPL-SCNC: 4.2 MMOL/L
PROT SERPL-MCNC: 6.1 G/DL
RBC # BLD AUTO: 2.83 M/UL
SODIUM SERPL-SCNC: 142 MMOL/L
WBC # BLD AUTO: 11.06 K/UL

## 2018-05-17 PROCEDURE — 97110 THERAPEUTIC EXERCISES: CPT

## 2018-05-17 PROCEDURE — 97112 NEUROMUSCULAR REEDUCATION: CPT

## 2018-05-17 PROCEDURE — 94640 AIRWAY INHALATION TREATMENT: CPT

## 2018-05-17 PROCEDURE — 27000646 HC AEROBIKA DEVICE

## 2018-05-17 PROCEDURE — 25000003 PHARM REV CODE 250: Performed by: INTERNAL MEDICINE

## 2018-05-17 PROCEDURE — 20600001 HC STEP DOWN PRIVATE ROOM

## 2018-05-17 PROCEDURE — 99232 SBSQ HOSP IP/OBS MODERATE 35: CPT | Mod: GC,,, | Performed by: HOSPITALIST

## 2018-05-17 PROCEDURE — 99900035 HC TECH TIME PER 15 MIN (STAT)

## 2018-05-17 PROCEDURE — 25000242 PHARM REV CODE 250 ALT 637 W/ HCPCS: Performed by: PHYSICIAN ASSISTANT

## 2018-05-17 PROCEDURE — 92526 ORAL FUNCTION THERAPY: CPT

## 2018-05-17 PROCEDURE — 97535 SELF CARE MNGMENT TRAINING: CPT

## 2018-05-17 PROCEDURE — 25000003 PHARM REV CODE 250: Performed by: PHYSICIAN ASSISTANT

## 2018-05-17 PROCEDURE — 85025 COMPLETE CBC W/AUTO DIFF WBC: CPT

## 2018-05-17 PROCEDURE — 36415 COLL VENOUS BLD VENIPUNCTURE: CPT

## 2018-05-17 PROCEDURE — 94668 MNPJ CHEST WALL SBSQ: CPT

## 2018-05-17 PROCEDURE — 99024 POSTOP FOLLOW-UP VISIT: CPT | Mod: POP,,, | Performed by: PHYSICIAN ASSISTANT

## 2018-05-17 PROCEDURE — 92507 TX SP LANG VOICE COMM INDIV: CPT

## 2018-05-17 PROCEDURE — 93010 ELECTROCARDIOGRAM REPORT: CPT | Mod: ,,, | Performed by: INTERNAL MEDICINE

## 2018-05-17 PROCEDURE — 93005 ELECTROCARDIOGRAM TRACING: CPT

## 2018-05-17 PROCEDURE — 27000221 HC OXYGEN, UP TO 24 HOURS

## 2018-05-17 PROCEDURE — 25000003 PHARM REV CODE 250: Performed by: STUDENT IN AN ORGANIZED HEALTH CARE EDUCATION/TRAINING PROGRAM

## 2018-05-17 PROCEDURE — 94761 N-INVAS EAR/PLS OXIMETRY MLT: CPT

## 2018-05-17 PROCEDURE — 94664 DEMO&/EVAL PT USE INHALER: CPT

## 2018-05-17 PROCEDURE — 80053 COMPREHEN METABOLIC PANEL: CPT

## 2018-05-17 PROCEDURE — 63600175 PHARM REV CODE 636 W HCPCS: Performed by: STUDENT IN AN ORGANIZED HEALTH CARE EDUCATION/TRAINING PROGRAM

## 2018-05-17 RX ORDER — SODIUM CHLORIDE, SODIUM LACTATE, POTASSIUM CHLORIDE, CALCIUM CHLORIDE 600; 310; 30; 20 MG/100ML; MG/100ML; MG/100ML; MG/100ML
INJECTION, SOLUTION INTRAVENOUS CONTINUOUS
Status: ACTIVE | OUTPATIENT
Start: 2018-05-17 | End: 2018-05-18

## 2018-05-17 RX ORDER — BISACODYL 10 MG
10 SUPPOSITORY, RECTAL RECTAL ONCE
Status: COMPLETED | OUTPATIENT
Start: 2018-05-17 | End: 2018-05-17

## 2018-05-17 RX ORDER — BISACODYL 10 MG
10 SUPPOSITORY, RECTAL RECTAL DAILY PRN
Status: DISCONTINUED | OUTPATIENT
Start: 2018-05-17 | End: 2018-05-18 | Stop reason: HOSPADM

## 2018-05-17 RX ORDER — DILTIAZEM HYDROCHLORIDE 30 MG/1
30 TABLET, FILM COATED ORAL EVERY 6 HOURS
Status: DISCONTINUED | OUTPATIENT
Start: 2018-05-17 | End: 2018-05-18

## 2018-05-17 RX ORDER — SODIUM CHLORIDE, SODIUM LACTATE, POTASSIUM CHLORIDE, CALCIUM CHLORIDE 600; 310; 30; 20 MG/100ML; MG/100ML; MG/100ML; MG/100ML
INJECTION, SOLUTION INTRAVENOUS CONTINUOUS
Status: ACTIVE | OUTPATIENT
Start: 2018-05-17 | End: 2018-05-17

## 2018-05-17 RX ADMIN — HEPARIN SODIUM 5000 UNITS: 5000 INJECTION, SOLUTION INTRAVENOUS; SUBCUTANEOUS at 05:05

## 2018-05-17 RX ADMIN — AMOXICILLIN AND CLAVULANATE POTASSIUM 500 MG: 400; 57 POWDER, FOR SUSPENSION ORAL at 05:05

## 2018-05-17 RX ADMIN — INSULIN ASPART 6 UNITS: 100 INJECTION, SOLUTION INTRAVENOUS; SUBCUTANEOUS at 02:05

## 2018-05-17 RX ADMIN — IPRATROPIUM BROMIDE AND ALBUTEROL SULFATE 3 ML: .5; 3 SOLUTION RESPIRATORY (INHALATION) at 08:05

## 2018-05-17 RX ADMIN — IPRATROPIUM BROMIDE AND ALBUTEROL SULFATE 3 ML: .5; 3 SOLUTION RESPIRATORY (INHALATION) at 09:05

## 2018-05-17 RX ADMIN — BISACODYL 10 MG: 10 SUPPOSITORY RECTAL at 02:05

## 2018-05-17 RX ADMIN — CITALOPRAM HYDROBROMIDE 10 MG: 10 TABLET ORAL at 10:05

## 2018-05-17 RX ADMIN — STANDARDIZED SENNA CONCENTRATE AND DOCUSATE SODIUM 1 TABLET: 8.6; 5 TABLET, FILM COATED ORAL at 10:05

## 2018-05-17 RX ADMIN — CHLORHEXIDINE GLUCONATE 15 ML: 1.2 RINSE ORAL at 09:05

## 2018-05-17 RX ADMIN — STANDARDIZED SENNA CONCENTRATE AND DOCUSATE SODIUM 1 TABLET: 8.6; 5 TABLET, FILM COATED ORAL at 09:05

## 2018-05-17 RX ADMIN — CIPROFLOXACIN HYDROCHLORIDE 500 MG: 500 TABLET, FILM COATED ORAL at 09:05

## 2018-05-17 RX ADMIN — HEPARIN SODIUM 5000 UNITS: 5000 INJECTION, SOLUTION INTRAVENOUS; SUBCUTANEOUS at 09:05

## 2018-05-17 RX ADMIN — AMOXICILLIN AND CLAVULANATE POTASSIUM 500 MG: 400; 57 POWDER, FOR SUSPENSION ORAL at 09:05

## 2018-05-17 RX ADMIN — HEPARIN SODIUM 5000 UNITS: 5000 INJECTION, SOLUTION INTRAVENOUS; SUBCUTANEOUS at 02:05

## 2018-05-17 RX ADMIN — TAMSULOSIN HYDROCHLORIDE 0.4 MG: 0.4 CAPSULE ORAL at 10:05

## 2018-05-17 RX ADMIN — DILTIAZEM HYDROCHLORIDE 30 MG: 30 TABLET, FILM COATED ORAL at 11:05

## 2018-05-17 RX ADMIN — CHLORHEXIDINE GLUCONATE 15 ML: 1.2 RINSE ORAL at 10:05

## 2018-05-17 RX ADMIN — DEXTROSE MONOHYDRATE 12.5 G: 25 INJECTION, SOLUTION INTRAVENOUS at 10:05

## 2018-05-17 RX ADMIN — AMOXICILLIN AND CLAVULANATE POTASSIUM 500 MG: 400; 57 POWDER, FOR SUSPENSION ORAL at 06:05

## 2018-05-17 RX ADMIN — SODIUM CHLORIDE, SODIUM LACTATE, POTASSIUM CHLORIDE, AND CALCIUM CHLORIDE: .6; .31; .03; .02 INJECTION, SOLUTION INTRAVENOUS at 06:05

## 2018-05-17 RX ADMIN — IPRATROPIUM BROMIDE AND ALBUTEROL SULFATE 3 ML: .5; 3 SOLUTION RESPIRATORY (INHALATION) at 04:05

## 2018-05-17 RX ADMIN — IPRATROPIUM BROMIDE AND ALBUTEROL SULFATE 3 ML: .5; 3 SOLUTION RESPIRATORY (INHALATION) at 12:05

## 2018-05-17 RX ADMIN — FOLIC ACID 1 MG: 1 TABLET ORAL at 10:05

## 2018-05-17 RX ADMIN — ACETAMINOPHEN 650 MG: 325 TABLET ORAL at 09:05

## 2018-05-17 RX ADMIN — SODIUM CHLORIDE, SODIUM LACTATE, POTASSIUM CHLORIDE, AND CALCIUM CHLORIDE: 600; 310; 30; 20 INJECTION, SOLUTION INTRAVENOUS at 08:05

## 2018-05-17 RX ADMIN — POLYETHYLENE GLYCOL 3350 17 G: 17 POWDER, FOR SOLUTION ORAL at 09:05

## 2018-05-17 RX ADMIN — POLYETHYLENE GLYCOL 3350 17 G: 17 POWDER, FOR SOLUTION ORAL at 10:05

## 2018-05-17 RX ADMIN — CIPROFLOXACIN HYDROCHLORIDE 500 MG: 500 TABLET, FILM COATED ORAL at 10:05

## 2018-05-17 RX ADMIN — INSULIN ASPART 6 UNITS: 100 INJECTION, SOLUTION INTRAVENOUS; SUBCUTANEOUS at 10:05

## 2018-05-17 RX ADMIN — ATORVASTATIN CALCIUM 20 MG: 20 TABLET, FILM COATED ORAL at 09:05

## 2018-05-17 RX ADMIN — INSULIN ASPART 6 UNITS: 100 INJECTION, SOLUTION INTRAVENOUS; SUBCUTANEOUS at 06:05

## 2018-05-17 RX ADMIN — INSULIN ASPART 6 UNITS: 100 INJECTION, SOLUTION INTRAVENOUS; SUBCUTANEOUS at 05:05

## 2018-05-17 RX ADMIN — SODIUM CHLORIDE, SODIUM LACTATE, POTASSIUM CHLORIDE, AND CALCIUM CHLORIDE: .6; .31; .03; .02 INJECTION, SOLUTION INTRAVENOUS at 11:05

## 2018-05-17 RX ADMIN — DILTIAZEM HYDROCHLORIDE 30 MG: 30 TABLET, FILM COATED ORAL at 06:05

## 2018-05-17 NOTE — ASSESSMENT & PLAN NOTE
- BUN/Cr had been minimally rising over last 2 weeks for unclear reasons though acute increase over 5/16 likely secondary to no PO intake and held tube feeds causing pre-renal injury   - bolusing 2 L of lactated ringers   - still hold valsartan until resolved   - renally dose all medications  - avoid nephrotoxic medications   - pt with improving Cr after fluid, now back on feeds- will continue to monitor Cr.

## 2018-05-17 NOTE — ASSESSMENT & PLAN NOTE
82 y.o. male s/p C3-5 ACDF for cervical myelopathy on 4/10 with Dr. Hess, and now s/p evacuation/washout of fluid collection with CSF leak repair on 4/28/18 POD#19    -Patient neurologically stable on exam with gradual improvements. Overall, patient is deconditioned since admission.   -Pt s/p aspiration on Monday 5/14/18 which is 2/2 to N/V of TF.  O2 sats remaining stable on 2L O2 via nasal canula (ranging 92-99%). leukocytosis resolved today 5/17 and patient is afebrile overnight. Started po Augmentin on 5/15/18 x7day s/p aspiration.  CXR 5/14/16 stable. Per HM, patient will continue to have intermittent low grade fevers, tachycardia, and hypotension as cytokine response to aspiration. Restarted trickle feeds 5/15. aggressive N/V control with anti-emetics. CPT/acapella treatment q 4 hours.  aspiration precautions- keep head of bed up at all times. CXR without signs of infiltrate.  Apprecicate recommendations.   -DM; hosp med adjusting since recent TF off and now back at goal 70cc/hr (with 100cc free water QID). On Levemir and Aspart.  -Hypercalcemia likely related to primary hyperparathyroidism.   -Vit D level low at 8, continue Vit D replacement.   -HTN; continue Diltiazem per HM; new dose adjustments today given previous hypotension yesterday that resolved. PRN meds SBP >160.   -ANGELIA overnight 5/16, likely d/t dehydration from holding TF.  Improving today with creat 1.5 <<1.8. Hosp med managing. Previously given LR bolus and currently on continuous fluids. Okay with 100cc free water bolus QID.   -UTI on UA 5/15. Hosp med managing and started Cipro x 7 days 5/16/18. Ucx pending identification.  -Sinus tachycardia on EKG 5/17   -Cervical collar when OOB or with activity, Up to chair as tolerated.    -Surgical incision well healed.   -Speech gradually improving since wound revision;still requires deep suctioning to induce cough.   -Scheduled CPT & acapella treatment q 4 hours to prevent atelectasis  -Aggressive  oral care per nursing staff daily  -Repeat CT Head 5/8 showed stable b/l hygromas   -Continue PILI/SCDs/SQH  -Continue bowel regimen daily.  -Continue to hold Eliquis. Per , indications unclear.  does not recommend continuing upon discharge.  -Continue aggressive PT/OT/ST. Discussed with therapy to evaluate patient simultaneously while pt OOB with PT.  -OOB daily; nursing staff can put patient in medi chair with draw sheet. Nursing staff to perform passive ROM of all extremity qshift.   -Continue delirium precautions   -Will attempt to transfer to SNF when more stable from medical standpoint       Discussed with Dr. Hess

## 2018-05-17 NOTE — PLAN OF CARE
Diet Orders per chart:   Tube Feedings/Formulas [facility]; Diabetisource; 70; Gastrostomy; Ready to Hang Liter: Tube Feeding (I) starting at 05/05 1749   Diet NPO: NPO starting at 04/20 1916     Yvonne Ellington LMSW  [id number]

## 2018-05-17 NOTE — PLAN OF CARE
Problem: SLP Goal  Goal: SLP Goal  Speech Language Pathology Goals  Goals expected to be met by 5/8-continue till 5/22    1. Pt will participate in ongoing swallow assessment  2. Pt will participate in speech language cognitive assessment MEt  3. Pt will complete rote speech tasks with 80% accy and mod cues  4. Pt will follow 1 step commands with 90% accuracy and occasional cues  5. Pt will participate in ongoing speech language cognitive assessment.         Speech Language Pathology Goals  Goals expected to be met by 4/29:    1. Pt will participate in ongoing assessment of swallow.               Outcome: Ongoing (interventions implemented as appropriate)  Pt participation limited by fatigue and decreased activity tolerance.  Goals remain appropriate.  Cont ST per POC.    Namita Caraballo CCC-SLP  5/17/2018

## 2018-05-17 NOTE — PROGRESS NOTES
[facility]  Neurosurgery  Progress Note    Subjective:     History of Present Illness: 82 y.o.  male with type 2 diabetes, who presents today for follow up evaluation one week prior to 2 level ACDF scheduled for 4/10/2018. Pt reports    Pt presents today wearing a cervical collar and in a wheelchair. He would like to proceed with surgery. Per pt's family, his PCP would like to see him on 4/9, the day before surgery. He notes continued neck pain and BUE numbness/tingling as well as BUE weakness. Pt is only able to walk a few steps unassisted. He has never received neck surgery.     Post-Op Info:  Procedure(s) (LRB):  WASHOUT-CERVICAL-wound washout and csf leak repair with depuy (N/A)  DISKECTOMY AND FUSION-ANTERIOR CERVICAL (ACDF)- REVISON C5-C7   19 Days Post-Op     Interval History: NAEON. ANGELIA improving, crea 1.5 today. O2 sats ranging 92-99% on 2L. AFEBRILE overnight. SBP improved 110-143 today. Patient awake and alert on exam with improving speech. No CP, SOB, palpitations, or new complaints on exam. He has mild abd discomfort and is unsure when last BM was.     Medications:  Continuous Infusions:   lactated ringers 200 mL/hr at 05/17/18 0815     Scheduled Meds:   albuterol-ipratropium  3 mL Nebulization Q4H    amoxicillin-clavulanate  500 mg Per G Tube Q8H    atorvastatin  20 mg Per NG tube Nightly    bisacodyl  10 mg Rectal Once    chlorhexidine  15 mL Mouth/Throat BID    ciprofloxacin HCl  500 mg Per G Tube Q12H    citalopram  10 mg Per G Tube Daily    diltiaZEM  30 mg Per G Tube Q6H    [START ON 5/20/2018] ergocalciferol  50,000 Units Per G Tube Q7 Days    folic acid  1 mg Per G Tube Daily    heparin (porcine)  5,000 Units Subcutaneous Q8H    insulin aspart U-100  6 Units Subcutaneous Q4H    insulin detemir U-100  18 Units Subcutaneous BID    polyethylene glycol  17 g Per G Tube BID    senna-docusate 8.6-50 mg  1 tablet Per G Tube BID    tamsulosin  0.4 mg Per G Tube Daily     zinc oxide   Topical (Top) Daily     PRN Meds:acetaminophen, aluminum-magnesium hydroxide-simethicone, bisacodyl, dextrose 50%, glucagon (human recombinant), hydrALAZINE, insulin aspart U-100, labetalol, lidocaine HCL 2%, ondansetron     Review of Systems  Objective:     Weight: 99.5 kg (219 lb 5.7 oz)  Body mass index is 29.75 kg/m².  Vital Signs (Most Recent):  Temp: 99.1 °F (37.3 °C) (05/17/18 1206)  Pulse: 107 (05/17/18 1244)  Resp: (!) 22 (05/17/18 1244)  BP: 116/66 (05/17/18 1206)  SpO2: 98 % (05/17/18 1244) Vital Signs (24h Range):  Temp:  [97.9 °F (36.6 °C)-99.1 °F (37.3 °C)] 99.1 °F (37.3 °C)  Pulse:  [] 107  Resp:  [17-22] 22  SpO2:  [92 %-99 %] 98 %  BP: (110-143)/(54-66) 116/66                 Oxygen Concentration (%):  [3] 3         Gastrostomy/Enterostomy 05/04/18 1539 Gastrostomy tube w/ balloon LUQ feeding (Active)   Securement anchored to abdomen w/ adhesive device 5/15/2018  1:12 PM   Interventions Prior to Feeding patency checked 5/16/2018  7:45 AM   Suction Setting/Drainage Method dependent drainage 5/6/2018  8:00 AM   Feeding Type continuous 5/16/2018  7:38 PM   Clamp Status/Tolerance no abdominal discomfort;no abdominal distention;no emesis;no nausea 5/16/2018  7:38 PM   Feeding Action feeding continued 5/16/2018  7:38 PM   Dressing dry and intact 5/13/2018  8:00 PM   Insertion Site dry 5/16/2018  7:38 PM   Site Care outer bumper rotated;sterile precut T-slit dressing applied 5/12/2018  8:03 PM   Flush/Irrigation flushed w/;water;no resistance met 5/15/2018  1:12 PM   Current Rate (mL/hr) 70 mL/hr 5/16/2018  7:38 PM   Goal Rate (mL/hr) 70 mL/hr 5/16/2018  7:38 PM   Water Bolus (mL) 100 mL 5/17/2018  5:00 AM   Tube Feeding Intake (mL) 40 5/15/2018  1:12 PM   Residual Amount (ml) 10 ml 5/16/2018 10:00 PM       Male External Urinary Catheter 04/30/18 1600 (Active)   Collection Container Standard drainage bag 5/15/2018  6:30 PM   Securement Method secured to top of thigh w/ adhesive  device 5/15/2018  6:30 PM   Skin no redness;no breakdown 5/15/2018  6:30 PM   Tolerance no signs/symptoms of discomfort 5/15/2018  6:30 PM   Output (mL) 200 mL 5/15/2018  6:30 PM   Catheter Change Date 05/15/18 5/15/2018  6:30 PM   Catheter Change Time 1830 5/15/2018  6:30 PM       Neurosurgery Physical Exam  General: no acute distress. Generalized deconditioning   Neurologic: Awake, sitting upright in bed. Oriented x3  Head: normocephalic, atraumatic   GCS: Motor: 6/Verbal: 5/Eyes: 4 GCS Total: 15  Cranial nerves: face symmetric, tongue midline, pupils equal, round, reactive to light with accomodation, extraocular muscles intact. CN II-XII grossly intact.   Language: no aphasia  Speech:  dysarthria improved, nearly back to baseline   Sensory: decrease response to light touch in BUE  Motor Strength:   Strength   Deltoids Triceps Biceps Wrist Extension Wrist Flexion Hand    Upper: R 3/5 3/5 3/5 3/5 3/5 3/5     L 3/5 4-/5 4-/5 3/5 3/5 4-/5       Iliopsoas Quadriceps Knee  Flexion Tibialis  anterior Gastro- cnemius EHL   Lower: R 3/5 3/5 3/5 4-/5 4-/5 4-/5     L 4-/5 3/5 3/5 4/5 4/5 4/5   [place]: absent  Clonus: absent  Lungs:  normal respiratory effort on 2L nasal cannula.   Abdomen: soft, non-tender and symmetric. G tube site c/d/i  Extremities: warm with no cyanosis, edema, or clubbing.   Pulses: palpable distal pulses  Skin: warm, dry and intact. No visible rashes or lesions.   Incision  is healing well. c/d/i, no erythema, edema, or drainage.   C-collar in place      Significant Labs:    Recent Labs  Lab 05/16/18  0534 05/16/18  1208 05/17/18  0357   * 296* 198*    139 142   K 4.1 4.2 4.2    106 108   CO2 21* 24 25   BUN 51* 53* 51*   CREATININE 1.8* 1.7* 1.5*   CALCIUM 8.9 9.0 8.9       Recent Labs  Lab 05/16/18  0534 05/17/18  0357   WBC 13.58* 11.06   HGB 9.5* 8.7*   HCT 30.5* 27.8*    289     No results for input(s): LABPT, INR, APTT in the last 48 hours.  Microbiology Results  (last 7 days)     Procedure Component Value Units Date/Time    Blood culture [[id number]] Collected:  05/16/18 0854    Order Status:  Completed Specimen:  Blood Updated:  05/17/18 1213     Blood Culture, Routine No Growth to date     Blood Culture, Routine No Growth to date    Blood culture [[id number]] Collected:  05/16/18 0854    Order Status:  Completed Specimen:  Blood Updated:  05/17/18 1212     Blood Culture, Routine No Growth to date     Blood Culture, Routine No Growth to date    Fungus culture [[id number]] Collected:  04/28/18 1413    Order Status:  Completed Specimen:  Wound from Neck Updated:  05/17/18 0757     Fungus (Mycology) Culture Culture in progress     Fungus (Mycology) Culture No fungus isolated after 2 weeks    Narrative:       Wound fluid    Fungus culture [[id number]] Collected:  04/28/18 1417    Order Status:  Completed Specimen:  Wound from Neck Updated:  05/17/18 0757     Fungus (Mycology) Culture Culture in progress     Fungus (Mycology) Culture No fungus isolated after 2 weeks    Narrative:       Wound fluid    Urine culture [[id number]] Collected:  05/15/18 1526    Order Status:  Completed Specimen:  Urine Updated:  05/16/18 2120     Urine Culture, Routine --     GRAM NEGATIVE MARIO  10,000 - 49,999 cfu/ml  Identification and susceptibility pending  No other significant isolate      Narrative:       Add on CXURN order# [id number] per Dr. Addison @  05/15/2018  15:26     Culture, Respiratory with Gram Stain [[id number]]     Order Status:  Canceled Specimen:  Respiratory     Urine culture [[id number]]     Order Status:  Completed Specimen:  Urine           Significant Diagnostics:  ECG 5/17/18  Sinus tachycardia  Minimal voltage criteria for LVH, may be normal variant  Borderline Abnormal ECG  When compared with ECG of 13-APR-2018 16:31,  Premature atrial complexes are no longer Present    Assessment/Plan:     * Cervical myelopathy with cervical radiculopathy    82 y.o. male s/p C3-5 ACDF for  cervical myelopathy on 4/10 with Dr. Hess, and now s/p evacuation/washout of fluid collection with CSF leak repair on 4/28/18 POD#19    -Patient neurologically stable on exam with gradual improvements. Overall, patient is deconditioned since admission.   -Pt s/p aspiration on Monday 5/14/18 which is 2/2 to N/V of TF.  O2 sats remaining stable on 2L O2 via nasal canula (ranging 92-99%). leukocytosis resolved today 5/17 and patient is afebrile overnight. Started po Augmentin on 5/15/18 x7day s/p aspiration.  CXR 5/14/16 stable. Per HM, patient will continue to have intermittent low grade fevers, tachycardia, and hypotension as cytokine response to aspiration. Restarted trickle feeds 5/15. aggressive N/V control with anti-emetics. CPT/acapella treatment q 4 hours.  aspiration precautions- keep head of bed up at all times. CXR without signs of infiltrate.  Apprecicate recommendations.   -DM; hosp med adjusting since recent TF off and now back at goal 70cc/hr (with 100cc free water QID). On Levemir and Aspart.  -Hypercalcemia likely related to primary hyperparathyroidism.   -Vit D level low at 8, continue Vit D replacement.   -HTN; continue Diltiazem per HM; new dose adjustments today given previous hypotension yesterday that resolved. PRN meds SBP >160.   -ANGELIA overnight 5/16, likely d/t dehydration from holding TF.  Improving today with creat 1.5 <<1.8. Hosp med managing. Previously given LR bolus and currently on continuous fluids. Okay with 100cc free water bolus QID.   -UTI on UA 5/15. Hosp med managing and started Cipro x 7 days 5/16/18. Ucx pending identification.  -Sinus tachycardia on EKG 5/17   -Cervical collar when OOB or with activity, Up to chair as tolerated.    -Surgical incision well healed.   -Speech gradually improving since wound revision;still requires deep suctioning to induce cough.   -Scheduled CPT & acapella treatment q 4 hours to prevent atelectasis  -Aggressive oral care per nursing staff  daily  -Repeat CT Head 5/8 showed stable b/l hygromas   -Continue PILI/SCDs/SQH  -Continue bowel regimen daily.  -Continue to hold Eliquis. Per , indications unclear.  does not recommend continuing upon discharge.  -Continue aggressive PT/OT/ST. Discussed with therapy to evaluate patient simultaneously while pt OOB with PT.  -OOB daily; nursing staff can put patient in medi chair with draw sheet. Nursing staff to perform passive ROM of all extremity qshift.   -Continue delirium precautions   -Constipation: give suppository today. If no Bm, give enema. Last bm was 5/6 and 5/11.   -Will attempt to transfer to SNF when more stable from medical standpoint       Discussed with Dr. Deshawn Bradford PAWoodC  Neurosurgery  [facility]

## 2018-05-17 NOTE — PT/OT/SLP PROGRESS
Occupational Therapy   Treatment    Name: Hollis Pitts  MRN: [id number]  Admitting Diagnosis:  Cervical myelopathy with cervical radiculopathy  19 Days Post-Op    Recommendations:     Discharge Recommendations: nursing facility, skilled  Discharge Equipment Recommendations:  hospital bed, lift device  Barriers to discharge:  Decreased caregiver support, Other (Comment) (level of skilled assistance required)    Subjective     Communicated with: RN prior to session. Completed with PTS and PT.  Pain/Comfort:  · Pain Rating 1:  (c/o B shoulder discomfort (L>R))  · Pain Addressed 1: Pre-medicate for activity, Reposition, Distraction  · Pain Rating Post-Intervention 1:  (remained throughout)    Patients cultural, spiritual, L[employer] conflicts given the current situation: None reported    Objective:     Patient found with: cervical collar, Condom Catheter, PEG Tube, telemetry    General Precautions: Standard, aspiration, aphasia, fall, hearing impaired, NPO, respiratory, seizure   Orthopedic Precautions:spinal precautions   Braces: Aspen collar     Occupational Performance:    Bed Mobility:    · Patient completed Rolling/Turning to Left with  total assistance and 2 persons  · Patient completed Rolling/Turning to Right with total assistance and 2 persons  · Patient completed Scooting/Bridging with total assistance and 2 persons  · Patient completed Supine to Sit with total assistance and 2 persons  · Patient completed Sit to Supine with total assistance and 2 persons     Functional Mobility/Transfers:  · Unable to complete 2/2 impaired sitting balance and fatigue    Activities of Daily Living:  · Feeding:  NPO PEG  · Grooming: maximal assistance for postural control; min(A) for proximal support of L UE to wash/wipe face and eyes while seated EOB  · LB Dressing: dependence B socks  · Toileting: total assistance pt with bowel movt while seated EOB/Pt unaware    Patient left with bed in chair position with all lines intact,  call button in reach, PCT notified and RT and daughter present    [facility] 6 Click:  [facility] Total Score: 8    Treatment & Education:  -Pt alert and agreeable to therapy session; oriented to x3; cues for time  -Pt alert with eyes open throughout; more verbal and appropriate this session compared to writing therapist previous session with patient   -EOB ~23 min with max(A) for postural control 2/2 L lateral lean  *Cervical collar adjusted 2/2 discomfort; midline achieved  *facilitation for anterior pelvic tilt x3 trials with rolled sheet; tolerated fairly; B UE in support position  *AAROM for L UE for shoulder flex 100* x10 reps; shoulder adduction x8 reps; reaching x5 reps  *AAROM/PROM for R UE shoulder flex 100* x10 reps; shoulder adduction x8 reps  *meatacarpal spread and mild wrist stretching completed for R hand 2/2 edema   *OT to provide postural control for LB exercises for PT- max(A) 2/2 L lateral lean; encouraged L UE weight bearing and midline control   -Communication board updated; questions/concerns addressed within OT scope of practice    Education:    Assessment:     Hollis Pitts is a 82 y.o. male with a medical diagnosis of Cervical myelopathy with cervical radiculopathy.  He presents with improvement in alertness and active engagement in therapy task on this date. He cont to require total care with ADLs and mobility. He would greatly benefit from nursing progressive mobility and positioning at this time to encourage mobility outside of therapy sessions. He will greatly benefit from OT services at [facility] level of care.  Performance deficits affecting function are weakness, impaired endurance, impaired sensation, impaired self care skills, impaired functional mobilty, gait instability, impaired balance, visual deficits, impaired cognition, decreased upper extremity function, decreased lower extremity function, decreased safety awareness, impaired coordination, impaired fine motor, impaired cardiopulmonary  response to activity, impaired skin.      Rehab Prognosis:  Fair; patient would benefit from acute skilled OT services to address these deficits and reach maximum level of function.       Plan:     Patient to be seen 3 x/week to address the above listed problems via self-care/home management, therapeutic activities, therapeutic exercises, neuromuscular re-education, cognitive retraining  · Plan of Care Expires: 05/31/18  · Plan of Care Reviewed with: patient    This Plan of care has been discussed with the patient who was involved in its development and understands and is in agreement with the identified goals and treatment plan    GOALS:    Occupational Therapy Goals        Problem: Occupational Therapy Goal    Goal Priority Disciplines Outcome Interventions   Occupational Therapy Goal     OT, PT/OT Ongoing (interventions implemented as appropriate)    Description:  Goals to be met by: 5/29  Patient will increase functional independence with ADLs by performing:    Pt will actively engage in therapeutic task for 8 min duration with 0 added cues/facilitation.  Pt will complete L UE reaching task EOB with moderate assistance for postural control.   Supine<>sit ; sit<>supine with Max Assistance.  Grooming while seated EOB with set up for grooming and mod(A) for postural control  Pt will tolerate EOB activity ~15 min duration with mod(A) for postural control.   Family/caregiver demo understanding for ROM and positioning for B UE.                     Time Tracking:     OT Date of Treatment: 05/17/18  OT Start Time: 1026  OT Stop Time: 1105  OT Total Time (min): 39 min    Billable Minutes:Self Care/Home Management 8  Therapeutic Exercise 15  Neuromuscular Re-education 15    MADDIE Quintanilla  5/17/2018

## 2018-05-17 NOTE — ASSESSMENT & PLAN NOTE
- Home dose senispar restarted though unclear when this medication was started or what the etiology of his hypercalcemia is. It appears that this must have been an outside of [facility] medication  - PTH elevated and vitamin D exceptionally low- maybe causative etiology of elevated PTH and calcium and at very least contributing  - will start ergocalciferol 50,000 units daily for 5 days and thereafter once weekly. Needs vitamin D level drawn in 3- 6 months  - patient has not been able to get sensipar given that it cannot be crushed  - s/p pamidronate 5/10  - patient needs a follow up appt regarding this before being discharged from hospital preferably with endocrine follow up  - will continue to monitor
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
## 2018-05-17 NOTE — SUBJECTIVE & OBJECTIVE
Interval History: NAEON. ANGELIA improving, crea 1.5 today. O2 sats ranging 92-99% on 2L. AFEBRILE overnight. SBP improved 110-143 today. Patient awake and alert on exam with improving speech. No CP, SOB, palpitations, or new complaints on exam. He has mild abd discomfort and is unsure when last BM was.     Medications:  Continuous Infusions:   lactated ringers 200 mL/hr at 05/17/18 0815     Scheduled Meds:   albuterol-ipratropium  3 mL Nebulization Q4H    amoxicillin-clavulanate  500 mg Per G Tube Q8H    atorvastatin  20 mg Per NG tube Nightly    bisacodyl  10 mg Rectal Once    chlorhexidine  15 mL Mouth/Throat BID    ciprofloxacin HCl  500 mg Per G Tube Q12H    citalopram  10 mg Per G Tube Daily    diltiaZEM  30 mg Per G Tube Q6H    [START ON 5/20/2018] ergocalciferol  50,000 Units Per G Tube Q7 Days    folic acid  1 mg Per G Tube Daily    heparin (porcine)  5,000 Units Subcutaneous Q8H    insulin aspart U-100  6 Units Subcutaneous Q4H    insulin detemir U-100  18 Units Subcutaneous BID    polyethylene glycol  17 g Per G Tube BID    senna-docusate 8.6-50 mg  1 tablet Per G Tube BID    tamsulosin  0.4 mg Per G Tube Daily    zinc oxide   Topical (Top) Daily     PRN Meds:acetaminophen, aluminum-magnesium hydroxide-simethicone, bisacodyl, dextrose 50%, glucagon (human recombinant), hydrALAZINE, insulin aspart U-100, labetalol, lidocaine HCL 2%, ondansetron     Review of Systems  Objective:     Weight: 99.5 kg (219 lb 5.7 oz)  Body mass index is 29.75 kg/m².  Vital Signs (Most Recent):  Temp: 99.1 °F (37.3 °C) (05/17/18 1206)  Pulse: 107 (05/17/18 1244)  Resp: (!) 22 (05/17/18 1244)  BP: 116/66 (05/17/18 1206)  SpO2: 98 % (05/17/18 1244) Vital Signs (24h Range):  Temp:  [97.9 °F (36.6 °C)-99.1 °F (37.3 °C)] 99.1 °F (37.3 °C)  Pulse:  [] 107  Resp:  [17-22] 22  SpO2:  [92 %-99 %] 98 %  BP: (110-143)/(54-66) 116/66                 Oxygen Concentration (%):  [3] 3         Gastrostomy/Enterostomy  05/04/18 1539 Gastrostomy tube w/ balloon LUQ feeding (Active)   Securement anchored to abdomen w/ adhesive device 5/15/2018  1:12 PM   Interventions Prior to Feeding patency checked 5/16/2018  7:45 AM   Suction Setting/Drainage Method dependent drainage 5/6/2018  8:00 AM   Feeding Type continuous 5/16/2018  7:38 PM   Clamp Status/Tolerance no abdominal discomfort;no abdominal distention;no emesis;no nausea 5/16/2018  7:38 PM   Feeding Action feeding continued 5/16/2018  7:38 PM   Dressing dry and intact 5/13/2018  8:00 PM   Insertion Site dry 5/16/2018  7:38 PM   Site Care outer bumper rotated;sterile precut T-slit dressing applied 5/12/2018  8:03 PM   Flush/Irrigation flushed w/;water;no resistance met 5/15/2018  1:12 PM   Current Rate (mL/hr) 70 mL/hr 5/16/2018  7:38 PM   Goal Rate (mL/hr) 70 mL/hr 5/16/2018  7:38 PM   Water Bolus (mL) 100 mL 5/17/2018  5:00 AM   Tube Feeding Intake (mL) 40 5/15/2018  1:12 PM   Residual Amount (ml) 10 ml 5/16/2018 10:00 PM       Male External Urinary Catheter 04/30/18 1600 (Active)   Collection Container Standard drainage bag 5/15/2018  6:30 PM   Securement Method secured to top of thigh w/ adhesive device 5/15/2018  6:30 PM   Skin no redness;no breakdown 5/15/2018  6:30 PM   Tolerance no signs/symptoms of discomfort 5/15/2018  6:30 PM   Output (mL) 200 mL 5/15/2018  6:30 PM   Catheter Change Date 05/15/18 5/15/2018  6:30 PM   Catheter Change Time 1830 5/15/2018  6:30 PM       Neurosurgery Physical Exam  General: no acute distress. Generalized deconditioning   Neurologic: Awake, sitting upright in bed. Oriented x3  Head: normocephalic, atraumatic   GCS: Motor: 6/Verbal: 5/Eyes: 4 GCS Total: 15  Cranial nerves: face symmetric, tongue midline, pupils equal, round, reactive to light with accomodation, extraocular muscles intact. CN II-XII grossly intact.   Language: no aphasia  Speech:  dysarthria improved, nearly back to baseline   Sensory: decrease response to light touch in  BUE  Motor Strength:   Strength   Deltoids Triceps Biceps Wrist Extension Wrist Flexion Hand    Upper: R 3/5 3/5 3/5 3/5 3/5 3/5     L 3/5 4-/5 4-/5 3/5 3/5 4-/5       Iliopsoas Quadriceps Knee  Flexion Tibialis  anterior Gastro- cnemius EHL   Lower: R 3/5 3/5 3/5 4-/5 4-/5 4-/5     L 4-/5 3/5 3/5 4/5 4/5 4/5   [place]: absent  Clonus: absent  Lungs:  normal respiratory effort on 2L nasal cannula.   Abdomen: soft, non-tender and symmetric. G tube site c/d/i  Extremities: warm with no cyanosis, edema, or clubbing.   Pulses: palpable distal pulses  Skin: warm, dry and intact. No visible rashes or lesions.   Incision  is healing well. c/d/i, no erythema, edema, or drainage.   C-collar in place      Significant Labs:    Recent Labs  Lab 05/16/18  0534 05/16/18  1208 05/17/18  0357   * 296* 198*    139 142   K 4.1 4.2 4.2    106 108   CO2 21* 24 25   BUN 51* 53* 51*   CREATININE 1.8* 1.7* 1.5*   CALCIUM 8.9 9.0 8.9       Recent Labs  Lab 05/16/18  0534 05/17/18  0357   WBC 13.58* 11.06   HGB 9.5* 8.7*   HCT 30.5* 27.8*    289     No results for input(s): LABPT, INR, APTT in the last 48 hours.  Microbiology Results (last 7 days)     Procedure Component Value Units Date/Time    Blood culture [[id number]] Collected:  05/16/18 0854    Order Status:  Completed Specimen:  Blood Updated:  05/17/18 1213     Blood Culture, Routine No Growth to date     Blood Culture, Routine No Growth to date    Blood culture [[id number]] Collected:  05/16/18 0854    Order Status:  Completed Specimen:  Blood Updated:  05/17/18 1212     Blood Culture, Routine No Growth to date     Blood Culture, Routine No Growth to date    Fungus culture [[id number]] Collected:  04/28/18 1413    Order Status:  Completed Specimen:  Wound from Neck Updated:  05/17/18 0757     Fungus (Mycology) Culture Culture in progress     Fungus (Mycology) Culture No fungus isolated after 2 weeks    Narrative:       Wound fluid    Fungus culture  [[id number]] Collected:  04/28/18 1417    Order Status:  Completed Specimen:  Wound from Neck Updated:  05/17/18 0757     Fungus (Mycology) Culture Culture in progress     Fungus (Mycology) Culture No fungus isolated after 2 weeks    Narrative:       Wound fluid    Urine culture [[id number]] Collected:  05/15/18 1526    Order Status:  Completed Specimen:  Urine Updated:  05/16/18 2120     Urine Culture, Routine --     GRAM NEGATIVE MARIO  10,000 - 49,999 cfu/ml  Identification and susceptibility pending  No other significant isolate      Narrative:       Add on [facility] order# [id number] per Dr. Addison @  05/15/2018  15:26     Culture, Respiratory with Gram Stain [[id number]]     Order Status:  Canceled Specimen:  Respiratory     Urine culture [[id number]]     Order Status:  Completed Specimen:  Urine           Significant Diagnostics:  ECG 5/17/18  Sinus tachycardia  Minimal voltage criteria for LVH, may be normal variant  Borderline Abnormal ECG  When compared with ECG of 13-APR-2018 16:31,  Premature atrial complexes are no longer Present

## 2018-05-17 NOTE — PLAN OF CARE
Problem: Occupational Therapy Goal  Goal: Occupational Therapy Goal  Goals to be met by: 5/29  Patient will increase functional independence with ADLs by performing:    Pt will actively engage in therapeutic task for 8 min duration with 0 added cues/facilitation.  Pt will complete L UE reaching task EOB with moderate assistance for postural control.   Supine<>sit ; sit<>supine with Max Assistance.  Grooming while seated EOB with set up for grooming and mod(A) for postural control  Pt will tolerate EOB activity ~15 min duration with mod(A) for postural control.   Family/caregiver demo understanding for ROM and positioning for B UE.    Outcome: Ongoing (interventions implemented as appropriate)  Goals remain appropriate. MADDIE Quintanilla 5/17/2018

## 2018-05-17 NOTE — ASSESSMENT & PLAN NOTE
-- A1C 5.6, glucoses moderately elevated likely secondary to holding meal insulin while tube feeds were being titrated upwards  -- per chart review patient had reported being controlled by lifestyle modification alone at home   -- currently on continuous tube feeds with diabetisource   -- detemir 18 units BID   -- restarting aspart 6 units q4hr while still on decreased tube feeds   -- at normal range now, but tube feeds running and kidney function improving, will continue to monitor to be sure pt has good glucose control    Recent Labs      05/16/18   1820  05/16/18   2156  05/17/18   0205  05/17/18   0531  05/17/18   0830  05/17/18   1237   POCTGLUCOSE  239*  178*  168*  187*  151*  163*

## 2018-05-17 NOTE — PLAN OF CARE
Problem: Physical Therapy Goal  Goal: Physical Therapy Goal  Goals to be met by: 2018     Patient will increase functional independence with mobility by performin. Supine to sit with Moderate Assistance  2. Sit to supine with Moderate Assistance  3. Sit to stand transfer with Max Assistance  4. Bed to chair transfer with Max Assistance using Rolling Walker  5. Lower extremity exercise program x20 reps per handout, with supervision   6. Pt will perform sitting at EOB x 10 minutes with Contact Guard Assistance to improve trunk control            Outcome: Ongoing (interventions implemented as appropriate)  PT goals still appropriate pending progress.    Gallito Odell, SPT  2018

## 2018-05-17 NOTE — PLAN OF CARE
TAN following for DC needs. TAN in communication with CM.    TAN spoke to oRxi at [facility] and informed her that the patient will likely be medically cleared for discharge this afternoon. Roxi stated that admit will likely have to be Friday.     TAN sent patient's tube feed information via [facility].     Yvonne Ellington, TAN  [id number]

## 2018-05-17 NOTE — PT/OT/SLP PROGRESS
Physical Therapy Treatment    Patient Name:  Hollis Pitts   MRN:  [id number]    Recommendations:     Discharge Recommendations:  nursing facility, skilled   Discharge Equipment Recommendations: hospital bed, lift device   Barriers to discharge: Decreased caregiver support    Assessment:     Hollis Pitts is a 82 y.o. male admitted with a medical diagnosis of Cervical myelopathy with cervical radiculopathy.  He presents with the following impairments/functional limitations:  weakness, impaired endurance, impaired self care skills, impaired functional mobilty, gait instability, impaired balance, impaired cognition, decreased coordination, decreased upper extremity function, decreased lower extremity function, decreased safety awareness, pain, impaired fine motor, impaired cardiopulmonary response to activity.  Pt tolerated PT Tx able to participate in seated activity EOB ~23 min with Max A for trunk control, and requiring Tot A (Assist of 2) for supine<>sit transfer, scooting in bed, and fwd seated scooting.  Pt is still appropriate and would benefit from skilled PT services to improve functional mobility, reduce caregiver burden and decrease risks associated with immobility.    Rehab Prognosis:  Good; patient would benefit from acute skilled PT services to address these deficits and reach maximum level of function.      Recent Surgery: Procedure(s) (LRB):  WASHOUT-CERVICAL-wound washout and csf leak repair with depuy (N/A)  DISKECTOMY AND FUSION-ANTERIOR CERVICAL (ACDF)- REVISON C5-C7 19 Days Post-Op    Plan:     During this hospitalization, patient to be seen 3 x/week to address the above listed problems via gait training, therapeutic activities, therapeutic exercises, neuromuscular re-education, wheelchair management/training  · Plan of Care Expires:  06/01/18   Plan of Care Reviewed with: patient, daughter    Subjective     Communicated with RN prior to session.  Patient found supine with HOB elevated wearing  aspen cervical collar upon PT entry to room, agreeable to treatment.      Chief Complaint: pt c/o fatigue.  Patient comments/goals: willing to participate in PT.  Pain/Comfort:  ·      Patients cultural, spiritual, Mandaeism conflicts given the current situation: None reported    Objective:     Patient found with: cervical collar, Condom Catheter, PEG Tube, telemetry     General Precautions: Standard, aspiration, fall, aphasia, hearing impaired, NPO, respiratory, seizure   Orthopedic Precautions:spinal precautions   Braces: Aspen collar     Functional Mobility:  · Bed Mobility:     · Rolling Left:  total assistance (Assist of 2)  · Rolling Right: total assistance (Assist of 2)  · Scooting: total assistance (Assist of 2)  · Supine to Sit: total assistance (Assist of 2)  · Sit to Supine: total assistance (Assist of 2)    · Balance:   · Static/Dynamic Sitting Balance Max A (Assist of 1)      AM-PAC 6 CLICK MOBILITY  Turning over in bed (including adjusting bedclothes, sheets and blankets)?: 2  Sitting down on and standing up from a chair with arms (e.g., wheelchair, bedside commode, etc.): 1  Moving from lying on back to sitting on the side of the bed?: 2  Moving to and from a bed to a chair (including a wheelchair)?: 1  Need to walk in hospital room?: 1  Climbing 3-5 steps with a railing?: 1  Total Score: 8       Therapeutic Activities and Exercises:     Seated Activity EOB  · Pt sat EOB ~23 min Max A (Assit of 1)  · PT facilitated trunk activation with fwd reaching task  · PT facilitated trunk rotation with cross-body reaching task  · PT facilitate upright posture with assist for thoracic extension & anterior pelvic tilt  · PT performed 3 x 20 sec chest musculature static stretching  · Pt performed B toe-taps (L ankle DF limited > R ankle)  · Pt performed AAROM LAQ & marching TE x 8 reps; limited hip flexor activation.  · Fwd seated scooting x 3 trials Tot A    Pt not safe to transfer to BSC/chair with RN  staff.    Patient left HOB elevated with all lines intact, call button in reach, RN notified and daughter present.    GOALS:    Physical Therapy Goals        Problem: Physical Therapy Goal    Goal Priority Disciplines Outcome Goal Variances Interventions   Physical Therapy Goal     PT/OT, PT Ongoing (interventions implemented as appropriate)     Description:  Goals to be met by: 2018     Patient will increase functional independence with mobility by performin. Supine to sit with Moderate Assistance  2. Sit to supine with Moderate Assistance  3. Sit to stand transfer with Max Assistance  4. Bed to chair transfer with Max Assistance using Rolling Walker  5. Lower extremity exercise program x20 reps per handout, with supervision   6. Pt will perform sitting at EOB x 10 minutes with Contact Guard Assistance to improve trunk control                              Time Tracking:     PT Received On: 18  PT Start Time: 1025     PT Stop Time: 1104  PT Total Time (min): 39 min     Billable Minutes: Neuromuscular Re-education 39    Treatment Type: Treatment  PT/PTA: PT     PTA Visit Number: 0     Gallito Odell, [facility]  2018

## 2018-05-17 NOTE — PT/OT/SLP PROGRESS
"Speech Language Pathology Treatment    Patient Name:  Hollis Pitts   MRN:  [id number]  Admitting Diagnosis: Cervical myelopathy with cervical radiculopathy    Recommendations:                 General Recommendations:  Dysphagia therapy and Speech/language therapy  Diet recommendations:  NPO, Liquid Diet Level: NPO   Aspiration Precautions: Continue alternate means of nutrition, Frequent oral care and Strict aspiration precautions   General Precautions: Standard, aspiration, fall, aphasia, hearing impaired, NPO, respiratory, seizure  Communication strategies:  provide increased time to answer    Subjective     "Hi."  Pt stated  Patient goals: none expressed     Pain/Comfort:  · Pain Rating 1: 0/10  · Pain Rating Post-Intervention 1: 0/10    Objective:     Has the patient been evaluated by SLP for swallowing?   Yes  Keep patient NPO? Yes   Current Respiratory Status: room air      Pt was awake and alert upon SLP presentation.  He was agreeable to tx session.  He completed basic lingual oral motor ex's for protrusion/retraction x5 reps given max cues.  He completed tongue base retraction ex's x10 reps w/ glottal /g, k/ given max cues.  He completed falsetto /i/ ex;'s x5 reps given max cues for laryngeal elevation.  Effortful swallows attempted w/ swallow elicited x2/5 trials following significant delays.  Lingual pumping and incomplete laryngeal excursions noted per SLP palpation.  Pt w/ intermittent cough during tx session w/ secretions.    Pt completed auto speech tasks for days of week w/ 57% acc given max cues.  He was unable to complete opposites or pairs despite max cues.  As tx session progressed pt was noted w/ increased lethargy w/ increased time for responses and greater need for cues and repetitions until pt stopped providing responses and transitioned to sleep state despite max stimulation.  Tx session discontinued.    Assessment:     Hollis Pitts is a 82 y.o. male with an SLP diagnosis of Aphasia and " Dysphagia.  He presents with severe deficits at this time.    Goals:    SLP Goals        Problem: SLP Goal    Goal Priority Disciplines Outcome   SLP Goal     SLP Ongoing (interventions implemented as appropriate)   Description:  Speech Language Pathology Goals  Goals expected to be met by 5/8-continue till 5/22    1. Pt will participate in ongoing swallow assessment  2. Pt will participate in speech language cognitive assessment MEt  3. Pt will complete rote speech tasks with 80% accy and mod cues  4. Pt will follow 1 step commands with 90% accuracy and occasional cues  5. Pt will participate in ongoing speech language cognitive assessment.         Speech Language Pathology Goals  Goals expected to be met by 4/29:    1. Pt will participate in ongoing assessment of swallow.                                Plan:     · Patient to be seen:  3 x/week   · Plan of Care expires:  05/22/18  · Plan of Care reviewed with:  patient, daughter   · SLP Follow-Up:  Yes       Discharge recommendations:  nursing facility, skilled   Barriers to Discharge:  Decreased Care Giver Support    Time Tracking:     SLP Treatment Date:   05/17/18  Speech Start Time:  1135  Speech Stop Time:  1152     Speech Total Time (min):  17 min    Billable Minutes: Speech Therapy Individual 9 and Treatment Swallowing Dysfunction 8    Namita Caraballo CCC-SLP  05/17/2018

## 2018-05-17 NOTE — SUBJECTIVE & OBJECTIVE
Interval History: See hospital course above.     Review of Systems   Constitutional: Positive for fever. Negative for chills.   HENT: Positive for trouble swallowing. Negative for congestion.    Respiratory: Positive for cough and shortness of breath.    Cardiovascular: Negative for chest pain.   Gastrointestinal: Negative for abdominal pain, constipation, diarrhea and nausea.   Genitourinary: Negative for dysuria.   Musculoskeletal: Positive for neck pain and neck stiffness.   Psychiatric/Behavioral: Negative for confusion and decreased concentration.     Objective:     Vital Signs (Most Recent):  Temp: 99.1 °F (37.3 °C) (05/17/18 1206)  Pulse: 107 (05/17/18 1244)  Resp: (!) 22 (05/17/18 1244)  BP: 116/66 (05/17/18 1206)  SpO2: 98 % (05/17/18 1244) Vital Signs (24h Range):  Temp:  [97.9 °F (36.6 °C)-99.1 °F (37.3 °C)] 99.1 °F (37.3 °C)  Pulse:  [] 107  Resp:  [17-22] 22  SpO2:  [92 %-99 %] 98 %  BP: (110-143)/(54-66) 116/66     Weight: 99.5 kg (219 lb 5.7 oz)  Body mass index is 29.75 kg/m².    Intake/Output Summary (Last 24 hours) at 05/17/18 1333  Last data filed at 05/17/18 0539   Gross per 24 hour   Intake              200 ml   Output              800 ml   Net             -600 ml      Physical Exam   Constitutional: He appears well-developed.   HENT:   Head: Normocephalic.   Wearing collar. Mucous membranes dry   Eyes: EOM are normal. Pupils are equal, round, and reactive to light.   Arcus senilis present bilaterally   Cardiovascular: Normal rate.    No murmur heard.  Pulmonary/Chest: He has rales.   NC in place. Diffuse coarse crackles   Abdominal: Soft. Bowel sounds are normal. There is no tenderness.   Peg tube in place   Genitourinary:   Genitourinary Comments: Stage II sacral decubitus ulcer   Neurological: He is alert.   Skin:   Poor skin turgor       Significant Labs: All pertinent labs within the past 24 hours have been reviewed.    Significant Imaging: I have reviewed all pertinent imaging  results/findings within the past 24 hours.

## 2018-05-17 NOTE — ASSESSMENT & PLAN NOTE
82 y.o. male s/p C3-5 ACDF for cervical myelopathy on 4/10 with Dr. Hess, and now s/p evacuation/washout of fluid collection with CSF leak repair on 4/28/18 POD#19    -Patient neurologically stable on exam with gradual improvements. Overall, patient is deconditioned since admission.   -Pt s/p aspiration on Monday 5/14/18 which is 2/2 to N/V of TF.  O2 sats remaining stable on 2L O2 via nasal canula (ranging 92-99%). leukocytosis resolved today 5/17 and patient is afebrile overnight. Started po Augmentin on 5/15/18 x7day s/p aspiration.  CXR 5/14/16 stable. Per HM, patient will continue to have intermittent low grade fevers, tachycardia, and hypotension as cytokine response to aspiration. Restarted trickle feeds 5/15. aggressive N/V control with anti-emetics. CPT/acapella treatment q 4 hours.  aspiration precautions- keep head of bed up at all times. CXR without signs of infiltrate.  Appreciate recommendations.   -DM: BG low overnight likely due to incidentally held tf. Holding aspart today but may resume as prescribed at SNF and recheck.  -Hypercalcemia likely related to primary hyperparathyroidism. Needs endocrinology f/u at discharge.  -Vit D level low at 8, continue Vit D replacement.   -HTN; continue Diltiazem per HM; BP improved. PRN meds SBP >160.   -ANGELIA overnight 5/16, likely d/t dehydration from holding TF.  Improving today with creat 1.5 <<1.8. Hosp med managing. Previously given LR bolus and currently on continuous fluids. Okay with 100cc free water bolus QID.   -UTI on UA 5/15. Hosp med managing and started Cipro x 7 days 5/16/18.   -Sinus tachycardia on EKG 5/17   -Cervical collar when OOB or with activity, Up to chair as tolerated.    -Surgical incision well healed.   -Speech gradually improving since wound revision;still requires deep suctioning to induce cough.   -Scheduled CPT & acapella treatment q 4 hours to prevent atelectasis  -Aggressive oral care per nursing staff daily  -Repeat CT Head 5/8  showed stable b/l hygromas   -Continue PILI/SCDs/SQH  -Continue bowel regimen daily.  -Continue to hold Eliquis. Per HM, indications unclear.  does not recommend continuing upon discharge.  -Continue aggressive PT/OT/ST. Discussed with therapy to evaluate patient simultaneously while pt OOB with PT.  -OOB daily; nursing staff can put patient in medi chair with draw sheet. Nursing staff to perform passive ROM of all extremity qshift.   -Continue delirium precautions   -Constipation: give suppository today. If no Bm, give enema. Last bm was 5/6 and 5/11.   -Discussed with HM. Stable for discharge to SNF today.      Discussed with Dr. Hess.

## 2018-05-17 NOTE — PLAN OF CARE
Planned discharge is Skilled Nursing - Plan (A) or New Nursing Home half-way Placement - Plan (B).     05/17/18 1422   Discharge Reassessment   Assessment Type Discharge Planning Reassessment   Provided patient/caregiver education on the expected discharge date and the discharge plan No   Do you have any problems affording any of your prescribed medications? No   Discharge Plan A Skilled Nursing Facility   Discharge Plan B New Nursing Home placement - retirement care facility   Patient choice form signed by patient/caregiver N/A   Can the patient answer the patient profile reliably? No, cognitively impaired   Describe the patient's ability to walk at the present time. Major restrictions/daily assistance from another person   How often would a person be available to care for the patient? Often   Number of comorbid conditions (as recorded on the chart) Four   During the past month, has the patient often been bothered by feeling down, depressed or hopeless? No   During the past month, has the patient often been bothered by little interest or pleasure in doing things? No

## 2018-05-18 VITALS
BODY MASS INDEX: 29.71 KG/M2 | HEIGHT: 72 IN | HEART RATE: 97 BPM | DIASTOLIC BLOOD PRESSURE: 76 MMHG | OXYGEN SATURATION: 96 % | SYSTOLIC BLOOD PRESSURE: 156 MMHG | RESPIRATION RATE: 16 BRPM | TEMPERATURE: 99 F | WEIGHT: 219.38 LBS

## 2018-05-18 PROBLEM — N39.0 UTI (URINARY TRACT INFECTION): Status: ACTIVE | Noted: 2018-05-18

## 2018-05-18 LAB
ALBUMIN SERPL BCP-MCNC: 1.7 G/DL
ALP SERPL-CCNC: 102 U/L
ALT SERPL W/O P-5'-P-CCNC: 21 U/L
ANION GAP SERPL CALC-SCNC: 8 MMOL/L
AST SERPL-CCNC: 28 U/L
BACTERIA UR CULT: NORMAL
BASOPHILS # BLD AUTO: 0.02 K/UL
BASOPHILS NFR BLD: 0.2 %
BILIRUB SERPL-MCNC: 0.4 MG/DL
BUN SERPL-MCNC: 41 MG/DL
CALCIUM SERPL-MCNC: 8.7 MG/DL
CHLORIDE SERPL-SCNC: 106 MMOL/L
CO2 SERPL-SCNC: 25 MMOL/L
CREAT SERPL-MCNC: 1.1 MG/DL
DIFFERENTIAL METHOD: ABNORMAL
EOSINOPHIL # BLD AUTO: 0.1 K/UL
EOSINOPHIL NFR BLD: 0.8 %
ERYTHROCYTE [DISTWIDTH] IN BLOOD BY AUTOMATED COUNT: 15.5 %
EST. GFR  (AFRICAN AMERICAN): >60 ML/MIN/1.73 M^2
EST. GFR  (NON AFRICAN AMERICAN): >60 ML/MIN/1.73 M^2
GLUCOSE SERPL-MCNC: 154 MG/DL
HCT VFR BLD AUTO: 28.8 %
HGB BLD-MCNC: 9.1 G/DL
IMM GRANULOCYTES # BLD AUTO: 0.08 K/UL
IMM GRANULOCYTES NFR BLD AUTO: 0.8 %
LYMPHOCYTES # BLD AUTO: 1.5 K/UL
LYMPHOCYTES NFR BLD: 15.2 %
MCH RBC QN AUTO: 31.3 PG
MCHC RBC AUTO-ENTMCNC: 31.6 G/DL
MCV RBC AUTO: 99 FL
MONOCYTES # BLD AUTO: 1 K/UL
MONOCYTES NFR BLD: 9.4 %
NEUTROPHILS # BLD AUTO: 7.5 K/UL
NEUTROPHILS NFR BLD: 73.6 %
NRBC BLD-RTO: 0 /100 WBC
PLATELET # BLD AUTO: 293 K/UL
PMV BLD AUTO: 10.3 FL
POCT GLUCOSE: 120 MG/DL (ref 70–110)
POCT GLUCOSE: 158 MG/DL (ref 70–110)
POTASSIUM SERPL-SCNC: 4.7 MMOL/L
PROT SERPL-MCNC: 6 G/DL
RBC # BLD AUTO: 2.91 M/UL
SODIUM SERPL-SCNC: 139 MMOL/L
WBC # BLD AUTO: 10.15 K/UL

## 2018-05-18 PROCEDURE — 27000221 HC OXYGEN, UP TO 24 HOURS

## 2018-05-18 PROCEDURE — 25000003 PHARM REV CODE 250: Performed by: PHYSICIAN ASSISTANT

## 2018-05-18 PROCEDURE — 94761 N-INVAS EAR/PLS OXIMETRY MLT: CPT

## 2018-05-18 PROCEDURE — 25000003 PHARM REV CODE 250: Performed by: STUDENT IN AN ORGANIZED HEALTH CARE EDUCATION/TRAINING PROGRAM

## 2018-05-18 PROCEDURE — 99231 SBSQ HOSP IP/OBS SF/LOW 25: CPT | Mod: GC,,, | Performed by: HOSPITALIST

## 2018-05-18 PROCEDURE — 99900035 HC TECH TIME PER 15 MIN (STAT)

## 2018-05-18 PROCEDURE — 63600175 PHARM REV CODE 636 W HCPCS: Performed by: STUDENT IN AN ORGANIZED HEALTH CARE EDUCATION/TRAINING PROGRAM

## 2018-05-18 PROCEDURE — 25000003 PHARM REV CODE 250: Performed by: INTERNAL MEDICINE

## 2018-05-18 PROCEDURE — 25000242 PHARM REV CODE 250 ALT 637 W/ HCPCS: Performed by: PHYSICIAN ASSISTANT

## 2018-05-18 PROCEDURE — 85025 COMPLETE CBC W/AUTO DIFF WBC: CPT

## 2018-05-18 PROCEDURE — 36415 COLL VENOUS BLD VENIPUNCTURE: CPT

## 2018-05-18 PROCEDURE — 80053 COMPREHEN METABOLIC PANEL: CPT

## 2018-05-18 PROCEDURE — 94640 AIRWAY INHALATION TREATMENT: CPT

## 2018-05-18 PROCEDURE — 94668 MNPJ CHEST WALL SBSQ: CPT

## 2018-05-18 PROCEDURE — 94664 DEMO&/EVAL PT USE INHALER: CPT

## 2018-05-18 RX ORDER — INSULIN ASPART 100 [IU]/ML
6 INJECTION, SOLUTION INTRAVENOUS; SUBCUTANEOUS EVERY 4 HOURS
Refills: 0
Start: 2018-05-18 | End: 2018-08-16 | Stop reason: SDUPTHER

## 2018-05-18 RX ORDER — ACETAMINOPHEN 325 MG/1
650 TABLET ORAL EVERY 4 HOURS PRN
Refills: 0 | COMMUNITY
Start: 2018-05-18

## 2018-05-18 RX ORDER — FOLIC ACID 1 MG/1
1 TABLET ORAL DAILY
Refills: 0
Start: 2018-05-19 | End: 2019-05-19

## 2018-05-18 RX ORDER — BISACODYL 10 MG
10 SUPPOSITORY, RECTAL RECTAL DAILY PRN
Refills: 0 | COMMUNITY
Start: 2018-05-18

## 2018-05-18 RX ORDER — CIPROFLOXACIN 500 MG/1
500 TABLET ORAL EVERY 12 HOURS
Qty: 10 TABLET | Refills: 0
Start: 2018-05-18 | End: 2018-05-23

## 2018-05-18 RX ORDER — DILTIAZEM HYDROCHLORIDE 60 MG/1
60 TABLET, FILM COATED ORAL EVERY 6 HOURS
Status: DISCONTINUED | OUTPATIENT
Start: 2018-05-18 | End: 2018-05-18 | Stop reason: HOSPADM

## 2018-05-18 RX ORDER — ERGOCALCIFEROL 1.25 MG/1
50000 CAPSULE ORAL
Start: 2018-05-20

## 2018-05-18 RX ORDER — TAMSULOSIN HYDROCHLORIDE 0.4 MG/1
0.4 CAPSULE ORAL DAILY
Qty: 30 CAPSULE | Refills: 11
Start: 2018-05-19 | End: 2019-05-19

## 2018-05-18 RX ORDER — HEPARIN SODIUM 5000 [USP'U]/ML
5000 INJECTION, SOLUTION INTRAVENOUS; SUBCUTANEOUS EVERY 8 HOURS
Start: 2018-05-18 | End: 2019-01-01

## 2018-05-18 RX ORDER — POLYETHYLENE GLYCOL 3350 17 G/17G
17 POWDER, FOR SOLUTION ORAL 2 TIMES DAILY
Refills: 0
Start: 2018-05-18

## 2018-05-18 RX ORDER — AMOXICILLIN 250 MG
1 CAPSULE ORAL 2 TIMES DAILY
COMMUNITY
Start: 2018-05-18

## 2018-05-18 RX ORDER — IPRATROPIUM BROMIDE AND ALBUTEROL SULFATE 2.5; .5 MG/3ML; MG/3ML
3 SOLUTION RESPIRATORY (INHALATION) EVERY 4 HOURS
Qty: 1 BOX | Refills: 0
Start: 2018-05-18 | End: 2019-05-18

## 2018-05-18 RX ORDER — CHLORHEXIDINE GLUCONATE ORAL RINSE 1.2 MG/ML
15 SOLUTION DENTAL 2 TIMES DAILY
Refills: 0
Start: 2018-05-18 | End: 2018-06-01

## 2018-05-18 RX ORDER — INSULIN ASPART 100 [IU]/ML
0-5 INJECTION, SOLUTION INTRAVENOUS; SUBCUTANEOUS EVERY 4 HOURS PRN
Refills: 0
Start: 2018-05-18 | End: 2019-01-01

## 2018-05-18 RX ORDER — AMOXICILLIN AND CLAVULANATE POTASSIUM 400; 57 MG/5ML; MG/5ML
500 POWDER, FOR SUSPENSION ORAL EVERY 8 HOURS
Qty: 75 ML | Refills: 0
Start: 2018-05-18 | End: 2018-05-22

## 2018-05-18 RX ORDER — LIDOCAINE HYDROCHLORIDE 20 MG/ML
JELLY TOPICAL
Refills: 0
Start: 2018-05-18

## 2018-05-18 RX ADMIN — DILTIAZEM HYDROCHLORIDE 60 MG: 60 TABLET, FILM COATED ORAL at 12:05

## 2018-05-18 RX ADMIN — IPRATROPIUM BROMIDE AND ALBUTEROL SULFATE 3 ML: .5; 3 SOLUTION RESPIRATORY (INHALATION) at 11:05

## 2018-05-18 RX ADMIN — CHLORHEXIDINE GLUCONATE 15 ML: 1.2 RINSE ORAL at 10:05

## 2018-05-18 RX ADMIN — TAMSULOSIN HYDROCHLORIDE 0.4 MG: 0.4 CAPSULE ORAL at 10:05

## 2018-05-18 RX ADMIN — CIPROFLOXACIN HYDROCHLORIDE 500 MG: 500 TABLET, FILM COATED ORAL at 10:05

## 2018-05-18 RX ADMIN — FOLIC ACID 1 MG: 1 TABLET ORAL at 10:05

## 2018-05-18 RX ADMIN — BISACODYL 10 MG: 10 SUPPOSITORY RECTAL at 04:05

## 2018-05-18 RX ADMIN — IPRATROPIUM BROMIDE AND ALBUTEROL SULFATE 3 ML: .5; 3 SOLUTION RESPIRATORY (INHALATION) at 12:05

## 2018-05-18 RX ADMIN — IPRATROPIUM BROMIDE AND ALBUTEROL SULFATE 3 ML: .5; 3 SOLUTION RESPIRATORY (INHALATION) at 03:05

## 2018-05-18 RX ADMIN — ACETAMINOPHEN 650 MG: 325 TABLET ORAL at 05:05

## 2018-05-18 RX ADMIN — INSULIN ASPART 6 UNITS: 100 INJECTION, SOLUTION INTRAVENOUS; SUBCUTANEOUS at 06:05

## 2018-05-18 RX ADMIN — HEPARIN SODIUM 5000 UNITS: 5000 INJECTION, SOLUTION INTRAVENOUS; SUBCUTANEOUS at 06:05

## 2018-05-18 RX ADMIN — IPRATROPIUM BROMIDE AND ALBUTEROL SULFATE 3 ML: .5; 3 SOLUTION RESPIRATORY (INHALATION) at 07:05

## 2018-05-18 RX ADMIN — AMOXICILLIN AND CLAVULANATE POTASSIUM 500 MG: 400; 57 POWDER, FOR SUSPENSION ORAL at 05:05

## 2018-05-18 RX ADMIN — CITALOPRAM HYDROBROMIDE 10 MG: 10 TABLET ORAL at 10:05

## 2018-05-18 NOTE — ASSESSMENT & PLAN NOTE
-- A1C 5.6, glucoses moderately elevated likely secondary to holding meal insulin while tube feeds were being titrated upwards  -- per chart review patient had reported being controlled by lifestyle modification alone at home   -- currently on continuous tube feeds with diabetisource   -- detemir 18 units BID   -- aspart on hold pending noon BG- unclear etiology for hypoglycemia overnight- possibly 2/2 accidentally held tube feeds?      Recent Labs      05/17/18   1737  05/17/18   2122  05/17/18   2124  05/17/18   2220  05/18/18   0121  05/18/18   0553   POCTGLUCOSE  76  53*  62*  120*  120*  158*

## 2018-05-18 NOTE — SUBJECTIVE & OBJECTIVE
Interval History:   BG dropped overnight- unsure if this was related to tube feeds not being hung or not? Will hold aspart today and continue to monitor just on detemir. Renal function improving. Had 1 large BM. Breathing improved. HR improved. Afebrile. Cleared from a medicine standpoint to be discharged to SNF. Family needs to be notified that patient needs endocrine follow up when he gets discharged from hospital.     Review of Systems   Constitutional: Negative for chills and fever.   HENT: Positive for trouble swallowing. Negative for congestion.    Respiratory: Positive for cough. Negative for shortness of breath.    Cardiovascular: Negative for chest pain.   Gastrointestinal: Negative for abdominal pain, constipation, diarrhea and nausea.   Genitourinary: Negative for dysuria.   Musculoskeletal: Positive for neck pain and neck stiffness.   Psychiatric/Behavioral: Negative for confusion and decreased concentration.     Objective:     Vital Signs (Most Recent):  Temp: 97.4 °F (36.3 °C) (05/18/18 0805)  Pulse: 80 (05/18/18 1118)  Resp: 20 (05/18/18 1118)  BP: 128/62 (05/18/18 0805)  SpO2: 95 % (05/18/18 1118) Vital Signs (24h Range):  Temp:  [97.4 °F (36.3 °C)-99.1 °F (37.3 °C)] 97.4 °F (36.3 °C)  Pulse:  [] 80  Resp:  [16-22] 20  SpO2:  [92 %-98 %] 95 %  BP: (116-156)/(62-72) 128/62     Weight: 99.5 kg (219 lb 5.7 oz)  Body mass index is 29.75 kg/m².    Intake/Output Summary (Last 24 hours) at 05/18/18 1144  Last data filed at 05/18/18 0537   Gross per 24 hour   Intake              100 ml   Output             2550 ml   Net            -2450 ml      Physical Exam   Constitutional: He appears well-developed.   HENT:   Head: Normocephalic.   Wearing collar. Mucous membranes dry   Eyes: EOM are normal. Pupils are equal, round, and reactive to light.   Arcus senilis present bilaterally   Cardiovascular: Normal rate.    No murmur heard.  Pulmonary/Chest: He has no rales.   NC in place. Diffuse coarse crackles    Abdominal: Soft. Bowel sounds are normal. There is no tenderness.   Peg tube in place   Genitourinary:   Genitourinary Comments: Stage II sacral decubitus ulcer   Neurological: He is alert.   Vitals reviewed.      Significant Labs: All pertinent labs within the past 24 hours have been reviewed.    Significant Imaging: I have reviewed all pertinent imaging results/findings within the past 24 hours.

## 2018-05-18 NOTE — PLAN OF CARE
1:10 PM  Manny Byrnes just phoned [facility] and reported that the Facility Transfer Order must be signed.      LINDA Segal  [facility]

## 2018-05-18 NOTE — PLAN OF CARE
12:47 PM  SW after conferring w/MALIKA Jim, [facility] phoned [place] back to discuss admitting Pt. today.    [place] agreed to accept Pt today.    Call report to Station #3, [phone].  Rm# [phone].    Spoke w/Esther at [facility] and she reported that transpo is presently on their way to [facility] to  Pt.    Notified HUMBERTO Corrales of the above stated.      Elma Chaparro, LINDA  [facility]

## 2018-05-18 NOTE — PLAN OF CARE
4:39PM  HUMBERTO Schwarz w/[facility] phoned [facility] and reported that she needs an Order stating that all medications are to be given to Pt by peg tub.    RN reported that she will need wound care Orders as the Pt presented w/a 10x7 wound that covers both buttocks.    SW informed RN Case Manager of all the above stated.      Elma Chaparro, LINDA  [facility]
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
## 2018-05-18 NOTE — PLAN OF CARE
[facility]    FACILITY TRANSFER ORDERS      Patient Name: Hollis Pitts  YOB: 1935    PCP: Vanessa Fontaine NP   PCP Address: [address] / [place] [address]  PCP Phone Number: [phone]  PCP Fax: [phone]    Encounter Date: 05/18/2018    Admit to: [facility]    Vital Signs:  Routine    Diagnoses:   Active Hospital Problems    Diagnosis  POA    *Cervical myelopathy with cervical radiculopathy [M47.12]  Yes    UTI (urinary tract infection) [N39.0]  Clinically Undetermined    ANGELIA (acute kidney injury) [N17.9]  No    Aspiration pneumonitis [J69.0]  No    Hypercalcemia [E83.52]  No    CSF leak [G96.0]  Yes    Fluid collection at surgical site [T88.8XXA]  No    Delirium [R41.0]  No    Dysphagia [R13.10]  No    Aspiration pneumonia of both lower lobes due to gastric secretions [J69.0]  No    Tachycardia [R00.0]  Yes    Benign prostatic hyperplasia with urinary retention [N40.1, R33.8]  Yes    Type 2 diabetes mellitus with hyperglycemia, without long-term current use of insulin [E11.65]  Yes    Essential hypertension [I10]  Yes    HX: anticoagulation [Z92.29]  Not Applicable    Other hyperlipidemia [E78.4]  Yes    Decreased functional mobility [R26.89]  Yes    Cervical pain [M54.2]  Yes    Anxiety [F41.9]  Yes      Resolved Hospital Problems    Diagnosis Date Resolved POA    Hypophosphatemia [E83.39] 05/15/2018 No    Inadequate dietary energy intake [E63.9] 05/14/2018 No    Sepsis [A41.9] 05/09/2018 No    Leukocytosis [D72.829] 05/11/2018 No       Allergies:Review of patient's allergies indicates:  No Known Allergies    Diet:   Tube feedings   Diabetasource 70ml/hr per G tube    Water bolus 100cc QID    Activities: Activity as tolerated, Up with assistance and Weight bearing as tolerated    Nursing:    Acapella treatment Q4h  Aspiration precautions  Chest physiotherapy Q4h  Delirium precautions  Inhalation treatment Q4h    OK for nurse to get patient in medi  chair with draw sheets. Nursing staff to perform passive ROM of all extremities Qshift.    Hold insulin aspart for BG less than 160    Labs: CBC and BMP per facility protocol     CONSULTS:    Physical Therapy to evaluate and treat.  and Occupational Therapy to evaluate and treat.    MISCELLANEOUS CARE:  PEG Care: Clean site every 24 hours. , Routine Skin for Bedridden Patients: Apply moisture barrier cream to all skin folds and wet areas in perineal area daily and after baths and all bowel movements. and Diabetes Care:   SN to perform and educate Diabetic management with blood glucose monitoring: and Report CBG < 60 or > 350 to physician.     Facility provider to assess ongoing need for subq heparin for DVT prophylaxis.    WOUND CARE ORDERS  Per facility protocol for bedridden patients.    Medications: Review discharge medications with patient and family and provide education.      Current Discharge Medication List      START taking these medications    Details   acetaminophen (TYLENOL) 325 MG tablet 2 tablets (650 mg total) by Per G Tube route every 4 (four) hours as needed (or temp >99.5).  Refills: 0      albuterol-ipratropium (DUO-NEB) 2.5 mg-0.5 mg/3 mL nebulizer solution Take 3 mLs by nebulization every 4 (four) hours. Rescue  Qty: 1 Box, Refills: 0      amoxicillin-clavulanate (AUGMENTIN) 400-57 mg/5 mL SusR 6.25 mLs (500 mg total) by Per G Tube route every 8 (eight) hours.  Qty: 75 mL, Refills: 0   Continue through 5/22   bisacodyl (DULCOLAX) 10 mg Supp Place 1 suppository (10 mg total) rectally daily as needed.  Refills: 0      chlorhexidine (PERIDEX) 0.12 % solution Use as directed 15 mLs in the mouth or throat 2 (two) times daily.  Refills: 0      ciprofloxacin HCl (CIPRO) 500 MG tablet 1 tablet (500 mg total) by Per G Tube route every 12 (twelve) hours.  Qty: 10 tablet, Refills: 0   Continue through 5/22   ergocalciferol (ERGOCALCIFEROL) 50,000 unit Cap 1 capsule (50,000 Units total) by Per G Tube route  "every 7 days.      folic acid (FOLVITE) 1 MG tablet 1 tablet (1 mg total) by Per G Tube route once daily.  Refills: 0      heparin sodium,porcine (HEPARIN, PORCINE,) 5,000 unit/mL injection Inject 1 mL (5,000 Units total) into the skin every 8 (eight) hours.      !! insulin aspart U-100 (NOVOLOG) 100 unit/mL InPn pen Inject 0-5 Units into the skin every 4 (four) hours as needed (Hyperglycemia).  Refills: 0      !! insulin aspart U-100 (NOVOLOG) 100 unit/mL InPn pen Inject 6 Units into the skin every 4 (four) hours.  Refills: 0      insulin detemir U-100 (LEVEMIR FLEXTOUCH) 100 unit/mL (3 mL) SubQ InPn pen Inject 18 Units into the skin 2 (two) times daily.  Refills: 0      lidocaine HCL 2% (XYLOCAINE) 2 % jelly Apply topically as needed.  Refills: 0      polyethylene glycol (GLYCOLAX) 17 gram PwPk 17 g by Per G Tube route 2 (two) times daily.  Refills: 0      senna-docusate 8.6-50 mg (PERICOLACE) 8.6-50 mg per tablet 1 tablet by Per G Tube route 2 (two) times daily.      tamsulosin (FLOMAX) 0.4 mg Cp24 1 capsule (0.4 mg total) by Per G Tube route once daily.  Qty: 30 capsule, Refills: 11       !! - Potential duplicate medications found. Please discuss with provider.      CONTINUE these medications which have NOT CHANGED    Details   atorvastatin (LIPITOR) 20 MG tablet Take 20 mg by mouth nightly.  Refills: 0      bisacodyl (DULCOLAX) 5 mg EC tablet Refills: 0      CARTIA  mg Cp24 Take 90 mg by mouth.   Refills: 0      citalopram (CELEXA) 20 MG tablet Take 20 mg by mouth once daily.  Refills: 0      gabapentin (NEURONTIN) 100 MG capsule Take 100 mg by mouth 3 (three) times daily as needed.   Refills: 0      SENSIPAR 30 mg Tab Take 30 mg by mouth daily with breakfast.   Refills: 0      zinc oxide (BOUDREAUXS BUTT PASTE) 16 % Oint ointment Apply topically once daily.      BD INSULIN PEN NEEDLE UF SHORT 31 gauge x 5/16" Ndle USE WITH LANTUS PEN DAILY  Refills: 0      CONSTULOSE 10 gram/15 mL solution TAKE  30 " MILLILITERS BY MOUTH 3 TIMES A DAY UNTIL BOWEL MOVEMENT ...  (REFER TO PRESCRIPTION NOTES).  Refills: 0      FREESTYLE TEST Strp Refills: 1         STOP taking these medications       traZODone (DESYREL) 50 MG tablet Comments:   Reason for Stopping:         valsartan (DIOVAN) 80 MG tablet Comments:   Reason for Stopping:         vitamin B comp with C no.4 150 mg Tab Comments:   Reason for Stopping:         ELIQUIS 2.5 mg Tab Comments:   Reason for Stopping:         omega-3 fatty acids-fish oil (FISH OIL) 360-1,200 mg Cap Comments:   Reason for Stopping:               _________________________________  Mariaelena Damico PA-C  05/18/2018

## 2018-05-18 NOTE — ASSESSMENT & PLAN NOTE
- resolved  - BUN/Cr had been minimally rising over last 2 weeks for unclear reasons though acute increase over 5/16 likely secondary to no PO intake and held tube feeds causing pre-renal injury   - bolusing 2 L of lactated ringers   - still hold valsartan until resolved   - renally dose all medications  - avoid nephrotoxic medications   - pt with improving Cr after fluid, now back on feeds- will continue to monitor Cr.

## 2018-05-18 NOTE — ASSESSMENT & PLAN NOTE
- pseudomonas UTI unclear if this is now a chronic colonizer?  - would complete a 7 day course of cipro upon discharge

## 2018-05-18 NOTE — PLAN OF CARE
11:32 AM  TAN faxed new referral packet over to [facility].  Spoke w/rep Roxi, she reported she received the updated referral packet and all she needs are the d/c orders.  Reported after she receives the d/c orders she will provide [facility] w/the Pt's room #.      11:14 AM  TAN spoke w/rep Roxi in admin at [facility], [phone].  She reported they need updated info on the Pt and the d/c the Orders.  Reported Pt must be in their building by 2pm today.    Elma Chaparro, LINDA  [facility]

## 2018-05-18 NOTE — ASSESSMENT & PLAN NOTE
-- currently well controlled  -- home meds include diltiazem XR 90 mg daily  -- valsartan held for ANGELIA- bp tolerating off medication so can continue to hold  -- continue current diltiazem regimen, stopped coreg   -- will cont to monitor and adjust meds prn  -- diltiazem 60 mg q 6 hrs

## 2018-05-18 NOTE — PLAN OF CARE
12:36 PM  Spoke w/Manny at [facility], she reported they will not have the feeding pump for Pt until well after 5pm today.  Reports their concern is that Pt will mis his feeding and would like for Pt to be admitted first thing Monday morning.      12:25 PM  Faxed d/c order via .    Phoned Manny at [facility], reported she is reviewing d/c orders and will phone [facility] back.

## 2018-05-18 NOTE — DISCHARGE SUMMARY
[facility]  Neurosurgery  Discharge Summary      Patient Name: Hollis Pitts  MRN: [id number]  Admission Date: 4/10/2018  Hospital Length of Stay: 38 days  Discharge Date and Time: 5/18/2018  1:45 PM  Attending Physician: No att. providers found   Discharging Provider: Mariaelena Damico PA-C  Primary Care Provider: Vanessa Fontaine NP    HPI:   82 y.o.  male with type 2 diabetes, who presents today for follow up evaluation one week prior to 2 level ACDF scheduled for 4/10/2018. Pt reports    Pt presents today wearing a cervical collar and in a wheelchair. He would like to proceed with surgery. Per pt's family, his PCP would like to see him on 4/9, the day before surgery. He notes continued neck pain and BUE numbness/tingling as well as BUE weakness. Pt is only able to walk a few steps unassisted. He has never received neck surgery.     Procedure(s) (LRB):  WASHOUT-CERVICAL-wound washout and csf leak repair with depuy (N/A)  DISKECTOMY AND FUSION-ANTERIOR CERVICAL (ACDF)- REVISON C5-C7     Hospital Course: 4/10: OR for ACDF C3-5  4/11: NAEON. Continued neck pain and BUE numbness/weakness which is same as preop. No new/worsening pain/weakness since surgery. Tolerating mechanical soft diet since daughter took his dentures home. He is sitting in chair with cervical collar on. Therapy recommending SNF placement. Surgical drain clamped; will dc tomorrow. Medicine consulted for comanagement ( DM and HTN etc).   4/12: Surgical drain clamped yesterday and patient was started on flomax after rouse removal given history of BPH. NAEON. Patient reported increased neck pain, choking on liquid/solids, coughing, and difficulty eating secondary to pain. He said he has some issues yesterday but it is worst today. Nurse did report of coughing with AM meds today. Yesterday, nursing staff fed patient breakfast/lunch and did not notice any issues.   4/13: Lumbar drain placed this am, transfer to [facility] when bed available  for higher level of care  4/14: NAEON. Some drainage from around HV exit site. Stitch placed  4/15: continue LD, continues drainage from HV site.   4/16: no drainage from HV site overnight  4/18: HV pulled yesterday, continued LD drain, OR cancelled today due to no leaking from wound  4/19: no drainage from wound. Clamp LD today.   4/20: LD removed. PT/OT ordered.   4/21: concern for aspiration o/n, WBC trending up, now on O2, lethargic, consulted HM & possible tx to NCC  4/23: vitals and exam improved. TTF  4/24: stable, TTF  4/26: WBC up again, Pseudomonas on last UA on rocephin and vanc. Redraw vanc trough today and reconsult HM. Remains NPO per ST   4/27: Patient with waxing and waning leukocytosis. Low grade fever yesterday evening with Tmax 100.6. Patient has no pain on exam. He is deconditioned. Medicine re-consulted for co-management since patient stepped down. He continues to have dysphagia and speech therapy is unsure when he will progress with swallowing. IR consulted for peg placement for long term nutritional means. Plan for MRI C spine w/wo contrast today to reassess patient given dysphagia, continued weakness and deconditioning. Radiology wanted CT head to r/o any metals prior MRI.  UA ordered, nursing staff to collect.   4/28: MRI with increased fluid collection in surgical bed concerning for CSF. Transfer to ICU, plan for wound washout and csf repair today. PEG delayed. On vanc, cefepime and flagyl for aspiration PNA  4/29: s/p wound washout and CSF leak repair POD 1. Patient extubated post op, on RA sat 98. NAEON.  4/30:  per ID will change abx regimen to vanc and cef  5/1: evauation by sp, transfer to ns   5/2: leukocytosis resolved, ST recommending NPO, intra-op cultures remain negative. Pt drowsy today during exam, will get repeat CTH  5/3: Repeat CTH stable. Hospital medicine consulted for assistance with commorbidities   5/4: Plan for PEG placement today  5/5: PEG in place.   5/6: YAMILKA  pending SNF  5/7: NAEON. Afebrile. VSS. Continued general deconditioning and intermittent drowsiness. Patient awake and alert on my exam but drowsy/lethargic with ST at 9:30am. He has no incisional pain, dysphagia, or any complaints on exam. Speech improved compared to pre wound revision on 4/28/18. Medicine following peripherally for co-management. Pending SNF placement. Tolerating TF. Voiding per condom cath.   5/8:  NAEON. Afebrile. VSS. Continued general deconditioning and waxing/waning mental status. Patient awake and alert on nurse's exam early am but drowsy on my exam with Dr. Hess at 11:30am. CT head today to reassess previous hygromas.  Medicine following peripherally for co-management and evaluating hypercalcemia. Pending SNF placement.   5/10: Exam stable - awake, alert today. BP stabilized, BG improved with current regimen. Cleared for discharge to SNF from  standpoint pending biphosphonate infusion.  5/11-12: Pt neurologically stable.  Stable for DC to SNF, Awaiting placement, to be transferred on Monday.   5/13: NAEON, placement pending, mental status improved  5/14: Aspiration this am after episode of vomiting. On 2L O2. Accepted to NH when medically stable.  5/15: Pt stable on 2L O2. Repeat CXR stable.  Started on po augmentin for aspiration pneumonia prophylaxis.  Intermittent low grade fevers.  HM Following.  SNF pending.    5/16: Intermittent low grade fevers again overnight with Tmax 102.7 at 1824 and leukocytosis. O2 sats ranging 92-96% on 2L. TF restarted yesterday; now at goal. Medicine comanaging and given 1L LR bolus for ANGELIA crea 1.8. Medicine also started cipro for UTI. Patient awake and alert on exam with improving speech. He has no complaints on exam. Pending SNF placement when medically stable.   5/17: ANGELIA improving, crea 1.5 today. O2 sats ranging 92-99% on 2L. AFEBRILE overnight. SBP improved 110-143 today--med adjusting diltiazem dosing. Med started IVF. Suppository today since  last BM was 5/11. Identification and susceptibility pending for Ucx. Ekg (sinus tachy) today since patient was tachycardic 110's, asymptomatic.   5/18: BG dropped overnight- likely due to unintentionally delayed tube feeds? Will hold aspart today and continue to monitor just on detemir. Renal function improving. Had 1 large BM. Breathing improved. ANGELIA resolved, improved respiratory status, HR improved. DC today to SNF.     Consults:   Consults         Status Ordering Provider     Inpatient consult to [facility] Medicine-General  Once     Provider:  (Not yet assigned)    Completed COLUMBA OSORIO     Inpatient consult to [facility] Medicine-General  Once     Provider:  (Not yet assigned)    Completed ERIC GRIMES     Inpatient consult to [facility]  Once     Provider:  (Not yet assigned)    Completed HIMANSHU SOSA     Inpatient consult to [facility]  Once     Provider:  (Not yet assigned)    Completed HIMANSHU SOSA     Inpatient consult to Infectious Diseases  Once     Provider:  (Not yet assigned)    Completed HIMANSHU SOSA     Inpatient consult to Interventional Radiology  Once     Provider:  (Not yet assigned)    Completed COLUMBA OSORIO     Inpatient consult to Midline team  Once     Provider:  (Not yet assigned)    Completed RAVINDER LLAMAS     Inpatient consult to Midline team  Once     Provider:  (Not yet assigned)    Completed ELVIE RAMIREZ     Inpatient consult to Midline team  Once     Provider:  (Not yet assigned)    Completed GALINDO LUTZ     Inpatient consult to Neuro Critical Care  Once     Provider:  (Not yet assigned)    Acknowledged HIMANSHU SOSA     Inpatient consult to PICC team (\[facility] [place] [facility]\"")  Once     Provider:  (Not yet assigned)    Completed GALINDO LUTZ     Inpatient consult to Registered Dietitian/Nutritionist  Once     Provider:  (Not yet assigned)    Completed ELVA FLOOD          Significant Diagnostic Studies: As per hospital  course    Pending Diagnostic Studies:     Procedure Component Value Units Date/Time    VANCOMYCIN, TROUGH before 3rd dose [[id number]] Collected:  05/01/18 1228    Order Status:  Sent Lab Status:  In process Updated:  05/01/18 1229    Specimen:  Blood from Blood         Final Active Diagnoses:    Diagnosis Date Noted POA    PRINCIPAL PROBLEM:  Cervical myelopathy with cervical radiculopathy [M47.12] 04/10/2018 Yes    UTI (urinary tract infection) [N39.0] 05/18/2018 Clinically Undetermined    ANGELIA (acute kidney injury) [N17.9] 05/16/2018 No    Aspiration pneumonitis [J69.0] 05/14/2018 No    Hypercalcemia [E83.52] 05/02/2018 No    CSF leak [G96.0] 04/30/2018 Yes    Fluid collection at surgical site [T88.8XXA] 04/28/2018 No    Delirium [R41.0] 04/27/2018 No    Dysphagia [R13.10] 04/27/2018 No    Aspiration pneumonia of both lower lobes due to gastric secretions [J69.0] 04/27/2018 No    Tachycardia [R00.0] 04/19/2018 Yes    Benign prostatic hyperplasia with urinary retention [N40.1, R33.8] 04/11/2018 Yes    Type 2 diabetes mellitus with hyperglycemia, without long-term current use of insulin [E11.65] 04/11/2018 Yes    Essential hypertension [I10] 04/11/2018 Yes    HX: anticoagulation [Z92.29] 04/11/2018 Not Applicable    Other hyperlipidemia [E78.4] 04/11/2018 Yes    Decreased functional mobility [R26.89] 02/19/2018 Yes    Cervical pain [M54.2] 02/19/2018 Yes    Anxiety [F41.9] 04/12/2016 Yes      Problems Resolved During this Admission:    Diagnosis Date Noted Date Resolved POA    Hypophosphatemia [E83.39] 05/10/2018 05/15/2018 No    Inadequate dietary energy intake [E63.9] 04/22/2018 05/14/2018 No    Sepsis [A41.9] 04/21/2018 05/09/2018 No    Leukocytosis [D72.829] 04/19/2018 05/11/2018 No      Discharged Condition: stable    Disposition: Skilled Nursing Facility    Follow Up: See the patient instruction tab for detailed discharge instructions and follow up information.    Patient Instructions:    No discharge procedures on file.  Medications:  Reconciled Home Medications:      Medication List      START taking these medications    acetaminophen 325 MG tablet  Commonly known as:  TYLENOL  2 tablets (650 mg total) by Per G Tube route every 4 (four) hours as needed (or temp >99.5).     albuterol-ipratropium 2.5 mg-0.5 mg/3 mL nebulizer solution  Commonly known as:  DUO-NEB  Take 3 mLs by nebulization every 4 (four) hours. Rescue     amoxicillin-clavulanate 400-57 mg/5 mL Susr  Commonly known as:  AUGMENTIN  6.25 mLs (500 mg total) by Per G Tube route every 8 (eight) hours.     chlorhexidine 0.12 % solution  Commonly known as:  PERIDEX  Use as directed 15 mLs in the mouth or throat 2 (two) times daily.     ciprofloxacin HCl 500 MG tablet  Commonly known as:  CIPRO  1 tablet (500 mg total) by Per G Tube route every 12 (twelve) hours.     ergocalciferol 50,000 unit Cap  Commonly known as:  ERGOCALCIFEROL  1 capsule (50,000 Units total) by Per G Tube route every 7 days.  Start taking on:  5/20/2018     folic acid 1 MG tablet  Commonly known as:  FOLVITE  1 tablet (1 mg total) by Per G Tube route once daily.  Start taking on:  5/19/2018     heparin (porcine) 5,000 unit/mL injection  Inject 1 mL (5,000 Units total) into the skin every 8 (eight) hours.     * insulin aspart U-100 100 unit/mL Inpn pen  Commonly known as:  NovoLOG  Inject 0-5 Units into the skin every 4 (four) hours as needed (Hyperglycemia).     * insulin aspart U-100 100 unit/mL Inpn pen  Commonly known as:  NovoLOG  Inject 6 Units into the skin every 4 (four) hours.     insulin detemir U-100 100 unit/mL (3 mL) Inpn pen  Commonly known as:  LEVEMIR FLEXTOUCH  Inject 18 Units into the skin 2 (two) times daily.     lidocaine HCL 2% 2 % jelly  Commonly known as:  XYLOCAINE  Apply topically as needed.     polyethylene glycol 17 gram Pwpk  Commonly known as:  GLYCOLAX  17 g by Per G Tube route 2 (two) times daily.     senna-docusate 8.6-50 mg 8.6-50 mg  "per tablet  Commonly known as:  PERICOLACE  1 tablet by Per G Tube route 2 (two) times daily.     tamsulosin 0.4 mg Cp24  Commonly known as:  FLOMAX  1 capsule (0.4 mg total) by Per G Tube route once daily.  Start taking on:  5/19/2018        * This list has 2 medication(s) that are the same as other medications prescribed for you. Read the directions carefully, and ask your doctor or other care provider to review them with you.            CHANGE how you take these medications    * bisacodyl 5 mg EC tablet  Commonly known as:  DULCOLAX  What changed:  Another medication with the same name was added. Make sure you understand how and when to take each.     * bisacodyl 10 mg Supp  Commonly known as:  DULCOLAX  Place 1 suppository (10 mg total) rectally daily as needed.  What changed:  You were already taking a medication with the same name, and this prescription was added. Make sure you understand how and when to take each.        * This list has 2 medication(s) that are the same as other medications prescribed for you. Read the directions carefully, and ask your doctor or other care provider to review them with you.            CONTINUE taking these medications    atorvastatin 20 MG tablet  Commonly known as:  LIPITOR  Take 20 mg by mouth nightly.     BD ULTRA-FINE SHORT PEN NEEDLE 31 gauge x 5/16" Ndle  Generic drug:  pen needle, diabetic  USE WITH LANTUS PEN DAILY     BOUDREAUXS BUTT PASTE 16 % Oint ointment  Generic drug:  zinc oxide  Apply topically once daily.     CARTIA  MG Cp24  Generic drug:  diltiaZEM  Take 90 mg by mouth.     citalopram 20 MG tablet  Commonly known as:  CELEXA  Take 20 mg by mouth once daily.     CONSTULOSE 10 gram/15 mL solution  Generic drug:  lactulose  TAKE  30 MILLILITERS BY MOUTH 3 TIMES A DAY UNTIL BOWEL MOVEMENT ...  (REFER TO PRESCRIPTION NOTES).     FREESTYLE TEST Strp  Generic drug:  blood sugar diagnostic     gabapentin 100 MG capsule  Commonly known as:  NEURONTIN  Take 100 " mg by mouth 3 (three) times daily as needed.     SENSIPAR 30 MG Tab  Generic drug:  cinacalcet  Take 30 mg by mouth daily with breakfast.        STOP taking these medications    ELIQUIS 2.5 mg Tab  Generic drug:  apixaban     FISH -1,200 mg Cap  Generic drug:  omega-3 fatty acids-fish oil     traZODone 50 MG tablet  Commonly known as:  DESYREL     valsartan 80 MG tablet  Commonly known as:  DIOVAN     vitamin B comp with C no.4 150 mg Tab            Mariaelena Damico PA-C  Neurosurgery  [facility]

## 2018-05-18 NOTE — ASSESSMENT & PLAN NOTE
- 2/2 to vomiting episode 5/14  - restarted trickle feeds   - aggressive N/V control with anti-emetics  - CPT/acapella treatment q 4 hours  - oxygen as needed   - aspiration precautions- keep head of bed up at all times  - on augmentin since 5/15- continue to complete a 7 day course   - CXR without signs of infiltrate

## 2018-05-18 NOTE — PLAN OF CARE
Patient discharged to [facility]. Care deferred to [facility]. Patient transported via ambulance. Neurosurgery clinic to schedule follow up appointment.    Future Appointments  Date Time Provider Department Center   5/24/2018 2:00 PM Osvaldo Hess MD [facility] NEUROS7 [facility]        05/18/18 1422   Final Note   Assessment Type Final Discharge Note   Discharge Disposition SNF   Hospital Follow Up  Appt(s) scheduled? (Neurosurgery clinic to schedule follow up appointment.)   Discharge plans and expectations educations in teach back method with documentation complete? Yes

## 2018-05-18 NOTE — SUBJECTIVE & OBJECTIVE
Interval History: BG dropped overnight- likely due to unintentionally delayed tube feeds? Will hold aspart today and continue to monitor just on detemir. Renal function improving. Had 1 large BM. Breathing improved. ANGELIA resolved, improved respiratory status, HR improved. DC today to SNF    Medications:  Continuous Infusions:    Scheduled Meds:   albuterol-ipratropium  3 mL Nebulization Q4H    amoxicillin-clavulanate  500 mg Per G Tube Q8H    atorvastatin  20 mg Per NG tube Nightly    chlorhexidine  15 mL Mouth/Throat BID    ciprofloxacin HCl  500 mg Per G Tube Q12H    citalopram  10 mg Per G Tube Daily    diltiaZEM  60 mg Per G Tube Q6H    [START ON 5/20/2018] ergocalciferol  50,000 Units Per G Tube Q7 Days    folic acid  1 mg Per G Tube Daily    heparin (porcine)  5,000 Units Subcutaneous Q8H    insulin aspart U-100  6 Units Subcutaneous Q4H    insulin detemir U-100  18 Units Subcutaneous BID    polyethylene glycol  17 g Per G Tube BID    senna-docusate 8.6-50 mg  1 tablet Per G Tube BID    tamsulosin  0.4 mg Per G Tube Daily    zinc oxide   Topical (Top) Daily     PRN Meds:acetaminophen, aluminum-magnesium hydroxide-simethicone, bisacodyl, dextrose 50%, glucagon (human recombinant), hydrALAZINE, insulin aspart U-100, labetalol, lidocaine HCL 2%, ondansetron     Review of Systems    Objective:     Weight: 99.5 kg (219 lb 5.7 oz)  Body mass index is 29.75 kg/m².  Vital Signs (Most Recent):  Temp: 98.7 °F (37.1 °C) (05/18/18 1204)  Pulse: 97 (05/18/18 1204)  Resp: 16 (05/18/18 1204)  BP: (!) 156/76 (05/18/18 1204)  SpO2: 96 % (05/18/18 1204) Vital Signs (24h Range):  Temp:  [97.4 °F (36.3 °C)-98.7 °F (37.1 °C)] 98.7 °F (37.1 °C)  Pulse:  [] 97  Resp:  [16-20] 16  SpO2:  [92 %-98 %] 96 %  BP: (120-156)/(62-76) 156/76       Date 05/18/18 0700 - 05/19/18 0659(Discharged)   Shift 5523-6704 6431-5533 8085-6575 24 Hour Total   I  N  T  A  K  E   NG/   100    Shift Total  (mL/kg) 100  (1)    100  (1)   O  U  T  P  U  T   Urine  (mL/kg/hr) 750  (0.9)   750    Shift Total  (mL/kg) 750  (7.5)   750  (7.5)   Weight (kg) 99.5 99.5 99.5 99.5                        Gastrostomy/Enterostomy 05/04/18 1539 Gastrostomy tube w/ balloon LUQ feeding (Active)   Securement anchored to abdomen w/ adhesive device 5/15/2018  1:12 PM   Interventions Prior to Feeding patency checked 5/16/2018  7:45 AM   Suction Setting/Drainage Method dependent drainage 5/6/2018  8:00 AM   Feeding Type continuous 5/16/2018  7:38 PM   Clamp Status/Tolerance no abdominal discomfort;no abdominal distention;no emesis;no nausea 5/16/2018  7:38 PM   Feeding Action feeding continued 5/16/2018  7:38 PM   Dressing dry and intact 5/13/2018  8:00 PM   Insertion Site dry 5/16/2018  7:38 PM   Site Care outer bumper rotated;sterile precut T-slit dressing applied 5/12/2018  8:03 PM   Flush/Irrigation flushed w/;water;no resistance met 5/15/2018  1:12 PM   Current Rate (mL/hr) 70 mL/hr 5/16/2018  7:38 PM   Goal Rate (mL/hr) 70 mL/hr 5/16/2018  7:38 PM   Water Bolus (mL) 100 mL 5/17/2018  5:00 AM   Tube Feeding Intake (mL) 40 5/15/2018  1:12 PM   Residual Amount (ml) 10 ml 5/16/2018 10:00 PM       Male External Urinary Catheter 04/30/18 1600 (Active)   Collection Container Standard drainage bag 5/15/2018  6:30 PM   Securement Method secured to top of thigh w/ adhesive device 5/15/2018  6:30 PM   Skin no redness;no breakdown 5/15/2018  6:30 PM   Tolerance no signs/symptoms of discomfort 5/15/2018  6:30 PM   Output (mL) 200 mL 5/15/2018  6:30 PM   Catheter Change Date 05/15/18 5/15/2018  6:30 PM   Catheter Change Time 1830 5/15/2018  6:30 PM       Neurosurgery Physical Exam    General: no acute distress. Generalized deconditioning   Neurologic: Awake, sitting upright in bed. Oriented x3  Head: normocephalic, atraumatic   GCS: Motor: 6/Verbal: 5/Eyes: 4 GCS Total: 15  Cranial nerves: face symmetric, tongue midline, pupils equal, round, reactive to light with  accomodation, extraocular muscles intact. CN II-XII grossly intact.   Language: no aphasia  Speech:  dysarthria improved, nearly back to baseline   Sensory: decrease response to light touch in BUE  Motor Strength:   Strength   Deltoids Triceps Biceps Wrist Extension Wrist Flexion Hand    Upper: R 3/5 3/5 3/5 3/5 3/5 3/5     L 3/5 4-/5 4-/5 3/5 3/5 4-/5       Iliopsoas Quadriceps Knee  Flexion Tibialis  anterior Gastro- cnemius EHL   Lower: R 3/5 3/5 3/5 4-/5 4-/5 4-/5     L 4-/5 3/5 3/5 4/5 4/5 4/5   [place]: absent  Clonus: absent  Lungs:  normal respiratory effort on 2L nasal cannula.   Abdomen: soft, non-tender and symmetric. G tube site c/d/i  Extremities: warm with no cyanosis, edema, or clubbing.   Pulses: palpable distal pulses  Skin: warm, dry and intact. No visible rashes or lesions.   Incision  is healing well. c/d/i, no erythema, edema, or drainage.   C-collar in place      Significant Labs:    Recent Labs  Lab 05/17/18  0357 05/18/18  0519   * 154*    139   K 4.2 4.7    106   CO2 25 25   BUN 51* 41*   CREATININE 1.5* 1.1   CALCIUM 8.9 8.7       Recent Labs  Lab 05/17/18  0357 05/18/18  0519   WBC 11.06 10.15   HGB 8.7* 9.1*   HCT 27.8* 28.8*    293     No results for input(s): LABPT, INR, APTT in the last 48 hours.  Microbiology Results (last 7 days)     Procedure Component Value Units Date/Time    Blood culture [[id number]] Collected:  05/16/18 0854    Order Status:  Completed Specimen:  Blood Updated:  05/18/18 1212     Blood Culture, Routine No Growth to date     Blood Culture, Routine No Growth to date     Blood Culture, Routine No Growth to date    Blood culture [[id number]] Collected:  05/16/18 0854    Order Status:  Completed Specimen:  Blood Updated:  05/18/18 1212     Blood Culture, Routine No Growth to date     Blood Culture, Routine No Growth to date     Blood Culture, Routine No Growth to date    Urine culture [[id number]]  (Susceptibility) Collected:  05/15/18 1522     Order Status:  Completed Specimen:  Urine Updated:  05/18/18 0046     Urine Culture, Routine --     PSEUDOMONAS AERUGINOSA  10,000 - 49,999 cfu/ml  No other significant isolate      Narrative:       Add on CXURN order# [id number] per Dr. Addison @  05/15/2018  15:26     Fungus culture [[id number]] Collected:  04/28/18 1413    Order Status:  Completed Specimen:  Wound from Neck Updated:  05/17/18 0757     Fungus (Mycology) Culture Culture in progress     Fungus (Mycology) Culture No fungus isolated after 2 weeks    Narrative:       Wound fluid    Fungus culture [[id number]] Collected:  04/28/18 1417    Order Status:  Completed Specimen:  Wound from Neck Updated:  05/17/18 0757     Fungus (Mycology) Culture Culture in progress     Fungus (Mycology) Culture No fungus isolated after 2 weeks    Narrative:       Wound fluid    Culture, Respiratory with Gram Stain [[id number]]     Order Status:  Canceled Specimen:  Respiratory     Urine culture [[id number]]     Order Status:  Completed Specimen:  Urine           Significant Diagnostics:  ECG 5/17/18  Sinus tachycardia  Minimal voltage criteria for LVH, may be normal variant  Borderline Abnormal ECG  When compared with ECG of 13-APR-2018 16:31,  Premature atrial complexes are no longer Present

## 2018-05-18 NOTE — PLAN OF CARE
12:05 PM  Transportation placed in Will Call.  Awaiting Facility Transfer Orders.      LINDA Segal  [facility]

## 2018-05-18 NOTE — NURSING
Called report to [place], station 3. Patient's IV's removed intact. Telemetry removed. Tube feeding stopped and PEG tube flushed and clamped. Patient left by EMS. Took all belongings.

## 2018-05-18 NOTE — PROGRESS NOTES
[facility]  Neurosurgery  Progress Note    Subjective:     History of Present Illness: 82 y.o.  male with type 2 diabetes, who presents today for follow up evaluation one week prior to 2 level ACDF scheduled for 4/10/2018. Pt reports    Pt presents today wearing a cervical collar and in a wheelchair. He would like to proceed with surgery. Per pt's family, his PCP would like to see him on 4/9, the day before surgery. He notes continued neck pain and BUE numbness/tingling as well as BUE weakness. Pt is only able to walk a few steps unassisted. He has never received neck surgery.     Post-Op Info:  Procedure(s) (LRB):  WASHOUT-CERVICAL-wound washout and csf leak repair with depuy (N/A)  DISKECTOMY AND FUSION-ANTERIOR CERVICAL (ACDF)- REVISON C5-C7   20 Days Post-Op     Interval History: BG dropped overnight- likely due to unintentionally delayed tube feeds? Will hold aspart today and continue to monitor just on detemir. Renal function improving. Had 1 large BM. Breathing improved. ANGELIA resolved, improved respiratory status, HR improved. DC today to SNF    Medications:  Continuous Infusions:    Scheduled Meds:   albuterol-ipratropium  3 mL Nebulization Q4H    amoxicillin-clavulanate  500 mg Per G Tube Q8H    atorvastatin  20 mg Per NG tube Nightly    chlorhexidine  15 mL Mouth/Throat BID    ciprofloxacin HCl  500 mg Per G Tube Q12H    citalopram  10 mg Per G Tube Daily    diltiaZEM  60 mg Per G Tube Q6H    [START ON 5/20/2018] ergocalciferol  50,000 Units Per G Tube Q7 Days    folic acid  1 mg Per G Tube Daily    heparin (porcine)  5,000 Units Subcutaneous Q8H    insulin aspart U-100  6 Units Subcutaneous Q4H    insulin detemir U-100  18 Units Subcutaneous BID    polyethylene glycol  17 g Per G Tube BID    senna-docusate 8.6-50 mg  1 tablet Per G Tube BID    tamsulosin  0.4 mg Per G Tube Daily    zinc oxide   Topical (Top) Daily     PRN Meds:acetaminophen, aluminum-magnesium  hydroxide-simethicone, bisacodyl, dextrose 50%, glucagon (human recombinant), hydrALAZINE, insulin aspart U-100, labetalol, lidocaine HCL 2%, ondansetron     Review of Systems    Objective:     Weight: 99.5 kg (219 lb 5.7 oz)  Body mass index is 29.75 kg/m².  Vital Signs (Most Recent):  Temp: 98.7 °F (37.1 °C) (05/18/18 1204)  Pulse: 97 (05/18/18 1204)  Resp: 16 (05/18/18 1204)  BP: (!) 156/76 (05/18/18 1204)  SpO2: 96 % (05/18/18 1204) Vital Signs (24h Range):  Temp:  [97.4 °F (36.3 °C)-98.7 °F (37.1 °C)] 98.7 °F (37.1 °C)  Pulse:  [] 97  Resp:  [16-20] 16  SpO2:  [92 %-98 %] 96 %  BP: (120-156)/(62-76) 156/76       Date 05/18/18 0700 - 05/19/18 0659(Discharged)   Shift 9525-9779 0340-8165 2125-4048 24 Hour Total   I  N  T  A  K  E   NG/   100    Shift Total  (mL/kg) 100  (1)   100  (1)   O  U  T  P  U  T   Urine  (mL/kg/hr) 750  (0.9)   750    Shift Total  (mL/kg) 750  (7.5)   750  (7.5)   Weight (kg) 99.5 99.5 99.5 99.5                        Gastrostomy/Enterostomy 05/04/18 1539 Gastrostomy tube w/ balloon LUQ feeding (Active)   Securement anchored to abdomen w/ adhesive device 5/15/2018  1:12 PM   Interventions Prior to Feeding patency checked 5/16/2018  7:45 AM   Suction Setting/Drainage Method dependent drainage 5/6/2018  8:00 AM   Feeding Type continuous 5/16/2018  7:38 PM   Clamp Status/Tolerance no abdominal discomfort;no abdominal distention;no emesis;no nausea 5/16/2018  7:38 PM   Feeding Action feeding continued 5/16/2018  7:38 PM   Dressing dry and intact 5/13/2018  8:00 PM   Insertion Site dry 5/16/2018  7:38 PM   Site Care outer bumper rotated;sterile precut T-slit dressing applied 5/12/2018  8:03 PM   Flush/Irrigation flushed w/;water;no resistance met 5/15/2018  1:12 PM   Current Rate (mL/hr) 70 mL/hr 5/16/2018  7:38 PM   Goal Rate (mL/hr) 70 mL/hr 5/16/2018  7:38 PM   Water Bolus (mL) 100 mL 5/17/2018  5:00 AM   Tube Feeding Intake (mL) 40 5/15/2018  1:12 PM   Residual Amount (ml) 10  ml 5/16/2018 10:00 PM       Male External Urinary Catheter 04/30/18 1600 (Active)   Collection Container Standard drainage bag 5/15/2018  6:30 PM   Securement Method secured to top of thigh w/ adhesive device 5/15/2018  6:30 PM   Skin no redness;no breakdown 5/15/2018  6:30 PM   Tolerance no signs/symptoms of discomfort 5/15/2018  6:30 PM   Output (mL) 200 mL 5/15/2018  6:30 PM   Catheter Change Date 05/15/18 5/15/2018  6:30 PM   Catheter Change Time 1830 5/15/2018  6:30 PM       Neurosurgery Physical Exam    General: no acute distress. Generalized deconditioning   Neurologic: Awake, sitting upright in bed. Oriented x3  Head: normocephalic, atraumatic   GCS: Motor: 6/Verbal: 5/Eyes: 4 GCS Total: 15  Cranial nerves: face symmetric, tongue midline, pupils equal, round, reactive to light with accomodation, extraocular muscles intact. CN II-XII grossly intact.   Language: no aphasia  Speech:  dysarthria improved, nearly back to baseline   Sensory: decrease response to light touch in BUE  Motor Strength:   Strength   Deltoids Triceps Biceps Wrist Extension Wrist Flexion Hand    Upper: R 3/5 3/5 3/5 3/5 3/5 3/5     L 3/5 4-/5 4-/5 3/5 3/5 4-/5       Iliopsoas Quadriceps Knee  Flexion Tibialis  anterior Gastro- cnemius EHL   Lower: R 3/5 3/5 3/5 4-/5 4-/5 4-/5     L 4-/5 3/5 3/5 4/5 4/5 4/5   Moctezuma: absent  Clonus: absent  Lungs:  normal respiratory effort on 2L nasal cannula.   Abdomen: soft, non-tender and symmetric. G tube site c/d/i  Extremities: warm with no cyanosis, edema, or clubbing.   Pulses: palpable distal pulses  Skin: warm, dry and intact. No visible rashes or lesions.   Incision  is healing well. c/d/i, no erythema, edema, or drainage.   C-collar in place      Significant Labs:    Recent Labs  Lab 05/17/18  0357 05/18/18  0519   * 154*    139   K 4.2 4.7    106   CO2 25 25   BUN 51* 41*   CREATININE 1.5* 1.1   CALCIUM 8.9 8.7       Recent Labs  Lab 05/17/18  0357 05/18/18  0519   WBC  11.06 10.15   HGB 8.7* 9.1*   HCT 27.8* 28.8*    293     No results for input(s): LABPT, INR, APTT in the last 48 hours.  Microbiology Results (last 7 days)     Procedure Component Value Units Date/Time    Blood culture [[id number]] Collected:  05/16/18 0854    Order Status:  Completed Specimen:  Blood Updated:  05/18/18 1212     Blood Culture, Routine No Growth to date     Blood Culture, Routine No Growth to date     Blood Culture, Routine No Growth to date    Blood culture [[id number]] Collected:  05/16/18 0854    Order Status:  Completed Specimen:  Blood Updated:  05/18/18 1212     Blood Culture, Routine No Growth to date     Blood Culture, Routine No Growth to date     Blood Culture, Routine No Growth to date    Urine culture [[id number]]  (Susceptibility) Collected:  05/15/18 1526    Order Status:  Completed Specimen:  Urine Updated:  05/18/18 0046     Urine Culture, Routine --     PSEUDOMONAS AERUGINOSA  10,000 - 49,999 cfu/ml  No other significant isolate      Narrative:       Add on [facility]URN order# [id number] per Dr. Addison @  05/15/2018  15:26     Fungus culture [[id number]] Collected:  04/28/18 1413    Order Status:  Completed Specimen:  Wound from Neck Updated:  05/17/18 0757     Fungus (Mycology) Culture Culture in progress     Fungus (Mycology) Culture No fungus isolated after 2 weeks    Narrative:       Wound fluid    Fungus culture [[id number]] Collected:  04/28/18 1417    Order Status:  Completed Specimen:  Wound from Neck Updated:  05/17/18 0757     Fungus (Mycology) Culture Culture in progress     Fungus (Mycology) Culture No fungus isolated after 2 weeks    Narrative:       Wound fluid    Culture, Respiratory with Gram Stain [[id number]]     Order Status:  Canceled Specimen:  Respiratory     Urine culture [[id number]]     Order Status:  Completed Specimen:  Urine           Significant Diagnostics:  ECG 5/17/18  Sinus tachycardia  Minimal voltage criteria for LVH, may be normal variant  Borderline  Abnormal ECG  When compared with ECG of 13-APR-2018 16:31,  Premature atrial complexes are no longer Present    Assessment/Plan:     * Cervical myelopathy with cervical radiculopathy    82 y.o. male s/p C3-5 ACDF for cervical myelopathy on 4/10 with Dr. Hess, and now s/p evacuation/washout of fluid collection with CSF leak repair on 4/28/18 POD#19    -Patient neurologically stable on exam with gradual improvements. Overall, patient is deconditioned since admission.   -Pt s/p aspiration on Monday 5/14/18 which is 2/2 to N/V of TF.  O2 sats remaining stable on 2L O2 via nasal canula (ranging 92-99%). leukocytosis resolved today 5/17 and patient is afebrile overnight. Started po Augmentin on 5/15/18 x7day s/p aspiration.  CXR 5/14/16 stable. Per HM, patient will continue to have intermittent low grade fevers, tachycardia, and hypotension as cytokine response to aspiration. Restarted trickle feeds 5/15. aggressive N/V control with anti-emetics. CPT/acapella treatment q 4 hours.  aspiration precautions- keep head of bed up at all times. CXR without signs of infiltrate.  Appreciate recommendations.   -DM: BG low overnight likely due to incidentally held tf. Holding aspart today but may resume as prescribed at SNF and recheck.  -Hypercalcemia likely related to primary hyperparathyroidism. Needs endocrinology f/u at discharge.  -Vit D level low at 8, continue Vit D replacement.   -HTN; continue Diltiazem per HM; BP improved. PRN meds SBP >160.   -ANGELIA overnight 5/16, likely d/t dehydration from holding TF.  Improving today with creat 1.5 <<1.8. Hosp med managing. Previously given LR bolus and currently on continuous fluids. Okay with 100cc free water bolus QID.   -UTI on UA 5/15. Hosp med managing and started Cipro x 7 days 5/16/18.   -Sinus tachycardia on EKG 5/17   -Cervical collar when OOB or with activity, Up to chair as tolerated.    -Surgical incision well healed.   -Speech gradually improving since wound  revision;still requires deep suctioning to induce cough.   -Scheduled CPT & acapella treatment q 4 hours to prevent atelectasis  -Aggressive oral care per nursing staff daily  -Repeat CT Head 5/8 showed stable b/l hygromas   -Continue PILI/SCDs/SQH  -Continue bowel regimen daily.  -Continue to hold Eliquis. Per HM, indications unclear.  does not recommend continuing upon discharge.  -Continue aggressive PT/OT/ST. Discussed with therapy to evaluate patient simultaneously while pt OOB with PT.  -OOB daily; nursing staff can put patient in medi chair with draw sheet. Nursing staff to perform passive ROM of all extremity qshift.   -Continue delirium precautions   -Constipation: give suppository today. If no Bm, give enema. Last bm was 5/6 and 5/11.   -Discussed with HM. Stable for discharge to SNF today.      Discussed with Dr. Hess.              Mariaelena Damioc PA-C  Neurosurgery  [facility]

## 2018-05-19 NOTE — PROGRESS NOTES
Physical Therapy Discharge Summary    Name: Hollis Pitts  MRN: [id number]   Principal Problem: Cervical myelopathy with cervical radiculopathy     Patient Discharged from acute Physical Therapy on 18.  Please refer to prior PT noted date on 18 for functional status.     Assessment:     Patient appropriate for care in another setting.    Objective:     GOALS:    Physical Therapy Goals     Not on file          Multidisciplinary Problems (Resolved)        Problem: Physical Therapy Goal    Goal Priority Disciplines Outcome Goal Variances Interventions   Physical Therapy Goal   (Resolved)     PT/OT, PT Outcome(s) achieved     Description:  Goals to be met by: 2018     Patient will increase functional independence with mobility by performin. Supine to sit with Moderate Assistance  2. Sit to supine with Moderate Assistance  3. Sit to stand transfer with Max Assistance  4. Bed to chair transfer with Max Assistance using Rolling Walker  5. Lower extremity exercise program x20 reps per handout, with supervision   6. Pt will perform sitting at EOB x 10 minutes with Contact Guard Assistance to improve trunk control                              Reasons for Discontinuation of Therapy Services  Transfer to alternate level of care.      Plan:     Patient Discharged to: Skilled Nursing Facility at [facility].    Enriqueta Healy, PT  2018

## 2018-05-21 ENCOUNTER — PATIENT OUTREACH (OUTPATIENT)
Dept: ADMINISTRATIVE | Facility: CLINIC | Age: 83
End: 2018-05-21

## 2018-05-21 LAB
BACTERIA BLD CULT: NORMAL
BACTERIA BLD CULT: NORMAL

## 2018-05-22 NOTE — PT/OT/SLP DISCHARGE
Occupational Therapy Discharge Summary    Hollis Pitts  MRN: [id number]   Principal Problem: Cervical myelopathy with cervical radiculopathy      Patient Discharged from acute Occupational Therapy on 5/18/2018.  Please refer to prior OT note dated 5/17/2018 for functional status.    Assessment:      Patient has not met goals.    Objective:     GOALS:    Occupational Therapy Goals     Not on file          Multidisciplinary Problems (Resolved)        Problem: Occupational Therapy Goal    Goal Priority Disciplines Outcome Interventions   Occupational Therapy Goal   (Resolved)     OT, PT/OT Outcome(s) achieved    Description:  Goals to be met by: 5/29  Patient will increase functional independence with ADLs by performing:    Pt will actively engage in therapeutic task for 8 min duration with 0 added cues/facilitation.  Pt will complete L UE reaching task EOB with moderate assistance for postural control.   Supine<>sit ; sit<>supine with Max Assistance.  Grooming while seated EOB with set up for grooming and mod(A) for postural control  Pt will tolerate EOB activity ~15 min duration with mod(A) for postural control.   Family/caregiver demo understanding for ROM and positioning for B UE.                     Reasons for Discontinuation of Therapy Services  Transfer to alternate level of care.      Plan:     Patient Discharged to: Skilled Nursing Facility    MADDIE Quintanilla  5/22/2018

## 2018-05-23 ENCOUNTER — TELEPHONE (OUTPATIENT)
Dept: NEUROSURGERY | Facility: CLINIC | Age: 83
End: 2018-05-23

## 2018-05-23 DIAGNOSIS — Z98.1 S/P CERVICAL SPINAL FUSION: Primary | ICD-10-CM

## 2018-05-24 ENCOUNTER — HOSPITAL ENCOUNTER (OUTPATIENT)
Dept: RADIOLOGY | Facility: HOSPITAL | Age: 83
Discharge: HOME | End: 2018-05-24
Attending: NEUROLOGICAL SURGERY
Payer: MEDICARE

## 2018-05-24 ENCOUNTER — OFFICE VISIT (OUTPATIENT)
Dept: NEUROSURGERY | Facility: CLINIC | Age: 83
End: 2018-05-24
Payer: MEDICARE

## 2018-05-24 VITALS — HEART RATE: 103 BPM | DIASTOLIC BLOOD PRESSURE: 87 MMHG | SYSTOLIC BLOOD PRESSURE: 130 MMHG

## 2018-05-24 DIAGNOSIS — Z98.1 HISTORY OF FUSION OF CERVICAL SPINE: Primary | ICD-10-CM

## 2018-05-24 DIAGNOSIS — Z98.1 S/P CERVICAL SPINAL FUSION: ICD-10-CM

## 2018-05-24 PROCEDURE — 99024 POSTOP FOLLOW-UP VISIT: CPT | Mod: POP,,, | Performed by: NEUROLOGICAL SURGERY

## 2018-05-24 PROCEDURE — 99213 OFFICE O/P EST LOW 20 MIN: CPT | Mod: PBBFAC,25 | Performed by: NEUROLOGICAL SURGERY

## 2018-05-24 PROCEDURE — 99999 PR PBB SHADOW E&M-EST. PATIENT-LVL III: CPT | Mod: PBBFAC,,, | Performed by: NEUROLOGICAL SURGERY

## 2018-05-24 PROCEDURE — 72040 X-RAY EXAM NECK SPINE 2-3 VW: CPT | Mod: 26,,, | Performed by: RADIOLOGY

## 2018-05-24 PROCEDURE — 72040 X-RAY EXAM NECK SPINE 2-3 VW: CPT | Mod: TC,FY

## 2018-05-24 NOTE — PROGRESS NOTES
CHIEF COMPLAINT:  Post op evaluation 6 weeks after C5/6, C6/7 ACDF     Phi GAITAN, attest that this documentation has been prepared under the direction and in the presence of Osvaldo Hess MD.    HPI:  Hollis Pitts is a 82 y.o.  male, who presents today for a 6 week post op evaluation. Pt is s/p C5/6, C6/7 ACDF on 4/10/2018 and subsequent cervical wound washout and CSF leak repair on 4/28/2018. Pt reports pain in the posterior shoulders and neck. Pt is staying in a nursing home and rehab facility. Pt presents today in a stretcher wearing a cervical collar.       Review of patient's allergies indicates:  No Known Allergies    Past Medical History:   Diagnosis Date    Benign prostatic hyperplasia with urinary retention 4/11/2018    Cervical myelopathy with cervical radiculopathy 4/10/2018    Depression     Dyslipidemia     Essential hypertension 4/11/2018    Glaucoma (increased eye pressure)     Mild cognitive impairment     Traumatic brain injury 07/2017    Type 2 diabetes mellitus with hyperglycemia, without long-term current use of insulin 4/11/2018     Past Surgical History:   Procedure Laterality Date    CATARACT EXTRACTION W/  INTRAOCULAR LENS IMPLANT       Family History   Problem Relation Age of Onset    Clotting disorder Mother     Paranoid behavior Mother     Cancer Maternal Grandmother      Social History   Substance Use Topics    Smoking status: Former Smoker     Quit date: 2/6/1950    Smokeless tobacco: Never Used    Alcohol use 4.2 oz/week     7 Shots of liquor per week        Review of Systems   Constitutional: Negative.    HENT: Negative.    Eyes: Negative.    Respiratory: Negative.    Cardiovascular: Negative.    Gastrointestinal: Negative.    Endocrine: Negative.    Genitourinary: Negative.    Musculoskeletal: Positive for neck pain. Negative for back pain and gait problem.   Skin: Negative.    Allergic/Immunologic: Negative.    Neurological: Negative for weakness,  light-headedness, numbness and headaches.   Hematological: Negative.    Psychiatric/Behavioral: Negative.        OBJECTIVE:   Vital Signs:  Pulse: 103 (05/24/18 1524)  BP: 130/87 (05/24/18 1524)    Physical Exam:    Vital signs: All nursing notes and vital signs reviewed -- afebrile, vital signs stable.  Constitutional: Patient sitting comfortably in chair. Appears well developed and well nourished.  Skin: Exposed areas are intact without abnormal markings, rashes or other lesions. Incision is well healed.   HEENT: Normocephalic. Normal conjunctivae.  Cardiovascular: Normal rate and regular rhythm.  Respiratory: Chest wall rises and falls symmetrically, without signs of respiratory distress.  Abdomen: Soft and non-tender.  Extremities: Warm and without edema. Calves supple, non-tender.  Psych/Behavior: Normal affect.    Neurological:    Mental status: Alert and oriented. Conversational and appropriate.       Cranial Nerves: Grossly intact.     Motor:    Upper:  Deltoids Triceps Biceps WE WF     R 5/5 5/5 5/5 5/5 5/5 5/5    L 5/5 5/5 5/5 5/5 5/5 5/5      Lower:  HF KE KF DF PF EHL    R 5/5 5/5 5/5 5/5 5/5 5/5    L 5/5 5/5 5/5 5/5 5/5 5/5     Sensory: Intact sensation to light touch in all extremities. Romberg negative.    Reflexes:          DTR: 2+ symmetrically throughout.     Moctezuma's: Negative.     Babinski's: Negative.     Clonus: Negative.    Cerebellar: Finger-to-nose and rapid alternating movements normal. Gait stable, fluid.    Spine:    Posture: Head well aligned over pelvis in front and side views.  No focal or global spinal deformity visible on inspection. Shoulders and hips even. No obvious leg length discrepancy. No scapula winging.    Bending: Full ROM with forward, back and lateral bending. No rib prominence with forward bend.    Cervical:      ROM: Full with flexion, extension, lateral rotation and ear-to-shoulder bend.      Midline TTP: Negative.     Spurling's test: Negative.     Lhermitte's:  Negative.    Thoracic:     Midline TTP: Negative    Lumbar:     Midline TTP: Negative     Straight Leg Test: Negative     Crossed Straight Leg Test: Negative     Sciatic notch tenderness: Negative.    Other:     SI joint TTP: Negative.     Greater trochanter TTP: Negative.     Tenderness with external/internal hip rotation: Negative.    Diagnostic Results:  All imaging was independently reviewed by me.    AP and Lateral X-ray C-spine, dated 05/24/2018:  1. Good spinal alignment and hardware position    ASSESSMENT/PLAN:     Hollis Pitts is doing significantly better today at 6 weeks than he was during his hospital stay. He is significantly brighter and more interactive. He has some expected postop neck pain which is improving. His incision is well healed and his xrays look great. We will continue the cervical collar and see him back in 6 weeks with a CT C spine.    The patient understands and agrees with the plan of care. All questions were answered.     1. RTC 6 weeks with CT C-spine      I, Dr. Osvaldo Hess personally performed the services described in this documentation. All medical record entries made by the scribe, Phi Loera, were at my direction and in my presence.  I have reviewed the chart and agree that the record reflects my personal performance and is accurate and complete.      Osvaldo Hess M.D.  Department of Neurosurgery  [facility]      .

## 2018-05-25 ENCOUNTER — TELEPHONE (OUTPATIENT)
Dept: NEUROSURGERY | Facility: CLINIC | Age: 83
End: 2018-05-25

## 2018-05-25 NOTE — TELEPHONE ENCOUNTER
Spoke with patient's daughter advised that patient is to follow up in 6 weeks, also advised that patient is to continue to wear cervical collar at this time. Understanding verbalized.

## 2018-05-25 NOTE — TELEPHONE ENCOUNTER
----- Message from Riana Park sent at 5/25/2018  1:53 PM CDT -----  Contact: Bud (daughter) @ [phone]  Calling to speak with someone in Dr. Hess' office regarding the patients visit on 5/24, please call.

## 2018-05-28 LAB
FUNGUS SPEC CULT: NORMAL
FUNGUS SPEC CULT: NORMAL

## 2018-05-28 NOTE — PHYSICIAN QUERY
PT Name: Hollis Pitts  MR #: [id number]     Physician Query Form - Diagnosis Clarification      CDS/: MALORIE Dickerson, RN, CCDS               Contact information: [email or web address]    This form is a permanent document in the medical record.     Query Date: May 28, 2018    By submitting this query, we are merely seeking further clarification of documentation.  Please utilize your independent clinical judgment when addressing the question(s) below.     The medical record contains the following:      Findings Supporting Clinical Information Location in Medical Record     Sepsis   Sepsis due to aspiration pneumonia: Patient with persistent leukocytosis and low grade fever and suspect likely aspiration pneumonia as cause. Patient being properly treated for Pseudomonas UTI so unlikely this is source of fever and WBC. Patient not on steroids. Patient with no risk factors for fungal infection. Cefepime covers most respiratory pathogens but lacks anaerobic coverage and would worry about anaerobes in patient with suspected aspiration. Recommend to add Flagyl for anaerobic coverage for lungs as I suspect this is source of elevated WBC and fever.   Recommend to get new CXR of lungs to evaluate as last CXR was on 4/23 and showed patchy infiltrates in RLL and LLL. Also recommend to get U/A and urine culture to be sure Psueomonas UTI is clearing.     WBC improved with addition of Flagyl on 4/27 for anaerobic coverage and recommend to continue cefepime and Flagyl for now. CXR stable from 4/27. Repeat urine culture pending from 4/27. Del;irium actually imp[roved today. Patient more alert and responsive after starting Flagyl.       Patient febrile up to 102.7. ANGELIA developed overnight likely from sepsis and pre-renal injury. On cirpo and augmentin. Patient's mental status markedly improved. More talkative today. Coached on incentive spirometry- patient with very poor inspiratory effort and unable to pull 100 cc even. Acapella  and CPT q 4 hours.     Aspiration pneumonitis - 2/2 to vomiting episode 5/14   - restarted trickle feeds   - aggressive N/V control with anti-emetics   - CPT/acapella treatment q 4 hours   - oxygen as needed   - aspiration precautions- keep head of bed up at all times   - on augmentin since 5/15   - CXR without signs of infiltrate   - can expect patient to be intermittently febrile, tachycardic and hypoxic- no indications for ICU at this time     Aspiration pneumonia of both lower lobes due to gastric secretions 5/7 - On rocephin, s/p vanc       Problems Resolved During this Admission:   Sepsis, Date noted: 4/21/2018, Date Resolved: 5/9/2018 4/27 Hospital Medicine Consult Note                            4/28 Hospital Medicine Progress Note            5/16 Hospital Medicine Progress Note                                          DC Summary     Please clarify if the Sepsis diagnosis has been:    [  ] Ruled In  [  ] Ruled In, Now Resolved  [  ] Ruled Out  [x  ] Clinically undetermined  [  ] Other/Clarification of findings (please specify)_______________________________    Please document in your progress notes daily for the duration of treatment, until resolved, and include in your discharge summary.

## 2018-06-22 ENCOUNTER — TELEPHONE (OUTPATIENT)
Dept: NEUROSURGERY | Facility: CLINIC | Age: 83
End: 2018-06-22

## 2018-06-25 ENCOUNTER — TELEPHONE (OUTPATIENT)
Dept: NEUROSURGERY | Facility: CLINIC | Age: 83
End: 2018-06-25

## 2018-06-25 NOTE — TELEPHONE ENCOUNTER
----- Message from Gregor Drake sent at 6/25/2018 11:36 AM CDT -----  Contact: Olayinka corona [facility] @ [phone] ext [phone]  Caller is requesting a return phone call concerning the length of time pt needs to wear the cervical collar, pls call

## 2018-06-25 NOTE — TELEPHONE ENCOUNTER
TAN Bhagat, advised per Dr. Hess that patient should continue wearing cervical brace until follow up appt on 7/26/18. Understanding verbalized.

## 2018-06-30 LAB
ACID FAST MOD KINY STN SPEC: NORMAL
ACID FAST MOD KINY STN SPEC: NORMAL
MYCOBACTERIUM SPEC QL CULT: NORMAL
MYCOBACTERIUM SPEC QL CULT: NORMAL

## 2018-07-02 ENCOUNTER — TELEPHONE (OUTPATIENT)
Dept: NEUROSURGERY | Facility: CLINIC | Age: 83
End: 2018-07-02

## 2018-07-02 NOTE — TELEPHONE ENCOUNTER
TAN Cason at [facility] to reschedule pt per their request. New appt info provided, verbalizes understanding.     ----- Message from Kamla Montoya sent at 7/2/2018 11:59 AM CDT -----  Contact: Ms Cason/   [facility]  [phone]  Patient need to reschedule post Op andCat Scan appointment for after 8/2/2018.   Please call Ms Cason/   [facility]  [phone]

## 2018-07-16 ENCOUNTER — TELEPHONE (OUTPATIENT)
Dept: NEUROSURGERY | Facility: CLINIC | Age: 83
End: 2018-07-16

## 2018-07-16 NOTE — TELEPHONE ENCOUNTER
SW pt's daughter clarifying need for brace until cleared by Dr. Hess. CT scan and appt scheduled for 8/16. Verbalizes understanding.     ----- Message from Óscar Bernard sent at 7/16/2018  2:26 PM CDT -----  Needs Advice    Reason for call: Bud is calling to confirm if the pt can take off collar from surgery.      Communication Preference: Bud @ [phone] (pls leave  w/ contact number)  Additional Information:

## 2018-07-30 ENCOUNTER — TELEPHONE (OUTPATIENT)
Dept: NEUROSURGERY | Facility: CLINIC | Age: 83
End: 2018-07-30

## 2018-07-30 NOTE — TELEPHONE ENCOUNTER
Returned call, could not reach pt's daughter at the work number she provided after two attempts. Called her cell phone, no answer, but LVM with appt info for pt's upcoming CT scan and appt with Dr. Hess on 8/16/18.     ----- Message from Riana Park sent at 7/30/2018  1:49 PM CDT -----  Contact: Bud (daughter) @ [phone]  Calling to speak with someone in Dr. Hess' office regarding his appt on 8/16, they have to make transportation arrangements. Please call to confirm appt.

## 2018-08-16 ENCOUNTER — HOSPITAL ENCOUNTER (OUTPATIENT)
Dept: RADIOLOGY | Facility: HOSPITAL | Age: 83
Discharge: HOME | End: 2018-08-16
Attending: NEUROLOGICAL SURGERY
Payer: MEDICARE

## 2018-08-16 ENCOUNTER — OFFICE VISIT (OUTPATIENT)
Dept: NEUROSURGERY | Facility: CLINIC | Age: 83
End: 2018-08-16
Payer: MEDICARE

## 2018-08-16 VITALS
BODY MASS INDEX: 26.85 KG/M2 | TEMPERATURE: 98 F | SYSTOLIC BLOOD PRESSURE: 124 MMHG | WEIGHT: 198 LBS | HEART RATE: 112 BPM | DIASTOLIC BLOOD PRESSURE: 87 MMHG

## 2018-08-16 DIAGNOSIS — Z98.1 HISTORY OF FUSION OF CERVICAL SPINE: ICD-10-CM

## 2018-08-16 DIAGNOSIS — M48.02 CERVICAL STENOSIS OF SPINAL CANAL: Primary | ICD-10-CM

## 2018-08-16 PROCEDURE — 72125 CT NECK SPINE W/O DYE: CPT | Mod: TC

## 2018-08-16 PROCEDURE — 99213 OFFICE O/P EST LOW 20 MIN: CPT | Mod: PBBFAC,25 | Performed by: NEUROLOGICAL SURGERY

## 2018-08-16 PROCEDURE — 99024 POSTOP FOLLOW-UP VISIT: CPT | Mod: POP,,, | Performed by: NEUROLOGICAL SURGERY

## 2018-08-16 PROCEDURE — 72125 CT NECK SPINE W/O DYE: CPT | Mod: 26,,, | Performed by: RADIOLOGY

## 2018-08-16 PROCEDURE — 99999 PR PBB SHADOW E&M-EST. PATIENT-LVL III: CPT | Mod: PBBFAC,,, | Performed by: NEUROLOGICAL SURGERY

## 2018-08-16 NOTE — PROGRESS NOTES
CHIEF COMPLAINT:  Follow up 4 months after C5/6, C6/7 ACDF    KANDY, Phi Loera, attest that this documentation has been prepared under the direction and in the presence of Osvaldo Hess MD.    HPI:  Hollis Pitts is a 83 y.o.  male who presents today for a 4 month post op evaluation. Pt is s/p C5/6, C6/7 ACDF on 4/10/2018 and subsequent cervical wound washout and CSF leak repair on 4/28/2018. Pt reports   Per pt's family he was discharge from [facility] and had a bed sore which prevented him from completing his stay in SNF care. He is now at [facility]. His bed sore is healing. He had an objective fever yesterday which has resolved. Pt is trying to swallow more and has been trying pudding and other thick liquids with improvement. Pt denies shooting arm pain. He reports numbness in his left hand which is the same as prior to surgery. Pt is not walking and has not been participating in physical therapy. His family states that he will be able to perform physical therapy once his cervical collar is removed. For the time being, his family has been helping him perform leg exercises. Pt presents today on a stretcher and wearing a cervical collar.     Review of patient's allergies indicates:  No Known Allergies    Past Medical History:   Diagnosis Date    Benign prostatic hyperplasia with urinary retention 4/11/2018    Cervical myelopathy with cervical radiculopathy 4/10/2018    Depression     Dyslipidemia     Essential hypertension 4/11/2018    Glaucoma (increased eye pressure)     Mild cognitive impairment     Traumatic brain injury 07/2017    Type 2 diabetes mellitus with hyperglycemia, without long-term current use of insulin 4/11/2018     Past Surgical History:   Procedure Laterality Date    CATARACT EXTRACTION W/  INTRAOCULAR LENS IMPLANT       Family History   Problem Relation Age of Onset    Clotting disorder Mother     Paranoid behavior Mother     Cancer Maternal Grandmother      Social History      Tobacco Use    Smoking status: Former Smoker     Last attempt to quit: 1950     Years since quittin.5    Smokeless tobacco: Former User   Substance Use Topics    Alcohol use: Yes     Alcohol/week: 4.2 oz     Types: 7 Shots of liquor per week    Drug use: No        Review of Systems   Constitutional: Positive for fever (yesterday).   HENT: Negative.    Eyes: Negative.    Respiratory: Negative.    Cardiovascular: Negative.    Gastrointestinal: Negative.    Endocrine: Negative.    Genitourinary: Negative.    Musculoskeletal: Positive for gait problem (stretcher). Negative for back pain and neck pain.   Skin: Negative.    Allergic/Immunologic: Negative.    Neurological: Positive for numbness (left hand). Negative for weakness, light-headedness and headaches.   Hematological: Negative.    Psychiatric/Behavioral: Negative.        OBJECTIVE:   Vital Signs:  Temp: 98.3 °F (36.8 °C) (18 1011)  Pulse: (!) 113 (18 1011)  BP: (!) 194/104 (18 1011)    Physical Exam:    Vital signs: All nursing notes and vital signs reviewed -- afebrile, vital signs stable.  Constitutional: Patient sitting comfortably in chair. Appears well developed and well nourished.  Skin: Exposed areas are intact without abnormal markings, rashes or other lesions. Incision is well healed.Clean, dry, intact with no swelling.  HEENT: Normocephalic. Normal conjunctivae.  Cardiovascular: Normal rate and regular rhythm.  Respiratory: Chest wall rises and falls symmetrically, without signs of respiratory distress.  Abdomen: Soft and non-tender.  Extremities: Warm and without edema. Calves supple, non-tender.  Psych/Behavior: Normal affect.    Neurological:    Mental status: Alert and oriented. Conversational and appropriate.       Cranial Nerves: Grossly intact.     Motor:    Upper:  Deltoids Triceps Biceps WE WF     R / 5/5 5/5 5/5 5/5 5/5    L // 5/5 5/ 5/ 5/      Lower:  HF KE KF DF PF EHL    R / 5/ 5/  5/5 5/5    L 5/5 5/5 5/5 5/5 5/5 5/5     Sensory: Intact sensation to light touch in all extremities. Romberg negative.    Reflexes:          DTR: 2+ symmetrically throughout.     Moctezuma's: Negative.     Babinski's: Negative.     Clonus: Negative.    Cerebellar: Finger-to-nose and rapid alternating movements normal. Gait stable, fluid.    Spine:    Posture: Head well aligned over pelvis in front and side views.  No focal or global spinal deformity visible on inspection. Shoulders and hips even. No obvious leg length discrepancy. No scapula winging.    Bending: Full ROM with forward, back and lateral bending. No rib prominence with forward bend.    Cervical:      ROM: Full with flexion, extension, lateral rotation and ear-to-shoulder bend.      Midline TTP: Negative.     Spurling's test: Negative.     Lhermitte's: Negative.    Thoracic:     Midline TTP: Negative    Lumbar:     Midline TTP: Negative     Straight Leg Test: Negative     Crossed Straight Leg Test: Negative     Sciatic notch tenderness: Negative.    Other:     SI joint TTP: Negative.     Greater trochanter TTP: Negative.     Tenderness with external/internal hip rotation: Negative.    Diagnostic Results:  All imaging was independently reviewed by me.    CT C-spine, dated 08/16/2018:  1. Interbody fusion from prior ACDF    ASSESSMENT/PLAN:     Hollis Pitts is an 82 y/o male s/p C5-7 ACDF complicated by wound washout and CSF leak repair presents in follow up. CT shows fusion with bony healing. There is no need for the cervical collar at this point. Pt has been to multiple SNFs to treat his ulcer and Physical Therapy has not worked with him at these institutions because of his collar. Now that his collar is off and he is structurally stable, I recommend aggressive physical therapy to increase his strength. We'll refer him to General Surgery for removal of his PEG tube once he is swallowing solid foods. Pt will follow up for reevaluation in 6 months.         The patient understands and agrees with the plan of care. All questions were answered.     1. Referral to General Surgery for PEG tube removal  2. RTC 6 months       I, Dr. Osvaldo Hess personally performed the services described in this documentation. All medical record entries made by the scribe, Phi Loera, were at my direction and in my presence.  I have reviewed the chart and agree that the record reflects my personal performance and is accurate and complete.      Osvaldo Hess M.D.  Department of Neurosurgery  [facility]      .

## 2018-08-16 NOTE — LETTER
August 19, 2018      Vanessa Fontaine, BO  [address] [address]           [facility] - Neurosurgery 7th Fl  [address]  [facility] [address]  Phone: [phone]          Patient: Hollis Pitts   MR Number: [id number]   YOB: 1935   Date of Visit: 8/16/2018       Dear Vanessa Fontaine:    Thank you for referring Hollis Pitts to me for evaluation. Attached you will find relevant portions of my assessment and plan of care.    If you have questions, please do not hesitate to call me. I look forward to following Hollis Pitts along with you.    Sincerely,    Osvaldo Hess MD    Enclosure  CC:  No Recipients    If you would like to receive this communication electronically, please contact [email or web address] or [phone] to request more information on [employer] access.    For providers and/or their staff who would like to refer a patient to [facility], please contact us through our one-stop-shop provider referral line, [facility], at [phone].    If you feel you have received this communication in error or would no longer like to receive these types of communications, please e-mail [email or web address]

## 2019-05-13 ENCOUNTER — TELEPHONE (OUTPATIENT)
Dept: ADMINISTRATIVE | Facility: CLINIC | Age: 84
End: 2019-05-13

## 2019-05-13 NOTE — TELEPHONE ENCOUNTER
Home Health SOC 04/02/2019 - 05/31/2019 with [facility] - Dr. Samson Ruth.  services.

## 2020-01-01 ENCOUNTER — HOSPITAL ENCOUNTER (INPATIENT)
Facility: HOSPITAL | Age: 85
LOS: 9 days | DRG: 871 | End: 2020-06-13
Attending: HOSPITALIST | Admitting: HOSPITALIST
Payer: MEDICARE

## 2020-01-01 VITALS
TEMPERATURE: 98 F | DIASTOLIC BLOOD PRESSURE: 55 MMHG | OXYGEN SATURATION: 100 % | RESPIRATION RATE: 24 BRPM | SYSTOLIC BLOOD PRESSURE: 103 MMHG | BODY MASS INDEX: 29.14 KG/M2 | HEIGHT: 74 IN | WEIGHT: 227.06 LBS

## 2020-01-01 DIAGNOSIS — U07.1 PNEUMONIA DUE TO COVID-19 VIRUS: ICD-10-CM

## 2020-01-01 DIAGNOSIS — R00.0 IRREGULAR TACHYCARDIA: ICD-10-CM

## 2020-01-01 DIAGNOSIS — R65.21 SEPTIC SHOCK: ICD-10-CM

## 2020-01-01 DIAGNOSIS — A41.9 SEPTIC SHOCK: ICD-10-CM

## 2020-01-01 DIAGNOSIS — J12.82 PNEUMONIA DUE TO COVID-19 VIRUS: ICD-10-CM

## 2020-01-01 DIAGNOSIS — R00.0 TACHYCARDIA: ICD-10-CM

## 2020-01-01 DIAGNOSIS — U07.1 COVID-19: ICD-10-CM

## 2020-01-01 DIAGNOSIS — R57.9 SHOCK: ICD-10-CM

## 2020-01-01 LAB
ALBUMIN SERPL BCP-MCNC: 0.8 G/DL (ref 3.5–5.2)
ALBUMIN SERPL BCP-MCNC: 1.2 G/DL (ref 3.5–5.2)
ALBUMIN SERPL BCP-MCNC: 1.2 G/DL (ref 3.5–5.2)
ALBUMIN SERPL BCP-MCNC: 1.5 G/DL (ref 3.5–5.2)
ALBUMIN SERPL BCP-MCNC: 1.6 G/DL (ref 3.5–5.2)
ALBUMIN SERPL BCP-MCNC: 1.7 G/DL (ref 3.5–5.2)
ALBUMIN SERPL BCP-MCNC: 2.1 G/DL (ref 3.5–5.2)
ALBUMIN SERPL BCP-MCNC: 2.6 G/DL (ref 3.5–5.2)
ALBUMIN SERPL BCP-MCNC: 2.7 G/DL (ref 3.5–5.2)
ALLENS TEST: ABNORMAL
ALP SERPL-CCNC: 43 U/L (ref 55–135)
ALP SERPL-CCNC: 43 U/L (ref 55–135)
ALP SERPL-CCNC: 45 U/L (ref 55–135)
ALP SERPL-CCNC: 45 U/L (ref 55–135)
ALP SERPL-CCNC: 53 U/L (ref 55–135)
ALP SERPL-CCNC: 64 U/L (ref 55–135)
ALP SERPL-CCNC: 69 U/L (ref 55–135)
ALP SERPL-CCNC: 71 U/L (ref 55–135)
ALP SERPL-CCNC: 89 U/L (ref 55–135)
ALT SERPL W/O P-5'-P-CCNC: 10 U/L (ref 10–44)
ALT SERPL W/O P-5'-P-CCNC: 13 U/L (ref 10–44)
ALT SERPL W/O P-5'-P-CCNC: 13 U/L (ref 10–44)
ALT SERPL W/O P-5'-P-CCNC: 15 U/L (ref 10–44)
ALT SERPL W/O P-5'-P-CCNC: 19 U/L (ref 10–44)
ALT SERPL W/O P-5'-P-CCNC: 19 U/L (ref 10–44)
ALT SERPL W/O P-5'-P-CCNC: 25 U/L (ref 10–44)
ALT SERPL W/O P-5'-P-CCNC: 36 U/L (ref 10–44)
ALT SERPL W/O P-5'-P-CCNC: 8 U/L (ref 10–44)
AMORPH CRY UR QL COMP ASSIST: ABNORMAL
ANION GAP SERPL CALC-SCNC: 12 MMOL/L (ref 8–16)
ANION GAP SERPL CALC-SCNC: 13 MMOL/L (ref 8–16)
ANION GAP SERPL CALC-SCNC: 14 MMOL/L (ref 8–16)
ANION GAP SERPL CALC-SCNC: 15 MMOL/L (ref 8–16)
ANION GAP SERPL CALC-SCNC: 16 MMOL/L (ref 8–16)
ANION GAP SERPL CALC-SCNC: 17 MMOL/L (ref 8–16)
ANION GAP SERPL CALC-SCNC: 21 MMOL/L (ref 8–16)
ANION GAP SERPL CALC-SCNC: 22 MMOL/L (ref 8–16)
ANION GAP SERPL CALC-SCNC: 23 MMOL/L (ref 8–16)
ANION GAP SERPL CALC-SCNC: 33 MMOL/L (ref 8–16)
ANION GAP SERPL CALC-SCNC: 38 MMOL/L (ref 8–16)
ANION GAP SERPL CALC-SCNC: 41 MMOL/L (ref 8–16)
ANISOCYTOSIS BLD QL SMEAR: SLIGHT
APTT BLDCRRT: 36.9 SEC (ref 21–32)
APTT BLDCRRT: 52 SEC (ref 21–32)
APTT BLDCRRT: 58.5 SEC (ref 21–32)
APTT BLDCRRT: 62.7 SEC (ref 21–32)
APTT BLDCRRT: 69.1 SEC (ref 21–32)
APTT BLDCRRT: 74.2 SEC (ref 21–32)
APTT BLDCRRT: 80.4 SEC (ref 21–32)
APTT BLDCRRT: 81.2 SEC (ref 21–32)
APTT BLDCRRT: 81.2 SEC (ref 21–32)
APTT BLDCRRT: 91.9 SEC (ref 21–32)
ASCENDING AORTA: 3.71 CM
AST SERPL-CCNC: 131 U/L (ref 10–40)
AST SERPL-CCNC: 14 U/L (ref 10–40)
AST SERPL-CCNC: 21 U/L (ref 10–40)
AST SERPL-CCNC: 23 U/L (ref 10–40)
AST SERPL-CCNC: 25 U/L (ref 10–40)
AST SERPL-CCNC: 27 U/L (ref 10–40)
AST SERPL-CCNC: 30 U/L (ref 10–40)
AST SERPL-CCNC: 32 U/L (ref 10–40)
AST SERPL-CCNC: 54 U/L (ref 10–40)
AV INDEX (PROSTH): 0.53
AV MEAN GRADIENT: 4 MMHG
AV PEAK GRADIENT: 5 MMHG
AV VALVE AREA: 1.82 CM2
AV VELOCITY RATIO: 0.54
B-OH-BUTYR BLD STRIP-SCNC: 0 MMOL/L (ref 0–0.5)
BACTERIA #/AREA URNS AUTO: ABNORMAL /HPF
BACTERIA BLD CULT: ABNORMAL
BACTERIA SPEC AEROBE CULT: ABNORMAL
BACTERIA SPEC AEROBE CULT: ABNORMAL
BASO STIPL BLD QL SMEAR: ABNORMAL
BASOPHILS # BLD AUTO: 0.02 K/UL (ref 0–0.2)
BASOPHILS # BLD AUTO: 0.02 K/UL (ref 0–0.2)
BASOPHILS # BLD AUTO: ABNORMAL K/UL (ref 0–0.2)
BASOPHILS # BLD AUTO: ABNORMAL K/UL (ref 0–0.2)
BASOPHILS NFR BLD: 0 % (ref 0–1.9)
BASOPHILS NFR BLD: 0.3 % (ref 0–1.9)
BASOPHILS NFR BLD: 0.3 % (ref 0–1.9)
BILIRUB SERPL-MCNC: 0.7 MG/DL (ref 0.1–1)
BILIRUB SERPL-MCNC: 0.9 MG/DL (ref 0.1–1)
BILIRUB SERPL-MCNC: 1.2 MG/DL (ref 0.1–1)
BILIRUB SERPL-MCNC: 1.3 MG/DL (ref 0.1–1)
BILIRUB SERPL-MCNC: 1.5 MG/DL (ref 0.1–1)
BILIRUB SERPL-MCNC: 1.7 MG/DL (ref 0.1–1)
BILIRUB SERPL-MCNC: 2.1 MG/DL (ref 0.1–1)
BILIRUB SERPL-MCNC: 3 MG/DL (ref 0.1–1)
BILIRUB SERPL-MCNC: 3.4 MG/DL (ref 0.1–1)
BILIRUB UR QL STRIP: NEGATIVE
BNP SERPL-MCNC: 1626 PG/ML (ref 0–99)
BNP SERPL-MCNC: <10 PG/ML (ref 0–99)
BSA FOR ECHO PROCEDURE: 2.23 M2
BUN SERPL-MCNC: 10 MG/DL (ref 8–23)
BUN SERPL-MCNC: 12 MG/DL (ref 8–23)
BUN SERPL-MCNC: 14 MG/DL (ref 8–23)
BUN SERPL-MCNC: 17 MG/DL (ref 8–23)
BUN SERPL-MCNC: 18 MG/DL (ref 8–23)
BUN SERPL-MCNC: 20 MG/DL (ref 8–23)
BUN SERPL-MCNC: 21 MG/DL (ref 8–23)
BUN SERPL-MCNC: 23 MG/DL (ref 8–23)
BUN SERPL-MCNC: 27 MG/DL (ref 8–23)
BUN SERPL-MCNC: 29 MG/DL (ref 8–23)
BUN SERPL-MCNC: 33 MG/DL (ref 8–23)
BUN SERPL-MCNC: 37 MG/DL (ref 8–23)
BURR CELLS BLD QL SMEAR: ABNORMAL
CALCIUM SERPL-MCNC: 10 MG/DL (ref 8.7–10.5)
CALCIUM SERPL-MCNC: 5.7 MG/DL (ref 8.7–10.5)
CALCIUM SERPL-MCNC: 6.3 MG/DL (ref 8.7–10.5)
CALCIUM SERPL-MCNC: 6.3 MG/DL (ref 8.7–10.5)
CALCIUM SERPL-MCNC: 6.4 MG/DL (ref 8.7–10.5)
CALCIUM SERPL-MCNC: 7.1 MG/DL (ref 8.7–10.5)
CALCIUM SERPL-MCNC: 7.7 MG/DL (ref 8.7–10.5)
CALCIUM SERPL-MCNC: 8.3 MG/DL (ref 8.7–10.5)
CALCIUM SERPL-MCNC: 9.1 MG/DL (ref 8.7–10.5)
CALCIUM SERPL-MCNC: 9.6 MG/DL (ref 8.7–10.5)
CALCIUM SERPL-MCNC: 9.7 MG/DL (ref 8.7–10.5)
CALCIUM SERPL-MCNC: 9.9 MG/DL (ref 8.7–10.5)
CHLORIDE SERPL-SCNC: 102 MMOL/L (ref 95–110)
CHLORIDE SERPL-SCNC: 108 MMOL/L (ref 95–110)
CHLORIDE SERPL-SCNC: 111 MMOL/L (ref 95–110)
CHLORIDE SERPL-SCNC: 112 MMOL/L (ref 95–110)
CHLORIDE SERPL-SCNC: 113 MMOL/L (ref 95–110)
CHLORIDE SERPL-SCNC: 84 MMOL/L (ref 95–110)
CHLORIDE SERPL-SCNC: 89 MMOL/L (ref 95–110)
CHLORIDE SERPL-SCNC: 92 MMOL/L (ref 95–110)
CHLORIDE SERPL-SCNC: 93 MMOL/L (ref 95–110)
CHLORIDE SERPL-SCNC: 98 MMOL/L (ref 95–110)
CK SERPL-CCNC: 30 U/L (ref 20–200)
CLARITY UR REFRACT.AUTO: ABNORMAL
CO2 SERPL-SCNC: 11 MMOL/L (ref 23–29)
CO2 SERPL-SCNC: 12 MMOL/L (ref 23–29)
CO2 SERPL-SCNC: 13 MMOL/L (ref 23–29)
CO2 SERPL-SCNC: 17 MMOL/L (ref 23–29)
CO2 SERPL-SCNC: 20 MMOL/L (ref 23–29)
CO2 SERPL-SCNC: 23 MMOL/L (ref 23–29)
CO2 SERPL-SCNC: 23 MMOL/L (ref 23–29)
CO2 SERPL-SCNC: 24 MMOL/L (ref 23–29)
CO2 SERPL-SCNC: 25 MMOL/L (ref 23–29)
CO2 SERPL-SCNC: 6 MMOL/L (ref 23–29)
COLOR UR AUTO: ABNORMAL
CREAT SERPL-MCNC: 0.8 MG/DL (ref 0.5–1.4)
CREAT SERPL-MCNC: 0.9 MG/DL (ref 0.5–1.4)
CREAT SERPL-MCNC: 0.9 MG/DL (ref 0.5–1.4)
CREAT SERPL-MCNC: 1.4 MG/DL (ref 0.5–1.4)
CREAT SERPL-MCNC: 1.8 MG/DL (ref 0.5–1.4)
CREAT SERPL-MCNC: 2 MG/DL (ref 0.5–1.4)
CREAT SERPL-MCNC: 2 MG/DL (ref 0.5–1.4)
CREAT SERPL-MCNC: 2.2 MG/DL (ref 0.5–1.4)
CREAT SERPL-MCNC: 2.3 MG/DL (ref 0.5–1.4)
CREAT SERPL-MCNC: 2.3 MG/DL (ref 0.5–1.4)
CREAT SERPL-MCNC: 2.4 MG/DL (ref 0.5–1.4)
CREAT SERPL-MCNC: 2.5 MG/DL (ref 0.5–1.4)
CRP SERPL-MCNC: 100.3 MG/L (ref 0–8.2)
CRP SERPL-MCNC: 114.4 MG/L (ref 0–8.2)
CRP SERPL-MCNC: 188.2 MG/L (ref 0–8.2)
CV ECHO LV RWT: 0.31 CM
D DIMER PPP IA.FEU-MCNC: 0.19 MG/L FEU
D DIMER PPP IA.FEU-MCNC: 0.22 MG/L FEU
D DIMER PPP IA.FEU-MCNC: 4.49 MG/L FEU
DACRYOCYTES BLD QL SMEAR: ABNORMAL
DACRYOCYTES BLD QL SMEAR: ABNORMAL
DELSYS: ABNORMAL
DIFFERENTIAL METHOD: ABNORMAL
DOHLE BOD BLD QL SMEAR: PRESENT
DOHLE BOD BLD QL SMEAR: PRESENT
DOP CALC AO PEAK VEL: 1.08 M/S
DOP CALC AO VTI: 15.65 CM
DOP CALC LVOT AREA: 3.4 CM2
DOP CALC LVOT DIAMETER: 2.09 CM
DOP CALC LVOT PEAK VEL: 0.58 M/S
DOP CALC LVOT STROKE VOLUME: 28.49 CM3
DOP CALCLVOT PEAK VEL VTI: 8.31 CM
ECHO LV POSTERIOR WALL: 0.83 CM (ref 0.6–1.1)
EOSINOPHIL # BLD AUTO: 0 K/UL (ref 0–0.5)
EOSINOPHIL # BLD AUTO: 0 K/UL (ref 0–0.5)
EOSINOPHIL # BLD AUTO: ABNORMAL K/UL (ref 0–0.5)
EOSINOPHIL # BLD AUTO: ABNORMAL K/UL (ref 0–0.5)
EOSINOPHIL NFR BLD: 0 % (ref 0–8)
EOSINOPHIL NFR BLD: 0.2 % (ref 0–8)
ERYTHROCYTE [DISTWIDTH] IN BLOOD BY AUTOMATED COUNT: 15.6 % (ref 11.5–14.5)
ERYTHROCYTE [DISTWIDTH] IN BLOOD BY AUTOMATED COUNT: 15.6 % (ref 11.5–14.5)
ERYTHROCYTE [DISTWIDTH] IN BLOOD BY AUTOMATED COUNT: 15.7 % (ref 11.5–14.5)
ERYTHROCYTE [DISTWIDTH] IN BLOOD BY AUTOMATED COUNT: 15.8 % (ref 11.5–14.5)
ERYTHROCYTE [DISTWIDTH] IN BLOOD BY AUTOMATED COUNT: 15.8 % (ref 11.5–14.5)
ERYTHROCYTE [DISTWIDTH] IN BLOOD BY AUTOMATED COUNT: 16.2 % (ref 11.5–14.5)
ERYTHROCYTE [DISTWIDTH] IN BLOOD BY AUTOMATED COUNT: 16.4 % (ref 11.5–14.5)
ERYTHROCYTE [DISTWIDTH] IN BLOOD BY AUTOMATED COUNT: 18.6 % (ref 11.5–14.5)
ERYTHROCYTE [SEDIMENTATION RATE] IN BLOOD BY WESTERGREN METHOD: 24 MM/H
ERYTHROCYTE [SEDIMENTATION RATE] IN BLOOD BY WESTERGREN METHOD: 42 MM/HR (ref 0–23)
EST. GFR  (AFRICAN AMERICAN): 26.5 ML/MIN/1.73 M^2
EST. GFR  (AFRICAN AMERICAN): 27.8 ML/MIN/1.73 M^2
EST. GFR  (AFRICAN AMERICAN): 29.3 ML/MIN/1.73 M^2
EST. GFR  (AFRICAN AMERICAN): 29.3 ML/MIN/1.73 M^2
EST. GFR  (AFRICAN AMERICAN): 30.9 ML/MIN/1.73 M^2
EST. GFR  (AFRICAN AMERICAN): 34.7 ML/MIN/1.73 M^2
EST. GFR  (AFRICAN AMERICAN): 34.7 ML/MIN/1.73 M^2
EST. GFR  (AFRICAN AMERICAN): 39.4 ML/MIN/1.73 M^2
EST. GFR  (AFRICAN AMERICAN): 53.3 ML/MIN/1.73 M^2
EST. GFR  (AFRICAN AMERICAN): >60 ML/MIN/1.73 M^2
EST. GFR  (NON AFRICAN AMERICAN): 22.9 ML/MIN/1.73 M^2
EST. GFR  (NON AFRICAN AMERICAN): 24 ML/MIN/1.73 M^2
EST. GFR  (NON AFRICAN AMERICAN): 25.3 ML/MIN/1.73 M^2
EST. GFR  (NON AFRICAN AMERICAN): 25.3 ML/MIN/1.73 M^2
EST. GFR  (NON AFRICAN AMERICAN): 26.7 ML/MIN/1.73 M^2
EST. GFR  (NON AFRICAN AMERICAN): 30 ML/MIN/1.73 M^2
EST. GFR  (NON AFRICAN AMERICAN): 30 ML/MIN/1.73 M^2
EST. GFR  (NON AFRICAN AMERICAN): 34 ML/MIN/1.73 M^2
EST. GFR  (NON AFRICAN AMERICAN): 46.1 ML/MIN/1.73 M^2
EST. GFR  (NON AFRICAN AMERICAN): >60 ML/MIN/1.73 M^2
ESTIMATED AVG GLUCOSE: 146 MG/DL (ref 68–131)
FERRITIN SERPL-MCNC: 359 NG/ML (ref 20–300)
FERRITIN SERPL-MCNC: 431 NG/ML (ref 20–300)
FERRITIN SERPL-MCNC: 675 NG/ML (ref 20–300)
FIO2: 60
FIO2: 60
FIO2: 70
FIO2: 70
FIO2: 80
FIO2: 80
FLOW: 5
FLOW: 5
FRACTIONAL SHORTENING: 11 % (ref 28–44)
GIANT PLATELETS BLD QL SMEAR: PRESENT
GLUCOSE SERPL-MCNC: 104 MG/DL (ref 70–110)
GLUCOSE SERPL-MCNC: 112 MG/DL (ref 70–110)
GLUCOSE SERPL-MCNC: 119 MG/DL (ref 70–110)
GLUCOSE SERPL-MCNC: 134 MG/DL (ref 70–110)
GLUCOSE SERPL-MCNC: 164 MG/DL (ref 70–110)
GLUCOSE SERPL-MCNC: 211 MG/DL (ref 70–110)
GLUCOSE SERPL-MCNC: 240 MG/DL (ref 70–110)
GLUCOSE SERPL-MCNC: 323 MG/DL (ref 70–110)
GLUCOSE SERPL-MCNC: 470 MG/DL (ref 70–110)
GLUCOSE SERPL-MCNC: 473 MG/DL (ref 70–110)
GLUCOSE SERPL-MCNC: 95 MG/DL (ref 70–110)
GLUCOSE SERPL-MCNC: 98 MG/DL (ref 70–110)
GLUCOSE UR QL STRIP: ABNORMAL
GRAM STN SPEC: ABNORMAL
HBA1C MFR BLD HPLC: 6.7 % (ref 4–5.6)
HCO3 UR-SCNC: 10.5 MMOL/L (ref 24–28)
HCO3 UR-SCNC: 11.6 MMOL/L (ref 24–28)
HCO3 UR-SCNC: 12.3 MMOL/L (ref 24–28)
HCO3 UR-SCNC: 12.8 MMOL/L (ref 24–28)
HCO3 UR-SCNC: 14.2 MMOL/L (ref 24–28)
HCO3 UR-SCNC: 17.8 MMOL/L (ref 24–28)
HCO3 UR-SCNC: 23.1 MMOL/L (ref 24–28)
HCO3 UR-SCNC: 6.8 MMOL/L (ref 24–28)
HCT VFR BLD AUTO: 29.1 % (ref 40–54)
HCT VFR BLD AUTO: 35.2 % (ref 40–54)
HCT VFR BLD AUTO: 36.6 % (ref 40–54)
HCT VFR BLD AUTO: 40.1 % (ref 40–54)
HCT VFR BLD AUTO: 42 % (ref 40–54)
HCT VFR BLD AUTO: 44.6 % (ref 40–54)
HCT VFR BLD AUTO: 45.3 % (ref 40–54)
HCT VFR BLD AUTO: 50.1 % (ref 40–54)
HCT VFR BLD CALC: 39 %PCV (ref 36–54)
HGB BLD-MCNC: 11.2 G/DL (ref 14–18)
HGB BLD-MCNC: 11.4 G/DL (ref 14–18)
HGB BLD-MCNC: 12.3 G/DL (ref 14–18)
HGB BLD-MCNC: 12.6 G/DL (ref 14–18)
HGB BLD-MCNC: 13.8 G/DL (ref 14–18)
HGB BLD-MCNC: 14 G/DL (ref 14–18)
HGB BLD-MCNC: 15.3 G/DL (ref 14–18)
HGB BLD-MCNC: 8.5 G/DL (ref 14–18)
HGB UR QL STRIP: ABNORMAL
HOWELL-JOLLY BOD BLD QL SMEAR: ABNORMAL
HYALINE CASTS UR QL AUTO: 0 /LPF
HYPOCHROMIA BLD QL SMEAR: ABNORMAL
IMM GRANULOCYTES # BLD AUTO: 0.04 K/UL (ref 0–0.04)
IMM GRANULOCYTES # BLD AUTO: 0.05 K/UL (ref 0–0.04)
IMM GRANULOCYTES # BLD AUTO: ABNORMAL K/UL (ref 0–0.04)
IMM GRANULOCYTES NFR BLD AUTO: 0.6 % (ref 0–0.5)
IMM GRANULOCYTES NFR BLD AUTO: 0.9 % (ref 0–0.5)
IMM GRANULOCYTES NFR BLD AUTO: ABNORMAL % (ref 0–0.5)
INR PPP: 1.6 (ref 0.8–1.2)
INTERVENTRICULAR SEPTUM: 0.82 CM (ref 0.6–1.1)
KETONES UR QL STRIP: ABNORMAL
LA MAJOR: 4.9 CM
LA MINOR: 4.93 CM
LA WIDTH: 4 CM
LACTATE SERPL-SCNC: 1.4 MMOL/L (ref 0.5–2.2)
LACTATE SERPL-SCNC: 11.7 MMOL/L (ref 0.5–2.2)
LACTATE SERPL-SCNC: 11.7 MMOL/L (ref 0.5–2.2)
LACTATE SERPL-SCNC: 9.2 MMOL/L (ref 0.5–2.2)
LACTATE SERPL-SCNC: 9.9 MMOL/L (ref 0.5–2.2)
LACTATE SERPL-SCNC: >12 MMOL/L (ref 0.5–2.2)
LACTATE SERPL-SCNC: >12 MMOL/L (ref 0.5–2.2)
LDH SERPL L TO P-CCNC: 257 U/L (ref 110–260)
LDH SERPL L TO P-CCNC: 262 U/L (ref 110–260)
LDH SERPL L TO P-CCNC: 314 U/L (ref 110–260)
LDH SERPL L TO P-CCNC: 7.76 MMOL/L (ref 0.36–1.25)
LEFT ATRIUM SIZE: 4.07 CM
LEFT ATRIUM VOLUME INDEX: 30.7 ML/M2
LEFT ATRIUM VOLUME: 68.01 CM3
LEFT INTERNAL DIMENSION IN SYSTOLE: 4.8 CM (ref 2.1–4)
LEFT VENTRICLE DIASTOLIC VOLUME INDEX: 62.78 ML/M2
LEFT VENTRICLE DIASTOLIC VOLUME: 139.28 ML
LEFT VENTRICLE MASS INDEX: 72 G/M2
LEFT VENTRICLE SYSTOLIC VOLUME INDEX: 34.7 ML/M2
LEFT VENTRICLE SYSTOLIC VOLUME: 76.95 ML
LEFT VENTRICULAR INTERNAL DIMENSION IN DIASTOLE: 5.37 CM (ref 3.5–6)
LEFT VENTRICULAR MASS: 159.59 G
LEUKOCYTE ESTERASE UR QL STRIP: ABNORMAL
LYMPHOCYTES # BLD AUTO: 1.3 K/UL (ref 1–4.8)
LYMPHOCYTES # BLD AUTO: 1.3 K/UL (ref 1–4.8)
LYMPHOCYTES # BLD AUTO: ABNORMAL K/UL (ref 1–4.8)
LYMPHOCYTES # BLD AUTO: ABNORMAL K/UL (ref 1–4.8)
LYMPHOCYTES NFR BLD: 11 % (ref 18–48)
LYMPHOCYTES NFR BLD: 17 % (ref 18–48)
LYMPHOCYTES NFR BLD: 19.9 % (ref 18–48)
LYMPHOCYTES NFR BLD: 2.7 % (ref 18–48)
LYMPHOCYTES NFR BLD: 21.8 % (ref 18–48)
LYMPHOCYTES NFR BLD: 4 % (ref 18–48)
LYMPHOCYTES NFR BLD: 5 % (ref 18–48)
LYMPHOCYTES NFR BLD: 7 % (ref 18–48)
MAGNESIUM SERPL-MCNC: 1.1 MG/DL (ref 1.6–2.6)
MAGNESIUM SERPL-MCNC: 1.2 MG/DL (ref 1.6–2.6)
MAGNESIUM SERPL-MCNC: 1.3 MG/DL (ref 1.6–2.6)
MAGNESIUM SERPL-MCNC: 1.3 MG/DL (ref 1.6–2.6)
MAGNESIUM SERPL-MCNC: 1.6 MG/DL (ref 1.6–2.6)
MAGNESIUM SERPL-MCNC: 1.7 MG/DL (ref 1.6–2.6)
MAGNESIUM SERPL-MCNC: 1.9 MG/DL (ref 1.6–2.6)
MAGNESIUM SERPL-MCNC: 2 MG/DL (ref 1.6–2.6)
MAGNESIUM SERPL-MCNC: 2.1 MG/DL (ref 1.6–2.6)
MCH RBC QN AUTO: 27.8 PG (ref 27–31)
MCH RBC QN AUTO: 28 PG (ref 27–31)
MCH RBC QN AUTO: 28.1 PG (ref 27–31)
MCH RBC QN AUTO: 28.2 PG (ref 27–31)
MCH RBC QN AUTO: 28.2 PG (ref 27–31)
MCH RBC QN AUTO: 28.3 PG (ref 27–31)
MCH RBC QN AUTO: 28.3 PG (ref 27–31)
MCH RBC QN AUTO: 28.5 PG (ref 27–31)
MCHC RBC AUTO-ENTMCNC: 29.2 G/DL (ref 32–36)
MCHC RBC AUTO-ENTMCNC: 30 G/DL (ref 32–36)
MCHC RBC AUTO-ENTMCNC: 30.5 G/DL (ref 32–36)
MCHC RBC AUTO-ENTMCNC: 30.7 G/DL (ref 32–36)
MCHC RBC AUTO-ENTMCNC: 30.9 G/DL (ref 32–36)
MCHC RBC AUTO-ENTMCNC: 30.9 G/DL (ref 32–36)
MCHC RBC AUTO-ENTMCNC: 31.1 G/DL (ref 32–36)
MCHC RBC AUTO-ENTMCNC: 31.8 G/DL (ref 32–36)
MCV RBC AUTO: 89 FL (ref 82–98)
MCV RBC AUTO: 90 FL (ref 82–98)
MCV RBC AUTO: 91 FL (ref 82–98)
MCV RBC AUTO: 91 FL (ref 82–98)
MCV RBC AUTO: 92 FL (ref 82–98)
MCV RBC AUTO: 93 FL (ref 82–98)
MCV RBC AUTO: 94 FL (ref 82–98)
MCV RBC AUTO: 97 FL (ref 82–98)
METAMYELOCYTES NFR BLD MANUAL: 5 %
METAMYELOCYTES NFR BLD MANUAL: 5 %
METAMYELOCYTES NFR BLD MANUAL: 7 %
MICROSCOPIC COMMENT: ABNORMAL
MIN VOL: 14.4
MODE: ABNORMAL
MONOCYTES # BLD AUTO: 0.9 K/UL (ref 0.3–1)
MONOCYTES # BLD AUTO: 1 K/UL (ref 0.3–1)
MONOCYTES # BLD AUTO: ABNORMAL K/UL (ref 0.3–1)
MONOCYTES # BLD AUTO: ABNORMAL K/UL (ref 0.3–1)
MONOCYTES NFR BLD: 13 % (ref 4–15)
MONOCYTES NFR BLD: 14.8 % (ref 4–15)
MONOCYTES NFR BLD: 15.5 % (ref 4–15)
MONOCYTES NFR BLD: 17 % (ref 4–15)
MONOCYTES NFR BLD: 18 % (ref 4–15)
MONOCYTES NFR BLD: 2 % (ref 4–15)
MONOCYTES NFR BLD: 6 % (ref 4–15)
MONOCYTES NFR BLD: 7 % (ref 4–15)
MYELOCYTES NFR BLD MANUAL: 0.7 %
MYELOCYTES NFR BLD MANUAL: 1 %
MYELOCYTES NFR BLD MANUAL: 2 %
MYELOCYTES NFR BLD MANUAL: 3 %
MYELOCYTES NFR BLD MANUAL: 4 %
NEUTROPHILS # BLD AUTO: 3.6 K/UL (ref 1.8–7.7)
NEUTROPHILS # BLD AUTO: 4.2 K/UL (ref 1.8–7.7)
NEUTROPHILS NFR BLD: 41 % (ref 38–73)
NEUTROPHILS NFR BLD: 44 % (ref 38–73)
NEUTROPHILS NFR BLD: 52 % (ref 38–73)
NEUTROPHILS NFR BLD: 62.2 % (ref 38–73)
NEUTROPHILS NFR BLD: 63.5 % (ref 38–73)
NEUTROPHILS NFR BLD: 72 % (ref 38–73)
NEUTROPHILS NFR BLD: 86 % (ref 38–73)
NEUTROPHILS NFR BLD: 89.3 % (ref 38–73)
NEUTS BAND NFR BLD MANUAL: 1.3 %
NEUTS BAND NFR BLD MANUAL: 16 %
NEUTS BAND NFR BLD MANUAL: 16 %
NEUTS BAND NFR BLD MANUAL: 17 %
NEUTS BAND NFR BLD MANUAL: 23 %
NEUTS BAND NFR BLD MANUAL: 4 %
NITRITE UR QL STRIP: NEGATIVE
NRBC BLD-RTO: 0 /100 WBC
NRBC BLD-RTO: 1 /100 WBC
NRBC BLD-RTO: 2 /100 WBC
OVALOCYTES BLD QL SMEAR: ABNORMAL
PCO2 BLDA: 24.2 MMHG (ref 35–45)
PCO2 BLDA: 24.6 MMHG (ref 35–45)
PCO2 BLDA: 27.7 MMHG (ref 35–45)
PCO2 BLDA: 28.1 MMHG (ref 35–45)
PCO2 BLDA: 28.6 MMHG (ref 35–45)
PCO2 BLDA: 31.2 MMHG (ref 35–45)
PCO2 BLDA: 33 MMHG (ref 35–45)
PCO2 BLDA: 36 MMHG (ref 35–45)
PEEP: 10
PEEP: 10
PEEP: 5
PEEP: 8
PH SMN: 6.99 [PH] (ref 7.35–7.45)
PH SMN: 7.21 [PH] (ref 7.35–7.45)
PH SMN: 7.22 [PH] (ref 7.35–7.45)
PH SMN: 7.25 [PH] (ref 7.35–7.45)
PH SMN: 7.25 [PH] (ref 7.35–7.45)
PH SMN: 7.32 [PH] (ref 7.35–7.45)
PH SMN: 7.37 [PH] (ref 7.35–7.45)
PH SMN: 7.45 [PH] (ref 7.35–7.45)
PH UR STRIP: 5 [PH] (ref 5–8)
PHOSPHATE SERPL-MCNC: 1.9 MG/DL (ref 2.7–4.5)
PHOSPHATE SERPL-MCNC: 10 MG/DL (ref 2.7–4.5)
PHOSPHATE SERPL-MCNC: 2.2 MG/DL (ref 2.7–4.5)
PHOSPHATE SERPL-MCNC: 2.9 MG/DL (ref 2.7–4.5)
PHOSPHATE SERPL-MCNC: 3.4 MG/DL (ref 2.7–4.5)
PHOSPHATE SERPL-MCNC: 5.2 MG/DL (ref 2.7–4.5)
PHOSPHATE SERPL-MCNC: 5.3 MG/DL (ref 2.7–4.5)
PHOSPHATE SERPL-MCNC: 5.6 MG/DL (ref 2.7–4.5)
PHOSPHATE SERPL-MCNC: <1 MG/DL (ref 2.7–4.5)
PIP: 19
PISA TR MAX VEL: 2.24 M/S
PLATELET # BLD AUTO: 108 K/UL (ref 150–350)
PLATELET # BLD AUTO: 190 K/UL (ref 150–350)
PLATELET # BLD AUTO: 246 K/UL (ref 150–350)
PLATELET # BLD AUTO: 263 K/UL (ref 150–350)
PLATELET # BLD AUTO: 284 K/UL (ref 150–350)
PLATELET # BLD AUTO: 286 K/UL (ref 150–350)
PLATELET # BLD AUTO: 40 K/UL (ref 150–350)
PLATELET # BLD AUTO: 69 K/UL (ref 150–350)
PLATELET BLD QL SMEAR: ABNORMAL
PMV BLD AUTO: 10.2 FL (ref 9.2–12.9)
PMV BLD AUTO: 10.6 FL (ref 9.2–12.9)
PMV BLD AUTO: 11.2 FL (ref 9.2–12.9)
PMV BLD AUTO: 11.4 FL (ref 9.2–12.9)
PMV BLD AUTO: 11.4 FL (ref 9.2–12.9)
PMV BLD AUTO: 11.5 FL (ref 9.2–12.9)
PMV BLD AUTO: 13 FL (ref 9.2–12.9)
PMV BLD AUTO: 13.5 FL (ref 9.2–12.9)
PO2 BLDA: 56 MMHG (ref 80–100)
PO2 BLDA: 65 MMHG (ref 80–100)
PO2 BLDA: 67 MMHG (ref 80–100)
PO2 BLDA: 68 MMHG (ref 80–100)
PO2 BLDA: 75 MMHG (ref 80–100)
PO2 BLDA: 76 MMHG (ref 80–100)
PO2 BLDA: 79 MMHG (ref 80–100)
PO2 BLDA: 93 MMHG (ref 80–100)
POC BE: -1 MMOL/L
POC BE: -13 MMOL/L
POC BE: -14 MMOL/L
POC BE: -15 MMOL/L
POC BE: -16 MMOL/L
POC BE: -17 MMOL/L
POC BE: -25 MMOL/L
POC BE: -8 MMOL/L
POC IONIZED CALCIUM: 1.17 MMOL/L (ref 1.06–1.42)
POC SATURATED O2: 87 % (ref 95–100)
POC SATURATED O2: 88 % (ref 95–100)
POC SATURATED O2: 89 % (ref 95–100)
POC SATURATED O2: 91 % (ref 95–100)
POC SATURATED O2: 93 % (ref 95–100)
POC SATURATED O2: 93 % (ref 95–100)
POC SATURATED O2: 94 % (ref 95–100)
POC SATURATED O2: 96 % (ref 95–100)
POC TCO2: 11 MMOL/L (ref 23–27)
POC TCO2: 12 MMOL/L (ref 23–27)
POC TCO2: 13 MMOL/L (ref 23–27)
POC TCO2: 14 MMOL/L (ref 23–27)
POC TCO2: 15 MMOL/L (ref 23–27)
POC TCO2: 19 MMOL/L (ref 23–27)
POC TCO2: 24 MMOL/L (ref 23–27)
POC TCO2: 8 MMOL/L (ref 23–27)
POCT GLUCOSE: 100 MG/DL (ref 70–110)
POCT GLUCOSE: 102 MG/DL (ref 70–110)
POCT GLUCOSE: 103 MG/DL (ref 70–110)
POCT GLUCOSE: 103 MG/DL (ref 70–110)
POCT GLUCOSE: 104 MG/DL (ref 70–110)
POCT GLUCOSE: 105 MG/DL (ref 70–110)
POCT GLUCOSE: 106 MG/DL (ref 70–110)
POCT GLUCOSE: 106 MG/DL (ref 70–110)
POCT GLUCOSE: 108 MG/DL (ref 70–110)
POCT GLUCOSE: 108 MG/DL (ref 70–110)
POCT GLUCOSE: 109 MG/DL (ref 70–110)
POCT GLUCOSE: 109 MG/DL (ref 70–110)
POCT GLUCOSE: 111 MG/DL (ref 70–110)
POCT GLUCOSE: 116 MG/DL (ref 70–110)
POCT GLUCOSE: 117 MG/DL (ref 70–110)
POCT GLUCOSE: 117 MG/DL (ref 70–110)
POCT GLUCOSE: 118 MG/DL (ref 70–110)
POCT GLUCOSE: 122 MG/DL (ref 70–110)
POCT GLUCOSE: 122 MG/DL (ref 70–110)
POCT GLUCOSE: 128 MG/DL (ref 70–110)
POCT GLUCOSE: 131 MG/DL (ref 70–110)
POCT GLUCOSE: 138 MG/DL (ref 70–110)
POCT GLUCOSE: 139 MG/DL (ref 70–110)
POCT GLUCOSE: 162 MG/DL (ref 70–110)
POCT GLUCOSE: 169 MG/DL (ref 70–110)
POCT GLUCOSE: 170 MG/DL (ref 70–110)
POCT GLUCOSE: 176 MG/DL (ref 70–110)
POCT GLUCOSE: 206 MG/DL (ref 70–110)
POCT GLUCOSE: 216 MG/DL (ref 70–110)
POCT GLUCOSE: 221 MG/DL (ref 70–110)
POCT GLUCOSE: 225 MG/DL (ref 70–110)
POCT GLUCOSE: 242 MG/DL (ref 70–110)
POCT GLUCOSE: 265 MG/DL (ref 70–110)
POCT GLUCOSE: 288 MG/DL (ref 70–110)
POCT GLUCOSE: 289 MG/DL (ref 70–110)
POCT GLUCOSE: 294 MG/DL (ref 70–110)
POCT GLUCOSE: 349 MG/DL (ref 70–110)
POCT GLUCOSE: 351 MG/DL (ref 70–110)
POCT GLUCOSE: 357 MG/DL (ref 70–110)
POCT GLUCOSE: 375 MG/DL (ref 70–110)
POCT GLUCOSE: 385 MG/DL (ref 70–110)
POCT GLUCOSE: 402 MG/DL (ref 70–110)
POCT GLUCOSE: 420 MG/DL (ref 70–110)
POCT GLUCOSE: 429 MG/DL (ref 70–110)
POCT GLUCOSE: 437 MG/DL (ref 70–110)
POCT GLUCOSE: 438 MG/DL (ref 70–110)
POCT GLUCOSE: 440 MG/DL (ref 70–110)
POCT GLUCOSE: 442 MG/DL (ref 70–110)
POCT GLUCOSE: 55 MG/DL (ref 70–110)
POCT GLUCOSE: 65 MG/DL (ref 70–110)
POCT GLUCOSE: 72 MG/DL (ref 70–110)
POCT GLUCOSE: 75 MG/DL (ref 70–110)
POCT GLUCOSE: 76 MG/DL (ref 70–110)
POCT GLUCOSE: 76 MG/DL (ref 70–110)
POCT GLUCOSE: 85 MG/DL (ref 70–110)
POCT GLUCOSE: 86 MG/DL (ref 70–110)
POCT GLUCOSE: 91 MG/DL (ref 70–110)
POCT GLUCOSE: 92 MG/DL (ref 70–110)
POCT GLUCOSE: 93 MG/DL (ref 70–110)
POCT GLUCOSE: 94 MG/DL (ref 70–110)
POCT GLUCOSE: 94 MG/DL (ref 70–110)
POCT GLUCOSE: 97 MG/DL (ref 70–110)
POIKILOCYTOSIS BLD QL SMEAR: ABNORMAL
POIKILOCYTOSIS BLD QL SMEAR: ABNORMAL
POIKILOCYTOSIS BLD QL SMEAR: SLIGHT
POLYCHROMASIA BLD QL SMEAR: ABNORMAL
POTASSIUM BLD-SCNC: 3.2 MMOL/L (ref 3.5–5.1)
POTASSIUM SERPL-SCNC: 2.5 MMOL/L (ref 3.5–5.1)
POTASSIUM SERPL-SCNC: 2.9 MMOL/L (ref 3.5–5.1)
POTASSIUM SERPL-SCNC: 3.1 MMOL/L (ref 3.5–5.1)
POTASSIUM SERPL-SCNC: 3.4 MMOL/L (ref 3.5–5.1)
POTASSIUM SERPL-SCNC: 3.5 MMOL/L (ref 3.5–5.1)
POTASSIUM SERPL-SCNC: 3.5 MMOL/L (ref 3.5–5.1)
POTASSIUM SERPL-SCNC: 3.7 MMOL/L (ref 3.5–5.1)
POTASSIUM SERPL-SCNC: 3.8 MMOL/L (ref 3.5–5.1)
POTASSIUM SERPL-SCNC: 3.9 MMOL/L (ref 3.5–5.1)
POTASSIUM SERPL-SCNC: 4 MMOL/L (ref 3.5–5.1)
POTASSIUM SERPL-SCNC: 4.1 MMOL/L (ref 3.5–5.1)
POTASSIUM SERPL-SCNC: 4.2 MMOL/L (ref 3.5–5.1)
POTASSIUM SERPL-SCNC: 6 MMOL/L (ref 3.5–5.1)
PROCALCITONIN SERPL IA-MCNC: 0.07 NG/ML
PROMYELOCYTES NFR BLD MANUAL: 1 %
PROT SERPL-MCNC: 3.6 G/DL (ref 6–8.4)
PROT SERPL-MCNC: 4.6 G/DL (ref 6–8.4)
PROT SERPL-MCNC: 4.8 G/DL (ref 6–8.4)
PROT SERPL-MCNC: 5.2 G/DL (ref 6–8.4)
PROT SERPL-MCNC: 5.2 G/DL (ref 6–8.4)
PROT SERPL-MCNC: 5.5 G/DL (ref 6–8.4)
PROT SERPL-MCNC: 6.6 G/DL (ref 6–8.4)
PROT SERPL-MCNC: 7.4 G/DL (ref 6–8.4)
PROT SERPL-MCNC: 7.5 G/DL (ref 6–8.4)
PROT UR QL STRIP: ABNORMAL
PROTHROMBIN TIME: 16 SEC (ref 9–12.5)
RA MAJOR: 3.67 CM
RA WIDTH: 3.5 CM
RBC # BLD AUTO: 3.01 M/UL (ref 4.6–6.2)
RBC # BLD AUTO: 3.96 M/UL (ref 4.6–6.2)
RBC # BLD AUTO: 4.07 M/UL (ref 4.6–6.2)
RBC # BLD AUTO: 4.43 M/UL (ref 4.6–6.2)
RBC # BLD AUTO: 4.49 M/UL (ref 4.6–6.2)
RBC # BLD AUTO: 4.85 M/UL (ref 4.6–6.2)
RBC # BLD AUTO: 4.96 M/UL (ref 4.6–6.2)
RBC # BLD AUTO: 5.41 M/UL (ref 4.6–6.2)
RBC #/AREA URNS AUTO: >100 /HPF (ref 0–4)
RIGHT VENTRICULAR END-DIASTOLIC DIMENSION: 4.7 CM
SAMPLE: ABNORMAL
SCHISTOCYTES BLD QL SMEAR: ABNORMAL
SCHISTOCYTES BLD QL SMEAR: ABNORMAL
SCHISTOCYTES BLD QL SMEAR: PRESENT
SCHISTOCYTES BLD QL SMEAR: PRESENT
SINUS: 3.3 CM
SITE: ABNORMAL
SODIUM BLD-SCNC: 147 MMOL/L (ref 136–145)
SODIUM SERPL-SCNC: 131 MMOL/L (ref 136–145)
SODIUM SERPL-SCNC: 133 MMOL/L (ref 136–145)
SODIUM SERPL-SCNC: 135 MMOL/L (ref 136–145)
SODIUM SERPL-SCNC: 136 MMOL/L (ref 136–145)
SODIUM SERPL-SCNC: 137 MMOL/L (ref 136–145)
SODIUM SERPL-SCNC: 138 MMOL/L (ref 136–145)
SODIUM SERPL-SCNC: 140 MMOL/L (ref 136–145)
SODIUM SERPL-SCNC: 143 MMOL/L (ref 136–145)
SODIUM SERPL-SCNC: 145 MMOL/L (ref 136–145)
SODIUM SERPL-SCNC: 149 MMOL/L (ref 136–145)
SODIUM SERPL-SCNC: 149 MMOL/L (ref 136–145)
SODIUM SERPL-SCNC: 151 MMOL/L (ref 136–145)
SP GR UR STRIP: 1.01 (ref 1–1.03)
SP02: 90
SP02: 93
SP02: 94
SP02: 96
SP02: 96
SQUAMOUS #/AREA URNS AUTO: 1 /HPF
STJ: 3.42 CM
TARGETS BLD QL SMEAR: ABNORMAL
TARGETS BLD QL SMEAR: ABNORMAL
TDI LATERAL: 0.05 M/S
TDI SEPTAL: 0.03 M/S
TDI: 0.04 M/S
TOXIC GRANULES BLD QL SMEAR: PRESENT
TOXIC GRANULES BLD QL SMEAR: PRESENT
TR MAX PG: 20 MMHG
TRICUSPID ANNULAR PLANE SYSTOLIC EXCURSION: 1.52 CM
TROPONIN I SERPL DL<=0.01 NG/ML-MCNC: 0.05 NG/ML (ref 0–0.03)
TROPONIN I SERPL DL<=0.01 NG/ML-MCNC: 0.06 NG/ML (ref 0–0.03)
TROPONIN I SERPL DL<=0.01 NG/ML-MCNC: 0.19 NG/ML (ref 0–0.03)
TROPONIN I SERPL DL<=0.01 NG/ML-MCNC: 0.22 NG/ML (ref 0–0.03)
TROPONIN I SERPL DL<=0.01 NG/ML-MCNC: 0.26 NG/ML (ref 0–0.03)
URN SPEC COLLECT METH UR: ABNORMAL
VANCOMYCIN SERPL-MCNC: 17.8 UG/ML
VANCOMYCIN SERPL-MCNC: 2.6 UG/ML
VANCOMYCIN TROUGH SERPL-MCNC: 21.1 UG/ML (ref 10–22)
VT: 500
WBC # BLD AUTO: 14.36 K/UL (ref 3.9–12.7)
WBC # BLD AUTO: 16.79 K/UL (ref 3.9–12.7)
WBC # BLD AUTO: 5.86 K/UL (ref 3.9–12.7)
WBC # BLD AUTO: 6.57 K/UL (ref 3.9–12.7)
WBC # BLD AUTO: 7.29 K/UL (ref 3.9–12.7)
WBC # BLD AUTO: 8.16 K/UL (ref 3.9–12.7)
WBC # BLD AUTO: 8.37 K/UL (ref 3.9–12.7)
WBC # BLD AUTO: 9.91 K/UL (ref 3.9–12.7)
WBC #/AREA URNS AUTO: 5 /HPF (ref 0–5)
WBC CLUMPS UR QL AUTO: ABNORMAL
WBC TOXIC VACUOLES BLD QL SMEAR: PRESENT
WBC TOXIC VACUOLES BLD QL SMEAR: PRESENT
YEAST UR QL AUTO: ABNORMAL

## 2020-01-01 PROCEDURE — 43752 NASAL/OROGASTRIC W/TUBE PLMT: CPT

## 2020-01-01 PROCEDURE — 84100 ASSAY OF PHOSPHORUS: CPT

## 2020-01-01 PROCEDURE — 87186 SC STD MICRODIL/AGAR DIL: CPT

## 2020-01-01 PROCEDURE — 83605 ASSAY OF LACTIC ACID: CPT

## 2020-01-01 PROCEDURE — 27000221 HC OXYGEN, UP TO 24 HOURS

## 2020-01-01 PROCEDURE — 51701 INSERT BLADDER CATHETER: CPT

## 2020-01-01 PROCEDURE — 63600175 PHARM REV CODE 636 W HCPCS: Performed by: NURSE PRACTITIONER

## 2020-01-01 PROCEDURE — 36620 ARTERIAL LINE: ICD-10-PCS | Mod: ,,, | Performed by: NURSE PRACTITIONER

## 2020-01-01 PROCEDURE — 37799 UNLISTED PX VASCULAR SURGERY: CPT

## 2020-01-01 PROCEDURE — 94761 N-INVAS EAR/PLS OXIMETRY MLT: CPT

## 2020-01-01 PROCEDURE — 25000003 PHARM REV CODE 250: Performed by: INTERNAL MEDICINE

## 2020-01-01 PROCEDURE — 86140 C-REACTIVE PROTEIN: CPT

## 2020-01-01 PROCEDURE — 94003 VENT MGMT INPAT SUBQ DAY: CPT

## 2020-01-01 PROCEDURE — 99291 CRITICAL CARE FIRST HOUR: CPT | Mod: 25,,, | Performed by: PHYSICIAN ASSISTANT

## 2020-01-01 PROCEDURE — 11000001 HC ACUTE MED/SURG PRIVATE ROOM

## 2020-01-01 PROCEDURE — 82803 BLOOD GASES ANY COMBINATION: CPT

## 2020-01-01 PROCEDURE — 99292 PR CRITICAL CARE, ADDL 30 MIN: ICD-10-PCS | Mod: 25,,, | Performed by: PHYSICIAN ASSISTANT

## 2020-01-01 PROCEDURE — 63600175 PHARM REV CODE 636 W HCPCS: Performed by: INTERNAL MEDICINE

## 2020-01-01 PROCEDURE — 99292 CRITICAL CARE ADDL 30 MIN: CPT | Mod: 25,,, | Performed by: PHYSICIAN ASSISTANT

## 2020-01-01 PROCEDURE — 99291 CRITICAL CARE FIRST HOUR: CPT | Mod: ,,, | Performed by: GENERAL ACUTE CARE HOSPITAL

## 2020-01-01 PROCEDURE — 80202 ASSAY OF VANCOMYCIN: CPT

## 2020-01-01 PROCEDURE — 93308 TTE F-UP OR LMTD: CPT

## 2020-01-01 PROCEDURE — 99233 SBSQ HOSP IP/OBS HIGH 50: CPT | Mod: ,,, | Performed by: INTERNAL MEDICINE

## 2020-01-01 PROCEDURE — 85730 THROMBOPLASTIN TIME PARTIAL: CPT | Mod: 91

## 2020-01-01 PROCEDURE — C1751 CATH, INF, PER/CENT/MIDLINE: HCPCS

## 2020-01-01 PROCEDURE — 85007 BL SMEAR W/DIFF WBC COUNT: CPT

## 2020-01-01 PROCEDURE — 84145 PROCALCITONIN (PCT): CPT

## 2020-01-01 PROCEDURE — 84295 ASSAY OF SERUM SODIUM: CPT

## 2020-01-01 PROCEDURE — 27200188 HC TRANSDUCER, ART ADULT/PEDS

## 2020-01-01 PROCEDURE — 85730 THROMBOPLASTIN TIME PARTIAL: CPT

## 2020-01-01 PROCEDURE — 27200966 HC CLOSED SUCTION SYSTEM

## 2020-01-01 PROCEDURE — 99233 PR SUBSEQUENT HOSPITAL CARE,LEVL III: ICD-10-PCS | Mod: ,,, | Performed by: INTERNAL MEDICINE

## 2020-01-01 PROCEDURE — 31500 INSERT EMERGENCY AIRWAY: CPT

## 2020-01-01 PROCEDURE — 97161 PT EVAL LOW COMPLEX 20 MIN: CPT

## 2020-01-01 PROCEDURE — 99291 PR CRITICAL CARE, E/M 30-74 MINUTES: ICD-10-PCS | Mod: 25,,, | Performed by: PHYSICIAN ASSISTANT

## 2020-01-01 PROCEDURE — 84132 ASSAY OF SERUM POTASSIUM: CPT

## 2020-01-01 PROCEDURE — 85027 COMPLETE CBC AUTOMATED: CPT

## 2020-01-01 PROCEDURE — 25000003 PHARM REV CODE 250: Performed by: NURSE PRACTITIONER

## 2020-01-01 PROCEDURE — 84484 ASSAY OF TROPONIN QUANT: CPT | Mod: 91

## 2020-01-01 PROCEDURE — 36620 INSERTION CATHETER ARTERY: CPT

## 2020-01-01 PROCEDURE — 82800 BLOOD PH: CPT

## 2020-01-01 PROCEDURE — 31500 INSERT EMERGENCY AIRWAY: CPT | Mod: 59,GC,, | Performed by: INTERNAL MEDICINE

## 2020-01-01 PROCEDURE — 83735 ASSAY OF MAGNESIUM: CPT

## 2020-01-01 PROCEDURE — 92610 EVALUATE SWALLOWING FUNCTION: CPT

## 2020-01-01 PROCEDURE — 36556 INSERT NON-TUNNEL CV CATH: CPT

## 2020-01-01 PROCEDURE — 25000003 PHARM REV CODE 250

## 2020-01-01 PROCEDURE — 20000000 HC ICU ROOM

## 2020-01-01 PROCEDURE — 99900035 HC TECH TIME PER 15 MIN (STAT)

## 2020-01-01 PROCEDURE — 82728 ASSAY OF FERRITIN: CPT

## 2020-01-01 PROCEDURE — 63600175 PHARM REV CODE 636 W HCPCS: Performed by: STUDENT IN AN ORGANIZED HEALTH CARE EDUCATION/TRAINING PROGRAM

## 2020-01-01 PROCEDURE — 80053 COMPREHEN METABOLIC PANEL: CPT

## 2020-01-01 PROCEDURE — 83605 ASSAY OF LACTIC ACID: CPT | Mod: 91

## 2020-01-01 PROCEDURE — 36600 WITHDRAWAL OF ARTERIAL BLOOD: CPT

## 2020-01-01 PROCEDURE — 87040 BLOOD CULTURE FOR BACTERIA: CPT

## 2020-01-01 PROCEDURE — 51702 INSERT TEMP BLADDER CATH: CPT

## 2020-01-01 PROCEDURE — 63600175 PHARM REV CODE 636 W HCPCS: Performed by: HOSPITALIST

## 2020-01-01 PROCEDURE — 63600175 PHARM REV CODE 636 W HCPCS: Performed by: PHYSICIAN ASSISTANT

## 2020-01-01 PROCEDURE — 99292 CRITICAL CARE ADDL 30 MIN: CPT | Mod: ,,, | Performed by: NURSE PRACTITIONER

## 2020-01-01 PROCEDURE — 82010 KETONE BODYS QUAN: CPT

## 2020-01-01 PROCEDURE — 87205 SMEAR GRAM STAIN: CPT

## 2020-01-01 PROCEDURE — 83880 ASSAY OF NATRIURETIC PEPTIDE: CPT

## 2020-01-01 PROCEDURE — 25000003 PHARM REV CODE 250: Performed by: PHYSICIAN ASSISTANT

## 2020-01-01 PROCEDURE — 99223 PR INITIAL HOSPITAL CARE,LEVL III: ICD-10-PCS | Mod: ,,, | Performed by: HOSPITALIST

## 2020-01-01 PROCEDURE — 83615 LACTATE (LD) (LDH) ENZYME: CPT

## 2020-01-01 PROCEDURE — 84484 ASSAY OF TROPONIN QUANT: CPT

## 2020-01-01 PROCEDURE — 97535 SELF CARE MNGMENT TRAINING: CPT

## 2020-01-01 PROCEDURE — 82330 ASSAY OF CALCIUM: CPT

## 2020-01-01 PROCEDURE — 97165 OT EVAL LOW COMPLEX 30 MIN: CPT

## 2020-01-01 PROCEDURE — 85007 BL SMEAR W/DIFF WBC COUNT: CPT | Mod: 91

## 2020-01-01 PROCEDURE — 85379 FIBRIN DEGRADATION QUANT: CPT

## 2020-01-01 PROCEDURE — 82550 ASSAY OF CK (CPK): CPT

## 2020-01-01 PROCEDURE — 83036 HEMOGLOBIN GLYCOSYLATED A1C: CPT

## 2020-01-01 PROCEDURE — 99900026 HC AIRWAY MAINTENANCE (STAT)

## 2020-01-01 PROCEDURE — 85025 COMPLETE CBC W/AUTO DIFF WBC: CPT

## 2020-01-01 PROCEDURE — 99291 CRITICAL CARE FIRST HOUR: CPT | Mod: 25,,, | Performed by: NURSE PRACTITIONER

## 2020-01-01 PROCEDURE — 92526 ORAL FUNCTION THERAPY: CPT

## 2020-01-01 PROCEDURE — 99291 PR CRITICAL CARE, E/M 30-74 MINUTES: ICD-10-PCS | Mod: ,,, | Performed by: NURSE PRACTITIONER

## 2020-01-01 PROCEDURE — 80053 COMPREHEN METABOLIC PANEL: CPT | Mod: 91

## 2020-01-01 PROCEDURE — 84100 ASSAY OF PHOSPHORUS: CPT | Mod: 91

## 2020-01-01 PROCEDURE — A4217 STERILE WATER/SALINE, 500 ML: HCPCS | Performed by: INTERNAL MEDICINE

## 2020-01-01 PROCEDURE — 87186 SC STD MICRODIL/AGAR DIL: CPT | Mod: 91

## 2020-01-01 PROCEDURE — 99223 1ST HOSP IP/OBS HIGH 75: CPT | Mod: ,,, | Performed by: HOSPITALIST

## 2020-01-01 PROCEDURE — 87077 CULTURE AEROBIC IDENTIFY: CPT

## 2020-01-01 PROCEDURE — 87106 FUNGI IDENTIFICATION YEAST: CPT

## 2020-01-01 PROCEDURE — 36620 INSERTION CATHETER ARTERY: CPT | Mod: 59,76,, | Performed by: NURSE PRACTITIONER

## 2020-01-01 PROCEDURE — 80048 BASIC METABOLIC PNL TOTAL CA: CPT

## 2020-01-01 PROCEDURE — 36556 PR INSERT NON-TUNNEL CV CATH 5+ YRS OLD: ICD-10-PCS | Mod: GC,,, | Performed by: INTERNAL MEDICINE

## 2020-01-01 PROCEDURE — 63600175 PHARM REV CODE 636 W HCPCS

## 2020-01-01 PROCEDURE — 36620 INSERTION CATHETER ARTERY: CPT | Mod: ,,, | Performed by: NURSE PRACTITIONER

## 2020-01-01 PROCEDURE — 36415 COLL VENOUS BLD VENIPUNCTURE: CPT

## 2020-01-01 PROCEDURE — S0028 INJECTION, FAMOTIDINE, 20 MG: HCPCS | Performed by: PHYSICIAN ASSISTANT

## 2020-01-01 PROCEDURE — 85652 RBC SED RATE AUTOMATED: CPT

## 2020-01-01 PROCEDURE — 94002 VENT MGMT INPAT INIT DAY: CPT

## 2020-01-01 PROCEDURE — 27202092 HC NEEDLE, EZ-IO

## 2020-01-01 PROCEDURE — 83735 ASSAY OF MAGNESIUM: CPT | Mod: 91

## 2020-01-01 PROCEDURE — 99291 CRITICAL CARE FIRST HOUR: CPT | Mod: ,,, | Performed by: NURSE PRACTITIONER

## 2020-01-01 PROCEDURE — 85610 PROTHROMBIN TIME: CPT

## 2020-01-01 PROCEDURE — 99292 PR CRITICAL CARE, ADDL 30 MIN: ICD-10-PCS | Mod: ,,, | Performed by: NURSE PRACTITIONER

## 2020-01-01 PROCEDURE — 36620 PR INSERT CATH,ART,PERCUT,SHORTTERM: ICD-10-PCS | Mod: 59,76,, | Performed by: NURSE PRACTITIONER

## 2020-01-01 PROCEDURE — 87077 CULTURE AEROBIC IDENTIFY: CPT | Mod: 59

## 2020-01-01 PROCEDURE — 99291 PR CRITICAL CARE, E/M 30-74 MINUTES: ICD-10-PCS | Mod: ,,, | Performed by: GENERAL ACUTE CARE HOSPITAL

## 2020-01-01 PROCEDURE — 36556 INSERT NON-TUNNEL CV CATH: CPT | Mod: GC,,, | Performed by: INTERNAL MEDICINE

## 2020-01-01 PROCEDURE — 85014 HEMATOCRIT: CPT

## 2020-01-01 PROCEDURE — 99291 PR CRITICAL CARE, E/M 30-74 MINUTES: ICD-10-PCS | Mod: 25,,, | Performed by: NURSE PRACTITIONER

## 2020-01-01 PROCEDURE — 87070 CULTURE OTHR SPECIMN AEROBIC: CPT

## 2020-01-01 PROCEDURE — 31720 CLEARANCE OF AIRWAYS: CPT

## 2020-01-01 PROCEDURE — 31500 PR INSERT, EMERGENCY ENDOTRACH AIRWAY: ICD-10-PCS | Mod: 59,GC,, | Performed by: INTERNAL MEDICINE

## 2020-01-01 PROCEDURE — 81001 URINALYSIS AUTO W/SCOPE: CPT

## 2020-01-01 RX ORDER — ALBUTEROL SULFATE 90 UG/1
2 AEROSOL, METERED RESPIRATORY (INHALATION) EVERY 6 HOURS PRN
Status: DISCONTINUED | OUTPATIENT
Start: 2020-01-01 | End: 2020-01-01

## 2020-01-01 RX ORDER — ACETAMINOPHEN 650 MG/1
650 SUPPOSITORY RECTAL EVERY 6 HOURS PRN
Status: DISCONTINUED | OUTPATIENT
Start: 2020-01-01 | End: 2020-01-01

## 2020-01-01 RX ORDER — NOREPINEPHRINE BITARTRATE/D5W 4MG/250ML
PLASTIC BAG, INJECTION (ML) INTRAVENOUS
Status: COMPLETED
Start: 2020-01-01 | End: 2020-01-01

## 2020-01-01 RX ORDER — SODIUM CHLORIDE 9 MG/ML
INJECTION, SOLUTION INTRAVENOUS CONTINUOUS
Status: DISCONTINUED | OUTPATIENT
Start: 2020-01-01 | End: 2020-01-01

## 2020-01-01 RX ORDER — SUCCINYLCHOLINE CHLORIDE 20 MG/ML
INJECTION INTRAMUSCULAR; INTRAVENOUS
Status: COMPLETED
Start: 2020-01-01 | End: 2020-01-01

## 2020-01-01 RX ORDER — AMLODIPINE BESYLATE 5 MG/1
5 TABLET ORAL DAILY
Status: DISCONTINUED | OUTPATIENT
Start: 2020-01-01 | End: 2020-01-01

## 2020-01-01 RX ORDER — LISINOPRIL 10 MG/1
10 TABLET ORAL ONCE
Status: DISCONTINUED | OUTPATIENT
Start: 2020-01-01 | End: 2020-01-01

## 2020-01-01 RX ORDER — INDOMETHACIN 25 MG/1
100 CAPSULE ORAL ONCE
Status: COMPLETED | OUTPATIENT
Start: 2020-01-01 | End: 2020-01-01

## 2020-01-01 RX ORDER — ETOMIDATE 2 MG/ML
10 INJECTION INTRAVENOUS ONCE
Status: COMPLETED | OUTPATIENT
Start: 2020-01-01 | End: 2020-01-01

## 2020-01-01 RX ORDER — DEXTROSE MONOHYDRATE 100 MG/ML
25 INJECTION, SOLUTION INTRAVENOUS
Status: DISCONTINUED | OUTPATIENT
Start: 2020-01-01 | End: 2020-01-01

## 2020-01-01 RX ORDER — FLUDROCORTISONE ACETATE 0.1 MG/1
100 TABLET ORAL DAILY
Status: DISCONTINUED | OUTPATIENT
Start: 2020-01-01 | End: 2020-01-01 | Stop reason: HOSPADM

## 2020-01-01 RX ORDER — SUCCINYLCHOLINE CHLORIDE 20 MG/ML
100 INJECTION INTRAMUSCULAR; INTRAVENOUS ONCE
Status: COMPLETED | OUTPATIENT
Start: 2020-01-01 | End: 2020-01-01

## 2020-01-01 RX ORDER — LISINOPRIL 20 MG/1
20 TABLET ORAL DAILY
Status: DISCONTINUED | OUTPATIENT
Start: 2020-01-01 | End: 2020-01-01

## 2020-01-01 RX ORDER — PHENYLEPHRINE HCL IN 0.9% NACL 1 MG/10 ML
SYRINGE (ML) INTRAVENOUS
Status: DISPENSED
Start: 2020-01-01 | End: 2020-01-01

## 2020-01-01 RX ORDER — FUROSEMIDE 10 MG/ML
40 INJECTION INTRAMUSCULAR; INTRAVENOUS ONCE
Status: COMPLETED | OUTPATIENT
Start: 2020-01-01 | End: 2020-01-01

## 2020-01-01 RX ORDER — POTASSIUM CHLORIDE 29.8 MG/ML
40 INJECTION INTRAVENOUS
Status: COMPLETED | OUTPATIENT
Start: 2020-01-01 | End: 2020-01-01

## 2020-01-01 RX ORDER — FUROSEMIDE 10 MG/ML
80 INJECTION INTRAMUSCULAR; INTRAVENOUS ONCE
Status: COMPLETED | OUTPATIENT
Start: 2020-01-01 | End: 2020-01-01

## 2020-01-01 RX ORDER — POTASSIUM CHLORIDE 7.45 MG/ML
10 INJECTION INTRAVENOUS
Status: DISPENSED | OUTPATIENT
Start: 2020-01-01 | End: 2020-01-01

## 2020-01-01 RX ORDER — INSULIN ASPART 100 [IU]/ML
0-5 INJECTION, SOLUTION INTRAVENOUS; SUBCUTANEOUS EVERY 6 HOURS PRN
Status: DISCONTINUED | OUTPATIENT
Start: 2020-01-01 | End: 2020-01-01

## 2020-01-01 RX ORDER — SODIUM CHLORIDE 0.9 % (FLUSH) 0.9 %
10 SYRINGE (ML) INJECTION
Status: DISCONTINUED | OUTPATIENT
Start: 2020-01-01 | End: 2020-01-01 | Stop reason: HOSPADM

## 2020-01-01 RX ORDER — PROPOFOL 10 MG/ML
INJECTION, EMULSION INTRAVENOUS
Status: DISPENSED
Start: 2020-01-01 | End: 2020-01-01

## 2020-01-01 RX ORDER — VASOPRESSIN 20 [USP'U]/ML
INJECTION, SOLUTION INTRAMUSCULAR; SUBCUTANEOUS
Status: DISPENSED
Start: 2020-01-01 | End: 2020-01-01

## 2020-01-01 RX ORDER — IBUPROFEN 200 MG
24 TABLET ORAL
Status: DISCONTINUED | OUTPATIENT
Start: 2020-01-01 | End: 2020-01-01

## 2020-01-01 RX ORDER — FAMOTIDINE 10 MG/ML
20 INJECTION INTRAVENOUS DAILY
Status: DISCONTINUED | OUTPATIENT
Start: 2020-01-01 | End: 2020-01-01 | Stop reason: HOSPADM

## 2020-01-01 RX ORDER — POTASSIUM CHLORIDE 29.8 MG/ML
40 INJECTION INTRAVENOUS
Status: DISCONTINUED | OUTPATIENT
Start: 2020-01-01 | End: 2020-01-01

## 2020-01-01 RX ORDER — POTASSIUM CHLORIDE 7.45 MG/ML
10 INJECTION INTRAVENOUS
Status: COMPLETED | OUTPATIENT
Start: 2020-01-01 | End: 2020-01-01

## 2020-01-01 RX ORDER — ETOMIDATE 2 MG/ML
INJECTION INTRAVENOUS
Status: COMPLETED
Start: 2020-01-01 | End: 2020-01-01

## 2020-01-01 RX ORDER — POTASSIUM CHLORIDE 14.9 MG/ML
40 INJECTION INTRAVENOUS
Status: DISCONTINUED | OUTPATIENT
Start: 2020-01-01 | End: 2020-01-01

## 2020-01-01 RX ORDER — HEPARIN SODIUM,PORCINE/D5W 25000/250
9 INTRAVENOUS SOLUTION INTRAVENOUS CONTINUOUS
Status: DISCONTINUED | OUTPATIENT
Start: 2020-01-01 | End: 2020-01-01 | Stop reason: HOSPADM

## 2020-01-01 RX ORDER — LISINOPRIL 5 MG/1
5 TABLET ORAL DAILY
Status: DISCONTINUED | OUTPATIENT
Start: 2020-01-01 | End: 2020-01-01

## 2020-01-01 RX ORDER — DEXMEDETOMIDINE HYDROCHLORIDE 4 UG/ML
0.2 INJECTION, SOLUTION INTRAVENOUS CONTINUOUS
Status: DISCONTINUED | OUTPATIENT
Start: 2020-01-01 | End: 2020-01-01

## 2020-01-01 RX ORDER — LISINOPRIL 10 MG/1
10 TABLET ORAL DAILY
Status: DISCONTINUED | OUTPATIENT
Start: 2020-01-01 | End: 2020-01-01

## 2020-01-01 RX ORDER — LABETALOL HCL 20 MG/4 ML
10 SYRINGE (ML) INTRAVENOUS ONCE
Status: COMPLETED | OUTPATIENT
Start: 2020-01-01 | End: 2020-01-01

## 2020-01-01 RX ORDER — IBUPROFEN 200 MG
16 TABLET ORAL
Status: DISCONTINUED | OUTPATIENT
Start: 2020-01-01 | End: 2020-01-01

## 2020-01-01 RX ORDER — AMLODIPINE BESYLATE 10 MG/1
10 TABLET ORAL DAILY
Status: DISCONTINUED | OUTPATIENT
Start: 2020-01-01 | End: 2020-01-01

## 2020-01-01 RX ORDER — FENTANYL CITRATE 50 UG/ML
50 INJECTION, SOLUTION INTRAMUSCULAR; INTRAVENOUS
Status: DISCONTINUED | OUTPATIENT
Start: 2020-01-01 | End: 2020-01-01 | Stop reason: HOSPADM

## 2020-01-01 RX ORDER — VANCOMYCIN HCL IN 5 % DEXTROSE 1G/250ML
1000 PLASTIC BAG, INJECTION (ML) INTRAVENOUS
Status: DISCONTINUED | OUTPATIENT
Start: 2020-01-01 | End: 2020-01-01 | Stop reason: HOSPADM

## 2020-01-01 RX ORDER — MAGNESIUM SULFATE HEPTAHYDRATE 40 MG/ML
2 INJECTION, SOLUTION INTRAVENOUS
Status: DISCONTINUED | OUTPATIENT
Start: 2020-01-01 | End: 2020-01-01

## 2020-01-01 RX ORDER — NOREPINEPHRINE BITARTRATE/D5W 4MG/250ML
0.02 PLASTIC BAG, INJECTION (ML) INTRAVENOUS CONTINUOUS
Status: DISCONTINUED | OUTPATIENT
Start: 2020-01-01 | End: 2020-01-01

## 2020-01-01 RX ORDER — INSULIN ASPART 100 [IU]/ML
0-5 INJECTION, SOLUTION INTRAVENOUS; SUBCUTANEOUS EVERY 6 HOURS PRN
Status: DISCONTINUED | OUTPATIENT
Start: 2020-01-01 | End: 2020-01-01 | Stop reason: HOSPADM

## 2020-01-01 RX ORDER — INDOMETHACIN 25 MG/1
CAPSULE ORAL
Status: COMPLETED
Start: 2020-01-01 | End: 2020-01-01

## 2020-01-01 RX ORDER — ENOXAPARIN SODIUM 100 MG/ML
40 INJECTION SUBCUTANEOUS EVERY 24 HOURS
Status: DISCONTINUED | OUTPATIENT
Start: 2020-01-01 | End: 2020-01-01

## 2020-01-01 RX ORDER — FENTANYL CITRATE 50 UG/ML
50 INJECTION, SOLUTION INTRAMUSCULAR; INTRAVENOUS
Status: DISPENSED | OUTPATIENT
Start: 2020-01-01 | End: 2020-01-01

## 2020-01-01 RX ORDER — CHLORHEXIDINE GLUCONATE ORAL RINSE 1.2 MG/ML
15 SOLUTION DENTAL 2 TIMES DAILY
Status: DISCONTINUED | OUTPATIENT
Start: 2020-01-01 | End: 2020-01-01

## 2020-01-01 RX ORDER — FENTANYL CITRATE 50 UG/ML
INJECTION, SOLUTION INTRAMUSCULAR; INTRAVENOUS
Status: COMPLETED
Start: 2020-01-01 | End: 2020-01-01

## 2020-01-01 RX ORDER — DEXTROSE MONOHYDRATE 50 MG/ML
INJECTION, SOLUTION INTRAVENOUS CONTINUOUS
Status: DISCONTINUED | OUTPATIENT
Start: 2020-01-01 | End: 2020-01-01

## 2020-01-01 RX ORDER — ROCURONIUM BROMIDE 10 MG/ML
INJECTION, SOLUTION INTRAVENOUS
Status: DISPENSED
Start: 2020-01-01 | End: 2020-01-01

## 2020-01-01 RX ORDER — LABETALOL HCL 20 MG/4 ML
10 SYRINGE (ML) INTRAVENOUS EVERY 4 HOURS PRN
Status: DISCONTINUED | OUTPATIENT
Start: 2020-01-01 | End: 2020-01-01

## 2020-01-01 RX ORDER — DEXTROSE MONOHYDRATE 100 MG/ML
12.5 INJECTION, SOLUTION INTRAVENOUS
Status: DISCONTINUED | OUTPATIENT
Start: 2020-01-01 | End: 2020-01-01

## 2020-01-01 RX ORDER — INSULIN ASPART 100 [IU]/ML
1-10 INJECTION, SOLUTION INTRAVENOUS; SUBCUTANEOUS EVERY 6 HOURS PRN
Status: DISCONTINUED | OUTPATIENT
Start: 2020-01-01 | End: 2020-01-01

## 2020-01-01 RX ORDER — ACETAMINOPHEN 650 MG/20.3ML
650 LIQUID ORAL EVERY 6 HOURS PRN
Status: DISCONTINUED | OUTPATIENT
Start: 2020-01-01 | End: 2020-01-01 | Stop reason: HOSPADM

## 2020-01-01 RX ORDER — GLUCAGON 1 MG
1 KIT INJECTION
Status: DISCONTINUED | OUTPATIENT
Start: 2020-01-01 | End: 2020-01-01

## 2020-01-01 RX ORDER — GLUCAGON 1 MG
1 KIT INJECTION
Status: DISCONTINUED | OUTPATIENT
Start: 2020-01-01 | End: 2020-01-01 | Stop reason: HOSPADM

## 2020-01-01 RX ORDER — LISINOPRIL 10 MG/1
10 TABLET ORAL ONCE
Status: COMPLETED | OUTPATIENT
Start: 2020-01-01 | End: 2020-01-01

## 2020-01-01 RX ORDER — MAGNESIUM SULFATE HEPTAHYDRATE 40 MG/ML
2 INJECTION, SOLUTION INTRAVENOUS ONCE
Status: COMPLETED | OUTPATIENT
Start: 2020-01-01 | End: 2020-01-01

## 2020-01-01 RX ORDER — MAGNESIUM SULFATE HEPTAHYDRATE 40 MG/ML
4 INJECTION, SOLUTION INTRAVENOUS
Status: DISCONTINUED | OUTPATIENT
Start: 2020-01-01 | End: 2020-01-01

## 2020-01-01 RX ADMIN — SODIUM BICARBONATE: 84 INJECTION, SOLUTION INTRAVENOUS at 03:06

## 2020-01-01 RX ADMIN — VASOPRESSIN 0.04 UNITS/MIN: 20 INJECTION INTRAVENOUS at 04:06

## 2020-01-01 RX ADMIN — NOREPINEPHRINE BITARTRATE 0.7 MCG/KG/MIN: 1 INJECTION, SOLUTION, CONCENTRATE INTRAVENOUS at 09:06

## 2020-01-01 RX ADMIN — EPINEPHRINE 0.02 MCG/KG/MIN: 1 INJECTION INTRAMUSCULAR; INTRAVENOUS; SUBCUTANEOUS at 12:06

## 2020-01-01 RX ADMIN — INDOMETHACIN 100 MEQ: 25 CAPSULE ORAL at 05:06

## 2020-01-01 RX ADMIN — LABETALOL HYDROCHLORIDE 10 MG: 5 INJECTION, SOLUTION INTRAVENOUS at 09:06

## 2020-01-01 RX ADMIN — EPINEPHRINE 0.38 MCG/KG/MIN: 1 INJECTION INTRAMUSCULAR; INTRAVENOUS; SUBCUTANEOUS at 10:06

## 2020-01-01 RX ADMIN — EPINEPHRINE 0.7 MCG/KG/MIN: 1 INJECTION INTRAMUSCULAR; INTRAVENOUS; SUBCUTANEOUS at 07:06

## 2020-01-01 RX ADMIN — PIPERACILLIN AND TAZOBACTAM 4.5 G: 4; .5 INJECTION, POWDER, LYOPHILIZED, FOR SOLUTION INTRAVENOUS; PARENTERAL at 09:06

## 2020-01-01 RX ADMIN — SODIUM CHLORIDE 4 UNITS/HR: 9 INJECTION, SOLUTION INTRAVENOUS at 01:06

## 2020-01-01 RX ADMIN — SODIUM CHLORIDE, SODIUM LACTATE, POTASSIUM CHLORIDE, AND CALCIUM CHLORIDE 1000 ML: .6; .31; .03; .02 INJECTION, SOLUTION INTRAVENOUS at 06:06

## 2020-01-01 RX ADMIN — DEXMEDETOMIDINE HYDROCHLORIDE 1.4 MCG/KG/HR: 4 INJECTION, SOLUTION INTRAVENOUS at 03:06

## 2020-01-01 RX ADMIN — MAGNESIUM SULFATE IN WATER 2 G: 40 INJECTION, SOLUTION INTRAVENOUS at 04:06

## 2020-01-01 RX ADMIN — Medication 0.02 MCG: at 10:06

## 2020-01-01 RX ADMIN — FAMOTIDINE 20 MG: 10 INJECTION INTRAVENOUS at 08:06

## 2020-01-01 RX ADMIN — SUCCINYLCHOLINE CHLORIDE 100 MG: 20 INJECTION INTRAMUSCULAR; INTRAVENOUS at 11:06

## 2020-01-01 RX ADMIN — DEXMEDETOMIDINE HYDROCHLORIDE 1.4 MCG/KG/HR: 4 INJECTION, SOLUTION INTRAVENOUS at 06:06

## 2020-01-01 RX ADMIN — INSULIN ASPART 5 UNITS: 100 INJECTION, SOLUTION INTRAVENOUS; SUBCUTANEOUS at 11:06

## 2020-01-01 RX ADMIN — NOREPINEPHRINE BITARTRATE 0.7 MCG/KG/MIN: 1 INJECTION, SOLUTION, CONCENTRATE INTRAVENOUS at 02:06

## 2020-01-01 RX ADMIN — ETOMIDATE 10 MG: 2 INJECTION INTRAVENOUS at 11:06

## 2020-01-01 RX ADMIN — PIPERACILLIN AND TAZOBACTAM 4.5 G: 4; .5 INJECTION, POWDER, LYOPHILIZED, FOR SOLUTION INTRAVENOUS; PARENTERAL at 02:06

## 2020-01-01 RX ADMIN — Medication 0.02 MCG: at 11:06

## 2020-01-01 RX ADMIN — SODIUM PHOSPHATE, MONOBASIC, MONOHYDRATE 39.99 MMOL: 276; 142 INJECTION, SOLUTION INTRAVENOUS at 08:06

## 2020-01-01 RX ADMIN — HYDROCORTISONE SODIUM SUCCINATE 100 MG: 100 INJECTION, POWDER, FOR SOLUTION INTRAMUSCULAR; INTRAVENOUS at 06:06

## 2020-01-01 RX ADMIN — NOREPINEPHRINE BITARTRATE 0.7 MCG/KG/MIN: 1 INJECTION, SOLUTION, CONCENTRATE INTRAVENOUS at 06:06

## 2020-01-01 RX ADMIN — EPINEPHRINE 0.28 MCG/KG/MIN: 1 INJECTION INTRAMUSCULAR; INTRAVENOUS; SUBCUTANEOUS at 05:06

## 2020-01-01 RX ADMIN — DEXMEDETOMIDINE HYDROCHLORIDE 1.4 MCG/KG/HR: 4 INJECTION, SOLUTION INTRAVENOUS at 10:06

## 2020-01-01 RX ADMIN — VASOPRESSIN 0.04 UNITS/MIN: 20 INJECTION INTRAVENOUS at 06:06

## 2020-01-01 RX ADMIN — VANCOMYCIN HYDROCHLORIDE 1750 MG: 750 INJECTION, POWDER, LYOPHILIZED, FOR SOLUTION INTRAVENOUS at 10:06

## 2020-01-01 RX ADMIN — SODIUM CHLORIDE: 0.9 INJECTION, SOLUTION INTRAVENOUS at 06:06

## 2020-01-01 RX ADMIN — SODIUM CHLORIDE 3.5 UNITS/HR: 9 INJECTION, SOLUTION INTRAVENOUS at 06:06

## 2020-01-01 RX ADMIN — HYDROCORTISONE SODIUM SUCCINATE 100 MG: 100 INJECTION, POWDER, FOR SOLUTION INTRAMUSCULAR; INTRAVENOUS at 02:06

## 2020-01-01 RX ADMIN — NOREPINEPHRINE BITARTRATE 0.5 MCG/KG/MIN: 1 INJECTION, SOLUTION, CONCENTRATE INTRAVENOUS at 05:06

## 2020-01-01 RX ADMIN — EPINEPHRINE 0.38 MCG/KG/MIN: 1 INJECTION INTRAMUSCULAR; INTRAVENOUS; SUBCUTANEOUS at 08:06

## 2020-01-01 RX ADMIN — POTASSIUM CHLORIDE 40 MEQ: 400 INJECTION, SOLUTION INTRAVENOUS at 04:06

## 2020-01-01 RX ADMIN — EPINEPHRINE 0.02 MCG/KG/MIN: 1 INJECTION INTRAMUSCULAR; INTRAVENOUS; SUBCUTANEOUS at 05:06

## 2020-01-01 RX ADMIN — NOREPINEPHRINE BITARTRATE 1 MCG/KG/MIN: 1 INJECTION, SOLUTION, CONCENTRATE INTRAVENOUS at 06:06

## 2020-01-01 RX ADMIN — AMLODIPINE BESYLATE 5 MG: 5 TABLET ORAL at 09:06

## 2020-01-01 RX ADMIN — SODIUM BICARBONATE: 84 INJECTION, SOLUTION INTRAVENOUS at 10:06

## 2020-01-01 RX ADMIN — DEXMEDETOMIDINE HYDROCHLORIDE 1.4 MCG/KG/HR: 4 INJECTION, SOLUTION INTRAVENOUS at 08:06

## 2020-01-01 RX ADMIN — AMIODARONE HYDROCHLORIDE 0.5 MG/MIN: 1.8 INJECTION, SOLUTION INTRAVENOUS at 04:06

## 2020-01-01 RX ADMIN — NOREPINEPHRINE BITARTRATE 1 MCG/KG/MIN: 1 INJECTION, SOLUTION, CONCENTRATE INTRAVENOUS at 09:06

## 2020-01-01 RX ADMIN — HEPARIN SODIUM AND DEXTROSE 4 UNITS/KG/HR: 10000; 5 INJECTION INTRAVENOUS at 04:06

## 2020-01-01 RX ADMIN — SODIUM CHLORIDE 1000 ML: 0.9 INJECTION, SOLUTION INTRAVENOUS at 09:06

## 2020-01-01 RX ADMIN — EPINEPHRINE 0.28 MCG/KG/MIN: 1 INJECTION INTRAMUSCULAR; INTRAVENOUS; SUBCUTANEOUS at 12:06

## 2020-01-01 RX ADMIN — SODIUM BICARBONATE: 84 INJECTION, SOLUTION INTRAVENOUS at 05:06

## 2020-01-01 RX ADMIN — AMIODARONE HYDROCHLORIDE 360 MG: 1.8 INJECTION, SOLUTION INTRAVENOUS at 09:06

## 2020-01-01 RX ADMIN — EPINEPHRINE 0.38 MCG/KG/MIN: 1 INJECTION INTRAMUSCULAR; INTRAVENOUS; SUBCUTANEOUS at 06:06

## 2020-01-01 RX ADMIN — DEXMEDETOMIDINE HYDROCHLORIDE 0.2 MCG/KG/HR: 4 INJECTION, SOLUTION INTRAVENOUS at 12:06

## 2020-01-01 RX ADMIN — NOREPINEPHRINE BITARTRATE 1.1 MCG/KG/MIN: 1 INJECTION, SOLUTION, CONCENTRATE INTRAVENOUS at 03:06

## 2020-01-01 RX ADMIN — AMIODARONE HYDROCHLORIDE 0.5 MG/MIN: 1.8 INJECTION, SOLUTION INTRAVENOUS at 05:06

## 2020-01-01 RX ADMIN — EPINEPHRINE 0.8 MCG/KG/MIN: 1 INJECTION INTRAMUSCULAR; INTRAVENOUS; SUBCUTANEOUS at 07:06

## 2020-01-01 RX ADMIN — EPINEPHRINE 0.8 MCG/KG/MIN: 1 INJECTION INTRAMUSCULAR; INTRAVENOUS; SUBCUTANEOUS at 05:06

## 2020-01-01 RX ADMIN — POTASSIUM CHLORIDE 10 MEQ: 10 INJECTION, SOLUTION INTRAVENOUS at 03:06

## 2020-01-01 RX ADMIN — DEXMEDETOMIDINE HYDROCHLORIDE 1.4 MCG/KG/HR: 4 INJECTION, SOLUTION INTRAVENOUS at 01:06

## 2020-01-01 RX ADMIN — Medication 0.02 MCG: at 12:06

## 2020-01-01 RX ADMIN — POTASSIUM CHLORIDE 10 MEQ: 10 INJECTION, SOLUTION INTRAVENOUS at 04:06

## 2020-01-01 RX ADMIN — ENOXAPARIN SODIUM 40 MG: 100 INJECTION SUBCUTANEOUS at 05:06

## 2020-01-01 RX ADMIN — NOREPINEPHRINE BITARTRATE 0.8 MCG/KG/MIN: 1 INJECTION, SOLUTION, CONCENTRATE INTRAVENOUS at 05:06

## 2020-01-01 RX ADMIN — VASOPRESSIN 0.04 UNITS/MIN: 20 INJECTION INTRAVENOUS at 11:06

## 2020-01-01 RX ADMIN — SODIUM CHLORIDE 13 UNITS/HR: 9 INJECTION, SOLUTION INTRAVENOUS at 11:06

## 2020-01-01 RX ADMIN — NOREPINEPHRINE BITARTRATE 1.2 MCG/KG/MIN: 1 INJECTION, SOLUTION, CONCENTRATE INTRAVENOUS at 09:06

## 2020-01-01 RX ADMIN — ETOMIDATE 10 MG: 40 INJECTION, SOLUTION INTRAVENOUS at 11:06

## 2020-01-01 RX ADMIN — POTASSIUM CHLORIDE 10 MEQ: 10 INJECTION, SOLUTION INTRAVENOUS at 01:06

## 2020-01-01 RX ADMIN — POTASSIUM CHLORIDE 10 MEQ: 7.46 INJECTION, SOLUTION INTRAVENOUS at 11:06

## 2020-01-01 RX ADMIN — FLUDROCORTISONE ACETATE 100 MCG: 0.1 TABLET ORAL at 09:06

## 2020-01-01 RX ADMIN — AMIODARONE HYDROCHLORIDE 1 MG/MIN: 1.8 INJECTION, SOLUTION INTRAVENOUS at 03:06

## 2020-01-01 RX ADMIN — FLUDROCORTISONE ACETATE 100 MCG: 0.1 TABLET ORAL at 08:06

## 2020-01-01 RX ADMIN — DEXMEDETOMIDINE HYDROCHLORIDE 1.4 MCG/KG/HR: 4 INJECTION, SOLUTION INTRAVENOUS at 12:06

## 2020-01-01 RX ADMIN — FENTANYL CITRATE 50 MCG: 50 INJECTION INTRAMUSCULAR; INTRAVENOUS at 03:06

## 2020-01-01 RX ADMIN — LABETALOL HYDROCHLORIDE 10 MG: 5 INJECTION, SOLUTION INTRAVENOUS at 12:06

## 2020-01-01 RX ADMIN — AMIODARONE HYDROCHLORIDE 0.5 MG/MIN: 1.8 INJECTION, SOLUTION INTRAVENOUS at 06:06

## 2020-01-01 RX ADMIN — EPINEPHRINE 0.7 MCG/KG/MIN: 1 INJECTION INTRAMUSCULAR; INTRAVENOUS; SUBCUTANEOUS at 04:06

## 2020-01-01 RX ADMIN — SUCCINYLCHOLINE CHLORIDE 100 MG: 20 INJECTION, SOLUTION INTRAMUSCULAR; INTRAVENOUS; PARENTERAL at 11:06

## 2020-01-01 RX ADMIN — FENTANYL CITRATE 50 MCG: 50 INJECTION INTRAMUSCULAR; INTRAVENOUS at 02:06

## 2020-01-01 RX ADMIN — POTASSIUM CHLORIDE 10 MEQ: 7.46 INJECTION, SOLUTION INTRAVENOUS at 08:06

## 2020-01-01 RX ADMIN — NOREPINEPHRINE BITARTRATE 1.1 MCG/KG/MIN: 1 INJECTION, SOLUTION, CONCENTRATE INTRAVENOUS at 12:06

## 2020-01-01 RX ADMIN — NOREPINEPHRINE BITARTRATE 0.8 MCG/KG/MIN: 1 INJECTION, SOLUTION, CONCENTRATE INTRAVENOUS at 09:06

## 2020-01-01 RX ADMIN — AMLODIPINE BESYLATE 5 MG: 5 TABLET ORAL at 06:06

## 2020-01-01 RX ADMIN — SODIUM PHOSPHATE, MONOBASIC, MONOHYDRATE 30 MMOL: 276; 142 INJECTION, SOLUTION INTRAVENOUS at 12:06

## 2020-01-01 RX ADMIN — NOREPINEPHRINE BITARTRATE 0.8 MCG/KG/MIN: 1 INJECTION, SOLUTION, CONCENTRATE INTRAVENOUS at 04:06

## 2020-01-01 RX ADMIN — FUROSEMIDE 40 MG: 10 INJECTION, SOLUTION INTRAMUSCULAR; INTRAVENOUS at 01:06

## 2020-01-01 RX ADMIN — DEXMEDETOMIDINE HYDROCHLORIDE 0.2 MCG/KG/HR: 4 INJECTION, SOLUTION INTRAVENOUS at 02:06

## 2020-01-01 RX ADMIN — VANCOMYCIN HYDROCHLORIDE 1500 MG: 1.5 INJECTION, POWDER, LYOPHILIZED, FOR SOLUTION INTRAVENOUS at 08:06

## 2020-01-01 RX ADMIN — PIPERACILLIN AND TAZOBACTAM 4.5 G: 4; .5 INJECTION, POWDER, LYOPHILIZED, FOR SOLUTION INTRAVENOUS; PARENTERAL at 06:06

## 2020-01-01 RX ADMIN — NOREPINEPHRINE BITARTRATE 1.2 MCG/KG/MIN: 1 INJECTION, SOLUTION, CONCENTRATE INTRAVENOUS at 06:06

## 2020-01-01 RX ADMIN — NOREPINEPHRINE BITARTRATE 1.2 MCG/KG/MIN: 1 INJECTION, SOLUTION, CONCENTRATE INTRAVENOUS at 04:06

## 2020-01-01 RX ADMIN — INSULIN ASPART 10 UNITS: 100 INJECTION, SOLUTION INTRAVENOUS; SUBCUTANEOUS at 04:06

## 2020-01-01 RX ADMIN — NOREPINEPHRINE BITARTRATE 1.2 MCG/KG/MIN: 1 INJECTION, SOLUTION, CONCENTRATE INTRAVENOUS at 11:06

## 2020-01-01 RX ADMIN — DEXTROSE MONOHYDRATE 1000 MG: 50 INJECTION, SOLUTION INTRAVENOUS at 12:06

## 2020-01-01 RX ADMIN — VASOPRESSIN 0.04 UNITS/MIN: 20 INJECTION INTRAVENOUS at 07:06

## 2020-01-01 RX ADMIN — AMIODARONE HYDROCHLORIDE 0.5 MG/MIN: 1.8 INJECTION, SOLUTION INTRAVENOUS at 07:06

## 2020-01-01 RX ADMIN — LISINOPRIL 5 MG: 5 TABLET ORAL at 11:06

## 2020-01-01 RX ADMIN — ANGIOTENSIN II 40 NG/KG/MIN: 2.5 INJECTION INTRAVENOUS at 02:06

## 2020-01-01 RX ADMIN — SODIUM CHLORIDE 1000 ML: 0.9 INJECTION, SOLUTION INTRAVENOUS at 10:06

## 2020-01-01 RX ADMIN — EPINEPHRINE 0.38 MCG/KG/MIN: 1 INJECTION INTRAMUSCULAR; INTRAVENOUS; SUBCUTANEOUS at 03:06

## 2020-01-01 RX ADMIN — POTASSIUM CHLORIDE 40 MEQ: 400 INJECTION, SOLUTION INTRAVENOUS at 08:06

## 2020-01-01 RX ADMIN — HYDROCORTISONE SODIUM SUCCINATE 100 MG: 100 INJECTION, POWDER, FOR SOLUTION INTRAMUSCULAR; INTRAVENOUS at 05:06

## 2020-01-01 RX ADMIN — EPINEPHRINE 0.7 MCG/KG/MIN: 1 INJECTION INTRAMUSCULAR; INTRAVENOUS; SUBCUTANEOUS at 02:06

## 2020-01-01 RX ADMIN — ENOXAPARIN SODIUM 40 MG: 100 INJECTION SUBCUTANEOUS at 04:06

## 2020-01-01 RX ADMIN — VASOPRESSIN 0.04 UNITS/MIN: 20 INJECTION INTRAVENOUS at 01:06

## 2020-01-01 RX ADMIN — HYDROCORTISONE SODIUM SUCCINATE 100 MG: 100 INJECTION, POWDER, FOR SOLUTION INTRAMUSCULAR; INTRAVENOUS at 09:06

## 2020-01-01 RX ADMIN — NOREPINEPHRINE BITARTRATE 0.02 MCG/KG/MIN: 1 INJECTION, SOLUTION, CONCENTRATE INTRAVENOUS at 01:06

## 2020-01-01 RX ADMIN — AMIODARONE HYDROCHLORIDE 0.5 MG/MIN: 1.8 INJECTION, SOLUTION INTRAVENOUS at 09:06

## 2020-01-01 RX ADMIN — ACETAMINOPHEN 650 MG: 160 SOLUTION ORAL at 08:06

## 2020-01-01 RX ADMIN — FAMOTIDINE 20 MG: 10 INJECTION INTRAVENOUS at 09:06

## 2020-01-01 RX ADMIN — SODIUM BICARBONATE: 84 INJECTION, SOLUTION INTRAVENOUS at 04:06

## 2020-01-01 RX ADMIN — HYDROCORTISONE SODIUM SUCCINATE 100 MG: 100 INJECTION, POWDER, FOR SOLUTION INTRAMUSCULAR; INTRAVENOUS at 03:06

## 2020-01-01 RX ADMIN — HYDROCORTISONE SODIUM SUCCINATE 100 MG: 100 INJECTION, POWDER, FOR SOLUTION INTRAMUSCULAR; INTRAVENOUS at 10:06

## 2020-01-01 RX ADMIN — POTASSIUM CHLORIDE 40 MEQ: 29.8 INJECTION, SOLUTION INTRAVENOUS at 09:06

## 2020-01-01 RX ADMIN — POTASSIUM CHLORIDE 40 MEQ: 400 INJECTION, SOLUTION INTRAVENOUS at 12:06

## 2020-01-01 RX ADMIN — PIPERACILLIN AND TAZOBACTAM 4.5 G: 4; .5 INJECTION, POWDER, LYOPHILIZED, FOR SOLUTION INTRAVENOUS; PARENTERAL at 10:06

## 2020-01-01 RX ADMIN — MAGNESIUM SULFATE IN WATER 4 G: 40 INJECTION, SOLUTION INTRAVENOUS at 09:06

## 2020-01-01 RX ADMIN — VASOPRESSIN 0.04 UNITS/MIN: 20 INJECTION INTRAVENOUS at 03:06

## 2020-01-01 RX ADMIN — SODIUM BICARBONATE: 84 INJECTION, SOLUTION INTRAVENOUS at 08:06

## 2020-01-01 RX ADMIN — LISINOPRIL 10 MG: 10 TABLET ORAL at 06:06

## 2020-01-01 RX ADMIN — HEPARIN SODIUM AND DEXTROSE 12 UNITS/KG/HR: 10000; 5 INJECTION INTRAVENOUS at 06:06

## 2020-01-01 RX ADMIN — POTASSIUM CHLORIDE 10 MEQ: 10 INJECTION, SOLUTION INTRAVENOUS at 02:06

## 2020-01-01 RX ADMIN — VANCOMYCIN HYDROCHLORIDE 2000 MG: 100 INJECTION, POWDER, LYOPHILIZED, FOR SOLUTION INTRAVENOUS at 08:06

## 2020-01-01 RX ADMIN — SODIUM BICARBONATE 100 MEQ: 84 INJECTION, SOLUTION INTRAVENOUS at 05:06

## 2020-01-01 RX ADMIN — NOREPINEPHRINE BITARTRATE 0.5 MCG/KG/MIN: 1 INJECTION, SOLUTION, CONCENTRATE INTRAVENOUS at 08:06

## 2020-01-01 RX ADMIN — PIPERACILLIN AND TAZOBACTAM 4.5 G: 4; .5 INJECTION, POWDER, LYOPHILIZED, FOR SOLUTION INTRAVENOUS; PARENTERAL at 05:06

## 2020-01-01 RX ADMIN — AMIODARONE HYDROCHLORIDE 150 MG: 1.5 INJECTION, SOLUTION INTRAVENOUS at 10:06

## 2020-01-01 RX ADMIN — LABETALOL HYDROCHLORIDE 5 MG: 5 INJECTION, SOLUTION INTRAVENOUS at 09:06

## 2020-01-01 RX ADMIN — SODIUM PHOSPHATE, MONOBASIC, MONOHYDRATE 20.01 MMOL: 276; 142 INJECTION, SOLUTION INTRAVENOUS at 09:06

## 2020-01-01 RX ADMIN — FUROSEMIDE 80 MG: 10 INJECTION, SOLUTION INTRAMUSCULAR; INTRAVENOUS at 03:06

## 2020-01-01 RX ADMIN — EPINEPHRINE 0.28 MCG/KG/MIN: 1 INJECTION INTRAMUSCULAR; INTRAVENOUS; SUBCUTANEOUS at 03:06

## 2020-01-01 RX ADMIN — FAMOTIDINE 20 MG: 10 INJECTION INTRAVENOUS at 12:06

## 2020-01-01 RX ADMIN — NOREPINEPHRINE BITARTRATE 0.8 MCG/KG/MIN: 1 INJECTION, SOLUTION, CONCENTRATE INTRAVENOUS at 08:06

## 2020-01-01 RX ADMIN — DEXTROSE: 5 SOLUTION INTRAVENOUS at 04:06

## 2020-01-01 RX ADMIN — EPINEPHRINE 0.8 MCG/KG/MIN: 1 INJECTION INTRAMUSCULAR; INTRAVENOUS; SUBCUTANEOUS at 12:06

## 2020-01-01 RX ADMIN — ACETAMINOPHEN 650 MG: 160 SOLUTION ORAL at 11:06

## 2020-01-01 RX ADMIN — HYDROCORTISONE SODIUM SUCCINATE 100 MG: 100 INJECTION, POWDER, FOR SOLUTION INTRAMUSCULAR; INTRAVENOUS at 12:06

## 2020-01-01 RX ADMIN — EPINEPHRINE 0.7 MCG/KG/MIN: 1 INJECTION INTRAMUSCULAR; INTRAVENOUS; SUBCUTANEOUS at 10:06

## 2020-01-01 RX ADMIN — EPINEPHRINE 0.28 MCG/KG/MIN: 1 INJECTION INTRAMUSCULAR; INTRAVENOUS; SUBCUTANEOUS at 11:06

## 2020-01-01 RX ADMIN — Medication 0.04 MCG/KG/MIN: at 11:06

## 2020-01-01 RX ADMIN — MAGNESIUM SULFATE HEPTAHYDRATE 1 G: 500 INJECTION, SOLUTION INTRAMUSCULAR; INTRAVENOUS at 08:06

## 2020-01-01 RX ADMIN — AMIODARONE HYDROCHLORIDE: 1.8 INJECTION, SOLUTION INTRAVENOUS at 11:06

## 2020-01-01 NOTE — NURSING
Per Dr. Hess, pt neck circumference measurements for the q1x4 monitorin: 39 1/2 cm  1000: 39 1/2 cm  1100: 39 1/2 cm  1200 39 1/2 cm   Yes

## 2020-06-04 PROBLEM — U07.1 COVID-19: Status: ACTIVE | Noted: 2020-01-01

## 2020-06-05 PROBLEM — L89.153 DECUBITUS ULCER OF COCCYGEAL REGION, STAGE 3: Status: ACTIVE | Noted: 2020-01-01

## 2020-06-05 PROBLEM — R13.12 OROPHARYNGEAL DYSPHAGIA: Status: ACTIVE | Noted: 2018-04-27

## 2020-06-05 PROBLEM — N17.9 AKI (ACUTE KIDNEY INJURY): Status: RESOLVED | Noted: 2018-05-16 | Resolved: 2020-01-01

## 2020-06-05 PROBLEM — J69.0 ASPIRATION PNEUMONITIS: Status: RESOLVED | Noted: 2018-05-14 | Resolved: 2020-01-01

## 2020-06-05 PROBLEM — G96.00 CSF LEAK: Status: RESOLVED | Noted: 2018-04-30 | Resolved: 2020-01-01

## 2020-06-05 PROBLEM — N39.0 UTI (URINARY TRACT INFECTION): Status: RESOLVED | Noted: 2018-05-18 | Resolved: 2020-01-01

## 2020-06-05 PROBLEM — J69.0 ASPIRATION PNEUMONIA OF BOTH LOWER LOBES DUE TO GASTRIC SECRETIONS: Status: RESOLVED | Noted: 2018-04-27 | Resolved: 2020-01-01

## 2020-06-05 PROBLEM — R41.0 DELIRIUM: Status: RESOLVED | Noted: 2018-04-27 | Resolved: 2020-01-01

## 2020-06-05 PROBLEM — E86.0 DEHYDRATION: Status: ACTIVE | Noted: 2020-01-01

## 2020-06-05 PROBLEM — R00.0 TACHYCARDIA: Status: RESOLVED | Noted: 2018-04-19 | Resolved: 2020-01-01

## 2020-06-05 NOTE — PROGRESS NOTES
[facility] - Respiratory ICU  Hospital Medicine  Progress Note    Patient Name: Hollis Pitts  MRN: [id number]  Patient Class: IP- Inpatient   Admission Date: 6/4/2020  Length of Stay: 1 days  Attending Physician: Sallie Hernandez MD  Primary Care Provider: Vanessa Fontaine NP        Subjective:     Principal Problem:COVID-19        HPI:  85-year-old male with right hemiplegia and dysphagia from traumatic head injury transferred from [place] ED for admission after he was found to be positive for COVID-19.  He is not able to provide much helpful history due to memory difficulties but according to ED notes he was sent to the ED by his primary care physician due to difficulty swallowing and dehydration resulting from that.  He does not know why he is here, but acknowledges that he is in a healthcare setting by asking, Am I real sick?  He denies having any complaints at this time other than being thirsty and wanting some water.    Overview/Hospital Course:  Patient admitted to hospital medicine 6/5    Interval History: Patient reports he is doing well this morning, though he was unaware he was in the hospital. He has some slight cough which he indicates is normal for him.    Review of Systems   Unable to perform ROS: Mental status change   Constitutional: Negative for appetite change, chills and fever.   Respiratory: Positive for cough. Negative for shortness of breath.    Cardiovascular: Negative for chest pain and palpitations.   Gastrointestinal: Negative for abdominal pain, diarrhea, nausea and vomiting.   Musculoskeletal: Negative for arthralgias and myalgias.   Neurological: Negative for dizziness, syncope and light-headedness.     Objective:     Vital Signs (Most Recent):  Temp: 97.7 °F (36.5 °C) (06/05/20 1631)  Pulse: 86 (06/05/20 1631)  Resp: 16 (06/05/20 1631)  BP: (!) 171/99 (06/05/20 1631)  SpO2: 100 % (06/05/20 1631) Vital Signs (24h Range):  Temp:  [97.5 °F (36.4 °C)-100.6 °F  (38.1 °C)] 97.7 °F (36.5 °C)  Pulse:  [63-97] 86  Resp:  [12-20] 16  SpO2:  [97 %-100 %] 100 %  BP: (110-171)/(59-99) 171/99     Weight: 95.6 kg (210 lb 12.2 oz)  Body mass index is 27.06 kg/m².  No intake or output data in the 24 hours ending 06/05/20 1836   Physical Exam   Constitutional: He appears well-developed and well-nourished. No distress.   HENT:   Head: Normocephalic and atraumatic.   Eyes: Conjunctivae and EOM are normal.   Cardiovascular: Normal rate, regular rhythm and intact distal pulses.   Heart and lung exam limited by PPE   Pulmonary/Chest: Effort normal. No respiratory distress.   Heart and lung exam limited by PPE   Abdominal: Soft. He exhibits no distension. There is no tenderness.   Neurological: He is alert. No cranial nerve deficit.   Skin: Skin is warm and dry.       Significant Labs:   CBC:   Recent Labs   Lab 06/04/20  1458 06/05/20  0559   WBC 7.40 5.86   HGB 14.9 14.0   HCT 45.0 45.3    284     CMP:   Recent Labs   Lab 06/04/20  1458 06/05/20  0600    149*   K 3.7 4.0    111*   CO2 18* 25   * 112*   BUN 20 23   CREATININE 1.1 1.4   CALCIUM 8.8 9.1   PROT 8.0 7.4   ALBUMIN 3.3* 2.7*   BILITOT 1.7* 0.7   ALKPHOS 67 71   AST 28 25   ALT 14* 13   ANIONGAP 19* 13   EGFRNONAA >60.0 46.1*       Significant Imaging:   CXR 6/4/20  Impression       No acute cardiopulmonary disease.     CT head 6/4/20  Impression       1. No acute intracranial process.  2. Involutional changes with chronic microvascular ischemic changes.  3. Minimal right maxillary sinus disease.           Assessment/Plan:      * COVID-19  - COVID-19 testing   - Infection Control notified     - Isolation:   - Airborne, Contact and Droplet Precautions  - Cohort patients into COVID units  - N95 masks must be fit tested, wear eye protection  - 20 second hand hygiene              - Limit visitors per hospital policy              - Consolidating lab draws, nursing care, provider visits, and interventions    -  Diagnostics: (leukopenia, hyponatremia, hyperferritinemia, elevated troponin, elevated d-dimer, age, and comorbidities are significant predictors of poor clinical outcome)  CBC, CMP, Procalcitonin, Ferritin, CRP, LDH, BNP, Troponin, ECG and Portable CXR    - Management:  Supplemental O2 to maintain SpO2 >92%  Telemetry  Continuous/intermittent Pulse Ox  Albuterol treatment PRN  Avoiding BiPAP to prevent aerosolization (including home BiPAP)    Advance Care Planning   need to reach daughter (presumed HCPOA) in am to establish code status     Presently need additional prognostication information from inflammatory markers to assess anticipated illness trajectory. He presently appears minimally symptomatic.      Decubitus ulcer of coccygeal region, stage 3  - Wound Care consulted   -w ill need pressure-reduced mattress, periodic turning, and caregiver education to promote healing and to prevent progression/recurrence.      Dehydration  Likely secondary to dysphagia.  Got 1L IVF at [facility] ED; will avoid further IVF to reduce risk of pulmonary edema in context of COVID-19 infection.    Oropharyngeal dysphagia  - pending SLP evaluation  - Also will need to inquire when and why his PEG was removed, as he will likely require placement of another.      Type 2 diabetes mellitus with hyperglycemia, without long-term current use of insulin  - Does not appear to be on any long-term medication  - hemoglobin A1c 6.7%    Memory deficits  - Patient oriented to person only on exam  - Presumably related to TBI and age likely a factor as well.  Will order delirium precautions.        VTE Risk Mitigation (From admission, onward)         Ordered     enoxaparin injection 40 mg  Daily      06/05/20 0043     IP VTE HIGH RISK PATIENT  Once      06/05/20 0043     Place sequential compression device  Until discontinued      06/05/20 0043                Diet: NPO, aspiration precautions  Disposition: Pending evaluation      Sallie Hernandez  MD  Department of Hospital Medicine   [facility] - Respiratory ICU

## 2020-06-05 NOTE — NURSING
Spoke with Pt daughter this morning. She is available today for any information that is needed. She endorses that he has had some issues swallowing. Normally he can eat and drink anything he wants. He does not eat red meat and his blood sugars have been great since he made that change. He is also no longer on Metformin. He was taking Lisinopril for BP but his bp has been good as well and he has not been getting that at home. Pt had been seeing home health for his wound and it has been healing. Daughter is his POA for all needs. Peg was removed almost a year ago when it was determined he was doing well eating on his own and no longer needed. Phone number for Mrs. Martínez [phone] Bud [place]. She is agreeable to swallow evaluation and recommendations as he was having issues with thin liquids here on bedside swallow by nursing.

## 2020-06-05 NOTE — H&P
[facility] Res[facility]  [place] [facility] Medicine  History & Physical    Patient Name: Hollis Pitts  MRN: [id number]  Admission Date: 6/4/2020  Attending Physician: Sallie Hernandez MD   Primary Care Provider: Vanessa Fontaine NP         Patient information was obtained from past medical records and ER records.     Subjective:     Principal Problem:COVID-19    Chief Complaint: No chief complaint on file.       HPI: 85-year-old male with right hemiplegia and dysphagia from traumatic head injury transferred from [place] ED for admission after he was found to be positive for COVID-19.  He is not able to provide much helpful history due to memory difficulties but according to ED notes he was sent to the ED by his primary care physician due to difficulty swallowing and dehydration resulting from that.  He does not know why he is here, but acknowledges that he is in a healthcare setting by asking, Am I real sick?  He denies having any complaints at this time other than being thirsty and wanting some water.    Past Medical History:   Diagnosis Date    Benign prostatic hyperplasia with urinary retention 4/11/2018    Cervical myelopathy with cervical radiculopathy 4/10/2018    Depression     Dyslipidemia     Essential hypertension 4/11/2018    Glaucoma (increased eye pressure)     Mild cognitive impairment     Traumatic brain injury 07/2017    Type 2 diabetes mellitus with hyperglycemia, without long-term current use of insulin 4/11/2018       Past Surgical History:   Procedure Laterality Date    CATARACT EXTRACTION W/  INTRAOCULAR LENS IMPLANT      FOREIGN BODY REMOVAL N/A 7/1/2019    Procedure: REMOVAL, FOREIGN BODY  Removal PEG TUBE;  Surgeon: Yaakov Foster MD;  Location: [facility];  Service: Endoscopy;  Laterality: N/A;    INSERTION OF PERCUTANEOUS ENDOSCOPIC GASTROSTOMY (PEG) FEEDING TUBE      PEG TUBE REMOVAL  07/01/2019       Review of patient's allergies indicates:   Allergen  Reactions    Iodine and iodide containing products Other (See Comments)       Current Facility-Administered Medications on File Prior to Encounter   Medication    [COMPLETED] acetaminophen suppository 650 mg    [COMPLETED] sodium chloride 0.9% bolus 1,000 mL    [DISCONTINUED] acetaminophen oral solution 650 mg     Current Outpatient Medications on File Prior to Encounter   Medication Sig    acetaminophen (TYLENOL) 325 MG tablet 2 tablets (650 mg total) by Per G Tube route every 4 (four) hours as needed (or temp >99.5).    albuterol-ipratropium (DUO-NEB) 2.5 mg-0.5 mg/3 mL nebulizer solution Take 3 mLs by nebulization every 4 (four) hours. Rescue    apixaban (ELIQUIS) 2.5 mg Tab Take 2.5 mg by mouth once daily.    atorvastatin (LIPITOR) 20 MG tablet Take 20 mg by mouth nightly.    bisacodyl (DULCOLAX) 10 mg Supp Place 1 suppository (10 mg total) rectally daily as needed.    bisacodyl (DULCOLAX) 5 mg EC tablet     CARTIA  mg Cp24 Take 90 mg by mouth.     citalopram (CELEXA) 20 MG tablet Take 20 mg by mouth once daily.    CONSTULOSE 10 gram/15 mL solution TAKE  30 MILLILITERS BY MOUTH 3 TIMES A DAY UNTIL BOWEL MOVEMENT ...  (REFER TO PRESCRIPTION NOTES).    ergocalciferol (ERGOCALCIFEROL) 50,000 unit Cap 1 capsule (50,000 Units total) by Per G Tube route every 7 days.    folic acid (FOLVITE) 1 MG tablet 1 tablet (1 mg total) by Per G Tube route once daily.    FREESTYLE TEST Strp     gabapentin (NEURONTIN) 100 MG capsule Take 100 mg by mouth 3 (three) times daily as needed.     lidocaine HCL 2% (XYLOCAINE) 2 % jelly Apply topically as needed.    polyethylene glycol (GLYCOLAX) 17 gram PwPk 17 g by Per G Tube route 2 (two) times daily.    senna-docusate 8.6-50 mg (PERICOLACE) 8.6-50 mg per tablet 1 tablet by Per G Tube route 2 (two) times daily.    SENSIPAR 30 mg Tab Take 30 mg by mouth daily with breakfast.     tamsulosin (FLOMAX) 0.4 mg Cp24 1 capsule (0.4 mg total) by Per G Tube route once  daily.    zinc oxide (BOUDREAUXS BUTT PASTE) 16 % Oint ointment Apply topically once daily.     Family History     Problem Relation (Age of Onset)    Cancer Maternal Grandmother    Clotting disorder Mother    Paranoid behavior Mother        Tobacco Use    Smoking status: Former Smoker     Last attempt to quit: 1950     Years since quittin.3    Smokeless tobacco: Former User   Substance and Sexual Activity    Alcohol use: Yes     Alcohol/week: 7.0 standard drinks     Types: 7 Shots of liquor per week    Drug use: No    Sexual activity: Yes     Partners: Female     Review of Systems   Unable to perform ROS: Dementia     Objective:     Vital Signs (Most Recent):  Temp: 97.5 °F (36.4 °C) (20)  Pulse: 69 (20)  Resp: 18 (20)  BP: (!) 156/72 (20)  SpO2: 98 % (20) Vital Signs (24h Range):  Temp:  [97.5 °F (36.4 °C)-100.6 °F (38.1 °C)] 97.5 °F (36.4 °C)  Pulse:  [] 69  Resp:  [12-30] 18  SpO2:  [93 %-98 %] 98 %  BP: (109-156)/(53-82) 156/72     Weight: 95.6 kg (210 lb 12.2 oz)  Body mass index is 27.06 kg/m².    Physical Exam   Constitutional: He appears well-developed and well-nourished. He is sleeping. He is easily aroused. No distress. Nasal cannula in place.   HENT:   Head: Normocephalic and atraumatic.   Dry mucous membranes   Eyes: Pupils are equal, round, and reactive to light. Conjunctivae and EOM are normal. No scleral icterus.   Neck: Normal range of motion. Neck supple. No JVD present.   Cardiovascular: Normal rate and intact distal pulses.   Heart and lung exam limited by PPE    Pulmonary/Chest: Effort normal. No respiratory distress.   Heart and lung exam limited by PPE   Abdominal: Soft. Bowel sounds are normal. There is no tenderness.   Scar in upper-mid abdomen from previous PEG   Musculoskeletal: He exhibits no edema, tenderness or deformity.   Trace spasticity of RUE, preferentially held in flexor positioning across abdomen    Lymphadenopathy:     He has no cervical adenopathy.   Neurological: He is alert and easily aroused. He is disoriented. He displays atrophy. He exhibits abnormal muscle tone. GCS eye subscore is 3. GCS verbal subscore is 4. GCS motor subscore is 6.   3/5 strength RUE   Skin: Skin is warm and dry. He is not diaphoretic. No erythema.   Stage III decubitus over coccyx   Nursing note and vitals reviewed.        CRANIAL NERVES     CN III, IV, VI   Pupils are equal, round, and reactive to light.  Extraocular motions are normal.        Significant Labs:   CBC:   Recent Labs   Lab 06/04/20  1458   WBC 7.40   HGB 14.9   HCT 45.0        CMP:   Recent Labs   Lab 06/04/20  1458      K 3.7      CO2 18*   *   BUN 20   CREATININE 1.1   CALCIUM 8.8   PROT 8.0   ALBUMIN 3.3*   BILITOT 1.7*   ALKPHOS 67   AST 28   ALT 14*   ANIONGAP 19*   EGFRNONAA >60.0       Significant Imaging: CXR: I have reviewed all pertinent results/findings within the past 24 hours and my personal findings are:  scant diffuse bilateral interstitial infiltrates    Assessment/Plan:     * COVID-19  - COVID-19 testing   - Infection Control notified     - Isolation:   - Airborne, Contact and Droplet Precautions  - Cohort patients into COVID units  - N95 masks must be fit tested, wear eye protection  - 20 second hand hygiene              - Limit visitors per hospital policy              - Consolidating lab draws, nursing care, provider visits, and interventions    - Diagnostics: (leukopenia, hyponatremia, hyperferritinemia, elevated troponin, elevated d-dimer, age, and comorbidities are significant predictors of poor clinical outcome)  CBC, CMP, Procalcitonin, Ferritin, CRP, LDH, BNP, Troponin, ECG and Portable CXR    - Management:  Supplemental O2 to maintain SpO2 >92%  Telemetry  Continuous/intermittent Pulse Ox  Albuterol treatment PRN  Avoiding BiPAP to prevent aerosolization (including home BiPAP)    Advance Care Planning   need to  reach daughter (presumed HCPOA) in am to establish code status     Presently need additional prognostication information from inflammatory markers to assess anticipated illness trajectory. He presently appears minimally symptomatic.      Decubitus ulcer of coccygeal region, stage 3  Wound Care consult ordered for am.  Will need pressure-reduced mattress, periodic turning, and caregiver education to promote healing and to prevent progression/recurrence.      Dehydration  Likely secondary to dysphagia.  Got 1L IVF at [facility] ED; will avoid further IVF to reduce risk of pulmonary edema in context of COVID-19 infection.    Oropharyngeal dysphagia  Witnessed bedside swallow eval.  He handled 1 sip without difficulty, but then struggled with a 2nd and took several seconds to force it down with great effort.  SLP evaluation ordered for a.m. and will keep NPO except for small sips until such time.  Will hold his medications for now, especially since they need to be verified with his primary caretaker 1st as well.  Also will need to inquire when and why his PEG was removed, as he will likely require placement of another.      Type 2 diabetes mellitus with hyperglycemia, without long-term current use of insulin  Does not appear to be on any long-term medication, and may not require any anymore.  Will check hemoglobin A1c for the morning, and monitor.  If hyperglycemia becomes a problem can institute low-dose sliding scale insulin.      Memory deficits  Presumably related to TBI and age likely a factor as well.  Will order delirium precautions.        VTE Risk Mitigation (From admission, onward)         Ordered     enoxaparin injection 40 mg  Daily      06/05/20 0043     IP VTE HIGH RISK PATIENT  Once      06/05/20 0043     Place sequential compression device  Until discontinued      06/05/20 0043                   Berry Peraza MD  Department of Hospital Medicine   [facility]

## 2020-06-05 NOTE — ASSESSMENT & PLAN NOTE
Witnessed bedside swallow eval.  He handled 1 sip without difficulty, but then struggled with a 2nd and took several seconds to force it down with great effort.  SLP evaluation ordered for a.m. and will keep NPO except for small sips until such time.  Will hold his medications for now, especially since they need to be verified with his primary caretaker 1st as well.  Also will need to inquire when and why his PEG was removed, as he will likely require placement of another.

## 2020-06-05 NOTE — SUBJECTIVE & OBJECTIVE
Past Medical History:   Diagnosis Date    Benign prostatic hyperplasia with urinary retention 4/11/2018    Cervical myelopathy with cervical radiculopathy 4/10/2018    Depression     Dyslipidemia     Essential hypertension 4/11/2018    Glaucoma (increased eye pressure)     Mild cognitive impairment     Traumatic brain injury 07/2017    Type 2 diabetes mellitus with hyperglycemia, without long-term current use of insulin 4/11/2018       Past Surgical History:   Procedure Laterality Date    CATARACT EXTRACTION W/  INTRAOCULAR LENS IMPLANT      FOREIGN BODY REMOVAL N/A 7/1/2019    Procedure: REMOVAL, FOREIGN BODY  Removal PEG TUBE;  Surgeon: Yaakov Foster MD;  Location: [facility];  Service: Endoscopy;  Laterality: N/A;    INSERTION OF PERCUTANEOUS ENDOSCOPIC GASTROSTOMY (PEG) FEEDING TUBE      PEG TUBE REMOVAL  07/01/2019       Review of patient's allergies indicates:   Allergen Reactions    Iodine and iodide containing products Other (See Comments)       Current Facility-Administered Medications on File Prior to Encounter   Medication    [COMPLETED] acetaminophen suppository 650 mg    [COMPLETED] sodium chloride 0.9% bolus 1,000 mL    [DISCONTINUED] acetaminophen oral solution 650 mg     Current Outpatient Medications on File Prior to Encounter   Medication Sig    acetaminophen (TYLENOL) 325 MG tablet 2 tablets (650 mg total) by Per G Tube route every 4 (four) hours as needed (or temp >99.5).    albuterol-ipratropium (DUO-NEB) 2.5 mg-0.5 mg/3 mL nebulizer solution Take 3 mLs by nebulization every 4 (four) hours. Rescue    apixaban (ELIQUIS) 2.5 mg Tab Take 2.5 mg by mouth once daily.    atorvastatin (LIPITOR) 20 MG tablet Take 20 mg by mouth nightly.    bisacodyl (DULCOLAX) 10 mg Supp Place 1 suppository (10 mg total) rectally daily as needed.    bisacodyl (DULCOLAX) 5 mg EC tablet     CARTIA  mg Cp24 Take 90 mg by mouth.     citalopram (CELEXA) 20 MG tablet Take 20 mg by mouth once  daily.    CONSTULOSE 10 gram/15 mL solution TAKE  30 MILLILITERS BY MOUTH 3 TIMES A DAY UNTIL BOWEL MOVEMENT ...  (REFER TO PRESCRIPTION NOTES).    ergocalciferol (ERGOCALCIFEROL) 50,000 unit Cap 1 capsule (50,000 Units total) by Per G Tube route every 7 days.    folic acid (FOLVITE) 1 MG tablet 1 tablet (1 mg total) by Per G Tube route once daily.    FREESTYLE TEST Strp     gabapentin (NEURONTIN) 100 MG capsule Take 100 mg by mouth 3 (three) times daily as needed.     lidocaine HCL 2% (XYLOCAINE) 2 % jelly Apply topically as needed.    polyethylene glycol (GLYCOLAX) 17 gram PwPk 17 g by Per G Tube route 2 (two) times daily.    senna-docusate 8.6-50 mg (PERICOLACE) 8.6-50 mg per tablet 1 tablet by Per G Tube route 2 (two) times daily.    SENSIPAR 30 mg Tab Take 30 mg by mouth daily with breakfast.     tamsulosin (FLOMAX) 0.4 mg Cp24 1 capsule (0.4 mg total) by Per G Tube route once daily.    zinc oxide (BOUDREAUXS BUTT PASTE) 16 % Oint ointment Apply topically once daily.     Family History     Problem Relation (Age of Onset)    Cancer Maternal Grandmother    Clotting disorder Mother    Paranoid behavior Mother        Tobacco Use    Smoking status: Former Smoker     Last attempt to quit: 1950     Years since quittin.3    Smokeless tobacco: Former User   Substance and Sexual Activity    Alcohol use: Yes     Alcohol/week: 7.0 standard drinks     Types: 7 Shots of liquor per week    Drug use: No    Sexual activity: Yes     Partners: Female     Review of Systems   Unable to perform ROS: Dementia     Objective:     Vital Signs (Most Recent):  Temp: 97.5 °F (36.4 °C) (20)  Pulse: 69 (20)  Resp: 18 (20)  BP: (!) 156/72 (20)  SpO2: 98 % (20) Vital Signs (24h Range):  Temp:  [97.5 °F (36.4 °C)-100.6 °F (38.1 °C)] 97.5 °F (36.4 °C)  Pulse:  [] 69  Resp:  [12-30] 18  SpO2:  [93 %-98 %] 98 %  BP: (109-156)/(53-82) 156/72     Weight: 95.6 kg  (210 lb 12.2 oz)  Body mass index is 27.06 kg/m².    Physical Exam   Constitutional: He appears well-developed and well-nourished. He is sleeping. He is easily aroused. No distress. Nasal cannula in place.   HENT:   Head: Normocephalic and atraumatic.   Dry mucous membranes   Eyes: Pupils are equal, round, and reactive to light. Conjunctivae and EOM are normal. No scleral icterus.   Neck: Normal range of motion. Neck supple. No JVD present.   Cardiovascular: Normal rate and intact distal pulses.   Heart and lung exam limited by PPE    Pulmonary/Chest: Effort normal. No respiratory distress.   Heart and lung exam limited by PPE   Abdominal: Soft. Bowel sounds are normal. There is no tenderness.   Scar in upper-mid abdomen from previous PEG   Musculoskeletal: He exhibits no edema, tenderness or deformity.   Trace spasticity of RUE, preferentially held in flexor positioning across abdomen   Lymphadenopathy:     He has no cervical adenopathy.   Neurological: He is alert and easily aroused. He is disoriented. He displays atrophy. He exhibits abnormal muscle tone. GCS eye subscore is 3. GCS verbal subscore is 4. GCS motor subscore is 6.   3/5 strength RUE   Skin: Skin is warm and dry. He is not diaphoretic. No erythema.   Stage III decubitus over coccyx   Nursing note and vitals reviewed.        CRANIAL NERVES     CN III, IV, VI   Pupils are equal, round, and reactive to light.  Extraocular motions are normal.        Significant Labs:   CBC:   Recent Labs   Lab 06/04/20  1458   WBC 7.40   HGB 14.9   HCT 45.0        CMP:   Recent Labs   Lab 06/04/20  1458      K 3.7      CO2 18*   *   BUN 20   CREATININE 1.1   CALCIUM 8.8   PROT 8.0   ALBUMIN 3.3*   BILITOT 1.7*   ALKPHOS 67   AST 28   ALT 14*   ANIONGAP 19*   EGFRNONAA >60.0       Significant Imaging: CXR: I have reviewed all pertinent results/findings within the past 24 hours and my personal findings are:  scant diffuse bilateral interstitial  infiltrates

## 2020-06-05 NOTE — ASSESSMENT & PLAN NOTE
Likely secondary to dysphagia.  Got 1L IVF at [facility] ED; will avoid further IVF to reduce risk of pulmonary edema in context of COVID-19 infection.

## 2020-06-05 NOTE — ASSESSMENT & PLAN NOTE
Wound Care consult ordered for am.  Will need pressure-reduced mattress, periodic turning, and caregiver education to promote healing and to prevent progression/recurrence.

## 2020-06-05 NOTE — ASSESSMENT & PLAN NOTE
- Patient oriented to person only on exam  - Presumably related to TBI and age likely a factor as well.  Will order delirium precautions.

## 2020-06-05 NOTE — ASSESSMENT & PLAN NOTE
Does not appear to be on any long-term medication, and may not require any anymore.  Will check hemoglobin A1c for the morning, and monitor.  If hyperglycemia becomes a problem can institute low-dose sliding scale insulin.

## 2020-06-05 NOTE — ASSESSMENT & PLAN NOTE
- pending SLP evaluation  - Also will need to inquire when and why his PEG was removed, as he will likely require placement of another.

## 2020-06-05 NOTE — NURSING
Pt not the best historian but states that he is a Diabetic and that he eats whatever he wants as well as thin liquids. Pt did ok with first sip but next sip had some trouble. May benefit from swallow eval. Pt also states he also takes his meds whole. Will defer to day shift nurse to call daughter to see if more information can be found out about daily routine. Pt also has wound to coccyx. Cleaned and dressed. Wound care consult placed. Will do SOS.

## 2020-06-05 NOTE — ASSESSMENT & PLAN NOTE
- COVID-19 testing   - Infection Control notified     - Isolation:   - Airborne, Contact and Droplet Precautions  - Cohort patients into COVID units  - N95 masks must be fit tested, wear eye protection  - 20 second hand hygiene              - Limit visitors per hospital policy              - Consolidating lab draws, nursing care, provider visits, and interventions    - Diagnostics: (leukopenia, hyponatremia, hyperferritinemia, elevated troponin, elevated d-dimer, age, and comorbidities are significant predictors of poor clinical outcome)  CBC, CMP, Procalcitonin, Ferritin, CRP, LDH, BNP, Troponin, ECG and Portable CXR    - Management:  Supplemental O2 to maintain SpO2 >92%  Telemetry  Continuous/intermittent Pulse Ox  Albuterol treatment PRN  Avoiding BiPAP to prevent aerosolization (including home BiPAP)    Advance Care Planning   need to reach daughter (presumed HCPOA) in am to establish code status      Presently need additional prognostication information from inflammatory markers to assess anticipated illness trajectory. He presently appears minimally symptomatic.

## 2020-06-05 NOTE — HOSPITAL COURSE
Patient admitted to hospital medicine 6/5. Patient endorsed some minor cough that was not bothersome to him. After discussing with his sister (POA), she reports the main concern was not URI symptoms but that he has not been eating/drinking well due to swallowing difficulties. SLP consulted 6/5 but patient kept NPO for aspiration risk.

## 2020-06-05 NOTE — PROGRESS NOTES
Consult received per RN for Stage 3 to coccyx.    Pt was admitted on 6/4/20 as a transfer from [place] with  right hemiplegia and dysphagia from traumatic head injury and COVID-19. Pt has a PMHx of Type 2 DM, Traumatic brain injury, mild cognitive impairment, Benign prostatic hyperplasia with urinary retention, Cervical myelopathy with cervical radiculopathy, essential hypertension    Received pt lying in bed awake and alert. Waffle overlay on bed but not inflated at this time. Pt requires assistance with turning.    Upon assessment of coccyx (present upon admission): Full thickness ulceration measuring 2.5 cm x 1 cm x 0.3 cm that appears to be a Stage 3 pressure injury with pink and red granulation tissue with slough noted to wound bed with intact pink scar tissue noted to the kai wound area.     (see assessment below)    Wound care nurse to order TRIAD ointment to be applied once nursing receives and heel lift boots to be applied as a pressure prevention intervention to bilateral heels to protect.     Recommendations:  -Nursing to cleanse sacrum with cleansing wipes and apply Triad ointment BID and prn cleaning. Triad ointment provides a hydrophilic environment to promote healing of exposed tissue and prevents breakdown of intact skin.  -Nursing to continue pressure prevention interventions as directed. Pressure injury prevention interventions to include assisting pt with turning and repositioning with pillow/ wedge pillow, heel lift boots, and waffle cushion overlay.    Discussed plan of care with pt's PCT. Nursing to continue care. Wound care to sign off at this time. Please reconsult if further assistance is needed. [id number]       06/05/20 1145        Pressure Injury 06/04/20 2200 midline Coccyx Stage 3   Date First Assessed/Time First Assessed: 06/04/20 2200   Pressure Injury Present on Admission: yes  Orientation: midline  Location: Coccyx  Staging: Stage 3   Staging Stage 3   Dressing Appearance  Dry;Intact;Clean   Drainage Amount Small   Drainage Characteristics/Odor Serosanguineous   Appearance Pink;Red;Slough;Moist   Tissue loss description Full thickness   Periwound Area Intact;Pink;Scar tissue   Wound Edges Open   Wound Length (cm) 2.5 cm   Wound Width (cm) 1 cm   Wound Depth (cm) 0.3 cm   Wound Volume (cm^3) 0.75 cm^3   Wound Surface Area (cm^2) 2.5 cm^2   Dressing Reinforced;Silver;Foam   Periwound Care Dry periwound area maintained   Off Loading Other (see comments)  (waffle overlay on bed; heel lift boots ordered)   Dressing Change Due 06/05/20

## 2020-06-05 NOTE — ASSESSMENT & PLAN NOTE
Likely secondary to dysphagia.  Got 1L IVF at [facility] ED; will avoid further IVF to reduce risk of pulmonary edema in context of COVID-19 infection.

## 2020-06-05 NOTE — PT/OT/SLP EVAL
Speech Language Pathology Evaluation  Bedside Swallow    Patient Name:  Hollis Pitts   MRN:  [id number]  Admitting Diagnosis: COVID-19    Recommendations:                 General Recommendations:  Dysphagia therapy  Diet recommendations:  NPO, NPO   Aspiration Precautions: Strict aspiration precautions   General Precautions: Standard, aspiration  Communication strategies:  go to room if call light pushed    History:     Past Medical History:   Diagnosis Date    Benign prostatic hyperplasia with urinary retention 4/11/2018    Cervical myelopathy with cervical radiculopathy 4/10/2018    Depression     Dyslipidemia     Essential hypertension 4/11/2018    Glaucoma (increased eye pressure)     Mild cognitive impairment     Traumatic brain injury 07/2017    Type 2 diabetes mellitus with hyperglycemia, without long-term current use of insulin 4/11/2018       Past Surgical History:   Procedure Laterality Date    CATARACT EXTRACTION W/  INTRAOCULAR LENS IMPLANT      FOREIGN BODY REMOVAL N/A 7/1/2019    Procedure: REMOVAL, FOREIGN BODY  Removal PEG TUBE;  Surgeon: Yaakov Foster MD;  Location: [facility];  Service: Endoscopy;  Laterality: N/A;    INSERTION OF PERCUTANEOUS ENDOSCOPIC GASTROSTOMY (PEG) FEEDING TUBE      PEG TUBE REMOVAL  07/01/2019     Prior diet: Unknown baseline dysphagia history and baseline PO intake. Patient with history of dysphagia requiring PEG tube s/p TBI 2017.     Subjective     Patient awake    Pain/Comfort:  · Pain Rating 1: 0/10    Objective:     Oral Musculature Evaluation  · Oral Musculature: WFL  · Dentition: present and adequate  · Secretion Management: other (see comments)  · Voice Prior to PO Intake: strained, strangled    Bedside Swallow Eval:   Consistencies Assessed:  · Thin liquids via cie chip x1  · Puree via 1/2 tsp x1     Oral Phase:   · Anterior loss  · Decreased closure around utensil  · Oral residue  · Lingual residue  · Slow oral transit time    Pharyngeal Phase:    · coughing/choking  · decreased hyolaryngeal excursion to palpation  · delayed swallow initation    Compensatory Strategies  · None    Treatment: Patient not appropriate for PO intake at this time. SLP provided suctioning for remainder of trials within oral cavity. Unsure as to why PEG previously removed, COVID would not explain current dysphagia s/s.  Skilled education was provided to patient  re: diet recs, standard aspiration precautions of which to follow, and ongoing ST plan of care. Poor historian, unsure of baseline PO intake.       Assessment:     Hollis Pitts is a 85 y.o. male with an SLP diagnosis of Dysphagia.     Goals:   Multidisciplinary Problems     SLP Goals        Problem: SLP Goal    Goal Priority Disciplines Outcome   SLP Goal     SLP Ongoing, Progressing   Description:  Speech Language Pathology Goals  Goals expected to be met by 6/12:  1. Patient will participate in ongoing swallow assessment to determine least restrictive diet recommendations.                           Plan:     · Patient to be seen:  4 x/week   · Plan of Care expires:     · Plan of Care reviewed with:  patient   · SLP Follow-Up:          Discharge recommendations:      Barriers to Discharge:  None    Time Tracking:     SLP Treatment Date:   06/05/20  Speech Start Time:  0803  Speech Stop Time:  0818     Speech Total Time (min):  15 min    Billable Minutes: Eval Swallow and Oral Function 5 and Seld Care/Home Management Training 7    Emily Abadie, CCC-SLP  06/05/2020

## 2020-06-05 NOTE — SUBJECTIVE & OBJECTIVE
Interval History: Patient reports he is doing well this morning, though he was unaware he was in the hospital. He has some slight cough which he indicates is normal for him.    Review of Systems   Unable to perform ROS: Mental status change   Constitutional: Negative for appetite change, chills and fever.   Respiratory: Positive for cough. Negative for shortness of breath.    Cardiovascular: Negative for chest pain and palpitations.   Gastrointestinal: Negative for abdominal pain, diarrhea, nausea and vomiting.   Musculoskeletal: Negative for arthralgias and myalgias.   Neurological: Negative for dizziness, syncope and light-headedness.     Objective:     Vital Signs (Most Recent):  Temp: 97.7 °F (36.5 °C) (06/05/20 1631)  Pulse: 86 (06/05/20 1631)  Resp: 16 (06/05/20 1631)  BP: (!) 171/99 (06/05/20 1631)  SpO2: 100 % (06/05/20 1631) Vital Signs (24h Range):  Temp:  [97.5 °F (36.4 °C)-100.6 °F (38.1 °C)] 97.7 °F (36.5 °C)  Pulse:  [63-97] 86  Resp:  [12-20] 16  SpO2:  [97 %-100 %] 100 %  BP: (110-171)/(59-99) 171/99     Weight: 95.6 kg (210 lb 12.2 oz)  Body mass index is 27.06 kg/m².  No intake or output data in the 24 hours ending 06/05/20 1836   Physical Exam   Constitutional: He appears well-developed and well-nourished. No distress.   HENT:   Head: Normocephalic and atraumatic.   Eyes: Conjunctivae and EOM are normal.   Cardiovascular: Normal rate, regular rhythm and intact distal pulses.   Heart and lung exam limited by PPE   Pulmonary/Chest: Effort normal. No respiratory distress.   Heart and lung exam limited by PPE   Abdominal: Soft. He exhibits no distension. There is no tenderness.   Neurological: He is alert. No cranial nerve deficit.   Skin: Skin is warm and dry.       Significant Labs:   CBC:   Recent Labs   Lab 06/04/20  1458 06/05/20  0559   WBC 7.40 5.86   HGB 14.9 14.0   HCT 45.0 45.3    284     CMP:   Recent Labs   Lab 06/04/20  1458 06/05/20  0600    149*   K 3.7 4.0    111*    CO2 18* 25   * 112*   BUN 20 23   CREATININE 1.1 1.4   CALCIUM 8.8 9.1   PROT 8.0 7.4   ALBUMIN 3.3* 2.7*   BILITOT 1.7* 0.7   ALKPHOS 67 71   AST 28 25   ALT 14* 13   ANIONGAP 19* 13   EGFRNONAA >60.0 46.1*       Significant Imaging:   CXR 6/4/20  Impression       No acute cardiopulmonary disease.     CT head 6/4/20  Impression       1. No acute intracranial process.  2. Involutional changes with chronic microvascular ischemic changes.  3. Minimal right maxillary sinus disease.

## 2020-06-05 NOTE — ASSESSMENT & PLAN NOTE
- Wound Care consulted   -w ill need pressure-reduced mattress, periodic turning, and caregiver education to promote healing and to prevent progression/recurrence.

## 2020-06-05 NOTE — PLAN OF CARE
CM called patient's daughter, Bud, for discharge planning.  Bud states the patient lives with a friend in a one story house with no steps to enter.  He gets up to a wheelchair via georgiana lift device.  He has 24/7 privately hired sitters.  He is current with [facility] for a nurse for his sacral wound.  Plan is to discharge home with continued home health and sitters.  Patient normally takes a cab with his daughter whenever he goes to the Drs office but can't do that for a ride home because he is Covid positive.  Patient will need ride home.    Pharmacy:    PCP:  Vanessa Fontaine NP    Payor: MEDICARE / Plan: MEDICARE PART A & B / Product Type: [facility] /      06/05/20 1210   Discharge Assessment   Assessment Type Discharge Planning Assessment   Confirmed/corrected address and phone number on facesheet? Yes   Assessment information obtained from? Caregiver  (Daughter)   Expected Length of Stay (days) 3   Prior to hospitalization functional status: Completely Dependent;Wheelchair Bound   Current Functional Status: Completely Dependent;Wheelchair Bound   Facility Arrived From: From [facility] ED   Lives With friend(s)   Able to Return to Prior Arrangements yes   Is patient able to care for self after discharge? No   Patient's perception of discharge disposition home health   Readmission Within the Last 30 Days no previous admission in last 30 days   Patient currently being followed by outpatient case management? No   Patient currently receives any other outside agency services? Yes   Name and contact number of agency or person providing outside services 24/7 personal sitters hired by the daughter   Is it the patient/care giver preference to resume care with the current outside agency? Yes   Equipment Currently Used at Home bedside commode;wheelchair;hospital bed;lift device   Do you have any problems affording any of your prescribed medications? No   Is the patient taking medications as  prescribed? yes   Does the patient have transportation home? No  (Patient normally takes cab but is covid positive)   Does the patient receive services at the [facility]? No   Discharge Plan A Home Health   Discharge Plan B Skilled Nursing Facility   DME Needed Upon Discharge  none   Patient/Family in Agreement with Plan yes

## 2020-06-05 NOTE — PLAN OF CARE
Problem: SLP Goal  Goal: SLP Goal  Description  Speech Language Pathology Goals  Goals expected to be met by 6/12:  1. Patient will participate in ongoing swallow assessment to determine least restrictive diet recommendations.   Outcome: Ongoing, Progressing         SLP eval completed. REC: Continue NPO. Full note to follow.     Emily P. Abadie M.S., CCC-SLP  Speech Language Pathologist  [phone]  06/05/2020

## 2020-06-06 NOTE — ASSESSMENT & PLAN NOTE
- Likely secondary to dysphagia.    - Got 1L IVF at [facility] ED  - As he remains NPO, will start slow rate of IVF

## 2020-06-06 NOTE — ASSESSMENT & PLAN NOTE
- Wound Care consulted   - will need pressure-reduced mattress, periodic turning, and caregiver education to promote healing and to prevent progression/recurrence.

## 2020-06-06 NOTE — ASSESSMENT & PLAN NOTE
- Patient with difficulty swallowing going on since around 5/30, per his daughter  - SLP evaluation completed 6/5, recommended NPO with aspiration precautions  - will give slow rate fluids  - PEG was removed 7/2019 by Dr. Foster, as the patient had began to eat and drink on his own without issues

## 2020-06-06 NOTE — NURSING
Daughter called this morning to put code on chart so that other family may call and check on her father. Pt stated that she will keep phone near her for phone calls. Lisinopril at home is 5mg. Pt bp is borderline at this time. Pt says he is thirsty but nursing is uncomfortable giving oral hydration per speech recs and after bedside swallow. Daughter aware. Blood sugars have been normal thus far. Pt turned and repositioned and dressing changed. Urinating well. BM overnight. Monitoring.

## 2020-06-06 NOTE — ASSESSMENT & PLAN NOTE
- COVID-19 testing   - Infection Control notified     - Isolation:   - Airborne, Contact and Droplet Precautions  - Cohort patients into COVID units  - N95 masks must be fit tested, wear eye protection  - 20 second hand hygiene              - Limit visitors per hospital policy              - Consolidating lab draws, nursing care, provider visits, and interventions    - Diagnostics: (leukopenia, hyponatremia, hyperferritinemia, elevated troponin, elevated d-dimer, age, and comorbidities are significant predictors of poor clinical outcome)  CBC, CMP, Procalcitonin, Ferritin, CRP, LDH, BNP, Troponin, ECG and Portable CXR    - Management:  Supplemental O2 to maintain SpO2 >92%  Telemetry  Continuous/intermittent Pulse Ox  Albuterol treatment PRN  Avoiding BiPAP to prevent aerosolization (including home BiPAP)    Advance Care Planning   Spoke with patient's daughter, his POA, and she reports that he would wish to be Full Code according to their prior discussions      Presently need additional prognostication information from inflammatory markers to assess anticipated illness trajectory. He remains to appear minimally symptomatic.

## 2020-06-06 NOTE — SUBJECTIVE & OBJECTIVE
Interval History: Patient reports he continues to have a mild cough but denies any shortness of breath.    Review of Systems   Unable to perform ROS: Mental status change   Constitutional: Negative for appetite change, chills and fever.   Respiratory: Positive for cough. Negative for shortness of breath.    Cardiovascular: Negative for chest pain and palpitations.   Gastrointestinal: Negative for abdominal pain, diarrhea, nausea and vomiting.   Musculoskeletal: Negative for arthralgias and myalgias.   Neurological: Negative for dizziness, syncope and light-headedness.     Objective:     Vital Signs (Most Recent):  Temp: 98.4 °F (36.9 °C) (06/06/20 0742)  Pulse: 89 (06/06/20 0742)  Resp: 20 (06/06/20 0742)  BP: (!) 173/79 (06/06/20 0742)  SpO2: 97 % (06/06/20 0742) Vital Signs (24h Range):  Temp:  [97.6 °F (36.4 °C)-98.5 °F (36.9 °C)] 98.4 °F (36.9 °C)  Pulse:  [66-96] 89  Resp:  [16-20] 20  SpO2:  [94 %-100 %] 97 %  BP: (152-175)/(76-99) 173/79     Weight: 95.6 kg (210 lb 12.2 oz)  Body mass index is 27.06 kg/m².  No intake or output data in the 24 hours ending 06/06/20 0807   Physical Exam   Constitutional: He appears well-developed and well-nourished. No distress.   HENT:   Head: Normocephalic and atraumatic.   Eyes: Conjunctivae and EOM are normal.   Cardiovascular: Normal rate, regular rhythm, normal heart sounds and intact distal pulses.   Heart and lung exam limited by PPE   Pulmonary/Chest: Effort normal. No respiratory distress.   Examined anteriorly. Heart and lung exam limited by PPE   Abdominal: Soft. He exhibits no distension. There is no tenderness.   Neurological: He is alert. No cranial nerve deficit.   Skin: Skin is warm and dry.       Significant Labs:   CBC:   Recent Labs   Lab 06/05/20  0559   WBC 5.86   HGB 14.0   HCT 45.3        CMP:   Recent Labs   Lab 06/05/20  0600   *   K 4.0   *   CO2 25   *   BUN 23   CREATININE 1.4   CALCIUM 9.1   PROT 7.4   ALBUMIN 2.7*    BILITOT 0.7   ALKPHOS 71   AST 25   ALT 13   ANIONGAP 13   EGFRNONAA 46.1*       Significant Imaging:   CT head 6/4  Impression       1. No acute intracranial process.  2. Involutional changes with chronic microvascular ischemic changes.  3. Minimal right maxillary sinus disease.     CXR 6/4  Impression       No acute cardiopulmonary disease.

## 2020-06-06 NOTE — ASSESSMENT & PLAN NOTE
"- Patient oriented to person only on exam  - Possibly related to TBI and age; but upon speaking with daughter, Ms. Martínez, she reports he is forgetful and "has his moments"  - patient with sitters at home  "

## 2020-06-06 NOTE — ASSESSMENT & PLAN NOTE
- Daughter reports that he was previously treated with Lotrel but was switched to lisinopril only after his blood pressure had improved  - Patient with elevated BP while in the hospital, despite restarting his current home dose lisinopril  - will increase lisinopril and add amlodipine for better BP control

## 2020-06-06 NOTE — PROGRESS NOTES
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  He is not able to provide much helpful history due to memory difficulties but according to ED notes he was sent to the ED by his primary care physician due to difficulty swallowing and dehydration resulting from that.  He does not know why he is here, but acknowledges that he is in a healthcare setting by asking, Am I real sick?  He denies having any complaints at this time other than being thirsty and wanting some water.    Overview/Hospital Course:  Patient admitted to hospital medicine 6/5    Interval History: Patient reports he continues to have a mild cough but denies any shortness of breath.    Review of Systems   Unable to perform ROS: Mental status change   Constitutional: Negative for appetite change, chills and fever.   Respiratory: Positive for cough. Negative for shortness of breath.    Cardiovascular: Negative for chest pain and palpitations.   Gastrointestinal: Negative for abdominal pain, diarrhea, nausea and vomiting.   Musculoskeletal: Negative for arthralgias and myalgias.   Neurological: Negative for dizziness, syncope and light-headedness.     Objective:     Vital Signs (Most Recent):  Temp: 98.4 °F (36.9 °C) (06/06/20 0742)  Pulse: 89 (06/06/20 0742)  Resp: 20 (06/06/20 0742)  BP: (!) 173/79 (06/06/20 0742)  SpO2: 97 % (06/06/20 0742) Vital Signs (24h Range):  Temp:  [97.6 °F (36.4 °C)-98.5 °F (36.9 °C)] 98.4 °F (36.9 °C)  Pulse:  [66-96] 89  Resp:  [16-20] 20  SpO2:   [94 %-100 %] 97 %  BP: (152-175)/(76-99) 173/79     Weight: 95.6 kg (210 lb 12.2 oz)  Body mass index is 27.06 kg/m².  No intake or output data in the 24 hours ending 06/06/20 0807   Physical Exam   Constitutional: He appears well-developed and well-nourished. No distress.   HENT:   Head: Normocephalic and atraumatic.   Eyes: Conjunctivae and EOM are normal.   Cardiovascular: Normal rate, regular rhythm, normal heart sounds and intact distal pulses.   Heart and lung exam limited by PPE   Pulmonary/Chest: Effort normal. No respiratory distress.   Examined anteriorly. Heart and lung exam limited by PPE   Abdominal: Soft. He exhibits no distension. There is no tenderness.   Neurological: He is alert. No cranial nerve deficit.   Skin: Skin is warm and dry.       Significant Labs:   CBC:   Recent Labs   Lab 06/05/20  0559   WBC 5.86   HGB 14.0   HCT 45.3        CMP:   Recent Labs   Lab 06/05/20  0600   *   K 4.0   *   CO2 25   *   BUN 23   CREATININE 1.4   CALCIUM 9.1   PROT 7.4   ALBUMIN 2.7*   BILITOT 0.7   ALKPHOS 71   AST 25   ALT 13   ANIONGAP 13   EGFRNONAA 46.1*       Significant Imaging:   CT head 6/4  Impression       1. No acute intracranial process.  2. Involutional changes with chronic microvascular ischemic changes.  3. Minimal right maxillary sinus disease.     CXR 6/4  Impression       No acute cardiopulmonary disease.             Assessment/Plan:      * COVID-19  - COVID-19 testing   - Infection Control notified     - Isolation:   - Airborne, Contact and Droplet Precautions  - Cohort patients into COVID units  - N95 masks must be fit tested, wear eye protection  - 20 second hand hygiene              - Limit visitors per hospital policy              - Consolidating lab draws, nursing care, provider visits, and interventions    - Diagnostics: (leukopenia, hyponatremia, hyperferritinemia, elevated troponin, elevated d-dimer, age, and comorbidities are significant predictors of poor  clinical outcome)  CBC, CMP, Procalcitonin, Ferritin, CRP, LDH, BNP, Troponin, ECG and Portable CXR    - Management:  Supplemental O2 to maintain SpO2 >92%  Telemetry  Continuous/intermittent Pulse Ox  Albuterol treatment PRN  Avoiding BiPAP to prevent aerosolization (including home BiPAP)    Advance Care Planning   Spoke with patient's daughter, his POA, and she reports that he would wish to be Full Code according to their prior discussions     Presently need additional prognostication information from inflammatory markers to assess anticipated illness trajectory. He remains to appear minimally symptomatic.      Decubitus ulcer of coccygeal region, stage 3  - Wound Care consulted   - will need pressure-reduced mattress, periodic turning, and caregiver education to promote healing and to prevent progression/recurrence.      Dehydration  - Likely secondary to dysphagia.    - Got 1L IVF at [facility] ED  - As he remains NPO, will start slow rate of IVF    Oropharyngeal dysphagia  - Patient with difficulty swallowing going on since around 5/30, per his daughter  - SLP evaluation completed 6/5, recommended NPO with aspiration precautions  - will give slow rate fluids  - PEG was removed 7/2019 by Dr. Foster, as the patient had began to eat and drink on his own without issues      Essential hypertension  - Daughter reports that he was previously treated with Lotrel but was switched to lisinopril only after his blood pressure had improved  - Patient with elevated BP while in the hospital, despite restarting his current home dose lisinopril  - will increase lisinopril and add amlodipine for better BP control      Type 2 diabetes mellitus with hyperglycemia, without long-term current use of insulin  - Does not appear to be on any long-term medication  - hemoglobin A1c 6.7%    Memory deficits  - Patient oriented to person only on exam  - Possibly related to TBI and age; but upon speaking with daughter, Ms. Martínez, she  "reports he is forgetful and "has his moments"  - patient with sitters at home      VTE Risk Mitigation (From admission, onward)         Ordered     enoxaparin injection 40 mg  Daily      06/05/20 0043     IP VTE HIGH RISK PATIENT  Once      06/05/20 0043     Place sequential compression device  Until discontinued      06/05/20 0043                Diet: NPO per SLP evaluation  Disposition: Full code; pending PT/OT evaluation      Sallie Hernandez MD  Department of Hospital Medicine   [facility]    "

## 2020-06-07 PROBLEM — E87.0 HYPERNATREMIA: Status: ACTIVE | Noted: 2020-01-01

## 2020-06-07 NOTE — PROGRESS NOTES
/92, HR 77, patient asymptomatic. [facility] D paged. Will continue to monitor.

## 2020-06-07 NOTE — PLAN OF CARE
Patient remained in stable condition through shift. Remained free of falls and other injuries. Assisted in repositioning. Remained NPO per orders. Bed in locked and lowest position, call light in reach, all questions answered, declines any further needs at this time. Will continue to monitor.

## 2020-06-07 NOTE — SUBJECTIVE & OBJECTIVE
Interval History: Mr. Pitts is asking for coffee, but otherwise without concerns. He reports he is feeling fine. When asked he reports a cough but denies this bothering him very much.    Review of Systems   Unable to perform ROS: Mental status change   Constitutional: Negative for appetite change, chills and fever.   Respiratory: Positive for cough. Negative for shortness of breath.    Cardiovascular: Negative for chest pain and palpitations.   Gastrointestinal: Negative for abdominal pain, diarrhea, nausea and vomiting.   Musculoskeletal: Negative for arthralgias and myalgias.   Neurological: Negative for dizziness, syncope and light-headedness.     Objective:     Vital Signs (Most Recent):  Temp: 97.9 °F (36.6 °C) (06/07/20 0731)  Pulse: 78 (06/07/20 0731)  Resp: 16 (06/07/20 0731)  BP: (!) 174/88 (06/07/20 0731)  SpO2: 98 % (06/07/20 0731) Vital Signs (24h Range):  Temp:  [97.3 °F (36.3 °C)-97.9 °F (36.6 °C)] 97.9 °F (36.6 °C)  Pulse:  [76-78] 78  Resp:  [16-20] 16  SpO2:  [97 %-98 %] 98 %  BP: (172-196)/(83-92) 174/88     Weight: 95.6 kg (210 lb 12.2 oz)  Body mass index is 27.06 kg/m².  No intake or output data in the 24 hours ending 06/07/20 1525   Physical Exam   Constitutional: He appears well-developed and well-nourished. No distress.   HENT:   Head: Normocephalic and atraumatic.   Eyes: Conjunctivae and EOM are normal.   Cardiovascular: Normal rate, regular rhythm, normal heart sounds and intact distal pulses.   Pulmonary/Chest: Effort normal and breath sounds normal. No respiratory distress.   Abdominal: Soft. Bowel sounds are normal. He exhibits no distension. There is no tenderness.   Neurological: He is alert. No cranial nerve deficit.   Oriented to person   Skin: Skin is warm and dry.       Significant Labs:   CBC:   Recent Labs   Lab 06/07/20  0830   WBC 6.57   HGB 13.8*   HCT 44.6        CMP:   Recent Labs   Lab 06/07/20  0449   *   K 3.7   *   CO2 24   GLU 98   BUN 14    CREATININE 0.9   CALCIUM 9.9   PROT 7.5   ALBUMIN 2.6*   BILITOT 0.9   ALKPHOS 69   AST 21   ALT 13   ANIONGAP 14   EGFRNONAA >60.0       Significant Imaging: I have reviewed all pertinent imaging results/findings within the past 24 hours.

## 2020-06-07 NOTE — ASSESSMENT & PLAN NOTE
- Likely secondary to dysphagia    - Got 1L IVF at [facility] ED  - As he remains NPO, will start slow rate of IVF

## 2020-06-07 NOTE — PROGRESS NOTES
[facility]  Hospital Medicine  Progress Note    Patient Name: Hollis Pitts  MRN: [id number]  Patient Class: IP- Inpatient   Admission Date: 6/4/2020  Length of Stay: 3 days  Attending Physician: Sallie Hernandez MD  Primary Care Provider: Vanessa Fontaine NP        Subjective:     Principal Problem:COVID-19        HPI:  85-year-old male with right hemiplegia and dysphagia from traumatic head injury transferred from [place] ED for admission after he was found to be positive for COVID-19.  He is not able to provide much helpful history due to memory difficulties but according to ED notes he was sent to the ED by his primary care physician due to difficulty swallowing and dehydration resulting from that.  He does not know why he is here, but acknowledges that he is in a healthcare setting by asking, Am I real sick?  He denies having any complaints at this time other than being thirsty and wanting some water.    Overview/Hospital Course:  Patient admitted to hospital medicine 6/5. Patient endorsed some minor cough that was not bothersome to him. After discussing with his sister (POA), she reports the main concern was not URI symptoms but that he has not been eating/drinking well due to swallowing difficulties. SLP consulted 6/5.    Interval History: Mr. Pitts is asking for coffee, but otherwise without concerns. He reports he is feeling fine. When asked he reports a cough but denies this bothering him very much.    Review of Systems   Unable to perform ROS: Mental status change   Constitutional: Negative for appetite change, chills and fever.   Respiratory: Positive for cough. Negative for shortness of breath.    Cardiovascular: Negative for chest pain and palpitations.   Gastrointestinal: Negative for abdominal pain, diarrhea, nausea and vomiting.   Musculoskeletal: Negative for arthralgias and myalgias.   Neurological: Negative for dizziness, syncope and light-headedness.      Objective:     Vital Signs (Most Recent):  Temp: 97.9 °F (36.6 °C) (06/07/20 0731)  Pulse: 78 (06/07/20 0731)  Resp: 16 (06/07/20 0731)  BP: (!) 174/88 (06/07/20 0731)  SpO2: 98 % (06/07/20 0731) Vital Signs (24h Range):  Temp:  [97.3 °F (36.3 °C)-97.9 °F (36.6 °C)] 97.9 °F (36.6 °C)  Pulse:  [76-78] 78  Resp:  [16-20] 16  SpO2:  [97 %-98 %] 98 %  BP: (172-196)/(83-92) 174/88     Weight: 95.6 kg (210 lb 12.2 oz)  Body mass index is 27.06 kg/m².  No intake or output data in the 24 hours ending 06/07/20 1525   Physical Exam   Constitutional: He appears well-developed and well-nourished. No distress.   HENT:   Head: Normocephalic and atraumatic.   Eyes: Conjunctivae and EOM are normal.   Cardiovascular: Normal rate, regular rhythm, normal heart sounds and intact distal pulses.   Pulmonary/Chest: Effort normal and breath sounds normal. No respiratory distress.   Abdominal: Soft. Bowel sounds are normal. He exhibits no distension. There is no tenderness.   Neurological: He is alert. No cranial nerve deficit.   Oriented to person   Skin: Skin is warm and dry.       Significant Labs:   CBC:   Recent Labs   Lab 06/07/20  0830   WBC 6.57   HGB 13.8*   HCT 44.6        CMP:   Recent Labs   Lab 06/07/20  0449   *   K 3.7   *   CO2 24   GLU 98   BUN 14   CREATININE 0.9   CALCIUM 9.9   PROT 7.5   ALBUMIN 2.6*   BILITOT 0.9   ALKPHOS 69   AST 21   ALT 13   ANIONGAP 14   EGFRNONAA >60.0       Significant Imaging: I have reviewed all pertinent imaging results/findings within the past 24 hours.      Assessment/Plan:      * COVID-19  - COVID-19 testing   - Infection Control notified     - Isolation:   - Airborne, Contact and Droplet Precautions  - Cohort patients into COVID units  - N95 masks must be fit tested, wear eye protection  - 20 second hand hygiene              - Limit visitors per hospital policy              - Consolidating lab draws, nursing care, provider visits, and interventions    - Diagnostics:  (leukopenia, hyponatremia, hyperferritinemia, elevated troponin, elevated d-dimer, age, and comorbidities are significant predictors of poor clinical outcome)  CBC, CMP, Procalcitonin, Ferritin, CRP, LDH, BNP, Troponin, ECG and Portable CXR    - Management:  Supplemental O2 to maintain SpO2 >92%  Telemetry  Continuous/intermittent Pulse Ox  Albuterol treatment PRN  Avoiding BiPAP to prevent aerosolization (including home BiPAP)    Advance Care Planning   Spoke with patient's daughter, his POA, and she reports that he would wish to be Full Code according to their prior discussions     Presently need additional prognostication information from inflammatory markers to assess anticipated illness trajectory. He remains to appear minimally symptomatic.      Hypernatremia  - Patient appears asymptomatic, though he does have baseline memory deficits which may hide overt AMS  - likely secondary to NPO status  - start D5W      Decubitus ulcer of coccygeal region, stage 3  - Wound Care consulted   - will need pressure-reduced mattress, periodic turning, and caregiver education to promote healing and to prevent progression/recurrence.      Dehydration  - Likely secondary to dysphagia    - Got [facility]  - As he remains NPO, will start slow rate of IVF    Oropharyngeal dysphagia  - Patient with difficulty swallowing going on since around 5/30, per his daughter  - SLP evaluation completed 6/5, recommended NPO with aspiration precautions  - will give slow rate fluids with D5W  - PEG was removed 7/2019 by Dr. Foster, as the patient had began to eat and drink on his own without issues around that time      Essential hypertension  - Daughter reports that he was previously treated with Lotrel but was switched to lisinopril only after his blood pressure had improved  - Patient with elevated BP while in the hospital, despite restarting his current home dose lisinopril  - will increase lisinopril and add amlodipine for  "better BP control      Type 2 diabetes mellitus with hyperglycemia, without long-term current use of insulin  - Does not appear to be on any long-term medication  - hemoglobin A1c 6.7%    Memory deficits  - Patient oriented to person only on exam  - Possibly related to TBI and age; but upon speaking with daughter (POA), Ms. Martínez, she reports he is forgetful and "has his moments"  - patient with sitters at home  - currently near his baseline      VTE Risk Mitigation (From admission, onward)         Ordered     enoxaparin injection 40 mg  Daily      06/05/20 0043     IP VTE HIGH RISK PATIENT  Once      06/05/20 0043     Place sequential compression device  Until discontinued      06/05/20 0043                Diet: NPO per SLP evaluation  Disposition: Pending PT/OT evaluation      Sallie Hernandez MD  Department of Hospital Medicine   [facility]    "

## 2020-06-07 NOTE — ASSESSMENT & PLAN NOTE
"- Patient oriented to person only on exam  - Possibly related to TBI and age; but upon speaking with daughter (POA), Ms. Martínez, she reports he is forgetful and "has his moments"  - patient with sitters at home  - currently near his baseline  "

## 2020-06-07 NOTE — ASSESSMENT & PLAN NOTE
- Patient appears asymptomatic, though he does have baseline memory deficits which may hide overt AMS  - likely secondary to NPO status  - start D5W

## 2020-06-07 NOTE — ASSESSMENT & PLAN NOTE
- Patient with difficulty swallowing going on since around 5/30, per his daughter  - SLP evaluation completed 6/5, recommended NPO with aspiration precautions  - will give slow rate fluids with D5W  - PEG was removed 7/2019 by Dr. Foster, as the patient had began to eat and drink on his own without issues around that time

## 2020-06-08 PROBLEM — R68.89 COPIOUS ORAL SECRETIONS: Status: ACTIVE | Noted: 2020-01-01

## 2020-06-08 PROBLEM — I47.10 SUPRAVENTRICULAR TACHYCARDIA: Status: ACTIVE | Noted: 2020-01-01

## 2020-06-08 NOTE — ASSESSMENT & PLAN NOTE
- Likely secondary to dysphagia    - Got 1L IVF at [facility] ED  - Previously given D5, DC'd 6/8  - continues to be NPO per SLP, may consider restarting fluids if he is to remain NPO

## 2020-06-08 NOTE — PROGRESS NOTES
06/08/20 1030 06/08/20 1040 06/08/20 1043   Vital Signs   Pulse (!) 145  --  (!) 170   Resp (!) 21  --  (!) 31   SpO2 (!) 92 %  --  (!) 84 %   Flow (L/min)  --  6  --    BP  --   --   --       06/08/20 1101 06/08/20 1109   Vital Signs   Pulse (!) 145 99   Resp (!) 35 (!) 28   SpO2 (!) 94 % 100 %   Flow (L/min)  --  5   BP (!) 143/81 122/71   MD at bedside at 1030.  Discussed patient unable to follow directions, not appropriate for oral meds at this time.  MD states she is ordering CXR & EKG.  RN requesting Labetalol or other meds to manage BP and HR.  MD to order.    After MD left bedside, sats dropped & airway secretions increased, sats decreasted to 82% & HR up to 170.  CC Charge RN presented to bedside for assistance & calling RT for NT suction.  NT suction performed with some improvement in sats.  HR remaining elevated.  Labetalol finally available to remove from Pyxis.  Administered with VS as noted. Will notify MD

## 2020-06-08 NOTE — PT/OT/SLP EVAL
"Occupational Therapy   Evaluation and Discharge Note    Name: Hollis Pitts  MRN: [id number]  Admitting Diagnosis:  COVID-19      Recommendations:     Discharge Recommendations: home, other (see comments)(w/ 24/7 care)  Discharge Equipment Recommendations:  none  Barriers to discharge:       Assessment:     Hollis Pitts is a 85 y.o. male with a medical diagnosis of COVID-19. At this time, patient is functioning at their prior level of function and does not require further acute OT services.     Plan:     During this hospitalization, patient does not require further acute OT services.  Please re-consult if situation changes.    · Plan of Care Reviewed with: patient, daughter    Subjective     Chief Complaint: Pt nonverbal during session  Patient/Family Comments/goals: "He has 24/7 sitters that help him with everything"- per daughter when contacted regarding PLOF     Occupational Profile: Obtained from daughter   Living Environment: Pt lives in a [facility] with no SPEEDY.  Previous level of function: 24/7 sitters to assist with ADLs and mobility. Pt can feed self with setup (A). Pt uses georgiana lift to t/f to w/c with assist from sitters. Sitters propel w/c for pt at baseline.  Equipment Used at home:  bedside commode, wheelchair, hospital bed, lift device  Assistance upon Discharge: 24/7 Sitter services     Pain/Comfort:  · Pain Rating 1: (unable to assess)    Patients cultural, spiritual, Hoahaoism conflicts given the current situation: no    Objective:     Communicated with: RN prior to session.  Patient found HOB elevated with blood pressure cuff, pulse ox (continuous), telemetry, peripheral IV, oxygen upon OT entry to room.    General Precautions: Standard, airborne, contact, droplet, fall(covid )   Orthopedic Precautions:N/A   Braces: N/A     Occupational Performance:    Cognitive/Visual Perceptual:  -Pt nonverbal during session; eyes remained closed 95% session despite gentle ROM to UE provided and tactile cueing " provided to improve alertness and engagement in session.     Physical Exam:  -Tolerated PROM to UEs; unable to actively test  strength despite vc's.    AMPAC 6 Click ADL:  AMPAC Total Score: 6    Treatment & Education:  Pt's daughter educated on role of occupational therapy, POC, and safety during ADLs and functional mobility. Pt's daughter and OT discussed importance of safe, continued mobility to optimize daily living skills. Pt's daughter verbalized understanding (pt with baseline cog impairments). White board updated during session. Pt given instruction to call for medical staff/nurse for assistance.     Education:    Patient left HOB elevated with all lines intact, call button in reach and RN Shauna notified    GOALS:   Multidisciplinary Problems     Occupational Therapy Goals     Not on file          Multidisciplinary Problems (Resolved)        Problem: Occupational Therapy Goal    Goal Priority Disciplines Outcome Interventions   Occupational Therapy Goal   (Resolved)     OT, PT/OT Met                    History:     Past Medical History:   Diagnosis Date    Benign prostatic hyperplasia with urinary retention 4/11/2018    Cervical myelopathy with cervical radiculopathy 4/10/2018    Depression     Dyslipidemia     Essential hypertension 4/11/2018    Glaucoma (increased eye pressure)     Mild cognitive impairment     Traumatic brain injury 07/2017    Type 2 diabetes mellitus with hyperglycemia, without long-term current use of insulin 4/11/2018       Past Surgical History:   Procedure Laterality Date    CATARACT EXTRACTION W/  INTRAOCULAR LENS IMPLANT      FOREIGN BODY REMOVAL N/A 7/1/2019    Procedure: REMOVAL, FOREIGN BODY  Removal PEG TUBE;  Surgeon: Yaakov Foster MD;  Location: [facility];  Service: Endoscopy;  Laterality: N/A;    INSERTION OF PERCUTANEOUS ENDOSCOPIC GASTROSTOMY (PEG) FEEDING TUBE      PEG TUBE REMOVAL  07/01/2019       Time Tracking:     OT Date of Treatment: 06/08/20  OT  Start Time: 1414  OT Stop Time: 1419(with additional time in pt room for mobility eval 2p-222p)  OT Total Time (min): 5 min    Billable Minutes:Evaluation 7 min    Kayla Pablo OT  6/8/2020

## 2020-06-08 NOTE — ASSESSMENT & PLAN NOTE
Sinus tachycardia  Hypoxia  - patient noted to have sinus tachycardia and hypoxia on exam, likely from excessive oral secretions  - CXR 6/8 showed no acute detrimental changes  - EKG not completed  - Patient improved with aggressive suctioning, continue

## 2020-06-08 NOTE — PROGRESS NOTES
[facility]  Hospital Medicine  Progress Note    Patient Name: Hollis Pitts  MRN: [id number]  Patient Class: IP- Inpatient   Admission Date: 6/4/2020  Length of Stay: 4 days  Attending Physician: Sallie Hernandez MD  Primary Care Provider: Vanessa Fontaine NP        Subjective:     Principal Problem:COVID-19        HPI:  85-year-old male with right hemiplegia and dysphagia from traumatic head injury transferred from [place] ED for admission after he was found to be positive for COVID-19.  He is not able to provide much helpful history due to memory difficulties but according to ED notes he was sent to the ED by his primary care physician due to difficulty swallowing and dehydration resulting from that.  He does not know why he is here, but acknowledges that he is in a healthcare setting by asking, Am I real sick?  He denies having any complaints at this time other than being thirsty and wanting some water.    Overview/Hospital Course:  Patient admitted to hospital medicine 6/5. Patient endorsed some minor cough that was not bothersome to him. After discussing with his sister (POA), she reports the main concern was not URI symptoms but that he has not been eating/drinking well due to swallowing difficulties. SLP consulted 6/5.    Interval History: Patient examined with nurse at bedside. At the time of exam patient was noted to be somewhat somnolent with gurgling upper airway noises, and he was suctioned. He was not able to answer questions for me at the time. His heart rate was also elevated from 150-170s. O2 saturations around 85%.    Review of Systems   Unable to perform ROS: Mental status change     Objective:     Vital Signs (Most Recent):  Temp: 98.2 °F (36.8 °C) (06/08/20 1448)  Pulse: 103 (06/08/20 1608)  Resp: (!) 25 (06/08/20 1448)  BP: (!) 169/82 (06/08/20 1448)  SpO2: 100 % (06/08/20 1608) Vital Signs (24h Range):  Temp:  [97.5 °F (36.4 °C)-99.7 °F (37.6 °C)]  98.2 °F (36.8 °C)  Pulse:  [] 103  Resp:  [15-35] 25  SpO2:  [84 %-100 %] 100 %  BP: (122-169)/(71-94) 169/82     Weight: 95.6 kg (210 lb 12.2 oz)  Body mass index is 27.06 kg/m².    Intake/Output Summary (Last 24 hours) at 6/8/2020 1839  Last data filed at 6/8/2020 0404  Gross per 24 hour   Intake 886.25 ml   Output --   Net 886.25 ml      Physical Exam   Constitutional: He appears well-developed and well-nourished. No distress.   HENT:   Head: Normocephalic and atraumatic.   Patient with a lot of secretions   Eyes: Conjunctivae and EOM are normal.   Cardiovascular: Regular rhythm, normal heart sounds and intact distal pulses.   Tachycardic   Pulmonary/Chest: No respiratory distress.   Patient with upper airway noises obstructing lung exam.   Abdominal: Soft. Bowel sounds are normal. He exhibits no distension. There is no tenderness.   Neurological: No cranial nerve deficit.   Somnolent on exam, arouses to loud voice   Skin: Skin is warm and dry.       Significant Labs:   CBC:   Recent Labs   Lab 06/07/20  0830   WBC 6.57   HGB 13.8*   HCT 44.6        CMP:   Recent Labs   Lab 06/07/20  0449 06/07/20 2025 06/08/20  1129   * 149* 143   K 3.7 3.4* 2.9*   * 111* 108   CO2 24 23 23   GLU 98 164* 240*   BUN 14 12 10   CREATININE 0.9 0.8 0.9   CALCIUM 9.9 10.0 9.6   PROT 7.5  --   --    ALBUMIN 2.6*  --   --    BILITOT 0.9  --   --    ALKPHOS 69  --   --    AST 21  --   --    ALT 13  --   --    ANIONGAP 14 15 12   EGFRNONAA >60.0 >60.0 >60.0       Significant Imaging: I have reviewed all pertinent imaging results/findings within the past 24 hours.      Assessment/Plan:      * COVID-19  - COVID-19 testing   - Infection Control notified     - Isolation:   - Airborne, Contact and Droplet Precautions  - Cohort patients into COVID units  - N95 masks must be fit tested, wear eye protection  - 20 second hand hygiene              - Limit visitors per hospital policy              - Consolidating lab  draws, nursing care, provider visits, and interventions    - Diagnostics: (leukopenia, hyponatremia, hyperferritinemia, elevated troponin, elevated d-dimer, age, and comorbidities are significant predictors of poor clinical outcome)  CBC, CMP, Procalcitonin, Ferritin, CRP, LDH, BNP, Troponin, ECG and Portable CXR    - Management:  Supplemental O2 to maintain SpO2 >92%  Telemetry  Continuous/intermittent Pulse Ox  Albuterol treatment PRN  Avoiding BiPAP to prevent aerosolization (including home BiPAP)    Advance Care Planning   Spoke with patient's daughter, his POA, and she reports that he would wish to be Full Code according to their prior discussions     Presently need additional prognostication information from inflammatory markers to assess anticipated illness trajectory. He remains to appear minimally symptomatic.      Copious oral secretions  Sinus tachycardia  Hypoxia  - patient noted to have sinus tachycardia and hypoxia on exam, likely from excessive oral secretions  - CXR 6/8 showed no acute detrimental changes  - EKG not completed  - Patient improved with aggressive suctioning, continue      Hypernatremia  - Patient appears asymptomatic, though he does have baseline memory deficits which may hide overt AMS  - likely secondary to NPO status  - corrected with D5W; DC fluids for time being      Decubitus ulcer of coccygeal region, stage 3  - Wound Care consulted   - will need pressure-reduced mattress, periodic turning, and caregiver education to promote healing and to prevent progression/recurrence.      Dehydration  - Likely secondary to dysphagia    - Got 1L IVF at [facility] ED  - Previously given D5, DC'd 6/8  - continues to be NPO per SLP, may consider restarting fluids if he is to remain NPO    Oropharyngeal dysphagia  - Patient with difficulty swallowing going on since around 5/30, per his daughter  - SLP evaluation completed 6/5, reevaluated 6/8, recommended NPO with aspiration precautions  - DC  "fluids for now  - PEG was removed 7/2019 by Dr. Foster, as the patient had began to eat and drink on his own without issues around that time  - may need to readress need for PEG tube if patient unable to take adequate PO      Essential hypertension  - Daughter reports that he was previously treated with Lotrel but was switched to lisinopril only after his blood pressure had improved  - Patient with elevated BP while in the hospital, despite restarting his current home dose lisinopril  - continue lisinopril 20 mg daily  - increase amlodipine 10 mg daily for better BP control      Type 2 diabetes mellitus with hyperglycemia, without long-term current use of insulin  - Does not appear to be on any long-term medication  - hemoglobin A1c 6.7%    Memory deficits  - Possibly related to TBI and age; but upon speaking with daughter (POA), Ms. Martínez, she reports he is forgetful and "has his moments"  - patient with sitters at home      VTE Risk Mitigation (From admission, onward)         Ordered     enoxaparin injection 40 mg  Daily      06/05/20 0043     IP VTE HIGH RISK PATIENT  Once      06/05/20 0043     Place sequential compression device  Until discontinued      06/05/20 0043                Diet: NPO per SLP recommendations for aspiration precautions    Disposition: Home with 24/7 care once medically ready      Sallie Hernandez MD  Department of Hospital Medicine   [facility]    "

## 2020-06-08 NOTE — ASSESSMENT & PLAN NOTE
- Patient appears asymptomatic, though he does have baseline memory deficits which may hide overt AMS  - likely secondary to NPO status  - corrected with D5W; DC fluids for time being

## 2020-06-08 NOTE — SUBJECTIVE & OBJECTIVE
Interval History: Patient examined with nurse at bedside. At the time of exam patient was noted to be somewhat somnolent with gurgling upper airway noises, and he was suctioned. He was not able to answer questions for me at the time. His heart rate was also elevated from 150-170s. O2 saturations around 85%.    Review of Systems   Unable to perform ROS: Mental status change     Objective:     Vital Signs (Most Recent):  Temp: 98.2 °F (36.8 °C) (06/08/20 1448)  Pulse: 103 (06/08/20 1608)  Resp: (!) 25 (06/08/20 1448)  BP: (!) 169/82 (06/08/20 1448)  SpO2: 100 % (06/08/20 1608) Vital Signs (24h Range):  Temp:  [97.5 °F (36.4 °C)-99.7 °F (37.6 °C)] 98.2 °F (36.8 °C)  Pulse:  [] 103  Resp:  [15-35] 25  SpO2:  [84 %-100 %] 100 %  BP: (122-169)/(71-94) 169/82     Weight: 95.6 kg (210 lb 12.2 oz)  Body mass index is 27.06 kg/m².    Intake/Output Summary (Last 24 hours) at 6/8/2020 1839  Last data filed at 6/8/2020 0404  Gross per 24 hour   Intake 886.25 ml   Output --   Net 886.25 ml      Physical Exam   Constitutional: He appears well-developed and well-nourished. No distress.   HENT:   Head: Normocephalic and atraumatic.   Patient with a lot of secretions   Eyes: Conjunctivae and EOM are normal.   Cardiovascular: Regular rhythm, normal heart sounds and intact distal pulses.   Tachycardic   Pulmonary/Chest: No respiratory distress.   Patient with upper airway noises obstructing lung exam.   Abdominal: Soft. Bowel sounds are normal. He exhibits no distension. There is no tenderness.   Neurological: No cranial nerve deficit.   Somnolent on exam, arouses to loud voice   Skin: Skin is warm and dry.       Significant Labs:   CBC:   Recent Labs   Lab 06/07/20  0830   WBC 6.57   HGB 13.8*   HCT 44.6        CMP:   Recent Labs   Lab 06/07/20  0449 06/07/20 2025 06/08/20  1129   * 149* 143   K 3.7 3.4* 2.9*   * 111* 108   CO2 24 23 23   GLU 98 164* 240*   BUN 14 12 10   CREATININE 0.9 0.8 0.9   CALCIUM 9.9  10.0 9.6   PROT 7.5  --   --    ALBUMIN 2.6*  --   --    BILITOT 0.9  --   --    ALKPHOS 69  --   --    AST 21  --   --    ALT 13  --   --    ANIONGAP 14 15 12   EGFRNONAA >60.0 >60.0 >60.0       Significant Imaging: I have reviewed all pertinent imaging results/findings within the past 24 hours.

## 2020-06-08 NOTE — PLAN OF CARE
- Maintaining sats >95% with 4 LPM NC since this AM (see previous note)  - NPO per SLP  - K+ replaced IV, IVF d/c  - Pt pulling when attempting [place] suctioning  - New orders placed for Labetalol for SBP>180 & DBP>100, hold parameters in place

## 2020-06-08 NOTE — PLAN OF CARE
Pt is at baseline mobility, dependent for transfers with 24/7 care. Pt does not require further acute skilled therapy intervention. Discharge from PT services and re-consult if pt experiences a change in status.     Ade Saab, PT, DPT  6/8/2020  [phone]

## 2020-06-08 NOTE — PLAN OF CARE
06/08/20 1511   Discharge Reassessment   Assessment Type Discharge Planning Reassessment   Discharge Plan A Home Health  (resume [facility])   Discharge Plan B Skilled Nursing Facility   DME Needed Upon Discharge  none   Anticipated Discharge Disposition Home-Health   Can the patient/caregiver answer the patient profile reliably? Yes, cognitively intact   How does the patient rate their overall health at the present time? Fair   Describe the patient's ability to walk at the present time. Does not walk or unable to take any steps at all   How often would a person be available to care for the patient? Whenever needed  (pt has 24/7 private duty caregivers)   Number of comorbid conditions (as recorded on the chart) Five or more   Post-Acute Status   Post-Acute Authorization Home Health   Home Health Status Awaiting Internal Medical Clearance     Patient not medically stable to discharge home today. Pox 94% on 5L O2 via NC this AM. Patient's daughter/POA, Bud Stephenson ([phone]) requested 24 hr notice prior to the patient's discharge to ensure private duty sitter availability. CM informed Dr. Hernandez & TAN Robison of request. Will continue to follow.

## 2020-06-08 NOTE — PLAN OF CARE
Patient remained in stable condition through shift. Remained free of falls and other injuries. Assisted in turn q2h. Complete bed change provided at beginning of shift, condom cath applied. D5 infusing at 75 ml/hr per orders. Remained NPO per orders. Bed in locked and lowest position, call light in reach, all questions answered, declines any further needs at this time. Will continue to monitor.

## 2020-06-08 NOTE — PT/OT/SLP EVAL
Physical Therapy Evaluation and Discharge     Patient Name:  Hollis Pitts   MRN:  [id number]    *co-treatment with OT   Recommendations:     Discharge Recommendations:  home(with 24/7 care )   Discharge Equipment Recommendations: none   Barriers to discharge: None    Assessment:     Hollis Pitts is a 85 y.o. male admitted with a medical diagnosis of COVID-19.  He presents with the following impairments/functional limitations:  weakness, impaired self care skills, impaired functional mobilty, impaired cognition, impaired cardiopulmonary response to activity. PT spoke with daughter to gather information regarding pt baseline level of mobility. Per daughter, pt is bed/wheelchair bound, uses [place] lift for transfers. Pt has private caregivers 24/7, who provide assistance for all ADLs. Pt is dependent to propel wheelchair. Pt is able to feed himself at baseline. At this time, pt with no acute PT needs, able to return home with 24/7 care when medically ready. Discharge from PT services and re-consult if pt experiences a change in status.       Rehab Prognosis: Poor;Recent Surgery: * No surgery found *      Plan:     · Plan of Care: Discharge from acute PT 6/8/2020    Subjective     Chief Complaint: unable to assess, pt did not verbalize   Patient/Family Comments/goals: unable to assess   Pain/Comfort:  · Pain Rating 1: (unable to assess)    Patients cultural, spiritual, Confucianism conflicts given the current situation: no    Living Environment:  Per daughter, pt is bed/wheelchair bound, uses [employer] lift for transfers. Pt has private caregivers 24/7, who provide assistance for all ADLs. Pt is dependent to propel wheelchair. Pt is able to feed himself at baseline.  Equipment used at home: bedside commode, wheelchair, hospital bed, lift device.  DME owned (not currently used): none.  Upon discharge, patient will have assistance from private caregivers 24/7.    Objective:     Communicated with RN prior to session.  Patient  found HOB elevated with blood pressure cuff, pulse ox (continuous), telemetry, oxygen, peripheral IV  upon PT entry to room.    General Precautions: Standard, airborne, contact, droplet   Orthopedic Precautions:N/A   Braces: N/A     Exams:  · Cognitive Exam:  Pt difficult to arouse, opened eyes briefly but did not attend to therapist. Pt unable to verbalize, coughing up copious secretions. Pt did not follow any commands.     Functional Mobility:  · Not performed 2/2 pt dependent at baseline       Therapeutic Activities and Exercises:   PT/OT gathered evaluation information from daughter via phone. Explained POC.   Attempted to arouse pt. Pt opened eyes briefly, unable to significantly arouse following. Pt did not follow any commands, no active movement noted. Pt coughing, could hear copious secretions, pt unable to expel.     AM-PAC 6 CLICK MOBILITY  Total Score:6     Patient left HOB elevated with all lines intact and call button in reach.    GOALS:   Multidisciplinary Problems     Physical Therapy Goals     Not on file          Multidisciplinary Problems (Resolved)        Problem: Physical Therapy Goal    Goal Priority Disciplines Outcome Goal Variances Interventions   Physical Therapy Goal   (Resolved)     PT, PT/OT Met                     History:     Past Medical History:   Diagnosis Date    Benign prostatic hyperplasia with urinary retention 4/11/2018    Cervical myelopathy with cervical radiculopathy 4/10/2018    Depression     Dyslipidemia     Essential hypertension 4/11/2018    Glaucoma (increased eye pressure)     Mild cognitive impairment     Traumatic brain injury 07/2017    Type 2 diabetes mellitus with hyperglycemia, without long-term current use of insulin 4/11/2018       Past Surgical History:   Procedure Laterality Date    CATARACT EXTRACTION W/  INTRAOCULAR LENS IMPLANT      FOREIGN BODY REMOVAL N/A 7/1/2019    Procedure: REMOVAL, FOREIGN BODY  Removal PEG TUBE;  Surgeon: Yaakov Foster,  MD;  Location: [facility];  Service: Endoscopy;  Laterality: N/A;    INSERTION OF PERCUTANEOUS ENDOSCOPIC GASTROSTOMY (PEG) FEEDING TUBE      PEG TUBE REMOVAL  07/01/2019       Time Tracking:     PT Received On: 06/08/20  PT Start Time: 1419     PT Stop Time: 1427  PT Total Time (min): 8 min     Billable Minutes: Evaluation 8 mins       Ade Saab, PT  06/08/2020

## 2020-06-08 NOTE — PHYSICIAN QUERY
PT Name: Hollis Pitts  MR #: [id number]     Physician Query Form - Documentation Clarification      CDS: Leonor Strauss RN, CCDS         Contact information :ext [phone] [email or web address]      This form is a permanent document in the medical record.                                                                                                                                                     Query Date: June 8, 2020    By submitting this query, we are merely seeking further clarification of documentation. Please utilize your independent clinical judgment when addressing the question(s) below.    The Medical record reflects the following:    Supporting Clinical Findings Location in Medical Record     Lungs are expanded with mild relative elevation of right diaphragm again seen.  Minor plate atelectasis is present in adjacent lung base.  Lungs are clear elsewhere without consolidation or pleural fluid.      transferred from [place] ED for admission after he was found to be positive for COVID-19  Management:  Supplemental O2 to maintain SpO2 >92%  Telemetry  Continuous/intermittent Pulse Ox  Albuterol treatment PRN    Vital Signs (24h Range):  Temp:  [97.5 °F (36.4 °C)-100.6 °F (38.1 °C)] 97.5 °F (36.4 °C)  Pulse:  [] 69  Resp:  [12-30] 18  SpO2:  [93 %-98 %] 98 %  BP: (109-156)/(53-82) 156/72    O2 @2L/NC  O2 @1L/NC    Patient admitted to hospital medicine 6/5. Patient endorsed some minor cough that was not bothersome to him. After discussing with his sister (POA), she reports the main concern was not URI symptoms but that he has not been eating/drinking well due to swallowing difficulties    Patient with difficulty swallowing going on since around 5/30, per his daughter  - SLP evaluation completed 6/5, recommended NPO with aspiration precautions       CXR 6/4/20            H&P 6/5/20                  VS per H&P 6/5/20                VS record 6/4-6/5/20  VS record  6/5-6/6/20    Hospital medicine PN 6/7/20                                                                                        Doctor, Please specify diagnosis or diagnoses associated with above clinical findings.      Provider Use Only        [     ] URI associated with Covid 19 ruled in    [     ] URI related to other condition ruled in , please specify condition ,_______    [  x   ] URI ruled out    [     ] Other clarification, ______                                                                                                                 [  ] Clinically Undetermined

## 2020-06-08 NOTE — ASSESSMENT & PLAN NOTE
- Patient with difficulty swallowing going on since around 5/30, per his daughter  - SLP evaluation completed 6/5, reevaluated 6/8, recommended NPO with aspiration precautions  - DC fluids for now  - PEG was removed 7/2019 by Dr. Foster, as the patient had began to eat and drink on his own without issues around that time  - may need to readress need for PEG tube if patient unable to take adequate PO

## 2020-06-08 NOTE — ASSESSMENT & PLAN NOTE
- Daughter reports that he was previously treated with Lotrel but was switched to lisinopril only after his blood pressure had improved  - Patient with elevated BP while in the hospital, despite restarting his current home dose lisinopril  - continue lisinopril 20 mg daily  - increase amlodipine 10 mg daily for better BP control

## 2020-06-08 NOTE — PLAN OF CARE
No OT needs.  Kayla Pablo, OT  6/8/2020    Problem: Occupational Therapy Goal  Goal: Occupational Therapy Goal  Outcome: Met

## 2020-06-08 NOTE — PT/OT/SLP PROGRESS
"Speech Language Pathology Treatment    Patient Name:  Hollis Pitts   MRN:  [id number]  Admitting Diagnosis: COVID-19    Recommendations:                 General Recommendations:  ST to continue to follow for ongoing swallow assessment, pt/family education  Diet recommendations:  NPO, Liquid Diet Level: NPO   Aspiration Precautions: Continue alternate means of medication and hydration, consideration of temporary alternate means nutrition advised with Strict aspiration precautions   General Precautions: Standard, airborne, aspiration, contact, droplet, fall  Communication strategies:  provide increased time to answer and go to room if call light pushed    Subjective     SLP reviewed Pt with MD and RN  Pt presents lethargic  He states "no"  Patient goals: ongoing assessment 2/2 minimal alertness     Pain/Comfort:  · Pain Rating 1: (SHAGGY 2/2 cognitive status)    Objective:     Has the patient been evaluated by SLP for swallowing?   Yes  Keep patient NPO? No   Current Respiratory Status: nasal cannula    CXR 6/8/2020: No detrimental change allowing for a more shallow depth of inspiration on the current examination.    Pt seen for ongoing swallow assessment. He was found asleep in bed, partially reclined, with peripheral IV and nasal canula in place. Pt woke per simple verbal cues and notes to cough on secretions. Pt with wet vocal quality, unable to clear vocal quality and continued to cough in secretions. Suctioning provided via Yankauer.  Pt was noted to continue to cough/gag on secretions and SLP attempted to provide additional suction;  however, Pt continued to move head away and took hand to move yankauer away as SLP re-attempted suction to clear.  He was educated on SLP role, aspiration precautions, and encouraged to attempt effortful swallows or hard throat clears to clear secretions. Pt unable to verbalized understanding or provide demonstration of swallow or throat clear upon command. PO trials deferred 2/2 " decreased endurance, wet vocal quality, decreased control of oral secretions and high aspiration risk. Pt not appropriate for PO intake.  Pt remained with call light and Eddie in reach upon SLP exit from room. SLP reviewed findings with RN upon SLP exit from room and handoff with RN. MD notified of findings via secure chat following session.     Assessment:     Hollis Pitts is a 85 y.o. male with an SLP diagnosis of Dysphagia.  He presents with poor control of oral secretions and high aspiration risk. Pt not appropriate for PO intake.    Goals:   Multidisciplinary Problems     SLP Goals        Problem: SLP Goal    Goal Priority Disciplines Outcome   SLP Goal     SLP Ongoing, Not Progressing   Description:  Speech Language Pathology Goals  Goals expected to be met by 6/12:  1. Patient will participate in ongoing swallow assessment to determine least restrictive diet recommendations.                           Plan:     · Patient to be seen:  4 x/week   · Plan of Care expires:  07/04/20  · Plan of Care reviewed with:  patient   · SLP Follow-Up:  Yes       Discharge recommendations:    TBD  Barriers to Discharge:  Means of nutrition not yet established, Pt NPO    Time Tracking:     SLP Treatment Date:   06/08/20  Speech Start Time:  1609  Speech Stop Time:  1622     Speech Total Time (min):  13 min    Billable Minutes: Treatment Swallowing Dysfunction 13    VALENTINO Shelby., CCC-SLP  Speech-Language Pathology  Pager: [phone]    06/08/2020

## 2020-06-08 NOTE — NURSING
Came into patient's room for assessment, mucus seen and patient coughing. Suctioned patient x3. O2 sats remained 95%. Patient AAOx4. All other VSS.     1000: Morning meds not given due to risk for aspiration as patient could barely follow commands and had to be suctioned again.     1030: Dr. Hernandez at bedside.

## 2020-06-08 NOTE — ASSESSMENT & PLAN NOTE
"- Possibly related to TBI and age; but upon speaking with daughter (POA), Ms. Martínez, she reports he is forgetful and "has his moments"  - patient with sitters at home  "

## 2020-06-08 NOTE — PLAN OF CARE
Problem: SLP Goal  Goal: SLP Goal  Description  Speech Language Pathology Goals  Goals expected to be met by 6/12:  1. Patient will participate in ongoing swallow assessment to determine least restrictive diet recommendations.          Outcome: Ongoing, Not Progressing     Pt seen for ongoing swallow assessment. PO trials deferred 2/2 wet vocal quality and decreased control of oral secretions.  Pt guarded against SLP's attempts to suction via Yankauer. RN aware of findings. REC: continue NPO with consideration of temporary alternative means medication/nutrition/hydration and ST to continue to follow for ongoing swallow assessment. MD notified of findings/recs.     VALENTINO Shelby., [facility]-SLP  Speech-Language Pathology  Pager: [phone]  6/8/2020

## 2020-06-09 PROBLEM — E86.0 DEHYDRATION: Status: RESOLVED | Noted: 2020-01-01 | Resolved: 2020-01-01

## 2020-06-09 PROBLEM — I48.91 ATRIAL FIBRILLATION WITH RVR: Status: ACTIVE | Noted: 2020-01-01

## 2020-06-09 PROBLEM — G93.41 ACUTE METABOLIC ENCEPHALOPATHY: Status: ACTIVE | Noted: 2020-01-01

## 2020-06-09 PROBLEM — R57.9 SHOCK: Status: ACTIVE | Noted: 2018-04-21

## 2020-06-09 PROBLEM — E87.20 LACTIC ACIDOSIS: Status: ACTIVE | Noted: 2020-01-01

## 2020-06-09 PROBLEM — E87.0 HYPERNATREMIA: Status: RESOLVED | Noted: 2020-01-01 | Resolved: 2020-01-01

## 2020-06-09 PROBLEM — J96.01 ACUTE HYPOXEMIC RESPIRATORY FAILURE: Status: ACTIVE | Noted: 2020-01-01

## 2020-06-09 PROBLEM — R65.21 SEPTIC SHOCK: Status: ACTIVE | Noted: 2018-04-21

## 2020-06-09 NOTE — ASSESSMENT & PLAN NOTE
- Patient with difficulty swallowing going on since around 5/30, per his daughter  - SLP evaluation completed 6/5, reevaluated 6/8, recommended NPO with aspiration precautions  - PEG was removed 7/2019 by Dr. Foster, as the patient had began to eat and drink on his own without issues around that time  - may need to readress need for PEG tube if patient unable to take adequate PO

## 2020-06-09 NOTE — RESPIRATORY THERAPY
ABG results:    PH: 7.245  CO2: 24.2  O2: 76   Be: -17  HCO3: 10.5  SpO2: 93%    Results reports to Dirk Bustamante MD and Millie Allison PA-C.

## 2020-06-09 NOTE — NURSING
patient's HR sustaining in the 140's on monitor. After obtaining vital signs pt noted to have  Axillary temp 101.9. BP 95/53. Quickly dipped in the 70's systolic. Dr. Hernandez at bedside during events. Many orders placed. Obtained new PIV, initiated first NS bolus. Collected UA. BP now 89/54. 2nd bolus initiated at 0915. Will continue to monitor closely.

## 2020-06-09 NOTE — ASSESSMENT & PLAN NOTE
Hx of TBI currently wheelchair dependent. S/p PEG removal 7/2019. CT head on admission negative for acute abnormalities, chronic microvascular changes.     Likely metabolic due to underlying infection and hypotension.     --Neuro exams  --Continue abx  --Daily SAT

## 2020-06-09 NOTE — PROCEDURES
"Hollis Pitts is a 85 y.o. male patient.    Temp: (!) 100.8 °F (38.2 °C) (06/09/20 0940)  Pulse: (!) 111 (06/09/20 1328)  Resp: (!) 30 (06/09/20 1328)  BP: 93/64 (06/09/20 1043)  SpO2: (!) 94 % (06/09/20 1328)  Weight: 95.6 kg (210 lb 12.2 oz) (06/04/20 2353)  Height: 6' 2" (188 cm) (06/09/20 1328)       Intubation  Date/Time: 6/9/2020 1:44 PM  Location procedure was performed: [facility]  Performed by: Dirk Bustamante MD  Authorized by: Dirk Bustamante MD   Assisting provider: Kamla Chang MD  Pre-operative diagnosis: hypoxemic respiratory failure  Post-operative diagnosis: hypoxemic respiratory failure  Consent Done: Emergent Situation  Indications: respiratory distress,  respiratory failure,  airway protection and  hypoxemia  Description of findings: grade 1 view with glidescope 4   Intubation method: video-assisted  Patient status: paralyzed (RSI)  Preoxygenation: BVM  Sedatives: etomidate  Paralytic: succinylcholine  Laryngoscope size: Glide 4  Tube size: 8.0 mm  Tube type: cuffed  Number of attempts: 1  Cricoid pressure: no  Cords visualized: yes  Post-procedure assessment: CO2 detector  Breath sounds: equal and absent over the epigastrium and clear  Cuff inflated: yes  ETT to teeth: 25 cm  Tube secured with: ETT christensen  Chest x-ray interpreted by me.  Chest x-ray findings: endotracheal tube in appropriate position  Patient tolerance: Patient tolerated the procedure well with no immediate complications  Complications: No  Specimens: No  Implants: No          Dirk Bustamante  6/9/2020  "

## 2020-06-09 NOTE — ASSESSMENT & PLAN NOTE
- Daughter reports that he was previously treated with Lotrel but was switched to lisinopril only after his blood pressure had improved  - Patient with elevated BP while in the hospital, despite restarting his current home dose lisinopril  - hold lisinopril 20 mg daily and amlodipine 10 mg due to hypotension this AM

## 2020-06-09 NOTE — ASSESSMENT & PLAN NOTE
- Patient appears asymptomatic, though he does have baseline memory deficits which may hide overt AMS  - likely secondary to NPO status  - corrected with D5W

## 2020-06-09 NOTE — SUBJECTIVE & OBJECTIVE
Past Medical History:   Diagnosis Date    Benign prostatic hyperplasia with urinary retention 2018    Cervical myelopathy with cervical radiculopathy 4/10/2018    Depression     Dyslipidemia     Essential hypertension 2018    Glaucoma (increased eye pressure)     Mild cognitive impairment     Traumatic brain injury 2017    Type 2 diabetes mellitus with hyperglycemia, without long-term current use of insulin 2018       Past Surgical History:   Procedure Laterality Date    CATARACT EXTRACTION W/  INTRAOCULAR LENS IMPLANT      FOREIGN BODY REMOVAL N/A 2019    Procedure: REMOVAL, FOREIGN BODY  Removal PEG TUBE;  Surgeon: Yaakov Foster MD;  Location: [facility];  Service: Endoscopy;  Laterality: N/A;    INSERTION OF PERCUTANEOUS ENDOSCOPIC GASTROSTOMY (PEG) FEEDING TUBE      PEG TUBE REMOVAL  2019       Review of patient's allergies indicates:   Allergen Reactions    Iodine and iodide containing products Other (See Comments)       Family History     Problem Relation (Age of Onset)    Cancer Maternal Grandmother    Clotting disorder Mother    Paranoid behavior Mother        Tobacco Use    Smoking status: Former Smoker     Last attempt to quit: 1950     Years since quittin.3    Smokeless tobacco: Former User   Substance and Sexual Activity    Alcohol use: Yes     Alcohol/week: 7.0 standard drinks     Types: 7 Shots of liquor per week    Drug use: No    Sexual activity: Yes     Partners: Female      Review of Systems   Unable to perform ROS: Mental status change     Objective:     Vital Signs (Most Recent):  Temp: (!) 100.8 °F (38.2 °C) (20 0940)  Pulse: 101 (20 1735)  Resp: (!) 25 (20 1735)  BP: 93/64 (20 1043)  SpO2: 95 % (20 1735) Vital Signs (24h Range):  Temp:  [99.5 °F (37.5 °C)-102.9 °F (39.4 °C)] 100.8 °F (38.2 °C)  Pulse:  [] 101  Resp:  [14-37] 25  SpO2:  [85 %-100 %] 95 %  BP: ()/(51-64) 93/64  Arterial Line BP:  (116)/(60) 116/60   Weight: 95.3 kg (210 lb)  Body mass index is 26.96 kg/m².      Intake/Output Summary (Last 24 hours) at 6/9/2020 1806  Last data filed at 6/9/2020 0900  Gross per 24 hour   Intake 1000 ml   Output 20 ml   Net 980 ml       Physical Exam   Constitutional: He appears well-developed and well-nourished. He appears toxic. Nasal cannula in place.   HENT:   Head: Normocephalic and atraumatic.   Eyes: Pupils are equal, round, and reactive to light.   Neck: No tracheal deviation present. No thyromegaly present.   Cardiovascular: An irregularly irregular rhythm present. Tachycardia present. Exam reveals no gallop and no friction rub.   No murmur heard.  Pulmonary/Chest: Effort normal. He has decreased breath sounds in the right middle field and the right lower field. He has no wheezes. He has no rhonchi. He has no rales.   Oral gargling    Abdominal: Soft. Bowel sounds are normal.   Musculoskeletal: Normal range of motion.   Neurological: He is unresponsive. GCS eye subscore is 1. GCS verbal subscore is 1. GCS motor subscore is 1.   Skin: Skin is warm and dry.       Vents:  Vent Mode: A/C (06/09/20 1328)  Ventilator Initiated: Yes(chart correction) (06/09/20 1130)  Set Rate: 24 BPM (06/09/20 1328)  Vt Set: 500 mL (06/09/20 1328)  PEEP/CPAP: (S) 10 cmH20 (06/09/20 1459)  Peak Airway Pressure: 19 cmH2O (06/09/20 1328)  Plateau Pressure: 20 cmH20 (06/09/20 1328)  Total Ve: 15.1 mL (06/09/20 1328)  F/VT Ratio<105 (RSBI): (!) 56.6 (06/09/20 1328)  Lines/Drains/Airways     Central Venous Catheter Line            Percutaneous Central Line Insertion/Assessment - Triple Lumen  06/09/20 1215 left subclavian less than 1 day          Drain                 NG/OG Tube 06/09/20 1129 less than 1 day         Urethral Catheter 06/09/20 1245 Straight-tip less than 1 day          Airway                 Airway - Non-Surgical 06/09/20 1126 Endotracheal Tube less than 1 day          Arterial Line                 Arterial Line  06/09/20 1105 Left Radial less than 1 day          Peripheral Intravenous Line                 Peripheral IV - Single Lumen 06/09/20 1115 20 G Anterior;Left Forearm less than 1 day         Peripheral IV - Single Lumen 06/09/20 1804 18 G Right Antecubital less than 1 day              Significant Labs:    CBC/Anemia Profile:  Recent Labs   Lab 06/09/20  0610 06/09/20  1134   WBC 8.16 16.79*   HGB 15.3 12.6*   HCT 50.1 42.0    246   MCV 93 94   RDW 15.6* 15.8*   FERRITIN 675*  --         Chemistries:  Recent Labs   Lab 06/07/20  2025 06/08/20  1129 06/09/20  0610   * 143 145   K 3.4* 2.9* 3.1*   * 108 111*   CO2 23 23 17*   BUN 12 10 17   CREATININE 0.8 0.9 1.8*   CALCIUM 10.0 9.6 9.7   ALBUMIN  --   --  2.1*   PROT  --   --  6.6   BILITOT  --   --  1.7*   ALKPHOS  --   --  53*   ALT  --   --  8*   AST  --   --  14   MG  --   --  1.1*   PHOS  --   --  <1.0*       All pertinent labs within the past 24 hours have been reviewed.    Significant Imaging: I have reviewed and interpreted all pertinent imaging results/findings within the past 24 hours.

## 2020-06-09 NOTE — CARE UPDATE
Rapid Response Respiratory Therapy Note      Code Status: Full Code   : 1935  Bed: 7399/70 A:   MRN: [id number]  Time page Received: 1017  Time Rapid Response RT at Bedside: 1019  Time Rapid Response RT left Bedside: 1114  Report given to: BRITNEY Viramontes RRT    SITUATION     Evaluated patient for: Decreased LOC, tachypnea, BP instability    BACKGROUND     Patient has a past medical history of Benign prostatic hyperplasia with urinary retention, Cervical myelopathy with cervical radiculopathy, Depression, Dyslipidemia, Essential hypertension, Glaucoma (increased eye pressure), Mild cognitive impairment, Traumatic brain injury, and Type 2 diabetes mellitus with hyperglycemia, without long-term current use of insulin.    ASSESSMENT/INTERVENTIONS     Upon arrival in room patient found on 8L NC with increased lethargy. NC changed to 5L and patient presented with coarse breath sounds throughout. Attempted to suction but with no return. ABG was ordered via [facility] with documented results. [facility] deemed [facility] A-line was to be placed and patient to be intubated. All intubation materials were placed at bedside and report given to BRITNEY Viramontes RRT.     Pulse: 131 Respiratory rate: 20 BP: BP: 93/64 SpO2:98  Level of Consciousness: Level of Consciousness (AVPU): responds to voice  Respiratory Effort: Respiratory Effort: Mild, Labored  Expansion/Accessory Muscle Usage: Expansion/Accessory Muscles/Retractions: abdominal muscle use, accessory muscle use  All Lung Field Breath Sounds: All Lung Fields Breath Sounds: Anterior:, Lateral:, coarse  JULITO Breath Sounds: coarse  LLL Breath Sounds: coarse  RUL Breath Sounds: coarse  RML Breath Sounds: coarse  RLL Breath Sounds: coarse  O2 Device/Concentration: 5L NC  Most recent blood gas: No results for input(s): PH, PCO2, PO2, HCO3, POCSATURATED, BE, LACTATE in the last 72 hours.  Current Respiratory Care Orders:   New  "Orders Ordered at: Tue Jun 9, 2020 11:06 AM to be Acknowledged   Acknowledge Section   Start   Ordering Provider    06/09/20 1200  Initiate VAP Protocol - RT Start: 06/09/20 1200, Every 4 hours, Until Specified, Routine   Comments: Initiate VAP protocol q4h    Millie Allison PA-C Acknowledge New   06/09/20 1101  Mechanical ventilation Continuous Start: 06/09/20 1101, Continuous, Until Specified, Routine      Millie Allison PA-C Acknowledge New   06/09/20 1101  Intubation Once Start: 06/09/20 1101, End: 06/09/20 1101, Once, Standing Count: 1 Occurrences, Routine      Millie Allison PA-C Acknowledge New      New Orders Ordered at: Tue Jun 9, 2020 10:30 AM to be Acknowledged   Acknowledge Section   Start   Ordering Provider    06/09/20 1031  POCT ARTERIAL BLOOD GAS Blood Gas, Lytes (NA,K,ICA,HCT) Start: 06/09/20 1031, End: 06/09/20 1031, Once, Standing Count: 1 Occurrences, Routine   Comments: Notify Physician if: see dank...    Sallie Hernandez MD Acknowledge New   06/09/20 1031  POCT ARTERIAL BLOOD GAS Blood Gas Start: 06/09/20 1031, End: 06/09/20 1031, Once, Standing Count: 1 Occurrences, Routine   Comments: Notify Physician if: see dank...    Sallie Hernandez MD Acknowledge New      New Discontinued Orders to be Acknowledged   Acknowledge Section   Discontinued   Discontinuing Provider    06/09/20 1059  MDI Q6H PRN Start: 06/05/20 0033, Every 6 hours PRN, Until Specified, Routine,  Status: Canceled     "And" Linked Group Details    Millie Allison PA-C Acknowledge Discontinue          NIPPV: No Surgical airway: No Vent: No  ETCO2 monitored:    Ambu at bedside: Ambu bag with the patient?: Yes, Adult Ambu    RECOMMENDATIONS      We recommend: Intubation via Charlene BA order and continual monitoring of patient's oxygenation and ventilation status. Thorough suctioning and evaluation of patient for advanced respiratory care.    ESCALATION      Discussed plan of care with HLeandro " Estefany SAHU and primary RTBRITNEY RRT.     FOLLOW-UP     Disposition: Tx in ICU bed RICU 7059.    Please call back the Rapid Response RT, Carlos Woo, RRT at x [phone] for any questions or concerns.

## 2020-06-09 NOTE — NURSING
Stat Paged Dr. Hernandez to inform her that Patient obtunded, minimally responsive to sternal rub. HR AFIB -150. MD stated she was coming to the bedside again. Notified her that we are Unable to obtain auto or manual BP. RICU charge, Jaquan at bedside. Rapid Response called shortly after. IV K+ replacement initiated, new orders obtained from provider. Critical Care at bedside. Patient then upgraded to 7099 to be intubated. Dr. Hernandez, hospitalist, attempted to call and update daughter with no response.

## 2020-06-09 NOTE — CODE/ RAPID DOCUMENTATION
Rapid Response Nurse Chart Check     Chart check completed, abnormal VS note.  Electrolytes to be replaced ASAP.   Please call [phone] for further concerns or assistance.

## 2020-06-09 NOTE — PROGRESS NOTES
Has technical issue with monitor and Arterial BP were not transferring over until 1735. I have been titraing medications to obtain MAP of 65.

## 2020-06-09 NOTE — HOSPITAL COURSE
In the ICU, Mr. Pitts was started on vasopressors and emergently intubated for encephalopathy. His TTE revealed an EF 10%, LV diastolic dysfunction, global hypokinetic wall motion, and severely reduced right ventricular systolic function. Epinephrine initiated for pressor support in additon to vasopressin, plan to wean off levophed. Blood cultures with Staph aureus in all 4 bottles, and sputum culture with many Staph and a presumptive Pseudomonas species so he was started on broad spectrum antibiotics. Critical Care staff spoke with family regarding patients condition on 06/10 and he was made DNR with no escalation of care and or further invasive treatment measures such as CRRT. His daughter has declined to visit. Repeat blood cultures sent 06/11 and lactate remains >11, continuing to wean off levophed and use epinephrine. Added Giapreza 06/11, repeat blood cultures, BLE arterial u/s for decreased doppler pulses

## 2020-06-09 NOTE — PROCEDURES
"Hollis Pitts is a 85 y.o. male patient.    Temp: (!) 100.4 °F (38 °C) (06/09/20 1815)  Pulse: 101 (06/09/20 1815)  Resp: (!) 26 (06/09/20 1815)  BP: (!) 140/66 (06/09/20 1815)  SpO2: 96 % (06/09/20 1815)  Weight: 95.3 kg (210 lb) (06/09/20 1328)  Height: 6' 2" (188 cm) (06/09/20 1328)       Arterial Line  Date/Time: 6/9/2020 6:24 PM  Location procedure was performed: [facility] RESPIRATORY ICU  Performed by: Miguel Mckinney NP  Authorized by: Miguel Mckinney NP   Pre-op Diagnosis: shock  Post-operative diagnosis: Shock  Consent Done: Emergent Situation  Preparation: Patient was prepped and draped in the usual sterile fashion.  Indications: hemodynamic monitoring  Location: left radial  Needle gauge: 20  Seldinger technique: Seldinger technique used  Number of attempts: 1  Post-procedure: line sutured and dressing applied  Patient tolerance: Patient tolerated the procedure well with no immediate complications          Miguel Mckinney  6/9/2020  "

## 2020-06-09 NOTE — CODE/ RAPID DOCUMENTATION
"RAPID RESPONSE NURSE NOTE     Admit Date: 2020  LOS: 5  Code Status: Full Code   Date of Consult: 2020  : 1935  Age: 85 y.o.  Weight:   Wt Readings from Last 1 Encounters:   20 95.6 kg (210 lb 12.2 oz)     Sex: male  Race: Black or    [facility] A:   MRN: [id number]  Time Rapid Response Team page Received: 1016  Time Rapid Response Team at Bedside: 1018  Time Rapid Response Team left Bedside: 1055  Was the patient discharged from an ICU this admission?   no  Was the patient discharged from a PACU within last 24 hours?  no  Did the patient receive conscious sedation/general anesthesia within last 24 hours?  no  Was the patient in the ED within the past 24 hours?  no  Was the patient started on NIPPV within the past 24 hours?  no  Did this progress into an ARC or CPA:  no  Attending Physician: Sallie Hernandez MD  Primary Service: [facility] HOSP MED D  Consult Requested By: Sallie Hernandez MD     SITUATION     Notified by code pager.  Reason for alert: hypotension  Called to evaluate the patient for Circulatory    BACKGROUND     Why is the patient in the hospital?: COVID-19    Patient has a past medical history of Benign prostatic hyperplasia with urinary retention, Cervical myelopathy with cervical radiculopathy, Depression, Dyslipidemia, Essential hypertension, Glaucoma (increased eye pressure), Mild cognitive impairment, Traumatic brain injury, and Type 2 diabetes mellitus with hyperglycemia, without long-term current use of insulin.    ASSESSMENT/INTERVENTIONS     BP 93/64   Pulse 106   Temp (!) 100.8 °F (38.2 °C) (Axillary)   Resp (!) 30   Ht 6' 2" (1.88 m)   Wt 95.6 kg (210 lb 12.2 oz)   SpO2 (!) 88%   BMI 27.06 kg/m²     What did you find: Upon arrival, pt found unresponsive with weak carotid pulse. Primary team and critical care at bedside. -150s. Unable to obtain BP initially. BP 93/64 after Levophed hung. SpO2 93% on 5L NC. ABG obtained. Pt transferred from " room 7091 to 7099 for higher level of care.    RECOMMENDATIONS     We recommend: ICU     FOLLOW-UP/CONTINGENCY PLAN     Call the Rapid Response Nurse, Lisa Bahena RN at x [phone] for additional questions or concerns.    PHYSICIAN ESCALATION     Orders received and case discussed with LUIS ALBERTO Allison.    Disposition: Tx in ICU bed 7099.

## 2020-06-09 NOTE — PLAN OF CARE
Patient was rapid  from Respiratory step down unit, patient came to the unit hypotensive, tachycardic, unresponsive. When patient got to room was intubated by MD, central line placed in left SC, OG placed. Patient was started on Levo, Vaso, Precedex,. Was heavily titrating Levo to keep MAPs above 65. Patients EF is 10%, was started on epinephrine for that. Patients extremities are very cool and dusky. Pulses are +1 throughout. Gave 20mEq of potasssium, and 30mmol of sodium phospahte. Will continue to monitor.

## 2020-06-09 NOTE — PROCEDURES
"Hollis Pitts is a 85 y.o. male patient.    Temp: (!) 100.8 °F (38.2 °C) (06/09/20 0940)  Pulse: (!) 111 (06/09/20 1328)  Resp: (!) 30 (06/09/20 1328)  BP: 93/64 (06/09/20 1043)  SpO2: (!) 94 % (06/09/20 1328)  Weight: 95.6 kg (210 lb 12.2 oz) (06/04/20 2353)  Height: 6' 2" (188 cm) (06/09/20 1328)       Central Line  Date/Time: 6/9/2020 1:42 PM  Location procedure was performed: [facility] CRITICAL CARE MEDICINE  Performed by: Dirk Bustamante MD  Assisting provider: Kamla Chang MD  Pre-operative Diagnosis: septic shock  Post-operative diagnosis: same  Consent Done: Emergent Situation  Time out: Immediately prior to procedure a "time out" was called to verify the correct patient, procedure, equipment, support staff and site/side marked as required.  Indications: med administration and vascular access  Preparation: skin prepped with ChloraPrep  Skin prep agent dried: skin prep agent completely dried prior to procedure  Sterile barriers: all five maximum sterile barriers used - cap, mask, sterile gown, sterile gloves, and large sterile sheet  Hand hygiene: hand hygiene performed prior to central venous catheter insertion  Location details: left subclavian  Catheter type: triple lumen  Catheter Length: 20cm    Ultrasound guidance: no  Manometry: Yes  Number of attempts: 3  Assessment: placement verified by x-ray,  no pneumothorax on x-ray and successful placement  Complications: none  Estimated blood loss (mL): 0  Specimens: No  Implants: No  Post-procedure: line sutured,  chlorhexidine patch,  sterile dressing applied and blood return through all ports  Complications: No          Dirk Bustamante  6/9/2020  "

## 2020-06-09 NOTE — PROGRESS NOTES
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  He is not able to provide much helpful history due to memory difficulties but according to ED notes he was sent to the ED by his primary care physician due to difficulty swallowing and dehydration resulting from that.  He does not know why he is here, but acknowledges that he is in a healthcare setting by asking, Am I real sick?  He denies having any complaints at this time other than being thirsty and wanting some water.    Overview/Hospital Course:  Patient admitted to hospital medicine 6/5. Patient endorsed some minor cough that was not bothersome to him. After discussing with his sister (POA), she reports the main concern was not URI symptoms but that he has not been eating/drinking well due to swallowing difficulties. SLP consulted 6/5 but patient kept NPO for aspiration risk.    Interval History: Patient became febrile overnight with HR sustained in the 130-150s and blood pressure 100/60 (lower than his baseline). Patient with upper airway gurgling noises, and is not interactive.     Review of Systems   Unable to perform ROS: Mental status change     Objective:     Vital Signs (Most Recent):  Temp: (!) 100.8 °F (38.2 °C) (06/09/20 0940)  Pulse: 106 (06/09/20 1044)  Resp: (!) 30 (06/09/20 1044)  BP: 93/64 (06/09/20 1043)  SpO2: (!) 88 % (06/09/20 1044) Vital Signs (24h Range):  Temp:  [98.2 °F (36.8 °C)-102.9 °F (39.4 °C)] 100.8 °F (38.2 °C)  Pulse:  [] 106  Resp:  [14-37]  30  SpO2:  [86 %-100 %] 88 %  BP: ()/(51-82) 93/64     Weight: 95.6 kg (210 lb 12.2 oz)  Body mass index is 27.06 kg/m².    Intake/Output Summary (Last 24 hours) at 6/9/2020 1052  Last data filed at 6/9/2020 0900  Gross per 24 hour   Intake 1000 ml   Output 20 ml   Net 980 ml      Physical Exam   Constitutional: He appears well-developed.   HENT:   Head: Normocephalic and atraumatic.   Cardiovascular: Intact distal pulses.   Tachycardic. Upper airway noises obstructs cardiopulmonary exam; cannot appreciate murmur   Pulmonary/Chest:   Patient is tachypneic with upper airway noises obstructing cardiopulmonary exam   Abdominal: Soft. He exhibits no distension. There is no tenderness.   Musculoskeletal: He exhibits no edema.   Neurological:   Patient obtunded and not responding to voice or touch   Skin: Skin is warm. No rash noted. He is diaphoretic.       Significant Labs:   CBC:   Recent Labs   Lab 06/09/20  0610   WBC 8.16   HGB 15.3   HCT 50.1        CMP:   Recent Labs   Lab 06/07/20  2025 06/08/20  1129 06/09/20  0610   * 143 145   K 3.4* 2.9* 3.1*   * 108 111*   CO2 23 23 17*   * 240* 134*   BUN 12 10 17   CREATININE 0.8 0.9 1.8*   CALCIUM 10.0 9.6 9.7   PROT  --   --  6.6   ALBUMIN  --   --  2.1*   BILITOT  --   --  1.7*   ALKPHOS  --   --  53*   AST  --   --  14   ALT  --   --  8*   ANIONGAP 15 12 17*   EGFRNONAA >60.0 >60.0 34.0*       Significant Imaging: CXR 6/8/20   Impression:       No detrimental change allowing for a more shallow depth of inspiration on the current examination.           Assessment/Plan:      * Sepsis  Sinus tachycardia  Hypoxia  - patient noted to be febrile morning of 6/9, along with tachycardia and relative hypotension  - patient maintained O2 saturations  - CXR 6/8 without acute findings  - patient did not improve with aggressive suctioning  - 2L NS bolus ordered, only mild resonse to fluids  - Blood cultures and urinalysis ordered, repeat CXR and EKG  ordered  - Start Vanc/Zosyn for broad spectrum coverage  - Rapid response called, given patient's deterioration, patient will be moved to ICU for further care      Copious oral secretions  - patient noted to have sinus tachycardia and hypoxia on exam, possibly from excessive oral secretions  - CXR 6/8 showed no acute detrimental changes  - EKG 6/8 shows sinus rhythm with HR 78 with left axis deviation and normal R wave progression. Premature atrial complexes and one PVC noted  - Patient improved with aggressive suctioning, continue      Hypernatremia  - Patient appears asymptomatic, though he does have baseline memory deficits which may hide overt AMS  - likely secondary to NPO status  - corrected with D5W    Decubitus ulcer of coccygeal region, stage 3  - Wound Care consulted   - will need pressure-reduced mattress, periodic turning, and caregiver education to promote healing and to prevent progression/recurrence.      Dehydration  - Likely secondary to dysphagia    - Got 1L IVF at [facility] ED  - SLP re-evaluated 6/8, continued NPO status  - given 2L bolus this AM due to hypotension/sepsis    COVID-19  - COVID-19 testing   - Infection Control notified     - Isolation:   - Airborne, Contact and Droplet Precautions  - Cohort patients into COVID units  - N95 masks must be fit tested, wear eye protection  - 20 second hand hygiene              - Limit visitors per hospital policy              - Consolidating lab draws, nursing care, provider visits, and interventions    - Diagnostics: (leukopenia, hyponatremia, hyperferritinemia, elevated troponin, elevated d-dimer, age, and comorbidities are significant predictors of poor clinical outcome)  CBC, CMP, Procalcitonin, Ferritin, CRP, LDH, BNP, Troponin, ECG and Portable CXR    - Management:  Supplemental O2 to maintain SpO2 >92%  Telemetry  Continuous/intermittent Pulse Ox  Albuterol treatment PRN  Avoiding BiPAP to prevent aerosolization (including home  "BiPAP)    Advance Care Planning   Spoke with patient's daughter, his POA, and she reports that he would wish to be Full Code according to their prior discussions     Presently need additional prognostication information from inflammatory markers to assess anticipated illness trajectory. He remains to appear minimally symptomatic.      Oropharyngeal dysphagia  - Patient with difficulty swallowing going on since around 5/30, per his daughter  - SLP evaluation completed 6/5, reevaluated 6/8, recommended NPO with aspiration precautions  - PEG was removed 7/2019 by Dr. Foster, as the patient had began to eat and drink on his own without issues around that time  - may need to readress need for PEG tube if patient unable to take adequate PO      Essential hypertension  - Daughter reports that he was previously treated with Lotrel but was switched to lisinopril only after his blood pressure had improved  - Patient with elevated BP while in the hospital, despite restarting his current home dose lisinopril  - hold lisinopril 20 mg daily and amlodipine 10 mg due to hypotension this AM      Type 2 diabetes mellitus with hyperglycemia, without long-term current use of insulin  - Does not appear to be on any long-term medication  - hemoglobin A1c 6.7% this admission    Memory deficits  - Possibly related to TBI and age; but upon speaking with daughter (POA), Ms. Martínez, she reports he is forgetful and "has his moments"  - patient with sitters at home      VTE Risk Mitigation (From admission, onward)         Ordered     enoxaparin injection 40 mg  Daily      06/05/20 0043     IP VTE HIGH RISK PATIENT  Once      06/05/20 0043     Place sequential compression device  Until discontinued      06/05/20 0043                Diet: NPO due to aspiration risk    Disposition: Transfer to ICU for further management      Sallie Hernandez MD  Department of Hospital Medicine   [facility] - Respiratory ICU    "

## 2020-06-09 NOTE — PLAN OF CARE
Problem: Adult Inpatient Plan of Care  Goal: Plan of Care Review  Outcome: Ongoing, Progressing     Problem: Electrolyte Imbalance (Acute Kidney Injury/Impairment)  Goal: Serum Electrolyte Balance  Outcome: Ongoing, Progressing     Problem: Fluid Imbalance (Acute Kidney Injury/Impairment)  Goal: Optimal Fluid Balance  Outcome: Ongoing, Progressing     Problem: Fall Injury Risk  Goal: Absence of Fall and Fall-Related Injury  Outcome: Ongoing, Progressing     Problem: Skin Injury Risk Increased  Goal: Skin Health and Integrity  Outcome: Ongoing, Progressing     Problem: Wound  Goal: Optimal Wound Healing  Outcome: Ongoing, Progressing

## 2020-06-09 NOTE — ASSESSMENT & PLAN NOTE
Sinus tachycardia  Hypoxia  - patient noted to be febrile morning of 6/9, along with tachycardia and relative hypotension  - patient maintained O2 saturations  - CXR 6/8 without acute findings  - patient did not improve with aggressive suctioning  - 2L NS bolus ordered, only mild resonse to fluids  - Blood cultures and urinalysis ordered, repeat CXR and EKG ordered  - Start Vanc/Zosyn for broad spectrum coverage  - Rapid response called, given patient's deterioration, patient will be moved to ICU for further care

## 2020-06-09 NOTE — ASSESSMENT & PLAN NOTE
- Likely secondary to dysphagia    - Got 1L IVF at [facility] ED  - SLP re-evaluated 6/8, continued NPO status  - given 2L bolus this AM due to hypotension/sepsis

## 2020-06-09 NOTE — SUBJECTIVE & OBJECTIVE
Interval History: Patient became febrile overnight with HR sustained in the 130-150s and blood pressure 100/60 (lower than his baseline). Patient with upper airway gurgling noises, and is not interactive.     Review of Systems   Unable to perform ROS: Mental status change     Objective:     Vital Signs (Most Recent):  Temp: (!) 100.8 °F (38.2 °C) (06/09/20 0940)  Pulse: 106 (06/09/20 1044)  Resp: (!) 30 (06/09/20 1044)  BP: 93/64 (06/09/20 1043)  SpO2: (!) 88 % (06/09/20 1044) Vital Signs (24h Range):  Temp:  [98.2 °F (36.8 °C)-102.9 °F (39.4 °C)] 100.8 °F (38.2 °C)  Pulse:  [] 106  Resp:  [14-37] 30  SpO2:  [86 %-100 %] 88 %  BP: ()/(51-82) 93/64     Weight: 95.6 kg (210 lb 12.2 oz)  Body mass index is 27.06 kg/m².    Intake/Output Summary (Last 24 hours) at 6/9/2020 1052  Last data filed at 6/9/2020 0900  Gross per 24 hour   Intake 1000 ml   Output 20 ml   Net 980 ml      Physical Exam   Constitutional: He appears well-developed.   HENT:   Head: Normocephalic and atraumatic.   Cardiovascular: Intact distal pulses.   Tachycardic. Upper airway noises obstructs cardiopulmonary exam; cannot appreciate murmur   Pulmonary/Chest:   Patient is tachypneic with upper airway noises obstructing cardiopulmonary exam   Abdominal: Soft. He exhibits no distension. There is no tenderness.   Musculoskeletal: He exhibits no edema.   Neurological:   Patient obtunded and not responding to voice or touch   Skin: Skin is warm. No rash noted. He is diaphoretic.       Significant Labs:   CBC:   Recent Labs   Lab 06/09/20  0610   WBC 8.16   HGB 15.3   HCT 50.1        CMP:   Recent Labs   Lab 06/07/20 2025 06/08/20  1129 06/09/20  0610   * 143 145   K 3.4* 2.9* 3.1*   * 108 111*   CO2 23 23 17*   * 240* 134*   BUN 12 10 17   CREATININE 0.8 0.9 1.8*   CALCIUM 10.0 9.6 9.7   PROT  --   --  6.6   ALBUMIN  --   --  2.1*   BILITOT  --   --  1.7*   ALKPHOS  --   --  53*   AST  --   --  14   ALT  --   --  8*    ANIONGAP 15 12 17*   EGFRNONAA >60.0 >60.0 34.0*       Significant Imaging: CXR 6/8/20   Impression:       No detrimental change allowing for a more shallow depth of inspiration on the current examination.

## 2020-06-09 NOTE — ASSESSMENT & PLAN NOTE
- patient noted to have sinus tachycardia and hypoxia on exam, possibly from excessive oral secretions  - CXR 6/8 showed no acute detrimental changes  - EKG 6/8 shows sinus rhythm with HR 78 with left axis deviation and normal R wave progression. Premature atrial complexes and one PVC noted  - Patient improved with aggressive suctioning, continue

## 2020-06-09 NOTE — RESPIRATORY THERAPY
Patient intubated with 8.0 ETT secured with commercial tube christensen and placed on 980 vent on documented settings.  Will continue to monitor.       06/09/20 1130   Patient Assessment/Suction   Level of Consciousness (AVPU) unresponsive   PRE-TX-O2   O2 Device (Oxygen Therapy) ventilator   $ Is the patient on Low Flow Oxygen? Yes   Oxygen Analyzed Concentration (%) 100 %   SpO2 (!) 85 %   Pulse Oximetry Type Continuous   $ Pulse Oximetry - Multiple Charge Pulse Oximetry - Multiple   Pulse (!) 124   Resp 20   Wound Care   $ Wound Care Tech Time 15 min   Area of Concern Upper lip;Lower lip;Corner lip;Left;Right;Cheek  (WNL)   Skin Color/Characteristics without discoloration   Skin Temperature cool   Was wound care notified? No        Airway - Non-Surgical 06/09/20 1126 Endotracheal Tube   Placement Date/Time: 06/09/20 1126   Method of Intubation: Glidescope  Airway Device: Endotracheal Tube  Mask Ventilation: Easy  Airway Device Size: 8.0  Style: Cuffed  Cuff Inflated With: Air  Placement Verified By: Colorimetric EtCO2 device;Ausculta...   Secured at 25 cm   Measured At Lips   Secured Location Center   Secured by Commercial tube christensen   Bite Block none   Site Condition Cool;Dry   Status Intact;Secured;Patent   Site Assessment Clean;Dry;No bleeding;No drainage   Cuff Pressure 30 cm H2O   Vent Select   Conventional Vent Y   Intubation? Initial intubation   Ventilator Initiated Yes   $ Ventilator Initial 1   Charged w/in last 24h YES   Preset Conventional Ventilator Settings   Vent ID rental   Vent Type    Ventilation Type VC   Vent Mode A/C   Humidity HME   Set Rate 20 BPM   Vt Set 420 mL   PEEP/CPAP 8 cmH20   Waveform RAMP   Peak Flow 60 L/min   I-Trigger Type  Flow   Trigger Sensitivity Flow/I-Trigger 3 L/min   Patient Ventilator Parameters   Resp Rate Total 20 br/min   Peak Airway Pressure 28 cmH2O   Mean Airway Pressure 13 cmH20   Exhaled Vt 433 mL   Total Ve 8.56 mL   Conventional Ventilator Alarms   Alarms  On Y   Ve High Alarm 23 L/min   Vt High Alarm 1200 mL   Vt Low Alarm 200 mL   Resp Rate High Alarm 45 br/min   Press High Alarm 60 cmH2O   Apnea Rate 20   Apnea Volume (mL) 420 mL   Apnea Oxygen Concentration  100   Apnea Flow Rate (L/min) 60   T Apnea 20 sec(s)   Ready to Wean/Extubation Screen   MV<16L (chart vol.) 8.56   PEEP <=8 (chart #) 8   Ready to Wean Parameters   F/VT Ratio<105 (RSBI) (!) 46.19

## 2020-06-09 NOTE — PT/OT/SLP PROGRESS
Speech Language Pathology  Discharge Summary    Hollis Pitts  MRN: [id number]    Patient not seen today secondary to Pt now intubated. ST orders discontinued by MD team. Please re-consult when Pt is appropriate for PO trials. Thank you.     VALENTINO Shelby., [facility]-SLP  Speech-Language Pathology  Pager: [phone]  6/9/2020

## 2020-06-09 NOTE — ASSESSMENT & PLAN NOTE
Likely septic vs cardiogenic. CXR with RML/RLL consolidation concerning for aspiration PNA.  BCx on admission NGTD. UA negative. Lactic 9.5. TTE with EF 10%, global wall motion hypokinesis. S/p 2.5L IVF resuscitation.     --BCx and sputum culture pending  --Lactic q4  --ScVo2 pending  --Requiring norepinephrine, epinephrine and vasopressin. Wean for a MAP >65. Wean down norepinephrine and titrate up the epinephrine due to EF 10%.   --Continue vancomycin and zosyn  --Stress dose steroids initiated

## 2020-06-10 PROBLEM — I10 ESSENTIAL HYPERTENSION: Chronic | Status: ACTIVE | Noted: 2018-04-11

## 2020-06-10 PROBLEM — R33.8 BENIGN PROSTATIC HYPERPLASIA WITH URINARY RETENTION: Chronic | Status: ACTIVE | Noted: 2018-04-11

## 2020-06-10 PROBLEM — E11.65 TYPE 2 DIABETES MELLITUS WITH HYPERGLYCEMIA, WITHOUT LONG-TERM CURRENT USE OF INSULIN: Chronic | Status: ACTIVE | Noted: 2018-04-11

## 2020-06-10 PROBLEM — N40.1 BENIGN PROSTATIC HYPERPLASIA WITH URINARY RETENTION: Chronic | Status: ACTIVE | Noted: 2018-04-11

## 2020-06-10 NOTE — NURSING
Pt remains tachycardic throughout shift, pressure changes noted and team alerted.  Frequent PVCs noted by RN and CC team  Wean Levo and titrate up on Epi per CC team verbal order  Vaso turned off.  Amio stopped by CC team due to hypotension and restarted (see previous note).  Insulin drip started; currently @ 10 units  Heparin currently @ 7 units.  Minimal stimulation by RN due to critical state of pt.  Dubois remains in place, pt diuresed with 40 Lasix.  Vent settings increased to 70% FiO2 and 10 PEEP  RN will continue to monitor

## 2020-06-10 NOTE — PROGRESS NOTES
Pharmacokinetic Assessment Follow Up: IV Vancomycin    Vancomycin Regimen Assessment & Plan:  - Vancomycin level collected with morning labs today resulted as 2.6 ug/mL, sub therapeutic for goal trough 15-20 ug/mL.  - Patient with ANGELIA. Will continue pulse dosing while renal function is unstable.  - Re-loading with vancomycin 2000 mg (20 mg/kg) IV x1 dose since level is so low today. Draw vancomycin level with morning labs tomorrow. Will re-dose as needed depending on level and renal function.    Drug levels (last 3 results):  Recent Labs   Lab Result Units 06/10/20  0312   Vancomycin, Random ug/mL 2.6     Pharmacy will continue to follow and monitor vancomycin.    Please contact pharmacy at extension [phone] for questions regarding this assessment.    Thank you for the consult,   Milo Mcclure     Patient brief summary:  Hollis Pitts is a 85 y.o. male initiated on antimicrobial therapy with IV Vancomycin for treatment of bacteremia    The patient's current regimen is pulse dosing.    Drug Allergies:   Review of patient's allergies indicates:   Allergen Reactions    Iodine and iodide containing products Other (See Comments)       Actual Body Weight:   97.2 kg    Renal Function:   Estimated Creatinine Clearance: 28.3 mL/min (A) (based on SCr of 2.3 mg/dL (H)).,     Dialysis Method (if applicable):  N/A

## 2020-06-10 NOTE — NURSING
Amio order DC'd.  Pt became more tachycardic, HR fluctuating between 120 to 170, team notified.  Verbal order to restart Amio given by CC team  Amio restarted @ 0.5 at 1400, no changes in HR, RN notified team. Phone order given to increase Amio to 1 at 1500.   K+: 3.5, team notified, no PRN electrolyte orders in place due to kidney function. No new orders at this time.   RN will continue to monitor.

## 2020-06-10 NOTE — H&P
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 Per daughter, patient with decreased PO intake which prompted presentation. Patient obtunded upon my evaluation with limited history per chart review.     He was admitted to hospital medicine on 6/5. SLP consulted for evaluation of dysphagia. Rapid response called on 6/9/2020 for episode of afib with RVR, hypotension, and unresponsiveness with upper airway gurgling noises. Patient febrile earlier that morning. 2L NS given and broad spectrum abx were ordered however not given due to poor IV access. Critical care medicine consulted and patient transferred to CMICU for hypotension and AMS.     Hospital/ICU Course:  In the ICU, patient was started on vasopressors and emergently intubated for encephalopathy. TTE with EF 10%, LV diastolic dysfunction, global hypokinetic wall motion, and  severely reduced right ventricular systolic function.      Past Medical History:   Diagnosis Date    Benign prostatic hyperplasia with urinary retention 4/11/2018    Cervical myelopathy with cervical radiculopathy 4/10/2018    Depression     Dyslipidemia     Essential hypertension 4/11/2018    Glaucoma (increased eye pressure)     Mild cognitive impairment     Traumatic brain injury 07/2017    Type 2 diabetes mellitus with hyperglycemia, without long-term current use of insulin 4/11/2018       Past Surgical History:   Procedure Laterality Date    CATARACT EXTRACTION W/   INTRAOCULAR LENS IMPLANT      FOREIGN BODY REMOVAL N/A 2019    Procedure: REMOVAL, FOREIGN BODY  Removal PEG TUBE;  Surgeon: Yaakov Foster MD;  Location[facility];  Service: Endoscopy;  Laterality: N/A;    INSERTION OF PERCUTANEOUS ENDOSCOPIC GASTROSTOMY (PEG) FEEDING TUBE      PEG TUBE REMOVAL  2019       Review of patient's allergies indicates:   Allergen Reactions    Iodine and iodide containing products Other (See Comments)       Family History     Problem Relation (Age of Onset)    Cancer Maternal Grandmother    Clotting disorder Mother    Paranoid behavior Mother        Tobacco Use    Smoking status: Former Smoker     Last attempt to quit: 1950     Years since quittin.3    Smokeless tobacco: Former User   Substance and Sexual Activity    Alcohol use: Yes     Alcohol/week: 7.0 standard drinks     Types: 7 Shots of liquor per week    Drug use: No    Sexual activity: Yes     Partners: Female      Review of Systems   Unable to perform ROS: Mental status change     Objective:     Vital Signs (Most Recent):  Temp: (!) 100.8 °F (38.2 °C) (20 0940)  Pulse: 101 (20 1735)  Resp: (!) 25 (20 1735)  BP: 93/64 (20 1043)  SpO2: 95 % (20 1735) Vital Signs (24h Range):  Temp:  [99.5 °F (37.5 °C)-102.9 °F (39.4 °C)] 100.8 °F (38.2 °C)  Pulse:  [] 101  Resp:  [14-37] 25  SpO2:  [85 %-100 %] 95 %  BP: ()/(51-64) 93/64  Arterial Line BP: (116)/(60) 116/60   Weight: 95.3 kg (210 lb)  Body mass index is 26.96 kg/m².      Intake/Output Summary (Last 24 hours) at 2020 1806  Last data filed at 2020 0900  Gross per 24 hour   Intake 1000 ml   Output 20 ml   Net 980 ml       Physical Exam   Constitutional: He appears well-developed and well-nourished. He appears toxic. Nasal cannula in place.   HENT:   Head: Normocephalic and atraumatic.   Eyes: Pupils are equal, round, and reactive to light.   Neck: No tracheal deviation present. No thyromegaly present.    Cardiovascular: An irregularly irregular rhythm present. Tachycardia present. Exam reveals no gallop and no friction rub.   No murmur heard.  Pulmonary/Chest: Effort normal. He has decreased breath sounds in the right middle field and the right lower field. He has no wheezes. He has no rhonchi. He has no rales.   Oral gargling    Abdominal: Soft. Bowel sounds are normal.   Musculoskeletal: Normal range of motion.   Neurological: He is unresponsive. GCS eye subscore is 1. GCS verbal subscore is 1. GCS motor subscore is 1.   Skin: Skin is warm and dry.       Vents:  Vent Mode: A/C (06/09/20 1328)  Ventilator Initiated: Yes(chart correction) (06/09/20 1130)  Set Rate: 24 BPM (06/09/20 1328)  Vt Set: 500 mL (06/09/20 1328)  PEEP/CPAP: (S) 10 cmH20 (06/09/20 1459)  Peak Airway Pressure: 19 cmH2O (06/09/20 1328)  Plateau Pressure: 20 cmH20 (06/09/20 1328)  Total Ve: 15.1 mL (06/09/20 1328)  F/VT Ratio<105 (RSBI): (!) 56.6 (06/09/20 1328)  Lines/Drains/Airways     Central Venous Catheter Line            Percutaneous Central Line Insertion/Assessment - Triple Lumen  06/09/20 1215 left subclavian less than 1 day          Drain                 NG/OG Tube 06/09/20 1129 less than 1 day         Urethral Catheter 06/09/20 1245 Straight-tip less than 1 day          Airway                 Airway - Non-Surgical 06/09/20 1126 Endotracheal Tube less than 1 day          Arterial Line                 Arterial Line 06/09/20 1105 Left Radial less than 1 day          Peripheral Intravenous Line                 Peripheral IV - Single Lumen 06/09/20 1115 20 G Anterior;Left Forearm less than 1 day         Peripheral IV - Single Lumen 06/09/20 1804 18 G Right Antecubital less than 1 day              Significant Labs:    CBC/Anemia Profile:  Recent Labs   Lab 06/09/20  0610 06/09/20  1134   WBC 8.16 16.79*   HGB 15.3 12.6*   HCT 50.1 42.0    246   MCV 93 94   RDW 15.6* 15.8*   FERRITIN 675*  --         Chemistries:  Recent Labs   Lab  06/07/20 2025 06/08/20  1129 06/09/20  0610   * 143 145   K 3.4* 2.9* 3.1*   * 108 111*   CO2 23 23 17*   BUN 12 10 17   CREATININE 0.8 0.9 1.8*   CALCIUM 10.0 9.6 9.7   ALBUMIN  --   --  2.1*   PROT  --   --  6.6   BILITOT  --   --  1.7*   ALKPHOS  --   --  53*   ALT  --   --  8*   AST  --   --  14   MG  --   --  1.1*   PHOS  --   --  <1.0*       All pertinent labs within the past 24 hours have been reviewed.    Significant Imaging: I have reviewed and interpreted all pertinent imaging results/findings within the past 24 hours.    Assessment/Plan:     Neuro  Acute metabolic encephalopathy  Hx of TBI currently wheelchair dependent. S/p PEG removal 7/2019. CT head on admission negative for acute abnormalities, chronic microvascular changes.     Likely metabolic due to underlying infection and hypotension.     --Neuro exams  --Continue abx  --Daily SAT    Derm  Decubitus ulcer of coccygeal region, stage 3  POA    --Wound care consulted    Pulmonary  Acute hypoxemic respiratory failure  COVID-19 positive 6/4. Minimal respiratory symptoms prior to acute decompensation. CXR with RML/RLL infiltrates. Intubated for airway protection 6/9/2020. New EF 10% with global hypokinesis.     --Continue LPV  --Wean supplemental oxygen for goal saturation 88-92%  --Sputum culture pending  --May benefit from diuresis if vasopressor requirements improve    Cardiac/Vascular  Atrial fibrillation with RVR  Likely 2/2 sepsis.     --s/p amiodarone bolus 6/9  --Continue amiodarone infusion    Essential hypertension  Holding home antihypertensives due to profound shock    Renal/  Lactic acidosis  See shock    Endocrine  Type 2 diabetes mellitus with hyperglycemia, without long-term current use of insulin  A1C 6.7 on admission.     --POCT/SSI     Other  * Shock  Likely septic vs cardiogenic. CXR with RML/RLL consolidation concerning for aspiration PNA.  BCx on admission NGTD. UA negative. Lactic 9.5. TTE with EF 10%, global wall  motion hypokinesis. S/p 2.5L IVF resuscitation.     --BCx and sputum culture pending  --Lactic q4  --ScVo2 pending  --Requiring norepinephrine, epinephrine and vasopressin. Wean for a MAP >65. Wean down norepinephrine and titrate up the epinephrine due to EF 10%.   --Continue vancomycin and zosyn  --Stress dose steroids initiated    COVID-19  See respiratory failure       Discussed with Dr. Chang.     Critical Care Time: 80 minutes  Critical secondary to Patient has a condition that poses threat to life and bodily function: shock requiring vasopressors.      Critical care was time spent personally by me on the following activities: development of treatment plan with patient or surrogate and bedside caregivers, discussions with consultants, evaluation of patient's response to treatment, examination of patient, ordering and performing treatments and interventions, ordering and review of laboratory studies, ordering and review of radiographic studies, pulse oximetry, re-evaluation of patient's condition. This critical care time did not overlap with that of any other provider or involve time for any procedures.     Millie Allison PA-C  Critical Care Medicine  [facility] - Respiratory ICU

## 2020-06-10 NOTE — PLAN OF CARE
Recommendations     Recommendation:   1. If warranted, recommend TF: Peptamen Intense VHP @ 10 mL/ hr increase to reach goal rate of 50mL/ hr to provide 1200 kcals (50% EEN), 110g pro (95% EPN) and 1008 mL free fluid. Additional fluid per MD. Hold TF if residuals >500mL.   2. RD to monitor and follow up     Goals: Pt to tolerate >/= 85% EEN/EPN daily  Nutrition Goal Status: new  Communication of RD Recs: other (comment)(POC)

## 2020-06-10 NOTE — SUBJECTIVE & OBJECTIVE
Past Medical History:   Diagnosis Date    Benign prostatic hyperplasia with urinary retention 2018    Cervical myelopathy with cervical radiculopathy 4/10/2018    Depression     Dyslipidemia     Essential hypertension 2018    Glaucoma (increased eye pressure)     Mild cognitive impairment     Traumatic brain injury 2017    Type 2 diabetes mellitus with hyperglycemia, without long-term current use of insulin 2018       Past Surgical History:   Procedure Laterality Date    CATARACT EXTRACTION W/  INTRAOCULAR LENS IMPLANT      FOREIGN BODY REMOVAL N/A 2019    Procedure: REMOVAL, FOREIGN BODY  Removal PEG TUBE;  Surgeon: Yaakov Foster MD;  Location: [facility];  Service: Endoscopy;  Laterality: N/A;    INSERTION OF PERCUTANEOUS ENDOSCOPIC GASTROSTOMY (PEG) FEEDING TUBE      PEG TUBE REMOVAL  2019       Review of patient's allergies indicates:   Allergen Reactions    Iodine and iodide containing products Other (See Comments)       Family History     Problem Relation (Age of Onset)    Cancer Maternal Grandmother    Clotting disorder Mother    Paranoid behavior Mother        Tobacco Use    Smoking status: Former Smoker     Last attempt to quit: 1950     Years since quittin.3    Smokeless tobacco: Former User   Substance and Sexual Activity    Alcohol use: Yes     Alcohol/week: 7.0 standard drinks     Types: 7 Shots of liquor per week    Drug use: No    Sexual activity: Yes     Partners: Female      Review of Systems   Unable to perform ROS: Mental status change     Objective:     Vital Signs (Most Recent):  Temp: (!) 101.1 °F (38.4 °C) (06/10/20 0805)  Pulse: (!) 111 (06/10/20 0913)  Resp: (!) 27 (06/10/20 0913)  BP: 107/66 (06/10/20 0805)  SpO2: (!) 89 % (06/10/20 0913) Vital Signs (24h Range):  Temp:  [98.2 °F (36.8 °C)-101.1 °F (38.4 °C)] 101.1 °F (38.4 °C)  Pulse:  [] 111  Resp:  [20-38] 27  SpO2:  [85 %-98 %] 89 %  BP: ()/(52-66) 107/66  Arterial  Line BP: ()/(40-78) 117/53   Weight: 97.2 kg (214 lb 4.6 oz)  Body mass index is 27.51 kg/m².      Intake/Output Summary (Last 24 hours) at 6/10/2020 0927  Last data filed at 6/10/2020 0815  Gross per 24 hour   Intake 9128.23 ml   Output 675 ml   Net 8453.23 ml       Physical Exam   Constitutional: He appears well-developed and well-nourished. He appears toxic. Nasal cannula in place.   HENT:   Head: Normocephalic and atraumatic.   Eyes: Pupils are equal, round, and reactive to light.   Neck: No tracheal deviation present. No thyromegaly present.   Cardiovascular: An irregularly irregular rhythm present. Tachycardia present. Exam reveals no gallop and no friction rub.   No murmur heard.  Pulmonary/Chest: Effort normal. He has decreased breath sounds in the right middle field and the right lower field. He has no wheezes. He has no rhonchi. He has no rales.   Oral gargling    Abdominal: Soft. Bowel sounds are normal.   Musculoskeletal: Normal range of motion.   Neurological: He is unresponsive. GCS eye subscore is 1. GCS verbal subscore is 1. GCS motor subscore is 1.   Skin: Skin is warm and dry.       Vents:  Vent Mode: A/C (06/10/20 0715)  Ventilator Initiated: Yes(chart correction) (06/09/20 1130)  Set Rate: 24 BPM (06/10/20 0715)  Vt Set: 500 mL (06/10/20 0715)  PEEP/CPAP: 10 cmH20 (06/10/20 0715)  Oxygen Concentration (%): 60 (06/10/20 0715)  Peak Airway Pressure: 29 cmH2O (06/10/20 0715)  Plateau Pressure: 25 cmH20 (06/10/20 0715)  Total Ve: 16 mL (06/10/20 0715)  F/VT Ratio<105 (RSBI): (!) 46.04 (06/10/20 0715)  Lines/Drains/Airways     Central Venous Catheter Line            Percutaneous Central Line Insertion/Assessment - Triple Lumen  06/09/20 1215 left subclavian less than 1 day          Drain                 NG/OG Tube 06/09/20 1129 less than 1 day         Urethral Catheter 06/09/20 1245 Straight-tip less than 1 day          Airway                 Airway - Non-Surgical 06/09/20 1126 Endotracheal Tube  less than 1 day          Arterial Line                 Arterial Line 06/09/20 1105 Left Radial less than 1 day          Peripheral Intravenous Line                 Peripheral IV - Single Lumen 06/09/20 1115 20 G Anterior;Left Forearm less than 1 day         Peripheral IV - Single Lumen 06/09/20 1804 18 G Right Antecubital less than 1 day              Significant Labs:    CBC/Anemia Profile:  Recent Labs   Lab 06/09/20  0610 06/09/20  1134 06/10/20  0312   WBC 8.16 16.79* 14.36*   HGB 15.3 12.6* 12.3*   HCT 50.1 42.0 40.1    246 190   MCV 93 94 91   RDW 15.6* 15.8* 16.2*   FERRITIN 675*  --   --         Chemistries:  Recent Labs   Lab 06/09/20  1134 06/09/20  1934 06/10/20  0312    135* 133*   K 3.5 3.8 4.1   * 102 98   CO2 12* 12* 13*   BUN 18 20 21   CREATININE 2.3* 2.4* 2.3*   CALCIUM 8.3* 7.7* 7.1*   ALBUMIN 1.7* 1.5* 1.6*   PROT 5.5* 5.2* 5.2*   BILITOT 1.5* 1.3* 1.2*   ALKPHOS 43* 45* 45*   ALT 10 15 19   AST 23 30 32   MG 1.3* 1.2* 2.0   PHOS 2.9 5.6* 5.3*       All pertinent labs within the past 24 hours have been reviewed.    Significant Imaging: I have reviewed and interpreted all pertinent imaging results/findings within the past 24 hours.

## 2020-06-10 NOTE — PROGRESS NOTES
"[facility] - Respiratory ICU  Adult Nutrition  Progress Note    SUMMARY       Recommendations    Recommendation:   1. If warranted, recommend TF: Peptamen Intense VHP @ 10 mL/ hr increase to reach goal rate of 50mL/ hr to provide 1200 kcals (50% EEN), 110g pro (95% EPN) and 1008 mL free fluid. Additional fluid per MD. Hold TF if residuals >500mL.   2. RD to monitor and follow up    Goals: Pt to tolerate >/= 85% EEN/EPN daily  Nutrition Goal Status: new  Communication of RD Recs: other (comment)(POC)    Reason for Assessment    Reason For Assessment: consult  Diagnosis: infection/sepsis(shock, COVID-19+)  Relevant Medical History: Hemiplagia, dysphagia, dyslipidemia, HTN, DM2  Interdisciplinary Rounds: did not attend  General Information Comments: Unable to speak to pt today for consult. Pt nonverbal, intubated and sedated. Pt made DNR this morning. NPO x day 6. Noted pt with 1+ edema and stage 3 pressure injury.  Pt shows stable weight history in chart. NFPE not performed, pt screened positive for Covid 19.    Nutrition Discharge Planning: adequate intake via TF    Nutrition Risk Screen    Nutrition Risk Screen: dysphagia or difficulty swallowing    Nutrition/Diet History    Spiritual, Cultural Beliefs, Latter-day Practices, Values that Affect Care: no  Food Allergies: NKFA  Factors Affecting Nutritional Intake: NPO, on mechanical ventilation    Anthropometrics    Temp: (!) 101.1 °F (38.4 °C)  Height Method: Stated  Height: 6' 2" (188 cm)  Height (inches): 74 in  Weight Method: Bed Scale  Weight: 97.2 kg (214 lb 4.6 oz)  Weight (lb): 214.29 lb  Ideal Body Weight (IBW), Male: 190 lb  % Ideal Body Weight, Male (lb): 112.78 %  BMI (Calculated): 27.5  BMI Grade: 25 - 29.9 - overweight       Lab/Procedures/Meds    Pertinent Labs Reviewed: reviewed  Pertinent Labs Comments: Na 133, Co2 13, Crt 2.3, Glu 470, Ca 7.1, Phos 5.3, Alb 1.6, T James 1.2  Pertinent Medications Reviewed: reviewed  Pertinent Medications " Comments: famotidine, fludrocortisone, hydrocortisone, piperacillin, vancomycin      Estimated/Assessed Needs    Weight Used For Calorie Calculations: 97.2 kg (214 lb 4.6 oz)  Energy Calorie Requirements (kcal): 2021 kcal/day  Energy Need Method: [facility]  Protein Requirements: 116-146 g/day(1.2-1.5g/kg)  Weight Used For Protein Calculations: 97.2 kg (214 lb 4.6 oz)  Fluid Requirements (mL): per MD  Estimated Fluid Requirement Method: RDA Method  RDA Method (mL): 2021  CHO Requirement: 252      Nutrition Prescription Ordered    Current Diet Order: NPO    Evaluation of Received Nutrient/Fluid Intake    I/O: +10.3L since admit  Energy Calories Required: not meeting needs  Protein Required: not meeting needs  Fluid Required: not meeting needs  Comments: LBM 6/6  % Intake of Estimated Energy Needs: 0 - 25 %  % Meal Intake: NPO      Assessment and Plan    Nutrition Problem  Inadequate energy intake    Related to (etiology):   Inability to consume sufficient energy     Signs and Symptoms (as evidenced by):   NPO with no means of nutrition     Interventions (treatment strategy):  Enteral Nutrition    Nutrition Diagnosis Status:   New    Monitor and Evaluation    Food and Nutrient Intake: energy intake, enteral nutrition intake  Food and Nutrient Adminstration: enteral and parenteral nutrition administration  Anthropometric Measurements: weight, body mass index, weight change  Biochemical Data, Medical Tests and Procedures: electrolyte and renal panel, gastrointestinal profile, glucose/endocrine profile, inflammatory profile, lipid profile  Nutrition-Focused Physical Findings: overall appearance       Nutrition Follow-Up    RD Follow-up?: Yes

## 2020-06-10 NOTE — PLAN OF CARE
"      SICU PLAN OF CARE NOTE    Dx: Shock    Shift Events: No acute events occurred throughout shift.  Physician spoke with pt's daughter discussing end of life goals.  Pt made a DNR with no escalation of care.  Pt bathed.     Goals of Care: Will continue to monitor pt's comfort level.      Neuro: Unresponsive    Vital Signs: BP (!) 140/66   Pulse (!) 119   Temp 99.4 °F (37.4 °C) (Oral)   Resp (!) 26   Ht 6' 2" (1.88 m)   Wt 97.2 kg (214 lb 4.6 oz)   SpO2 (!) 91%   BMI 27.51 kg/m²     Respiratory: Ventilator AC VC, FIO2 60%, PEEP 10     Diet: NPO    Gtts: Precedex, Norepinephrine, Vasopressin, Epinephrine, Amiodarone and Heparin and bicarb    Urine Output: Urinary Catheter 695 cc/shift    Drains: None     Labs/Accuchecks: Labs monitored and replaced as needed. Accuchecks Q6 hours.  PRN insulin given as needed.     Skin: See flowsheets.  Triad cream placed on sacral wound.  Heel foams in place.  Tubing kept free of skin. Bed plugged in and working properly.  Waffle mattress inflated.  Pt bathed.        "

## 2020-06-10 NOTE — ASSESSMENT & PLAN NOTE
COVID-19 positive 6/4. Minimal respiratory symptoms prior to acute decompensation. CXR with RML/RLL infiltrates. Intubated for airway protection 6/9/2020. New EF 10% with global hypokinesis.     --Continue LPV  --Wean supplemental oxygen for goal saturation 88-92%  --Sputum culture pending  --May benefit from diuresis if vasopressor requirements improve

## 2020-06-10 NOTE — CARE UPDATE
Spoke with pt's daughter who is POA. I informed her that her dad is in multi-organ failure. Pt has covid pneumonia, intubated for respiratory failure. Has acute renal failure that is progressively worsening, and is on a bicarb gtt. Pt's LA continues to rise, is currently 9.9. 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 Pt's echo showed EF 10% with decreased RV systolic function as well. Pt also on 3 pressers. 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 We discussed his poor prognosis. Daughter was in agreement of making him DNR and no escalation of care. Thus won't be offering dialysis. Won't increase pressers. Offered family chance to come in and say their final good byes and spend time with pt. Family will let us know if they want to come in. Code status updated to DNR to reflect this.

## 2020-06-11 PROBLEM — R73.9 HYPERGLYCEMIA: Status: ACTIVE | Noted: 2020-01-01

## 2020-06-11 PROBLEM — J15.1 PNEUMONIA DUE TO PSEUDOMONAS SPECIES: Status: ACTIVE | Noted: 2020-01-01

## 2020-06-11 PROBLEM — B95.61 BACTEREMIA DUE TO STAPHYLOCOCCUS AUREUS: Status: ACTIVE | Noted: 2020-01-01

## 2020-06-11 PROBLEM — R78.81 BACTEREMIA DUE TO STAPHYLOCOCCUS AUREUS: Status: ACTIVE | Noted: 2020-01-01

## 2020-06-11 NOTE — ASSESSMENT & PLAN NOTE
Sputum culture from 06/09 with presumptive pseudomonas and staph aureus  wean O2 for sat greater than 90%  Continue pip tazo  continue LPV

## 2020-06-11 NOTE — PROGRESS NOTES
Pharmacokinetic Assessment Follow Up: IV Vancomycin    Vancomycin Regimen Assessment & Plan:  - Vancomycin level collected with morning labs today resulted as 17.8 ug/mL (19h level from 2000 mg dose).  - ANGELIA appears to be resolving. SCr down trending and with good UOP. Will transition from pulse dosing to scheduled vancomycin 1500 mg IV q24h.   - Draw trough on 6/12 @0830, goal 15-20 ug/mL.    Drug levels (last 3 results):  Recent Labs   Lab Result Units 06/10/20  0312 06/11/20  0253   Vancomycin, Random ug/mL 2.6 17.8     Pharmacy will continue to follow and monitor vancomycin.    Please contact pharmacy at extension [phone] for questions regarding this assessment.    Thank you for the consult,   Milo Mcclure     Patient brief summary:  Hollis Pitts is a 85 y.o. male initiated on antimicrobial therapy with IV Vancomycin for treatment of bacteremia    The patient's current regimen is pulse dosing.    Drug Allergies:   Review of patient's allergies indicates:   Allergen Reactions    Iodine and iodide containing products Other (See Comments)       Actual Body Weight:   97.2 kg    Renal Function:   Estimated Creatinine Clearance: 35.8 mL/min (A) (based on SCr of 2 mg/dL (H)).,     Dialysis Method (if applicable):  N/A

## 2020-06-11 NOTE — ASSESSMENT & PLAN NOTE
Secondary to staph aureus pna and bacteremia. CXR with RML/RLL consolidation concerning for aspiration PNA. Admission cultures negative. UA negative. TTE with EF 10%, global wall motion hypokinesis. S/p 2.5L IVF resuscitation.     --repeat BCx ordered 06/11   --Lactate >11  --Requiring norepinephrine, epinephrine and vasopressin. Wean for a MAP >65. Wean off norepinephrine and titrate up the epinephrine due to EF 10%.   --add Giapreza  --Continue vancomycin and zosyn  --continue Stress dose steroids

## 2020-06-11 NOTE — NURSING
MD Charlene, Faye, NP, and RN attempted to bridge pt from levo and epi to Ang II and Epi.  Left radial art line removed due to being positional and poor waveform.   Right radial art line placed for more accurate measurement during transition between medications  Ang II started @ 40ng/kg/min and Levo turned off, Epi @ 0.8  Increasing pressor requirements noted. Delbert bump given x1, no response. MD Charlene at bedside.   Levo turned back on, Epi increased, Ang II continued.   Pt less responsive during neuro exam noted by RN, MD notified.   Urine output decreased, MD notified.   RN unable to obtain proper waveform from pulse ox, ABG done.  PO2 : 68. PH: 7.205, MD Charlene notified.   2 amps Bicarb given, Bicarb drip restarted.   Vaso to be restarted.

## 2020-06-11 NOTE — PROGRESS NOTES
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 Per daughter, patient with decreased PO intake which prompted presentation. Patient obtunded upon my evaluation with limited history per chart review.     He was admitted to hospital medicine on 6/5. SLP consulted for evaluation of dysphagia. Rapid response called on 6/9/2020 for episode of afib with RVR, hypotension, and unresponsiveness with upper airway gurgling noises. Patient febrile earlier that morning. 2L NS given and broad spectrum abx were ordered however not given due to poor IV access. Critical care medicine consulted and patient transferred to CMICU for hypotension and AMS.     Hospital/ICU Course:  In the ICU, patient was started on vasopressors and emergently intubated for encephalopathy. TTE with EF 10%, LV diastolic dysfunction, global hypokinetic wall motion, and severely reduced right ventricular systolic function. Epinephrine initiated for pressor support in additon to vasopressin, plan to wean off levophed. Blood cultures with GPCs in all 4 bottles, and sputum culture with many GPCs, on broad spectrum antibiotics. Lactic acid remais elevated and worsening kidney function noted with anuria and insulin gtt added for hyperglycemia. Spoke with family regarding patients condition on 06/10 and patient made DNR with no escalation of care and or further invasive treatment measures such as CRRT. Daughter has declined to visit. Repeat blood cultures sent 06/11  and lactate remains >11, continuing to wean off levophed and use epinephrine as sole pressor.     Past Medical History:   Diagnosis Date    Benign prostatic hyperplasia with urinary retention 2018    Cervical myelopathy with cervical radiculopathy 4/10/2018    Depression     Dyslipidemia     Essential hypertension 2018    Glaucoma (increased eye pressure)     Mild cognitive impairment     Traumatic brain injury 2017    Type 2 diabetes mellitus with hyperglycemia, without long-term current use of insulin 2018       Past Surgical History:   Procedure Laterality Date    CATARACT EXTRACTION W/  INTRAOCULAR LENS IMPLANT      FOREIGN BODY REMOVAL N/A 2019    Procedure: REMOVAL, FOREIGN BODY  Removal PEG TUBE;  Surgeon: Yaakov Foster MD;  Location: [facility];  Service: Endoscopy;  Laterality: N/A;    INSERTION OF PERCUTANEOUS ENDOSCOPIC GASTROSTOMY (PEG) FEEDING TUBE      PEG TUBE REMOVAL  2019       Review of patient's allergies indicates:   Allergen Reactions    Iodine and iodide containing products Other (See Comments)       Family History     Problem Relation (Age of Onset)    Cancer Maternal Grandmother    Clotting disorder Mother    Paranoid behavior Mother        Tobacco Use    Smoking status: Former Smoker     Last attempt to quit: 1950     Years since quittin.3    Smokeless tobacco: Former User   Substance and Sexual Activity    Alcohol use: Yes     Alcohol/week: 7.0 standard drinks     Types: 7 Shots of liquor per week    Drug use: No    Sexual activity: Yes     Partners: Female      Review of Systems   Unable to perform ROS: Mental status change     Objective:     Vital Signs (Most Recent):  Temp: (!) 101.1 °F (38.4 °C) (06/10/20 0805)  Pulse: (!) 111 (06/10/20 0913)  Resp: (!) 27 (06/10/20 0913)  BP: 107/66 (06/10/20 0805)  SpO2: (!) 89 % (06/10/20 0913) Vital Signs (24h Range):  Temp:  [98.2 °F (36.8 °C)-101.1 °F (38.4 °C)] 101.1 °F (38.4 °C)  Pulse:   [] 111  Resp:  [20-38] 27  SpO2:  [85 %-98 %] 89 %  BP: ()/(52-66) 107/66  Arterial Line BP: ()/(40-78) 117/53   Weight: 97.2 kg (214 lb 4.6 oz)  Body mass index is 27.51 kg/m².      Intake/Output Summary (Last 24 hours) at 6/10/2020 0927  Last data filed at 6/10/2020 0815  Gross per 24 hour   Intake 9128.23 ml   Output 675 ml   Net 8453.23 ml       Physical Exam   Constitutional: He appears well-developed and well-nourished. He appears toxic. He is intubated.   HENT:   Head: Normocephalic and atraumatic.   Eyes: Pupils are equal, round, and reactive to light.   Neck: No tracheal deviation present. No thyromegaly present.   Cardiovascular: An irregularly irregular rhythm present. Tachycardia present. Exam reveals no gallop and no friction rub.   No murmur heard.  Pulmonary/Chest: Effort normal. He has decreased breath sounds in the right middle field and the right lower field. He has no wheezes. He has no rhonchi. He has no rales.     Abdominal: Soft. Bowel sounds are normal.   Musculoskeletal: Normal range of motion.   Neurological: He is unresponsive. GCS eye subscore is 1. GCS verbal subscore is 1. GCS motor subscore is 1.   Skin: Skin is warm and dry.       Vents:  Vent Mode: A/C (06/10/20 0715)  Ventilator Initiated: Yes(chart correction) (06/09/20 1130)  Set Rate: 24 BPM (06/10/20 0715)  Vt Set: 500 mL (06/10/20 0715)  PEEP/CPAP: 10 cmH20 (06/10/20 0715)  Oxygen Concentration (%): 60 (06/10/20 0715)  Peak Airway Pressure: 29 cmH2O (06/10/20 0715)  Plateau Pressure: 25 cmH20 (06/10/20 0715)  Total Ve: 16 mL (06/10/20 0715)  F/VT Ratio<105 (RSBI): (!) 46.04 (06/10/20 0715)  Lines/Drains/Airways     Central Venous Catheter Line            Percutaneous Central Line Insertion/Assessment - Triple Lumen  06/09/20 1215 left subclavian less than 1 day          Drain                 NG/OG Tube 06/09/20 1129 less than 1 day         Urethral Catheter 06/09/20 1245 Straight-tip less than 1 day           Airway                 Airway - Non-Surgical 06/09/20 1126 Endotracheal Tube less than 1 day          Arterial Line                 Arterial Line 06/09/20 1105 Left Radial less than 1 day          Peripheral Intravenous Line                 Peripheral IV - Single Lumen 06/09/20 1115 20 G Anterior;Left Forearm less than 1 day         Peripheral IV - Single Lumen 06/09/20 1804 18 G Right Antecubital less than 1 day              Significant Labs:    CBC/Anemia Profile:  Recent Labs   Lab 06/09/20  0610 06/09/20  1134 06/10/20  0312   WBC 8.16 16.79* 14.36*   HGB 15.3 12.6* 12.3*   HCT 50.1 42.0 40.1    246 190   MCV 93 94 91   RDW 15.6* 15.8* 16.2*   FERRITIN 675*  --   --         Chemistries:  Recent Labs   Lab 06/09/20  1134 06/09/20  1934 06/10/20  0312    135* 133*   K 3.5 3.8 4.1   * 102 98   CO2 12* 12* 13*   BUN 18 20 21   CREATININE 2.3* 2.4* 2.3*   CALCIUM 8.3* 7.7* 7.1*   ALBUMIN 1.7* 1.5* 1.6*   PROT 5.5* 5.2* 5.2*   BILITOT 1.5* 1.3* 1.2*   ALKPHOS 43* 45* 45*   ALT 10 15 19   AST 23 30 32   MG 1.3* 1.2* 2.0   PHOS 2.9 5.6* 5.3*       All pertinent labs within the past 24 hours have been reviewed.    Significant Imaging: I have reviewed and interpreted all pertinent imaging results/findings within the past 24 hours.      ABG  Recent Labs   Lab 06/10/20  0913   PH 7.365   PO2 56*   PCO2 31.2*   HCO3 17.8*   BE -8     Assessment/Plan:     Neuro  Acute metabolic encephalopathy  Hx of TBI currently wheelchair dependent. S/p PEG removal 7/2019. CT head on admission negative for acute abnormalities, chronic microvascular changes.     Likely metabolic due to underlying infection and hypotension.     --Neuro exams  --Continue abx  --Daily SAT    Derm  Decubitus ulcer of coccygeal region, stage 3  POA    --Wound care consulted    Pulmonary  Pneumonia due to Pseudomonas species  Sputum culture from 06/09 with presumptive pseudomonas and staph aureus  wean O2 for sat greater than 90%  Continue pip  tazo  continue LPV    Acute hypoxemic respiratory failure  COVID-19 positive 6/4. Minimal respiratory symptoms prior to acute decompensation. CXR with RML/RLL infiltrates. Intubated for airway protection 6/9/2020. New EF 10% with global hypokinesis.     --Continue LPV  --Wean supplemental oxygen for goal saturation 88-92%  --Sputum culture pending  --s/p one dose lasix 06/11 with excellent urine output    Cardiac/Vascular  Atrial fibrillation with RVR  Likely 2/2 sepsis.     --s/p amiodarone bolus 6/9  --Continue amiodarone infusion    Essential hypertension  Holding home antihypertensives due to profound shock    Renal/  Lactic acidosis  See shock    ID  Bacteremia due to Staphylococcus aureus  See shock    Endocrine  Type 2 diabetes mellitus with hyperglycemia, without long-term current use of insulin  A1C 6.7 on admission.   Continue insulin gtt  Follow up beta hydroxy  BG [id number]     Other  * Shock  Secondary to staph aureus pna and bacteremia. CXR with RML/RLL consolidation concerning for aspiration PNA. Admission cultures negative. UA negative. TTE with EF 10%, global wall motion hypokinesis. S/p 2.5L IVF resuscitation.     --repeat BCx ordered 06/11   --Lactate >11  --Requiring norepinephrine, epinephrine and vasopressin. Wean for a MAP >65. Wean off norepinephrine and titrate up the epinephrine due to EF 10%.   --add Giapreza  --Continue vancomycin and zosyn  --continue Stress dose steroids    COVID-19  See respiratory failure      Critical Care Time: 80 minutes  Critical secondary to Patient has a condition that poses threat to life and bodily function: Septic shock      Critical care was time spent personally by me on the following activities: development of treatment plan with patient or surrogate and bedside caregivers, discussions with consultants, evaluation of patient's response to treatment, examination of patient, ordering and performing treatments and interventions, ordering and review of  laboratory studies, ordering and review of radiographic studies, pulse oximetry, re-evaluation of patient's condition. This critical care time did not overlap with that of any other provider or involve time for any procedures.     Miguel Mckinney NP  Critical Care Medicine  [facility] - Res[facility] ICU

## 2020-06-11 NOTE — PROGRESS NOTES
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 Per daughter, patient with decreased PO intake which prompted presentation. Patient obtunded upon my evaluation with limited history per chart review.     He was admitted to hospital medicine on 6/5. SLP consulted for evaluation of dysphagia. Rapid response called on 6/9/2020 for episode of afib with RVR, hypotension, and unresponsiveness with upper airway gurgling noises. Patient febrile earlier that morning. 2L NS given and broad spectrum abx were ordered however not given due to poor IV access. Critical care medicine consulted and patient transferred to CMICU for hypotension and AMS.     Hospital/ICU Course:  In the ICU, patient was started on vasopressors and emergently intubated for encephalopathy. TTE with EF 10%, LV diastolic dysfunction, global hypokinetic wall motion, and severely reduced right ventricular systolic function. Epinephrine initiated for pressor support in additon to vasopressin, plan to wean off levophed. Blood cultures with GPCs in all 4 bottles, and sputum culture with many GPCs, on broad spectrum antibiotics. Lactic acid remais elevated and worsening kidney function noted with anuria and insulin gtt added for hyperglycemia. Spoke with family regarding patients condition on 06/10 and patient made DNR with no escalation of care and or further invasive treatment measures such as CRRT. Daughter has declined to visit. Repeat blood cultures sent 06/11  and lactate remains >11, continuing to wean off levophed and use epinephrine. Added Giapreza 06/11, repeat blood cultures, BLE arterial u/s for decreased doppler pulses    No new subjective & objective note has been filed under this hospital service since the last note was generated.      ABG  Recent Labs   Lab 06/10/20  0913   PH 7.365   PO2 56*   PCO2 31.2*   HCO3 17.8*   BE -8     Assessment/Plan:     Neuro  Acute metabolic encephalopathy  Hx of TBI currently wheelchair dependent. S/p PEG removal 7/2019. CT head on admission negative for acute abnormalities, chronic microvascular changes.     Likely metabolic due to underlying infection and hypotension.     --Neuro exams  --Continue abx  --Daily SAT    Derm  Decubitus ulcer of coccygeal region, stage 3  POA    --Wound care consulted    Pulmonary  Pneumonia due to Pseudomonas species  Sputum culture from 06/09 with presumptive pseudomonas and staph aureus  wean O2 for sat greater than 90%  Continue pip tazo  continue LPV    Acute hypoxemic respiratory failure  Secondary to pseudomonas and staph aureus pna. COVID-19 positive 6/4. Minimal respiratory symptoms prior to acute decompensation. CXR with RML/RLL infiltrates. New EF 10% with global hypokinesis. Intubated for GCS 3 on 06/09    --Continue LPV  --Wean supplemental oxygen for goal saturation 88-92%  --Sputum culture pending  --s/p one dose lasix 06/11 with excellent urine output    Cardiac/Vascular  Atrial fibrillation with RVR  Likely 2/2 sepsis.     --s/p amiodarone bolus 6/9  --Continue amiodarone infusion    Essential hypertension  Holding home antihypertensives due to profound shock    Renal/  Lactic acidosis  See shock    ID  Bacteremia due to Staphylococcus aureus  See shock    Endocrine  Type 2 diabetes mellitus with hyperglycemia, without long-term current use of insulin  A1C 6.7 on admission.   Continue insulin gtt  Follow up beta hydroxy  BG [id number]     Other  * Shock  Secondary to staph  aureus pna and bacteremia. CXR with RML/RLL consolidation concerning for aspiration PNA. Admission cultures negative. UA negative. TTE with EF 10%, global wall motion hypokinesis. S/p 2.5L IVF resuscitation.     --repeat BCx ordered 06/11   --Lactate >11  --Requiring norepinephrine, epinephrine and vasopressin. Wean for a MAP >65. Wean off norepinephrine and titrate up the epinephrine due to EF 10%.   --add Giapreza  --Continue vancomycin and zosyn  --continue Stress dose steroids    COVID-19  See respiratory failure      Critical Care Time: 70 minutes  Critical secondary to Patient has a condition that poses threat to life and bodily function: Septic shock Bacteremia       Critical care was time spent personally by me on the following activities: development of treatment plan with patient or surrogate and bedside caregivers, discussions with consultants, evaluation of patient's response to treatment, examination of patient, ordering and performing treatments and interventions, ordering and review of laboratory studies, ordering and review of radiographic studies, pulse oximetry, re-evaluation of patient's condition. This critical care time did not overlap with that of any other provider or involve time for any procedures.     Miguel Mckinney, NP  Critical Care Medicine  [facility]

## 2020-06-11 NOTE — ASSESSMENT & PLAN NOTE
COVID-19 positive 6/4. Minimal respiratory symptoms prior to acute decompensation. CXR with RML/RLL infiltrates. Intubated for airway protection 6/9/2020. New EF 10% with global hypokinesis.     --Continue LPV  --Wean supplemental oxygen for goal saturation 88-92%  --Sputum culture pending  --s/p one dose lasix 06/11 with excellent urine output

## 2020-06-11 NOTE — CARE UPDATE
Updated daughter, Bud, via telephone regarding patient's clinical status. All questions answered and concerns addressed. She is aware that her father is not likely to survive this hospitalization and agrees with no further escalation in care and supports a DNR status. She was offered the opportunity to visit; however, she politely declined due to her own age and health conditions. She expressed interest in creating a Skype name in order to have a video call and be able to observe her father.       Miguel Mckinney, BO  Pulmonary Critical Care Medicine  [facility]

## 2020-06-11 NOTE — NURSING
Neuro:  Pt able to move L arm spontaneously earlier in shift  Bilateral legs triple flex  No movement R arm  Pupils: 2 R BR, 3 L BR  Afebrile  No gag noted, corneals present, cough weak  At approx 1500, RN noted no longer moving L arm spontaneously  MD notified.     Cardiac:  Pt tachycardic, Afib. Amio drip continued.  Hypotensive episode x1 (see previous note)  RN able to wean levo to 0.36 during shift  CC team wanted to bridge pt from levo and epi to Giapreza and Epi  After transition, pt went hypotensive and became tachycardic  Not responsive to increasing pressors  Levo restarted, Epi increased, Vaso ordered and started.     Resp:  Vent settings: 70% FiO2, 8 PEEP  Unable to obtain appropriate waveform from multiple pulse ox in the afternoon  RN troubleshot by replacing cord, and pulse ox in multiple locations  ABG done  Pt PO2 67, PH 7.2  MD notified    Bicarb drip had been discontinued earlier due to elevated pH.  MD Charlene ordered 2 amp Bicarb push and Bicarb drip restarted.    Lactic acid taken earlier was 11.7, BO Mckinney notified.     GIGU:   Dubois remains in place, catheter care provided per protocol.   Pt urine output decreased over the course of the afternoon from approx 200ml/hr to 40ml/hr.   Team notified.  TF order placed    Skin:  RN unable to locate L DP pulse with doppler, team notified.  Later in shift, RN unable to locate R DP pulse, team notified.   Bilateral US done.   Heat packs placed on pt extremities to promote blood flow    Access:   All venous access remains the same  R radial A line placed  L radial A line removed to positional status and poor waveform    Labs:  120meq of K given, K 4.2    Drips:  Insulin drip paused for BG <80, MD notified.   Heparin drip therapeutic @ 4 units  Levo stopped and then restarted, currently @ 0.5  Epi @ 0.7  Bicarb stopped and restarted @ 150  Vaso restarted  Giapreza stopped    Family notified RN that they wanted to rescind DNR  RN notified BO Mckinney    BO Mckinney spoke to family, DNR remains in place per BO Mckinney

## 2020-06-11 NOTE — PROCEDURES
"Hollis Pitts is a 85 y.o. male patient.    Temp: 97 °F (36.1 °C) (06/11/20 1505)  Pulse: (!) 122 (06/11/20 1705)  Resp: (!) 25 (06/11/20 1705)  BP: (!) 103/56 (06/11/20 1705)  SpO2: (!) 84 % (06/11/20 1705)  Weight: 103 kg (227 lb 1.2 oz) (06/11/20 0500)  Height: 6' 2" (188 cm) (06/11/20 1424)       Arterial Line  Date/Time: 6/11/2020 5:41 PM  Location procedure was performed: [facility] RESPIRATORY ICU  Performed by: Miguel Mckinney NP  Authorized by: Miguel Mckinney NP   Pre-op Diagnosis: shock  Post-operative diagnosis: shock  Consent Done: Emergent Situation  Preparation: Patient was prepped and draped in the usual sterile fashion.  Indications: hemodynamic monitoring  Location: right radial  Needle gauge: 20  Seldinger technique: Seldinger technique used  Number of attempts: 1  Post-procedure: line sutured and dressing applied  Patient tolerance: Patient tolerated the procedure well with no immediate complications          Miguel Mckinney  6/11/2020  "

## 2020-06-11 NOTE — CARE UPDATE
1800: Attempted to reach daughterBud via telephone to provide update on patient's clinical status. A message was left requesting a call back.    Addendum 1900: Daughter, Bud, returned my phone call and an update was given. She expressed that she wanted everything possible done for Mr. Pitts. We discussed the various forms of life support he is on to treat his multiple organ failure and conditions. I explained that his heart is severely depressed and not functioning well at all. I explained that if his heart were to stop despite all our efforts, CPR would not offer any benefit and would prevent him from have a peaceful passing. I suggested that we continue all current interventions with the exception of CPR/shocks in the event of an arrest. She verbalized with me that she was in agreement. I asked that if she have any concerns or further questions regarding Mr. Pitts's care or trajectory to please call at any time. I again offered for her to come and visit with him and she maintains that she should not. I assured her the team is working diligently to off the best care possible. She seemed grateful and looks forward to speaking with the team again tomorrow.     Miguel Mckinney, NP  Pulmonary Critical Care Medicine  [facility]

## 2020-06-11 NOTE — ASSESSMENT & PLAN NOTE
Secondary to pseudomonas and staph aureus pna. COVID-19 positive 6/4. Minimal respiratory symptoms prior to acute decompensation. CXR with RML/RLL infiltrates. New EF 10% with global hypokinesis. Intubated for GCS 3 on 06/09    --Continue LPV  --Wean supplemental oxygen for goal saturation 88-92%  --Sputum culture pending  --s/p one dose lasix 06/11 with excellent urine output

## 2020-06-11 NOTE — NURSING
Dr. Guerrero notified about sustaining -150s in and out of AFIB with frequents PVCs. Also asked about replacing his potassium and/or drawing new labs.

## 2020-06-12 NOTE — PROGRESS NOTES
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 Per his daughter, Mr. Pitts developed decreased PO intake which prompted presentation to the ED.    He was admitted to hospital medicine on 6/5/20. SLP consulted for evaluation of dysphagia. A rapid response was called on 6/9/2020 for episode of afib with RVR, hypotension, and unresponsiveness with upper airway gurgling noises. He was febrile earlier that morning. 2L NS given and broad spectrum abx were ordered however not given due to poor IV access. Critical care medicine consulted and patient transferred to CMICU for hypotension and AMS.     Hospital/ICU Course:  In the ICU, Mr. Pitts was started on vasopressors and emergently intubated for encephalopathy. His TTE revealed an EF 10%, LV diastolic dysfunction, global hypokinetic wall motion, and severely reduced right ventricular systolic function. Epinephrine initiated for pressor support in additon to vasopressin, plan to wean off levophed. Blood cultures with Staph aureus in all 4 bottles, and sputum culture with many Staph and a presumptive Pseudomonas species so he was started on broad spectrum antibiotics. Critical Care staff spoke with family regarding patients condition on 06/10 and he was made DNR with no escalation of care and or further invasive treatment measures such as CRRT. His daughter has declined to visit. Repeat blood cultures sent 06/11 and lactate remains >11, continuing to wean off  levophed and use epinephrine. Added Giapreza 06/11, repeat blood cultures, BLE arterial u/s for decreased doppler pulses    Interval History/Significant Events: No significant events overnight. Off insulin gtt this morning. Giapreza titrated to off--still on norepinephrine, epinephrine, and vasopressin.     Review of Systems   Unable to perform ROS: Intubated     Objective:     Vital Signs (Most Recent):  Temp: (!) 101.2 °F (38.4 °C) (06/12/20 0700)  Pulse: 108 (06/12/20 0848)  Resp: (!) 24 (06/12/20 0848)  BP: 109/60 (06/12/20 0700)  SpO2: (!) 93 % (06/12/20 0848) Vital Signs (24h Range):  Temp:  [97 °F (36.1 °C)-101.2 °F (38.4 °C)] 101.2 °F (38.4 °C)  Pulse:  [] 108  Resp:  [24-29] 24  SpO2:  [75 %-98 %] 93 %  BP: ()/() 109/60  Arterial Line BP: ()/() 115/41   Weight: 103 kg (227 lb 1.2 oz)  Body mass index is 29.15 kg/m².      Intake/Output Summary (Last 24 hours) at 6/12/2020 0935  Last data filed at 6/12/2020 0601  Gross per 24 hour   Intake 4972.8 ml   Output 1562 ml   Net 3410.8 ml       Physical Exam   Constitutional: He appears well-developed and well-nourished. He is intubated.   HENT:   Head: Normocephalic and atraumatic.   Mouth/Throat: Oropharynx is clear and moist.   Eyes: Pupils are equal, round, and reactive to light. Conjunctivae are normal. Right eye exhibits no discharge. Left eye exhibits no discharge. No scleral icterus.   Neck: Trachea normal, normal range of motion and full passive range of motion without pain. Neck supple. No JVD present. No tracheal deviation present. No thyromegaly present.   Cardiovascular: Normal rate, regular rhythm, S1 normal, S2 normal, normal heart sounds and intact distal pulses. Exam reveals no gallop and no friction rub.   No murmur heard.  Pulmonary/Chest: Breath sounds normal. He is intubated. He is in respiratory distress. He has no wheezes. He has no rales. He exhibits no tenderness.   Abdominal: Soft. Bowel sounds are normal. He  exhibits no distension and no mass. There is no tenderness. There is no rebound and no guarding.   Musculoskeletal: Normal range of motion. He exhibits no tenderness or deformity.   Lymphadenopathy:     He has no cervical adenopathy.   Neurological: No cranial nerve deficit. Coordination normal.   Skin: Skin is warm and dry. No abrasion and no bruising noted.   Vitals reviewed.      Vents:  Vent Mode: A/C (06/12/20 0848)  Ventilator Initiated: Yes(chart correction) (06/09/20 1130)  Set Rate: 24 BPM (06/12/20 0848)  Vt Set: 500 mL (06/12/20 0848)  PEEP/CPAP: 8 cmH20 (06/12/20 0848)  Oxygen Concentration (%): 80 (06/12/20 0848)  Peak Airway Pressure: 22 cmH2O (06/12/20 0848)  Plateau Pressure: 12 cmH20 (06/12/20 0848)  Total Ve: 13.5 mL (06/12/20 0848)  F/VT Ratio<105 (RSBI): (!) 46.42 (06/12/20 0848)  Lines/Drains/Airways     Central Venous Catheter Line            Percutaneous Central Line Insertion/Assessment - Triple Lumen  06/09/20 1215 left subclavian 2 days          Drain                 NG/OG Tube 06/09/20 1129 2 days         Urethral Catheter 06/09/20 1245 Straight-tip 2 days          Airway                 Airway - Non-Surgical 06/09/20 1126 Endotracheal Tube 2 days          Arterial Line                 Arterial Line 06/11/20 1530 Right Radial less than 1 day          Peripheral Intravenous Line                 Peripheral IV - Single Lumen 06/09/20 1115 20 G Anterior;Left Forearm 2 days         Peripheral IV - Single Lumen 06/09/20 1804 18 G Right Antecubital 2 days              Significant Labs:    CBC/Anemia Profile:  Recent Labs   Lab 06/11/20  0253 06/12/20  0234   WBC 9.91 8.37   HGB 11.4* 11.2*   HCT 36.6* 35.2*   * 69*   MCV 90 89   RDW 15.8* 16.4*        Chemistries:  Recent Labs   Lab 06/11/20  0253 06/11/20  1621 06/12/20  0234    137 138   K 2.5* 4.2 3.9   CL 93* 92* 89*   CO2 20* 12* 11*   BUN 27* 29* 33*   CREATININE 2.0* 2.0* 2.2*   CALCIUM 6.3* 6.4* 6.3*   ALBUMIN 1.2*  --  1.2*    PROT 4.6*  --  4.8*   BILITOT 2.1*  --  3.4*   ALKPHOS 43*  --  64   ALT 19  --  25   AST 27  --  54*   MG 1.3*  --  1.9   PHOS 2.2* 5.2*  --      Significant Imaging:  I have reviewed and interpreted all pertinent imaging results/findings within the past 24 hours.      ABG  Recent Labs   Lab 06/12/20  0457   PH 7.224*   PO2 93   PCO2 28.1*   HCO3 11.6*   BE -16     Assessment/Plan:     Neuro  Acute metabolic encephalopathy  --Hx of TBI currently wheelchair dependent. S/p PEG removal 7/2019. CT head on admission negative for acute abnormalities, chronic microvascular changes.   --Continue to monitor.     Derm  Decubitus ulcer of coccygeal region, stage 3  --POA.  --Wound care consulted.    Pulmonary  Pneumonia due to Pseudomonas species  --Continue empiric pip/tazo. Follow sensitivities.     Acute hypoxemic respiratory failure  --Secondary to Pseudomonas and Staph aureus PNA. COVID-19 positive 6/4. Minimal respiratory symptoms prior to acute decompensation. CXR with RML/RLL infiltrates. New EF 10% with global hypokinesis. Intubated for GCS 3 on 06/09  --Follow up Staph sensitivities and deescalate as indicated.  --Wean ventilator as tolerated. SAT/SBT contraindicated.     Cardiac/Vascular  Atrial fibrillation with RVR  --Likely 2/2 sepsis.   --S/p amiodarone bolus 6/9.  --Continue amiodarone infusion.   --Fairly well rate controlled this morning.     Essential hypertension  Holding home antihypertensives due to profound shock    Renal/  Lactic acidosis  --2/2 septic shock.   --Will stop trending as it is not changing care plan.     ANGELIA (acute kidney injury)  --2/2 ischemic ATN.   --Making some urine. Furosemide responsive.   --No acute indication for RRT. Continue to monitor.     ID  * Septic shock  --Secondary to Staph aureus PNA/bacteremia and Pseudomonas PNA.   --Follow up surveillance cultures.   --Lactate >11  --Titrate norepinephrine + epinephrine + vasopressin to keep MAP>65mmHg.   --Giapreza stopped  overnight for unclear reasons.   --Continue vancomycin and pip/tazo. Consider aminoglycoside.   --Continue stress dose steroids.  --High risk of death in need of immediate intervention.     Bacteremia due to Staphylococcus aureus  --2nd set of blood cultures positive for GPCs.  --Send repeat blood cultures today.   --Continue vancomycin and f/u sensitivities.   --Hold on echocardiogram for now. May need to repeat 2D echo if blood cultures remain positive.     Endocrine  Type 2 diabetes mellitus with hyperglycemia, without long-term current use of insulin  --Likely acutely exacerbated by stress of shock + stress dose steroids.   --Wean insulin gtt to off today.   --SSI for now. CBG goal 140-180.     Other  COVID-19  --See respiratory failure      Critical Care Time: 55 minutes  Critical secondary to Patient has a condition that poses threat to life and bodily function: shock      Critical care was time spent personally by me on the following activities: development of treatment plan with patient or surrogate and bedside caregivers, discussions with consultants, evaluation of patient's response to treatment, examination of patient, ordering and performing treatments and interventions, ordering and review of laboratory studies, ordering and review of radiographic studies, pulse oximetry, re-evaluation of patient's condition. This critical care time did not overlap with that of any other provider or involve time for any procedures.     Too Argueta PA-C  Critical Care Medicine  [facility] - Respiratory ICU

## 2020-06-12 NOTE — ASSESSMENT & PLAN NOTE
--Secondary to Pseudomonas and Staph aureus PNA. COVID-19 positive 6/4. Minimal respiratory symptoms prior to acute decompensation. CXR with RML/RLL infiltrates. New EF 10% with global hypokinesis. Intubated for GCS 3 on 06/09  --Follow up Staph sensitivities and deescalate as indicated.  --Wean ventilator as tolerated. SAT/SBT contraindicated.

## 2020-06-12 NOTE — ASSESSMENT & PLAN NOTE
--2/2 ischemic ATN.   --Making some urine. Furosemide responsive.   --No acute indication for RRT. Continue to monitor.

## 2020-06-12 NOTE — ASSESSMENT & PLAN NOTE
--Hx of TBI currently wheelchair dependent. S/p PEG removal 7/2019. CT head on admission negative for acute abnormalities, chronic microvascular changes.   --Continue to monitor.

## 2020-06-12 NOTE — ASSESSMENT & PLAN NOTE
--2nd set of blood cultures positive for GPCs.  --Send repeat blood cultures today.   --Continue vancomycin and f/u sensitivities.   --Hold on echocardiogram for now. May need to repeat 2D echo if blood cultures remain positive.

## 2020-06-12 NOTE — CARE UPDATE
Called his sister Bud Martínez to provide routine update. I expressed he remains critically ill without any major improvement since yesterday. All questions answered.     CATY Argueta PA-C  Critical Care Medicine  [phone]

## 2020-06-12 NOTE — PROGRESS NOTES
Pharmacokinetic Assessment Follow Up: IV Vancomycin    Vancomycin Regimen Assessment & Plan:  - Vancomycin level trough collected correctly this morning resulted as 21.1 ug/mL, supra therapeutic for goal trough 15-20 ug/mL.  - Decreasing scheduled vancomycin regimen from 1500 mg IV q24h to 1000 mg IV q24h.  - Draw next trough on 6/14 @1230 prior to 3rd dose of this new regimen.    Drug levels (last 3 results):  Recent Labs   Lab Result Units 06/10/20  0312 06/11/20  0253 06/12/20  0824   Vancomycin, Random ug/mL 2.6 17.8  --    Vancomycin-Trough ug/mL  --   --  21.1     Pharmacy will continue to follow and monitor vancomycin.    Please contact pharmacy at extension [phone] for questions regarding this assessment.    Thank you for the consult,   Milo Mcclure     Patient brief summary:  Hollis Pitts is a 85 y.o. male initiated on antimicrobial therapy with IV Vancomycin for treatment of bacteremia    Drug Allergies:   Review of patient's allergies indicates:   Allergen Reactions    Iodine and iodide containing products Other (See Comments)       Actual Body Weight:   97.2 kg    Renal Function:   Estimated Creatinine Clearance: 32.6 mL/min (A) (based on SCr of 2.2 mg/dL (H)).,     Dialysis Method (if applicable):  N/A

## 2020-06-12 NOTE — ASSESSMENT & PLAN NOTE
--Likely acutely exacerbated by stress of shock + stress dose steroids.   --Wean insulin gtt to off today.   --SSI for now. CBG goal 140-180.

## 2020-06-12 NOTE — PLAN OF CARE
06/12/20 1221   Discharge Reassessment   Assessment Type Discharge Planning Reassessment   Discharge Plan A Inpatient Hospice   Discharge Plan B Hospice/home   DME Needed Upon Discharge  other (see comments)  (TBD)   Anticipated Discharge Disposition [facility]ospiceMedic   Can the patient/caregiver answer the patient profile reliably?   (SHAGGY Vented/Sedated)   How does the patient rate their overall health at the present time? Poor   Describe the patient's ability to walk at the present time.   (SHAGGY Vented/sedated)   How often would a person be available to care for the patient? Often   Number of comorbid conditions (as recorded on the chart) Five or more   Post-Acute Status   Post-Acute Authorization Placement   Post-Acute Placement Status Awaiting Internal Medical Clearance   COVID+  VENT  Followed by Pulmonary and Nutrition  [facility]

## 2020-06-12 NOTE — SUBJECTIVE & OBJECTIVE
Interval History/Significant Events: No significant events overnight. Off insulin gtt this morning. Giapreza titrated to off--still on norepinephrine, epinephrine, and vasopressin.     Review of Systems   Unable to perform ROS: Intubated     Objective:     Vital Signs (Most Recent):  Temp: (!) 101.2 °F (38.4 °C) (06/12/20 0700)  Pulse: 108 (06/12/20 0848)  Resp: (!) 24 (06/12/20 0848)  BP: 109/60 (06/12/20 0700)  SpO2: (!) 93 % (06/12/20 0848) Vital Signs (24h Range):  Temp:  [97 °F (36.1 °C)-101.2 °F (38.4 °C)] 101.2 °F (38.4 °C)  Pulse:  [] 108  Resp:  [24-29] 24  SpO2:  [75 %-98 %] 93 %  BP: ()/() 109/60  Arterial Line BP: ()/() 115/41   Weight: 103 kg (227 lb 1.2 oz)  Body mass index is 29.15 kg/m².      Intake/Output Summary (Last 24 hours) at 6/12/2020 0935  Last data filed at 6/12/2020 0601  Gross per 24 hour   Intake 4972.8 ml   Output 1562 ml   Net 3410.8 ml       Physical Exam   Constitutional: He appears well-developed and well-nourished. He is intubated.   HENT:   Head: Normocephalic and atraumatic.   Mouth/Throat: Oropharynx is clear and moist.   Eyes: Pupils are equal, round, and reactive to light. Conjunctivae are normal. Right eye exhibits no discharge. Left eye exhibits no discharge. No scleral icterus.   Neck: Trachea normal, normal range of motion and full passive range of motion without pain. Neck supple. No JVD present. No tracheal deviation present. No thyromegaly present.   Cardiovascular: Normal rate, regular rhythm, S1 normal, S2 normal, normal heart sounds and intact distal pulses. Exam reveals no gallop and no friction rub.   No murmur heard.  Pulmonary/Chest: Breath sounds normal. He is intubated. He is in respiratory distress. He has no wheezes. He has no rales. He exhibits no tenderness.   Abdominal: Soft. Bowel sounds are normal. He exhibits no distension and no mass. There is no tenderness. There is no rebound and no guarding.   Musculoskeletal: Normal  range of motion. He exhibits no tenderness or deformity.   Lymphadenopathy:     He has no cervical adenopathy.   Neurological: No cranial nerve deficit. Coordination normal.   Skin: Skin is warm and dry. No abrasion and no bruising noted.   Vitals reviewed.      Vents:  Vent Mode: A/C (06/12/20 0848)  Ventilator Initiated: Yes(chart correction) (06/09/20 1130)  Set Rate: 24 BPM (06/12/20 0848)  Vt Set: 500 mL (06/12/20 0848)  PEEP/CPAP: 8 cmH20 (06/12/20 0848)  Oxygen Concentration (%): 80 (06/12/20 0848)  Peak Airway Pressure: 22 cmH2O (06/12/20 0848)  Plateau Pressure: 12 cmH20 (06/12/20 0848)  Total Ve: 13.5 mL (06/12/20 0848)  F/VT Ratio<105 (RSBI): (!) 46.42 (06/12/20 0848)  Lines/Drains/Airways     Central Venous Catheter Line            Percutaneous Central Line Insertion/Assessment - Triple Lumen  06/09/20 1215 left subclavian 2 days          Drain                 NG/OG Tube 06/09/20 1129 2 days         Urethral Catheter 06/09/20 1245 Straight-tip 2 days          Airway                 Airway - Non-Surgical 06/09/20 1126 Endotracheal Tube 2 days          Arterial Line                 Arterial Line 06/11/20 1530 Right Radial less than 1 day          Peripheral Intravenous Line                 Peripheral IV - Single Lumen 06/09/20 1115 20 G Anterior;Left Forearm 2 days         Peripheral IV - Single Lumen 06/09/20 1804 18 G Right Antecubital 2 days              Significant Labs:    CBC/Anemia Profile:  Recent Labs   Lab 06/11/20  0253 06/12/20  0234   WBC 9.91 8.37   HGB 11.4* 11.2*   HCT 36.6* 35.2*   * 69*   MCV 90 89   RDW 15.8* 16.4*        Chemistries:  Recent Labs   Lab 06/11/20  0253 06/11/20  1621 06/12/20  0234    137 138   K 2.5* 4.2 3.9   CL 93* 92* 89*   CO2 20* 12* 11*   BUN 27* 29* 33*   CREATININE 2.0* 2.0* 2.2*   CALCIUM 6.3* 6.4* 6.3*   ALBUMIN 1.2*  --  1.2*   PROT 4.6*  --  4.8*   BILITOT 2.1*  --  3.4*   ALKPHOS 43*  --  64   ALT 19  --  25   AST 27  --  54*   MG 1.3*  --  1.9    PHOS 2.2* 5.2*  --      Significant Imaging:  I have reviewed and interpreted all pertinent imaging results/findings within the past 24 hours.

## 2020-06-12 NOTE — ASSESSMENT & PLAN NOTE
--Secondary to Staph aureus PNA/bacteremia and Pseudomonas PNA.   --Follow up surveillance cultures.   --Lactate >11  --Titrate norepinephrine + epinephrine + vasopressin to keep MAP>65mmHg.   --Giapreza stopped overnight for unclear reasons.   --Continue vancomycin and pip/tazo. Consider aminoglycoside.   --Continue stress dose steroids.  --High risk of death in need of immediate intervention.

## 2020-06-12 NOTE — ASSESSMENT & PLAN NOTE
--Likely 2/2 sepsis.   --S/p amiodarone bolus 6/9.  --Continue amiodarone infusion.   --Fairly well rate controlled this morning.

## 2020-06-12 NOTE — PLAN OF CARE
Pt free from falls and injury this shift. VSS. SpO2 reading intermittently, 90-93% on vent settings ACVC+ 80 and 8. MAPs maintained >60, pt on continuous epi, vaso, and levo, sodium bicarb, amio, and heparin. All pulses dopplerable, extremities cool to touch. Some response to noxious stimuli. Abdomen rounded/soft, no BM this shift. Dubois intact, uop minimal. 30-35cc/hr. No other significant events this shift. Will continue to monitor.

## 2020-06-12 NOTE — PLAN OF CARE
POC reviewed with pt at 0500. Pt did not verbalized understanding. Pt remains on Vaso, Levo, Epi, Amio, Heparin. Pt required increase in pressors for MAP >65. Afebrile this shift, no bowel movement this shift. No acute events overnight.  Will continue to monitor. See flowsheets for full assessment and VS info        Problem: Adult Inpatient Plan of Care  Goal: Plan of Care Review  Outcome: Ongoing, Progressing     Problem: Electrolyte Imbalance (Acute Kidney Injury/Impairment)  Goal: Serum Electrolyte Balance  Outcome: Ongoing, Progressing     Problem: Diabetes Comorbidity  Goal: Blood Glucose Level Within Desired Range  Outcome: Ongoing, Progressing

## 2020-06-13 NOTE — PLAN OF CARE
Death Note    Called to bedside by patient's nurse. Nursing supervisor notified. Family called.  has been called.    Patient is not responding to verbal or tactile stimuli. Patient does not have a papillary or corneal reflex. Pupils are fixed and dilated. No heart or breath sounds on auscultation. No respirations. No palpable pulses.     Time of death: 8:17 am    Cause of Death: Cardiac arrest           Janeen Dolan MD  PGY-2 Internal Medicine  Pager #: [phone]

## 2020-06-13 NOTE — NURSING
MD Chapman notified of loss of both corneal reflexes and no UOP for the last 2 hours.     No further orders given at this time.

## 2020-06-13 NOTE — CARE UPDATE
Bud Martínez notified of death of Mr. Pitts.     CATY Argueta PA-C  Critical Care Medicine  [phone]

## 2020-06-13 NOTE — CHAPLAIN
Spiritual Care Encounter: Death  Thank you for allowing us to be apart of the patient and family's care at this most difficult time.  As well as your genuine care and compassion for the patient and their loved one's. Through this interdisciplinary teamwork  we were able to provide the the best possible patient and family centered care we could offer.        Purpose:  To provide emotional, spiritual and/or existential support to the patient and/or their loved one's during their time of grief/loss as well as to provide information to the family/staff/supports regarding the decedent care process, next steps, grief resources and the paperwork required for proper decedent documentation.    Visit Type: Initial Visit  Visit Category: Death  Visited With: Health care provider, Family(daughter via phone)  Number of Family Visited: 1  Length of Visit: 45 Minutes  Referral From: Nurse  Referral To:            Encounter Summary:            Patient Spiritual Encounters  Care Provided: Decedent Care   Family Spiritual Encounters  Care Provided: Explored pt/family concerns, Reflective listening, Compassionate presence, Grief care, Other care (specify)(gathered info for decedent care paperwork)  Family Coping: Accepting, Open/discussion, Internal strength, Family/ friends resources     No Update to Plan of Care/Goals      Sadly, the patient has passed away and will therefore require no follow-up. There will be continued thoughts of peace and blessing for the patient's loved one's during this time of loss and grief. The Spiritual Care Department/ 's will  be reaching out to the family/loved ones in the following days regarding next steps as well as to provide emotional and spiritual  support.    Please contact the department of spiritual care with any questions you may have regarding the decedent care process and/or paperwork.        Teresa Felton  Chaplain Resident  On-Call 24/7: #[phone]  Department of Spiritual  [facility] [place] [facility]

## 2020-06-13 NOTE — DISCHARGE SUMMARY
Death Note  Critical Care Medicine      Admit Date: 2020    Date of Death: 2020    Time of Death: 08:17 AM    Attending Physician: Kamla Chang MD    Principal Diagnoses: Septic shock    Preliminary Cause of Death: Septic shock    Secondary Diagnoses:   Active Hospital Problems    Diagnosis  POA    *Septic shock [A41.9, R65.21]  Yes    Hyperglycemia [R73.9]  No    Bacteremia due to Staphylococcus aureus [R78.81]  No    Pneumonia due to Pseudomonas species [J15.1]  No    Atrial fibrillation with RVR [I48.91]  No    Acute metabolic encephalopathy [G93.41]  Yes    Acute hypoxemic respiratory failure [J96.01]  No    Lactic acidosis [E87.2]  No    Decubitus ulcer of coccygeal region, stage 3 [L89.153]  Yes    COVID-19 [U07.1]  Yes    ANGELIA (acute kidney injury) [N17.9]  Yes    Oropharyngeal dysphagia [R13.12]  Yes    Type 2 diabetes mellitus with hyperglycemia, without long-term current use of insulin [E11.65]  Yes     Chronic      Resolved Hospital Problems    Diagnosis Date Resolved POA    Hypernatremia [E87.0] 2020 Yes    Dehydration [E86.0] 2020 Yes        Discharged Condition:     HPI:  Hollis Pitts is an 85-year-old male with 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 Per his daughter, Mr. Pitts developed decreased PO intake which prompted presentation to the ED.    He was admitted to hospital medicine on 20. SLP consulted for evaluation of dysphagia. A rapid response was called on 2020 for episode of afib with RVR, hypotension, and unresponsiveness with upper airway gurgling noises. He was febrile earlier that morning. 2L NS given and broad spectrum abx were ordered however not given due to poor IV access. Critical care medicine consulted and patient transferred to CMICU for hypotension and AMS.     Hospital/ICU Course:  In the  ICU, Mr. Pitts was started on vasopressors and emergently intubated for encephalopathy. His TTE revealed an EF 10%, LV diastolic dysfunction, global hypokinetic wall motion, and severely reduced right ventricular systolic function. Epinephrine initiated for pressor support in additon to vasopressin, plan to wean off levophed. Blood cultures with Staph aureus in all 4 bottles, and sputum culture with many Staph and a presumptive Pseudomonas species so he was started on broad spectrum antibiotics. Critical Care staff spoke with family regarding patients condition on 06/10 and he was made DNR with no escalation of care and or further invasive treatment measures such as CRRT. His daughter has declined to visit. Repeat blood cultures sent 06/11 and lactate remains >11, continuing to wean off levophed and use epinephrine. Added Giapreza 06/11, repeat blood cultures, BLE arterial u/s for decreased doppler pulses    Mr. Pitts received aggressive care after decompensating however he did not show any substantial improvement. He was subsequently declared dead.    CATY Argueta PA-C  Critical Care Medicine  [phone]

## 2020-06-15 LAB — POCT GLUCOSE: <20 MG/DL (ref 70–110)

## 2020-06-15 NOTE — PLAN OF CARE
06/15/20 0814   Final Note   Assessment Type Final Discharge Note     Patient  on 2020 at 0817. Discharge summary/death note faxed to BO Fontaine (PCP) f [phone].

## 2020-06-16 NOTE — PHYSICIAN QUERY
PT Name: Hollis Pitts  MR #: [id number]     PRESENT ON ADMISSION (POA) DIAGNOSIS CLARIFICATION     CDS: Leonor Strauss RN, CCDS         Contact information :ext [phone] [email or web address]     This form is a permanent document in the medical record.     Query Date: June 16, 2020    By submitting this query, we are merely seeking further clarification of documentation.  Please utilize your independent clinical judgment when addressing the question(s) below.     The Medical Record contains following diagnoses documented:  Clinical Information Location in Medical Record     Hyaline casts 3    Covid 19  Dehydration  Dry mucous membranes    BP: (109-156)/(53-82) 156/72      ANGELIA  ANGELIA improving    ANGELIA (acute kidney injury)  --2/2 ischemic ATN.   --Making some urine. Furosemide responsive.   --No acute indication for RRT. Continue to monitor.       BUN 20, creatinine 1.1, GFR>60  BUN 23, creatinine 1.4, GFR 53.3  BUN 17, creatinine 1.8, GFR 39.4  BUN 18, creatinine 2.3, GFR 29.3  BUN 27, creatinine 2.0, GFR 34.7         Lab U/A 6/4/20    H&P 6/5/20            Pharmacist Note 6/10/20  Critical care PN 6/11/20    Critical Care PN 6/12/20            Lab 6/4/20@1458  Lab 6/5/20@0600  Lab 6/9/20  Lab 6/10/20  Lab 6/11/20      Present on admission (POA) is defined as present at the time inpatient admission occurs. Conditions that develop during an outpatient encounter, including emergency department, observation or outpatient surgery, are considered as present on admission. [facility] 4th Quarter 2008      Please clarify the Present on Admission (POA) status of the diagnosis:  ANGELIA (acute kidney injury)  -2/2 ischemic ATN.     Present on admission (POA) status:   [     ] Yes (Y)                [     ] Clinically Undetermined (W)        [   x  ] No (N)                  [     ] Documentation insufficient to determine if condition is POA (U)

## 2020-06-16 NOTE — PHYSICIAN QUERY
PT Name: Hollis Pitts  MR #: [id number]     PRESENT ON ADMISSION (POA) DIAGNOSIS CLARIFICATION     CDS: Leonor Strauss RN, CCDS         Contact information :ext [phone] [email or web address]     This form is a permanent document in the medical record.     Query Date: June 16, 2020    By submitting this query, we are merely seeking further clarification of documentation.  Please utilize your independent clinical judgment when addressing the question(s) below.     The Medical Record contains following diagnoses documented:  Clinical Information Location in Medical Record     He is not able to provide much helpful history due to memory difficulties but according to ED notes he was sent to the ED by his primary care physician due to difficulty swallowing and dehydration resulting from that.  He does not know why he is here  Memory deficits  Presumably related to TBI and age likely a factor as well.    Will order delirium precautions.  He is disoriented  Dementia  COVID-19    CO2 18  sodium chloride 0.9% bolus 1,000 mL         No acute intracranial process.    Acute metabolic encephalopathy  Hx of TBI currently wheelchair dependent. S/p PEG removal 7/2019. CT head on admission negative for acute abnormalities, chronic microvascular changes.      H&P 6/5/20                      Lab 6/4/20  MAR 6/4/20      CT Head 6/4/20    Critical Care Medicine 6/9/20          Present on admission (POA) is defined as present at the time inpatient admission occurs. Conditions that develop during an outpatient encounter, including emergency department, observation or outpatient surgery, are considered as present on admission. [facility] 4th Quarter 2008      Please clarify the Present on Admission (POA) status of the diagnosis:  Metabolic Encephalopathy    Present on admission (POA) status:   [ x   ] Yes (Y)                [    ] Clinically Undetermined (W)        [    ] No (N)                 [    ] Documentation insufficient to  determine if condition is POA (U)

## 2020-06-16 NOTE — PHYSICIAN QUERY
PT Name: Hollis Pitts  MR #: [id number]     PRESENT ON ADMISSION (POA) DIAGNOSIS CLARIFICATION     CDS: Leonor Strauss RN, CCDS         Contact information :ext [phone] [email or web address]     This form is a permanent document in the medical record.     Query Date: June 16, 2020    By submitting this query, we are merely seeking further clarification of documentation.  Please utilize your independent clinical judgment when addressing the question(s) below.     The Medical Record contains following diagnoses documented:  Clinical Information Location in Medical Record     Covid 19  Dehydration  Likely secondary to dysphagia.  Got 1L IVF at [facility] ED; will avoid further IVF to reduce risk of pulmonary edema in context of COVID-19 infection.  Vital Signs (24h Range):  Temp:  [97.5 °F (36.4 °C)-100.6 °F (38.1 °C)] 97.5 °F (36.4 °C)  Pulse:  [] 69  Resp:  [12-30] 18  SpO2:  [93 %-98 %] 98 %  BP: (109-156)/(53-82) 156/72     Shock  Likely septic vs cardiogenic. CXR with RML/RLL consolidation concerning for aspiration PNA.  BCx on admission NGTD. UA negative. Lactic 9.5. TTE with EF 10%, global wall motion hypokinesis. S/p 2.5L IVF resuscitation.   Vital Signs (24h Range):  Temp:  [99.5 °F (37.5 °C)-102.9 °F (39.4 °C)] 100.8 °F (38.2 °C)  Pulse:  [] 101  Resp:  [14-37] 25  SpO2:  [85 %-100 %] 95 %  BP: ()/(51-64) 93/64  Arterial Line BP: (116)/(60) 116/60    Septic shock: on epi and norepi, stress dose steroids    Principal Diagnoses: Septic shock       H&P 6/5/20                        Critical Care H&P 6/9/20-filed 6/10/20                          Critical Care medicine PN 6/11/20    Discharge summary 6/13/20      Present on admission (POA) is defined as present at the time inpatient admission occurs. Conditions that develop during an outpatient encounter, including emergency department, observation or outpatient surgery, are considered as present on admission. [facility] 4th Quarter  2008      Please clarify the Present on Admission (POA) status of the diagnosis: Septic shock     Present on admission (POA) status:   [    ] Yes (Y)               [  ] Clinically Undetermined (W)        [  x  ] No (N)                 [    ] Documentation insufficient to determine if condition is POA (U)

## 2020-06-16 NOTE — PHYSICIAN QUERY
PT Name: Hollis Pitts  MR #: [id number]    CAUSE AND EFFECT RELATIONSHIP CLARIFICATION     CDS: Leonor Strauss RN, CCDS         Contact information :ext [phone] [email or web address]       This form is a permanent document in the medical record.     Query Date: June 16, 2020    By submitting this query, we are merely seeking further clarification of documentation. Please utilize your independent clinical judgment when addressing the question(s) below.    Supporting Clinical Findings   Location in Medical Record                                                                      Principal Problem:COVID-19  Vital Signs (24h Range):  Temp:  [97.5 °F (36.4 °C)-100.6 °F (38.1 °C)] 97.5 °F (36.4 °C)  Pulse:  [] 69  Resp:  [12-30] 18  SpO2:  [93 %-98 %] 98 %  BP: (109-156)/(53-82) 156/72  Lactate 1.7, T. Bili 1.7      Sepsis                                                        He was admitted to hospital medicine on 6/5. SLP consulted for evaluation of dysphagia. Rapid response called on 6/9/2020 for episode of afib with RVR, hypotension, and unresponsiveness with upper airway gurgling noises. Patient febrile earlier that morning. 2L NS given and broad spectrum abx were ordered however not given due to poor IV access. Critical care medicine consulted and patient transferred to CMICU for hypotension and AMS.                                                           Hospital Medicine H&P 6/5/20                Lab 6/4/20      Critical care medicine H&P 6/9/20             Provider, please clarify if there is any clinical correlation between Sepsis and Covid 19 .           Are the conditions:      [ x  ] Due to or associated with each other   [   ] Unrelated to each other   [   ] Other explanation (Please Specify): ______________   [  ] Clinically Undetermined        Present on admission (POA) status:   [  x   ] Yes (Y)                          [  ] Clinically Undetermined (W)  [     ] No (N)                             [     ] Documentation insufficient to determine if condition is POA (U)

## 2020-06-17 LAB
BACTERIA BLD CULT: ABNORMAL

## 2020-06-21 LAB
FUNGAL SUSC PNL ISLT: ABNORMAL
SPECIMEN SOURCE AND ORGANISM ID: ABNORMAL

## 2021-11-25 NOTE — CARE UPDATE
Chart check completed, abnormal VS noted, bedside RN, Stephany contacted, no concerns verbalized at this time, instructed to call [phone] for further concerns or assistance.    Vitals:    05/17/18 1100   BP:    Pulse: (!) 118   Resp:    Temp:           25-Nov-2021 21:59

## 2022-01-25 NOTE — PROGRESS NOTES
[facility]  Neurosurgery  Progress Note    Subjective:     History of Present Illness: 82 y.o.  male with type 2 diabetes, who presents today for follow up evaluation one week prior to 2 level ACDF scheduled for 4/10/2018. Pt reports    Pt presents today wearing a cervical collar and in a wheelchair. He would like to proceed with surgery. Per pt's family, his PCP would like to see him on 4/9, the day before surgery. He notes continued neck pain and BUE numbness/tingling as well as BUE weakness. Pt is only able to walk a few steps unassisted. He has never received neck surgery.     Post-Op Info:  Procedure(s) (LRB):  DISKECTOMY AND FUSION-ANTERIOR CERVICAL (ACDF); C5-6. C6-7 (N/A)   13 Days Post-Op     Interval History: stepped up to ICU yesterday. AFVSS.     Medications:  Continuous Infusions:  Scheduled Meds:   atorvastatin  20 mg Oral Nightly    ceFEPime (MAXIPIME) IVPB  1 g Intravenous Q8H    citalopram  20 mg Oral Daily    diltiaZEM  120 mg Oral Daily    gabapentin  300 mg Oral TID    heparin (porcine)  5,000 Units Subcutaneous Q8H    insulin aspart U-100  3 Units Subcutaneous TIDWM    insulin detemir U-100  9 Units Subcutaneous BID    metoprolol tartrate  12.5 mg Oral Q8H    senna-docusate 8.6-50 mg  1 tablet Oral BID    sodium chloride  2 g Oral Daily    tamsulosin  0.4 mg Oral Daily    traZODone  50 mg Oral QHS    valsartan  40 mg Oral Daily    vancomycin (VANCOCIN) IVPB  1,000 mg Intravenous Q12H     PRN Meds:acetaminophen, aluminum-magnesium hydroxide-simethicone, benzonatate, dextrose 50%, dextrose 50%, dextrose 50%, fentaNYL, glucagon (human recombinant), glucagon (human recombinant), glucose, glucose, hydrALAZINE, hydrocodone-acetaminophen 5-325mg, insulin aspart U-100, labetalol, lidocaine HCL 2%, magnesium oxide, magnesium oxide, ondansetron, potassium chloride 10%, potassium chloride 10%, potassium chloride 10%, potassium, sodium phosphates, potassium, sodium  phosphates, potassium, sodium phosphates     Review of Systems  Objective:     Weight: 99.5 kg (219 lb 5.7 oz)  Body mass index is 29.75 kg/m².  Vital Signs (Most Recent):  Temp: 97.8 °F (36.6 °C) (04/23/18 1100)  Pulse: 99 (04/23/18 1100)  Resp: 18 (04/23/18 1100)  BP: (!) 142/82 (04/23/18 1100)  SpO2: 95 % (04/23/18 1100) Vital Signs (24h Range):  Temp:  [97.8 °F (36.6 °C)-100 °F (37.8 °C)] 97.8 °F (36.6 °C)  Pulse:  [] 99  Resp:  [18-26] 18  SpO2:  [91 %-96 %] 95 %  BP: (130-164)/(62-82) 142/82       Date 04/23/18 0700 - 04/24/18 0659   Shift 7435-2470 5147-5702 4642-3155 24 Hour Total   I  N  T  A  K  E   I.V.  (mL/kg) 25  (0.3)   25  (0.3)    NG/   470    Shift Total  (mL/kg) 495  (5)   495  (5)   O  U  T  P  U  T   Urine  (mL/kg/hr) 330   330    Shift Total  (mL/kg) 330  (3.3)   330  (3.3)   Weight (kg) 99.5 99.5 99.5 99.5                   Male External Urinary Catheter 04/13/18 1600 Small (Active)   Collection Container Urimeter 4/20/2018  9:00 PM   Securement Method secured to top of thigh w/ adhesive device 4/20/2018  9:00 PM   Skin no redness;no breakdown 4/20/2018  9:00 PM   Tolerance no signs/symptoms of discomfort 4/20/2018  9:00 PM   Output (mL) 150 mL 4/19/2018  2:00 PM   Catheter Change Date 04/19/18 4/19/2018  3:01 PM   Catheter Change Time 0701 4/19/2018  3:01 PM       Neurosurgery Physical Exam   General: well developed, well nourished, no distress.   Head: normocephalic, atraumatic  Neurologic: Alert and oriented. Thought content appropriate, but lethargic & slow to answer  GCS: Motor: 6/Verbal: 5/Eyes: 4 GCS Total: 15  Mental Status: Awake, Alert, Oriented x 4  Language: No aphasia  Speech: No dysarthria  Cranial nerves: face symmetric, tongue midline, CN II-XII grossly intact.   Eyes: pupils equal, round, reactive to light with accomodation, EOMI.   Pulmonary: normal respirations, no signs of respiratory distress  Abdomen: soft, non-distended, not tender to palpation  Sensory:  intact to light touch throughout     Motor Strength:Moves all extremities spontaneously with good tone. No abnormal movements seen.      Strength   Deltoids Triceps Biceps Wrist Extension Wrist Flexion Hand    Upper: R 3/5 3/5 4-/5 4/5 4/5 4/5     L 3/5 4/5 4/5 4/5 4/5 4/5       Iliopsoas Quadriceps Knee  Flexion Tibialis  anterior Gastro- cnemius EHL   Lower: R 3/5 4+/5 5/5 5/5 5/5 5/5     L 3/5 4+/5 5/5 5/5 5/5 5/5      DTR's - 2 + and symmetric in UE and LE  Pronator Drift: no drift noted  Finger-to-nose: Intact bilaterally  Moct[place]uma: absent  Clonus: absent  Babinski: absent  Pulses: 2+ and symmetric radial and dorsalis pedis.  Skin: Skin is warm, dry and intact.  Incision c/d/i with no surrounding erythema, edema, or drainage. Skin edges well approximated with dermabond.  C-collar in place.       Significant Labs:    Recent Labs  Lab 04/22/18  0408 04/23/18  0357   * 248*    145   K 4.1 3.8    111*   CO2 26 24   BUN 35* 29*   CREATININE 0.8 0.8   CALCIUM 11.5* 10.9*   MG 2.1 1.8       Recent Labs  Lab 04/22/18  0408 04/23/18  0357   WBC 22.61* 21.88*   HGB 10.3* 10.9*   HCT 32.5* 34.5*    298     No results for input(s): LABPT, INR, APTT in the last 48 hours.  Microbiology Results (last 7 days)     Procedure Component Value Units Date/Time    Urine culture [[id number]] Collected:  04/21/18 1430    Order Status:  Completed Specimen:  Urine from Urine, Catheterized Updated:  04/22/18 2020     Urine Culture, Routine --     GRAM NEGATIVE MARIO, NON-LACTOSE   10,000 - 49,999 cfu/ml  Identification and susceptibility pending      Blood culture [[id number]] Collected:  04/21/18 1335    Order Status:  Completed Specimen:  Blood Updated:  04/22/18 1612     Blood Culture, Routine No Growth to date     Blood Culture, Routine No Growth to date    Narrative:       Blood cultures x 2 different sites. 4 bottles total. Please  draw cultures before administering antibiotics.    Blood culture  [[id number]] Collected:  04/21/18 1335    Order Status:  Completed Specimen:  Blood Updated:  04/22/18 1612     Blood Culture, Routine No Growth to date     Blood Culture, Routine No Growth to date    Narrative:       Blood cultures from 2 different sites. 4 bottles total.  Please draw before starting antibiotics.    Blood culture [[id number]] Collected:  04/19/18 0952    Order Status:  Completed Specimen:  Blood from Peripheral, Hand, Left Updated:  04/22/18 1412     Blood Culture, Routine No Growth to date     Blood Culture, Routine No Growth to date     Blood Culture, Routine No Growth to date     Blood Culture, Routine No Growth to date    Narrative:       Blood cultures x 2 different sites. 4 bottles total. Please  draw cultures before administering antibiotics.    Blood culture [[id number]] Collected:  04/19/18 1005    Order Status:  Completed Specimen:  Blood from Peripheral, Wrist, Right Updated:  04/22/18 1412     Blood Culture, Routine No Growth to date     Blood Culture, Routine No Growth to date     Blood Culture, Routine No Growth to date     Blood Culture, Routine No Growth to date    Narrative:       Blood cultures from 2 different sites. 4 bottles total.  Please draw before starting antibiotics.    Culture, Respiratory with Gram Stain [[id number]] Collected:  04/19/18 0231    Order Status:  Completed Specimen:  Respiratory from Sputum, Expectorated Updated:  04/21/18 1059     Respiratory Culture Normal respiratory mitzy     Gram Stain (Respiratory) <10 epithelial cells per low power field.     Gram Stain (Respiratory) Many WBC's     Gram Stain (Respiratory) Many Gram positive cocci     Gram Stain (Respiratory) Many Gram negative rods     Gram Stain (Respiratory) Rare Gram positive rods          Significant Diagnostics:  I personally reviewed CXR - The cardiomediastinal silhouette is prominent, magnified by technique, stable..  There is obscuration of the left costophrenic angle suggesting effusion.   There is elevation of the right hemidiaphragm..  The trachea is midline.  The lungs are symmetrically expanded bilaterally with patchy increased interstitial and parenchymal attenuation, primarily in a perihilar distribution suggesting edema.  There is patchy increased parenchymal attenuation projected over the left lower lung zone, developing consolidation is of concern.  Developing right lower lobe consolidation not excluded medially..  There is no pneumothorax.  The osseous structures are unchanged..Interval History:     Medications:  Continuous Infusions:  Scheduled Meds:   atorvastatin  20 mg Oral Nightly    ceFEPime (MAXIPIME) IVPB  1 g Intravenous Q8H    citalopram  20 mg Oral Daily    diltiaZEM  120 mg Oral Daily    gabapentin  300 mg Oral TID    heparin (porcine)  5,000 Units Subcutaneous Q8H    insulin aspart U-100  3 Units Subcutaneous TIDWM    insulin detemir U-100  9 Units Subcutaneous BID    metoprolol tartrate  12.5 mg Oral Q8H    senna-docusate 8.6-50 mg  1 tablet Oral BID    sodium chloride  2 g Oral Daily    tamsulosin  0.4 mg Oral Daily    traZODone  50 mg Oral QHS    valsartan  40 mg Oral Daily    vancomycin (VANCOCIN) IVPB  1,000 mg Intravenous Q12H     PRN Meds:acetaminophen, aluminum-magnesium hydroxide-simethicone, benzonatate, dextrose 50%, dextrose 50%, dextrose 50%, fentaNYL, glucagon (human recombinant), glucagon (human recombinant), glucose, glucose, hydrALAZINE, hydrocodone-acetaminophen 5-325mg, insulin aspart U-100, labetalol, lidocaine HCL 2%, magnesium oxide, magnesium oxide, ondansetron, potassium chloride 10%, potassium chloride 10%, potassium chloride 10%, potassium, sodium phosphates, potassium, sodium phosphates, potassium, sodium phosphates     Review of Systems  Objective:     Weight: 99.5 kg (219 lb 5.7 oz)  Body mass index is 29.75 kg/m².  Vital Signs (Most Recent):  Temp: 97.8 °F (36.6 °C) (04/23/18 1100)  Pulse: 99 (04/23/18 1100)  Resp: 18 (04/23/18  1100)  BP: (!) 142/82 (04/23/18 1100)  SpO2: 95 % (04/23/18 1100) Vital Signs (24h Range):  Temp:  [97.8 °F (36.6 °C)-100 °F (37.8 °C)] 97.8 °F (36.6 °C)  Pulse:  [] 99  Resp:  [18-26] 18  SpO2:  [91 %-96 %] 95 %  BP: (130-164)/(62-82) 142/82       Date 04/23/18 0700 - 04/24/18 0659   Shift 8714-6546 8991-7579 9056-9008 24 Hour Total   I  N  T  A  K  E   I.V.  (mL/kg) 25  (0.3)   25  (0.3)    NG/   470    Shift Total  (mL/kg) 495  (5)   495  (5)   O  U  T  P  U  T   Urine  (mL/kg/hr) 330   330    Shift Total  (mL/kg) 330  (3.3)   330  (3.3)   Weight (kg) 99.5 99.5 99.5 99.5                        NG/OG Tube 04/21/18 2300 Cortrak Right nostril (Active)   Placement Check placement verified by distal tube length measurement 4/23/2018 11:00 AM   Distal Tube Length (cm) 75 4/23/2018 11:00 AM   Tolerance no signs/symptoms of discomfort 4/23/2018 11:00 AM   Securement anchored to nostril center w/ adhesive device 4/23/2018 11:00 AM   Clamp Status/Tolerance unclamped;no abdominal distention;no emesis;no residual;no restlessness 4/23/2018 11:00 AM   Insertion Site Appearance no redness, warmth, tenderness, skin breakdown, drainage 4/23/2018 11:00 AM   Drainage None 4/23/2018  5:00 AM   Flush/Irrigation flushed w/;water;no resistance met 4/23/2018  5:00 AM   Feeding Method continuous 4/23/2018 11:00 AM   Current Rate (mL/hr) 55 mL/hr 4/23/2018  5:00 AM   Goal Rate (mL/hr) 55 mL/hr 4/23/2018  5:00 AM   Intake (mL) 75 mL 4/23/2018  9:00 AM   Water Bolus (mL) 100 mL 4/23/2018  5:00 AM   Rate Formula Tube Feeding (mL/hr) 55 mL/hr 4/23/2018  5:00 AM   Intake (mL) - Formula Tube Feeding 55 4/23/2018 11:00 AM   Residual Amount (ml) 0 ml 4/23/2018 11:00 AM            Urethral Catheter 04/21/18 1432 Straight-tip (Active)   Site Assessment Clean;Intact;Dry 4/23/2018 11:00 AM   Collection Container Urimeter 4/23/2018 11:00 AM   Securement Method secured to top of thigh w/ adhesive device 4/23/2018 11:00 AM   Catheter Care  "Performed yes 4/23/2018  8:00 AM   Reason for Continuing Urinary Catheterization Urinary retention;Critically ill in ICU requiring intensive monitoring 4/23/2018 11:00 AM   CAUTI Prevention Bundle StatLock in place w 1" slack;Drainage bag off the floor;Green sheeting clip in use;No dependent loops or kinks 4/23/2018  7:00 AM   Output (mL) 75 mL 4/23/2018 11:00 AM       Neurosurgery Physical Exam    Significant Labs:    Recent Labs  Lab 04/22/18  0408 04/23/18  0357   * 248*    145   K 4.1 3.8    111*   CO2 26 24   BUN 35* 29*   CREATININE 0.8 0.8   CALCIUM 11.5* 10.9*   MG 2.1 1.8       Recent Labs  Lab 04/22/18  0408 04/23/18  0357   WBC 22.61* 21.88*   HGB 10.3* 10.9*   HCT 32.5* 34.5*    298     No results for input(s): LABPT, INR, APTT in the last 48 hours.  Microbiology Results (last 7 days)     Procedure Component Value Units Date/Time    Urine culture [[id number]] Collected:  04/21/18 1430    Order Status:  Completed Specimen:  Urine from Urine, Catheterized Updated:  04/22/18 2020     Urine Culture, Routine --     GRAM NEGATIVE MARIO, NON-LACTOSE   10,000 - 49,999 cfu/ml  Identification and susceptibility pending      Blood culture [[id number]] Collected:  04/21/18 1335    Order Status:  Completed Specimen:  Blood Updated:  04/22/18 1612     Blood Culture, Routine No Growth to date     Blood Culture, Routine No Growth to date    Narrative:       Blood cultures x 2 different sites. 4 bottles total. Please  draw cultures before administering antibiotics.    Blood culture [[id number]] Collected:  04/21/18 1335    Order Status:  Completed Specimen:  Blood Updated:  04/22/18 1612     Blood Culture, Routine No Growth to date     Blood Culture, Routine No Growth to date    Narrative:       Blood cultures from 2 different sites. 4 bottles total.  Please draw before starting antibiotics.    Blood culture [[id number]] Collected:  04/19/18 0952    Order Status:  Completed Specimen:  Blood " from Peripheral, Hand, Left Updated:  04/22/18 1412     Blood Culture, Routine No Growth to date     Blood Culture, Routine No Growth to date     Blood Culture, Routine No Growth to date     Blood Culture, Routine No Growth to date    Narrative:       Blood cultures x 2 different sites. 4 bottles total. Please  draw cultures before administering antibiotics.    Blood culture [[id number]] Collected:  04/19/18 1005    Order Status:  Completed Specimen:  Blood from Peripheral, Wrist, Right Updated:  04/22/18 1412     Blood Culture, Routine No Growth to date     Blood Culture, Routine No Growth to date     Blood Culture, Routine No Growth to date     Blood Culture, Routine No Growth to date    Narrative:       Blood cultures from 2 different sites. 4 bottles total.  Please draw before starting antibiotics.    Culture, Respiratory with Gram Stain [[id number]] Collected:  04/19/18 0231    Order Status:  Completed Specimen:  Respiratory from Sputum, Expectorated Updated:  04/21/18 1059     Respiratory Culture Normal respiratory mitzy     Gram Stain (Respiratory) <10 epithelial cells per low power field.     Gram Stain (Respiratory) Many WBC's     Gram Stain (Respiratory) Many Gram positive cocci     Gram Stain (Respiratory) Many Gram negative rods     Gram Stain (Respiratory) Rare Gram positive rods        All pertinent labs from the last 24 hours have been reviewed.    Significant Diagnostics:      Assessment/Plan:     * Cervical myelopathy with cervical radiculopathy    82 y.o. male s/p C3-5 ACDF POD#12    - Neurologically stable  - Respiratory status improved, possible aspiration.  - Neuro checks q4h  - Leave incision open to air, healing well with no edema present  - NPO for now  - Urine with GNRs, rocephin.  -Cervical collar when OOB or with activity  -IS to bed side. Patient to use atleast 10x every hour.  -CV- goal normotension. Continue home meds. PRN meds SBP >160.  -Hyponatremia- daily NaCl tabs.  -Heme/ID- hgb/hct  stable. Afebrile.   -Urinary retention- rouse in place, continue Flomax  -Continue PILI/SCDs/SQH  -Continue bowel regimen daily.  -Continue to hold Eliquis    Dispo: cont ICU care today, possible ttf tomorrow if doing better.               Orion Be MD  Neurosurgery  [facility]   Yes

## 2022-03-11 NOTE — CARE UPDATE
No HR noticed on monitor, no pulses felt. Physician called. Pt pronounced at 0817 by MD Kavitha at bedside.  called, TRAMAINE notified, coroners office called, house supervisor notified, family notified.     [No Acute Distress] : no acute distress [Normal Appearance] : normal appearance [Normal Rate/Rhythm] : normal rate/rhythm [Normal S1, S2] : normal s1, s2 [No Murmurs] : no murmurs [No Rubs] : no rubs [No Gallops] : no gallops [No Resp Distress] : no resp distress [No Acc Muscle Use] : no acc muscle use [Normal Rhythm and Effort] : normal rhythm and effort [Clear to Auscultation Bilaterally] : clear to auscultation bilaterally [Normal to Percussion] : normal to percussion [No Abnormalities] : no abnormalities [Normal Gait] : normal gait [No Clubbing] : no clubbing [No Cyanosis] : no cyanosis [No Edema] : no edema [FROM] : FROM [Normal Color/ Pigmentation] : normal color/ pigmentation [No Focal Deficits] : no focal deficits [Oriented x3] : oriented x3 [Normal Affect] : normal affect

## 2022-03-16 NOTE — ASSESSMENT & PLAN NOTE
- Continue atorvastatin   March 16, 2022    Hello, may I speak with Joelle Clement?    My name is Francia     I am  with [facility]  [address]  [place] [address] [phone].    Before we get started may I verify your date of birth? 1983    I am calling to officially welcome you to our practice and ask about your recent visit. Is this a good time to talk? No just got out of surgery     Tell me about your visit with us. What things went well?         We're always looking for ways to make our patients' experiences even better. Do you have recommendations on ways we may improve?     Overall were you satisfied with your first visit to our practice?        I appreciate you taking the time to speak with me today. Is there anything else I can do for you?      Thank you, and have a great day.

## 2023-04-14 NOTE — ASSESSMENT & PLAN NOTE
Continue insulin gtt  Follow up beta hydroxy   Subsequent Stages Histo Method Verbiage: Using a similar technique to that described above, a thin layer of tissue was removed from all areas where tumor was visible on the previous stage.  The tissue was again oriented, mapped, dyed, and processed as above.

## 2025-01-16 NOTE — ASSESSMENT & PLAN NOTE
82 y.o. male s/p C3-5 ACDF with Dr. Hess, POD#4    -Neurologically stable  -Leave incision open to air  -HV with CSF drainage - currently clamped, please leave clamped  -LD placed for continued CSF leak - Lumbar drain to be open at 0 cm h20 and level with shoulder. Check output q30min and clamp drain to prevent draining more than 10cc per hr.  Does not require minimum output per hour - if LD puts out 0, it is fine.  Please do not drop to floor to drain.  -Ancef while drains in place  -Continue dex 4mg q6h, PPI while on dex  -Cervical collar when OOB or with activity  -OK for OOB with PT - clamp LD when OOB  -IS to bed side. Patient to use atleast 10x every hour.  -Continue bowel regimen daily.  -Continue SQH, Teds and SCDs for DVTP.  -Urinary retention - rouse replaced  -Hyperglycemia/DM: on insulin TID with POCT.   -Continue to hold Eliquis x 1 weeks at this time  -Medical management per NCC  -ST consult     Reviewed with patient/parent live well kai instructions for test results.  Pt/parent verbalized understanding re: discharge instructions, and f/u information.  Urgent Care phone number provided to patient if questions arise.

## (undated) DEVICE — ADHESIVE DERMABOND ADVANCED

## (undated) DEVICE — BOOT SUTURE AID

## (undated) DEVICE — SEE MEDLINE ITEM 152622

## (undated) DEVICE — DRESSING TELFA STRL 4X3 LF

## (undated) DEVICE — TUBE FRAZIER 5MM 2FT SOFT TIP

## (undated) DEVICE — SEE MEDLINE ITEM 157131

## (undated) DEVICE — DRAPE C ARM 42 X 120 10/BX

## (undated) DEVICE — SEE MEDLINE ITEM 147518

## (undated) DEVICE — SUT ETHILON 3/0 18IN PS-1

## (undated) DEVICE — BIT DRILL FLAT CHUCK 16MM

## (undated) DEVICE — CORD BIPOLAR 12 FOOT

## (undated) DEVICE — GAUZE SPONGE PEANUT STRL

## (undated) DEVICE — DURASEAL

## (undated) DEVICE — DIFFUSER

## (undated) DEVICE — SUT MONOCRYL 4-0 PS-1 UND

## (undated) DEVICE — PIN DISTRACTION DISP 14MM
Type: IMPLANTABLE DEVICE | Site: SPINE CERVICAL | Status: NON-FUNCTIONAL
Removed: 2018-04-10

## (undated) DEVICE — HEMOSTAT SURGICEL 4X8IN

## (undated) DEVICE — DRAPE INCISE IOBAN 2 23X17IN

## (undated) DEVICE — WARMER DRAPE STERILE LF

## (undated) DEVICE — DRAPE STERI INSTRUMENT 1018

## (undated) DEVICE — PACK SET UP CONVERTORS

## (undated) DEVICE — NDL SPINAL 18GX3.5 SPINOCAN

## (undated) DEVICE — DRAIN CHANNEL ROUND 10FR

## (undated) DEVICE — PIN FIXATION TEMPORARY STRGHT
Type: IMPLANTABLE DEVICE | Site: SPINE CERVICAL | Status: NON-FUNCTIONAL
Removed: 2018-04-10

## (undated) DEVICE — KIT SURGIFLO HEMOSTATIC MATRIX

## (undated) DEVICE — SUT MCRYL PLUS 4-0 PS2 27IN

## (undated) DEVICE — DRAPE OPMI STERILE

## (undated) DEVICE — SEE MEDLINE ITEM 146292

## (undated) DEVICE — DRESSING SURGICAL 1/2X1/2

## (undated) DEVICE — SUT VICRYL PLUS 3-0 SH 18IN

## (undated) DEVICE — CARTRIDGE OIL

## (undated) DEVICE — ELECTRODE BLADE INSULATED 1 IN

## (undated) DEVICE — MARKER SKIN STND TIP BLUE BARR

## (undated) DEVICE — DRAPE THYROID WITH ARMBOARD

## (undated) DEVICE — ELECTRODE REM PLYHSV RETURN 9

## (undated) DEVICE — BURR PRECISION ROUND 6.0MM

## (undated) DEVICE — APPLICATOR MICROMYST

## (undated) DEVICE — DRESSING SURGICAL 3/4X3/4

## (undated) DEVICE — Device

## (undated) DEVICE — SUT CTD VICRYL 3-0 CR/SH

## (undated) DEVICE — SEE MEDLINE ITEM 157150

## (undated) DEVICE — SEE MEDLINE ITEM 156905

## (undated) DEVICE — RUBBERBAND STERILE 3X1/8IN

## (undated) DEVICE — KIT EVACUATOR 3-SPRING 1/8 DRN

## (undated) DEVICE — DRAPE STERI-DRAPE 1000 17X11IN